# Patient Record
Sex: MALE | Race: BLACK OR AFRICAN AMERICAN | NOT HISPANIC OR LATINO | Employment: OTHER | URBAN - METROPOLITAN AREA
[De-identification: names, ages, dates, MRNs, and addresses within clinical notes are randomized per-mention and may not be internally consistent; named-entity substitution may affect disease eponyms.]

---

## 2017-01-03 ENCOUNTER — OFFICE VISIT (OUTPATIENT)
Dept: PODIATRY | Facility: CLINIC | Age: 67
End: 2017-01-03
Payer: MEDICARE

## 2017-01-03 VITALS
BODY MASS INDEX: 26.9 KG/M2 | HEART RATE: 78 BPM | DIASTOLIC BLOOD PRESSURE: 92 MMHG | HEIGHT: 73 IN | WEIGHT: 203 LBS | SYSTOLIC BLOOD PRESSURE: 150 MMHG

## 2017-01-03 DIAGNOSIS — B35.1 ONYCHOMYCOSIS: ICD-10-CM

## 2017-01-03 DIAGNOSIS — E11.49 TYPE II DIABETES MELLITUS WITH NEUROLOGICAL MANIFESTATIONS: Primary | ICD-10-CM

## 2017-01-03 PROCEDURE — 99499 UNLISTED E&M SERVICE: CPT | Mod: S$PBB,,, | Performed by: PODIATRIST

## 2017-01-03 PROCEDURE — 99499 UNLISTED E&M SERVICE: CPT | Mod: S$GLB,,, | Performed by: PODIATRIST

## 2017-01-03 PROCEDURE — 11721 DEBRIDE NAIL 6 OR MORE: CPT | Mod: Q9,S$GLB,, | Performed by: PODIATRIST

## 2017-01-03 PROCEDURE — 99999 PR PBB SHADOW E&M-EST. PATIENT-LVL III: CPT | Mod: PBBFAC,,, | Performed by: PODIATRIST

## 2017-01-03 NOTE — MR AVS SNAPSHOT
[facility]   [place]  [place] [address]  Phone: [phone]                  Xander Sanchez   1/3/2017 9:30 AM   Office Visit    Description:  Male : 1950   Provider:  Scott Garcia DPM   Department:  [facility]           Reason for Visit     Diabetes Mellitus     Diabetic Foot Exam           Diagnoses this Visit        Comments    Type II diabetes mellitus with neurological manifestations    -  Primary     Onychomycosis                To Do List           Future Appointments        Provider Department Dept Phone    2017 8:00 AM SUDS, UROLOGY Kadeem [facility] - Urology 4th Floor [phone]    2017 8:00 AM HRA, KNR 7 [facility] [phone]    2017 8:40 AM MD Kadeem Bailey III [facility] - Neurology [phone]    2017 8:00 AM MD Kadeem Maradiaga [facility]-Physical Med & Rehab [phone]    2017 8:00 AM HOME MONITOR DEVICE CHECK, [facility] - Arrhythmia [phone]      Goals (5 Years of Data)     None      Follow-Up and Disposition     Return in about 3 months (around 4/3/2017).      [facility]     [facility] On Call Nurse Care Line -  Assistance  Registered nurses in the [facility] On Call Center provide clinical advisement, health education, appointment booking, and other advisory services.  Call for this free service at [phone].             Medications           Message regarding Medications     Verify the changes and/or additions to your medication regime listed below are the same as discussed with your clinician today.  If any of these changes or additions are incorrect, please notify your healthcare provider.             Verify that the below list of medications is an accurate representation of the medications you are currently taking.  If none reported, the list may be blank. If incorrect, please contact your healthcare provider. Carry this list with you in case of emergency.           Current Medications      ACCU-CHEK SOFTCLIX LANCETS Misc     amlodipine (NORVASC) 5 MG tablet Take 1 tablet (5 mg total) by mouth once daily.    ammonium lactate 12 % Crea Apply to feet twice a day. Avoid use between toes.    aspirin (ECOTRIN) 81 MG EC tablet Take 81 mg by mouth once daily.    baclofen (LIORESAL) 10 MG tablet Take 1 tablet (10 mg total) by mouth 3 (three) times daily.    CONTOUR NEXT STRIPS Strp     docusate sodium (COLACE) 50 MG capsule Take 2 capsules (100 mg total) by mouth 2 (two) times daily as needed for Constipation.    econazole nitrate 1 % cream Apply topically 2 (two) times daily. Apply to feet twice daily.    fluticasone-salmeterol 230-21 mcg/dose (ADVAIR HFA) 230-21 mcg/actuation HFAA inhaler Inhale 2 puffs into the lungs 2 (two) times daily.    gabapentin (NEURONTIN) 600 MG tablet Take 1 tablet (600 mg total) by mouth 4 (four) times daily with meals and nightly.    hydrocodone-acetaminophen 10-325mg (NORCO)  mg Tab Take 1 tablet by mouth every 8 (eight) hours as needed (MSK pain).    hydrocodone-acetaminophen 10-325mg (NORCO)  mg Tab Starting on Jan 11, 2017. Take 1 tablet by mouth every 8 (eight) hours as needed (MSK pain).    lisinopril (PRINIVIL,ZESTRIL) 5 MG tablet Take 2 tablets (10 mg total) by mouth every morning.    oxybutynin (DITROPAN) 5 MG Tab Take 1 tablet (5 mg total) by mouth 3 (three) times daily.    oxybutynin (DITROPAN-XL) 5 MG TR24 Take 1 tablet (5 mg total) by mouth 2 (two) times daily.    polyethylene glycol (GLYCOLAX) 17 gram/dose powder Take 17 g by mouth once daily.    pravastatin (PRAVACHOL) 10 MG tablet TAKE ONE TABLET BY MOUTH AT BEDTIME    tamsulosin (FLOMAX) 0.4 mg Cp24 Take 1 capsule (0.4 mg total) by mouth once daily.    hydrochlorothiazide (MICROZIDE) 12.5 mg capsule Take 1 capsule (12.5 mg total) by mouth once daily.    omeprazole (PRILOSEC) 40 MG capsule Take 1 capsule (40 mg total) by mouth before breakfast.           Clinical Reference Information           Vital  "Signs - Last Recorded  Most recent update: 1/3/2017  9:34 AM by Patsy Knapp MA    BP Pulse Ht Wt BMI    (!) 150/92 78 6' 1" (1.854 m) 92.1 kg (203 lb) 26.78 kg/m2      Blood Pressure          Most Recent Value    BP  (!)  150/92      Allergies as of 1/3/2017     No Known Allergies      Immunizations Administered on Date of Encounter - 1/3/2017     None      "

## 2017-01-05 ENCOUNTER — PROCEDURE VISIT (OUTPATIENT)
Dept: UROLOGY | Facility: CLINIC | Age: 67
End: 2017-01-05
Payer: MEDICARE

## 2017-01-05 ENCOUNTER — TELEPHONE (OUTPATIENT)
Dept: UROLOGY | Facility: CLINIC | Age: 67
End: 2017-01-05

## 2017-01-05 VITALS
HEART RATE: 70 BPM | BODY MASS INDEX: 28 KG/M2 | HEIGHT: 71 IN | WEIGHT: 200 LBS | DIASTOLIC BLOOD PRESSURE: 78 MMHG | SYSTOLIC BLOOD PRESSURE: 148 MMHG | TEMPERATURE: 98 F

## 2017-01-05 DIAGNOSIS — N40.1 BENIGN NON-NODULAR PROSTATIC HYPERPLASIA WITH LOWER URINARY TRACT SYMPTOMS: Primary | ICD-10-CM

## 2017-01-05 DIAGNOSIS — R35.0 FREQUENCY OF MICTURITION: ICD-10-CM

## 2017-01-05 DIAGNOSIS — R33.9 INCOMPLETE BLADDER EMPTYING: ICD-10-CM

## 2017-01-05 PROCEDURE — 87086 URINE CULTURE/COLONY COUNT: CPT

## 2017-01-05 PROCEDURE — 51797 INTRAABDOMINAL PRESSURE TEST: CPT | Mod: S$GLB,,, | Performed by: UROLOGY

## 2017-01-05 PROCEDURE — 51728 CYSTOMETROGRAM W/VP: CPT | Mod: S$GLB,,, | Performed by: UROLOGY

## 2017-01-05 PROCEDURE — 52000 CYSTOURETHROSCOPY: CPT | Mod: 59,S$GLB,, | Performed by: UROLOGY

## 2017-01-05 PROCEDURE — 51741 ELECTRO-UROFLOWMETRY FIRST: CPT | Mod: 51,S$GLB,, | Performed by: UROLOGY

## 2017-01-05 PROCEDURE — 51784 ANAL/URINARY MUSCLE STUDY: CPT | Mod: 51,S$GLB,, | Performed by: UROLOGY

## 2017-01-05 RX ORDER — CIPROFLOXACIN 250 MG/1
500 TABLET, FILM COATED ORAL ONCE
Status: COMPLETED | OUTPATIENT
Start: 2017-01-05 | End: 2017-01-05

## 2017-01-05 RX ORDER — LIDOCAINE HYDROCHLORIDE 20 MG/ML
JELLY TOPICAL ONCE
Status: COMPLETED | OUTPATIENT
Start: 2017-01-05 | End: 2017-01-05

## 2017-01-05 RX ADMIN — LIDOCAINE HYDROCHLORIDE: 20 JELLY TOPICAL at 09:01

## 2017-01-05 RX ADMIN — CIPROFLOXACIN 500 MG: 250 TABLET, FILM COATED ORAL at 09:01

## 2017-01-05 NOTE — MR AVS SNAPSHOT
[facility] U[facility] 4th Floor  [address]  [facility] [address]  Phone: [phone]                  Xander Sanchez   2017 8:00 AM   Procedure visit    Description:  Male : 1950   Provider:  SUDS, UROLOGY   Department:  [facility] 4th Floor           Diagnoses this Visit        Comments    Incomplete bladder emptying         Frequency of micturition                To Do List           Future Appointments        Provider Department Dept Phone    2017 8:00 AM ELIZABETH WATERSR 7 [facility] [phone]    2017 8:40 AM MD Kadeem Bailey III [facility] - Neurology [phone]    2017 8:40 AM MD Kadeem Quinn [facility] - Arrhythmia [phone]    2017 8:00 AM MD Kademe Maradiaga [facility]-Physical Med & Rehab [phone]    2017 8:00 AM HOME MONITOR DEVICE CHECK, [facility] Arrhythmia [phone]      Goals (5 Years of Data)     None      [facility]     [facility] Nurse [facility] -  Assistance  Registered nurses in the [facility] On Call Center provide clinical advisement, health education, appointment booking, and other advisory services.  Call for this free service at [phone].             Medications           Message regarding Medications     Verify the changes and/or additions to your medication regime listed below are the same as discussed with your clinician today.  If any of these changes or additions are incorrect, please notify your healthcare provider.             Verify that the below list of medications is an accurate representation of the medications you are currently taking.  If none reported, the list may be blank. If incorrect, please contact your healthcare provider. Carry this list with you in case of emergency.           Current Medications     ACCU-CHEK SOFTCLIX LANCETS Misc     amlodipine (NORVASC) 5 MG tablet Take 1 tablet (5 mg total) by mouth once daily.    ammonium lactate 12 % Crea Apply to feet twice a  "day. Avoid use between toes.    aspirin (ECOTRIN) 81 MG EC tablet Take 81 mg by mouth once daily.    baclofen (LIORESAL) 10 MG tablet Take 1 tablet (10 mg total) by mouth 3 (three) times daily.    CONTOUR NEXT STRIPS Strp     docusate sodium (COLACE) 50 MG capsule Take 2 capsules (100 mg total) by mouth 2 (two) times daily as needed for Constipation.    econazole nitrate 1 % cream Apply topically 2 (two) times daily. Apply to feet twice daily.    fluticasone-salmeterol 230-21 mcg/dose (ADVAIR HFA) 230-21 mcg/actuation HFAA inhaler Inhale 2 puffs into the lungs 2 (two) times daily.    gabapentin (NEURONTIN) 600 MG tablet Take 1 tablet (600 mg total) by mouth 4 (four) times daily with meals and nightly.    hydrocodone-acetaminophen 10-325mg (NORCO)  mg Tab Take 1 tablet by mouth every 8 (eight) hours as needed (MSK pain).    hydrocodone-acetaminophen 10-325mg (NORCO)  mg Tab Starting on Jan 11, 2017. Take 1 tablet by mouth every 8 (eight) hours as needed (MSK pain).    lisinopril (PRINIVIL,ZESTRIL) 5 MG tablet Take 2 tablets (10 mg total) by mouth every morning.    oxybutynin (DITROPAN) 5 MG Tab Take 1 tablet (5 mg total) by mouth 3 (three) times daily.    oxybutynin (DITROPAN-XL) 5 MG TR24 Take 1 tablet (5 mg total) by mouth 2 (two) times daily.    polyethylene glycol (GLYCOLAX) 17 gram/dose powder Take 17 g by mouth once daily.    pravastatin (PRAVACHOL) 10 MG tablet TAKE ONE TABLET BY MOUTH AT BEDTIME    tamsulosin (FLOMAX) 0.4 mg Cp24 Take 1 capsule (0.4 mg total) by mouth once daily.    hydrochlorothiazide (MICROZIDE) 12.5 mg capsule Take 1 capsule (12.5 mg total) by mouth once daily.    omeprazole (PRILOSEC) 40 MG capsule Take 1 capsule (40 mg total) by mouth before breakfast.           Clinical Reference Information           Vital Signs - Last Recorded  Most recent update: 1/5/2017  8:14 AM by Ela Devine LPN    BP Pulse Temp Ht Wt BMI    (!) 134/99 75 98.3 °F (36.8 °C) (Oral) 5' 11" (1.803 m) " 90.7 kg (200 lb) 27.89 kg/m2      Blood Pressure          Most Recent Value    BP  (!)  134/99      Allergies as of 1/5/2017     No Known Allergies      Immunizations Administered on Date of Encounter - 1/5/2017     None      Orders Placed During Today's Visit      Normal Orders This Visit    Simple Urodynamics w/ Cysto       Instructions    SIMPLE URODYNAMIC STUDY (SUDS) & CYSTOSCOPY  UROLOGY CLINIC DISCHARGE INSTRUCTIONS    You have had a procedure that will require time to properly heal. Follow the instructions you have been given on how to care for yourself once you are home. Below is additional information to help in your recovery.    ACTIVITY  · There are no restrictions in activity. Start doing again the things you did before the procedure.  · You may experience a slight burning sensation. You may notice a small amount of blood in your urine. This will clear up within a day. Call the clinic if this continues beyond 48 hours.    DIET  · Continue your normal diet. You may eat the same foods you ate before your procedure.  · Drink plenty of fluids during the first 24-48 hours following your procedure.    MEDICATIONS  · Resume all other previous medications from your prescribing physician.  · Continue any pre=procedure antibiotics until they are all gone.    SIGNS AND SYMPTOMS TO REPORT TO THE DOCTOR  · Chills or fever greater than 101° F within 24 hours of procedure.  · Changes in urination, such as increased bleeding, foul smell, cloudy urine, or painful urination.  · Call your doctor with any questions or concerns.    For any emergency situation, call 911 immediately or go to your nearest emergency room.    [facility]  [phone]      Instructed by_________________________________          Patient Signature___________________________________                                                                                                                                                                                              If any problems after hours or weekends, you may call [phone] and ask for the urology resident on call.

## 2017-01-05 NOTE — PROCEDURES
Simple Urodynamics w/ Cysto  Date/Time: 1/5/2017 10:16 AM  Performed by: PEARL POTTER.  Authorized by: PEARL POTTER.   Comments: Procedure Date:  01/05/2017    Procedure:   Diagnostic Cystourethroscopy   Complex Cystometrogram   Voiding / Pressure Study with Intrarectal Balloon   Complex Uroflow   Electromyogram of Anal Sphincter.     Pre-OP Diagnosis:   bph with obstruction, incomplete bladder emptying, urgency, urge incontinence   Post-OP Diagnosis:   same   Anesthesia:   Anesthesia Administered:   Intraurethral instillation of 10 mL 2% lidocaine (Xylocaine) jelly.   Findings:   --- Bladder ---   CYSTOMETROGRAM ( Filling Phase ):   Cystometric Numeric Data:   - First Desire (Sensation): 321 mL at 7cm of water.   - Normal Desire: 411mL at 27 cm of water.   - Strong Desire: 413 mL at 64 cm of water.   - Urgency (Imminent Void) : 417 mL at 80cm of water.   - Maximum Cystometric Capacity: 418 mL.   Compliance:   - normal.   Leak Point Pressure:   - Valsalva ( Abdominal ) Leak Point Pressure: none.   UROFLOW:   - Voided Volume: 53 mL.   - Residual Urine: 425 mL.   - Maximum Flow Rate: 10 mL/sec.   - Flow Pattern: low intermittent flow  VOIDING PRESSURE STUDY ( Voiding Phase ):   Detrusor Pressure:   - Maximum Detrusor Pressure: 91cm of water.   - Detrusor Pressure at Maximum Flow: 62 cm of water.   - Detrusor Contraction Characteristics: Sustained contraction(s).   ELECTROMYOGRAM:   - normal.     ---Diagnostic Cystourethroscopy ---   Normal urethra.    Prostate 3.5 cm bilateral obstruction  Width of Bladder Neck Opening: Approximately 18 Fr.   Normal bladder with 2 + trabeculation.   Normal ureteral orifices bilaterally.       Description of Procedure:   Informed Consent:   - Risks, benefits and alternatives of procedure discussed with   patient and informed consent obtained.   Patient Position:   - Supine.   --- Bladder ---   Prep and Drape:   - Patient prepped and draped in usual sterile fashion using povidone    iodine (Betadine).   --- Diagnostic Cystourethroscopy ---   Instruments:   - 16 Fr flexible cystoscope with 0 degree lens.   Procedure Details:   - Cystoscope passed under vision into bladder.   - Bladder and urethra examined in their entirety with findings as   above.   --- Urodynamic Studies ---   Procedure Details:   Cystometrogram:   - Catheter(s) passed into the bladder.   - Rectal balloon inserted.   - Catheter(s) connected to infusion medium and to pressure recording   device.   - Infusion Rate: 30 mL / min.   Electromyogram:   - Perineal electromyogram pad placed and connected to electromyogram   recording device.   Equipment:   - Catheters: Double lumen catheter.   - Medium: Liquid.   - Pressure Recording Device: Calibrated electronic equipment.   Complications:   No immediate complications.    CONCLUSIONS:   1. BPH with obstruction  2. Incomplete bladder emptying  Failed medical treatment.  Laser TURP for BPH with obstruction.  Stop ASA.  Continue Flomax  0.8 mg q hs and oxybutynin for now.    Post-OP Plan:   Patient was discharged home in a stable condition.  Medications: cipro  Follow up:  Laser TURP

## 2017-01-05 NOTE — PATIENT INSTRUCTIONS
SIMPLE URODYNAMIC STUDY (SUDS) & CYSTOSCOPY  UROLOGY CLINIC DISCHARGE INSTRUCTIONS    You have had a procedure that will require time to properly heal. Follow the instructions you have been given on how to care for yourself once you are home. Below is additional information to help in your recovery.    ACTIVITY  · There are no restrictions in activity. Start doing again the things you did before the procedure.  · You may experience a slight burning sensation. You may notice a small amount of blood in your urine. This will clear up within a day. Call the clinic if this continues beyond 48 hours.    DIET  · Continue your normal diet. You may eat the same foods you ate before your procedure.  · Drink plenty of fluids during the first 24-48 hours following your procedure.    MEDICATIONS  · Resume all other previous medications from your prescribing physician.  · Continue any pre=procedure antibiotics until they are all gone.    SIGNS AND SYMPTOMS TO REPORT TO THE DOCTOR  · Chills or fever greater than 101° F within 24 hours of procedure.  · Changes in urination, such as increased bleeding, foul smell, cloudy urine, or painful urination.  · Call your doctor with any questions or concerns.    For any emergency situation, call 171 immediately or go to your nearest emergency room.    [facility]  [phone]      Instructed by_________________________________          Patient Signature___________________________________                                                                                                                                                                                             If any problems after hours or weekends, you may call [phone] and ask for the urology resident on call.

## 2017-01-05 NOTE — TELEPHONE ENCOUNTER
Diagnoses and all orders for this visit:    Benign non-nodular prostatic hyperplasia with lower urinary tract symptoms  -     Case Request Operating Room: PROSTATECTOMY-TRANSURETHRAL W QUANTA LASER

## 2017-01-05 NOTE — PROGRESS NOTES
Subjective:      Patient ID: Xander Sanchez is a 66 y.o. male.    Chief Complaint: Diabetes Mellitus and Diabetic Foot Exam    Xander is a 66 y.o. male who presents to the clinic for evaluation and treatment of high risk feet. Xander has a past medical history of Asthma; Cardiomyopathy; Cervical spondylosis with myelopathy (10/17/2012); Chronic bronchitis; Chronic systolic dysfunction of left ventricle (7/27/2015); Constipation (7/27/2015); COPD (chronic obstructive pulmonary disease); Diabetes mellitus, type 2; DM type 2 (diabetes mellitus, type 2) (7/27/2015); Enlarged prostate (7/27/2015); Hypertension; ICD (implantable cardiac defibrillator) in place; Presence of biventricular AICD (7/27/2015); 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thy (2/1/2016); and Urinary tract infection.  This patient has documented high risk feet requiring routine maintenance secondary to diabetes mellitis and those secondary complications of diabetes, as mentioned.  Wearing flip flops. Complains of elongated and thickened toenails. No new complaints.    PCP: Dr. August  LOV: 11/29/16    Past Medical History   Diagnosis Date    Asthma     Cardiomyopathy     Cervical spondylosis with myelopathy 10/17/2012    Chronic bronchitis     Chronic systolic dysfunction of left ventricle 7/27/2015    Constipation 7/27/2015    COPD (chronic obstructive pulmonary disease)     Diabetes mellitus, type 2     DM type 2 (diabetes mellitus, type 2) 7/27/2015    Enlarged prostate 7/27/2015    Hypertension     ICD (implantable cardiac defibrillator) in place     Presence of biventricular AICD 7/27/2015    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thy 2/1/2016    Urinary tract infection      pt does not know       Past Surgical History   Procedure Laterality Date    Cervical spine surgery      Cardiac defibrillator placement      Spine surgery      Eye surgery         Family History   Problem Relation Age of Onset     Hypertension Mother     Hypertension Father     COPD Father     No Known Problems Sister     No Known Problems Brother     No Known Problems Daughter     Prostate cancer Neg Hx     Kidney disease Neg Hx        Social History     Social History    Marital status: Single     Spouse name: N/A    Number of children: N/A    Years of education: N/A     Social History Main Topics    Smoking status: Former Smoker     Packs/day: 1.00     Years: 40.00     Quit date: 7/26/2009    Smokeless tobacco: Never Used    Alcohol use 0.6 oz/week     1 Cans of beer per week      Comment: occasionally    Drug use: No    Sexual activity: Yes     Partners: Female     Other Topics Concern    None     Social History Narrative       Current Outpatient Prescriptions   Medication Sig Dispense Refill    ACCU-CHEK SOFTCLIX LANCETS Misc       amlodipine (NORVASC) 5 MG tablet Take 1 tablet (5 mg total) by mouth once daily. 90 tablet 3    ammonium lactate 12 % Crea Apply to feet twice a day. Avoid use between toes. 140 g 5    aspirin (ECOTRIN) 81 MG EC tablet Take 81 mg by mouth once daily.      baclofen (LIORESAL) 10 MG tablet Take 1 tablet (10 mg total) by mouth 3 (three) times daily. 90 tablet 11    CONTOUR NEXT STRIPS Strp       docusate sodium (COLACE) 50 MG capsule Take 2 capsules (100 mg total) by mouth 2 (two) times daily as needed for Constipation. 30 capsule 0    econazole nitrate 1 % cream Apply topically 2 (two) times daily. Apply to feet twice daily. 85 g 2    fluticasone-salmeterol 230-21 mcg/dose (ADVAIR HFA) 230-21 mcg/actuation HFAA inhaler Inhale 2 puffs into the lungs 2 (two) times daily. 12 each 6    gabapentin (NEURONTIN) 600 MG tablet Take 1 tablet (600 mg total) by mouth 4 (four) times daily with meals and nightly. 360 tablet 3    hydrocodone-acetaminophen 10-325mg (NORCO)  mg Tab Take 1 tablet by mouth every 8 (eight) hours as needed (MSK pain). 90 tablet 0    [START ON 1/11/2017]  hydrocodone-acetaminophen 10-325mg (NORCO)  mg Tab Take 1 tablet by mouth every 8 (eight) hours as needed (MSK pain). 90 tablet 0    lisinopril (PRINIVIL,ZESTRIL) 5 MG tablet Take 2 tablets (10 mg total) by mouth every morning. 270 tablet 3    oxybutynin (DITROPAN) 5 MG Tab Take 1 tablet (5 mg total) by mouth 3 (three) times daily. 270 tablet 3    oxybutynin (DITROPAN-XL) 5 MG TR24 Take 1 tablet (5 mg total) by mouth 2 (two) times daily. 30 tablet 11    polyethylene glycol (GLYCOLAX) 17 gram/dose powder Take 17 g by mouth once daily. 1 Bottle 6    pravastatin (PRAVACHOL) 10 MG tablet TAKE ONE TABLET BY MOUTH AT BEDTIME 90 tablet 0    tamsulosin (FLOMAX) 0.4 mg Cp24 Take 1 capsule (0.4 mg total) by mouth once daily. 90 capsule 3    hydrochlorothiazide (MICROZIDE) 12.5 mg capsule Take 1 capsule (12.5 mg total) by mouth once daily. 10 capsule 0    omeprazole (PRILOSEC) 40 MG capsule Take 1 capsule (40 mg total) by mouth before breakfast. 30 capsule 0     No current facility-administered medications for this visit.        Review of patient's allergies indicates:  No Known Allergies    PCP: Williams Alvarenga MD    Date Last Seen by PCP:     Current shoe gear:  Affected Foot: Casual shoes     Unaffected Foot: Casual shoes    Hemoglobin A1C   Date Value Ref Range Status   08/17/2016 6.4 (H) 4.5 - 6.2 % Final     Comment:     According to ADA guidelines, hemoglobin A1C <7.0% represents  optimal control in non-pregnant diabetic patients.  Different  metrics may apply to specific populations.   Standards of Medical Care in Diabetes - 2016.  For the purpose of screening for the presence of diabetes:  <5.7%     Consistent with the absence of diabetes  5.7-6.4%  Consistent with increasing risk for diabetes   (prediabetes)  >or=6.5%  Consistent with diabetes  Currently no consensus exists for use of hemoglobin A1C  for diagnosis of diabetes for children.     02/01/2016 6.5 (H) 4.5 - 6.2 % Final   07/27/2015 7.2  (H) 4.5 - 6.2 % Final       Review of Systems   Constitution: Negative for chills and fever.   Respiratory: Negative for shortness of breath.    Skin: Positive for color change and nail changes. Negative for itching.   Musculoskeletal: Negative for falls, joint pain and muscle weakness.   Gastrointestinal: Negative for nausea and vomiting.   Neurological: Negative for focal weakness, loss of balance, numbness and paresthesias.           Objective:      Physical Exam   Constitutional: He is oriented to person, place, and time. He appears well-nourished. No distress.   Cardiovascular: Intact distal pulses.    Pulses:       Dorsalis pedis pulses are 2+ on the right side, and 2+ on the left side.        Posterior tibial pulses are 2+ on the right side, and 2+ on the left side.   CRT < 3 seconds to digits 1-5 bilateral, no edema.   Musculoskeletal:        Right foot: There is decreased range of motion. There is no deformity.        Left foot: There is decreased range of motion. There is no deformity.   Equinus noted b/l ankles with < 10 deg DF noted. MMT 5/5 in DF/PF/Inv/Ev resistance with no reproduction of pain in any direction. Passive range of motion of ankle and pedal joints is painless b/l.     Feet:   Right Foot:   Protective Sensation: 10 sites tested. 9 sites sensed.   Skin Integrity: Positive for dry skin. Negative for ulcer, skin breakdown, erythema, warmth or callus.   Left Foot:   Protective Sensation: 10 sites tested. 7 sites sensed.   Skin Integrity: Positive for dry skin. Negative for ulcer, skin breakdown, erythema, warmth or callus.   Neurological: He is alert and oriented to person, place, and time. He has normal strength. A sensory deficit is present.   Decreased vibratory sensation to the bilateral foot.   Skin: Skin is warm, dry and intact. No ecchymosis, no lesion, no petechiae and no rash noted. He is not diaphoretic. No cyanosis or erythema. No pallor. Nails show no clubbing.   Skin is dry and  flaky plantar foot bilateral.    Hyperpigmented patch of skin dorsal left hallux.     Nails 1-5 bilateral are thickened, slightly yellow with debris, loosened and elongated.     No open lesions or macerations bilateral lower extremity.             Assessment:       Encounter Diagnoses   Name Primary?    Type II diabetes mellitus with neurological manifestations Yes    Onychomycosis          Plan:       Xander was seen today for diabetes mellitus and diabetic foot exam.    Diagnoses and all orders for this visit:    Type II diabetes mellitus with neurological manifestations    Onychomycosis      I counseled the patient on his conditions, their implications and medical management.    Shoe inspection. Diabetic Foot Education. Patient reminded of the importance of good nutrition and blood sugar control to help prevent podiatric complications of diabetes. Patient instructed on proper foot hygeine. We discussed wearing proper shoe gear, daily foot inspections, never walking without protective shoe gear, never putting sharp instruments to feet    With patient's verbal consent, nails were aggressively reduced and debrided x 10 to their soft tissue attachment mechanically and with electric , removing all offending nail and debris. Patient relates relief following the procedure. No anesthesia or hemostasis required. No blood loss.

## 2017-01-06 LAB — BACTERIA UR CULT: NO GROWTH

## 2017-01-13 ENCOUNTER — OFFICE VISIT (OUTPATIENT)
Dept: FAMILY MEDICINE | Facility: CLINIC | Age: 67
End: 2017-01-13
Payer: MEDICARE

## 2017-01-13 VITALS
BODY MASS INDEX: 28.24 KG/M2 | DIASTOLIC BLOOD PRESSURE: 77 MMHG | HEART RATE: 74 BPM | HEIGHT: 71 IN | WEIGHT: 201.75 LBS | SYSTOLIC BLOOD PRESSURE: 132 MMHG

## 2017-01-13 DIAGNOSIS — I42.8 CARDIOMYOPATHY, NONISCHEMIC: ICD-10-CM

## 2017-01-13 DIAGNOSIS — E11.9 TYPE 2 DIABETES MELLITUS WITHOUT COMPLICATION, WITHOUT LONG-TERM CURRENT USE OF INSULIN: Chronic | ICD-10-CM

## 2017-01-13 DIAGNOSIS — Z00.00 ENCOUNTER FOR PREVENTIVE HEALTH EXAMINATION: Primary | ICD-10-CM

## 2017-01-13 DIAGNOSIS — I50.32 DIASTOLIC DYSFUNCTION WITH CHRONIC HEART FAILURE: ICD-10-CM

## 2017-01-13 DIAGNOSIS — F11.20 NARCOTIC DEPENDENCY, CONTINUOUS: ICD-10-CM

## 2017-01-13 DIAGNOSIS — J43.9 PULMONARY EMPHYSEMA, UNSPECIFIED EMPHYSEMA TYPE: ICD-10-CM

## 2017-01-13 DIAGNOSIS — I70.0 AORTIC ATHEROSCLEROSIS: ICD-10-CM

## 2017-01-13 DIAGNOSIS — M47.812 FACET ARTHRITIS OF CERVICAL REGION: ICD-10-CM

## 2017-01-13 DIAGNOSIS — I10 ESSENTIAL HYPERTENSION: ICD-10-CM

## 2017-01-13 PROCEDURE — 99499 UNLISTED E&M SERVICE: CPT | Mod: S$PBB,,, | Performed by: NURSE PRACTITIONER

## 2017-01-13 PROCEDURE — 99999 PR PBB SHADOW E&M-EST. PATIENT-LVL IV: CPT | Mod: PBBFAC,,, | Performed by: NURSE PRACTITIONER

## 2017-01-13 PROCEDURE — 3075F SYST BP GE 130 - 139MM HG: CPT | Mod: S$GLB,,, | Performed by: NURSE PRACTITIONER

## 2017-01-13 PROCEDURE — 3078F DIAST BP <80 MM HG: CPT | Mod: S$GLB,,, | Performed by: NURSE PRACTITIONER

## 2017-01-13 PROCEDURE — G0439 PPPS, SUBSEQ VISIT: HCPCS | Mod: S$GLB,,, | Performed by: NURSE PRACTITIONER

## 2017-01-13 NOTE — PATIENT INSTRUCTIONS
Counseling and Referral of Other Preventative  (Italic type indicates deductible and co-insurance are waived)    Patient Name: Xander Sanchez  Today's Date: 1/13/2017      SERVICE LIMITATIONS RECOMMENDATION    Vaccines    · Pneumococcal (once after 65)    · Influenza (annually)    · Hepatitis B (if medium/high risk)    · Prevnar 13      Hepatitis B medium/high risk factors:       - End-stage renal disease       - Hemophiliacs who received Factor VII or         IX concentrates       - Clients of institutions for the mentally             retarded       - Persons who live in the same house as          a HepB carrier       - Homosexual men       - Illicit injectable drug abusers     Pneumococcal: Done, no repeat necessary     Influenza: Done, repeat in one year     Hepatitis B: N/A Not recommended     Prevnar 13: Done, repeat at next scheduled date    Prostate cancer screening (annually to age 75)     Prostate specific antigen (PSA) Shared decision making with Provider. Sometimes a co-pay may be required if the patient decides to have this test. The USPSTF no longer recommends prostate cancer screening routinely in medicine: every 1 year    Colorectal cancer screening (to age 75)    · Fecal occult blood test (annual)  · Flexible sigmoidoscopy (5y)  · Screening colonoscopy (10y)  · Barium enema   Last done 05/08/2012, recommend to repeat every 10  years    Diabetes self-management training (no USPSTF recommendations)  Requires referral by treating physician for patient with diabetes or renal disease. 10 hours of initial DSMT sessions of no less than 30 minutes each in a continuous 12-month period. 2 hours of follow-up DSMT in subsequent years.  N/A Not recommended    Glaucoma screening (no USPSTF recommendation)  Diabetes mellitus, family history   , age 50 or over    American, age 65 or over  Done this year, repeat every year    Medical nutrition therapy for diabetes or renal disease (no  recommended schedule)  Requires referral by treating physician for patient with diabetes or renal disease or kidney transplant within the past 3 years.  Can be provided in same year as diabetes self-management training (DSMT), and CMS recommends medical nutrition therapy take place after DSMT. Up to 3 hours for initial year and 2 hours in subsequent years.  N/A Not recommended    Cardiovascular screening blood tests (every 5 years)  · Fasting lipid panel  Order as a panel if possible  Done this year, repeat every year    Diabetes screening tests (at least every 3 years, Medicare covers annually or at 6-month intervals for prediabetic patients)  · Fasting blood sugar (FBS) or glucose tolerance test (GTT)  Patient must be diagnosed with one of the following:       - Hypertension       - Dyslipidemia       - Obesity (BMI 30kg/m2)       - Previous elevated impaired FBS or GTT       ... or any two of the following:       - Overweight (BMI 25 but <30)       - Family history of diabetes       - Age 65 or older       - History of gestational diabetes or birth of baby weighing more than 9 pounds  Done this year, repeat every year    Abdominal aortic aneurysm screening (once)  · Sonogram   Limited to patients who meet one of the following criteria:       - Men who are 65-75 years old and have smoked more than 100 cigarette in their lifetime       - Anyone with a family history of abdominal aortic aneurysm       - Anyone recommended for screening by the USPSTF  Done this year, repeat every year    HIV screening (annually for increased risk patients)  · HIV-1 and HIV-2 by EIA, or MEGHANN, rapid antibody test or oral mucosa transudate  Patients must be at increased risk for HIV infection per USPSTF guidelines or pregnant. Tests covered annually for patient at increased risk or as requested by the patient. Pregnant patients may receive up to 3 tests during pregnancy.  Risks discussed, screening is not recommended    Smoking  cessation counseling (up to 8 sessions per year)  Patients must be asymptomatic of tobacco-related conditions to receive as a preventative service.  Former smoker    Subsequent annual wellness visit  At least 12 months since last AWV  Return in one year     The following information is provided to all patients.  This information is to help you find resources for any of the problems found today that may be affecting your health:                Living healthy guide: www.[facility].[place].[facility]      Understanding Diabetes: www.diabetes.org      Eating healthy: www.cdc.gov/healthyweight      [facility] home safety checklist: www.cdc.gov/steadi/patient.html      Agency on Aging: www.goea.[place].[facility]      Alcoholics anonymous (AA): www.aa.org      Physical Activity: www.juvencio.nih.gov/xx6fbcs      Tobacco use: www.quitwithusla.org

## 2017-01-13 NOTE — PROGRESS NOTES
"Xander Sanchez presented for a  Medicare AWV and comprehensive Health Risk Assessment today. The following components were reviewed and updated:    · Medical history  · Family History  · Social history  · Allergies and Current Medications  · Health Risk Assessment  · Health Maintenance  · Care Team     ** See Completed Assessments for Annual Wellness Visit within the encounter summary.**       The following assessments were completed:  · Living Situation  · CAGE  · Depression Screening  · Timed Get Up and Go  · Whisper Test  · Cognitive Function Screening  · Nutrition Screening  · ADL Screening  · PAQ Screening    Vitals:    01/13/17 0812   BP: 132/77   Pulse: 74   Weight: 91.5 kg (201 lb 11.5 oz)   Height: 5' 11" (1.803 m)     Body mass index is 28.13 kg/(m^2).     Physical Exam   Constitutional: He is oriented to person, place, and time. He appears well-developed and well-nourished. No distress.   HENT:   Head: Normocephalic and atraumatic.   Eyes: EOM are normal. Pupils are equal, round, and reactive to light.   Neck: Neck supple. No JVD present. No tracheal deviation present.   Cardiovascular: Normal rate, regular rhythm, normal heart sounds and intact distal pulses.    No murmur heard.  Pulmonary/Chest: Effort normal and breath sounds normal. No respiratory distress. He has no wheezes. He has no rales.   Abdominal: Soft. Bowel sounds are normal. He exhibits no distension and no mass. There is no tenderness.   Musculoskeletal: Normal range of motion. He exhibits no edema or tenderness.   Neurological: He is alert and oriented to person, place, and time. Coordination normal.   Skin: Skin is warm and dry. No erythema. No pallor.   Psychiatric: He has a normal mood and affect. His behavior is normal. Judgment and thought content normal. Cognition and memory are normal. He expresses no homicidal and no suicidal ideation.   Nursing note and vitals reviewed.        Diagnoses and health risks identified today and " associated recommendations/orders:    1. Encounter for preventive health examination    2. Type 2 diabetes mellitus without complication, without long-term current use of insulin  Chronic; stable with lifestyle modifications.  Followed by PCP.    3. Essential hypertension  Chronic; stable on medication.  Followed by PCP.    4. Aortic atherosclerosis  Chronic; stable.  As seen on imaging dated 03/27/11.  Followed by Cardiology.    5. Cardiomyopathy, nonischemic  Chronic; stable on medication.  Followed by Cardiology.    6. Diastolic dysfunction with chronic heart failure  Chronic; stable on medication.  Followed by Cardiology.    7. Pulmonary emphysema, unspecified emphysema type  Chronic; stable on medication.  Followed by PCP.    8. Facet arthritis of cervical region  Chronic; stable on medication.  As seen on imaging dated 08/04/15.  Followed by Pain Management.    9. Narcotic dependency, continuous  Chronic; stable.  Followed by PCP.    10. BMI 28.0-28.9,adult      Provided Xander with a 5-10 year written screening schedule and personal prevention plan. Recommendations were developed using the USPSTF age appropriate recommendations. Education, counseling, and referrals were provided as needed. After Visit Summary printed and given to patient which includes a list of additional screenings\tests needed.    Return in 4 months (on 5/29/2017) for follow-up with PCP, HRA visit in 1 year.    Pooja Duong NP

## 2017-01-13 NOTE — MR AVS SNAPSHOT
[facility]   [place]  [place] [address]  Phone: [phone]  Fax: [phone]                  Xander Sanchez   2017 8:00 AM   Office Visit    Description:  Male : 1950   Provider:  Pooja Duong NP   Department:  [facility]           Reason for Visit     Health Risk Assessment           Diagnoses this Visit        Comments    Encounter for preventive health examination    -  Primary            To Do List           Future Appointments        Provider Department Dept Phone    2017 8:40 AM Nahun Lockhart III, MD [facility] - Neurology [phone]    2017 8:40 AM Christian Navarro MD [facility] - Arrhythmia [phone]    2017 3:00 PM PREOP, HP UROLOGY [facility] Urology 4th Floor [phone]    2/3/2017 10:40 AM Ness Diego NP [facility] Urolog 4th Floor [phone]    2017 8:00 AM Sara Ruiz MD [facility]-Physical Med & Rehab [phone]      Your Future Surgeries/Procedures     2017   Surgery with Graham Buenrostro MD   [facility]    [address] [place] [address] [phone]              Goals (5 Years of Data)     None      Follow-Up and Disposition     Return in 4 months (on 2017) for follow-up with PCP, HRA visit in 1 year.      [facility]     [facility] -  Assistance  Registered nurses in the [facility] provide clinical advisement, health education, appointment booking, and other advisory services.  Call for this free service at [phone].             Medications           Message regarding Medications     Verify the changes and/or additions to your medication regime listed below are the same as discussed with your clinician today.  If any of these changes or additions are incorrect, please notify your healthcare provider.        STOP taking these medications     ammonium lactate 12 % Crea Apply to feet  twice a day. Avoid use between toes.    docusate sodium (COLACE) 50 MG capsule Take 2 capsules (100 mg total) by mouth 2 (two) times daily as needed for Constipation.    econazole nitrate 1 % cream Apply topically 2 (two) times daily. Apply to feet twice daily.    omeprazole (PRILOSEC) 40 MG capsule Take 1 capsule (40 mg total) by mouth before breakfast.    oxybutynin (DITROPAN-XL) 5 MG TR24 Take 1 tablet (5 mg total) by mouth 2 (two) times daily.    polyethylene glycol (GLYCOLAX) 17 gram/dose powder Take 17 g by mouth once daily.    hydrochlorothiazide (MICROZIDE) 12.5 mg capsule Take 1 capsule (12.5 mg total) by mouth once daily.           Verify that the below list of medications is an accurate representation of the medications you are currently taking.  If none reported, the list may be blank. If incorrect, please contact your healthcare provider. Carry this list with you in case of emergency.           Current Medications     ACCU-CHEK SOFTCLIX LANCETS Misc     amlodipine (NORVASC) 5 MG tablet Take 1 tablet (5 mg total) by mouth once daily.    aspirin (ECOTRIN) 81 MG EC tablet Take 81 mg by mouth once daily.    baclofen (LIORESAL) 10 MG tablet Take 1 tablet (10 mg total) by mouth 3 (three) times daily.    CONTOUR NEXT STRIPS Strp     fluticasone-salmeterol 230-21 mcg/dose (ADVAIR HFA) 230-21 mcg/actuation HFAA inhaler Inhale 2 puffs into the lungs 2 (two) times daily.    gabapentin (NEURONTIN) 600 MG tablet Take 1 tablet (600 mg total) by mouth 4 (four) times daily with meals and nightly.    hydrocodone-acetaminophen 10-325mg (NORCO)  mg Tab Take 1 tablet by mouth every 8 (eight) hours as needed (MSK pain).    lisinopril (PRINIVIL,ZESTRIL) 5 MG tablet Take 2 tablets (10 mg total) by mouth every morning.    oxybutynin (DITROPAN) 5 MG Tab Take 1 tablet (5 mg total) by mouth 3 (three) times daily.    pravastatin (PRAVACHOL) 10 MG tablet TAKE ONE TABLET BY MOUTH AT BEDTIME    tamsulosin (FLOMAX) 0.4 mg Cp24  "Take 1 capsule (0.4 mg total) by mouth once daily.           Clinical Reference Information           Vital Signs - Last Recorded  Most recent update: 1/13/2017  8:14 AM by Shruthi Gonzales MA    BP Pulse Ht Wt BMI    132/77 74 5' 11" (1.803 m) 91.5 kg (201 lb 11.5 oz) 28.13 kg/m2      Blood Pressure          Most Recent Value    BP  132/77      Allergies as of 1/13/2017     No Known Allergies      Immunizations Administered on Date of Encounter - 1/13/2017     None      Instructions      Counseling and Referral of Other Preventative  (Italic type indicates deductible and co-insurance are waived)    Patient Name: Xander Sanchez  Today's Date: 1/13/2017      SERVICE LIMITATIONS RECOMMENDATION    Vaccines    · Pneumococcal (once after 65)    · Influenza (annually)    · Hepatitis B (if medium/high risk)    · Prevnar 13      Hepatitis B medium/high risk factors:       - End-stage renal disease       - Hemophiliacs who received Factor VII or         IX concentrates       - Clients of institutions for the mentally             retarded       - Persons who live in the same house as          a HepB carrier       - Homosexual men       - Illicit injectable drug abusers     Pneumococcal: Done, no repeat necessary     Influenza: Done, repeat in one year     Hepatitis B: N/A Not recommended     Prevnar 13: Done, repeat at next scheduled date    Prostate cancer screening (annually to age 75)     Prostate specific antigen (PSA) Shared decision making with Provider. Sometimes a co-pay may be required if the patient decides to have this test. The USPSTF no longer recommends prostate cancer screening routinely in medicine: every 1 year    Colorectal cancer screening (to age 75)    · Fecal occult blood test (annual)  · Flexible sigmoidoscopy (5y)  · Screening colonoscopy (10y)  · Barium enema   Last done 05/08/2012, recommend to repeat every 10  years    Diabetes self-management training (no USPSTF recommendations)  Requires " referral by treating physician for patient with diabetes or renal disease. 10 hours of initial DSMT sessions of no less than 30 minutes each in a continuous 12-month period. 2 hours of follow-up DSMT in subsequent years.  N/A Not recommended    Glaucoma screening (no USPSTF recommendation)  Diabetes mellitus, family history   , age 50 or over    American, age 65 or over  Done this year, repeat every year    Medical nutrition therapy for diabetes or renal disease (no recommended schedule)  Requires referral by treating physician for patient with diabetes or renal disease or kidney transplant within the past 3 years.  Can be provided in same year as diabetes self-management training (DSMT), and CMS recommends medical nutrition therapy take place after DSMT. Up to 3 hours for initial year and 2 hours in subsequent years.  N/A Not recommended    Cardiovascular screening blood tests (every 5 years)  · Fasting lipid panel  Order as a panel if possible  Done this year, repeat every year    Diabetes screening tests (at least every 3 years, Medicare covers annually or at 6-month intervals for prediabetic patients)  · Fasting blood sugar (FBS) or glucose tolerance test (GTT)  Patient must be diagnosed with one of the following:       - Hypertension       - Dyslipidemia       - Obesity (BMI 30kg/m2)       - Previous elevated impaired FBS or GTT       ... or any two of the following:       - Overweight (BMI 25 but <30)       - Family history of diabetes       - Age 65 or older       - History of gestational diabetes or birth of baby weighing more than 9 pounds  Done this year, repeat every year    Abdominal aortic aneurysm screening (once)  · Sonogram   Limited to patients who meet one of the following criteria:       - Men who are 65-75 years old and have smoked more than 100 cigarette in their lifetime       - Anyone with a family history of abdominal aortic aneurysm       - Anyone recommended for  screening by the USPSTF  Done this year, repeat every year    HIV screening (annually for increased risk patients)  · HIV-1 and HIV-2 by EIA, or MEGHANN, rapid antibody test or oral mucosa transudate  Patients must be at increased risk for HIV infection per USPSTF guidelines or pregnant. Tests covered annually for patient at increased risk or as requested by the patient. Pregnant patients may receive up to 3 tests during pregnancy.  Risks discussed, screening is not recommended    Smoking cessation counseling (up to 8 sessions per year)  Patients must be asymptomatic of tobacco-related conditions to receive as a preventative service.  Former smoker    Subsequent annual wellness visit  At least 12 months since last AWV  Return in one year     The following information is provided to all patients.  This information is to help you find resources for any of the problems found today that may be affecting your health:                Living healthy guide: www.[facility].[place].gov      Understanding Diabetes: www.diabetes.org      Eating healthy: www.cdc.gov/healthyweight      CDC home safety checklist: www.cdc.gov/steadi/patient.html      Agency on Aging: www.goea.[place].[facility]      Alcoholics anonymous (AA): www.aa.org      Physical Activity: www.juvencio.nih.gov/aw1wtim      Tobacco use: www.quitwithusla.org

## 2017-01-20 ENCOUNTER — TELEPHONE (OUTPATIENT)
Dept: ENDOCRINOLOGY | Facility: CLINIC | Age: 67
End: 2017-01-20

## 2017-01-20 NOTE — TELEPHONE ENCOUNTER
----- Message from Ana Pulido sent at 1/18/2017 10:29 AM CST -----  Contact: patient  [phone]  Arjun  -   Pt calling to speak with the DrJose Cruz In regards to having a procedure done , dealing with shots - call back number [phone]  Thanks,

## 2017-01-23 ENCOUNTER — TELEPHONE (OUTPATIENT)
Dept: ELECTROPHYSIOLOGY | Facility: CLINIC | Age: 67
End: 2017-01-23

## 2017-01-23 DIAGNOSIS — I44.7 LBBB (LEFT BUNDLE BRANCH BLOCK): Primary | ICD-10-CM

## 2017-01-23 NOTE — TELEPHONE ENCOUNTER
Called pt to request device check in AM prior to apt with Dr. Navarro. Pt unavailable, spoke to spouse. She states she will give pt message.

## 2017-01-24 ENCOUNTER — OFFICE VISIT (OUTPATIENT)
Dept: ELECTROPHYSIOLOGY | Facility: CLINIC | Age: 67
End: 2017-01-24
Payer: MEDICARE

## 2017-01-24 ENCOUNTER — ANESTHESIA EVENT (OUTPATIENT)
Dept: SURGERY | Facility: HOSPITAL | Age: 67
End: 2017-01-24
Payer: MEDICARE

## 2017-01-24 ENCOUNTER — CLINICAL SUPPORT (OUTPATIENT)
Dept: ELECTROPHYSIOLOGY | Facility: CLINIC | Age: 67
End: 2017-01-24
Payer: MEDICARE

## 2017-01-24 VITALS
SYSTOLIC BLOOD PRESSURE: 122 MMHG | BODY MASS INDEX: 28.15 KG/M2 | HEIGHT: 71 IN | HEART RATE: 83 BPM | DIASTOLIC BLOOD PRESSURE: 70 MMHG | WEIGHT: 201.06 LBS

## 2017-01-24 DIAGNOSIS — I42.9 CARDIOMYOPATHY, PRIMARY: ICD-10-CM

## 2017-01-24 DIAGNOSIS — I44.7 LBBB (LEFT BUNDLE BRANCH BLOCK): ICD-10-CM

## 2017-01-24 DIAGNOSIS — Z95.810 PRESENCE OF BIVENTRICULAR AICD: ICD-10-CM

## 2017-01-24 DIAGNOSIS — I50.32 DIASTOLIC DYSFUNCTION WITH CHRONIC HEART FAILURE: ICD-10-CM

## 2017-01-24 DIAGNOSIS — Z95.810 AUTOMATIC IMPLANTABLE CARDIOVERTER-DEFIBRILLATOR IN SITU: ICD-10-CM

## 2017-01-24 DIAGNOSIS — I10 ESSENTIAL HYPERTENSION: ICD-10-CM

## 2017-01-24 DIAGNOSIS — I42.8 CARDIOMYOPATHY, NONISCHEMIC: ICD-10-CM

## 2017-01-24 DIAGNOSIS — E11.9 TYPE 2 DIABETES MELLITUS WITHOUT COMPLICATION, WITHOUT LONG-TERM CURRENT USE OF INSULIN: Chronic | ICD-10-CM

## 2017-01-24 DIAGNOSIS — I51.9 CHRONIC SYSTOLIC DYSFUNCTION OF LEFT VENTRICLE: Primary | ICD-10-CM

## 2017-01-24 PROCEDURE — 3074F SYST BP LT 130 MM HG: CPT | Mod: S$GLB,,, | Performed by: INTERNAL MEDICINE

## 2017-01-24 PROCEDURE — 1160F RVW MEDS BY RX/DR IN RCRD: CPT | Mod: S$GLB,,, | Performed by: INTERNAL MEDICINE

## 2017-01-24 PROCEDURE — 1157F ADVNC CARE PLAN IN RCRD: CPT | Mod: S$GLB,,, | Performed by: INTERNAL MEDICINE

## 2017-01-24 PROCEDURE — 99214 OFFICE O/P EST MOD 30 MIN: CPT | Mod: S$GLB,,, | Performed by: INTERNAL MEDICINE

## 2017-01-24 PROCEDURE — 1126F AMNT PAIN NOTED NONE PRSNT: CPT | Mod: S$GLB,,, | Performed by: INTERNAL MEDICINE

## 2017-01-24 PROCEDURE — 93283 PRGRMG EVAL IMPLANTABLE DFB: CPT | Mod: S$GLB,,, | Performed by: INTERNAL MEDICINE

## 2017-01-24 PROCEDURE — 1159F MED LIST DOCD IN RCRD: CPT | Mod: S$GLB,,, | Performed by: INTERNAL MEDICINE

## 2017-01-24 PROCEDURE — 3044F HG A1C LEVEL LT 7.0%: CPT | Mod: S$GLB,,, | Performed by: INTERNAL MEDICINE

## 2017-01-24 PROCEDURE — 99999 PR PBB SHADOW E&M-EST. PATIENT-LVL III: CPT | Mod: PBBFAC,,, | Performed by: INTERNAL MEDICINE

## 2017-01-24 PROCEDURE — 99499 UNLISTED E&M SERVICE: CPT | Mod: S$GLB,,, | Performed by: INTERNAL MEDICINE

## 2017-01-24 PROCEDURE — 3078F DIAST BP <80 MM HG: CPT | Mod: S$GLB,,, | Performed by: INTERNAL MEDICINE

## 2017-01-24 PROCEDURE — 93000 ELECTROCARDIOGRAM COMPLETE: CPT | Mod: S$GLB,,, | Performed by: INTERNAL MEDICINE

## 2017-01-24 PROCEDURE — 4010F ACE/ARB THERAPY RXD/TAKEN: CPT | Mod: S$GLB,,, | Performed by: INTERNAL MEDICINE

## 2017-01-24 NOTE — MR AVS SNAPSHOT
[facility] Arrhythmia  [address]  [place] [address]  Phone: [phone]  Fax: [phone]                  Xander Sanchez   2017 8:40 AM   Office Visit    Description:  Male : 1950   Provider:  Christian Navarro MD   Department:  [facility] - Arrhythmia           Reason for Visit     Pacemaker Check           Diagnoses this Visit        Comments    Chronic systolic dysfunction of left ventricle    -  Primary     LBBB (left bundle branch block)         Cardiomyopathy, nonischemic         Essential hypertension         Diastolic dysfunction with chronic heart failure         Type 2 diabetes mellitus without complication, without long-term current use of insulin         Presence of biventricular AICD                To Do List           Future Appointments        Provider Department Dept Phone    2017 8:40 AM Christian Navarro MD [facility] Arrhythmia [phone]    2017 3:40 PM PACEMAKER, ICD [facility] [phone]    2017 3:00 PM PREOP, HP UROLOGY [facility] Urology 4th Floor [phone]    2/3/2017 10:40 AM Ness Diego NP [facility] Urology 4th Floor [phone]    2017 8:00 AM Sara Ruiz MD [facility]-Physical Med & Rehab [phone]      Your Future Surgeries/Procedures     2017   Surgery with Graham Buenrostro MD   [facility]    [address] [facility] [address] [phone]              Goals (5 Years of Data)     None      Follow-Up and Disposition     Return in about 1 year (around 2018).    Follow-up and Disposition History      [facility]     [facility] -  Assistance  Registered nurses in the [facility] provide clinical advisement, health education, appointment booking, and other advisory services.  Call for this free service at [phone].             Medications           Message regarding Medications     Verify the changes  "and/or additions to your medication regime listed below are the same as discussed with your clinician today.  If any of these changes or additions are incorrect, please notify your healthcare provider.             Verify that the below list of medications is an accurate representation of the medications you are currently taking.  If none reported, the list may be blank. If incorrect, please contact your healthcare provider. Carry this list with you in case of emergency.           Current Medications     amlodipine (NORVASC) 5 MG tablet Take 1 tablet (5 mg total) by mouth once daily.    aspirin (ECOTRIN) 81 MG EC tablet Take 81 mg by mouth once daily.    baclofen (LIORESAL) 10 MG tablet Take 1 tablet (10 mg total) by mouth 3 (three) times daily.    fluticasone-salmeterol 230-21 mcg/dose (ADVAIR HFA) 230-21 mcg/actuation HFAA inhaler Inhale 2 puffs into the lungs 2 (two) times daily.    gabapentin (NEURONTIN) 600 MG tablet Take 1 tablet (600 mg total) by mouth 4 (four) times daily with meals and nightly.    hydrocodone-acetaminophen 10-325mg (NORCO)  mg Tab Take 1 tablet by mouth every 8 (eight) hours as needed (MSK pain).    lisinopril (PRINIVIL,ZESTRIL) 5 MG tablet Take 2 tablets (10 mg total) by mouth every morning.    pravastatin (PRAVACHOL) 10 MG tablet TAKE ONE TABLET BY MOUTH AT BEDTIME    tamsulosin (FLOMAX) 0.4 mg Cp24 Take 1 capsule (0.4 mg total) by mouth once daily.    ACCU-CHEK SOFTCLIX LANCETS Misc     CONTOUR NEXT STRIPS Strp     oxybutynin (DITROPAN) 5 MG Tab Take 1 tablet (5 mg total) by mouth 3 (three) times daily.           Clinical Reference Information           Vital Signs - Last Recorded  Most recent update: 1/24/2017  7:47 AM by Kaushik Rodriguez MA    BP Pulse Ht Wt BMI    122/70 83 5' 11" (1.803 m) 91.2 kg (201 lb 1 oz) 28.04 kg/m2      Blood Pressure          Most Recent Value    BP  122/70      Allergies as of 1/24/2017     No Known Allergies      Immunizations Administered on Date " of Encounter - 1/24/2017     None      Orders Placed During Today's Visit      Normal Orders This Visit    EKG RHYTHM STRIP (to [place])     Future Labs/Procedures Expected by Expires    2D echo with color flow doppler  1/24/2018 1/24/2018

## 2017-01-24 NOTE — PROGRESS NOTES
"Subjective:    Patient ID:  Xander Sanchez is a 66 y.o. male who presents for follow-up of Pacemaker Check      HPI 67 yo male with NICM, LBBB, CRT-D.  He was diagnosed with NICM with a LBBB at [facility] in approx ~2005, C approx 5-6 years ago per patient at  that was "no blockages"  A single chamber ICD implanted for primary prevention, since he has been followed here at [facility] his LBBB resolved and NICM normalized.   He was scheduled for a generator change, noted to be in LBBB again ( with correlative NYHA III symptoms ) and fluoroscopy of the RV ([facility]) revealed lead externalization but a patent left sided venous system. Echo revealed EF 25%.  3/24/15 Extraction of ICD lead and implantation of CRT-D (Dr. Mensah). Paucity of LV vein targets, has high LV threshold.  Did not derive symptomatic benefit from upgrade, and EF remained unchanged at 30%.  We turned off LV lead 11/16 to conserve battery.  Device interrogation reveals stable function of leads, no ventricular pacing, no arrhythmias.  Battery is under advisory.  Continues to feel ok.  Notes stable dyspnea on exertion.  No difference since we turned off LV lead.  Denies syncope, dizziness.    Review of Systems   Constitution: Negative. Negative for weakness and malaise/fatigue.   Cardiovascular: Negative for chest pain, dyspnea on exertion, irregular heartbeat, leg swelling, near-syncope, orthopnea, palpitations, paroxysmal nocturnal dyspnea and syncope.   Respiratory: Positive for shortness of breath. Negative for cough.    Neurological: Negative for dizziness and light-headedness.   All other systems reviewed and are negative.       Objective:    Physical Exam   Constitutional: He is oriented to person, place, and time. He appears well-developed and well-nourished.   Eyes: Conjunctivae are normal. No scleral icterus.   Neck: No JVD present. No tracheal deviation present.   Cardiovascular: Normal rate, regular rhythm and normal heart sounds.  PMI " is not displaced.    Pulmonary/Chest: Effort normal and breath sounds normal. No respiratory distress.   Abdominal: Soft. There is no hepatosplenomegaly. There is no tenderness.   Musculoskeletal: He exhibits no edema (lower extremity) or tenderness.   Neurological: He is alert and oriented to person, place, and time.   Skin: Skin is warm and dry. No rash noted.   Psychiatric: He has a normal mood and affect. His behavior is normal.         Assessment:       1. Chronic systolic dysfunction of left ventricle    2. LBBB (left bundle branch block)    3. Cardiomyopathy, nonischemic    4. Essential hypertension    5. Diastolic dysfunction with chronic heart failure    6. Type 2 diabetes mellitus without complication, without long-term current use of insulin    7. Presence of biventricular AICD         Plan:           Doing well overall.  Continue with LV lead off.  F/u in device clinic and home monitoring as scheduled.  Repeat echo in 1 year, f/u upon completion with Princess Fajardo.

## 2017-01-24 NOTE — PRE ADMISSION SCREENING
Anesthesia Assessment: Preoperative EQUATION    Planned Procedure: Procedure(s) (LRB):  PROSTATECTOMY-TRANSURETHRAL W QUANTA LASER (N/A)  Requested Anesthesia Type:General  Surgeon: Graham Buenrostro MD  Service: Urology  Known or anticipated Date of Surgery:2/1/2017    Surgeon notes: reviewed    Electronic QUestionnaire Assessment completed via nurse interview with patient.        Xander Sanchez [[id number]] - 66 y.o. Male        Providers Outside of [facility]      No data to display       Surgical Risk Level      Surgical Risk Level:  2           caRDScore (Clinical Anesthesia Rapid Decision Score)        High  Total Score: 21      21 Sum of Clinical Scores       caRDScores (Grouped)      caRDScore - Ane:  2                caRDScore - CVD:  5                caRDScore - Pul:  9                caRDScore - Met:  2                caRDScore - Phy:  3           caRDScore Items           Pre-admit from 2/1/2017 in [facility]     Anesthesia      Has painful neck extension  Yes     S/P cervical fusion or other surgery severely affecting neck mobility  Yes     CVD      Activity similar to best ability for maximal activity or exercise  METS 4     Diagnosed with high blood pressure  Yes     Typical BP runs <150/90  Yes     Has internal defibrillator (includes pacemaker)  Yes [St Isreal]     Has/has had congestive heart failure  Yes [EF30% on 11/2016]     Cardiomypathy (specify type)  Yes [nonischemic stable]     Pulmonary      Total smoking adds up to 20 - 40 years  Yes     Has COPD (emphysema, chronic bronchitis, or bronchiectasis)  Yes [advair inhaler 1/day]     COPD requiring bronchodilator therapy  Yes     Some SOB at rest  Yes     Metabolic      Type 2 Diabetes  Yes [diet controlled--130s, A1c 6.4 on 8/2016]     Physiologic      Obesity Status  Not Obese (BMI <30)     Hx of abnormal blood clot  Yes [DVT arm x1 after a neck surgery per pt]     Has problems emptying bladder/ u. retent. due to  nerve/prostate/bladder/pelvic dis.  Yes       Flags      Red Flag Score:  2                Yellow Flag Score:  9           Red Flags           Pre-admit from 2/1/2017 in [facility]     Obesity Status  Not Obese (BMI <30)     Some SOB at rest  Yes     Has internal defibrillator (includes pacemaker)  Yes [St Isreal]       Yellow Flags           Pre-admit from 2/1/2017 in [facility]     Obesity Status  Not Obese (BMI <30)     Is or has been a Smoker  Yes     Hx of abnormal blood clot  Yes [DVT arm x1 after a neck surgery per pt]     Aspirin  Yes     Has painful neck extension  Yes     S/P cervical fusion or other surgery severely affecting neck mobility  Yes     Has COPD (emphysema, chronic bronchitis, or bronchiectasis)  Yes [advair inhaler 1/day]     Has/has had congestive heart failure  Yes [EF30% on 11/2016]     Cardiomypathy (specify type)  Yes [nonischemic stable]     Has pain  Yes       PONV Risk Score (assumes periop narcotic use = +1, Max=4)      PONV Risk Score:  2           PONV Risk Factors  Total Score: 1      1 Non-Smoker at present       Sleep Apnea  Total Score: 0        BLANK STOP-Bang Risk Factors (Max=8)  Total Score: 3      1 Takes medication for high blood pressure     1 Age >50     1 Male       BLANK Risk Level - 1 (Low), 2 (Moderate), 3 (High)      BLANK Risk Level:  2           RCRI (Revised Cardiac Risk Indices of ACC/AHA guidelines, Max=6)  Total Score: 1      1 Has/has had CHF       CAD Risk Factors  Total Score: 4      1 Male. Age >45     1 Total smoking adds up to 20 - 40 years     1 Diagnosed with high blood pressure     1 Has Diabetes       CHADS Score if applicable (history of atrial fib/flutter, Max=6)  Total Score: 3      1 Diagnosed with high blood pressure     1 Has/has had congestive heart failure     1 Has Diabetes       Maximal Exercise Capacity           Pre-admit from 2/1/2017 in [facility]     Maximal Exercise Capacity  METS 4        Summary of Dependence  Total Score: 1      1 Is totally independent of others for activities of daily living       Phone Fraility Score (Max = 17)  Total Score: 2      1 Uses 5 or more meds on reg basis     1 Describes health as Fair       Pain Factors           Pre-admit from 2/1/2017 in [facility]     Has pain  Yes     Location and description of pain  arm pain     Duration of pain  Pain is chronic, has been present greater than 12 mo     Uses one of the following medications:  daily gabapentin and hydrocodone     On opioid medication for greater than 90 days  Yes       Risk Triggers (Evidence-Based Risk Triggers)        Pulmonary Risk Triggers  Total Score: 5      1 Age > or = 60     1 Total smoking adds up to 20 - 40 years     1 Has COPD     1 COPD requiring bronchodilator therapy     1 Some SOB at rest       Renal Risk Triggers  Total Score: 2      1 Diagnosed with high blood pressure     1 Type 2 Diabetes       Delirium Risk Triggers  Total Score: 0        Urologic Risk Triggers  Total Score: 1      1 Has problems emptying bladder/ u. retent. due to nerve/prostate/bladder/pelvic dis.       Logistics        Pre-op Clinic Logistics  Total Score: 4      1 Has had anesthesia, either as adult or as a child     1 Takes medication for high blood pressure     2 Has internal defibrillator (includes pacemaker)       DOSC Logistics  Total Score: 4      3 Has internal defibrillator (includes pacemaker)     1 Has peripheral neuropathy       Discharge Logistics  Total Score: 3      2 Some SOB at rest     1 Has peripheral neuropathy       Discharge Planning           Pre-admit from 2/1/2017 in [facility]     Discharge Planning      Will assist patient 24/7, if needed  fiancee     Who will transport you to therapy, if need  fiancee       Int Med <For office use only>      Total Score: 22        Surgical Risk Level Assessment                 Triage considerations:     The patient  has no apparent active cardiac condition (No unstable coronary Syndrome such as severe unstable angina or recent [<1 month] myocardial infarction, decompensated CHF, severe valvular   disease or significant arrhythmia)    Previous anesthesia records:GETA, MAC and No problems-HX cervical fusion: 8/10/15 Placement Time: 1212; Method of Intubation: Glidescope; Inserted by: CRNA (MATTHEW Krishna using in-line stabilization); Airway Device: Endotracheal Tube; Mask Ventilation: Easy; Intubated: Postinduction; Blade: Maria E #3; Airway Device Size: 8.0; Style: Cuffed; Cuff Inflation: Minimal occlusive pressure; Inflation Amount: 6; Placement Verified By: Auscultation, Capnometry; Complicating Factors: None; Intubation Findings: Positive EtCO2, Bilateral breath sounds, Atraumatic/Condition of teeth unchanged;  Depth of Insertion: 22; Securment: Teeth; Complications: None; Insertion Attempts: 1;   Airway/Jaw/Neck:  Airway Findings: Mouth Opening: Normal Tongue: Large General Airway Assessment: Adult Jaw/Neck Findings: Neck ROM: Extension Decreased, Mild Neck Findings: Short Neck     Last PCP note: within 1 month , within [facility] 1/13/17 health risk assessment  Subspecialty notes: Cardiology: General, Pain Management, Physical medicine    Other important co-morbidities: Nonischemic cardiomyopathy/EF 30%, CHF,  HTN, COPD, DM2, chronic pain     Tests already available:  Available tests,  within 3 months , within [facility] . 11/29/16 BMP. 8/2016 A1c and CMP. 11/2016 echo. 7/2016 EKG.            Instructions given. (See in Nurse's note)    Optimization:  Anesthesia Preop Clinic Assessment  Indicated-not required for this procedure    Medical Opinion Indicated-will ask [facility] for clearance note          Plan:    Testing:  Hematology Profile       Consultation:Patient's PCP for a statement of optimization      Patient  has previously scheduled Medical Appointment: 1/24 Dr Navarro 1 yr followup and defibrillator tune  up    Navigation: Tests Scheduled. Am of surgery             Consults scheduled.             Results will be tracked by Preop Clinic.

## 2017-01-24 NOTE — ANESTHESIA PREPROCEDURE EVALUATION
Pre Admission Screening  Verna Martin RN      []Hide copied text  Anesthesia Assessment: Preoperative EQUATION     Planned Procedure: Procedure(s) (LRB):  PROSTATECTOMY-TRANSURETHRAL W QUANTA LASER (N/A)  Requested Anesthesia Type:General  Surgeon: Graham Buenrostro MD  Service: Urology  Known or anticipated Date of Surgery:2/1/2017     Surgeon notes: reviewed     Electronic QUestionnaire Assessment completed via nurse interview with patient.                    Xander Sanchez [[id number]] - 66 y.o. Male         Providers Outside of [facility]       No data to display        Surgical Risk Level       Surgical Risk Level:   2              caRDScore (Clinical Anesthesia Rapid Decision Score)         High  Total Score: 21       21 Sum of Clinical Scores        caRDScores (Grouped)       caRDScore - Ane:   2                       caRDScore - CVD:   5                       caRDScore - Pul:   9                       caRDScore - Met:   2                       caRDScore - Phy:   3              caRDScore Items             Pre-admit from 2/1/2017 in [facility]      Anesthesia        Has painful neck extension   Yes      S/P cervical fusion or other surgery severely affecting neck mobility   Yes      CVD        Activity similar to best ability for maximal activity or exercise   METS 4      Diagnosed with high blood pressure   Yes      Typical BP runs <150/90   Yes      Has internal defibrillator (includes pacemaker)   Yes [St Isreal]      Has/has had congestive heart failure   Yes [EF30% on 11/2016]      Cardiomypathy (specify type)   Yes [nonischemic stable]      Pulmonary        Total smoking adds up to 20 - 40 years   Yes      Has COPD (emphysema, chronic bronchitis, or bronchiectasis)   Yes [advair inhaler 1/day]      COPD requiring bronchodilator therapy   Yes      Some SOB at rest   Yes      Metabolic        Type 2 Diabetes   Yes [diet controlled--130s, A1c 6.4 on 8/2016]      Physiologic         Obesity Status   Not Obese (BMI <30)      Hx of abnormal blood clot   Yes [DVT arm x1 after a neck surgery per pt]      Has problems emptying bladder/ u. retent. due to nerve/prostate/bladder/pelvic dis.   Yes        Flags       Red Flag Score:   2                       Yellow Flag Score:   9              Red Flags             Pre-admit from 2/1/2017 in [facility]      Obesity Status   Not Obese (BMI <30)      Some SOB at rest   Yes      Has internal defibrillator (includes pacemaker)   Yes [St Isreal]        Yellow Flags             Pre-admit from 2/1/2017 in [facility]      Obesity Status   Not Obese (BMI <30)      Is or has been a Smoker   Yes      Hx of abnormal blood clot   Yes [DVT arm x1 after a neck surgery per pt]      Aspirin   Yes      Has painful neck extension   Yes      S/P cervical fusion or other surgery severely affecting neck mobility   Yes      Has COPD (emphysema, chronic bronchitis, or bronchiectasis)   Yes [advair inhaler 1/day]      Has/has had congestive heart failure   Yes [EF30% on 11/2016]      Cardiomypathy (specify type)   Yes [nonischemic stable]      Has pain   Yes        PONV Risk Score (assumes periop narcotic use = +1, Max=4)       PONV Risk Score:   2              PONV Risk Factors  Total Score: 1       1 Non-Smoker at present        Sleep Apnea  Total Score: 0         BLANK STOP-Bang Risk Factors (Max=8)  Total Score: 3       1 Takes medication for high blood pressure      1 Age >50      1 Male        BLANK Risk Level - 1 (Low), 2 (Moderate), 3 (High)       BLANK Risk Level:   2              RCRI (Revised Cardiac Risk Indices of ACC/AHA guidelines, Max=6)  Total Score: 1       1 Has/has had CHF        CAD Risk Factors  Total Score: 4       1 Male. Age >45      1 Total smoking adds up to 20 - 40 years      1 Diagnosed with high blood pressure      1 Has Diabetes        CHADS Score if applicable (history of atrial fib/flutter, Max=6)  Total Score: 3        1 Diagnosed with high blood pressure      1 Has/has had congestive heart failure      1 Has Diabetes        Maximal Exercise Capacity             Pre-admit from 2/1/2017 in [facility]      Maximal Exercise Capacity   METS 4        Summary of Dependence  Total Score: 1       1 Is totally independent of others for activities of daily living        Phone Fraility Score (Max = 17)  Total Score: 2       1 Uses 5 or more meds on reg basis      1 Describes health as Fair        Pain Factors             Pre-admit from 2/1/2017 in [facility]      Has pain   Yes      Location and description of pain   arm pain      Duration of pain   Pain is chronic, has been present greater than 12 mo      Uses one of the following medications:   daily gabapentin and hydrocodone      On opioid medication for greater than 90 days   Yes        Risk Triggers (Evidence-Based Risk Triggers)         Pulmonary Risk Triggers  Total Score: 5       1 Age > or = 60      1 Total smoking adds up to 20 - 40 years      1 Has COPD      1 COPD requiring bronchodilator therapy      1 Some SOB at rest        Renal Risk Triggers  Total Score: 2       1 Diagnosed with high blood pressure      1 Type 2 Diabetes        Delirium Risk Triggers  Total Score: 0         Urologic Risk Triggers  Total Score: 1       1 Has problems emptying bladder/ u. retent. due to nerve/prostate/bladder/pelvic dis.        Logistics         Pre-op Clinic Logistics  Total Score: 4       1 Has had anesthesia, either as adult or as a child      1 Takes medication for high blood pressure      2 Has internal defibrillator (includes pacemaker)        DOSC Logistics  Total Score: 4       3 Has internal defibrillator (includes pacemaker)      1 Has peripheral neuropathy        Discharge Logistics  Total Score: 3       2 Some SOB at rest      1 Has peripheral neuropathy        Discharge Planning             Pre-admit from 2/1/2017 in [facility]      Discharge Planning        Will assist patient 24/7, if needed   fiancee      Who will transport you to therapy, if need   fiancee        Int Med <For office use only>       Total Score: 22          Surgical Risk Level Assessment                       Triage considerations:      The patient has no apparent active cardiac condition (No unstable coronary Syndrome such as severe unstable angina or recent [<1 month] myocardial infarction, decompensated CHF, severe valvular disease or significant arrhythmia)     Previous anesthesia records:GETA, MAC and No problems-HX cervical fusion: 8/10/15 Placement Time: 1212; Method of Intubation: Glidescope; Inserted by: CRNA (MATTHEW Krishna using in-line stabilization); Airway Device: Endotracheal Tube; Mask Ventilation: Easy; Intubated: Postinduction; Blade: Maria E #3; Airway Device Size: 8.0; Style: Cuffed; Cuff Inflation: Minimal occlusive pressure; Inflation Amount: 6; Placement Verified By: Auscultation, Capnometry; Complicating Factors: None; Intubation Findings: Positive EtCO2, Bilateral breath sounds, Atraumatic/Condition of teeth unchanged;  Depth of Insertion: 22; Securment: Teeth; Complications: None; Insertion Attempts: 1;   Airway/Jaw/Neck:  Airway Findings: Mouth Opening: Normal Tongue: Large General Airway Assessment: Adult Jaw/Neck Findings: Neck ROM: Extension Decreased, Mild Neck Findings: Short Neck   Last PCP note: within 1 month , within [facility] 1/13/17 health risk assessment  Subspecialty notes: Cardiology: General, Pain Management, Physical medicine     Other important co-morbidities: Nonischemic cardiomyopathy/EF 30%, CHF, HX LBBB-St Isreal AICD, HTN, COPD, DM2, and chronic pain      Tests already available:  Available tests, within 3 months , within [facility] . 11/29/16 BMP. 8/2016 A1c and CMP. 11/2016 echo. 7/2016 EKG.      Instructions given. (See in Nurse's note)     Optimization:  Anesthesia Preop Clinic Assessment Indicated-not  required for this procedure  Medical Opinion Indicated-will ask [facility] for clearance note      Plan:   Testing: Hematology Profile     Consultation:Patient's PCP for a statement of optimization -PCP recommended cards to clear  Patient has previously scheduled Medical Appointment: 1/24 Dr Navarro 1 yr followup and defibrillator tune up     Navigation: Tests Scheduled. Am of surgery  Consults scheduled.  Results will be tracked by Preop Clinic.  1/30 cards note:   EKG today with SR no acute ST or T wave changes     Echo from 11/2016 notes moderately depressed left ventricular systolic function (EF 30-35%) which is stable over the past 2 years.  Patient able to tolerate 4 METS with NYHA class II symptoms which is at his baseline.  Revised Cardiac Risk Index of 1 point for history of heart failure which equates to 0.9% risk of major cardiac event.   Patient may proceed with TURP without further preoperative cardiac work up.        Electronically signed by Verna Martin RN at 1/24/2017 11:24 AM        Pre-admit on 2/1/2017              Detailed Report                                                                                                                        01/24/2017  Xander Sanchez is a 66 y.o., male.    [facility] Anesthesia Evaluation    I have reviewed the Patient Summary Reports.    I have reviewed the Nursing Notes.   I have reviewed the Medications.     Review of Systems  Anesthesia Hx:  No problems with previous Anesthesia    Cardiovascular:   Hypertension Dysrhythmias    Pulmonary:   COPD Asthma    Hepatic/GI:   PUD,    Musculoskeletal:   Arthritis     Neurological:   Neuromuscular Disease,    Endocrine:   Diabetes, type 2        Physical Exam  General:  Well nourished    Airway/Jaw/Neck:  Airway Findings: Mouth Opening: Normal Mallampati: II  Improves to II with phonation.  TM Distance: Normal, at least 6 cm  Jaw/Neck Findings:  Neck ROM: Extension Decreased, Mild       Chest/Lungs:  Chest/Lungs  Findings: Normal Respiratory Rate     Heart/Vascular:  Heart Findings: Rate: Normal  Rhythm: Regular Rhythm        Mental Status:  Mental Status Findings:  Cooperative         Anesthesia Plan  Type of Anesthesia, risks & benefits discussed:  Anesthesia Type:  general  Patient's Preference:   Intra-op Monitoring Plan:   Intra-op Monitoring Plan Comments:   Post Op Pain Control Plan:   Post Op Pain Control Plan Comments:   Induction:   IV  Beta Blocker:  Patient is not currently on a Beta-Blocker (No further documentation required).       Informed Consent: Patient understands risks and agrees with Anesthesia plan.  Questions answered. Anesthesia consent signed with patient.  ASA Score: 3     Day of Surgery Review of History & Physical:    H&P update referred to the surgeon.         Ready For Surgery From Anesthesia Perspective.

## 2017-01-26 ENCOUNTER — OFFICE VISIT (OUTPATIENT)
Dept: UROLOGY | Facility: CLINIC | Age: 67
End: 2017-01-26
Payer: MEDICARE

## 2017-01-26 DIAGNOSIS — N40.1 BPH WITH URINARY OBSTRUCTION: ICD-10-CM

## 2017-01-26 DIAGNOSIS — N13.8 BPH WITH URINARY OBSTRUCTION: ICD-10-CM

## 2017-01-26 PROCEDURE — 99499 UNLISTED E&M SERVICE: CPT | Mod: S$GLB,,, | Performed by: STUDENT IN AN ORGANIZED HEALTH CARE EDUCATION/TRAINING PROGRAM

## 2017-01-26 NOTE — PROGRESS NOTES
Urology ([facility]) H&P  Staff: Graham Buenrostro MD    CC: BPH with obstruction    HPI: Xander Sanchez is a 66 y.o. 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    He complains of frequency, urgency, weak stream. He was started on Flomax and Oxybutynin XL 5mg BID by Dr. Rosas and states that this has helped his symptoms. He still notes frequency, urgency, hesitancy, nocturia x1-2, occasional incomplete emptying.  He denies dysuria, hematuria, recurrent UTIs.        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  - has been on flomax.  - does not have a catheter in place.  - has had cystoscopy. This demonstrated 3.5 cm prostate with lateral lobe hypertrophy.  - has had urodynamics. This demonstrated obstructive voiding.  - has not had history of elevated PSA.   - ADDIS documented on 10/18/2016 was Normal.  30g, smooth.   - Last urine culture on 1/5/2017 demonstrated no growth.      No family history of  malignancy.       ROS:  Neg except per HPI    Past Medical History   Diagnosis Date    Asthma     Cardiomyopathy     Cervical spondylosis with myelopathy 10/17/2012    Chronic bronchitis     Chronic systolic dysfunction of left ventricle 7/27/2015    Constipation 7/27/2015    COPD (chronic obstructive pulmonary disease)     Diabetes mellitus, type 2     DM type 2 (diabetes mellitus, type 2) 7/27/2015    Enlarged prostate 7/27/2015    Hypertension     ICD (implantable cardiac defibrillator) in place     Presence of biventricular AICD 7/27/2015    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thy 2/1/2016    Urinary tract infection      pt does not know       Past Surgical History   Procedure Laterality Date    Cervical spine surgery      Cardiac defibrillator placement      Spine surgery         Social History     Social History    Marital status: Single     Spouse name:  N/A    Number of children: N/A    Years of education: N/A     Social History Main Topics    Smoking status: Former Smoker     Packs/day: 1.00     Years: 40.00     Types: Cigarettes     Quit date: 7/26/2009    Smokeless tobacco: Never Used    Alcohol use 1.8 oz/week     3 Cans of beer per week    Drug use: No    Sexual activity: Yes     Partners: Female     Other Topics Concern    Not on file     Social History Narrative       Family History   Problem Relation Age of Onset    Hypertension Mother     Hypertension Father     COPD Father     No Known Problems Sister     No Known Problems Brother     No Known Problems Daughter     Prostate cancer Neg Hx     Kidney disease Neg Hx        Review of patient's allergies indicates:  No Known Allergies    Current Outpatient Prescriptions on File Prior to Visit   Medication Sig Dispense Refill    ACCU-CHEK SOFTCLIX LANCETS Misc       amlodipine (NORVASC) 5 MG tablet Take 1 tablet (5 mg total) by mouth once daily. 90 tablet 3    aspirin (ECOTRIN) 81 MG EC tablet Take 81 mg by mouth once daily.      baclofen (LIORESAL) 10 MG tablet Take 1 tablet (10 mg total) by mouth 3 (three) times daily. 90 tablet 11    CONTOUR NEXT STRIPS Strp       fluticasone-salmeterol 230-21 mcg/dose (ADVAIR HFA) 230-21 mcg/actuation HFAA inhaler Inhale 2 puffs into the lungs 2 (two) times daily. 12 each 6    gabapentin (NEURONTIN) 600 MG tablet Take 1 tablet (600 mg total) by mouth 4 (four) times daily with meals and nightly. 360 tablet 3    hydrocodone-acetaminophen 10-325mg (NORCO)  mg Tab Take 1 tablet by mouth every 8 (eight) hours as needed (MSK pain). 90 tablet 0    lisinopril (PRINIVIL,ZESTRIL) 5 MG tablet Take 2 tablets (10 mg total) by mouth every morning. 270 tablet 3    oxybutynin (DITROPAN) 5 MG Tab Take 1 tablet (5 mg total) by mouth 3 (three) times daily. 270 tablet 3    pravastatin (PRAVACHOL) 10 MG tablet TAKE ONE TABLET BY MOUTH AT BEDTIME 90 tablet 0     tamsulosin (FLOMAX) 0.4 mg Cp24 Take 1 capsule (0.4 mg total) by mouth once daily. 90 capsule 3     No current facility-administered medications on file prior to visit.        Anticoagulation:  Yes aspirin, will hold     Physical Exam:  Weight 201lb/91kg  BMI 28    AAOx4, NAD, WDWN  NC/AT, EOMI, PER, sclerae anicteric, speech normal, tongue midline  Nl effort  RRR  Soft, non-tender, non-distended  Phallus uncircumcised, testes 10cc, normal cord structures    Labs:    Urine dipstick today - negative for all components       Lab Results   Component Value Date    WBC 4.29 01/25/2016    HGB 12.7 (L) 01/25/2016    HCT 40.1 01/25/2016    MCV 91 01/25/2016     01/25/2016       BMP  Lab Results   Component Value Date     11/29/2016    K 4.7 11/29/2016     11/29/2016    CO2 28 11/29/2016    BUN 16 11/29/2016    CREATININE 0.9 11/29/2016    CALCIUM 9.0 11/29/2016    ANIONGAP 6 (L) 11/29/2016    ESTGFRAFRICA >60.0 11/29/2016    EGFRNONAA >60.0 11/29/2016       Lab Results   Component Value Date    PSA 1.2 02/01/2016    PSA 0.66 03/28/2014    PSA 0.67 03/13/2013    PSA 0.64 04/12/2012    PSADIAG 1.1 04/06/2015       Imaging: none    Assessment: Xander Sanchez is a 66 y.o. 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    Plan:     1. To OR on 2/1/2017 for laser TURP.  2. Consents signed for surgery.  3. I have explained the risk, benefits, and alternatives of the procedure in detail. The patient voices understanding and all questions have been answered. The patient agrees to proceed as planned.        Ashok Kunz MD

## 2017-01-27 RX ORDER — PRAVASTATIN SODIUM 10 MG/1
TABLET ORAL
Qty: 90 TABLET | Refills: 1 | Status: SHIPPED | OUTPATIENT
Start: 2017-01-27 | End: 2017-08-01 | Stop reason: SDUPTHER

## 2017-01-30 ENCOUNTER — OFFICE VISIT (OUTPATIENT)
Dept: CARDIOLOGY | Facility: CLINIC | Age: 67
End: 2017-01-30
Payer: MEDICARE

## 2017-01-30 VITALS
HEART RATE: 76 BPM | BODY MASS INDEX: 26.07 KG/M2 | HEIGHT: 69 IN | SYSTOLIC BLOOD PRESSURE: 127 MMHG | DIASTOLIC BLOOD PRESSURE: 78 MMHG | WEIGHT: 176 LBS

## 2017-01-30 DIAGNOSIS — I42.8 CARDIOMYOPATHY, NONISCHEMIC: Primary | ICD-10-CM

## 2017-01-30 DIAGNOSIS — I11.0 HYPERTENSIVE LEFT VENTRICULAR HYPERTROPHY WITH HEART FAILURE: ICD-10-CM

## 2017-01-30 PROCEDURE — 1159F MED LIST DOCD IN RCRD: CPT | Mod: S$GLB,,, | Performed by: NURSE PRACTITIONER

## 2017-01-30 PROCEDURE — 3074F SYST BP LT 130 MM HG: CPT | Mod: S$GLB,,, | Performed by: NURSE PRACTITIONER

## 2017-01-30 PROCEDURE — 99499 UNLISTED E&M SERVICE: CPT | Mod: S$GLB,,, | Performed by: NURSE PRACTITIONER

## 2017-01-30 PROCEDURE — 1160F RVW MEDS BY RX/DR IN RCRD: CPT | Mod: S$GLB,,, | Performed by: NURSE PRACTITIONER

## 2017-01-30 PROCEDURE — 1157F ADVNC CARE PLAN IN RCRD: CPT | Mod: S$GLB,,, | Performed by: NURSE PRACTITIONER

## 2017-01-30 PROCEDURE — 3078F DIAST BP <80 MM HG: CPT | Mod: S$GLB,,, | Performed by: NURSE PRACTITIONER

## 2017-01-30 PROCEDURE — 93000 ELECTROCARDIOGRAM COMPLETE: CPT | Mod: S$GLB,,, | Performed by: INTERNAL MEDICINE

## 2017-01-30 PROCEDURE — 99213 OFFICE O/P EST LOW 20 MIN: CPT | Mod: S$GLB,,, | Performed by: NURSE PRACTITIONER

## 2017-01-30 PROCEDURE — 1126F AMNT PAIN NOTED NONE PRSNT: CPT | Mod: S$GLB,,, | Performed by: NURSE PRACTITIONER

## 2017-01-30 PROCEDURE — 99999 PR PBB SHADOW E&M-EST. PATIENT-LVL III: CPT | Mod: PBBFAC,,, | Performed by: NURSE PRACTITIONER

## 2017-01-30 NOTE — PROGRESS NOTES
"Subjective:       Patient ID: Xander Sanchez is a 66 y.o. male.    Chief Complaint: No chief complaint on file.    HPI Comments: Mr Xander Sanchez is a 66 year old male here for cardiac clearance for scheduled prostatectomy with Dr Buenrostro.  He has DCM with [facility] [employer] AICD in place in the left chest. Last interrogation, device was functioning well. Last EF 30%. LBBB at [facility] in approx ~2005, LHC in 2008 years ago per patient at  that was "no blockages", no C records in EPIC to review. Cardiology clinic notes from [facility] reviewed noting nonischemic cardiomyopathy. Previously seen in clinic by Dr Joy for RUE evaluation with no significant PAD noted. Patient's largest complaint is RUE numbness 2/2 spinal stenosis. Patient follows with Dr Bai for NICM, LBBB, CRT-D. Last device interrogation was on 01/24/2017 per Dr Bai noting stable function of leads, no ventricular pacing, no arrhythmias. Patient reports that he is physically active and able to tolerate riding his bike 15-30 min on most days of the week. Denies any chest pain. Endorses SOB x many years that is stable in nature. Patient denies any dizziness, syncope, weakness, or fatigue. Echocardiogram from 11/2016 notes moderately depressed left ventricular systolic function (EF 30-35%) which is stable over the past 2 years. Patient reports compliance with daily medication regimen and low sodium, diabetic diet. Daily BG checks per patient are in the 110-120 range. Blood pressure is currently well controlled. Patient has not been hospitalized within the past 2 years for ADHF.           Review of Systems   Constitutional: Negative for diaphoresis, fatigue, fever and unexpected weight change.   HENT: Negative.    Respiratory: Positive for shortness of breath. Negative for cough, choking, chest tightness, wheezing and stridor.    Cardiovascular: Negative for chest pain, palpitations and leg swelling.   Gastrointestinal: Negative.    Endocrine: Negative.  "   Genitourinary: Positive for dysuria and urgency.   Musculoskeletal: Positive for arthralgias, myalgias and neck pain. Negative for gait problem and joint swelling.   Skin: Negative for color change, pallor, rash and wound.   Allergic/Immunologic: Negative.    Neurological: Negative for dizziness, syncope, weakness and light-headedness.   Hematological: Negative.    Psychiatric/Behavioral: Negative.        Objective:      Physical Exam   Constitutional: He is oriented to person, place, and time. He appears well-developed and well-nourished. No distress.   HENT:   Head: Normocephalic and atraumatic.   Eyes: Right eye exhibits no discharge. Left eye exhibits no discharge.   Neck: No JVD present.   Cardiovascular: Normal rate, regular rhythm, normal heart sounds and intact distal pulses.  Exam reveals no gallop and no friction rub.    No murmur heard.  Pulmonary/Chest: Effort normal and breath sounds normal. No respiratory distress. He has no wheezes. He has no rales. He exhibits no tenderness.   Abdominal: Soft. Bowel sounds are normal.   Musculoskeletal: He exhibits no edema.   Neurological: He is alert and oriented to person, place, and time.   Skin: Skin is warm and dry. He is not diaphoretic.   Psychiatric: He has a normal mood and affect. His behavior is normal. Judgment and thought content normal.       Assessment:       1. Cardiomyopathy, nonischemic    2. Hypertensive left ventricular hypertrophy with heart failure        Plan:       Diagnoses and all orders for this visit:    Cardiomyopathy, nonischemic  -     EKG 12-lead    Hypertensive left ventricular hypertrophy with heart failure  -     EKG 12-lead  Current Outpatient Prescriptions on File Prior to Visit   Medication Sig Dispense Refill    ACCU-CHEK SOFTCLIX LANCETS Misc       amlodipine (NORVASC) 5 MG tablet Take 1 tablet (5 mg total) by mouth once daily. 90 tablet 3    aspirin (ECOTRIN) 81 MG EC tablet Take 81 mg by mouth once daily.      baclofen  "(LIORESAL) 10 MG tablet Take 1 tablet (10 mg total) by mouth 3 (three) times daily. 90 tablet 11    CONTOUR NEXT STRIPS Strp       fluticasone-salmeterol 230-21 mcg/dose (ADVAIR HFA) 230-21 mcg/actuation HFAA inhaler Inhale 2 puffs into the lungs 2 (two) times daily. 12 each 6    gabapentin (NEURONTIN) 600 MG tablet Take 1 tablet (600 mg total) by mouth 4 (four) times daily with meals and nightly. 360 tablet 3    hydrocodone-acetaminophen 10-325mg (NORCO)  mg Tab Take 1 tablet by mouth every 8 (eight) hours as needed (MSK pain). 90 tablet 0    lisinopril (PRINIVIL,ZESTRIL) 5 MG tablet Take 2 tablets (10 mg total) by mouth every morning. 270 tablet 3    oxybutynin (DITROPAN) 5 MG Tab Take 1 tablet (5 mg total) by mouth 3 (three) times daily. 270 tablet 3    pravastatin (PRAVACHOL) 10 MG tablet TAKE ONE TABLET BY MOUTH AT BEDTIME 90 tablet 1    tamsulosin (FLOMAX) 0.4 mg Cp24 Take 1 capsule (0.4 mg total) by mouth once daily. 90 capsule 3     No current facility-administered medications on file prior to visit.      Cardiovascular risk analysis - 66 y.o. male diabetic  hypertension  former smoker.   ROS: taking medications as instructed, no medication side effects noted, no TIA's, no chest pain on exertion, notes stable dyspnea on exertion, no change, no swelling of ankles and no orthopnea or paroxysmal nocturnal dyspnea.   New concerns: none.   Exam: BP noted to be well controlled today in office, S1, S2 normal, no gallop, no murmur, chest clear, no JVD, no HSM, no edema. Lab review: labs are reviewed, up to date and normal  Assessment:  Hyperlipidemia well controlled.   Plan: Current treatment plan is effective, no change in therapy..  Blood pressure right arm sitting 127/78, pulse 76, height 5' 9" (1.753 m), weight 79.8 kg (176 lb).       Left arm sitting  120/76       EKG today with SR no acute ST or T wave changes    Echo from 11/2016 notes moderately depressed left ventricular systolic function (EF " 30-35%) which is stable over the past 2 years.  Patient able to tolerate 4 METS with NYHA class II symptoms which is at his baseline.  Revised Cardiac Risk Index of 1 point for history of heart failure which equates to 0.9% risk of major cardiac event.   Patient may proceed with TURP without further preoperative cardiac work up.

## 2017-01-31 ENCOUNTER — TELEPHONE (OUTPATIENT)
Dept: UROLOGY | Facility: CLINIC | Age: 67
End: 2017-01-31

## 2017-02-01 ENCOUNTER — NURSE TRIAGE (OUTPATIENT)
Dept: ADMINISTRATIVE | Facility: CLINIC | Age: 67
End: 2017-02-01

## 2017-02-01 ENCOUNTER — ANESTHESIA (OUTPATIENT)
Dept: SURGERY | Facility: HOSPITAL | Age: 67
End: 2017-02-01
Payer: MEDICARE

## 2017-02-01 ENCOUNTER — SURGERY (OUTPATIENT)
Age: 67
End: 2017-02-01

## 2017-02-01 ENCOUNTER — HOSPITAL ENCOUNTER (OUTPATIENT)
Facility: HOSPITAL | Age: 67
Discharge: HOME | End: 2017-02-01
Attending: UROLOGY | Admitting: UROLOGY
Payer: MEDICARE

## 2017-02-01 VITALS
TEMPERATURE: 98 F | BODY MASS INDEX: 28.14 KG/M2 | WEIGHT: 201 LBS | RESPIRATION RATE: 16 BRPM | SYSTOLIC BLOOD PRESSURE: 104 MMHG | DIASTOLIC BLOOD PRESSURE: 88 MMHG | HEIGHT: 71 IN | OXYGEN SATURATION: 100 % | HEART RATE: 81 BPM

## 2017-02-01 DIAGNOSIS — N13.8 BPH WITH URINARY OBSTRUCTION: ICD-10-CM

## 2017-02-01 DIAGNOSIS — N40.1 BPH WITH URINARY OBSTRUCTION: ICD-10-CM

## 2017-02-01 LAB
POCT GLUCOSE: 81 MG/DL (ref 70–110)
POCT GLUCOSE: 91 MG/DL (ref 70–110)

## 2017-02-01 PROCEDURE — D9220A PRA ANESTHESIA: Mod: ANES,,, | Performed by: ANESTHESIOLOGY

## 2017-02-01 PROCEDURE — 36000706: Performed by: UROLOGY

## 2017-02-01 PROCEDURE — 25000003 PHARM REV CODE 250: Performed by: UROLOGY

## 2017-02-01 PROCEDURE — 63600175 PHARM REV CODE 636 W HCPCS: Performed by: NURSE ANESTHETIST, CERTIFIED REGISTERED

## 2017-02-01 PROCEDURE — 27200921 HC BAG, CYSTO DRAINAGE: Performed by: UROLOGY

## 2017-02-01 PROCEDURE — 27000212: Performed by: UROLOGY

## 2017-02-01 PROCEDURE — 27200651 HC AIRWAY, LMA: Performed by: NURSE ANESTHETIST, CERTIFIED REGISTERED

## 2017-02-01 PROCEDURE — 52649 PROSTATE LASER ENUCLEATION: CPT | Mod: ,,, | Performed by: UROLOGY

## 2017-02-01 PROCEDURE — 36000707: Performed by: UROLOGY

## 2017-02-01 PROCEDURE — 27200960 HC CATHETER, FOLEY (ANY): Performed by: UROLOGY

## 2017-02-01 PROCEDURE — 88305 TISSUE EXAM BY PATHOLOGIST: CPT | Mod: 26,,, | Performed by: PATHOLOGY

## 2017-02-01 PROCEDURE — 71000033 HC RECOVERY, INTIAL HOUR: Performed by: UROLOGY

## 2017-02-01 PROCEDURE — 25000003 PHARM REV CODE 250: Performed by: NURSE ANESTHETIST, CERTIFIED REGISTERED

## 2017-02-01 PROCEDURE — 37000008 HC ANESTHESIA 1ST 15 MINUTES: Performed by: UROLOGY

## 2017-02-01 PROCEDURE — 71000015 HC POSTOP RECOV 1ST HR: Performed by: UROLOGY

## 2017-02-01 PROCEDURE — 25000003 PHARM REV CODE 250: Performed by: STUDENT IN AN ORGANIZED HEALTH CARE EDUCATION/TRAINING PROGRAM

## 2017-02-01 PROCEDURE — 27200971 HC CYSTO SUPPLY II (SCOPE PRCDR.): Performed by: UROLOGY

## 2017-02-01 PROCEDURE — 37000009 HC ANESTHESIA EA ADD 15 MINS: Performed by: UROLOGY

## 2017-02-01 PROCEDURE — 71000039 HC RECOVERY, EACH ADD'L HOUR: Performed by: UROLOGY

## 2017-02-01 PROCEDURE — D9220A PRA ANESTHESIA: Mod: CRNA,,, | Performed by: NURSE ANESTHETIST, CERTIFIED REGISTERED

## 2017-02-01 PROCEDURE — 63600175 PHARM REV CODE 636 W HCPCS: Performed by: ANESTHESIOLOGY

## 2017-02-01 PROCEDURE — 88305 TISSUE EXAM BY PATHOLOGIST: CPT | Performed by: PATHOLOGY

## 2017-02-01 RX ORDER — FENTANYL CITRATE 50 UG/ML
50 INJECTION, SOLUTION INTRAMUSCULAR; INTRAVENOUS EVERY 10 MIN PRN
Status: DISCONTINUED | OUTPATIENT
Start: 2017-02-01 | End: 2017-02-01 | Stop reason: HOSPADM

## 2017-02-01 RX ORDER — FENTANYL CITRATE 50 UG/ML
INJECTION, SOLUTION INTRAMUSCULAR; INTRAVENOUS
Status: DISCONTINUED
Start: 2017-02-01 | End: 2017-02-01 | Stop reason: WASHOUT

## 2017-02-01 RX ORDER — ONDANSETRON 2 MG/ML
INJECTION INTRAMUSCULAR; INTRAVENOUS
Status: DISCONTINUED | OUTPATIENT
Start: 2017-02-01 | End: 2017-02-01

## 2017-02-01 RX ORDER — SODIUM CHLORIDE 9 MG/ML
INJECTION, SOLUTION INTRAVENOUS CONTINUOUS
Status: DISCONTINUED | OUTPATIENT
Start: 2017-02-01 | End: 2017-02-01 | Stop reason: HOSPADM

## 2017-02-01 RX ORDER — LABETALOL HYDROCHLORIDE 5 MG/ML
INJECTION, SOLUTION INTRAVENOUS
Status: DISCONTINUED | OUTPATIENT
Start: 2017-02-01 | End: 2017-02-01

## 2017-02-01 RX ORDER — FENTANYL CITRATE 50 UG/ML
INJECTION, SOLUTION INTRAMUSCULAR; INTRAVENOUS
Status: DISCONTINUED | OUTPATIENT
Start: 2017-02-01 | End: 2017-02-01

## 2017-02-01 RX ORDER — HYDROCODONE BITARTRATE AND ACETAMINOPHEN 5; 325 MG/1; MG/1
1 TABLET ORAL EVERY 4 HOURS PRN
Status: DISCONTINUED | OUTPATIENT
Start: 2017-02-01 | End: 2017-02-01 | Stop reason: HOSPADM

## 2017-02-01 RX ORDER — CIPROFLOXACIN 500 MG/1
500 TABLET ORAL EVERY 12 HOURS
Qty: 6 TABLET | Refills: 0 | Status: SHIPPED | OUTPATIENT
Start: 2017-02-01 | End: 2017-02-04

## 2017-02-01 RX ORDER — PROPOFOL 10 MG/ML
VIAL (ML) INTRAVENOUS
Status: DISCONTINUED | OUTPATIENT
Start: 2017-02-01 | End: 2017-02-01

## 2017-02-01 RX ORDER — MIDAZOLAM HYDROCHLORIDE 1 MG/ML
INJECTION, SOLUTION INTRAMUSCULAR; INTRAVENOUS
Status: DISCONTINUED | OUTPATIENT
Start: 2017-02-01 | End: 2017-02-01

## 2017-02-01 RX ORDER — POLYETHYLENE GLYCOL 3350 17 G/17G
17 POWDER, FOR SOLUTION ORAL DAILY
Qty: 30 PACKET | Refills: 0 | Status: SHIPPED | OUTPATIENT
Start: 2017-02-01 | End: 2018-02-26

## 2017-02-01 RX ORDER — ALBUTEROL SULFATE 90 UG/1
AEROSOL, METERED RESPIRATORY (INHALATION)
Status: DISCONTINUED | OUTPATIENT
Start: 2017-02-01 | End: 2017-02-01

## 2017-02-01 RX ORDER — GLYCOPYRROLATE 0.2 MG/ML
INJECTION INTRAMUSCULAR; INTRAVENOUS
Status: DISCONTINUED | OUTPATIENT
Start: 2017-02-01 | End: 2017-02-01

## 2017-02-01 RX ORDER — LIDOCAINE HCL/PF 100 MG/5ML
SYRINGE (ML) INTRAVENOUS
Status: DISCONTINUED | OUTPATIENT
Start: 2017-02-01 | End: 2017-02-01

## 2017-02-01 RX ORDER — OXYCODONE AND ACETAMINOPHEN 5; 325 MG/1; MG/1
1 TABLET ORAL EVERY 4 HOURS PRN
Qty: 31 TABLET | Refills: 0 | Status: SHIPPED | OUTPATIENT
Start: 2017-02-01 | End: 2017-02-16

## 2017-02-01 RX ORDER — ONDANSETRON 8 MG/1
8 TABLET, ORALLY DISINTEGRATING ORAL EVERY 8 HOURS PRN
Status: DISCONTINUED | OUTPATIENT
Start: 2017-02-01 | End: 2017-02-01 | Stop reason: HOSPADM

## 2017-02-01 RX ORDER — LIDOCAINE HYDROCHLORIDE 10 MG/ML
1 INJECTION, SOLUTION EPIDURAL; INFILTRATION; INTRACAUDAL; PERINEURAL ONCE
Status: COMPLETED | OUTPATIENT
Start: 2017-02-01 | End: 2017-02-01

## 2017-02-01 RX ADMIN — ONDANSETRON 4 MG: 2 INJECTION INTRAMUSCULAR; INTRAVENOUS at 12:02

## 2017-02-01 RX ADMIN — PROPOFOL 50 MG: 10 INJECTION, EMULSION INTRAVENOUS at 12:02

## 2017-02-01 RX ADMIN — MIDAZOLAM HYDROCHLORIDE 2 MG: 1 INJECTION, SOLUTION INTRAMUSCULAR; INTRAVENOUS at 11:02

## 2017-02-01 RX ADMIN — FENTANYL CITRATE 50 MCG: 50 INJECTION, SOLUTION INTRAMUSCULAR; INTRAVENOUS at 11:02

## 2017-02-01 RX ADMIN — LABETALOL HYDROCHLORIDE 10 MG: 5 INJECTION, SOLUTION INTRAVENOUS at 12:02

## 2017-02-01 RX ADMIN — GLYCOPYRROLATE 0.2 MG: 0.2 INJECTION, SOLUTION INTRAMUSCULAR; INTRAVENOUS at 12:02

## 2017-02-01 RX ADMIN — LIDOCAINE HYDROCHLORIDE 100 MG: 20 INJECTION, SOLUTION INTRAVENOUS at 11:02

## 2017-02-01 RX ADMIN — Medication 2 G: at 11:02

## 2017-02-01 RX ADMIN — ALBUTEROL SULFATE 2 PUFF: 90 AEROSOL, METERED RESPIRATORY (INHALATION) at 12:02

## 2017-02-01 RX ADMIN — SODIUM CHLORIDE, SODIUM GLUCONATE, SODIUM ACETATE, POTASSIUM CHLORIDE, MAGNESIUM CHLORIDE, SODIUM PHOSPHATE, DIBASIC, AND POTASSIUM PHOSPHATE: .53; .5; .37; .037; .03; .012; .00082 INJECTION, SOLUTION INTRAVENOUS at 12:02

## 2017-02-01 RX ADMIN — HYDROCODONE BITARTRATE AND ACETAMINOPHEN 1 TABLET: 5; 325 TABLET ORAL at 01:02

## 2017-02-01 RX ADMIN — SODIUM CHLORIDE: 0.9 INJECTION, SOLUTION INTRAVENOUS at 11:02

## 2017-02-01 RX ADMIN — FENTANYL CITRATE 50 MCG: 50 INJECTION, SOLUTION INTRAMUSCULAR; INTRAVENOUS at 02:02

## 2017-02-01 RX ADMIN — LIDOCAINE HYDROCHLORIDE 2 MG: 10 INJECTION, SOLUTION EPIDURAL; INFILTRATION; INTRACAUDAL; PERINEURAL at 11:02

## 2017-02-01 RX ADMIN — FENTANYL CITRATE 50 MCG: 50 INJECTION, SOLUTION INTRAMUSCULAR; INTRAVENOUS at 12:02

## 2017-02-01 RX ADMIN — EPHEDRINE SULFATE 15 MG: 50 INJECTION, SOLUTION INTRAMUSCULAR; INTRAVENOUS; SUBCUTANEOUS at 12:02

## 2017-02-01 RX ADMIN — PROPOFOL 200 MG: 10 INJECTION, EMULSION INTRAVENOUS at 11:02

## 2017-02-01 NOTE — PROGRESS NOTES
Per Lady, in pacemaker clinic, AICD was not turned off. No need to do anything further. Per Dr. Peacock, urology, Pt has FC to traction. Will come back in 30 mins to check it, release traction. If not bloody, pt may be dc hm w/ FC in place.   1345- DR. Buenrostro and Dr. Peacock, at bedside, taking pt off traction. Pt tolerated well. Per Dr. Peacock pt may be dc hm w/ FC. Pt aaox3 and in no distress.   1400- Extra mcpherson care supplies given to pt and fiance.

## 2017-02-01 NOTE — ANESTHESIA POSTPROCEDURE EVALUATION
"Anesthesia Post Evaluation    Patient: Xander Sanchez    Procedure(s) Performed: Procedure(s) (LRB):  PROSTATECTOMY-TRANSURETHRAL W QUANTA LASER (N/A)    Final Anesthesia Type: general  Patient location during evaluation: PACU  Patient participation: Yes- Able to Participate  Level of consciousness: awake and alert  Post-procedure vital signs: reviewed and stable  Pain management: adequate  Airway patency: patent  PONV status at discharge: No PONV  Anesthetic complications: no      Cardiovascular status: blood pressure returned to baseline  Respiratory status: unassisted, spontaneous ventilation and room air  Hydration status: euvolemic  Follow-up not needed.        Visit Vitals    /61    Pulse 71    Temp 36.6 °C (97.9 °F) (Temporal)    Resp 16    Ht 5' 11" (1.803 m)    Wt 91.2 kg (201 lb)    SpO2 99%    BMI 28.03 kg/m2       Pain/Odell Score: Pain Assessment Performed: Yes (2/1/2017  2:30 PM)  Presence of Pain: complains of pain/discomfort (2/1/2017  2:30 PM)  Pain Rating Prior to Med Admin: 8 (2/1/2017  2:24 PM)  Odell Score: 10 (2/1/2017  2:30 PM)      "

## 2017-02-01 NOTE — TRANSFER OF CARE
"Anesthesia Transfer of Care Note    Patient: Xander Sanchez    Procedure(s) Performed: Procedure(s) (LRB):  PROSTATECTOMY-TRANSURETHRAL W QUANTA LASER (N/A)    Patient location: PACU    Anesthesia Type: general    Transport from OR: Transported from OR on 6-10 L/min O2 by face mask with adequate spontaneous ventilation    Post pain: adequate analgesia    Post assessment: no apparent anesthetic complications    Post vital signs: stable    Level of consciousness: awake, alert and oriented    Nausea/Vomiting: no nausea/vomiting    Complications: none          Last vitals:   Visit Vitals    BP (!) 152/97 (BP Location: Left arm, Patient Position: Lying, BP Method: Automatic)    Pulse 73    Temp 37.2 °C (98.9 °F) (Oral)    Resp 18    Ht 5' 11" (1.803 m)    Wt 91.2 kg (201 lb)    SpO2 97%    BMI 28.03 kg/m2     "

## 2017-02-01 NOTE — OP NOTE
[facility]  Operative Note    Date: 02/01/2017    Pre-Op Diagnosis: BPH    Post-Op Diagnosis: same    Procedure(s) Performed:   1.  Laser vaporization of the prostate  2.  Enucleation of prostate tissue    Specimen(s): prostate floor    Staff Surgeon: Graham Buenrostro MD    Assistant Surgeon: Anabel Peacock MD    Anesthesia: General endotracheal anesthesia    Indications: Xander Sanchez is a 66 y.o. male with BPH    Findings:   1. Lateral lobe hypertrophy  2. No urethral or bladder abnormalities  3. Bilateral ureteral orifices in normal anatomical positon    Estimated Blood Loss: min    Drains: 20 Fr mcpherson catheter    Procedure in Detail:  After risks, benefits and possible complications of Laser TURP were discussed, the patient elected to undergo the procedure and informed consent was obtained. All questions were answered in the tiffany-operative area. The patient was transferred to the cystoscopy suite and placed on the fluoroscopy table in the supine position. Anesthesia was administered.  When the patient was adequately sedated he was placed in the dorsal lithotomy position and prepped and draped in the usual sterile fashion.  Time out was performed, tiffany-procedural antibiotics were confirmed.      A 22 Armenian revolix laser scope was introduced into the bladder per urethra. Bilobar hyperplasia was seen. Formal cystoscopy was performed which revealed no tumors or lesions suspicious for malignancy, no bladder stones, no bladder diverticuli.  The right and left ureteral orifices were visualized in the normal anatomic position.    Our attention was first turned to enucleating the floor of the prostate. This was accomplished with a 800 micro Quanta laser fiber. An incision in the prostate was made with the laser fiber at the bladder neck at the 5 o'clock position and brought just proximal to the verumontanum to the depth of the prostatic capsule. A right counter incision was made in the prostate at the  7 o'clock position and brought to just proximal of the verumontanum. The tissue was then enucleated by incising at the base between the two previously mentioned counterincisions. The median lobe was advanced into the bladder and and vaporized at the base. Once free, the enucleated tissue was removed with alligator forceps and sent to pathology.    Next lateral incisions were made from the 2 o'clock position and brought down to the proximal verumontanum to the level of the prostatic capsule. The lateral lobe was then vaporized superficially to deep in a stepwise fashion. This was repeated from the 10' o'clock position to just proximal to the veru. After all hyperplasia had been vaporized the bladder was drained. A good channel was seen. All bleeding was coagulated with the quanta laser and adequate hemostasis was obtained.      The patient tolerated the procedure well and was transferred to the recovery room in stable condition.      Follow up care:  The patient will follow up with Dr. Buenrostro in 4 weeks.  He was given prescriptions for cipro, percocet, miralax. His mcpherson will be removed in 2 days.     Anabel Peacock MD

## 2017-02-01 NOTE — IP AVS SNAPSHOT
[facility]  [address]  [facility] [address]  Phone: [phone]           Patient Discharge Instructions     Our goal is to set you up for success. This packet includes information on your condition, medications, and your home care. It will help you to care for yourself so you don't get sicker and need to go back to the hospital.     Please ask your nurse if you have any questions.        There are many details to remember when preparing to leave the hospital. Here is what you will need to do:    1. Take your medicine. If you are prescribed medications, review your Medication List in the following pages. You may have new medications to  at the pharmacy and others that you'll need to stop taking. Review the instructions for how and when to take your medications. Talk with your doctor or nurses if you are unsure of what to do.     2. Go to your follow-up appointments. Specific follow-up information is listed in the following pages. Your may be contacted by a transition nurse or clinical provider about future appointments. Be sure we have all of the phone numbers to reach you, if needed. Please contact your provider's office if you are unable to make an appointment.     3. Watch for warning signs. Your doctor or nurse will give you detailed warning signs to watch for and when to call for assistance. These instructions may also include educational information about your condition. If you experience any of warning signs to your health, call your doctor.               [facility]  Unless otherwise directed by your provider, please contact [facility], our nurse care line that is available for 24/7 assistance.     [phone] (toll-free)    Registered nurses in the [facility] provide clinical advisement, health education, appointment booking, and other advisory services.                    ** Verify the list of medication(s) below is accurate and up  to date. Carry this with you in case of emergency. If your medications have changed, please notify your healthcare provider.             Medication List      START taking these medications        Additional Info                      ciprofloxacin HCl 500 MG tablet   Commonly known as:  CIPRO   Quantity:  6 tablet   Refills:  0   Dose:  500 mg    Instructions:  Take 1 tablet (500 mg total) by mouth every 12 (twelve) hours.     Begin Date    AM    Noon    PM    Bedtime       oxycodone-acetaminophen 5-325 mg per tablet   Commonly known as:  PERCOCET   Quantity:  31 tablet   Refills:  0   Dose:  1 tablet    Instructions:  Take 1 tablet by mouth every 4 (four) hours as needed for Pain.     Begin Date    AM    Noon    PM    Bedtime       polyethylene glycol 17 gram Pwpk   Commonly known as:  GLYCOLAX   Quantity:  30 packet   Refills:  0   Dose:  17 g    Instructions:  Take 17 g by mouth once daily.     Begin Date    AM    Noon    PM    Bedtime         CONTINUE taking these medications        Additional Info                      ACCU-CHEK SOFTCLIX LANCETS Misc   Refills:  0   Generic drug:  lancets      Begin Date    AM    Noon    PM    Bedtime       amlodipine 5 MG tablet   Commonly known as:  NORVASC   Quantity:  90 tablet   Refills:  3   Dose:  5 mg    Instructions:  Take 1 tablet (5 mg total) by mouth once daily.     Begin Date    AM    Noon    PM    Bedtime       aspirin 81 MG EC tablet   Commonly known as:  ECOTRIN   Refills:  0   Dose:  81 mg    Instructions:  Take 81 mg by mouth once daily.     Begin Date    AM    Noon    PM    Bedtime       baclofen 10 MG tablet   Commonly known as:  LIORESAL   Quantity:  90 tablet   Refills:  11   Dose:  10 mg    Instructions:  Take 1 tablet (10 mg total) by mouth 3 (three) times daily.     Begin Date    AM    Noon    PM    Bedtime       CONTOUR NEXT STRIPS Strp   Refills:  0   Generic drug:  blood sugar diagnostic      Begin Date    AM    Noon    PM    Bedtime        fluticasone-salmeterol 230-21 mcg/dose 230-21 mcg/actuation Hfaa inhaler   Commonly known as:  ADVAIR HFA   Quantity:  12 each   Refills:  6   Dose:  2 puff    Instructions:  Inhale 2 puffs into the lungs 2 (two) times daily.     Begin Date    AM    Noon    PM    Bedtime       gabapentin 600 MG tablet   Commonly known as:  NEURONTIN   Quantity:  360 tablet   Refills:  3   Dose:  600 mg    Instructions:  Take 1 tablet (600 mg total) by mouth 4 (four) times daily with meals and nightly.     Begin Date    AM    Noon    PM    Bedtime       hydrocodone-acetaminophen 10-325mg  mg Tab   Commonly known as:  NORCO   Quantity:  90 tablet   Refills:  0   Dose:  1 tablet    Instructions:  Take 1 tablet by mouth every 8 (eight) hours as needed (MSK pain).     Begin Date    AM    Noon    PM    Bedtime       lisinopril 5 MG tablet   Commonly known as:  PRINIVIL,ZESTRIL   Quantity:  270 tablet   Refills:  3   Dose:  10 mg    Instructions:  Take 2 tablets (10 mg total) by mouth every morning.     Begin Date    AM    Noon    PM    Bedtime       oxybutynin 5 MG Tab   Commonly known as:  DITROPAN   Quantity:  270 tablet   Refills:  3   Dose:  5 mg    Instructions:  Take 1 tablet (5 mg total) by mouth 3 (three) times daily.     Begin Date    AM    Noon    PM    Bedtime       pravastatin 10 MG tablet   Commonly known as:  PRAVACHOL   Quantity:  90 tablet   Refills:  1    Instructions:  TAKE ONE TABLET BY MOUTH AT BEDTIME     Begin Date    AM    Noon    PM    Bedtime       tamsulosin 0.4 mg Cp24   Commonly known as:  FLOMAX   Quantity:  90 capsule   Refills:  3   Dose:  1 capsule    Instructions:  Take 1 capsule (0.4 mg total) by mouth once daily.     Begin Date    AM    Noon    PM    Bedtime            Where to Get Your Medications      You can get these medications from any pharmacy     Bring a paper prescription for each of these medications     ciprofloxacin HCl 500 MG tablet    oxycodone-acetaminophen 5-325 mg per tablet     polyethylene glycol 17 gram Pwpk                  Please bring to all follow up appointments:    1. A copy of your discharge instructions.  2. All medicines you are currently taking in their original bottles.  3. Identification and insurance card.    Please arrive 15 minutes ahead of scheduled appointment time.    Please call 24 hours in advance if you must reschedule your appointment and/or time.        Your Scheduled Appointments     Feb 03, 2017 10:40 AM CST   Post OP with Ness Diego NP   [facility] 4th [facility] ([address] )    [address]  [facility] [id number] [phone]            Feb 13, 2017  8:00 AM CST   Established Patient Visit with Sara Ruiz MD   [facility] ([address] )    [address]  [facility] [id number] [phone]            Feb 27, 2017  1:40 PM CST   Established Patient Visit with Williams Alvarenga MD   [facility] ([place]    [address]  [place] [id number] [phone]            Apr 03, 2017  7:30 AM CDT   Established Patient Visit with Scott Garcia DPM   [facility] ([place])    [address]  [place] [id number] [phone]            Apr 24, 2017  8:00 AM CDT   Remote Interrogation with HOME MONITOR DEVICE CHECK, [facility]   [facility] ([address] )    [address]  [place] [id number] [phone]              Follow-up Information     Follow up with Jing Riley NP On 2/3/2017.    Specialty:  Urology    Why:  mcpherson removal, voiding trial    Contact information:    [address] [facility] [id number] [phone]          Follow up with Graham Buenrostro MD In 1 month.    Specialty:  Urology    Why:  post op    Contact information:    [address]  [facility] [id number] [phone]          Discharge Instructions     Future Orders    Activity as tolerated     Call MD for:  persistent nausea and vomiting     Call MD for:   severe uncontrolled pain     Call MD for:  temperature >100.4     Diet general     Questions:    Total calories:      Fat restriction, if any:      Protein restriction, if any:      Na restriction, if any:      Fluid restriction:      Additional restrictions:      No dressing needed         Discharge Instructions         Discharge Instructions: Caring for Your Indwelling Urinary Catheter  You have been discharged with an indwelling urinary catheter (also called a Potter catheter). A catheter is a thin, flexible tube. An indwelling urinary catheter has two parts. The first part is a tube that drains urine from your bladder. The second part is a bag or other device that collects the urine.  The most important thing to remember is that you want to prevent infection. Always wash your hands before handling your catheter bag or tubing.  Draining the bedside bag  · Wash your hands with soap and clean, running water or an alcohol-based hand  that contains at least 60% alcohol.  · Hold the drainage tube over a toilet or measuring container.  · Unclamp the tube and let the bag drain.  · Dont touch the tip of the drainage tube or let it touch the toilet or container.  Cleaning the drainage tube  · When the bag is empty, clean the tip of the drainage tube with an alcohol wipe.  · Clamp the tube.  · Reinsert the tube into the pocket on the drainage bag.  Cleaning your skin and tubing  · Clean the skin near the catheter with soap and water.  · Wash your genital area from front to back.  · Wash the catheter tubing. Always wash the catheter in the direction away from your body.  · You will be told when and how to change your bag and tubing.  · Dont try to remove the catheter by yourself.  · You may shower with the catheter in place.  Emptying a leg bag  · Wash your hands.  · Remove the stopper on the bag.  · Drain the bag into the toilet or a measuring container. Dont let the tip of the drainage tube touch anything,  "including your fingers.  · Clean the tip of the drainage tube with alcohol.  · Replace the stopper.  Follow-up  Make a follow-up appointment as directed by your healthcare provider  When to call your healthcare provider  Call your healthcare provider right away if you have any of the following:  · Chills or fever above 100.4°F (38°C)  · Leakage around the catheter insertion site  · Increased spasms (uncontrollable twitching) in your legs, abdomen, or bladder. Occasional mild spasms are normal.  · Burning in the urinary tract, penis, or genital area  · Nausea and vomiting  · Aching in the lower back  · Cloudy or bloody (pink or red) urine, sediment or mucus in the urine, or foul-smelling urine   © 7948-3035 [employer]. [address] [place] [address]. All rights reserved. This information is not intended as a substitute for professional medical care. Always follow your healthcare professional's instructions.            Primary Diagnosis     Your primary diagnosis was:  Enlarged Prostate With Urinary Obstruction      Admission Information     Date & Time Provider Department CSN    2/1/2017  9:53 AM Graham Buenrostro MD [facility] [id number]      Care Providers     Provider Role Specialty Primary office phone    Graham Buenrostro MD Attending Provider Urology [phone]    Graham Buenrostro MD Surgeon  Urology [phone]      Your Vitals Were     BP Pulse Temp Resp Height Weight    124/41 80 97.9 °F (36.6 °C) (Temporal) 16 5' 11" (1.803 m) 91.2 kg (201 lb)    SpO2 BMI             100% 28.03 kg/m2         Recent Lab Values        10/3/2014 2/2/2015 7/27/2015 2/1/2016 8/17/2016               7:16 AM  9:49 AM 11:50 AM  9:12 AM  9:19 AM       A1C 7.7 (H) 7.0 (H) 7.2 (H) 6.5 (H) 6.4 (H)       Comment for A1C at  9:19 AM on 8/17/2016:  According to ADA guidelines, hemoglobin A1C <7.0% represents  optimal control in non-pregnant diabetic patients.  Different  metrics may apply to specific " populations.   Standards of Medical Care in Diabetes - 2016.  For the purpose of screening for the presence of diabetes:  <5.7%     Consistent with the absence of diabetes  5.7-6.4%  Consistent with increasing risk for diabetes   (prediabetes)  >or=6.5%  Consistent with diabetes  Currently no consensus exists for use of hemoglobin A1C  for diagnosis of diabetes for children.        Pending Labs     Order Current Status    Specimen to Pathology - Surgery Collected (02/01/17 1251)      Allergies as of 2/1/2017     No Known Allergies      Advance Directives     An advance directive is a document which, in the event you are no longer able to make decisions for yourself, tells your healthcare team what kind of treatment you do or do not want to receive, or who you would like to make those decisions for you.  If you do not currently have an advance directive, [employer] encourages you to create one.  For more information call:  [phone]), [phone] ([phone]),  or log on to [email or web address].        Smoking Cessation     If you would like to quit smoking:   You may be eligible for free services if you are a [place] resident and started smoking cigarettes before September 1, 1988.  Call the Smoking Cessation Trust (SCT) toll free at (253) 015-8075 or (704) 478-3254.   Call 4-995-QUIT-NOW if you do not meet the above criteria.            Language Assistance Services     ATTENTION: Language assistance services are available, free of charge. Please call [phone].      ATENCIÓN: Si habla español, tiene a cope disposición servicios gratuitos de asistencia lingüística. Llame al [phone].     CHÚ Ý: N?u b?n nói Ti?ng Vi?t, có các d?ch v? h? tr? ngôn ng? mi?n phí dành cho b?n. G?i s? [phone].        Heart Failure Education       Heart Failure: Being Active  You have a condition called heart failure. Being active doesnt mean that you have to wear yourself out. Even a little  movement each day helps to strengthen your heart. If you cant get out to exercise, you can do simple stretching and strengthening exercises at home. These are good ways to keep you well-conditioned and prevent you and your heart from becoming excessively weak.    Ideas to get you started  · Add a little movement to things you do now. Walk to mail letters. Park your car at the far end of the parking lot and walk to the store. Walk up a flight of stairs instead of taking the elevator.  · Choose activities you enjoy. You might walk, swim, or ride an exercise bike. Things like gardening and washing the car count, too. Other possibilities include: washing dishes, walking the dog, walking around the mall, and doing aerobic activities with friends.  · Join a group exercise program at a [facility], a senior center, or a community center. Or look into a hospital cardiac rehabilitation program. Ask your doctor if you qualify.  Tips to keep you going  · Get up and get dressed each day. Go to a coffee shop and read a newspaper or go somewhere that you'll be in the presence of other active people. Youll feel more like being active.  · Make a plan. Choose one or more activities that you enjoy and that you can easily do. Then plan to do at least one each day. You might write your plan on a calendar.  · Go with a friend or a group if you like company. This can help you feel supported and stay motivated, too.  · Plan social events that you enjoy. This will keep you mentally engaged as well as physically motivated to do things you find pleasure in.  For your safety  · Talk with your healthcare provider before starting an exercise program.  · Exercise indoors when its too hot or too cold outside, or when the air quality is poor. Try walking at a shopping mall.  · Wear socks and sturdy shoes to maintain your balance and prevent falls.  · Start slowly. Do a few minutes several times a day at first. Increase your time and speed  little by little.  · Stop and rest whenever you feel tired or get short of breath.  · Dont push yourself on days when you dont feel well.  © 6361-3708 [employer]. [address] [place] [address], [place] [address]. All rights reserved. This information is not intended as a substitute for professional medical care. Always follow your healthcare professional's instructions.              Heart Failure: Evaluating Your Heart  You have a condition called heart failure. To evaluate your condition, your doctor will examine you, ask questions, and do some tests. Along with looking for signs of heart failure, the doctor looks for any other health problems that may have led to heart failure. The results of your evaluation will help your doctor form a treatment plan.  Health history and physical exam  Your visit will start with a health history. Tell the doctor about any symptoms youve noticed and about all medicines you take. Then youll have a physical exam. This includes listening to your heartbeat and breathing. Youll also be checked for swelling (edema) in your legs and neck. When you have fluid buildup or fluid in the lungs, it may be called congestive heart failure.  Diagnosing heart failure     During an echocardiogram, sound waves bounce off the heart. These are converted into a picture on the screen.   The following may be done to help your doctor form a diagnosis:  · X-rays show the size and shape of your heart. These pictures can also show fluid in your lungs.  · An electrocardiogram (ECG or EKG) shows the pattern of your heartbeat. Small pads (electrodes) are placed on your chest, arms, and legs. Wires connect the pads to the ECG machine, which records your hearts electrical signals. This can give the doctor information about heart function.  · An echocardiogram uses ultrasound waves to show the structure and movement of your heart muscle. This shows how well the heart pumps. It also shows the thickness  of the heart walls, and if the heart is enlarged. It is one of the most useful, non-invasive tests as it provides information about the heart's general function. This helps your doctor make treatment decisions.  · Lab tests evaluate small amounts of blood or urine for signs of problems. A BNP lab test can help diagnose and evaluate heart failure. BNP stands for B-type natriuretic peptide. The ventricles secrete more BNP when heart failure worsens. Lab tests can also provide information about metabolic dysfunction or heart dysfunction.  Your treatment plan  Based on the results of your evaluation and tests, your doctor will develop a treatment plan. This plan is designed to relieve some of your heart failure symptoms and help make you more comfortable. Your treatment plan may include:  · Medicine to help your heart work better and improve your quality of life  · Changes in what you eat and drink to help prevent fluid from backing up in your body  · Daily monitoring of your weight and heart failure symptoms to see how well your treatment plan is working  · Exercise to help you stay healthy  · Help with quitting smoking  · Emotional and psychological support to help adjust to the changes  · Referrals to other specialists to make sure you are being treated comprehensively  © 0358-2461 [employer]. [address] [place] [address]. All rights reserved. This information is not intended as a substitute for professional medical care. Always follow your healthcare professional's instructions.              Heart Failure: Making Changes to Your Diet  You have a condition called heart failure. When you have heart failure, excess fluid is more likely to build up in your body because your heart isn't working well. This makes the heart work harder to pump blood. Fluid buildup causes symptoms such as shortness of breath and swelling (edema). This is often referred to as congestive heart failure or CHF.  Controlling the amount of salt (sodium) you eat may help stop fluid from building up. Your doctor may also tell you to reduce the amount of fluid you drink.  Reading food labels    Your healthcare provider will tell you how much sodium you can eat each day. Read food labels to keep track. Keep in mind that certain foods are high in salt. These include canned, frozen, and processed foods. Check the amount of sodium in each serving. Watch out for high-sodium ingredients. These include MSG (monosodium glutamate), baking soda, and sodium phosphate.   Eating less salt  Give yourself time to get used to eating less salt. It may take a little while. Here are some tips to help:  · Take the saltshaker off the table. Replace it with salt-free herb mixes and spices.  · Eat fresh or plain frozen vegetables. These have much less salt than canned vegetables.  · Choose low-sodium snacks like sodium-free pretzels, crackers, or air-popped popcorn.  · Dont add salt to your food when youre cooking. Instead, season your foods with pepper, lemon, garlic, or onion.  · When you eat out, ask that your food be cooked without added salt.  · Avoid eating fried foods as these often have a great deal of salt.  If youre told to limit fluids  You may need to limit how much fluid you have to help prevent swelling. This includes anything that is liquid at room temperature, such as ice cream and soup. If your doctor tells you to limit fluid, try these tips:  · Measure drinks in a measuring cup before you drink them. This will help you meet daily goals.  · Chill drinks to make them more refreshing.  · Suck on frozen lemon wedges to quench thirst.  · Only drink when youre thirsty.  · Chew sugarless gum or suck on hard candy to keep your mouth moist.  · Weigh yourself daily to know if your body's fluid content is rising.  My sodium goal  Your healthcare provider may give you a sodium goal to meet each day. This includes sodium found in food as well  as salt that you add. My goal is to eat no more than ___________ mg of sodium per day.     When to call your doctor  Call your doctor right away if you have any symptoms of worsening heart failure. These can include:  · Sudden weight gain  · Increased swelling of your legs or ankles  · Trouble breathing when youre resting or at night  · Increase in the number of pillows you have to sleep on  · Chest pain, pressure, discomfort, or pain in the jaw, neck, or back   © 20000310-1461 [employer]. [address] [place] [address]. All rights reserved. This information is not intended as a substitute for professional medical care. Always follow your healthcare professional's instructions.              Heart Failure: Medicines to Help Your Heart    You have a condition called heart failure (also known as congestive heart failure, or CHF). Your doctor will likely prescribe medicines for heart failure and any underlying health problems you have. Most heart failure patients take one or more types of medicinen. Your healthcare provider will work to find the combination of medicines that works best for you.  Heart failure medicines  Here are the most common heart failure medicines:  · ACE inhibitors lower blood pressure and decrease strain on the heart. This makes it easier for the heart to pump. Angiotensin receptor blockers have similar effects. These are prescribed for some patients instead of ACE inhibitors.  · Beta-blockers relieve stress on the heart. They also improve symptoms. They may also improve the heart's pumping action over time.  · Diuretics (also called water pills) help rid your body of excess water. This can help rid your body of swelling (edema). Having less fluid to pump means your heart doesnt have to work as hard. Some diuretics make your body lose a mineral called potassium. Your doctor will tell you if you need to take supplements or eat more foods high in potassium.  · Digoxin helps  your heart pump with more strength. This helps your heart pump more blood with each beat. So, more oxygen-rich blood travels to the rest of the body.  · Aldosterone antagonists help alter hormones and decrease strain on the heart.  · Hydralazine and nitrates are two separate medicines used together to treat heart failure. They may come in one combination pill. They lower blood pressure and decrease how hard the heart has to pump.  Medicines for related conditions  Controlling other heart problems helps keep heart failure under control, too. Depending on other heart problems you have, medicines may be prescribed to:  · Lower blood pressure (antihypertensives).  · Lower cholesterol levels (statins).  · Prevent blood clots (anticoagulants or aspirin).  · Keep the heartbeat steady (antiarrhythmics).  © 7072-0033 [employer]. [address] [place] [address], [place] [address]. All rights reserved. This information is not intended as a substitute for professional medical care. Always follow your healthcare professional's instructions.              Heart Failure: Procedures That May Help    The heart is a muscle that pumps oxygen-rich blood to all parts of the body. When you have heart failure, the heart is not able to pump as well as it should. Blood and fluid may back up into the lungs (congestive heart failure), and some parts of the body dont get enough oxygen-rich blood to work normally. These problems lead to the symptoms of heart failure.     Certain procedures may help the heart pump better in some cases of heart failure. Some procedures are done to treat health problems that may have caused the heart failure such as coronary artery disease or heart rhythm problems. For more serious heart failure, other options are available.  Treating artery and valve problems  If you have coronary artery disease or valve disease, procedures may be done to improve blood flow. This helps the heart pump better, which can  improve heart failure symptoms. First, your doctor may do a cardiac catheterization to help detect clogged blood vessels or valve damage. During this procedure, a  thin tube (catheter) in inserted into a blood vessel and guided to the heart. There a dye is injected and a special type of X-ray (angiogram) is taken of the blood vessels. Procedures to open a blocked artery or fix damaged valves can also be done using catheterization.  · Angioplasty uses a balloon-tipped instrument at the end of the catheter. The balloon is inflated to widen the narrowed artery. In many cases, a stent is expanded to further support the narrowed artery. A stent is a metal mesh tube.  · Valve surgery repairs or replacement of faulty valves can also be done during catheterization so blood can flow properly through the chambers of the heart.  Bypass surgery is another option to help treat blocked arteries. It uses a healthy blood vessel from elsewhere in the body. The healthy blood vessel is attached above and below the blocked area so that blood can flow around the blocked artery.  Treating heart rhythm problems  A device may be placed in the chest to help a weak heart maintain a healthy, heartbeat so the heart can pump more effectively:  · Pacemaker. A pacemaker is an implanted device that regulates your heartbeat electronically. It monitors your heart's rhythm and generates a painless electric impulse that helps the heart beat in a regular rhythm. A pacemaker is programmed to meet your specific heart rhythm needs.  · Biventricular pacing/cardiac resynchronization therapy. A type of pacemaker that paces both pumping chambers of the heart at the same time to coordinate contractions and to improve the heart's function. Some people with heart failure are candidates for this therapy.  · Implantable cardioverter defibrillator. A device similar to a pacemaker that senses when the heart is beating too fast and delivers an electrical shock to  convert the fast rhythm to a normal rhythm. This can be a life saving device.  In severe cases  In more serious cases of heart failure when other treatments no longer work, other options may include:  · Ventricular assist devices (VADs). These are mechanical devices used to take over the pumping function for one or both of the heart's ventricles, or pumping chambers. A VAD may be necessary when heart failure progresses to the point that medicines and other treatments no longer help. In some cases, a VAD may be used as a bridge to transplant.  · Heart transplant. This is replacing the diseased heart with a healthy one from a donor. This is an option for a few people who are very sick. A heart transplant is very serious and not an option for all patients. Your doctor can tell you more.  © 2465-1305 [employer]. [address] [place] [address], [place] [address]. All rights reserved. This information is not intended as a substitute for professional medical care. Always follow your healthcare professional's instructions.              Heart Failure: Tracking Your Weight  You have a condition called heart failure. When you have heart failure, a sudden weight gain or a steady rise in weight is a warning sign that your body is retaining too much water and salt. This could mean your heart failure is getting worse. If left untreated, it can cause problems for your lungs and result in shortness of breath. Weighing yourself each day is the best way to know if youre retaining water. If your weight goes up quickly, call your doctor. You will be given instructions on how to get rid of the excess water. You will likely need medicines and to avoid salt. This will help your heart work better.  Call your doctor if you gain more than 2 pounds in 1 day, more than 5 pounds in 1 week, or whatever weight gain you were told to report by your doctor. This is often a sign of worsening heart failure and needs to be evaluated and treated.  Your doctor will tell you what to do next.   Tips for weighing yourself    · Weigh yourself at the same time each morning, wearing the same clothes. Weigh yourself after urinating and before eating.  · Use the same scale each day. Make sure the numbers are easy to read. Put the scale on a flat, hard surface -- not on a rug or carpet.  · Do not stop weighing yourself. If you forget one day, weigh again the next morning.  How to use your weight chart  · Keep your weight chart near the scale. Write your weight on the chart as soon as you get off the scale.  · Fill in the month and the start date on the chart. Then write down your weight each day. Your chart will look like this:    · If you miss a day, leave the space blank. Weigh yourself the next day and write your weight in the next space.  · Take your weight chart with you when you go to see your doctor.  © 6579-4176 [employer]. [address] [place] [address], [place] [address]. All rights reserved. This information is not intended as a substitute for professional medical care. Always follow your healthcare professional's instructions.              Heart Failure: Warning Signs of a Flare-Up  You have a condition called heart failure. Once you have heart failure, flare-ups can happen. Below are signs that can mean your heart failure is getting worse. If you notice any of these warning signs, call your healthcare provider.  Swelling    · Your feet, ankles, or lower legs get puffier.  · You notice skin changes on your lower legs.  · Your shoes feel too tight.  · Your clothes are tighter in the waist.  · You have trouble getting rings on or off your fingers.  Shortness of breath  · You have to breathe harder even when youre doing your normal activities or when youre resting.  · You are short of breath walking up stairs or even short distances.  · You wake up at night short of breath or coughing.  · You need to use more pillows or sit up to sleep.  · You wake up  tired or restless.  Other warning signs  · You feel weaker, dizzy, or more tired.  · You have chest pain or changes in your heartbeat.  · You have a cough that wont go away.  · You cant remember things or dont feel like eating.  Tracking your weight  Gaining weight is often the first warning sign that heart failure is getting worse. Gaining even a few pounds can be a sign that your body is retaining excess water and salt. Weighing yourself each day in the morning after you urinate and before you eat, is the best way to know if you're retaining water. Get a scale that is easy to read and make sure you wear the same clothes and use the same scale every time you weigh. Your healthcare provider will show you how to track your weight. Call your doctor if you gain more than 2 pounds in 1 day, 5 pounds in 1 week, or whatever weight gain you were told to report by your doctor. This is often a sign of worsening heart failure and needs to be evaluated and treated before it compromises your breathing. Your doctor will tell you what to do next.    © 1869-4030 [employer]. [address] [place] [address], [place] [address]. All rights reserved. This information is not intended as a substitute for professional medical care. Always follow your healthcare professional's instructions.              Diabetes Discharge Instructions                                    [facility] complies with applicable Federal civil rights laws and does not discriminate on the basis of race, color, national origin, age, disability, or sex.

## 2017-02-01 NOTE — DISCHARGE INSTRUCTIONS
Discharge Instructions: Caring for Your Indwelling Urinary Catheter  You have been discharged with an indwelling urinary catheter (also called a Potter catheter). A catheter is a thin, flexible tube. An indwelling urinary catheter has two parts. The first part is a tube that drains urine from your bladder. The second part is a bag or other device that collects the urine.  The most important thing to remember is that you want to prevent infection. Always wash your hands before handling your catheter bag or tubing.  Draining the bedside bag  · Wash your hands with soap and clean, running water or an alcohol-based hand  that contains at least 60% alcohol.  · Hold the drainage tube over a toilet or measuring container.  · Unclamp the tube and let the bag drain.  · Dont touch the tip of the drainage tube or let it touch the toilet or container.  Cleaning the drainage tube  · When the bag is empty, clean the tip of the drainage tube with an alcohol wipe.  · Clamp the tube.  · Reinsert the tube into the pocket on the drainage bag.  Cleaning your skin and tubing  · Clean the skin near the catheter with soap and water.  · Wash your genital area from front to back.  · Wash the catheter tubing. Always wash the catheter in the direction away from your body.  · You will be told when and how to change your bag and tubing.  · Dont try to remove the catheter by yourself.  · You may shower with the catheter in place.  Emptying a leg bag  · Wash your hands.  · Remove the stopper on the bag.  · Drain the bag into the toilet or a measuring container. Dont let the tip of the drainage tube touch anything, including your fingers.  · Clean the tip of the drainage tube with alcohol.  · Replace the stopper.  Follow-up  Make a follow-up appointment as directed by your healthcare provider  When to call your healthcare provider  Call your healthcare provider right away if you have any of the following:  · Chills or fever above  100.4°F (38°C)  · Leakage around the catheter insertion site  · Increased spasms (uncontrollable twitching) in your legs, abdomen, or bladder. Occasional mild spasms are normal.  · Burning in the urinary tract, penis, or genital area  · Nausea and vomiting  · Aching in the lower back  · Cloudy or bloody (pink or red) urine, sediment or mucus in the urine, or foul-smelling urine   © 8102-2057 [employer]. [address] [place] [address], [place] [address]. All rights reserved. This information is not intended as a substitute for professional medical care. Always follow your healthcare professional's instructions.

## 2017-02-01 NOTE — ANESTHESIA RELEASE NOTE
"Anesthesia Release from PACU Note    Patient: Xander Sanchez    Procedure(s) Performed: Procedure(s) (LRB):  PROSTATECTOMY-TRANSURETHRAL W QUANTA LASER (N/A)    Anesthesia type: GEN    Post pain: Adequate analgesia reported    Post assessment: no apparent anesthetic complications, tolerated procedure well and no evidence of recall    Post vital signs:   Visit Vitals    /61    Pulse 71    Temp 36.6 °C (97.9 °F) (Temporal)    Resp 16    Ht 5' 11" (1.803 m)    Wt 91.2 kg (201 lb)    SpO2 99%    BMI 28.03 kg/m2       Level of consciousness: awake, alert and oriented    Nausea/Vomiting: no nausea/no vomiting    Complications: none    Airway Patency: patent    Respiratory: unassisted, spontaneous ventilation, room air    Cardiovascular: stable and blood pressure at baseline    Hydration: euvolemic    "

## 2017-02-01 NOTE — PROGRESS NOTES
Dr Bustillo notified: pt c/o pain to right arm x 2 years now; no new orders. Dr uBstillo at bs now.

## 2017-02-01 NOTE — H&P (VIEW-ONLY)
Urology ([facility]) H&P  Staff: Graham Buenrostro MD    CC: BPH with obstruction    HPI: Xander Sanchez is a 66 y.o. 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    He complains of frequency, urgency, weak stream. He was started on Flomax and Oxybutynin XL 5mg BID by Dr. Rosas and states that this has helped his symptoms. He still notes frequency, urgency, hesitancy, nocturia x1-2, occasional incomplete emptying.  He denies dysuria, hematuria, recurrent UTIs.        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  - has been on flomax.  - does not have a catheter in place.  - has had cystoscopy. This demonstrated 3.5 cm prostate with lateral lobe hypertrophy.  - has had urodynamics. This demonstrated obstructive voiding.  - has not had history of elevated PSA.   - ADDIS documented on 10/18/2016 was Normal.  30g, smooth.   - Last urine culture on 1/5/2017 demonstrated no growth.      No family history of  malignancy.       ROS:  Neg except per HPI    Past Medical History   Diagnosis Date    Asthma     Cardiomyopathy     Cervical spondylosis with myelopathy 10/17/2012    Chronic bronchitis     Chronic systolic dysfunction of left ventricle 7/27/2015    Constipation 7/27/2015    COPD (chronic obstructive pulmonary disease)     Diabetes mellitus, type 2     DM type 2 (diabetes mellitus, type 2) 7/27/2015    Enlarged prostate 7/27/2015    Hypertension     ICD (implantable cardiac defibrillator) in place     Presence of biventricular AICD 7/27/2015    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thy 2/1/2016    Urinary tract infection      pt does not know       Past Surgical History   Procedure Laterality Date    Cervical spine surgery      Cardiac defibrillator placement      Spine surgery         Social History     Social History    Marital status: Single     Spouse name:  N/A    Number of children: N/A    Years of education: N/A     Social History Main Topics    Smoking status: Former Smoker     Packs/day: 1.00     Years: 40.00     Types: Cigarettes     Quit date: 7/26/2009    Smokeless tobacco: Never Used    Alcohol use 1.8 oz/week     3 Cans of beer per week    Drug use: No    Sexual activity: Yes     Partners: Female     Other Topics Concern    Not on file     Social History Narrative       Family History   Problem Relation Age of Onset    Hypertension Mother     Hypertension Father     COPD Father     No Known Problems Sister     No Known Problems Brother     No Known Problems Daughter     Prostate cancer Neg Hx     Kidney disease Neg Hx        Review of patient's allergies indicates:  No Known Allergies    Current Outpatient Prescriptions on File Prior to Visit   Medication Sig Dispense Refill    ACCU-CHEK SOFTCLIX LANCETS Misc       amlodipine (NORVASC) 5 MG tablet Take 1 tablet (5 mg total) by mouth once daily. 90 tablet 3    aspirin (ECOTRIN) 81 MG EC tablet Take 81 mg by mouth once daily.      baclofen (LIORESAL) 10 MG tablet Take 1 tablet (10 mg total) by mouth 3 (three) times daily. 90 tablet 11    CONTOUR NEXT STRIPS Strp       fluticasone-salmeterol 230-21 mcg/dose (ADVAIR HFA) 230-21 mcg/actuation HFAA inhaler Inhale 2 puffs into the lungs 2 (two) times daily. 12 each 6    gabapentin (NEURONTIN) 600 MG tablet Take 1 tablet (600 mg total) by mouth 4 (four) times daily with meals and nightly. 360 tablet 3    hydrocodone-acetaminophen 10-325mg (NORCO)  mg Tab Take 1 tablet by mouth every 8 (eight) hours as needed (MSK pain). 90 tablet 0    lisinopril (PRINIVIL,ZESTRIL) 5 MG tablet Take 2 tablets (10 mg total) by mouth every morning. 270 tablet 3    oxybutynin (DITROPAN) 5 MG Tab Take 1 tablet (5 mg total) by mouth 3 (three) times daily. 270 tablet 3    pravastatin (PRAVACHOL) 10 MG tablet TAKE ONE TABLET BY MOUTH AT BEDTIME 90 tablet 0     tamsulosin (FLOMAX) 0.4 mg Cp24 Take 1 capsule (0.4 mg total) by mouth once daily. 90 capsule 3     No current facility-administered medications on file prior to visit.        Anticoagulation:  Yes aspirin, will hold     Physical Exam:  Weight 201lb/91kg  BMI 28    AAOx4, NAD, WDWN  NC/AT, EOMI, PER, sclerae anicteric, speech normal, tongue midline  Nl effort  RRR  Soft, non-tender, non-distended  Phallus uncircumcised, testes 10cc, normal cord structures    Labs:    Urine dipstick today - negative for all components       Lab Results   Component Value Date    WBC 4.29 01/25/2016    HGB 12.7 (L) 01/25/2016    HCT 40.1 01/25/2016    MCV 91 01/25/2016     01/25/2016       BMP  Lab Results   Component Value Date     11/29/2016    K 4.7 11/29/2016     11/29/2016    CO2 28 11/29/2016    BUN 16 11/29/2016    CREATININE 0.9 11/29/2016    CALCIUM 9.0 11/29/2016    ANIONGAP 6 (L) 11/29/2016    ESTGFRAFRICA >60.0 11/29/2016    EGFRNONAA >60.0 11/29/2016       Lab Results   Component Value Date    PSA 1.2 02/01/2016    PSA 0.66 03/28/2014    PSA 0.67 03/13/2013    PSA 0.64 04/12/2012    PSADIAG 1.1 04/06/2015       Imaging: none    Assessment: Xander Sanchez is a 66 y.o. 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    Plan:     1. To OR on 2/1/2017 for laser TURP.  2. Consents signed for surgery.  3. I have explained the risk, benefits, and alternatives of the procedure in detail. The patient voices understanding and all questions have been answered. The patient agrees to proceed as planned.        Ashok Kunz MD

## 2017-02-01 NOTE — DISCHARGE SUMMARY
[facility]  Discharge Note  Short Stay    Admit Date: 2/1/2017    Discharge Date and Time: 2/1/2017    Attending Physician: Graham Buenrostro MD     Discharge Provider: Anabel Peacock    Diagnoses:  Active Hospital Problems    Diagnosis  POA    *BPH with urinary obstruction [N40.1, N13.8]  Yes    BMI 28.0-28.9,adult [Z68.28]  Not Applicable    DM type 2 (diabetes mellitus, type 2) [E11.9]  Yes     Chronic      Resolved Hospital Problems    Diagnosis Date Resolved POA   No resolved problems to display.       Discharged Condition: good    Hospital Course: Patient was admitted for laser TURP and tolerated the procedure well with no complications.    Final Diagnoses: Same as principal problem.    Disposition: Home or Self Care    Follow up/Patient Instructions:    Medications:  Reconciled Home Medications:   Current Discharge Medication List      START taking these medications    Details   ciprofloxacin HCl (CIPRO) 500 MG tablet Take 1 tablet (500 mg total) by mouth every 12 (twelve) hours.  Qty: 6 tablet, Refills: 0      oxycodone-acetaminophen (PERCOCET) 5-325 mg per tablet Take 1 tablet by mouth every 4 (four) hours as needed for Pain.  Qty: 31 tablet, Refills: 0      polyethylene glycol (GLYCOLAX) 17 gram PwPk Take 17 g by mouth once daily.  Qty: 30 packet, Refills: 0         CONTINUE these medications which have NOT CHANGED    Details   amlodipine (NORVASC) 5 MG tablet Take 1 tablet (5 mg total) by mouth once daily.  Qty: 90 tablet, Refills: 3    Associated Diagnoses: Essential hypertension      aspirin (ECOTRIN) 81 MG EC tablet Take 81 mg by mouth once daily.      baclofen (LIORESAL) 10 MG tablet Take 1 tablet (10 mg total) by mouth 3 (three) times daily.  Qty: 90 tablet, Refills: 11    Associated Diagnoses: Brachial neuritis or radiculitis NOS; Chronic pain syndrome; HNP (herniated nucleus pulposus) with myelopathy, cervical; Degeneration of cervical intervertebral disc; Complex regional pain  syndrome type 1, affecting unspecified site; Right arm weakness; Narcotic dependency, continuous; Muscle right arm weakness; Rotator cuff syndrome of right shoulder; Arm pain, right; Right arm pain; Right hand weakness; Cervical stenosis of spine; Spinal stenosis in cervical region; Cervicalgia; Cervical radicular pain      fluticasone-salmeterol 230-21 mcg/dose (ADVAIR HFA) 230-21 mcg/actuation HFAA inhaler Inhale 2 puffs into the lungs 2 (two) times daily.  Qty: 12 each, Refills: 6      gabapentin (NEURONTIN) 600 MG tablet Take 1 tablet (600 mg total) by mouth 4 (four) times daily with meals and nightly.  Qty: 360 tablet, Refills: 3    Associated Diagnoses: Brachial neuritis or radiculitis NOS; Chronic pain syndrome; HNP (herniated nucleus pulposus) with myelopathy, cervical; Degeneration of cervical intervertebral disc; Complex regional pain syndrome type 1, affecting unspecified site; Right arm weakness; Narcotic dependency, continuous; Muscle right arm weakness; Rotator cuff syndrome of right shoulder      hydrocodone-acetaminophen 10-325mg (NORCO)  mg Tab Take 1 tablet by mouth every 8 (eight) hours as needed (MSK pain).  Qty: 90 tablet, Refills: 0    Associated Diagnoses: Brachial neuritis or radiculitis NOS; Chronic pain syndrome; HNP (herniated nucleus pulposus) with myelopathy, cervical; Degeneration of cervical intervertebral disc; Complex regional pain syndrome type 1, affecting unspecified site; Right arm weakness; Narcotic dependency, continuous; Muscle right arm weakness; Rotator cuff syndrome of right shoulder; Arm pain, right; Right arm pain; Right hand weakness; Cervical stenosis of spine; Spinal stenosis in cervical region; Cervicalgia; Cervical radicular pain      lisinopril (PRINIVIL,ZESTRIL) 5 MG tablet Take 2 tablets (10 mg total) by mouth every morning.  Qty: 270 tablet, Refills: 3    Associated Diagnoses: Automatic implantable cardioverter-defibrillator in situ; Cardiomyopathy,  nonischemic      oxybutynin (DITROPAN) 5 MG Tab Take 1 tablet (5 mg total) by mouth 3 (three) times daily.  Qty: 270 tablet, Refills: 3    Associated Diagnoses: OAB (overactive bladder)      pravastatin (PRAVACHOL) 10 MG tablet TAKE ONE TABLET BY MOUTH AT BEDTIME  Qty: 90 tablet, Refills: 1      tamsulosin (FLOMAX) 0.4 mg Cp24 Take 1 capsule (0.4 mg total) by mouth once daily.  Qty: 90 capsule, Refills: 3    Associated Diagnoses: Benign non-nodular prostatic hyperplasia with lower urinary tract symptoms      ACCU-CHEK SOFTCLIX LANCETS Misc       CONTOUR NEXT STRIPS Strp              Discharge Procedure Orders  Diet general     Activity as tolerated     Call MD for:  temperature >100.4     Call MD for:  persistent nausea and vomiting     Call MD for:  severe uncontrolled pain     No dressing needed       Follow-up Information     Follow up with Jing Riley NP On 2/3/2017.    Specialty:  Urology    Why:  mcpherson removal, voiding trial    Contact information:    [address] [address]  [phone]          Follow up with Graham Buenrostro MD In 1 month.    Specialty:  Urology    Why:  post op    Contact information:    [address]  [place] [address]  [phone]              Anabel Peacock MD  Urology, PGY-2  Pager# [phone]

## 2017-02-01 NOTE — INTERVAL H&P NOTE
The patient has been examined and the H&P has been reviewed:    I concur with the findings and no changes have occurred since H&P was written.    Anesthesia/Surgery risks, benefits and alternative options discussed and understood by patient/family.    UA today negative for nitrite, blood, leuks      Active Hospital Problems    Diagnosis  POA    BPH with urinary obstruction [N40.1, N13.8]  Yes      Resolved Hospital Problems    Diagnosis Date Resolved POA   No resolved problems to display.

## 2017-02-02 ENCOUNTER — NURSE TRIAGE (OUTPATIENT)
Dept: ADMINISTRATIVE | Facility: CLINIC | Age: 67
End: 2017-02-02

## 2017-02-02 NOTE — TELEPHONE ENCOUNTER
"  Reason for Disposition   Urinary catheter care, questions about     EC states patient is concern the although his mcpherson is draining properly and has no symptoms there still remain pinkish (expected) urine in mcpherson tubing. EC advised there's an possibility with mcpherson urine may appear in tubing, if otherwise asymptotic and mcpherson is drainage properly she can empty mcpherson more often to allow "complete" emptying of mcpherson container. EC advised per OOC protocol verbalized understanding, agreed with recommendations to monitor at home, call it urine collecting in tubing persist or worsen or if home measures are ineffective; EC had no further questions at end of call.    Answer Assessment - Initial Assessment Questions  1. SYMPTOMS: "What symptoms are you concerned about?"      Urine in tubing  2. ONSET:  "When did the symptoms start?"      Today   3. FEVER: "Is there a fever?" If so, ask: "What is the temperature, how was it measured, and when did it start?"      no  4. ABDOMINAL PAIN: "Is there any abdominal pain?" (e.g., Scale 1-10; or mild, moderate, severe)      no  5. URINE COLOR: "What color is the urine?"  "Is there blood present in the urine?" (e.g., clear, yellow, cloudy, tea-colored, blood streaks, bright red)      Pinkish (expected)   6. ONSET: "When was the catheter inserted?"      This morning   7. OTHER SYMPTOMS: "Do you have any other symptoms?" (e.g., back pain, bad urine odor)       no  8. PREGNANCY: "Is there any chance you are pregnant?" "When was your last menstrual period?"      n/a    Protocols used: ST URINARY CATHETER SYMPTOMS AND [id number]    "

## 2017-02-02 NOTE — TELEPHONE ENCOUNTER
Reason for Disposition   Caller has NON-URGENT question and triager unable to answer question    Protocols used: ST POST-OP SYMPTOMS AND [id number]  caller states pt had a laser TURP today/ his urine is pink in color/ denies kathe bleeding/ pt doing well    Advised that pink urine is to be expected    Call back for any other questions or concerns    Jazmín Brooks RN

## 2017-02-03 ENCOUNTER — OFFICE VISIT (OUTPATIENT)
Dept: UROLOGY | Facility: CLINIC | Age: 67
End: 2017-02-03
Payer: MEDICARE

## 2017-02-03 VITALS
HEIGHT: 71 IN | DIASTOLIC BLOOD PRESSURE: 88 MMHG | WEIGHT: 201 LBS | HEART RATE: 125 BPM | BODY MASS INDEX: 28.14 KG/M2 | SYSTOLIC BLOOD PRESSURE: 107 MMHG

## 2017-02-03 DIAGNOSIS — N13.8 BPH WITH URINARY OBSTRUCTION: Primary | ICD-10-CM

## 2017-02-03 DIAGNOSIS — Z46.6 ENCOUNTER FOR FOLEY CATHETER REMOVAL: ICD-10-CM

## 2017-02-03 DIAGNOSIS — Z90.79 S/P TURP: ICD-10-CM

## 2017-02-03 DIAGNOSIS — Z98.890 POST-OPERATIVE STATE: ICD-10-CM

## 2017-02-03 DIAGNOSIS — N40.1 BPH WITH URINARY OBSTRUCTION: Primary | ICD-10-CM

## 2017-02-03 PROCEDURE — 99999 PR PBB SHADOW E&M-EST. PATIENT-LVL IV: CPT | Mod: PBBFAC,,, | Performed by: NURSE PRACTITIONER

## 2017-02-03 PROCEDURE — 99024 POSTOP FOLLOW-UP VISIT: CPT | Mod: S$GLB,,, | Performed by: NURSE PRACTITIONER

## 2017-02-03 NOTE — MR AVS SNAPSHOT
[facility] Urolog 4th Floor  [address]  [place] [address]  Phone: [phone]                  Xander Sanchez   2/3/2017 10:40 AM   Office Visit    Description:  Male : 1950   Provider:  Ness Diego NP   Department:  [facility]rolog 4th Floor           Reason for Visit     Post-op Evaluation           Diagnoses this Visit        Comments    BPH with urinary obstruction    -  Primary     S/P TURP         Encounter for Potter catheter removal         Post-operative state                To Do List           Future Appointments        Provider Department Dept Phone    2017 8:00 AM Sara Ruiz MD [facility] [phone]    2017 1:40 PM Williams Alvarenga MD [facility] Family Medicine [phone]    4/3/2017 7:30 AM Scott Garcia DPM [facility] Podiatry [phone]    2017 8:00 AM HOME MONITOR DEVICE CHECK, [facility] Arrhythmia [phone]    2017 8:20 AM Graham Buenrostro MD [facility] 4th Floor [phone]      Goals (5 Years of Data)     None      Follow-Up and Disposition     Return in about 3 months (around 2017) for 3 months with Dr. Buenrostro.      [facility] On Call     [facility] On Call Nurse [facility] -  Assistance  Registered nurses in the [facility] provide clinical advisement, health education, appointment booking, and other advisory services.  Call for this free service at [phone].             Medications           Message regarding Medications     Verify the changes and/or additions to your medication regime listed below are the same as discussed with your clinician today.  If any of these changes or additions are incorrect, please notify your healthcare provider.             Verify that the below list of medications is an accurate representation of the medications you are currently taking.  If none reported, the list may be blank. If incorrect, please contact your healthcare provider.  "Carry this list with you in case of emergency.           Current Medications     ACCU-CHEK SOFTCLIX LANCETS Misc     amlodipine (NORVASC) 5 MG tablet Take 1 tablet (5 mg total) by mouth once daily.    aspirin (ECOTRIN) 81 MG EC tablet Take 81 mg by mouth once daily.    baclofen (LIORESAL) 10 MG tablet Take 1 tablet (10 mg total) by mouth 3 (three) times daily.    ciprofloxacin HCl (CIPRO) 500 MG tablet Take 1 tablet (500 mg total) by mouth every 12 (twelve) hours.    CONTOUR NEXT STRIPS Strp     fluticasone-salmeterol 230-21 mcg/dose (ADVAIR HFA) 230-21 mcg/actuation HFAA inhaler Inhale 2 puffs into the lungs 2 (two) times daily.    gabapentin (NEURONTIN) 600 MG tablet Take 1 tablet (600 mg total) by mouth 4 (four) times daily with meals and nightly.    hydrocodone-acetaminophen 10-325mg (NORCO)  mg Tab Take 1 tablet by mouth every 8 (eight) hours as needed (MSK pain).    lisinopril (PRINIVIL,ZESTRIL) 5 MG tablet Take 2 tablets (10 mg total) by mouth every morning.    oxycodone-acetaminophen (PERCOCET) 5-325 mg per tablet Take 1 tablet by mouth every 4 (four) hours as needed for Pain.    polyethylene glycol (GLYCOLAX) 17 gram PwPk Take 17 g by mouth once daily.    pravastatin (PRAVACHOL) 10 MG tablet TAKE ONE TABLET BY MOUTH AT BEDTIME    tamsulosin (FLOMAX) 0.4 mg Cp24 Take 1 capsule (0.4 mg total) by mouth once daily.    oxybutynin (DITROPAN) 5 MG Tab Take 1 tablet (5 mg total) by mouth 3 (three) times daily.           Clinical Reference Information           Your Vitals Were     BP Pulse Height Weight BMI    107/88 125 5' 11" (1.803 m) 91.2 kg (201 lb) 28.03 kg/m2      Blood Pressure          Most Recent Value    BP  107/88      Allergies as of 2/3/2017     No Known Allergies      Immunizations Administered on Date of Encounter - 2/3/2017     None      Orders Placed During Today's Visit      Normal Orders This Visit    Voiding Trial       Instructions      Laser Prostatectomy  You have an enlarged prostate " gland. This is also called benign prostatic hyperplasia (BPH). BPH is not cancer, but it can cause problems with urination. To relieve your symptoms, your doctor may recommend laser prostatectomy. During this procedure, a laser (concentrated light energy) is used. The laser removes prostate tissue from around the urethra. The urethra is the tube that carries urine from the bladder out of the body. The surgery lets urine to flow more freely. Note that the laser destroys the prostate tissue so it cant be looked at for signs of cancer.     Types of prostate laser surgery  The type of laser surgery your doctor will do may depend on your health, the size of your prostate, and the type of tools available. The different types of prostate laser surgery are:  · Photoselective vaporization of the prostate (PVP). The laser is used to vaporize or melt away the extra prostate tissue.  · Holmium laser ablation of the prostate (HoLAP). This type of surgery is similar to PVP, but uses a different kind of laser.  · Holmium laser enucleation of the prostate (HoLEP). In this procedure the laser cuts and removes extra tissue. An instrument called a morcellator is used to cut the tissue into small pieces. This makes them easier to remove.   Possible risks and side effects of laser prostatectomy  · Bleeding  · Infection  · Pneumonia  · Blood clots  · Scarring or narrowing of the urethra. This can cause trouble urinating.  · Only some relief of symptoms  · Erectile dysfunction (rare)  · Loss of bladder control (very rare)  In some cases, a muscle involved in ejaculation must be cut during surgery. If this happens, semen may travel back into the bladder instead of out of the penis during ejaculation. This side effect is called retrograde ejaculation. It is harmless. The feeling of orgasm wont change. But very little semen will leave the body during ejaculation. Note that retrograde ejaculation can also be a side effect of certain  medicines.  Getting ready for the procedure  Your healthcare team will give you detailed instructions on how to get ready for the procedure. Be sure to follow them.  · Tell your healthcare team about all medicines you take. This includes herbs and other supplements. It also includes any blood thinners, such as warfarin, clopidogrel, or daily aspirin. You may need to stop taking some or all of them before surgery.  · You will probably be told not to eat or drink anything after midnight on the night before your procedure.  · When you arrive for the procedure, you will have an intravenous (IV) line placed. This gives you fluids and medicines during the procedure.  · You will be given medicine to keep you from feeling pain (anesthesia) before the procedure. You may have one or more of the following:  ¨ Local anesthesia. Your bladder and urethra are numbed.  ¨ Regional anesthesia. Your body below the waist is numbed.  ¨ General anesthesia. You are in a state like deep sleep.  During the procedure  Your doctor can help explain the details of your surgery. These details depend on the type of laser surgery that will be done. In general, you can expect the following:  · The procedure itself usually takes less than an hour.  · A thin, tube-like telescope (cystoscope) is put through the opening in your penis and into your urethra. This tool lets your doctor see your urethra and your prostate, either through the cystoscope or on a video screen.  · The laser is put through the cystoscope. The laser is then used to destroy the extra prostate tissue.  · You may get a urinary catheter. This helps your bladder drain for a short time after the procedure. Its removed when its no longer needed.  After the procedure  You may go home the same day after your surgery. Or you may stay a night in the hospital. An adult friend or family member should drive you home. To get the best results from your laser prostatectomy, follow your doctors  instructions. Keep your follow-up appointments.  Your prostate will likely be sore at first. This will get better as you heal. Here are some things you can expect:  · You may be sent home with a catheter to drain urine from your bladder. If so, you may wear a leg bag until it's no longer needed. The catheter will allow the area to heal and help you avoid painful urination.  · Your doctor may also prescribe antibiotics to prevent infection and pain medicine to ease any discomfort.  · In about a week, youll visit the doctor to have your catheter removed. If swelling still makes urination difficult, the catheter may be left in longer. After the catheter is removed, you may need to urinate more often. This is normal and should get better with time.  · For the first few weeks after your procedure, you may notice that your urine is cloudy. Or you may have blood or blood clots in your urine. This is normal while your body rids itself of the treated tissue. These symptoms may begin to get better during the first few weeks. But it may take a few months before they go away. Your doctor can tell you when you can resume sexual activity and how soon you can return to work.  · You also may be told to avoid lifting anything over 10 pounds or bending over to lift things from the ground.  · You should drink plenty of fluids to help flush out your bladder.  Getting back to sex  You may be glad to know that BPH and its treatments rarely cause problems with sex. Even if you have retrograde ejaculation, orgasm shouldnt feel any different than it did before the procedure. Retrograde ejaculation happens when semen goes into the bladder instead of the urethra during ejaculation. This is common after procedures for BPH. This should not cause any health problems or affect your sexual function. If you notice any problems with sex, talk with your doctor. Help may be available.     When to call your healthcare provider  Contact your doctor  right away if:  · You have a fever over 100.4°F (38°C).  · You have excessive bleeding.  · You have pain not relieved by medicines.  · You notice that no urine is draining from the catheter or the catheter falls out.  · You have frequent or very strong urge to urinate.  · Youre not able to urinate, or notice a decrease in urine flow.    Date Last Reviewed: 4/9/2015 © 2000-2016 [employer]. [address] [place] [address], [place] [address]. All rights reserved. This information is not intended as a substitute for professional medical care. Always follow your healthcare professional's instructions.        After Laser Prostatectomy  You may go home the same day after your laser prostatectomy. Or you may stay up to 2 nights in the hospital. An adult friend or family member should drive you home. To get the best results from your laser prostatectomy, follow your doctors instructions and keep your follow-up appointments.  After your procedure  Your prostate will likely be sore at first. This will improve as you heal. Here are some things you can expect:  · You may be sent home with a catheter to drain urine from your bladder. If so, you may wear a leg bag for a week or so. The catheter will allow the surgery area to heal and help you avoid painful urination.  · Your doctor may also prescribe antibiotics to prevent infection and pain medication to ease any discomfort.  · In about a week, youll visit the doctor to have your catheter removed. If swelling still makes urination difficult, the catheter may be left in for another week. After the catheter is removed, you may need to urinate more often. This is normal and should get better with time.  Healing  For the first few weeks after your surgery, you may notice that your urine is cloudy or that you have blood or blood clots in your urine. This is normal while your body rids itself of the treated tissue. These symptoms may begin to improve during the first few  weeks, but it may take up to 3 months before they go away. Your doctor can tell you when you can resume sexual activity and how soon you can return to work.  Special instructions  You may be told to:  · Avoid certain activities, such as sex, driving, and strenuous exercise. Talk to your doctor about when you can resume these activities.  · Avoid lifting anything over 10 pounds and avoid bending over to lift things from the ground.  · Drink plenty of fluids to flush out your bladder.  Getting back to sex  You may be glad to know that BPH and its treatments rarely cause problems with sex. Even if you have retrograde ejaculation, orgasm shouldnt feel any different than it did before the procedure. Retrograde ejaculation happens when semen goes into the bladder instead of the urethra during ejaculation. This is common after surgery for BPH. This should not cause any health problems or affect your sexual function. If you notice any problems with sex, talk to your doctor. Help may be available.      When to call your healthcare provider  Contact your healthcare provider right away if:  · You have a fever of 100.4°F (38°C) or higher, or as directed by your healthcare provider  · You have excessive bleeding  · You have pain not relieved by medicine  · You notice that no urine is draining from the catheter or if the catheter falls out  · You have frequent or excessive urge to urinate  · Youre not able to urinate, or notice a decrease in urine flow   Date Last Reviewed: 10/23/2013  © 5552-4644 [employer]. [address] [place] [address]. All rights reserved. This information is not intended as a substitute for professional medical care. Always follow your healthcare professional's instructions.             Language Assistance Services     ATTENTION: Language assistance services are available, free of charge. Please call [phone].      ATENCIÓN: xiomara Slaughter cope disposición  servicios gratuitos de asistencia lingüística. Herve pink [phone].     JACKIE Ý: N?u b?n nói Ti?ng Vi?t, có các d?ch v? h? tr? ngôn ng? mi?n phí dành cho b?n. G?i s? [phone].         [facility] complies with applicable Federal civil rights laws and does not discriminate on the basis of race, color, national origin, age, disability, or sex.

## 2017-02-03 NOTE — PROGRESS NOTES
Subjective:       Patient ID: Xander Sanchez is a 66 y.o. male.    Chief Complaint: Post-op Evaluation    HPI Comments: Xander Sanchez is a 66 y.o. male with BPH;     Procedure(s) Performed 02/01/2017 with Dr. Buenrostro   1. Laser vaporization of the prostate  2. Enucleation of prostate tissue       Findings:   1. Lateral lobe hypertrophy  2. No urethral or bladder abnormalities  3. Bilateral ureteral orifices in normal anatomical positon      Today he reports some urgency.  Some blood in mcpherson; (+) urgency  No fever; n/v.        Past Medical History:    Asthma                                                        Cardiomyopathy                                                Cervical spondylosis with myelopathy            10/17/2012    Chronic bronchitis                                            Chronic systolic dysfunction of left ventricle  7/27/2015     Constipation                                    7/27/2015     COPD (chronic obstructive pulmonary disease)                  Diabetes mellitus, type 2                                     DM type 2 (diabetes mellitus, type 2)           7/27/2015     Enlarged prostate                               7/27/2015     Hypertension                                                  ICD (implantable cardiac defibrillator) in greta*               Presence of biventricular AICD                  7/27/2015     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* 2/1/2016      Urinary tract infection                                         Comment:pt does not know    Past Surgical History:    CERVICAL SPINE SURGERY                                         CARDIAC DEFIBRILLATOR PLACEMENT                                SPINE SURGERY                                                  Review of patient's family history indicates:    Hypertension                   Mother                    Hypertension                   Father                    COPD                           Father                     No Known Problems              Sister                    No Known Problems              Brother                   No Known Problems              Daughter                  Prostate cancer                Neg Hx                    Kidney disease                 Neg Hx                      Social History    Marital status: Single              Spouse name:                       Years of education:                 Number of children:               Occupational History    None on file    Social History Main Topics    Smoking status: Former Smoker                                                                Packs/day: 1.00      Years: 40.00          Types: Cigarettes       Quit date: 7/26/2009    Smokeless status: Never Used                        Alcohol use: Yes           1.8 oz/week       3 Cans of beer per week    Drug use: No              Sexual activity: Yes               Partners with: Female    Other Topics            Concern    None on file    Social History Narrative    None on file        Allergies:  Review of patient's allergies indicates no known allergies.    Medications:  Current Outpatient Prescriptions:   ACCU-CHEK SOFTCLIX LANCETS Misc, , Disp: , Rfl:   amlodipine (NORVASC) 5 MG tablet, Take 1 tablet (5 mg total) by mouth once daily., Disp: 90 tablet, Rfl: 3  aspirin (ECOTRIN) 81 MG EC tablet, Take 81 mg by mouth once daily., Disp: , Rfl:   baclofen (LIORESAL) 10 MG tablet, Take 1 tablet (10 mg total) by mouth 3 (three) times daily., Disp: 90 tablet, Rfl: 11  ciprofloxacin HCl (CIPRO) 500 MG tablet, Take 1 tablet (500 mg total) by mouth every 12 (twelve) hours., Disp: 6 tablet, Rfl: 0  CONTOUR NEXT STRIPS Strp, , Disp: , Rfl:   fluticasone-salmeterol 230-21 mcg/dose (ADVAIR HFA) 230-21 mcg/actuation HFAA inhaler, Inhale 2 puffs into the lungs 2 (two) times daily., Disp: 12 each, Rfl: 6  gabapentin (NEURONTIN) 600 MG tablet, Take 1 tablet (600 mg total) by mouth 4 (four) times daily with meals and  nightly., Disp: 360 tablet, Rfl: 3  hydrocodone-acetaminophen 10-325mg (NORCO)  mg Tab, Take 1 tablet by mouth every 8 (eight) hours as needed (MSK pain)., Disp: 90 tablet, Rfl: 0  lisinopril (PRINIVIL,ZESTRIL) 5 MG tablet, Take 2 tablets (10 mg total) by mouth every morning., Disp: 270 tablet, Rfl: 3  oxycodone-acetaminophen (PERCOCET) 5-325 mg per tablet, Take 1 tablet by mouth every 4 (four) hours as needed for Pain., Disp: 31 tablet, Rfl: 0  polyethylene glycol (GLYCOLAX) 17 gram PwPk, Take 17 g by mouth once daily., Disp: 30 packet, Rfl: 0  pravastatin (PRAVACHOL) 10 MG tablet, TAKE ONE TABLET BY MOUTH AT BEDTIME, Disp: 90 tablet, Rfl: 1  tamsulosin (FLOMAX) 0.4 mg Cp24, Take 1 capsule (0.4 mg total) by mouth once daily., Disp: 90 capsule, Rfl: 3  oxybutynin (DITROPAN) 5 MG Tab, Take 1 tablet (5 mg total) by mouth 3 (three) times daily., Disp: 270 tablet, Rfl: 3        Review of Systems   Constitutional: Negative.  Negative for activity change, appetite change and fever.   HENT: Negative.  Negative for facial swelling and trouble swallowing.    Eyes: Negative.    Respiratory: Negative.  Negative for shortness of breath.    Cardiovascular: Negative.  Negative for chest pain and palpitations.   Gastrointestinal: Negative for abdominal pain, constipation, diarrhea, nausea and vomiting.   Genitourinary: Positive for difficulty urinating and urgency. Negative for dysuria, enuresis, flank pain, frequency, genital sores, hematuria, nocturia, penile pain, scrotal swelling and testicular pain.        Potter draining.     Musculoskeletal: Negative for back pain, gait problem and neck stiffness.   Skin: Negative.  Negative for wound.   Neurological: Negative for dizziness, tremors, seizures, syncope, speech difficulty, light-headedness and headaches.   Hematological: Does not bruise/bleed easily.   Psychiatric/Behavioral: Negative for confusion and hallucinations. The patient is not nervous/anxious.         Objective:      Physical Exam   Nursing note and vitals reviewed.  Constitutional: He is oriented to person, place, and time. He appears well-developed and well-nourished.  Non-toxic appearance. He does not have a sickly appearance.   HENT:   Head: Normocephalic and atraumatic.   Right Ear: External ear normal.   Left Ear: External ear normal.   Nose: Nose normal.   Mouth/Throat: Mucous membranes are normal.   Eyes: Conjunctivae and lids are normal. No scleral icterus.   Neck: Trachea normal, normal range of motion and full passive range of motion without pain. Neck supple. No JVD present. No tracheal deviation present.   Cardiovascular: Normal rate, regular rhythm, S1 normal and S2 normal.    Pulmonary/Chest: Effort normal and breath sounds normal. No respiratory distress. He exhibits no tenderness.   Abdominal: Soft. Normal appearance and bowel sounds are normal. There is no hepatosplenomegaly. There is no tenderness. There is no rebound, no guarding and no CVA tenderness.   Genitourinary: Penis normal. No penile tenderness.   Musculoskeletal: Normal range of motion.   Neurological: He is alert and oriented to person, place, and time. He has normal strength.   Skin: Skin is warm, dry and intact.     Psychiatric: He has a normal mood and affect. His behavior is normal. Judgment and thought content normal.       Assessment:       1. BPH with urinary obstruction    2. S/P TURP    3. Encounter for Mcpherson catheter removal    4. Post-operative state        Plan:         I spent 20 minutes with the patient of which more than half was spent in direct consultation with the patient in regards to our treatment and plan.    Education and recommendations of today's plan of care including home remedies.  Discussed today's plan of care.  Discussed post mcpherson removal as well as post TURP expectations.  VT attempted but stopped due to bladder spasms.  He was able to urinate < 100cc.  Diet modifications; avoiding bladder  irritants.   F/u with Dr. Buenrostro 3 months.  Call this afternoon with any issues.

## 2017-02-03 NOTE — PATIENT INSTRUCTIONS
Laser Prostatectomy  You have an enlarged prostate gland. This is also called benign prostatic hyperplasia (BPH). BPH is not cancer, but it can cause problems with urination. To relieve your symptoms, your doctor may recommend laser prostatectomy. During this procedure, a laser (concentrated light energy) is used. The laser removes prostate tissue from around the urethra. The urethra is the tube that carries urine from the bladder out of the body. The surgery lets urine to flow more freely. Note that the laser destroys the prostate tissue so it cant be looked at for signs of cancer.     Types of prostate laser surgery  The type of laser surgery your doctor will do may depend on your health, the size of your prostate, and the type of tools available. The different types of prostate laser surgery are:  · Photoselective vaporization of the prostate (PVP). The laser is used to vaporize or melt away the extra prostate tissue.  · Holmium laser ablation of the prostate (HoLAP). This type of surgery is similar to PVP, but uses a different kind of laser.  · Holmium laser enucleation of the prostate (HoLEP). In this procedure the laser cuts and removes extra tissue. An instrument called a morcellator is used to cut the tissue into small pieces. This makes them easier to remove.   Possible risks and side effects of laser prostatectomy  · Bleeding  · Infection  · Pneumonia  · Blood clots  · Scarring or narrowing of the urethra. This can cause trouble urinating.  · Only some relief of symptoms  · Erectile dysfunction (rare)  · Loss of bladder control (very rare)  In some cases, a muscle involved in ejaculation must be cut during surgery. If this happens, semen may travel back into the bladder instead of out of the penis during ejaculation. This side effect is called retrograde ejaculation. It is harmless. The feeling of orgasm wont change. But very little semen will leave the body during ejaculation. Note that retrograde  ejaculation can also be a side effect of certain medicines.  Getting ready for the procedure  Your healthcare team will give you detailed instructions on how to get ready for the procedure. Be sure to follow them.  · Tell your healthcare team about all medicines you take. This includes herbs and other supplements. It also includes any blood thinners, such as warfarin, clopidogrel, or daily aspirin. You may need to stop taking some or all of them before surgery.  · You will probably be told not to eat or drink anything after midnight on the night before your procedure.  · When you arrive for the procedure, you will have an intravenous (IV) line placed. This gives you fluids and medicines during the procedure.  · You will be given medicine to keep you from feeling pain (anesthesia) before the procedure. You may have one or more of the following:  ¨ Local anesthesia. Your bladder and urethra are numbed.  ¨ Regional anesthesia. Your body below the waist is numbed.  ¨ General anesthesia. You are in a state like deep sleep.  During the procedure  Your doctor can help explain the details of your surgery. These details depend on the type of laser surgery that will be done. In general, you can expect the following:  · The procedure itself usually takes less than an hour.  · A thin, tube-like telescope (cystoscope) is put through the opening in your penis and into your urethra. This tool lets your doctor see your urethra and your prostate, either through the cystoscope or on a video screen.  · The laser is put through the cystoscope. The laser is then used to destroy the extra prostate tissue.  · You may get a urinary catheter. This helps your bladder drain for a short time after the procedure. Its removed when its no longer needed.  After the procedure  You may go home the same day after your surgery. Or you may stay a night in the hospital. An adult friend or family member should drive you home. To get the best results  from your laser prostatectomy, follow your doctors instructions. Keep your follow-up appointments.  Your prostate will likely be sore at first. This will get better as you heal. Here are some things you can expect:  · You may be sent home with a catheter to drain urine from your bladder. If so, you may wear a leg bag until it's no longer needed. The catheter will allow the area to heal and help you avoid painful urination.  · Your doctor may also prescribe antibiotics to prevent infection and pain medicine to ease any discomfort.  · In about a week, youll visit the doctor to have your catheter removed. If swelling still makes urination difficult, the catheter may be left in longer. After the catheter is removed, you may need to urinate more often. This is normal and should get better with time.  · For the first few weeks after your procedure, you may notice that your urine is cloudy. Or you may have blood or blood clots in your urine. This is normal while your body rids itself of the treated tissue. These symptoms may begin to get better during the first few weeks. But it may take a few months before they go away. Your doctor can tell you when you can resume sexual activity and how soon you can return to work.  · You also may be told to avoid lifting anything over 10 pounds or bending over to lift things from the ground.  · You should drink plenty of fluids to help flush out your bladder.  Getting back to sex  You may be glad to know that BPH and its treatments rarely cause problems with sex. Even if you have retrograde ejaculation, orgasm shouldnt feel any different than it did before the procedure. Retrograde ejaculation happens when semen goes into the bladder instead of the urethra during ejaculation. This is common after procedures for BPH. This should not cause any health problems or affect your sexual function. If you notice any problems with sex, talk with your doctor. Help may be available.     When to  call your healthcare provider  Contact your doctor right away if:  · You have a fever over 100.4°F (38°C).  · You have excessive bleeding.  · You have pain not relieved by medicines.  · You notice that no urine is draining from the catheter or the catheter falls out.  · You have frequent or very strong urge to urinate.  · Youre not able to urinate, or notice a decrease in urine flow.    Date Last Reviewed: 4/9/2015 © 2000-2016 "[employer]". [address] [place] [address]. All rights reserved. This information is not intended as a substitute for professional medical care. Always follow your healthcare professional's instructions.        After Laser Prostatectomy  You may go home the same day after your laser prostatectomy. Or you may stay up to 2 nights in the hospital. An adult friend or family member should drive you home. To get the best results from your laser prostatectomy, follow your doctors instructions and keep your follow-up appointments.  After your procedure  Your prostate will likely be sore at first. This will improve as you heal. Here are some things you can expect:  · You may be sent home with a catheter to drain urine from your bladder. If so, you may wear a leg bag for a week or so. The catheter will allow the surgery area to heal and help you avoid painful urination.  · Your doctor may also prescribe antibiotics to prevent infection and pain medication to ease any discomfort.  · In about a week, youll visit the doctor to have your catheter removed. If swelling still makes urination difficult, the catheter may be left in for another week. After the catheter is removed, you may need to urinate more often. This is normal and should get better with time.  Healing  For the first few weeks after your surgery, you may notice that your urine is cloudy or that you have blood or blood clots in your urine. This is normal while your body rids itself of the treated tissue. These  symptoms may begin to improve during the first few weeks, but it may take up to 3 months before they go away. Your doctor can tell you when you can resume sexual activity and how soon you can return to work.  Special instructions  You may be told to:  · Avoid certain activities, such as sex, driving, and strenuous exercise. Talk to your doctor about when you can resume these activities.  · Avoid lifting anything over 10 pounds and avoid bending over to lift things from the ground.  · Drink plenty of fluids to flush out your bladder.  Getting back to sex  You may be glad to know that BPH and its treatments rarely cause problems with sex. Even if you have retrograde ejaculation, orgasm shouldnt feel any different than it did before the procedure. Retrograde ejaculation happens when semen goes into the bladder instead of the urethra during ejaculation. This is common after surgery for BPH. This should not cause any health problems or affect your sexual function. If you notice any problems with sex, talk to your doctor. Help may be available.      When to call your healthcare provider  Contact your healthcare provider right away if:  · You have a fever of 100.4°F (38°C) or higher, or as directed by your healthcare provider  · You have excessive bleeding  · You have pain not relieved by medicine  · You notice that no urine is draining from the catheter or if the catheter falls out  · You have frequent or excessive urge to urinate  · Youre not able to urinate, or notice a decrease in urine flow   Date Last Reviewed: 10/23/2013  © 9212-9014 [employer]. [address] [place] [address], [place] [address]. All rights reserved. This information is not intended as a substitute for professional medical care. Always follow your healthcare professional's instructions.

## 2017-02-04 ENCOUNTER — HOSPITAL ENCOUNTER (EMERGENCY)
Facility: HOSPITAL | Age: 67
Discharge: HOME | End: 2017-02-04
Attending: EMERGENCY MEDICINE
Payer: MEDICARE

## 2017-02-04 ENCOUNTER — NURSE TRIAGE (OUTPATIENT)
Dept: ADMINISTRATIVE | Facility: CLINIC | Age: 67
End: 2017-02-04

## 2017-02-04 VITALS
SYSTOLIC BLOOD PRESSURE: 155 MMHG | TEMPERATURE: 98 F | DIASTOLIC BLOOD PRESSURE: 84 MMHG | BODY MASS INDEX: 28.14 KG/M2 | HEIGHT: 71 IN | RESPIRATION RATE: 20 BRPM | WEIGHT: 201 LBS | HEART RATE: 104 BPM | OXYGEN SATURATION: 100 %

## 2017-02-04 DIAGNOSIS — R33.8 ACUTE URINARY RETENTION: Primary | ICD-10-CM

## 2017-02-04 LAB
ALBUMIN SERPL BCP-MCNC: 3.4 G/DL
ALP SERPL-CCNC: 67 U/L
ALT SERPL W/O P-5'-P-CCNC: 19 U/L
ANION GAP SERPL CALC-SCNC: 11 MMOL/L
AST SERPL-CCNC: 23 U/L
BACTERIA #/AREA URNS HPF: ABNORMAL /HPF
BASOPHILS # BLD AUTO: 0.02 K/UL
BASOPHILS NFR BLD: 0.2 %
BILIRUB SERPL-MCNC: 0.5 MG/DL
BILIRUB UR QL STRIP: NEGATIVE
BUN SERPL-MCNC: 17 MG/DL
CALCIUM SERPL-MCNC: 9 MG/DL
CHLORIDE SERPL-SCNC: 101 MMOL/L
CLARITY UR: ABNORMAL
CO2 SERPL-SCNC: 22 MMOL/L
COLOR UR: ABNORMAL
CREAT SERPL-MCNC: 0.9 MG/DL
DIFFERENTIAL METHOD: ABNORMAL
EOSINOPHIL # BLD AUTO: 0.3 K/UL
EOSINOPHIL NFR BLD: 2.1 %
ERYTHROCYTE [DISTWIDTH] IN BLOOD BY AUTOMATED COUNT: 12.4 %
EST. GFR  (AFRICAN AMERICAN): >60 ML/MIN/1.73 M^2
EST. GFR  (NON AFRICAN AMERICAN): >60 ML/MIN/1.73 M^2
GLUCOSE SERPL-MCNC: 124 MG/DL
GLUCOSE UR QL STRIP: NEGATIVE
HCT VFR BLD AUTO: 41.3 %
HGB BLD-MCNC: 13.5 G/DL
HGB UR QL STRIP: ABNORMAL
HYALINE CASTS #/AREA URNS LPF: 0 /LPF
KETONES UR QL STRIP: NEGATIVE
LEUKOCYTE ESTERASE UR QL STRIP: ABNORMAL
LYMPHOCYTES # BLD AUTO: 2 K/UL
LYMPHOCYTES NFR BLD: 16 %
MCH RBC QN AUTO: 29.9 PG
MCHC RBC AUTO-ENTMCNC: 32.7 %
MCV RBC AUTO: 92 FL
MICROSCOPIC COMMENT: ABNORMAL
MONOCYTES # BLD AUTO: 0.8 K/UL
MONOCYTES NFR BLD: 6.3 %
NEUTROPHILS # BLD AUTO: 9.5 K/UL
NEUTROPHILS NFR BLD: 75.2 %
NITRITE UR QL STRIP: NEGATIVE
PH UR STRIP: 6 [PH] (ref 5–8)
PLATELET # BLD AUTO: 255 K/UL
PMV BLD AUTO: 10.2 FL
POTASSIUM SERPL-SCNC: 4.1 MMOL/L
PROT SERPL-MCNC: 7.2 G/DL
PROT UR QL STRIP: ABNORMAL
RBC # BLD AUTO: 4.51 M/UL
RBC #/AREA URNS HPF: >100 /HPF (ref 0–4)
SODIUM SERPL-SCNC: 134 MMOL/L
SP GR UR STRIP: 1.02 (ref 1–1.03)
URN SPEC COLLECT METH UR: ABNORMAL
UROBILINOGEN UR STRIP-ACNC: 1 EU/DL
WBC # BLD AUTO: 12.58 K/UL
WBC #/AREA URNS HPF: 50 /HPF (ref 0–5)

## 2017-02-04 PROCEDURE — 99283 EMERGENCY DEPT VISIT LOW MDM: CPT | Mod: 25

## 2017-02-04 PROCEDURE — 81000 URINALYSIS NONAUTO W/SCOPE: CPT

## 2017-02-04 PROCEDURE — 80053 COMPREHEN METABOLIC PANEL: CPT

## 2017-02-04 PROCEDURE — 51702 INSERT TEMP BLADDER CATH: CPT

## 2017-02-04 PROCEDURE — 85025 COMPLETE CBC W/AUTO DIFF WBC: CPT

## 2017-02-04 PROCEDURE — 87086 URINE CULTURE/COLONY COUNT: CPT

## 2017-02-04 PROCEDURE — 25000003 PHARM REV CODE 250: Performed by: EMERGENCY MEDICINE

## 2017-02-04 RX ORDER — CIPROFLOXACIN 500 MG/1
500 TABLET ORAL
Status: COMPLETED | OUTPATIENT
Start: 2017-02-04 | End: 2017-02-04

## 2017-02-04 RX ORDER — CIPROFLOXACIN 500 MG/1
500 TABLET ORAL EVERY 12 HOURS
Qty: 14 TABLET | Refills: 0 | Status: SHIPPED | OUTPATIENT
Start: 2017-02-04 | End: 2017-02-07

## 2017-02-04 RX ADMIN — CIPROFLOXACIN 500 MG: 500 TABLET, FILM COATED ORAL at 09:02

## 2017-02-04 NOTE — TELEPHONE ENCOUNTER
Reason for Disposition   Urination is difficult to start (i.e., hesitancy) or straining    Protocols used: ST URINARY SYMPTOMS-A-AH

## 2017-02-04 NOTE — ED AVS SNAPSHOT
[facility] [address]  [place] [address]               Xander Sanchez   2017  8:21 PM   ED    Description:  Male : 1950   Department:  [facility]           Your Care was Coordinated By:     Provider Role From To    Georges Sharif Jr., MD Attending Provider 17 --      Reason for Visit     Urinary Retention           Diagnoses this Visit        Comments    Acute urinary retention    -  Primary       ED Disposition     None           To Do List           Follow-up Information     Follow up with Graham Buenrostro MD.    Specialty:  Urology    Why:  Follow-up Monday    Contact information:    Rick JIMENEZ  [facility] [address]  [phone]         These Medications        Disp Refills Start End    ciprofloxacin HCl (CIPRO) 500 MG tablet 14 tablet 0 2017    Take 1 tablet (500 mg total) by mouth every 12 (twelve) hours. - Oral    Pharmacy: [facility] - [address] Ph #: [phone]         [facility]     [facility] -  Assistance  Registered nurses in the [facility] provide clinical advisement, health education, appointment booking, and other advisory services.  Call for this free service at [phone].             Medications           Message regarding Medications     Verify the changes and/or additions to your medication regime listed below are the same as discussed with your clinician today.  If any of these changes or additions are incorrect, please notify your healthcare provider.        These medications were administered today        Dose Freq    ciprofloxacin HCl tablet 500 mg 500 mg ED 1 Time    Sig: Take 1 tablet (500 mg total) by mouth ED 1 Time.    Class: Normal    Route: Oral           Verify that the below list of medications is an accurate representation of the medications you are currently taking.  If none  "reported, the list may be blank. If incorrect, please contact your healthcare provider. Carry this list with you in case of emergency.           Current Medications     ACCU-CHEK SOFTCLIX LANCETS Misc     amlodipine (NORVASC) 5 MG tablet Take 1 tablet (5 mg total) by mouth once daily.    aspirin (ECOTRIN) 81 MG EC tablet Take 81 mg by mouth once daily.    baclofen (LIORESAL) 10 MG tablet Take 1 tablet (10 mg total) by mouth 3 (three) times daily.    ciprofloxacin HCl (CIPRO) 500 MG tablet Take 1 tablet (500 mg total) by mouth every 12 (twelve) hours.    CONTOUR NEXT STRIPS Strp     fluticasone-salmeterol 230-21 mcg/dose (ADVAIR HFA) 230-21 mcg/actuation HFAA inhaler Inhale 2 puffs into the lungs 2 (two) times daily.    gabapentin (NEURONTIN) 600 MG tablet Take 1 tablet (600 mg total) by mouth 4 (four) times daily with meals and nightly.    hydrocodone-acetaminophen 10-325mg (NORCO)  mg Tab Take 1 tablet by mouth every 8 (eight) hours as needed (MSK pain).    lisinopril (PRINIVIL,ZESTRIL) 5 MG tablet Take 2 tablets (10 mg total) by mouth every morning.    oxybutynin (DITROPAN) 5 MG Tab Take 1 tablet (5 mg total) by mouth 3 (three) times daily.    oxycodone-acetaminophen (PERCOCET) 5-325 mg per tablet Take 1 tablet by mouth every 4 (four) hours as needed for Pain.    polyethylene glycol (GLYCOLAX) 17 gram PwPk Take 17 g by mouth once daily.    pravastatin (PRAVACHOL) 10 MG tablet TAKE ONE TABLET BY MOUTH AT BEDTIME    tamsulosin (FLOMAX) 0.4 mg Cp24 Take 1 capsule (0.4 mg total) by mouth once daily.           Clinical Reference Information           Your Vitals Were     BP Pulse Temp Resp Height Weight    155/84 (BP Location: Right arm, Patient Position: Sitting) 104 98.2 °F (36.8 °C) (Oral) 20 5' 11" (1.803 m) 91.2 kg (201 lb)    SpO2 BMI             100% 28.03 kg/m2         Allergies as of 2/4/2017     No Known Allergies      Immunizations Administered on Date of Encounter - 2/4/2017     None      ED Micro, " Lab, POCT     Start Ordered       Status Ordering Provider    02/04/17 2039 02/04/17 2038  Urinalysis Catheterized  STAT      Final result     02/04/17 2039 02/04/17 2038  CBC auto differential  STAT      Final result     02/04/17 2039 02/04/17 2038  Comprehensive metabolic panel  STAT      Final result     02/04/17 2039 02/04/17 2038  Urine culture **CANNOT BE ORDERED STAT**  Once      In process     02/04/17 2039 02/04/17 2039  Urinalysis Microscopic  Once      Final result       ED Imaging Orders     None        Discharge Instructions         Urinary Retention (Male)  Urinary retention is the medical term for difficulty or inability to pass urine, even though your bladder is full.  Causes  The most common cause of urinary retention in men is the bladder outlet being blocked. This can be due to an enlarged prostate gland or a bladder infection. Certain medicines can also cause this problem. This condition is more likely to occur as men get older.    This condition is treated by insertion of a catheter into the bladder to drain the urine. This provides immediate relief. The catheter may need to remain in place for a few days to prevent a recurrence. The catheter has a balloon on the tip which was inflated after insertion. This prevents the catheter from falling out.  Symptoms  Common symptoms of urinary retention include:  · Pain (not experienced by everyone)  · Frequent urination  · Feeling that the bladder is still full after urinating  · Incontinence (not being able to control the release of urine)  · Swollen abdomen  Treatment  This condition is treated by inserting a tube (catheter) into the bladder to drain the urine. This provides immediate relief. The catheter may need to stay in place for a few days. The catheter has a balloon on the tip, which is inflated after insertion. This prevents the catheter from falling out.  Home care  · If you were given antibiotics, take them until they are used up, or your  healthcare provider tells you to stop. It is important to finish the antibiotics even though you feel better. This is to make sure your infection has cleared.  · If a catheter was left in place, it is important to keep bacteria from getting into the collection bag. Do not disconnect the catheter from the collection bag.  · Use a leg band to secure the drainage tube, so it does not pull on the catheter. Drain the collection bag when it becomes full using the drain spout at the bottom of the bag.  · Do not pull on or try to remove your catheter. This will injure your urethra. The catheter must be removed by a healthcare provider.  Follow-up care  Follow up with your healthcare provider, or as advised.  If a catheter was left in place, it can usually be removed within 3 to 7 days. Some conditions require the catheter to stay in longer. Your healthcare provider will tell you when to return to have the catheter removed.  When to seek medical advice  Call your healthcare provider right away if any of these occur:  · Fever of 100.4ºF (38ºC) or higher, or as directed by your healthcare provider  · Bladder or lower-abdominal pain or fullness  · Abdominal swelling, nausea, vomiting, or back pain  · Blood or urine leakage around the catheter  · Bloody urine coming from the catheter (if a new symptom)  · Weakness, dizziness, or fainting  · Confusion or change in usual level of alertness  · If a catheter was left in place, return if:  ¨ Catheter falls out  ¨ Catheter stops draining for 6 hours  Date Last Reviewed: 7/26/2015  © 8636-7645 [employer]. [address] [place] [address], [place] [address]. All rights reserved. This information is not intended as a substitute for professional medical care. Always follow your healthcare professional's instructions.          Your Scheduled Appointments     Feb 13, 2017  8:00 AM CST   Established Patient Visit with MD [facility] ([facility]     [address]  [facility] [address]   [phone]            Feb 27, 2017  1:40 PM CST   Established Patient Visit with Williams Alvarenga MD   [facility]    [address]  [place] [address] [phone]            Apr 03, 2017  7:30 AM CDT   Established Patient Visit with Scott Garcia DPM   [facility] ([facility])    [address]  [place] [id number] [phone]            Apr 24, 2017  8:00 AM CDT   Remote Interrogation with HOME MONITOR DEVICE CHECK, [facility]   [facility] ([facility] )    [address]  [facility] [address] [phone]            May 02, 2017  8:20 AM CDT   Post OP with Graham Buenrostro MD   [facility] 4th [facility] ([facility] )    [address]  [facility] [address] [phone]              Smoking Cessation     If you would like to quit smoking:   You may be eligible for free services if you are a [place] resident and started smoking cigarettes before September 1, 1988.  Call the Smoking Cessation Trust (SCT) toll free at (414) 853-4075 or (489) 312-6887.   Call 1-800-QUIT-NOW if you do not meet the above criteria.             [facility] complies with applicable Federal civil rights laws and does not discriminate on the basis of race, color, national origin, age, disability, or sex.        Language Assistance Services     ATTENTION: Language assistance services are available, free of charge. Please call [phone].      ATENCIÓN: Si habla español, tiene a cope disposición servicios gratuitos de asistencia lingüística. Llame al [phone].     CHÚ Ý: N?u b?n nói Ti?ng Vi?t, có các d?ch v? h? tr? ngôn ng? mi?n phí dành cho b?n. G?i s? [phone].

## 2017-02-05 LAB — POCT GLUCOSE: 105 MG/DL (ref 70–110)

## 2017-02-05 NOTE — ED PROVIDER NOTES
Encounter Date: 2/4/2017       History     Chief Complaint   Patient presents with    Urinary Retention     reports had urinary catherter removed yesterday after prostate surgery, was able to urinate at that time however has not been able to urinate in a few hours, reports extreme pain to groin and pelvic area     Review of patient's allergies indicates:  No Known Allergies  HPI Comments: Xander Sanchez, a 66 y.o. male, complains of inability to void since this afternoon.  He states he had prostate surgery on Wednesday.  He said catheter was removed at his urologist office yesterday.  He said he was able to void yesterday and this morning but began to dribble urine this afternoon and has been unable to void since he complains of suprapubic pain and pressure.  He denies any fever chills or vomiting.  No hematuria.  Pain location: Suprapubic pressure  Pain Severity: Moderate    Pain timing: This afternoon  Pain character: Suprapubic pressure    Associated with or Modified by: (see above)        Past Medical History   Diagnosis Date    Asthma     Cardiomyopathy     Cervical spondylosis with myelopathy 10/17/2012    Chronic bronchitis     Chronic systolic dysfunction of left ventricle 7/27/2015    Constipation 7/27/2015    COPD (chronic obstructive pulmonary disease)     Diabetes mellitus, type 2     DM type 2 (diabetes mellitus, type 2) 7/27/2015    Enlarged prostate 7/27/2015    Hypertension     ICD (implantable cardiac defibrillator) in place     Presence of biventricular AICD 7/27/2015    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thy 2/1/2016    Urinary tract infection      pt does not know     Past Medical History Pertinent Negatives   Diagnosis Date Noted    Acute leukemia 2/1/2016    Acute pancreatitis 2/1/2016    Alcohol dependence 2/1/2016    Alzheimer's disease 2/1/2016    Amblyopia 12/10/2015    Anemia 2/1/2016    Angina pectoris 2/1/2016    Anticoagulant long-term use 1/23/2015     Anxiety 2/1/2016    Aplasia bone marrow 2/1/2016    Atrial fibrillation 2/1/2016    Back pain 2/1/2016    Bipolar disorder 2/1/2016    Bladder cancer 2/1/2016    Bone cancer 2/1/2016    Bone marrow transplant status 2/1/2016    Brain cancer 2/1/2016    Breast cancer 2/1/2016    Cancer 1/23/2015    Cancer of lymphatic and hematopoietic tissue 2/1/2016    Cerebral palsy 2/1/2016    Cervical cancer 2/1/2016    CHF (congestive heart failure) 1/23/2015    Chronic hepatitis 2/1/2016    Cirrhosis 2/1/2016    Colon cancer 2/1/2016    Complications of reattached extremity 2/1/2016    Coronary artery disease 2/1/2016    Cystic fibrosis 2/1/2016    Dependence on renal dialysis 2/1/2016    Depression 2/1/2016    Diabetic retinopathy 12/10/2015    Difficult intubation 1/23/2015    Elevated PSA 4/6/2015    Emphysema of lung 2/1/2016    Encounter for blood transfusion 1/23/2015    Endometrial cancer 2/1/2016    Esophageal cancer 2/1/2016    Fallopian tube cancer, carcinoma 2/1/2016    Fibromyalgia 2/1/2016    Gastrostomy status 2/1/2016    General anesthetics causing adverse effect in therapeutic use 1/23/2015    Glaucoma 12/10/2015    Glomerulonephritis 2/1/2016    Heart transplanted 2/1/2016    Hemolytic anemia 2/1/2016    Hepatitis B 2/1/2016    Hepatitis C 2/1/2016    HIV infection 2/1/2016    Hyperlipidemia 2/1/2016    Hypotension, iatrogenic 1/23/2015    Hypothyroidism 2/1/2016    Immune deficiency disorder 2/1/2016    Immune disorder 2/1/2016    Inflammatory bowel disease 2/1/2016    Interstitial nephritis chronic 2/1/2016    Intracranial hemorrhage 2/1/2016    Kidney stone 4/6/2015    Late complications of amputation stump 2/1/2016    Leukemia 2/1/2016    Liver cancer 2/1/2016    Liver transplanted 2/1/2016    Lung cancer 2/1/2016    Lung transplanted 2/1/2016    Macular degeneration 12/10/2015    Malignant hyperthermia 1/23/2015    Malnutrition 2/1/2016     Melanoma 2/1/2016    Multiple sclerosis 2/1/2016    Myocardial infarction 2/1/2016    Neoplasm of lip 2/1/2016    Obesity 2/1/2016    Osteoporosis 2/1/2016    Ovarian cancer 2/1/2016    Pancreatic cancer 2/1/2016    Pancreatic disease 2/1/2016    Paranoia 2/1/2016    Parkinson disease 2/1/2016    Peripheral vascular disease 2/1/2016    Phantom limb syndrome 2/1/2016    Pneumonia 2/1/2016    Pneumonia due to other staphylococcus 2/1/2016    Polyneuropathy 2/1/2016    PONV (postoperative nausea and vomiting) 1/23/2015    Prostate cancer 2/1/2016    Pulmonary embolism 2/1/2016    Rectal cancer 2/1/2016    Renal cancer 2/1/2016    Renal manifestation of secondary diabetes mellitus 2/1/2016    Respiratory distress 1/23/2015    Respiratory failure 2/1/2016    Retinal detachment 12/10/2015    Rheumatoid arthritis 2/1/2016    Schizoaffective disorder 2/1/2016    Seizures 1/23/2015    Septic shock 2/1/2016    Sickle cell anemia 12/10/2015    Sickle cell trait 12/10/2015    Skin ulcer 2/1/2016    Sleep apnea 2/1/2016    Small intestine cancer 2/1/2016    Spinal cord disease 2/1/2016    Squamous cell carcinoma 2/1/2016    STD (sexually transmitted disease) 4/6/2015    Stomach cancer 2/1/2016    Strabismus 12/10/2015    Stroke 1/23/2015    Testicular cancer 2/1/2016    Thyroid cancer 2/1/2016    Thyroid disease 1/23/2015    Tobacco dependence 2/1/2016    Transfusion reaction 1/23/2015    Trouble in sleeping 2/1/2016    Type 2 diabetes with peripheral circulatory disorder, controlled 2/1/2016    Urinary incontinence 2/1/2016    Uterine cancer 2/1/2016    Uveitis 12/10/2015    Vaginal cancer 2/1/2016    Vulvar cancer 2/1/2016     Past Surgical History   Procedure Laterality Date    Cervical spine surgery      Cardiac defibrillator placement      Spine surgery       Family History   Problem Relation Age of Onset    Hypertension Mother     Hypertension Father     COPD Father      No Known Problems Sister     No Known Problems Brother     No Known Problems Daughter     Prostate cancer Neg Hx     Kidney disease Neg Hx      Social History   Substance Use Topics    Smoking status: Former Smoker     Packs/day: 1.00     Years: 40.00     Types: Cigarettes     Quit date: 7/26/2009    Smokeless tobacco: Never Used    Alcohol use 1.8 oz/week     3 Cans of beer per week     Review of Systems   Constitutional: Negative for chills and fever.   Gastrointestinal: Negative.    Genitourinary: Positive for difficulty urinating.   All other systems reviewed and are negative.      Physical Exam   Initial Vitals   BP Pulse Resp Temp SpO2   02/04/17 2015 02/04/17 2015 02/04/17 2015 02/04/17 2015 02/04/17 2015   155/84 104 20 98.2 °F (36.8 °C) 100 %     Physical Exam    Nursing note and vitals reviewed.  Constitutional: He appears well-developed and well-nourished. He appears distressed.   Cardiovascular: Normal rate, regular rhythm and normal heart sounds.   Pulmonary/Chest: Breath sounds normal.   Abdominal: Soft.   Suprapubic fullness with tenderness the rest of the abdomen is soft and nontender with no guarding or rebound.  No CVA tenderness.   Neurological: He is alert and oriented to person, place, and time. He has normal strength.   Skin: Skin is dry.   Psychiatric: He has a normal mood and affect. His behavior is normal. Thought content normal.         ED Course   Procedures  Labs Reviewed - No data to display          Medical Decision Making:   Initial Assessment:   66 y.o. male, complains of inability to void since this afternoon.  He states he had prostate surgery on Wednesday.  He said catheter was removed at his urologist office yesterday.  He said he was able to void yesterday and this morning but began to dribble urine this afternoon and has been unable to void since he complains of suprapubic pain and pressure.  He denies any fever chills or vomiting.  No hematuria  Pain location:  Suprapubic pressure    PE: Mild suprapubic distention with tenderness, no penile bleeding  Differential Diagnosis:   Urinary tract infection, acute urinary retention, gross hematuria and clots,  ED Management:  Acute urinary retention post-prostatectomy.  No focal catheter was reinserted.  No gross blood clots and hematuria was noted.  He will be placed on Cipro and follow-up with his urologist on Monday.  A culture was submitted of the urine.                   ED Course     Clinical Impression:   The encounter diagnosis was Acute urinary retention.          Georges Sharif Jr., MD  02/11/17 [id number]

## 2017-02-05 NOTE — DISCHARGE INSTRUCTIONS
Urinary Retention (Male)  Urinary retention is the medical term for difficulty or inability to pass urine, even though your bladder is full.  Causes  The most common cause of urinary retention in men is the bladder outlet being blocked. This can be due to an enlarged prostate gland or a bladder infection. Certain medicines can also cause this problem. This condition is more likely to occur as men get older.    This condition is treated by insertion of a catheter into the bladder to drain the urine. This provides immediate relief. The catheter may need to remain in place for a few days to prevent a recurrence. The catheter has a balloon on the tip which was inflated after insertion. This prevents the catheter from falling out.  Symptoms  Common symptoms of urinary retention include:  · Pain (not experienced by everyone)  · Frequent urination  · Feeling that the bladder is still full after urinating  · Incontinence (not being able to control the release of urine)  · Swollen abdomen  Treatment  This condition is treated by inserting a tube (catheter) into the bladder to drain the urine. This provides immediate relief. The catheter may need to stay in place for a few days. The catheter has a balloon on the tip, which is inflated after insertion. This prevents the catheter from falling out.  Home care  · If you were given antibiotics, take them until they are used up, or your healthcare provider tells you to stop. It is important to finish the antibiotics even though you feel better. This is to make sure your infection has cleared.  · If a catheter was left in place, it is important to keep bacteria from getting into the collection bag. Do not disconnect the catheter from the collection bag.  · Use a leg band to secure the drainage tube, so it does not pull on the catheter. Drain the collection bag when it becomes full using the drain spout at the bottom of the bag.  · Do not pull on or try to remove your catheter.  This will injure your urethra. The catheter must be removed by a healthcare provider.  Follow-up care  Follow up with your healthcare provider, or as advised.  If a catheter was left in place, it can usually be removed within 3 to 7 days. Some conditions require the catheter to stay in longer. Your healthcare provider will tell you when to return to have the catheter removed.  When to seek medical advice  Call your healthcare provider right away if any of these occur:  · Fever of 100.4ºF (38ºC) or higher, or as directed by your healthcare provider  · Bladder or lower-abdominal pain or fullness  · Abdominal swelling, nausea, vomiting, or back pain  · Blood or urine leakage around the catheter  · Bloody urine coming from the catheter (if a new symptom)  · Weakness, dizziness, or fainting  · Confusion or change in usual level of alertness  · If a catheter was left in place, return if:  ¨ Catheter falls out  ¨ Catheter stops draining for 6 hours  Date Last Reviewed: 7/26/2015  © 1776-9297 [employer]. [address] [place] [address], [place] [address]. All rights reserved. This information is not intended as a substitute for professional medical care. Always follow your healthcare professional's instructions.

## 2017-02-05 NOTE — ED NOTES
Patient identifiers for Xander Sanchez checked and correct.  LOC: The patient is awake, alert and aware of environment with an appropriate affect, the patient is oriented x 3 and speaking appropriately.  APPEARANCE: Patient uncomfortable.  MUSKULOSKELETAL: Patient moving all extremities well, no obvious swelling or deformities noted.  RESPIRATORY: Airway is open and patent, respirations are spontaneous, patient has a normal effort and rate.  ABDOMEN: Tender to palpation.

## 2017-02-06 LAB — BACTERIA UR CULT: NO GROWTH

## 2017-02-09 ENCOUNTER — OFFICE VISIT (OUTPATIENT)
Dept: UROLOGY | Facility: CLINIC | Age: 67
End: 2017-02-09
Payer: MEDICARE

## 2017-02-09 VITALS
DIASTOLIC BLOOD PRESSURE: 91 MMHG | BODY MASS INDEX: 28.14 KG/M2 | SYSTOLIC BLOOD PRESSURE: 138 MMHG | HEIGHT: 71 IN | HEART RATE: 89 BPM | WEIGHT: 201 LBS

## 2017-02-09 DIAGNOSIS — N40.1 BPH WITH URINARY OBSTRUCTION: ICD-10-CM

## 2017-02-09 DIAGNOSIS — R31.0 HEMATURIA, GROSS: ICD-10-CM

## 2017-02-09 DIAGNOSIS — N13.8 BPH WITH URINARY OBSTRUCTION: ICD-10-CM

## 2017-02-09 DIAGNOSIS — Z98.890 POST-OPERATIVE STATE: Primary | ICD-10-CM

## 2017-02-09 DIAGNOSIS — Z97.8 FOLEY CATHETER IN PLACE: ICD-10-CM

## 2017-02-09 DIAGNOSIS — R33.9 URINARY RETENTION: ICD-10-CM

## 2017-02-09 DIAGNOSIS — Z46.6 ENCOUNTER FOR FOLEY CATHETER REMOVAL: ICD-10-CM

## 2017-02-09 DIAGNOSIS — N32.81 OAB (OVERACTIVE BLADDER): ICD-10-CM

## 2017-02-09 PROCEDURE — 99024 POSTOP FOLLOW-UP VISIT: CPT | Mod: S$GLB,,, | Performed by: NURSE PRACTITIONER

## 2017-02-09 PROCEDURE — 51702 INSERT TEMP BLADDER CATH: CPT | Mod: 58,S$GLB,, | Performed by: NURSE PRACTITIONER

## 2017-02-09 PROCEDURE — 87086 URINE CULTURE/COLONY COUNT: CPT

## 2017-02-09 PROCEDURE — 99999 PR PBB SHADOW E&M-EST. PATIENT-LVL III: CPT | Mod: PBBFAC,,, | Performed by: NURSE PRACTITIONER

## 2017-02-09 RX ORDER — LIDOCAINE HYDROCHLORIDE 20 MG/ML
JELLY TOPICAL ONCE
Status: CANCELLED | OUTPATIENT
Start: 2017-02-09 | End: 2017-02-09

## 2017-02-09 RX ORDER — OXYBUTYNIN CHLORIDE 5 MG/1
5 TABLET ORAL 3 TIMES DAILY
Qty: 30 TABLET | Refills: 0 | Status: SHIPPED | OUTPATIENT
Start: 2017-02-09 | End: 2017-02-14 | Stop reason: SDUPTHER

## 2017-02-09 RX ORDER — CIPROFLOXACIN 500 MG/1
500 TABLET ORAL 2 TIMES DAILY
Qty: 14 TABLET | Refills: 0 | Status: SHIPPED | OUTPATIENT
Start: 2017-02-09 | End: 2017-02-16

## 2017-02-09 RX ORDER — CIPROFLOXACIN 250 MG/1
500 TABLET, FILM COATED ORAL ONCE
Status: CANCELLED | OUTPATIENT
Start: 2017-02-09 | End: 2017-02-09

## 2017-02-09 NOTE — MR AVS SNAPSHOT
[facility] Urolog 4th Floor  [address]  [facility] [address]  Phone: [phone]                  Xander Sanchez   2017 8:00 AM   Office Visit    Description:  Male : 1950   Provider:  Jing Riley NP   Department:  [facility] Urolog 4th Floor           Reason for Visit     Urinary Retention                To Do List           Future Appointments        Provider Department Dept Phone    2017 8:00 AM Sara Ruiz MD [facility] Med [facility] Rehab [phone]    2017 1:40 PM Williams Alvarenga MD [facility] Family Medicine [phone]    4/3/2017 7:30 AM Scott Garcia DPM [facility] Podiatry [phone]    2017 8:00 AM [facility] [phone]    2017 8:20 AM Graham Buenrostro MD [facility] Urolog 4th Floor [phone]      Goals (5 Years of Data)     None      Follow-Up and Disposition     Return in about 3 months (around 2017) for Follow up w/ Dr. Buenrostro.      [facility]     [facility] -  Assistance  Registered nurses in the [facility] provide clinical advisement, health education, appointment booking, and other advisory services.  Call for this free service at [phone].             Medications           Message regarding Medications     Verify the changes and/or additions to your medication regime listed below are the same as discussed with your clinician today.  If any of these changes or additions are incorrect, please notify your healthcare provider.        STOP taking these medications     oxybutynin (DITROPAN) 5 MG Tab Take 1 tablet (5 mg total) by mouth 3 (three) times daily.           Verify that the below list of medications is an accurate representation of the medications you are currently taking.  If none reported, the list may be blank. If incorrect, please contact your healthcare provider. Carry this list with you in case of emergency.     "       Current Medications     ACCU-CHEK SOFTCLIX LANCETS Misc     amlodipine (NORVASC) 5 MG tablet Take 1 tablet (5 mg total) by mouth once daily.    aspirin (ECOTRIN) 81 MG EC tablet Take 81 mg by mouth once daily.    baclofen (LIORESAL) 10 MG tablet Take 1 tablet (10 mg total) by mouth 3 (three) times daily.    CONTOUR NEXT STRIPS Strp     fluticasone-salmeterol 230-21 mcg/dose (ADVAIR HFA) 230-21 mcg/actuation HFAA inhaler Inhale 2 puffs into the lungs 2 (two) times daily.    gabapentin (NEURONTIN) 600 MG tablet Take 1 tablet (600 mg total) by mouth 4 (four) times daily with meals and nightly.    hydrocodone-acetaminophen 10-325mg (NORCO)  mg Tab Take 1 tablet by mouth every 8 (eight) hours as needed (MSK pain).    lisinopril (PRINIVIL,ZESTRIL) 5 MG tablet Take 2 tablets (10 mg total) by mouth every morning.    oxycodone-acetaminophen (PERCOCET) 5-325 mg per tablet Take 1 tablet by mouth every 4 (four) hours as needed for Pain.    polyethylene glycol (GLYCOLAX) 17 gram PwPk Take 17 g by mouth once daily.    pravastatin (PRAVACHOL) 10 MG tablet TAKE ONE TABLET BY MOUTH AT BEDTIME    tamsulosin (FLOMAX) 0.4 mg Cp24 Take 1 capsule (0.4 mg total) by mouth once daily.           Clinical Reference Information           Your Vitals Were     BP Pulse Height Weight BMI    138/91 89 5' 11" (1.803 m) 91.2 kg (201 lb) 28.03 kg/m2      Blood Pressure          Most Recent Value    BP  (!)  138/91      Allergies as of 2/9/2017     No Known Allergies      Immunizations Administered on Date of Encounter - 2/9/2017     None      Instructions    Stop Oxybutynin  Continue Flomax        Urinary Retention (Male)  Urinary retention is the medical term for difficulty or inability to pass urine, even though your bladder is full.  Causes  The most common cause of urinary retention in men is the bladder outlet being blocked. This can be due to an enlarged prostate gland or a bladder infection. Certain medicines can also cause this " problem. This condition is more likely to occur as men get older.    This condition is treated by insertion of a catheter into the bladder to drain the urine. This provides immediate relief. The catheter may need to remain in place for a few days to prevent a recurrence. The catheter has a balloon on the tip which was inflated after insertion. This prevents the catheter from falling out.  Symptoms  Common symptoms of urinary retention include:  · Pain (not experienced by everyone)  · Frequent urination  · Feeling that the bladder is still full after urinating  · Incontinence (not being able to control the release of urine)  · Swollen abdomen  Treatment  This condition is treated by inserting a tube (catheter) into the bladder to drain the urine. This provides immediate relief. The catheter may need to stay in place for a few days. The catheter has a balloon on the tip, which is inflated after insertion. This prevents the catheter from falling out.  Home care  · If you were given antibiotics, take them until they are used up, or your healthcare provider tells you to stop. It is important to finish the antibiotics even though you feel better. This is to make sure your infection has cleared.  · If a catheter was left in place, it is important to keep bacteria from getting into the collection bag. Do not disconnect the catheter from the collection bag.  · Use a leg band to secure the drainage tube, so it does not pull on the catheter. Drain the collection bag when it becomes full using the drain spout at the bottom of the bag.  · Do not pull on or try to remove your catheter. This will injure your urethra. The catheter must be removed by a healthcare provider.  Follow-up care  Follow up with your healthcare provider, or as advised.  If a catheter was left in place, it can usually be removed within 3 to 7 days. Some conditions require the catheter to stay in longer. Your healthcare provider will tell you when to return  to have the catheter removed.  When to seek medical advice  Call your healthcare provider right away if any of these occur:  · Fever of 101.4ºF (38ºC) or higher, or as directed by your healthcare provider  · Bladder or lower-abdominal pain or fullness  · Abdominal swelling, nausea, vomiting, or back pain  · Blood or urine leakage around the catheter  · Bloody urine coming from the catheter (if a new symptom)  · Weakness, dizziness, or fainting  · Confusion or change in usual level of alertness  Date Last Reviewed: 7/26/2015 © 2000-2016 [employer]. [address] [place] [address]. All rights reserved. This information is not intended as a substitute for professional medical care. Always follow your healthcare professional's instructions.             Language Assistance Services     ATTENTION: Language assistance services are available, free of charge. Please call [phone].      ATENCIÓN: Si habla español, tiene a cope disposición servicios gratuitos de asistencia lingüística. Llame al [phone].     JACKIE Ý: N?u b?n nói Ti?ng Vi?t, có các d?ch v? h? tr? ngôn ng? mi?n phí dành cho b?n. G?i s? [phone].         [facility] 4th [facility] complies with applicable Federal civil rights laws and does not discriminate on the basis of race, color, national origin, age, disability, or sex.

## 2017-02-09 NOTE — PATIENT INSTRUCTIONS
Stop Oxybutynin  Continue Flomax        Urinary Retention (Male)  Urinary retention is the medical term for difficulty or inability to pass urine, even though your bladder is full.  Causes  The most common cause of urinary retention in men is the bladder outlet being blocked. This can be due to an enlarged prostate gland or a bladder infection. Certain medicines can also cause this problem. This condition is more likely to occur as men get older.    This condition is treated by insertion of a catheter into the bladder to drain the urine. This provides immediate relief. The catheter may need to remain in place for a few days to prevent a recurrence. The catheter has a balloon on the tip which was inflated after insertion. This prevents the catheter from falling out.  Symptoms  Common symptoms of urinary retention include:  · Pain (not experienced by everyone)  · Frequent urination  · Feeling that the bladder is still full after urinating  · Incontinence (not being able to control the release of urine)  · Swollen abdomen  Treatment  This condition is treated by inserting a tube (catheter) into the bladder to drain the urine. This provides immediate relief. The catheter may need to stay in place for a few days. The catheter has a balloon on the tip, which is inflated after insertion. This prevents the catheter from falling out.  Home care  · If you were given antibiotics, take them until they are used up, or your healthcare provider tells you to stop. It is important to finish the antibiotics even though you feel better. This is to make sure your infection has cleared.  · If a catheter was left in place, it is important to keep bacteria from getting into the collection bag. Do not disconnect the catheter from the collection bag.  · Use a leg band to secure the drainage tube, so it does not pull on the catheter. Drain the collection bag when it becomes full using the drain spout at the bottom of the bag.  · Do not pull  on or try to remove your catheter. This will injure your urethra. The catheter must be removed by a healthcare provider.  Follow-up care  Follow up with your healthcare provider, or as advised.  If a catheter was left in place, it can usually be removed within 3 to 7 days. Some conditions require the catheter to stay in longer. Your healthcare provider will tell you when to return to have the catheter removed.  When to seek medical advice  Call your healthcare provider right away if any of these occur:  · Fever of 101.4ºF (38ºC) or higher, or as directed by your healthcare provider  · Bladder or lower-abdominal pain or fullness  · Abdominal swelling, nausea, vomiting, or back pain  · Blood or urine leakage around the catheter  · Bloody urine coming from the catheter (if a new symptom)  · Weakness, dizziness, or fainting  · Confusion or change in usual level of alertness  Date Last Reviewed: 7/26/2015  © 5575-9784 [employer]. [address] [place] [address], [place] [address]. All rights reserved. This information is not intended as a substitute for professional medical care. Always follow your healthcare professional's instructions.

## 2017-02-09 NOTE — PROGRESS NOTES
Subjective:       Patient ID: Xander Sanchez is a 66 y.o. male.    Chief Complaint: Urinary Retention (mcpherson)    HPI Comments: Xander Sanchez is a 66 y.o. male with BPH;   Last seen with KEYONA Diego on 2/3/17 for a VT. He had bladder spasms w/ the VT. Mcpherson was removed, and pt instructed to call if unable to urinate.     On 2/4/17, he returned to the ER bc he was unable to urinate and experienced suprapubic pressure. A catheter was reinserted, and he returns today for another voiding trial.     Today he reports catheter has been draining well but still aggravating him.  Reports some blood in mcpherson  No fever; n/v; abdominal pain or flank pain.       Procedure(s) Performed 02/01/2017 with Dr. Buenrostro   1. Laser vaporization of the prostate  2. Enucleation of prostate tissue       Findings:   1. Lateral lobe hypertrophy  2. No urethral or bladder abnormalities  3. Bilateral ureteral orifices in normal anatomical positon        Past Medical History:    Asthma                                                        Cardiomyopathy                                                Cervical spondylosis with myelopathy            10/17/2012    Chronic bronchitis                                            Chronic systolic dysfunction of left ventricle  7/27/2015     Constipation                                    7/27/2015     COPD (chronic obstructive pulmonary disease)                  Diabetes mellitus, type 2                                     DM type 2 (diabetes mellitus, type 2)           7/27/2015     Enlarged prostate                               7/27/2015     Hypertension                                                  ICD (implantable cardiac defibrillator) in greta*               Presence of biventricular AICD                  7/27/2015     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* 2/1/2016      Urinary tract infection                                         Comment:pt does not know    Past Surgical History:     CERVICAL SPINE SURGERY                                         CARDIAC DEFIBRILLATOR PLACEMENT                                SPINE SURGERY                                                  Review of patient's family history indicates:    Hypertension                   Mother                    Hypertension                   Father                    COPD                           Father                    No Known Problems              Sister                    No Known Problems              Brother                   No Known Problems              Daughter                  Prostate cancer                Neg Hx                    Kidney disease                 Neg Hx                      Social History    Marital status: Single              Spouse name:                       Years of education:                 Number of children:               Occupational History    None on file    Social History Main Topics    Smoking status: Former Smoker                                                                Packs/day: 1.00      Years: 40.00          Types: Cigarettes       Quit date: 7/26/2009    Smokeless status: Never Used                        Alcohol use: Yes           1.8 oz/week       3 Cans of beer per week    Drug use: No              Sexual activity: Yes               Partners with: Female    Other Topics            Concern    None on file    Social History Narrative    None on file        Allergies:  Review of patient's allergies indicates no known allergies.    Medications:  Current Outpatient Prescriptions:   ACCU-CHEK SOFTCLIX LANCETS Misc, , Disp: , Rfl:   amlodipine (NORVASC) 5 MG tablet, Take 1 tablet (5 mg total) by mouth once daily., Disp: 90 tablet, Rfl: 3  aspirin (ECOTRIN) 81 MG EC tablet, Take 81 mg by mouth once daily., Disp: , Rfl:   baclofen (LIORESAL) 10 MG tablet, Take 1 tablet (10 mg total) by mouth 3 (three) times daily., Disp: 90 tablet, Rfl: 11  ciprofloxacin HCl (CIPRO) 500 MG  tablet, Take 1 tablet (500 mg total) by mouth every 12 (twelve) hours., Disp: 6 tablet, Rfl: 0  CONTOUR NEXT STRIPS Strp, , Disp: , Rfl:   fluticasone-salmeterol 230-21 mcg/dose (ADVAIR HFA) 230-21 mcg/actuation HFAA inhaler, Inhale 2 puffs into the lungs 2 (two) times daily., Disp: 12 each, Rfl: 6  gabapentin (NEURONTIN) 600 MG tablet, Take 1 tablet (600 mg total) by mouth 4 (four) times daily with meals and nightly., Disp: 360 tablet, Rfl: 3  hydrocodone-acetaminophen 10-325mg (NORCO)  mg Tab, Take 1 tablet by mouth every 8 (eight) hours as needed (MSK pain)., Disp: 90 tablet, Rfl: 0  lisinopril (PRINIVIL,ZESTRIL) 5 MG tablet, Take 2 tablets (10 mg total) by mouth every morning., Disp: 270 tablet, Rfl: 3  oxycodone-acetaminophen (PERCOCET) 5-325 mg per tablet, Take 1 tablet by mouth every 4 (four) hours as needed for Pain., Disp: 31 tablet, Rfl: 0  polyethylene glycol (GLYCOLAX) 17 gram PwPk, Take 17 g by mouth once daily., Disp: 30 packet, Rfl: 0  pravastatin (PRAVACHOL) 10 MG tablet, TAKE ONE TABLET BY MOUTH AT BEDTIME, Disp: 90 tablet, Rfl: 1  tamsulosin (FLOMAX) 0.4 mg Cp24, Take 1 capsule (0.4 mg total) by mouth once daily., Disp: 90 capsule, Rfl: 3  oxybutynin (DITROPAN) 5 MG Tab, Take 1 tablet (5 mg total) by mouth 3 (three) times daily., Disp: 270 tablet, Rfl: 3        Review of Systems   Constitutional: Negative.  Negative for activity change, appetite change and fever.   HENT: Negative.  Negative for facial swelling and trouble swallowing.    Eyes: Negative.    Respiratory: Negative.  Negative for shortness of breath.    Cardiovascular: Negative.  Negative for chest pain and palpitations.   Gastrointestinal: Negative for abdominal pain, constipation, diarrhea, nausea and vomiting.   Genitourinary: Positive for difficulty urinating. Negative for dysuria, enuresis, flank pain, frequency, genital sores, hematuria, nocturia, penile pain, scrotal swelling, testicular pain and urgency.         Potter draining.     Musculoskeletal: Negative for back pain, gait problem and neck stiffness.   Skin: Negative.  Negative for wound.   Neurological: Negative for dizziness, tremors, seizures, syncope, speech difficulty, light-headedness and headaches.   Hematological: Does not bruise/bleed easily.   Psychiatric/Behavioral: Negative for agitation, confusion and hallucinations. The patient is not nervous/anxious.        Objective:      Physical Exam   Nursing note and vitals reviewed.  Constitutional: He is oriented to person, place, and time. He appears well-developed and well-nourished.  Non-toxic appearance. He does not have a sickly appearance.   HENT:   Head: Normocephalic and atraumatic.   Right Ear: External ear normal.   Left Ear: External ear normal.   Nose: Nose normal.   Mouth/Throat: Mucous membranes are normal.   Eyes: Conjunctivae and lids are normal. No scleral icterus.   Neck: Trachea normal, normal range of motion and full passive range of motion without pain. Neck supple. No JVD present. No tracheal deviation present.   Cardiovascular: Normal rate, regular rhythm, S1 normal and S2 normal.    Pulmonary/Chest: Effort normal and breath sounds normal. No respiratory distress. He exhibits no tenderness.   Abdominal: Soft. Normal appearance and bowel sounds are normal. There is no hepatosplenomegaly. There is no tenderness. There is no rebound, no guarding and no CVA tenderness.   Genitourinary: Penis normal. No penile tenderness.   Genitourinary Comments:  Catheter draining clear, esteban tinted urine.   Musculoskeletal: Normal range of motion.   Neurological: He is alert and oriented to person, place, and time. He has normal strength.   Skin: Skin is warm, dry and intact.     Psychiatric: He has a normal mood and affect. His behavior is normal. Judgment and thought content normal.       Assessment:       1. Post-operative state    2. BPH with urinary obstruction    3. Potter catheter in place    4. Encounter  for Mcpherson catheter removal        Plan:         I spent 25 minutes with the patient of which more than half was spent in direct consultation with the patient in regards to our treatment and plan.    Education and recommendations of today's plan of care including home remedies.  Discussed today's plan of care.  Discussed post mcpherson removal as well as post TURP expectations.  VT attempted but stopped due to bladder spasms.  Diet modifications; avoiding bladder irritants.   Continue flomax and stop oxybutynin.   Verbalized understanding.   F/u with Dr. Buenrostro 3 months.  Call this afternoon with updates on voiding.    Addendum:  Pt came back c/o inability to urinate.  18 F Mcpherson catheter was replaced without difficulty.  40 cc urine noted.  UC sent to the lab from cath urine  Will keep the catheter over the weekend.  Dr. Buenrostro will see him next Tues.

## 2017-02-10 DIAGNOSIS — N41.9 PROSTATITIS, UNSPECIFIED PROSTATITIS TYPE: Primary | ICD-10-CM

## 2017-02-10 LAB — BACTERIA UR CULT: NO GROWTH

## 2017-02-10 RX ORDER — SULFAMETHOXAZOLE AND TRIMETHOPRIM 800; 160 MG/1; MG/1
1 TABLET ORAL 2 TIMES DAILY
Qty: 20 TABLET | Refills: 0 | Status: SHIPPED | OUTPATIENT
Start: 2017-02-10 | End: 2017-02-20

## 2017-02-13 ENCOUNTER — OFFICE VISIT (OUTPATIENT)
Dept: PHYSICAL MEDICINE AND REHAB | Facility: CLINIC | Age: 67
End: 2017-02-13
Payer: MEDICARE

## 2017-02-13 VITALS
WEIGHT: 191 LBS | SYSTOLIC BLOOD PRESSURE: 103 MMHG | DIASTOLIC BLOOD PRESSURE: 66 MMHG | HEART RATE: 105 BPM | BODY MASS INDEX: 26.74 KG/M2 | HEIGHT: 71 IN

## 2017-02-13 DIAGNOSIS — M48.02 CERVICAL STENOSIS OF SPINE: ICD-10-CM

## 2017-02-13 DIAGNOSIS — M62.81 MUSCLE RIGHT ARM WEAKNESS: ICD-10-CM

## 2017-02-13 DIAGNOSIS — R27.8 LOSS OF COORDINATION: ICD-10-CM

## 2017-02-13 DIAGNOSIS — M79.641 PAIN OF RIGHT HAND: ICD-10-CM

## 2017-02-13 DIAGNOSIS — M79.601 RIGHT ARM PAIN: ICD-10-CM

## 2017-02-13 DIAGNOSIS — F11.20 NARCOTIC DEPENDENCY, CONTINUOUS: ICD-10-CM

## 2017-02-13 DIAGNOSIS — M54.12 CERVICAL RADICULOPATHY: ICD-10-CM

## 2017-02-13 DIAGNOSIS — M54.2 CERVICALGIA: ICD-10-CM

## 2017-02-13 DIAGNOSIS — G90.50 COMPLEX REGIONAL PAIN SYNDROME TYPE 1, AFFECTING UNSPECIFIED SITE: ICD-10-CM

## 2017-02-13 DIAGNOSIS — M54.12 BRACHIAL NEURITIS OR RADICULITIS NOS: ICD-10-CM

## 2017-02-13 DIAGNOSIS — M50.30 DEGENERATION OF CERVICAL INTERVERTEBRAL DISC: ICD-10-CM

## 2017-02-13 DIAGNOSIS — M48.02 SPINAL STENOSIS IN CERVICAL REGION: Primary | ICD-10-CM

## 2017-02-13 DIAGNOSIS — R29.898 RIGHT HAND WEAKNESS: ICD-10-CM

## 2017-02-13 DIAGNOSIS — M50.00 HNP (HERNIATED NUCLEUS PULPOSUS) WITH MYELOPATHY, CERVICAL: ICD-10-CM

## 2017-02-13 DIAGNOSIS — R29.898 RIGHT ARM WEAKNESS: ICD-10-CM

## 2017-02-13 DIAGNOSIS — M54.12 CERVICAL RADICULAR PAIN: ICD-10-CM

## 2017-02-13 DIAGNOSIS — M79.601 ARM PAIN, RIGHT: ICD-10-CM

## 2017-02-13 DIAGNOSIS — G89.4 CHRONIC PAIN SYNDROME: ICD-10-CM

## 2017-02-13 DIAGNOSIS — R26.81 GAIT INSTABILITY: ICD-10-CM

## 2017-02-13 DIAGNOSIS — M75.101 ROTATOR CUFF SYNDROME OF RIGHT SHOULDER: ICD-10-CM

## 2017-02-13 PROCEDURE — 99499 UNLISTED E&M SERVICE: CPT | Mod: S$GLB,,, | Performed by: PHYSICAL MEDICINE & REHABILITATION

## 2017-02-13 PROCEDURE — 1159F MED LIST DOCD IN RCRD: CPT | Mod: S$GLB,,, | Performed by: PHYSICAL MEDICINE & REHABILITATION

## 2017-02-13 PROCEDURE — 99999 PR PBB SHADOW E&M-EST. PATIENT-LVL III: CPT | Mod: PBBFAC,,, | Performed by: PHYSICAL MEDICINE & REHABILITATION

## 2017-02-13 PROCEDURE — 1160F RVW MEDS BY RX/DR IN RCRD: CPT | Mod: S$GLB,,, | Performed by: PHYSICAL MEDICINE & REHABILITATION

## 2017-02-13 PROCEDURE — 99214 OFFICE O/P EST MOD 30 MIN: CPT | Mod: S$GLB,,, | Performed by: PHYSICAL MEDICINE & REHABILITATION

## 2017-02-13 PROCEDURE — 3078F DIAST BP <80 MM HG: CPT | Mod: S$GLB,,, | Performed by: PHYSICAL MEDICINE & REHABILITATION

## 2017-02-13 PROCEDURE — 1157F ADVNC CARE PLAN IN RCRD: CPT | Mod: S$GLB,,, | Performed by: PHYSICAL MEDICINE & REHABILITATION

## 2017-02-13 PROCEDURE — 3074F SYST BP LT 130 MM HG: CPT | Mod: S$GLB,,, | Performed by: PHYSICAL MEDICINE & REHABILITATION

## 2017-02-13 RX ORDER — HYDROCODONE BITARTRATE AND ACETAMINOPHEN 10; 325 MG/1; MG/1
1 TABLET ORAL EVERY 8 HOURS PRN
Qty: 90 TABLET | Refills: 0 | Status: SHIPPED | OUTPATIENT
Start: 2017-03-13 | End: 2017-02-16 | Stop reason: SDUPTHER

## 2017-02-13 RX ORDER — HYDROCODONE BITARTRATE AND ACETAMINOPHEN 10; 325 MG/1; MG/1
1 TABLET ORAL EVERY 8 HOURS PRN
Qty: 90 TABLET | Refills: 0 | Status: SHIPPED | OUTPATIENT
Start: 2017-04-13 | End: 2017-02-16 | Stop reason: SDUPTHER

## 2017-02-13 RX ORDER — HYDROCODONE BITARTRATE AND ACETAMINOPHEN 10; 325 MG/1; MG/1
1 TABLET ORAL EVERY 8 HOURS PRN
Qty: 90 TABLET | Refills: 0 | Status: SHIPPED | OUTPATIENT
Start: 2017-02-13 | End: 2017-03-15

## 2017-02-13 NOTE — PROGRESS NOTES
Subjective:       Patient ID: Xander Sanchez is a 66 y.o. male.    Chief Complaint: Extremity Weakness and Arm Pain    Extremity Weakness    Numbness: Right arm paina nd numbness.   Arm Pain    Pertinent negatives include no chest pain. Numbness: Right arm paina nd numbness.   Neck Pain    Pertinent negatives include no chest pain or headaches. Numbness: Right arm paina nd numbness. Weakness: right arm weak.      Mr Sanchez is Right handed male,who returns to clinic for right arm pain, and weakness that worsened after second neck surgery with Dr. Meyer.   Third surgery with Dr. De Dois did not changed his symptoms.   He states that all symptoms stayed the same as before surgery.   He reports some improvement in pain, but the tightness, cold sensation in right arm is same as before.  Mr. Sanchez returns to clinic for chronic pain management.  Last clinic visit was on 11/11/16. .   Current right arm pain is 3, average pain is 3-4, worst pain is 7.   He has not that much of neck pain, current neck pain is 1, worst pain maybe 2.   He complains mainly about tightness, pressure in right arm, in right arm , more in forearm, than above elbow, tightness  runs down the arm to right thumb.   Right arm pain is constant, day and night, intensity changes, pain is worse during day time, does not awake him at night.   In morning feels tight, stiff, as pressure around the arm, also feels swollen and tight in hand, and feels cold at all time.   It hurts more bellow \the elbow than above elbow.   He reports that he does not have feelings, cannot write, does not know if things are going to drop out of his hand.   Sometimes squeezes too hard plastic cup.   Right hand is clumsy, getting smaller, hardly can dress, cannot button shirt.   He states that he is not able to dress. He states that before surgery he has had similar problems, but they just got worse after second surgery. It is swelling feeling, and tightness that borders him, not  that much pain.   Sometimes right arm feels cold, and cold weather affect him more,  Pain is better controled with Hydrocodone.   He went for CRESCENCIO, once before surgery and few time after surgery, total  > 3   Takes Hydrocodone 10/325 mg PO TID, that brings his pain down to tolerable 2.   He is now taking Neurontin 600 mg po QID, total 2400 mg /day, and tolerates it well.   Hydrocodone helps and brings pain down to 2, and Neurontin does not help much, in his opinion.   Also, takes Baclofen 10 mg po TID , for tightness in right arm.  Now, awaiting procedure, pain stimulator, that is a little complicated since he has AICD.  Here for follow up, re evaluation and conservative treatment, involved in chronic pain management with opiates.    Review of Systems   Constitutional: Negative for appetite change and fatigue.   Eyes: Negative for visual disturbance.   Respiratory: Negative for shortness of breath.    Cardiovascular: Negative for chest pain.   Gastrointestinal: Negative for constipation and diarrhea.   Genitourinary: Negative for dysuria, frequency and urgency.   Musculoskeletal: Positive for extremity weakness and neck pain. Negative for arthralgias, back pain, gait problem, joint swelling, myalgias and neck stiffness.   Neurological: Negative for dizziness, tremors and headaches. Weakness: right arm weak. Numbness: Right arm paina nd numbness.   Psychiatric/Behavioral: Negative for dysphoric mood.   All other systems reviewed and are negative.          Objective:      Physical Exam    GENERAL: The patient is alert, oriented, pleasant.   MUSCULOSKELETAL:   Gait: on wide basis, COG moved forward.   GENERAL: The patient is alert, oriented, pleasant.   HEENT; PERRLA  NECK: supple, minimaly webbed neck.   Cervical spine :  Minimally decreased AROM in cervical spine, flexion to 60, extension to 0,,   side bending and rotation to 20-25 degrees with pain.  + mild-moderate paravertebral cervical musculature tenderness on  Right.  + mild- moderate tenderness in upper trapezius muscles on Right.   Lumbar spine, full range of motion in all planes, flexion to 90 degrees , ext. 0.  Side bending and rotation to 25--30 degrees.   Straight leg raising Negative bilaterally.   Full range of motion in all joints x4 extremities.   Muscle strength 5/5 throughout x4 extremities.   No joint laxity throughout x4 extremities.   NEUROLOGIC: Cranial nerves II through XII intact.   Deep tendon reflexes is normal, +2 in the upper and lower extremities bilaterally.   Muscle tone is normal.   Sensory is intact to light touch and pinprick throughout x4 extremities.   Skin: no hypersensitivity, alodynia, no somatosensory changes, other than cold skin in right arm, no atrophic skin changes.        CT of Cervical spine showed:  Stable remote operative change right C3, C4 and C6 hemilaminectomies with metallic laminal plasty.  Operative change with C5/C6 anterior spinal fusion no evidence for hardware failure. with interval reduction of protruding disk at C5/C6.  continued truncation of the cervical spinal cord most pronounced at the C6 level with mild/moderate small caliber of the cord   concerning for myelomalacia stable.  Superimposed multilevel degenerative change most pronounced at C6/C7 with bulging disk resulting in mild central canal stenosis.   Please note there is additional degenerative change with mild neural foraminal stenosis C3/C4 and C4/C5 levels.    Assessment:       1. Spinal stenosis in cervical region    2. Right arm weakness    3. Cervical radiculopathy    4. Gait instability    5. Loss of coordination    6. Pain of right hand    7. Cervical stenosis of spine        Plan:         Spinal stenosis in cervical region  -     hydrocodone-acetaminophen 10-325mg (NORCO)  mg Tab; Take 1 tablet by mouth every 8 (eight) hours as needed (MSK pain).  Dispense: 90 tablet; Refill: 0  -     hydrocodone-acetaminophen 10-325mg (NORCO)  mg Tab;  Take 1 tablet by mouth every 8 (eight) hours as needed (MSK pain).  Dispense: 90 tablet; Refill: 0  -     hydrocodone-acetaminophen 10-325mg (NORCO)  mg Tab; Take 1 tablet by mouth every 8 (eight) hours as needed (MSK pain).  Dispense: 90 tablet; Refill: 0    Right arm weakness  -     hydrocodone-acetaminophen 10-325mg (NORCO)  mg Tab; Take 1 tablet by mouth every 8 (eight) hours as needed (MSK pain).  Dispense: 90 tablet; Refill: 0  -     hydrocodone-acetaminophen 10-325mg (NORCO)  mg Tab; Take 1 tablet by mouth every 8 (eight) hours as needed (MSK pain).  Dispense: 90 tablet; Refill: 0  -     hydrocodone-acetaminophen 10-325mg (NORCO)  mg Tab; Take 1 tablet by mouth every 8 (eight) hours as needed (MSK pain).  Dispense: 90 tablet; Refill: 0    Cervical radiculopathy  -     hydrocodone-acetaminophen 10-325mg (NORCO)  mg Tab; Take 1 tablet by mouth every 8 (eight) hours as needed (MSK pain).  Dispense: 90 tablet; Refill: 0  -     hydrocodone-acetaminophen 10-325mg (NORCO)  mg Tab; Take 1 tablet by mouth every 8 (eight) hours as needed (MSK pain).  Dispense: 90 tablet; Refill: 0  -     hydrocodone-acetaminophen 10-325mg (NORCO)  mg Tab; Take 1 tablet by mouth every 8 (eight) hours as needed (MSK pain).  Dispense: 90 tablet; Refill: 0    Gait instability  -     hydrocodone-acetaminophen 10-325mg (NORCO)  mg Tab; Take 1 tablet by mouth every 8 (eight) hours as needed (MSK pain).  Dispense: 90 tablet; Refill: 0  -     hydrocodone-acetaminophen 10-325mg (NORCO)  mg Tab; Take 1 tablet by mouth every 8 (eight) hours as needed (MSK pain).  Dispense: 90 tablet; Refill: 0  -     hydrocodone-acetaminophen 10-325mg (NORCO)  mg Tab; Take 1 tablet by mouth every 8 (eight) hours as needed (MSK pain).  Dispense: 90 tablet; Refill: 0    Loss of coordination  -     hydrocodone-acetaminophen 10-325mg (NORCO)  mg Tab; Take 1 tablet by mouth every 8 (eight) hours as  needed (MSK pain).  Dispense: 90 tablet; Refill: 0  -     hydrocodone-acetaminophen 10-325mg (NORCO)  mg Tab; Take 1 tablet by mouth every 8 (eight) hours as needed (MSK pain).  Dispense: 90 tablet; Refill: 0  -     hydrocodone-acetaminophen 10-325mg (NORCO)  mg Tab; Take 1 tablet by mouth every 8 (eight) hours as needed (MSK pain).  Dispense: 90 tablet; Refill: 0    Pain of right hand  -     hydrocodone-acetaminophen 10-325mg (NORCO)  mg Tab; Take 1 tablet by mouth every 8 (eight) hours as needed (MSK pain).  Dispense: 90 tablet; Refill: 0  -     hydrocodone-acetaminophen 10-325mg (NORCO)  mg Tab; Take 1 tablet by mouth every 8 (eight) hours as needed (MSK pain).  Dispense: 90 tablet; Refill: 0  -     hydrocodone-acetaminophen 10-325mg (NORCO)  mg Tab; Take 1 tablet by mouth every 8 (eight) hours as needed (MSK pain).  Dispense: 90 tablet; Refill: 0    Cervical stenosis of spine  -     hydrocodone-acetaminophen 10-325mg (NORCO)  mg Tab; Take 1 tablet by mouth every 8 (eight) hours as needed (MSK pain).  Dispense: 90 tablet; Refill: 0  -     hydrocodone-acetaminophen 10-325mg (NORCO)  mg Tab; Take 1 tablet by mouth every 8 (eight) hours as needed (MSK pain).  Dispense: 90 tablet; Refill: 0  -     hydrocodone-acetaminophen 10-325mg (NORCO)  mg Tab; Take 1 tablet by mouth every 8 (eight) hours as needed (MSK pain).  Dispense: 90 tablet; Refill: 0    Brachial neuritis or radiculitis NOS  -     hydrocodone-acetaminophen 10-325mg (NORCO)  mg Tab; Take 1 tablet by mouth every 8 (eight) hours as needed (MSK pain).  Dispense: 90 tablet; Refill: 0  -     hydrocodone-acetaminophen 10-325mg (NORCO)  mg Tab; Take 1 tablet by mouth every 8 (eight) hours as needed (MSK pain).  Dispense: 90 tablet; Refill: 0  -     hydrocodone-acetaminophen 10-325mg (NORCO)  mg Tab; Take 1 tablet by mouth every 8 (eight) hours as needed (MSK pain).  Dispense: 90 tablet; Refill:  0    Chronic pain syndrome  -     hydrocodone-acetaminophen 10-325mg (NORCO)  mg Tab; Take 1 tablet by mouth every 8 (eight) hours as needed (MSK pain).  Dispense: 90 tablet; Refill: 0  -     hydrocodone-acetaminophen 10-325mg (NORCO)  mg Tab; Take 1 tablet by mouth every 8 (eight) hours as needed (MSK pain).  Dispense: 90 tablet; Refill: 0  -     hydrocodone-acetaminophen 10-325mg (NORCO)  mg Tab; Take 1 tablet by mouth every 8 (eight) hours as needed (MSK pain).  Dispense: 90 tablet; Refill: 0    HNP (herniated nucleus pulposus) with myelopathy, cervical  -     hydrocodone-acetaminophen 10-325mg (NORCO)  mg Tab; Take 1 tablet by mouth every 8 (eight) hours as needed (MSK pain).  Dispense: 90 tablet; Refill: 0  -     hydrocodone-acetaminophen 10-325mg (NORCO)  mg Tab; Take 1 tablet by mouth every 8 (eight) hours as needed (MSK pain).  Dispense: 90 tablet; Refill: 0  -     hydrocodone-acetaminophen 10-325mg (NORCO)  mg Tab; Take 1 tablet by mouth every 8 (eight) hours as needed (MSK pain).  Dispense: 90 tablet; Refill: 0    Degeneration of cervical intervertebral disc  -     hydrocodone-acetaminophen 10-325mg (NORCO)  mg Tab; Take 1 tablet by mouth every 8 (eight) hours as needed (MSK pain).  Dispense: 90 tablet; Refill: 0  -     hydrocodone-acetaminophen 10-325mg (NORCO)  mg Tab; Take 1 tablet by mouth every 8 (eight) hours as needed (MSK pain).  Dispense: 90 tablet; Refill: 0  -     hydrocodone-acetaminophen 10-325mg (NORCO)  mg Tab; Take 1 tablet by mouth every 8 (eight) hours as needed (MSK pain).  Dispense: 90 tablet; Refill: 0    Complex regional pain syndrome type 1, affecting unspecified site  -     hydrocodone-acetaminophen 10-325mg (NORCO)  mg Tab; Take 1 tablet by mouth every 8 (eight) hours as needed (MSK pain).  Dispense: 90 tablet; Refill: 0  -     hydrocodone-acetaminophen 10-325mg (NORCO)  mg Tab; Take 1 tablet by mouth every 8 (eight)  hours as needed (MSK pain).  Dispense: 90 tablet; Refill: 0  -     hydrocodone-acetaminophen 10-325mg (NORCO)  mg Tab; Take 1 tablet by mouth every 8 (eight) hours as needed (MSK pain).  Dispense: 90 tablet; Refill: 0    Narcotic dependency, continuous  -     hydrocodone-acetaminophen 10-325mg (NORCO)  mg Tab; Take 1 tablet by mouth every 8 (eight) hours as needed (MSK pain).  Dispense: 90 tablet; Refill: 0  -     hydrocodone-acetaminophen 10-325mg (NORCO)  mg Tab; Take 1 tablet by mouth every 8 (eight) hours as needed (MSK pain).  Dispense: 90 tablet; Refill: 0  -     hydrocodone-acetaminophen 10-325mg (NORCO)  mg Tab; Take 1 tablet by mouth every 8 (eight) hours as needed (MSK pain).  Dispense: 90 tablet; Refill: 0    Muscle right arm weakness  -     hydrocodone-acetaminophen 10-325mg (NORCO)  mg Tab; Take 1 tablet by mouth every 8 (eight) hours as needed (MSK pain).  Dispense: 90 tablet; Refill: 0  -     hydrocodone-acetaminophen 10-325mg (NORCO)  mg Tab; Take 1 tablet by mouth every 8 (eight) hours as needed (MSK pain).  Dispense: 90 tablet; Refill: 0  -     hydrocodone-acetaminophen 10-325mg (NORCO)  mg Tab; Take 1 tablet by mouth every 8 (eight) hours as needed (MSK pain).  Dispense: 90 tablet; Refill: 0    Rotator cuff syndrome of right shoulder  -     hydrocodone-acetaminophen 10-325mg (NORCO)  mg Tab; Take 1 tablet by mouth every 8 (eight) hours as needed (MSK pain).  Dispense: 90 tablet; Refill: 0  -     hydrocodone-acetaminophen 10-325mg (NORCO)  mg Tab; Take 1 tablet by mouth every 8 (eight) hours as needed (MSK pain).  Dispense: 90 tablet; Refill: 0  -     hydrocodone-acetaminophen 10-325mg (NORCO)  mg Tab; Take 1 tablet by mouth every 8 (eight) hours as needed (MSK pain).  Dispense: 90 tablet; Refill: 0    Arm pain, right  -     hydrocodone-acetaminophen 10-325mg (NORCO)  mg Tab; Take 1 tablet by mouth every 8 (eight) hours as needed  (MSK pain).  Dispense: 90 tablet; Refill: 0  -     hydrocodone-acetaminophen 10-325mg (NORCO)  mg Tab; Take 1 tablet by mouth every 8 (eight) hours as needed (MSK pain).  Dispense: 90 tablet; Refill: 0  -     hydrocodone-acetaminophen 10-325mg (NORCO)  mg Tab; Take 1 tablet by mouth every 8 (eight) hours as needed (MSK pain).  Dispense: 90 tablet; Refill: 0    Right arm pain  -     hydrocodone-acetaminophen 10-325mg (NORCO)  mg Tab; Take 1 tablet by mouth every 8 (eight) hours as needed (MSK pain).  Dispense: 90 tablet; Refill: 0  -     hydrocodone-acetaminophen 10-325mg (NORCO)  mg Tab; Take 1 tablet by mouth every 8 (eight) hours as needed (MSK pain).  Dispense: 90 tablet; Refill: 0  -     hydrocodone-acetaminophen 10-325mg (NORCO)  mg Tab; Take 1 tablet by mouth every 8 (eight) hours as needed (MSK pain).  Dispense: 90 tablet; Refill: 0    Right hand weakness  -     hydrocodone-acetaminophen 10-325mg (NORCO)  mg Tab; Take 1 tablet by mouth every 8 (eight) hours as needed (MSK pain).  Dispense: 90 tablet; Refill: 0  -     hydrocodone-acetaminophen 10-325mg (NORCO)  mg Tab; Take 1 tablet by mouth every 8 (eight) hours as needed (MSK pain).  Dispense: 90 tablet; Refill: 0  -     hydrocodone-acetaminophen 10-325mg (NORCO)  mg Tab; Take 1 tablet by mouth every 8 (eight) hours as needed (MSK pain).  Dispense: 90 tablet; Refill: 0    Cervicalgia  -     hydrocodone-acetaminophen 10-325mg (NORCO)  mg Tab; Take 1 tablet by mouth every 8 (eight) hours as needed (MSK pain).  Dispense: 90 tablet; Refill: 0  -     hydrocodone-acetaminophen 10-325mg (NORCO)  mg Tab; Take 1 tablet by mouth every 8 (eight) hours as needed (MSK pain).  Dispense: 90 tablet; Refill: 0  -     hydrocodone-acetaminophen 10-325mg (NORCO)  mg Tab; Take 1 tablet by mouth every 8 (eight) hours as needed (MSK pain).  Dispense: 90 tablet; Refill: 0    Cervical radicular pain  -      hydrocodone-acetaminophen 10-325mg (NORCO)  mg Tab; Take 1 tablet by mouth every 8 (eight) hours as needed (MSK pain).  Dispense: 90 tablet; Refill: 0  -     hydrocodone-acetaminophen 10-325mg (NORCO)  mg Tab; Take 1 tablet by mouth every 8 (eight) hours as needed (MSK pain).  Dispense: 90 tablet; Refill: 0  -     hydrocodone-acetaminophen 10-325mg (NORCO)  mg Tab; Take 1 tablet by mouth every 8 (eight) hours as needed (MSK pain).  Dispense: 90 tablet; Refill: 0      Patient with cervical myelopathy, with tightness and pain is Right UE, s/p Cervical fusion, here for chronic pain management.   Awaiting SCS.    1. Pain management : Gabapentin , increased to 600 mg po QID, and Hydrocodone 10/325 mg PO TID ( #90 tabs,2 refills).  2. Spasticity, will resume Baclofen 10 mg PO TID.    RTC  In 3 months.    Total time spent face to face with patient was 25 minutes.     More than 50% of that time was spent in counseling on diagnosis , prognosis and treatment options.   I also caunsel patient  on common and most usual side effect of prescribed medications.   Risk and benefits of opiates, possible risk of developing opiate dependence and tolerance, need of strict compliance with prescribed medications.  I reviewed Primary care , and other specialty's notes to better coordinate patient's  care.   All questions were answered, and patient voiced understanding.

## 2017-02-13 NOTE — MR AVS SNAPSHOT
[facility]  [address]  [facility] [address]  Phone: [phone]                  Xander Sanchez   2017 8:00 AM   Office Visit    Description:  Male : 1950   Provider:  Sara Ruiz MD   Department:  [facility]           Reason for Visit     Extremity Weakness     Arm Pain           Diagnoses this Visit        Comments    Spinal stenosis in cervical region    -  Primary     Right arm weakness         Cervical radiculopathy         Gait instability         Loss of coordination         Pain of right hand         Cervical stenosis of spine         Brachial neuritis or radiculitis NOS         Chronic pain syndrome         HNP (herniated nucleus pulposus) with myelopathy, cervical         Degeneration of cervical intervertebral disc         Complex regional pain syndrome type 1, affecting unspecified site         Narcotic dependency, continuous         Muscle right arm weakness         Rotator cuff syndrome of right shoulder         Arm pain, right         Right arm pain         Right hand weakness         Cervicalgia         Cervical radicular pain                To Do List           Future Appointments        Provider Department Dept Phone    2017 8:00 AM MD Kadeem Cruz [facility] Urology 4th Floor [phone]    2017 1:40 PM Williams Alvarenga MD [facility] Family Medicine [phone]    4/3/2017 7:30 AM Scott Garcia DPM [facility] Podiatry [phone]    2017 8:00 AM HOME MONITOR DEVICE CHECK, [facility] - Arrhythmia [phone]    2017 8:20 AM Graham Buenrostro MD [facility] Urology 4th Floor [phone]      Goals (5 Years of Data)     None      Follow-Up and Disposition     Return in about 3 months (around 2017).       These Medications        Disp Refills Start End    hydrocodone-acetaminophen 10-325mg (NORCO)  mg Tab 90 tablet 0 2017 3/15/2017    Take 1 tablet by mouth every 8  (eight) hours as needed (MSK pain). - Oral    Pharmacy: [facility] - [facility] [address] [facility] #: [phone]       hydrocodone-acetaminophen 10-325mg (NORCO)  mg Tab 90 tablet 0 3/13/2017 4/12/2017    Take 1 tablet by mouth every 8 (eight) hours as needed (MSK pain). - Oral    Pharmacy: [facility] - [facility] [address] [facility] [address] [facility] Ph #: [phone]       hydrocodone-acetaminophen 10-325mg (NORCO)  mg Tab 90 tablet 0 4/13/2017 5/13/2017    Take 1 tablet by mouth every 8 (eight) hours as needed (MSK pain). - Oral    Pharmacy: [facility] - [facility] [address] [facility] #: [phone]         [facility] On Call     [facility] On Call Nurse Care Line - 24/7 Assistance  Registered nurses in the [facility] Call Center provide clinical advisement, health education, appointment booking, and other advisory services.  Call for this free service at [phone].             Medications           Message regarding Medications     Verify the changes and/or additions to your medication regime listed below are the same as discussed with your clinician today.  If any of these changes or additions are incorrect, please notify your healthcare provider.        START taking these NEW medications        Refills    hydrocodone-acetaminophen 10-325mg (NORCO)  mg Tab 0    Starting on: 3/13/2017    Sig: Take 1 tablet by mouth every 8 (eight) hours as needed (MSK pain).    Class: Print    Route: Oral    hydrocodone-acetaminophen 10-325mg (NORCO)  mg Tab 0    Starting on: 4/13/2017    Sig: Take 1 tablet by mouth every 8 (eight) hours as needed (MSK pain).    Class: Print    Route: Oral           Verify that the below list of medications is an accurate representation of the medications you are currently taking.  If none reported, the list may be blank. If incorrect, please contact your healthcare provider. Carry this list with you in case  "of emergency.           Current Medications     ACCU-CHEK SOFTCLIX LANCETS Misc     amlodipine (NORVASC) 5 MG tablet Take 1 tablet (5 mg total) by mouth once daily.    aspirin (ECOTRIN) 81 MG EC tablet Take 81 mg by mouth once daily.    baclofen (LIORESAL) 10 MG tablet Take 1 tablet (10 mg total) by mouth 3 (three) times daily.    ciprofloxacin HCl (CIPRO) 500 MG tablet Take 1 tablet (500 mg total) by mouth 2 (two) times daily.    CONTOUR NEXT STRIPS Strp     fluticasone-salmeterol 230-21 mcg/dose (ADVAIR HFA) 230-21 mcg/actuation HFAA inhaler Inhale 2 puffs into the lungs 2 (two) times daily.    gabapentin (NEURONTIN) 600 MG tablet Take 1 tablet (600 mg total) by mouth 4 (four) times daily with meals and nightly.    hydrocodone-acetaminophen 10-325mg (NORCO)  mg Tab Take 1 tablet by mouth every 8 (eight) hours as needed (MSK pain).    hydrocodone-acetaminophen 10-325mg (NORCO)  mg Tab Starting on Mar 13, 2017. Take 1 tablet by mouth every 8 (eight) hours as needed (MSK pain).    hydrocodone-acetaminophen 10-325mg (NORCO)  mg Tab Starting on Apr 13, 2017. Take 1 tablet by mouth every 8 (eight) hours as needed (MSK pain).    lisinopril (PRINIVIL,ZESTRIL) 5 MG tablet Take 2 tablets (10 mg total) by mouth every morning.    oxybutynin (DITROPAN) 5 MG Tab Take 1 tablet (5 mg total) by mouth 3 (three) times daily.    oxycodone-acetaminophen (PERCOCET) 5-325 mg per tablet Take 1 tablet by mouth every 4 (four) hours as needed for Pain.    polyethylene glycol (GLYCOLAX) 17 gram PwPk Take 17 g by mouth once daily.    pravastatin (PRAVACHOL) 10 MG tablet TAKE ONE TABLET BY MOUTH AT BEDTIME    sulfamethoxazole-trimethoprim 800-160mg (BACTRIM DS) 800-160 mg Tab Take 1 tablet by mouth 2 (two) times daily.    tamsulosin (FLOMAX) 0.4 mg Cp24 Take 1 capsule (0.4 mg total) by mouth once daily.           Clinical Reference Information           Your Vitals Were     BP Pulse Height Weight BMI    103/66 105 5' 11" " (1.803 m) 86.6 kg (191 lb) 26.64 kg/m2      Blood Pressure          Most Recent Value    BP  103/66      Allergies as of 2/13/2017     No Known Allergies      Immunizations Administered on Date of Encounter - 2/13/2017     None      Language Assistance Services     ATTENTION: Language assistance services are available, free of charge. Please call [phone].      ATENCIÓN: Si habla español, tiene a cope disposición servicios gratuitos de asistencia lingüística. Llame al [phone].     CHÚ Ý: N?u b?n nói Ti?ng Vi?t, có các d?ch v? h? tr? ngôn ng? mi?n phí dành cho b?n. G?i s? [phone].         [facility] complies with applicable Federal civil rights laws and does not discriminate on the basis of race, color, national origin, age, disability, or sex.

## 2017-02-14 ENCOUNTER — OFFICE VISIT (OUTPATIENT)
Dept: UROLOGY | Facility: CLINIC | Age: 67
End: 2017-02-14
Payer: MEDICARE

## 2017-02-14 VITALS
BODY MASS INDEX: 27 KG/M2 | HEART RATE: 96 BPM | DIASTOLIC BLOOD PRESSURE: 79 MMHG | HEIGHT: 71 IN | SYSTOLIC BLOOD PRESSURE: 131 MMHG | WEIGHT: 192.88 LBS

## 2017-02-14 DIAGNOSIS — R30.0 DYSURIA: Primary | ICD-10-CM

## 2017-02-14 DIAGNOSIS — N32.81 OAB (OVERACTIVE BLADDER): ICD-10-CM

## 2017-02-14 DIAGNOSIS — R31.0 HEMATURIA, GROSS: ICD-10-CM

## 2017-02-14 DIAGNOSIS — Z97.8 FOLEY CATHETER IN PLACE: ICD-10-CM

## 2017-02-14 DIAGNOSIS — Z98.890 POST-OPERATIVE STATE: ICD-10-CM

## 2017-02-14 DIAGNOSIS — N40.1 BENIGN NON-NODULAR PROSTATIC HYPERPLASIA WITH LOWER URINARY TRACT SYMPTOMS: ICD-10-CM

## 2017-02-14 PROCEDURE — 1160F RVW MEDS BY RX/DR IN RCRD: CPT | Mod: S$GLB,,, | Performed by: UROLOGY

## 2017-02-14 PROCEDURE — 87086 URINE CULTURE/COLONY COUNT: CPT

## 2017-02-14 PROCEDURE — 87088 URINE BACTERIA CULTURE: CPT

## 2017-02-14 PROCEDURE — 1157F ADVNC CARE PLAN IN RCRD: CPT | Mod: S$GLB,,, | Performed by: UROLOGY

## 2017-02-14 PROCEDURE — 87077 CULTURE AEROBIC IDENTIFY: CPT

## 2017-02-14 PROCEDURE — 99024 POSTOP FOLLOW-UP VISIT: CPT | Mod: S$GLB,,, | Performed by: UROLOGY

## 2017-02-14 PROCEDURE — 87186 SC STD MICRODIL/AGAR DIL: CPT

## 2017-02-14 PROCEDURE — 3078F DIAST BP <80 MM HG: CPT | Mod: S$GLB,,, | Performed by: UROLOGY

## 2017-02-14 PROCEDURE — 3075F SYST BP GE 130 - 139MM HG: CPT | Mod: S$GLB,,, | Performed by: UROLOGY

## 2017-02-14 PROCEDURE — 1126F AMNT PAIN NOTED NONE PRSNT: CPT | Mod: S$GLB,,, | Performed by: UROLOGY

## 2017-02-14 PROCEDURE — 99999 PR PBB SHADOW E&M-EST. PATIENT-LVL III: CPT | Mod: PBBFAC,,, | Performed by: UROLOGY

## 2017-02-14 PROCEDURE — 1159F MED LIST DOCD IN RCRD: CPT | Mod: S$GLB,,, | Performed by: UROLOGY

## 2017-02-14 RX ORDER — PHENAZOPYRIDINE HYDROCHLORIDE 95 MG/1
95 TABLET ORAL 3 TIMES DAILY PRN
Qty: 30 TABLET | Refills: 3 | Status: SHIPPED | OUTPATIENT
Start: 2017-02-14 | End: 2017-02-24

## 2017-02-14 RX ORDER — OXYBUTYNIN CHLORIDE 5 MG/1
5 TABLET ORAL 3 TIMES DAILY
Qty: 270 TABLET | Refills: 3 | Status: SHIPPED | OUTPATIENT
Start: 2017-02-14 | End: 2017-12-19

## 2017-02-14 RX ORDER — OXYBUTYNIN CHLORIDE 5 MG/1
5 TABLET ORAL 3 TIMES DAILY
Qty: 30 TABLET | Refills: 3 | Status: SHIPPED | OUTPATIENT
Start: 2017-02-14 | End: 2017-02-14 | Stop reason: SDUPTHER

## 2017-02-14 RX ORDER — TAMSULOSIN HYDROCHLORIDE 0.4 MG/1
1 CAPSULE ORAL DAILY
Qty: 30 CAPSULE | Refills: 3 | Status: SHIPPED | OUTPATIENT
Start: 2017-02-14 | End: 2017-11-06 | Stop reason: SDUPTHER

## 2017-02-14 NOTE — PROGRESS NOTES
CC: voiding trial    Xander Sanchez is here for voiding trial following laser TURP on 2/1/17.  Failed to void well after voiding trial last week.  Thus a mcpherson catheter was replaced on 2/9/17.  Minimal PVR was noted. But due to his bladder spasm, the catheter was left in.    He reports that there was no problem with catheter drainage. Clear urine reported.  He has been taking flomax, oxybutynin, and abx.    Review of Systems    General ROS: GENERAL:  No weight gain or loss  SKIN:  No rashes or lacerations  HEAD:  No headaches  EYES:  No exophthalmos, jaundice or blindness  EARS:  No dizziness, tinnitus or hearing loss  NOSE:  No changes in smell  MOUTH & THROAT:  No dyskinetic movements or obvious goiter  CHEST:  No shortness of breath, hyperventilation or cough  CARDIOVASCULAR:  No tachycardia or chest pain  ABDOMEN:  No nausea, vomiting, pain, constipation or diarrhea  URINARY:  No frequency, dysuria or sexual dysfunction  ENDOCRINE:  No polydipsia, polyuria  MUSCULOSKELETAL:  No pain or stiffness of the joints  NEUROLOGIC:  No weakness, sensory changes, seizures, confusion, memory loss, tremor or other abnormal movements    Physical Exam     Vitals:    02/14/17 0807   BP: 131/79   Pulse: 96     General Appearance:  Alert, cooperative, no distress, appears stated age   Head:  Normocephalic, without obvious abnormality, atraumatic   Eyes:  PERRL, conjunctiva/corneas clear, EOM's intact, fundi benign, both eyes   Ears:  Normal TM's and external ear canals, both ears   Nose: Nares normal, septum midline, mucosa normal, no drainage or sinus tenderness   Throat: Lips, mucosa, and tongue normal; teeth and gums normal   Neck: Supple, symmetrical, trachea midline, no adenopathy, thyroid: not enlarged, symmetric, no tenderness/mass/nodules, no carotid bruit or JVD   Back:   Symmetric, no curvature, ROM normal, no CVA tenderness   Lungs:   Clear to auscultation bilaterally, respirations unlabored   Chest Wall:  No  tenderness or deformity   Heart:  Regular rate and rhythm, S1, S2 normal, no murmur, rub or gallop   Abdomen:   Soft, non-tender, bowel sounds active all four quadrants,  no masses, no organomegaly   Genitalia:  Normal male, normal testicles, normal epididymis, normal vas palpated.   Rectal:  Normal tone, no masses or tenderness;   Extremities: Extremities normal, atraumatic, no cyanosis or edema   Pulses: 2+ and symmetric   Skin: Skin color, texture, turgor normal, no rashes or lesions   Lymph nodes: Cervical, supraclavicular, and axillary nodes normal   Neurologic: Normal       LABS:  Lab Results   Component Value Date    PSA 1.2 02/01/2016    PSA 0.66 03/28/2014    PSA 0.67 03/13/2013     Lab Results   Component Value Date    CREATININE 0.9 02/04/2017    CREATININE 0.9 11/29/2016    CREATININE 0.8 08/17/2016     No results found for: TESTOSTERONE  Urine Culture, Routine   Date Value Ref Range Status   02/09/2017 No growth  Final     Procedure  Voiding trial.  Unable to tolerate bladder filling due to bladder spasm.  Potter catheter removed without difficulty.  Pt was asked to drink plenty of water and void to the urinal.    When he returned later to the clinic, he voided over 200 cc slight bloody urine later without difficulty.    Assessment and Plan:  Diagnoses and all orders for this visit:    Dysuria  -     phenazopyridine (AZO) 95 MG tablet; Take 1 tablet (95 mg total) by mouth 3 (three) times daily as needed for Pain.  -     Urine culture    Benign non-nodular prostatic hyperplasia with lower urinary tract symptoms  -     tamsulosin (FLOMAX) 0.4 mg Cp24; Take 1 capsule (0.4 mg total) by mouth once daily.    OAB (overactive bladder)  -     Discontinue: oxybutynin (DITROPAN) 5 MG Tab; Take 1 tablet (5 mg total) by mouth 3 (three) times daily.  -     oxybutynin (DITROPAN) 5 MG Tab; Take 1 tablet (5 mg total) by mouth 3 (three) times daily.    Post-operative state  -     Discontinue: oxybutynin (DITROPAN) 5 MG  Tab; Take 1 tablet (5 mg total) by mouth 3 (three) times daily.  -     oxybutynin (DITROPAN) 5 MG Tab; Take 1 tablet (5 mg total) by mouth 3 (three) times daily.    Potter catheter in place  -     Discontinue: oxybutynin (DITROPAN) 5 MG Tab; Take 1 tablet (5 mg total) by mouth 3 (three) times daily.  -     oxybutynin (DITROPAN) 5 MG Tab; Take 1 tablet (5 mg total) by mouth 3 (three) times daily.    Hematuria, gross  -     Discontinue: oxybutynin (DITROPAN) 5 MG Tab; Take 1 tablet (5 mg total) by mouth 3 (three) times daily.  -     Urine culture  -     oxybutynin (DITROPAN) 5 MG Tab; Take 1 tablet (5 mg total) by mouth 3 (three) times daily.    try Azo to minimize dysuria.  Continue ditropan to minimize bladder spasm.  Continue flomax for now.  Will see him in 3 months for follow up.  I spent 25 minutes with the patient of which more than half was spent in direct consultation with the patient in regards to our treatment and plan.      Follow-up:  Return in about 3 months (around 5/14/2017).

## 2017-02-14 NOTE — MR AVS SNAPSHOT
[facility] 4th Floor  [address]  [facility] [address]  Phone: [phone]                  Xander Sanchez   2017 8:00 AM   Office Visit    Description:  Male : 1950   Provider:  Graham Buenrostro MD   Department:  [facility] 4th Floor           Diagnoses this Visit        Comments    Dysuria    -  Primary     Benign non-nodular prostatic hyperplasia with lower urinary tract symptoms         OAB (overactive bladder)         Post-operative state         Potter catheter in place         Hematuria, gross                To Do List           Future Appointments        Provider Department Dept Phone    2017 1:40 PM Williams Alvarenga MD [facility] [phone]    4/3/2017 7:30 AM Scott Garcia DPM [facility] [phone]    2017 8:00 AM HOME MONITOR DEVICE CHECK, [facility] [phone]    2017 8:20 AM Graham Buenrostro MD [facility] 4th Floor [phone]    2017 8:40 AM Sara Ruiz MD [facility] [phone]      Goals (5 Years of Data)     None      Follow-Up and Disposition     Return in about 3 months (around 2017).       These Medications        Disp Refills Start End    phenazopyridine (AZO) 95 MG tablet 30 tablet 3 2017    Take 1 tablet (95 mg total) by mouth 3 (three) times daily as needed for Pain. - Oral    Pharmacy: [facility] - [facility] [address] Ph #: [phone]       tamsulosin (FLOMAX) 0.4 mg Cp24 30 capsule 3 2017     Take 1 capsule (0.4 mg total) by mouth once daily. - Oral    Pharmacy: [facility] - [facility] [address] Ph #: [phone]       oxybutynin (DITROPAN) 5 MG Tab 30 tablet 3 2017 5/15/2017    Take 1 tablet (5 mg total) by mouth 3 (three) times daily. - Oral    Pharmacy: [facility] - [facility] [address] Ph #:  [phone]         [facility]     [facility] Line - 24/7 Assistance  Registered nurses in the [facility] provide clinical advisement, health education, appointment booking, and other advisory services.  Call for this free service at [phone].             Medications           Message regarding Medications     Verify the changes and/or additions to your medication regime listed below are the same as discussed with your clinician today.  If any of these changes or additions are incorrect, please notify your healthcare provider.        START taking these NEW medications        Refills    phenazopyridine (AZO) 95 MG tablet 3    Sig: Take 1 tablet (95 mg total) by mouth 3 (three) times daily as needed for Pain.    Class: Print    Route: Oral           Verify that the below list of medications is an accurate representation of the medications you are currently taking.  If none reported, the list may be blank. If incorrect, please contact your healthcare provider. Carry this list with you in case of emergency.           Current Medications     ACCU-CHEK SOFTCLIX LANCETS Misc     amlodipine (NORVASC) 5 MG tablet Take 1 tablet (5 mg total) by mouth once daily.    aspirin (ECOTRIN) 81 MG EC tablet Take 81 mg by mouth once daily.    baclofen (LIORESAL) 10 MG tablet Take 1 tablet (10 mg total) by mouth 3 (three) times daily.    ciprofloxacin HCl (CIPRO) 500 MG tablet Take 1 tablet (500 mg total) by mouth 2 (two) times daily.    CONTOUR NEXT STRIPS Strp     hydrocodone-acetaminophen 10-325mg (NORCO)  mg Tab Take 1 tablet by mouth every 8 (eight) hours as needed ([facility] pain).    hydrocodone-acetaminophen 10-325mg (NORCO)  mg Tab Starting on Mar 13, 2017. Take 1 tablet by mouth every 8 (eight) hours as needed ([facility] pain).    hydrocodone-acetaminophen 10-325mg (NORCO)  mg Tab Starting on Apr 13, 2017. Take 1 tablet by mouth every 8 (eight) hours as needed ([facility] pain).     "lisinopril (PRINIVIL,ZESTRIL) 5 MG tablet Take 2 tablets (10 mg total) by mouth every morning.    oxybutynin (DITROPAN) 5 MG Tab Take 1 tablet (5 mg total) by mouth 3 (three) times daily.    oxycodone-acetaminophen (PERCOCET) 5-325 mg per tablet Take 1 tablet by mouth every 4 (four) hours as needed for Pain.    polyethylene glycol (GLYCOLAX) 17 gram PwPk Take 17 g by mouth once daily.    pravastatin (PRAVACHOL) 10 MG tablet TAKE ONE TABLET BY MOUTH AT BEDTIME    sulfamethoxazole-trimethoprim 800-160mg (BACTRIM DS) 800-160 mg Tab Take 1 tablet by mouth 2 (two) times daily.    tamsulosin (FLOMAX) 0.4 mg Cp24 Take 1 capsule (0.4 mg total) by mouth once daily.    gabapentin (NEURONTIN) 600 MG tablet Take 1 tablet (600 mg total) by mouth 4 (four) times daily with meals and nightly.    phenazopyridine (AZO) 95 MG tablet Take 1 tablet (95 mg total) by mouth 3 (three) times daily as needed for Pain.           Clinical Reference Information           Your Vitals Were     BP Pulse Height Weight BMI    131/79 (BP Location: Left arm, Patient Position: Sitting, BP Method: Automatic) 96 5' 11" (1.803 m) 87.5 kg (192 lb 14.4 oz) 26.9 kg/m2      Blood Pressure          Most Recent Value    BP  131/79      Allergies as of 2/14/2017     No Known Allergies      Immunizations Administered on Date of Encounter - 2/14/2017     None      Orders Placed During Today's Visit      Normal Orders This Visit    Urine culture       Language Assistance Services     ATTENTION: Language assistance services are available, free of charge. Please call [phone].      ATENCIÓN: Si habla español, tiene a cope disposición servicios gratuitos de asistencia lingüística. Llame al [phone].     JACKIE Ý: N?u b?n nói Ti?ng Vi?t, có các d?ch v? h? tr? ngôn ng? mi?n phí dành cho b?n. G?i s? [phone].         [facility] complies with applicable Federal civil rights laws and does not discriminate on the basis of race, color, national " origin, age, disability, or sex.

## 2017-02-16 ENCOUNTER — HOSPITAL ENCOUNTER (EMERGENCY)
Facility: HOSPITAL | Age: 67
Discharge: HOME | End: 2017-02-16
Attending: EMERGENCY MEDICINE
Payer: MEDICARE

## 2017-02-16 VITALS
WEIGHT: 191 LBS | TEMPERATURE: 98 F | HEIGHT: 71 IN | OXYGEN SATURATION: 98 % | SYSTOLIC BLOOD PRESSURE: 122 MMHG | HEART RATE: 89 BPM | RESPIRATION RATE: 15 BRPM | DIASTOLIC BLOOD PRESSURE: 82 MMHG | BODY MASS INDEX: 26.74 KG/M2

## 2017-02-16 DIAGNOSIS — T78.3XXA ANGIOEDEMA OF LIPS, INITIAL ENCOUNTER: Primary | ICD-10-CM

## 2017-02-16 PROCEDURE — 96374 THER/PROPH/DIAG INJ IV PUSH: CPT

## 2017-02-16 PROCEDURE — 99284 EMERGENCY DEPT VISIT MOD MDM: CPT | Mod: 25

## 2017-02-16 PROCEDURE — 25000003 PHARM REV CODE 250: Performed by: EMERGENCY MEDICINE

## 2017-02-16 PROCEDURE — 63600175 PHARM REV CODE 636 W HCPCS: Performed by: EMERGENCY MEDICINE

## 2017-02-16 PROCEDURE — 96375 TX/PRO/DX INJ NEW DRUG ADDON: CPT

## 2017-02-16 RX ORDER — DIPHENHYDRAMINE HCL 50 MG
50 CAPSULE ORAL EVERY 6 HOURS PRN
Qty: 20 CAPSULE | Refills: 0 | Status: SHIPPED | OUTPATIENT
Start: 2017-02-16 | End: 2018-02-26

## 2017-02-16 RX ORDER — FAMOTIDINE 10 MG/ML
20 INJECTION INTRAVENOUS ONCE
Status: COMPLETED | OUTPATIENT
Start: 2017-02-16 | End: 2017-02-16

## 2017-02-16 RX ORDER — DIPHENHYDRAMINE HYDROCHLORIDE 50 MG/ML
50 INJECTION INTRAMUSCULAR; INTRAVENOUS
Status: COMPLETED | OUTPATIENT
Start: 2017-02-16 | End: 2017-02-16

## 2017-02-16 RX ORDER — PREDNISONE 20 MG/1
20 TABLET ORAL 2 TIMES DAILY
Qty: 6 TABLET | Refills: 0 | Status: SHIPPED | OUTPATIENT
Start: 2017-02-16 | End: 2017-02-19

## 2017-02-16 RX ORDER — FAMOTIDINE 20 MG/1
20 TABLET, FILM COATED ORAL 2 TIMES DAILY
Qty: 20 TABLET | Refills: 0 | Status: SHIPPED | OUTPATIENT
Start: 2017-02-16 | End: 2018-02-06

## 2017-02-16 RX ORDER — METHYLPREDNISOLONE SOD SUCC 125 MG
125 VIAL (EA) INJECTION
Status: COMPLETED | OUTPATIENT
Start: 2017-02-16 | End: 2017-02-16

## 2017-02-16 RX ADMIN — METHYLPREDNISOLONE SODIUM SUCCINATE 125 MG: 125 INJECTION, POWDER, FOR SOLUTION INTRAMUSCULAR; INTRAVENOUS at 03:02

## 2017-02-16 RX ADMIN — DIPHENHYDRAMINE HYDROCHLORIDE 50 MG: 50 INJECTION, SOLUTION INTRAMUSCULAR; INTRAVENOUS at 03:02

## 2017-02-16 RX ADMIN — FAMOTIDINE 20 MG: 10 INJECTION, SOLUTION INTRAVENOUS at 03:02

## 2017-02-16 NOTE — DISCHARGE INSTRUCTIONS
Angioedema  Angioedema (OT-owo-oo-eh-JOHNNIE-muh) is a sudden appearance of swollen patches (edema) on the skin or mucous membranes. It most often involves the face, lips, mouth, tongue, back of throat, or vocal cords. It may also occur in other places, such as the arms or legs. A rash may also appear during the first 4 days of this illness.  Symptoms  There are different types of angioedema. Your symptoms will depend on what type of angioedema you have. Swelling and redness may be the main symptoms. Like allergic reactions, angioedema may include:  · Rash, hives, redness, welts, blisters  · Itching, burning, stinging, pain  · Dry, flaky, cracking, or scaly skin  · Swelling of the face, lips, or other parts of the body  More severe symptoms may include:  · Trouble swallowing, or feeling like your throat is closing  · Trouble breathing or wheezing  · Hoarse voice or trouble speaking  · Nausea, vomiting, diarrhea, or stomach cramps  · Feeling faint or lightheaded, rapid heart rate, or low blood pressure  Causes  Angioedema can be triggered by exposure to certain substances. Medical conditions involving the immune systems and certain infections may cause it. In rare cases, angioedema can be hereditary. Sometimes the cause may be very clear. However, it is often hard to find a cause. The most common causes include:  · Foods, such as shrimp, shellfish, peanuts, milk products, gluten, and eggs; also colorings, flavorings, and additives  · Insect bites or stings, from bees, mosquitos, fleas, or ticks  · Medicines, such as ACE inhibitors, penicillin, sulfa drugs, amoxicillin, aspirin, and ibuprofen  · Latex, which may be in gloves, clothes, toys, balloons, and some kinds of tape. People who are allergic to latex may have problems with foods such as bananas, avocados, kiwi, papaya, or chestnuts.  · Stress  · Heat, cold, or sunlight  The most common cause of angioedema is a reaction to a class of medicines called ACE  inhibitors. These are used to treat high blood pressure. ACE inhibitors include captopril, enalapril, and lisinopril. Tell your doctor if you have angioedema symptoms and are taking any of these medicines. Stop taking your lisinopril. Call your doctor today.  Angioedema may recur. It is important to watch for the earliest signs of this condition (see the list below). Contact your healthcare provider right away if swelling involves the face, mouth, or throat.  Home care  Rest quietly today. Avoid vigorous physical activity.  Medicines: The healthcare provider may prescribe medicines for itching, swelling, or pain. Follow the healthcare providers instructions when taking these medicines.  · Oral diphenhydramine is an antihistamine available without a prescription. Unless a prescription antihistamine was given, diphenhydramine may be used to reduce widespread itching. It may make you sleepy, so be careful using it when going to school, working, or driving. (Note: Do not use diphenhydramine if you have glaucoma or if you are a man who has trouble urinating due to an enlarged prostate.) Loratadine is an antihistamine that may cause less drowsiness.  · Do not use diphenhydramine cream on your skin. Some people can have an allergic reaction to this.  · Calamine lotion or oatmeal baths sometimes help with itching.  · You may use acetaminophen or ibuprofen for pain, unless another pain medicine was prescribed.  · If you were told that your angioedema was caused by a medicine you are taking, you must stop taking it. Ask your healthcare provider for a different one. In the future, advise medical staff that you are allergic to this medicine.  · If medicine was prescribed to treat angioedema (such as steroids or antihistamines), be sure you understand what the medicine is and how to take it. Then, take it as directed.  Follow-up care  Follow up with your healthcare provider, or as advised.  Call 911  Contact emergency services  right away if any of these occur:  · Trouble breathing or swallowing, or wheezing  · Hoarse voice or trouble speaking  · Chest pain  · Confusion  · Extreme drowsiness or trouble awakening  · Fainting or loss of consciousness  · Rapid heart rate  · Vomiting blood, or large amounts of blood in stool  · Seizure  When to seek medical attention  Call your healthcare provider right away if any of the following occur:  · Increase in swelling of the lips, mouth, or tongue  · Severe abdominal pain  Date Last Reviewed: 7/20/2015 © 2000-2016 [employer]. [address] [place] [address]. All rights reserved. This information is not intended as a substitute for professional medical care. Always follow your healthcare professional's instructions.

## 2017-02-16 NOTE — ED PROVIDER NOTES
Encounter Date: 2/16/2017       History     Chief Complaint   Patient presents with    Angioedema     Reports swelling to upper and lower lip since yesterday afternoon. Presents awake, alert, oriented. Speech clear.      Review of patient's allergies indicates:  No Known Allergies  The history is provided by the patient.    He presents to the emergency department with swelling to his lips since this afternoon.  The patient has been taking lisinopril for a number of years and recently started Bactrim 5 days ago.  The patient had an episode of angioedema one year ago and his physician reduced his lisinopril from 3 times a day to 2 times a day.  Past Medical History   Diagnosis Date    Asthma     Cardiomyopathy     Cervical spondylosis with myelopathy 10/17/2012    Chronic bronchitis     Chronic systolic dysfunction of left ventricle 7/27/2015    Constipation 7/27/2015    COPD (chronic obstructive pulmonary disease)     Diabetes mellitus, type 2     DM type 2 (diabetes mellitus, type 2) 7/27/2015    Enlarged prostate 7/27/2015    Hypertension     ICD (implantable cardiac defibrillator) in place     Presence of biventricular AICD 7/27/2015    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thy 2/1/2016    Urinary tract infection      pt does not know     Past Medical History Pertinent Negatives   Diagnosis Date Noted    Acute leukemia 2/1/2016    Acute pancreatitis 2/1/2016    Alcohol dependence 2/1/2016    Alzheimer's disease 2/1/2016    Amblyopia 12/10/2015    Anemia 2/1/2016    Angina pectoris 2/1/2016    Anticoagulant long-term use 1/23/2015    Anxiety 2/1/2016    Aplasia bone marrow 2/1/2016    Atrial fibrillation 2/1/2016    Back pain 2/1/2016    Bipolar disorder 2/1/2016    Bladder cancer 2/1/2016    Bone cancer 2/1/2016    Bone marrow transplant status 2/1/2016    Brain cancer 2/1/2016    Breast cancer 2/1/2016    Cancer 1/23/2015    Cancer of lymphatic and hematopoietic tissue  2/1/2016    Cerebral palsy 2/1/2016    Cervical cancer 2/1/2016    CHF (congestive heart failure) 1/23/2015    Chronic hepatitis 2/1/2016    Cirrhosis 2/1/2016    Colon cancer 2/1/2016    Complications of reattached extremity 2/1/2016    Coronary artery disease 2/1/2016    Cystic fibrosis 2/1/2016    Dependence on renal dialysis 2/1/2016    Depression 2/1/2016    Diabetic retinopathy 12/10/2015    Difficult intubation 1/23/2015    Elevated PSA 4/6/2015    Emphysema of lung 2/1/2016    Encounter for blood transfusion 1/23/2015    Endometrial cancer 2/1/2016    Esophageal cancer 2/1/2016    Fallopian tube cancer, carcinoma 2/1/2016    Fibromyalgia 2/1/2016    Gastrostomy status 2/1/2016    General anesthetics causing adverse effect in therapeutic use 1/23/2015    Glaucoma 12/10/2015    Glomerulonephritis 2/1/2016    Heart transplanted 2/1/2016    Hemolytic anemia 2/1/2016    Hepatitis B 2/1/2016    Hepatitis C 2/1/2016    HIV infection 2/1/2016    Hyperlipidemia 2/1/2016    Hypotension, iatrogenic 1/23/2015    Hypothyroidism 2/1/2016    Immune deficiency disorder 2/1/2016    Immune disorder 2/1/2016    Inflammatory bowel disease 2/1/2016    Interstitial nephritis chronic 2/1/2016    Intracranial hemorrhage 2/1/2016    Kidney stone 4/6/2015    Late complications of amputation stump 2/1/2016    Leukemia 2/1/2016    Liver cancer 2/1/2016    Liver transplanted 2/1/2016    Lung cancer 2/1/2016    Lung transplanted 2/1/2016    Macular degeneration 12/10/2015    Malignant hyperthermia 1/23/2015    Malnutrition 2/1/2016    Melanoma 2/1/2016    Multiple sclerosis 2/1/2016    Myocardial infarction 2/1/2016    Neoplasm of lip 2/1/2016    Obesity 2/1/2016    Osteoporosis 2/1/2016    Ovarian cancer 2/1/2016    Pancreatic cancer 2/1/2016    Pancreatic disease 2/1/2016    Paranoia 2/1/2016    Parkinson disease 2/1/2016    Peripheral vascular disease 2/1/2016    Phantom limb  syndrome 2/1/2016    Pneumonia 2/1/2016    Pneumonia due to other staphylococcus 2/1/2016    Polyneuropathy 2/1/2016    PONV (postoperative nausea and vomiting) 1/23/2015    Prostate cancer 2/1/2016    Pulmonary embolism 2/1/2016    Rectal cancer 2/1/2016    Renal cancer 2/1/2016    Renal manifestation of secondary diabetes mellitus 2/1/2016    Respiratory distress 1/23/2015    Respiratory failure 2/1/2016    Retinal detachment 12/10/2015    Rheumatoid arthritis 2/1/2016    Schizoaffective disorder 2/1/2016    Seizures 1/23/2015    Septic shock 2/1/2016    Sickle cell anemia 12/10/2015    Sickle cell trait 12/10/2015    Skin ulcer 2/1/2016    Sleep apnea 2/1/2016    Small intestine cancer 2/1/2016    Spinal cord disease 2/1/2016    Squamous cell carcinoma 2/1/2016    STD (sexually transmitted disease) 4/6/2015    Stomach cancer 2/1/2016    Strabismus 12/10/2015    Stroke 1/23/2015    Testicular cancer 2/1/2016    Thyroid cancer 2/1/2016    Thyroid disease 1/23/2015    Tobacco dependence 2/1/2016    Transfusion reaction 1/23/2015    Trouble in sleeping 2/1/2016    Type 2 diabetes with peripheral circulatory disorder, controlled 2/1/2016    Urinary incontinence 2/1/2016    Uterine cancer 2/1/2016    Uveitis 12/10/2015    Vaginal cancer 2/1/2016    Vulvar cancer 2/1/2016     Past Surgical History   Procedure Laterality Date    Cervical spine surgery      Cardiac defibrillator placement      Spine surgery       Family History   Problem Relation Age of Onset    Hypertension Mother     Hypertension Father     COPD Father     No Known Problems Sister     No Known Problems Brother     No Known Problems Daughter     Prostate cancer Neg Hx     Kidney disease Neg Hx      Social History   Substance Use Topics    Smoking status: Former Smoker     Packs/day: 1.00     Years: 40.00     Types: Cigarettes     Quit date: 7/26/2009    Smokeless tobacco: Never Used    Alcohol use 1.8  oz/week     3 Cans of beer per week     Review of Systems   Constitutional: Negative for fever.   HENT: Negative for sore throat.    Respiratory: Negative for shortness of breath.    Cardiovascular: Negative for chest pain.   Gastrointestinal: Negative for nausea.   Genitourinary: Negative for dysuria.   Musculoskeletal: Negative for back pain.   Skin: Negative for rash.   Neurological: Negative for weakness.   Hematological: Does not bruise/bleed easily.       Physical Exam   Initial Vitals   BP Pulse Resp Temp SpO2   02/16/17 0235 02/16/17 0235 02/16/17 0235 02/16/17 0235 02/16/17 0235   132/67 95 16 97.6 °F (36.4 °C) 95 %     Physical Exam    Nursing note and vitals reviewed.  Constitutional: He appears well-developed and well-nourished.   HENT:   Head: Normocephalic and atraumatic.   Eyes: EOM are normal. Pupils are equal, round, and reactive to light.   Neck: Normal range of motion. Neck supple.   Cardiovascular: Normal rate, regular rhythm, normal heart sounds and intact distal pulses.   Pulmonary/Chest: Breath sounds normal.   Abdominal: Soft. Bowel sounds are normal. He exhibits no distension. There is no tenderness. There is no rebound and no guarding.   Musculoskeletal: Normal range of motion. He exhibits no edema.   Neurological: He is alert and oriented to person, place, and time.   Skin: Skin is warm and dry.   Psychiatric: He has a normal mood and affect. His behavior is normal. Judgment and thought content normal.         ED Course   Procedures  Labs Reviewed - No data to display          Medical Decision Making:   ED Management:  The patient was given Benadryl Solu-Medrol and Pepcid                   ED Course     Clinical Impression:   The encounter diagnosis was Angioedema of lips, initial encounter.          Jazmín Rubio MD  02/16/17 0552

## 2017-02-16 NOTE — ED AVS SNAPSHOT
[facility]  [address]  [place] [address]               Xander Sanchez   2017  2:36 AM   ED    Description:  Male : 1950   Department:  [facility]           Your Care was Coordinated By:     Provider Role From To    Jazmín Rubio MD Attending Provider 17 0236 --      Reason for Visit     Angioedema           Diagnoses this Visit        Comments    Angioedema of lips, initial encounter    -  Primary       ED Disposition     ED Disposition Condition Comment    Discharge             To Do List           Follow-up Information     Follow up with Williams Alvarenga MD. Schedule an appointment as soon as possible for a visit today.    Specialty:  Family Medicine    Contact information:     [facility]  [place] [address]  [phone]         These Medications        Disp Refills Start End    predniSONE (DELTASONE) 20 MG tablet 6 tablet 0 2017    Take 1 tablet (20 mg total) by mouth 2 (two) times daily. - Oral    Pharmacy: [facility] - [address] Ph #: [phone]       diphenhydrAMINE (BENADRYL) 50 MG capsule 20 capsule 0 2017     Take 1 capsule (50 mg total) by mouth every 6 (six) hours as needed for Itching or Allergies (swelling). - Oral    Pharmacy: [facility] - [address] Ph #: [phone]       famotidine (PEPCID) 20 MG tablet 20 tablet 0 2017    Take 1 tablet (20 mg total) by mouth 2 (two) times daily. - Oral    Pharmacy: [facility] - [address] Ph #: [phone]         [facility]     [facility] -  Assistance  Registered nurses in the [facility] provide clinical advisement, health education, appointment booking, and other advisory services.  Call for this free service at [phone].             Medications            Message regarding Medications     Verify the changes and/or additions to your medication regime listed below are the same as discussed with your clinician today.  If any of these changes or additions are incorrect, please notify your healthcare provider.        START taking these NEW medications        Refills    predniSONE (DELTASONE) 20 MG tablet 0    Sig: Take 1 tablet (20 mg total) by mouth 2 (two) times daily.    Class: Print    Route: Oral    diphenhydrAMINE (BENADRYL) 50 MG capsule 0    Sig: Take 1 capsule (50 mg total) by mouth every 6 (six) hours as needed for Itching or Allergies (swelling).    Class: Print    Route: Oral    famotidine (PEPCID) 20 MG tablet 0    Sig: Take 1 tablet (20 mg total) by mouth 2 (two) times daily.    Class: Print    Route: Oral      These medications were administered today        Dose Freq    diphenhydrAMINE injection 50 mg 50 mg ED 1 Time    Sig: Inject 1 mL (50 mg total) into the vein ED 1 Time.    Class: Normal    Route: Intravenous    methylPREDNISolone sodium succinate injection 125 mg 125 mg ED 1 Time    Sig: Inject 125 mg into the vein ED 1 Time.    Class: Normal    Route: Intravenous    famotidine (PF) 20 mg/2 mL injection 20 mg 20 mg Once    Sig: Inject 2 mLs (20 mg total) into the vein once.    Class: Normal    Route: Intravenous      STOP taking these medications     ciprofloxacin HCl (CIPRO) 500 MG tablet Take 1 tablet (500 mg total) by mouth 2 (two) times daily.    gabapentin (NEURONTIN) 600 MG tablet Take 1 tablet (600 mg total) by mouth 4 (four) times daily with meals and nightly.    oxycodone-acetaminophen (PERCOCET) 5-325 mg per tablet Take 1 tablet by mouth every 4 (four) hours as needed for Pain.           Verify that the below list of medications is an accurate representation of the medications you are currently taking.  If none reported, the list may be blank. If incorrect, please contact your healthcare provider. Carry this list with you in case  "of emergency.           Current Medications     ACCU-CHEK SOFTCLIX LANCETS Misc     amlodipine (NORVASC) 5 MG tablet Take 1 tablet (5 mg total) by mouth once daily.    aspirin (ECOTRIN) 81 MG EC tablet Take 81 mg by mouth once daily.    baclofen (LIORESAL) 10 MG tablet Take 1 tablet (10 mg total) by mouth 3 (three) times daily.    CONTOUR NEXT STRIPS Strp     diphenhydrAMINE (BENADRYL) 50 MG capsule Take 1 capsule (50 mg total) by mouth every 6 (six) hours as needed for Itching or Allergies (swelling).    famotidine (PEPCID) 20 MG tablet Take 1 tablet (20 mg total) by mouth 2 (two) times daily.    hydrocodone-acetaminophen 10-325mg (NORCO)  mg Tab Take 1 tablet by mouth every 8 (eight) hours as needed (MSK pain).    lisinopril (PRINIVIL,ZESTRIL) 5 MG tablet Take 2 tablets (10 mg total) by mouth every morning.    oxybutynin (DITROPAN) 5 MG Tab Take 1 tablet (5 mg total) by mouth 3 (three) times daily.    phenazopyridine (AZO) 95 MG tablet Take 1 tablet (95 mg total) by mouth 3 (three) times daily as needed for Pain.    polyethylene glycol (GLYCOLAX) 17 gram PwPk Take 17 g by mouth once daily.    pravastatin (PRAVACHOL) 10 MG tablet TAKE ONE TABLET BY MOUTH AT BEDTIME    predniSONE (DELTASONE) 20 MG tablet Take 1 tablet (20 mg total) by mouth 2 (two) times daily.    sulfamethoxazole-trimethoprim 800-160mg (BACTRIM DS) 800-160 mg Tab Take 1 tablet by mouth 2 (two) times daily.    tamsulosin (FLOMAX) 0.4 mg Cp24 Take 1 capsule (0.4 mg total) by mouth once daily.           Clinical Reference Information           Your Vitals Were     BP Pulse Temp Resp Height Weight    122/82 89 98.2 °F (36.8 °C) (Oral) 15 5' 11" (1.803 m) 86.6 kg (191 lb)    SpO2 BMI             98% 26.64 kg/m2         Allergies as of 2/16/2017     No Known Allergies      Immunizations Administered on Date of Encounter - 2/16/2017     None      ED Micro, Lab, POCT     None      ED Imaging Orders     None        Discharge Instructions     "     Angioedema  Angioedema (DN-ben-yx-eh-JOHNNIE-muh) is a sudden appearance of swollen patches (edema) on the skin or mucous membranes. It most often involves the face, lips, mouth, tongue, back of throat, or vocal cords. It may also occur in other places, such as the arms or legs. A rash may also appear during the first 4 days of this illness.  Symptoms  There are different types of angioedema. Your symptoms will depend on what type of angioedema you have. Swelling and redness may be the main symptoms. Like allergic reactions, angioedema may include:  · Rash, hives, redness, welts, blisters  · Itching, burning, stinging, pain  · Dry, flaky, cracking, or scaly skin  · Swelling of the face, lips, or other parts of the body  More severe symptoms may include:  · Trouble swallowing, or feeling like your throat is closing  · Trouble breathing or wheezing  · Hoarse voice or trouble speaking  · Nausea, vomiting, diarrhea, or stomach cramps  · Feeling faint or lightheaded, rapid heart rate, or low blood pressure  Causes  Angioedema can be triggered by exposure to certain substances. Medical conditions involving the immune systems and certain infections may cause it. In rare cases, angioedema can be hereditary. Sometimes the cause may be very clear. However, it is often hard to find a cause. The most common causes include:  · Foods, such as shrimp, shellfish, peanuts, milk products, gluten, and eggs; also colorings, flavorings, and additives  · Insect bites or stings, from bees, mosquitos, fleas, or ticks  · Medicines, such as ACE inhibitors, penicillin, sulfa drugs, amoxicillin, aspirin, and ibuprofen  · Latex, which may be in gloves, clothes, toys, balloons, and some kinds of tape. People who are allergic to latex may have problems with foods such as bananas, avocados, kiwi, papaya, or chestnuts.  · Stress  · Heat, cold, or sunlight  The most common cause of angioedema is a reaction to a class of medicines called ACE  inhibitors. These are used to treat high blood pressure. ACE inhibitors include captopril, enalapril, and lisinopril. Tell your doctor if you have angioedema symptoms and are taking any of these medicines. Stop taking your lisinopril. Call your doctor today.  Angioedema may recur. It is important to watch for the earliest signs of this condition (see the list below). Contact your healthcare provider right away if swelling involves the face, mouth, or throat.  Home care  Rest quietly today. Avoid vigorous physical activity.  Medicines: The healthcare provider may prescribe medicines for itching, swelling, or pain. Follow the healthcare providers instructions when taking these medicines.  · Oral diphenhydramine is an antihistamine available without a prescription. Unless a prescription antihistamine was given, diphenhydramine may be used to reduce widespread itching. It may make you sleepy, so be careful using it when going to school, working, or driving. (Note: Do not use diphenhydramine if you have glaucoma or if you are a man who has trouble urinating due to an enlarged prostate.) Loratadine is an antihistamine that may cause less drowsiness.  · Do not use diphenhydramine cream on your skin. Some people can have an allergic reaction to this.  · Calamine lotion or oatmeal baths sometimes help with itching.  · You may use acetaminophen or ibuprofen for pain, unless another pain medicine was prescribed.  · If you were told that your angioedema was caused by a medicine you are taking, you must stop taking it. Ask your healthcare provider for a different one. In the future, advise medical staff that you are allergic to this medicine.  · If medicine was prescribed to treat angioedema (such as steroids or antihistamines), be sure you understand what the medicine is and how to take it. Then, take it as directed.  Follow-up care  Follow up with your healthcare provider, or as advised.  Call 911  Contact emergency services  right away if any of these occur:  · Trouble breathing or swallowing, or wheezing  · Hoarse voice or trouble speaking  · Chest pain  · Confusion  · Extreme drowsiness or trouble awakening  · Fainting or loss of consciousness  · Rapid heart rate  · Vomiting blood, or large amounts of blood in stool  · Seizure  When to seek medical attention  Call your healthcare provider right away if any of the following occur:  · Increase in swelling of the lips, mouth, or tongue  · Severe abdominal pain  Date Last Reviewed: 7/20/2015 © 2000-2016 [employer]. [address] [place] [address]. All rights reserved. This information is not intended as a substitute for professional medical care. Always follow your healthcare professional's instructions.          Your Scheduled Appointments     Feb 27, 2017  1:40 PM CST   Established Patient Visit with Williams Alvarenga MD   [place] [facility]    [address]  [place] [address]   [phone]            Apr 03, 2017  7:30 AM CDT   Established Patient Visit with Scott Garcia DPM   [place] [facility] Podiat[facility]    [address]  [place] [address]   [phone]            Apr 24, 2017  8:00 AM CDT   Remote Interrogation with HOME MONITOR DEVICE CHECK, [facility]   [facility] ([facility] )    [address]  [place] [address] [phone]            May 02, 2017  8:20 AM CDT   Post OP with MD Kadeem Cruz - Urology 4th Floor ([facility] )    [address]  [place] [address] [phone]            May 12, 2017  8:40 AM CDT   Established Patient Visit with MD [facility]    [address]  [place] [address] [phone]              Smoking Cessation     If you would like to quit smoking:   You may be eligible for free services if you are a [place] resident and started smoking cigarettes before  September 1, 1988.  Call the Smoking Cessation Trust (SCT) toll free at (875) 074-8530 or (124) 662-1355.   Call 1-800-QUIT-NOW if you do not meet the above criteria.             [facility] complies with applicable Federal civil rights laws and does not discriminate on the basis of race, color, national origin, age, disability, or sex.        Language Assistance Services     ATTENTION: Language assistance services are available, free of charge. Please call [phone].      ATENCIÓN: Si habla español, tiene a cope disposición servicios gratuitos de asistencia lingüística. Llame al [phone].     CHÚ Ý: N?u b?n nói Ti?ng Vi?t, có các d?ch v? h? tr? ngôn ng? mi?n phí dành cho b?n. G?i s? [phone].

## 2017-02-16 NOTE — ED TRIAGE NOTES
Pt. C/o having swelling to lips that began around 4 pm yesterday after taking his Lisinopril. Resp. Even and non labored. Pt. Able to speak in clear sentences. Pt. Placed on cardiac monitor, BP and pulse oximetry. Monitor shows NSR and oxygen saturations are 96% on room air. Breath sounds clear bilaterally. Skin PWD.

## 2017-02-17 ENCOUNTER — TELEPHONE (OUTPATIENT)
Dept: UROLOGY | Facility: CLINIC | Age: 67
End: 2017-02-17

## 2017-02-17 DIAGNOSIS — N39.0 ACUTE UTI: Primary | ICD-10-CM

## 2017-02-17 LAB — BACTERIA UR CULT: NORMAL

## 2017-02-17 RX ORDER — DOXYCYCLINE 100 MG/1
100 CAPSULE ORAL 2 TIMES DAILY
Qty: 14 CAPSULE | Refills: 0 | Status: SHIPPED | OUTPATIENT
Start: 2017-02-17 | End: 2017-02-24

## 2017-02-17 NOTE — TELEPHONE ENCOUNTER
Please ask pt to switch his abx to doxycycline for his recent UTI.    Diagnoses and all orders for this visit:    Acute UTI  -     doxycycline (VIBRAMYCIN) 100 MG Cap; Take 1 capsule (100 mg total) by mouth 2 (two) times daily.    eRxed to the pharmacy.  Please call and advise the patient to take the medication as given.

## 2017-02-20 ENCOUNTER — TELEPHONE (OUTPATIENT)
Dept: UROLOGY | Facility: CLINIC | Age: 67
End: 2017-02-20

## 2017-02-27 ENCOUNTER — LAB VISIT (OUTPATIENT)
Dept: LAB | Facility: HOSPITAL | Age: 67
End: 2017-02-27
Attending: FAMILY MEDICINE
Payer: MEDICARE

## 2017-02-27 ENCOUNTER — OFFICE VISIT (OUTPATIENT)
Dept: FAMILY MEDICINE | Facility: CLINIC | Age: 67
End: 2017-02-27
Payer: MEDICARE

## 2017-02-27 VITALS
SYSTOLIC BLOOD PRESSURE: 122 MMHG | WEIGHT: 198.63 LBS | HEIGHT: 71 IN | HEART RATE: 84 BPM | DIASTOLIC BLOOD PRESSURE: 78 MMHG | BODY MASS INDEX: 27.81 KG/M2 | OXYGEN SATURATION: 95 %

## 2017-02-27 DIAGNOSIS — I10 ESSENTIAL HYPERTENSION: Primary | ICD-10-CM

## 2017-02-27 DIAGNOSIS — K29.50 CHRONIC GASTRITIS, PRESENCE OF BLEEDING UNSPECIFIED, UNSPECIFIED GASTRITIS TYPE: ICD-10-CM

## 2017-02-27 DIAGNOSIS — R10.11 RUQ ABDOMINAL PAIN: ICD-10-CM

## 2017-02-27 DIAGNOSIS — I10 ESSENTIAL HYPERTENSION: ICD-10-CM

## 2017-02-27 LAB
AMYLASE SERPL-CCNC: 122 U/L
ANION GAP SERPL CALC-SCNC: 8 MMOL/L
BACTERIA #/AREA URNS AUTO: ABNORMAL /HPF
BASOPHILS # BLD AUTO: 0.01 K/UL
BASOPHILS NFR BLD: 0.1 %
BILIRUB UR QL STRIP: NEGATIVE
BUN SERPL-MCNC: 10 MG/DL
CALCIUM SERPL-MCNC: 9 MG/DL
CHLORIDE SERPL-SCNC: 105 MMOL/L
CLARITY UR: ABNORMAL
CO2 SERPL-SCNC: 26 MMOL/L
COLOR UR: YELLOW
CREAT SERPL-MCNC: 0.9 MG/DL
DIFFERENTIAL METHOD: ABNORMAL
EOSINOPHIL # BLD AUTO: 0.3 K/UL
EOSINOPHIL NFR BLD: 3.5 %
ERYTHROCYTE [DISTWIDTH] IN BLOOD BY AUTOMATED COUNT: 12.9 %
EST. GFR  (AFRICAN AMERICAN): >60 ML/MIN/1.73 M^2
EST. GFR  (NON AFRICAN AMERICAN): >60 ML/MIN/1.73 M^2
GLUCOSE SERPL-MCNC: 134 MG/DL
GLUCOSE UR QL STRIP: NEGATIVE
HCT VFR BLD AUTO: 39.5 %
HGB BLD-MCNC: 12.5 G/DL
HGB UR QL STRIP: ABNORMAL
HYALINE CASTS UR QL AUTO: 1 /LPF
KETONES UR QL STRIP: NEGATIVE
LEUKOCYTE ESTERASE UR QL STRIP: ABNORMAL
LIPASE SERPL-CCNC: 26 U/L
LYMPHOCYTES # BLD AUTO: 3 K/UL
LYMPHOCYTES NFR BLD: 35.4 %
MCH RBC QN AUTO: 30 PG
MCHC RBC AUTO-ENTMCNC: 31.6 %
MCV RBC AUTO: 95 FL
MICROSCOPIC COMMENT: ABNORMAL
MONOCYTES # BLD AUTO: 0.6 K/UL
MONOCYTES NFR BLD: 6.5 %
NEUTROPHILS # BLD AUTO: 4.6 K/UL
NEUTROPHILS NFR BLD: 54 %
NITRITE UR QL STRIP: NEGATIVE
PH UR STRIP: 5 [PH] (ref 5–8)
PLATELET # BLD AUTO: 308 K/UL
PMV BLD AUTO: 10.4 FL
POTASSIUM SERPL-SCNC: 4.1 MMOL/L
PROT UR QL STRIP: ABNORMAL
RBC # BLD AUTO: 4.17 M/UL
RBC #/AREA URNS AUTO: 10 /HPF (ref 0–4)
SODIUM SERPL-SCNC: 139 MMOL/L
SP GR UR STRIP: 1.02 (ref 1–1.03)
SQUAMOUS #/AREA URNS AUTO: 1 /HPF
URN SPEC COLLECT METH UR: ABNORMAL
UROBILINOGEN UR STRIP-ACNC: NEGATIVE EU/DL
WBC # BLD AUTO: 8.58 K/UL
WBC #/AREA URNS AUTO: >100 /HPF (ref 0–5)
WBC CLUMPS UR QL AUTO: ABNORMAL
YEAST UR QL AUTO: ABNORMAL

## 2017-02-27 PROCEDURE — 3078F DIAST BP <80 MM HG: CPT | Mod: S$GLB,,, | Performed by: FAMILY MEDICINE

## 2017-02-27 PROCEDURE — 99214 OFFICE O/P EST MOD 30 MIN: CPT | Mod: S$GLB,,, | Performed by: FAMILY MEDICINE

## 2017-02-27 PROCEDURE — 1159F MED LIST DOCD IN RCRD: CPT | Mod: S$GLB,,, | Performed by: FAMILY MEDICINE

## 2017-02-27 PROCEDURE — 99499 UNLISTED E&M SERVICE: CPT | Mod: S$GLB,,, | Performed by: FAMILY MEDICINE

## 2017-02-27 PROCEDURE — 1160F RVW MEDS BY RX/DR IN RCRD: CPT | Mod: S$GLB,,, | Performed by: FAMILY MEDICINE

## 2017-02-27 PROCEDURE — 1157F ADVNC CARE PLAN IN RCRD: CPT | Mod: S$GLB,,, | Performed by: FAMILY MEDICINE

## 2017-02-27 PROCEDURE — 85025 COMPLETE CBC W/AUTO DIFF WBC: CPT

## 2017-02-27 PROCEDURE — 99999 PR PBB SHADOW E&M-EST. PATIENT-LVL IV: CPT | Mod: PBBFAC,,, | Performed by: FAMILY MEDICINE

## 2017-02-27 PROCEDURE — 36415 COLL VENOUS BLD VENIPUNCTURE: CPT | Mod: PO

## 2017-02-27 PROCEDURE — 3074F SYST BP LT 130 MM HG: CPT | Mod: S$GLB,,, | Performed by: FAMILY MEDICINE

## 2017-02-27 PROCEDURE — 82150 ASSAY OF AMYLASE: CPT

## 2017-02-27 PROCEDURE — 83690 ASSAY OF LIPASE: CPT

## 2017-02-27 PROCEDURE — 80048 BASIC METABOLIC PNL TOTAL CA: CPT

## 2017-02-27 PROCEDURE — 1125F AMNT PAIN NOTED PAIN PRSNT: CPT | Mod: S$GLB,,, | Performed by: FAMILY MEDICINE

## 2017-02-27 RX ORDER — PANTOPRAZOLE SODIUM 40 MG/1
40 TABLET, DELAYED RELEASE ORAL
Qty: 30 TABLET | Refills: 3 | Status: SHIPPED | OUTPATIENT
Start: 2017-02-27 | End: 2017-03-27 | Stop reason: SDUPTHER

## 2017-02-27 RX ORDER — GABAPENTIN 600 MG/1
TABLET ORAL
COMMUNITY
Start: 2017-02-20 | End: 2017-08-02 | Stop reason: SDUPTHER

## 2017-02-27 NOTE — PROGRESS NOTES
Subjective:       Patient ID: Xander Sanchez is a 66 y.o. male.    Chief Complaint: Abdominal Pain    HPI Comments: 66 years old male who came to the clinic with right upper quadrant abdominal pain for the last week.  Patient was previously diagnosed with gastritis and ulcers.  Patient using antiacid with no significant improvement.  The pain is 3 out of 10 of intensity on and off aggravated with palpation and better with rest.  Blood pressure today is stable.  No chest pain palpitations orthopnea or PND.    Abdominal Pain       Review of Systems   Constitutional: Negative.    HENT: Negative.    Eyes: Negative.    Respiratory: Negative.    Cardiovascular: Negative.  Negative for chest pain, palpitations and leg swelling.   Gastrointestinal: Positive for abdominal pain.   Endocrine: Negative for cold intolerance, heat intolerance, polydipsia, polyphagia and polyuria.   Genitourinary: Negative.    Musculoskeletal: Negative.    Skin: Negative.    Neurological: Negative.    Psychiatric/Behavioral: Negative.        Objective:      Physical Exam   Constitutional: He is oriented to person, place, and time. He appears well-developed and well-nourished. No distress.   HENT:   Head: Normocephalic and atraumatic.   Right Ear: External ear normal.   Left Ear: External ear normal.   Nose: Nose normal.   Mouth/Throat: Oropharynx is clear and moist. No oropharyngeal exudate.   Eyes: Conjunctivae and EOM are normal. Pupils are equal, round, and reactive to light. Right eye exhibits no discharge. Left eye exhibits no discharge. No scleral icterus.   Neck: Normal range of motion. Neck supple. No JVD present. No tracheal deviation present. No thyromegaly present.   Cardiovascular: Normal rate, regular rhythm, normal heart sounds and intact distal pulses.  Exam reveals no gallop and no friction rub.    No murmur heard.  Pulmonary/Chest: Effort normal and breath sounds normal. No stridor. No respiratory distress. He has no wheezes. He  has no rales. He exhibits no tenderness.   Abdominal: Soft. Bowel sounds are normal. He exhibits no distension and no mass. There is tenderness in the right upper quadrant. There is no rebound and no guarding.   Musculoskeletal: Normal range of motion. He exhibits no edema or tenderness.   Lymphadenopathy:     He has no cervical adenopathy.   Neurological: He is alert and oriented to person, place, and time. He has normal reflexes. He displays normal reflexes. No cranial nerve deficit. He exhibits normal muscle tone. Coordination normal.   Skin: Skin is warm and dry. No rash noted. He is not diaphoretic. No erythema. No pallor.   Psychiatric: He has a normal mood and affect. His behavior is normal. Judgment and thought content normal.   Nursing note and vitals reviewed.      Assessment:       1. Essential hypertension    2. RUQ abdominal pain    3. Chronic gastritis, presence of bleeding unspecified, unspecified gastritis type        Plan:         Xander was seen today for abdominal pain.    Diagnoses and all orders for this visit:    Essential hypertension  -     Basic metabolic panel; Future  -     CBC auto differential; Future  -     Urinalysis  -     Urinalysis; Future    RUQ abdominal pain  -     Basic metabolic panel; Future  -     CBC auto differential; Future  -     Urinalysis  -     Amylase; Future  -     Lipase; Future  -     Urinalysis; Future  -     US Abdomen Complete; Future    Chronic gastritis, presence of bleeding unspecified, unspecified gastritis type  -     pantoprazole (PROTONIX) 40 MG tablet; Take 1 tablet (40 mg total) by mouth before breakfast.    Continue monitoring blood pressure at home, low sodium diet.  Antireflux measures.

## 2017-02-27 NOTE — PATIENT INSTRUCTIONS
Abdominal Pain    Abdominal pain is pain in the stomach or belly area. Everyone has this pain from time to time. In many cases it goes away on its own. But abdominal pain can sometimes be due to a serious problem, such as appendicitis. So its important to know when to seek help.  Causes of abdominal pain  There are many possible causes of abdominal pain. Common causes in adults include:  · Constipation, diarrhea, or gas  · Stomach acid flowing back up into the esophagus (acid reflux or heartburn)  · Severe acid reflux, called GERD (gastroesophageal reflux disease)  · A sore in the lining of the stomach or small intestine (peptic ulcer)  · Inflammation of the gallbladder, liver, or pancreas  · Gallstones or kidney stones  · Appendicitis   · Intestinal blockage   · An internal organ pushing through a muscle or other tissue (hernia)  · Urinary tract infections  · In women, menstrual cramps, fibroids, or endometriosis  · Inflammation or infection of the intestines  Diagnosing the cause of abdominal pain  Your healthcare provider will do a physical exam help find the cause of your pain. If needed, tests will be ordered. Belly pain has many possible causes. So it can be hard to find the reason for your pain. Giving details about your pain can help. Tell your provider where and when you feel the pain, and what makes it better or worse. Also let your provider know if you have other symptoms such as:  · Fever  · Tiredness  · Upset stomach (nausea)  · Vomiting  · Changes in bathroom habits  Treating abdominal pain  Some causes of pain need emergency medical treatment right away. These include appendicitis or a bowel blockage. Other problems can be treated with rest, fluids, or medicines. Your healthcare provider can give you specific instructions for treatment or self-care based on what is causing your pain.  If you have vomiting or diarrhea, sip water or other clear fluids. When you are ready to eat solid foods again,  start with small amounts of easy-to-digest, low-fat foods. These include apple sauce, toast, or crackers.   When to seek medical care  Call 911 or go to the hospital right away if you:  · Cant pass stool and are vomiting  · Are vomiting blood or have bloody diarrhea or black, tarry diarrhea  · Have chest, neck, or shoulder pain  · Feel like you might pass out  · Have pain in your shoulder blades with nausea  · Have sudden, severe belly pain  · Have new, severe pain unlike any you have felt before  · Have a belly that is rigid, hard, and tender to touch  Call your healthcare provider if you have:  · Pain for more than 5 days  · Bloating for more than 2 days  · Diarrhea for more than 5 days  · A fever of 100.4°F (38.0°C) or higher, or as directed by your provider  · Pain that gets worse  · Weight loss for no reason  · Continued lack of appetite  · Blood in your stool  How to prevent abdominal pain  Here are some tips to help prevent abdominal pain:  · Eat smaller amounts of food at one time.  · Avoid greasy, fried, or other high-fat foods.  · Avoid foods that give you gas.  · Exercise regularly.  · Drink plenty of fluids.  To help prevent GERD symptoms:  · Quit smoking.  · Reduce alcohol and certain foods that increase stomach acid.  · Avoid aspirin and over-the-counter pain and fever medicines (NSAIDS or nonsteroidal anti-inflammatory drugs), if possible  · Lose extra weight.  · Finish eating at least 2 hours before you go to bed or lie down.  · Raise the head of your bed.  Date Last Reviewed: 7/1/2016  © 5457-5820 [employer]. [address] [place] [address], [place] [address]. All rights reserved. This information is not intended as a substitute for professional medical care. Always follow your healthcare professional's instructions.

## 2017-02-27 NOTE — MR AVS SNAPSHOT
[facility]   [place]  [place] [address]  Phone: [phone]  Fax: [phone]                  Xander Sanchez   2017 1:40 PM   Office Visit    Description:  Male : 1950   Provider:  Williams Alvarenga MD   Department:  [facility]           Reason for Visit     Abdominal Pain           Diagnoses this Visit        Comments    Essential hypertension    -  Primary     RUQ abdominal pain         Chronic gastritis, presence of bleeding unspecified, unspecified gastritis type                To Do List           Future Appointments        Provider Department Dept Phone    3/10/2017 8:45 AM [facility] 11 ALL [facility] [phone]    4/3/2017 7:30 AM Scott Garcia, SAYRA [facility] Podiatry [phone]    2017 8:00 AM HOME MONITOR DEVICE CHECK, [facility] - Arrhythmia [phone]    2017 8:20 AM Graham Buenrostro MD [facility] - Urology 4th Floor [phone]    2017 8:40 AM Sara Ruiz MD [facility]-Physical Med & Rehab [phone]      Goals (5 Years of Data)     None      Follow-Up and Disposition     Return in about 6 months (around 2017), or if symptoms worsen or fail to improve.       These Medications        Disp Refills Start End    pantoprazole (PROTONIX) 40 MG tablet 30 tablet 3 2017 3/29/2017    Take 1 tablet (40 mg total) by mouth before breakfast. - Oral    Pharmacy: [facility] [address] [place] [address] Ph #: [phone]         [facility] On Call     [facility] On [facility] Nurse Care Line -  Assistance  Registered nurses in the [facility] On [facility] Center provide clinical advisement, health education, appointment booking, and other advisory services.  Call for this free service at [phone].             Medications           Message regarding Medications     Verify the changes and/or additions to your medication regime listed below are the same as discussed with your  clinician today.  If any of these changes or additions are incorrect, please notify your healthcare provider.        START taking these NEW medications        Refills    pantoprazole (PROTONIX) 40 MG tablet 3    Sig: Take 1 tablet (40 mg total) by mouth before breakfast.    Class: Normal    Route: Oral           Verify that the below list of medications is an accurate representation of the medications you are currently taking.  If none reported, the list may be blank. If incorrect, please contact your healthcare provider. Carry this list with you in case of emergency.           Current Medications     ACCU-CHEK SOFTCLIX LANCETS Misc     amlodipine (NORVASC) 5 MG tablet Take 1 tablet (5 mg total) by mouth once daily.    aspirin (ECOTRIN) 81 MG EC tablet Take 81 mg by mouth once daily.    baclofen (LIORESAL) 10 MG tablet Take 1 tablet (10 mg total) by mouth 3 (three) times daily.    CONTOUR NEXT STRIPS Strp     diphenhydrAMINE (BENADRYL) 50 MG capsule Take 1 capsule (50 mg total) by mouth every 6 (six) hours as needed for Itching or Allergies (swelling).    famotidine (PEPCID) 20 MG tablet Take 1 tablet (20 mg total) by mouth 2 (two) times daily.    gabapentin (NEURONTIN) 600 MG tablet     hydrocodone-acetaminophen 10-325mg (NORCO)  mg Tab Take 1 tablet by mouth every 8 (eight) hours as needed (MSK pain).    oxybutynin (DITROPAN) 5 MG Tab Take 1 tablet (5 mg total) by mouth 3 (three) times daily.    polyethylene glycol (GLYCOLAX) 17 gram PwPk Take 17 g by mouth once daily.    pravastatin (PRAVACHOL) 10 MG tablet TAKE ONE TABLET BY MOUTH AT BEDTIME    tamsulosin (FLOMAX) 0.4 mg Cp24 Take 1 capsule (0.4 mg total) by mouth once daily.    lisinopril (PRINIVIL,ZESTRIL) 5 MG tablet Take 2 tablets (10 mg total) by mouth every morning.    pantoprazole (PROTONIX) 40 MG tablet Take 1 tablet (40 mg total) by mouth before breakfast.           Clinical Reference Information           Your Vitals Were     BP                    122/78 (BP Location: Left arm, Patient Position: Sitting, BP Method: Manual)           Blood Pressure          Most Recent Value    BP  122/78      Allergies as of 2/27/2017     No Known Allergies      Immunizations Administered on Date of Encounter - 2/27/2017     None      Orders Placed During Today's Visit      Normal Orders This Visit    Urinalysis     Future Labs/Procedures Expected by Expires    Amylase  2/27/2017 5/28/2017    Basic metabolic panel  2/27/2017 5/28/2017    CBC auto differential  2/27/2017 2/27/2018    Lipase  2/27/2017 5/28/2017    Urinalysis  2/27/2017 5/28/2017    US Abdomen Complete  2/27/2017 2/27/2018      Instructions      Abdominal Pain    Abdominal pain is pain in the stomach or belly area. Everyone has this pain from time to time. In many cases it goes away on its own. But abdominal pain can sometimes be due to a serious problem, such as appendicitis. So its important to know when to seek help.  Causes of abdominal pain  There are many possible causes of abdominal pain. Common causes in adults include:  · Constipation, diarrhea, or gas  · Stomach acid flowing back up into the esophagus (acid reflux or heartburn)  · Severe acid reflux, called GERD (gastroesophageal reflux disease)  · A sore in the lining of the stomach or small intestine (peptic ulcer)  · Inflammation of the gallbladder, liver, or pancreas  · Gallstones or kidney stones  · Appendicitis   · Intestinal blockage   · An internal organ pushing through a muscle or other tissue (hernia)  · Urinary tract infections  · In women, menstrual cramps, fibroids, or endometriosis  · Inflammation or infection of the intestines  Diagnosing the cause of abdominal pain  Your healthcare provider will do a physical exam help find the cause of your pain. If needed, tests will be ordered. Belly pain has many possible causes. So it can be hard to find the reason for your pain. Giving details about your pain can help. Tell your provider where  and when you feel the pain, and what makes it better or worse. Also let your provider know if you have other symptoms such as:  · Fever  · Tiredness  · Upset stomach (nausea)  · Vomiting  · Changes in bathroom habits  Treating abdominal pain  Some causes of pain need emergency medical treatment right away. These include appendicitis or a bowel blockage. Other problems can be treated with rest, fluids, or medicines. Your healthcare provider can give you specific instructions for treatment or self-care based on what is causing your pain.  If you have vomiting or diarrhea, sip water or other clear fluids. When you are ready to eat solid foods again, start with small amounts of easy-to-digest, low-fat foods. These include apple sauce, toast, or crackers.   When to seek medical care  Call 911 or go to the hospital right away if you:  · Cant pass stool and are vomiting  · Are vomiting blood or have bloody diarrhea or black, tarry diarrhea  · Have chest, neck, or shoulder pain  · Feel like you might pass out  · Have pain in your shoulder blades with nausea  · Have sudden, severe belly pain  · Have new, severe pain unlike any you have felt before  · Have a belly that is rigid, hard, and tender to touch  Call your healthcare provider if you have:  · Pain for more than 5 days  · Bloating for more than 2 days  · Diarrhea for more than 5 days  · A fever of 100.4°F (38.0°C) or higher, or as directed by your provider  · Pain that gets worse  · Weight loss for no reason  · Continued lack of appetite  · Blood in your stool  How to prevent abdominal pain  Here are some tips to help prevent abdominal pain:  · Eat smaller amounts of food at one time.  · Avoid greasy, fried, or other high-fat foods.  · Avoid foods that give you gas.  · Exercise regularly.  · Drink plenty of fluids.  To help prevent GERD symptoms:  · Quit smoking.  · Reduce alcohol and certain foods that increase stomach acid.  · Avoid aspirin and over-the-counter pain  and fever medicines (NSAIDS or nonsteroidal anti-inflammatory drugs), if possible  · Lose extra weight.  · Finish eating at least 2 hours before you go to bed or lie down.  · Raise the head of your bed.  Date Last Reviewed: 7/1/2016  © 3540-1602 [employer] [address] [place] [address]. All rights reserved. This information is not intended as a substitute for professional medical care. Always follow your healthcare professional's instructions.             Language Assistance Services     ATTENTION: Language assistance services are available, free of charge. Please call [phone].      ATENCIÓN: Si habla jazmine, tiene a cope disposición servicios gratuitos de asistencia lingüística. Llame al [phone].     CHÚ Ý: N?u b?n nói Ti?ng Vi?t, có các d?ch v? h? tr? ngôn ng? mi?n phí dành cho b?n. G?i s? [phone].         [facility] complies with applicable Federal civil rights laws and does not discriminate on the basis of race, color, national origin, age, disability, or sex.

## 2017-03-02 DIAGNOSIS — E11.9 TYPE 2 DIABETES MELLITUS WITHOUT COMPLICATION: ICD-10-CM

## 2017-03-10 ENCOUNTER — TELEPHONE (OUTPATIENT)
Dept: FAMILY MEDICINE | Facility: CLINIC | Age: 67
End: 2017-03-10

## 2017-03-10 ENCOUNTER — HOSPITAL ENCOUNTER (OUTPATIENT)
Dept: RADIOLOGY | Facility: HOSPITAL | Age: 67
Discharge: HOME | End: 2017-03-10
Attending: FAMILY MEDICINE
Payer: MEDICARE

## 2017-03-10 DIAGNOSIS — R10.11 RUQ ABDOMINAL PAIN: ICD-10-CM

## 2017-03-10 DIAGNOSIS — K80.20 GALLSTONES: Primary | ICD-10-CM

## 2017-03-10 PROCEDURE — 76700 US EXAM ABDOM COMPLETE: CPT | Mod: TC

## 2017-03-10 PROCEDURE — 76700 US EXAM ABDOM COMPLETE: CPT | Mod: 26,,, | Performed by: INTERNAL MEDICINE

## 2017-03-10 NOTE — TELEPHONE ENCOUNTER
Fatty liver noted.  Diet and physical activity to promote weight loss.  Avoid Tylenol in higher doses and alcohol abuse.    Gallbladder stones noted.  Surgery evaluation was ordered.

## 2017-03-16 ENCOUNTER — TELEPHONE (OUTPATIENT)
Dept: UROLOGY | Facility: CLINIC | Age: 67
End: 2017-03-16

## 2017-03-16 NOTE — TELEPHONE ENCOUNTER
----- Message from Maritza Diego sent at 3/16/2017  9:54 AM CDT -----  Contact: pt [phone]  Pt states he is almost out of AZO and would like to know what he should do next. Pt states he is still having a little stinging feeling. Please call pt.

## 2017-03-22 ENCOUNTER — OFFICE VISIT (OUTPATIENT)
Dept: FAMILY MEDICINE | Facility: CLINIC | Age: 67
End: 2017-03-22
Payer: MEDICARE

## 2017-03-22 VITALS
DIASTOLIC BLOOD PRESSURE: 74 MMHG | HEART RATE: 84 BPM | OXYGEN SATURATION: 95 % | WEIGHT: 205.25 LBS | BODY MASS INDEX: 28.73 KG/M2 | HEIGHT: 71 IN | SYSTOLIC BLOOD PRESSURE: 126 MMHG

## 2017-03-22 DIAGNOSIS — M79.89 LEG SWELLING: ICD-10-CM

## 2017-03-22 DIAGNOSIS — G47.00 INSOMNIA, UNSPECIFIED TYPE: ICD-10-CM

## 2017-03-22 DIAGNOSIS — I10 ESSENTIAL HYPERTENSION: Primary | ICD-10-CM

## 2017-03-22 DIAGNOSIS — B35.4 TINEA CORPORIS: ICD-10-CM

## 2017-03-22 DIAGNOSIS — K59.00 CONSTIPATION, UNSPECIFIED CONSTIPATION TYPE: ICD-10-CM

## 2017-03-22 DIAGNOSIS — K76.0 FATTY LIVER: ICD-10-CM

## 2017-03-22 DIAGNOSIS — K80.20 GALLSTONES: ICD-10-CM

## 2017-03-22 PROCEDURE — 1125F AMNT PAIN NOTED PAIN PRSNT: CPT | Mod: S$GLB,,, | Performed by: FAMILY MEDICINE

## 2017-03-22 PROCEDURE — 1159F MED LIST DOCD IN RCRD: CPT | Mod: S$GLB,,, | Performed by: FAMILY MEDICINE

## 2017-03-22 PROCEDURE — 3078F DIAST BP <80 MM HG: CPT | Mod: S$GLB,,, | Performed by: FAMILY MEDICINE

## 2017-03-22 PROCEDURE — 1157F ADVNC CARE PLAN IN RCRD: CPT | Mod: S$GLB,,, | Performed by: FAMILY MEDICINE

## 2017-03-22 PROCEDURE — 99499 UNLISTED E&M SERVICE: CPT | Mod: S$GLB,,, | Performed by: FAMILY MEDICINE

## 2017-03-22 PROCEDURE — 99214 OFFICE O/P EST MOD 30 MIN: CPT | Mod: S$GLB,,, | Performed by: FAMILY MEDICINE

## 2017-03-22 PROCEDURE — 1160F RVW MEDS BY RX/DR IN RCRD: CPT | Mod: S$GLB,,, | Performed by: FAMILY MEDICINE

## 2017-03-22 PROCEDURE — 99999 PR PBB SHADOW E&M-EST. PATIENT-LVL III: CPT | Mod: PBBFAC,,, | Performed by: FAMILY MEDICINE

## 2017-03-22 PROCEDURE — 3074F SYST BP LT 130 MM HG: CPT | Mod: S$GLB,,, | Performed by: FAMILY MEDICINE

## 2017-03-22 RX ORDER — HYDROCODONE BITARTRATE AND ACETAMINOPHEN 10; 325 MG/1; MG/1
TABLET ORAL
COMMUNITY
Start: 2017-03-17 | End: 2017-05-02 | Stop reason: SDUPTHER

## 2017-03-22 RX ORDER — METOPROLOL SUCCINATE 25 MG/1
25 TABLET, EXTENDED RELEASE ORAL DAILY
Qty: 90 TABLET | Refills: 3 | Status: SHIPPED | OUTPATIENT
Start: 2017-03-22 | End: 2018-01-31 | Stop reason: SDUPTHER

## 2017-03-22 RX ORDER — ECONAZOLE NITRATE 10 MG/G
CREAM TOPICAL DAILY
Qty: 85 G | Refills: 0 | Status: SHIPPED | OUTPATIENT
Start: 2017-03-22 | End: 2018-02-26

## 2017-03-22 RX ORDER — TRAZODONE HYDROCHLORIDE 100 MG/1
100 TABLET ORAL NIGHTLY PRN
Qty: 30 TABLET | Refills: 1 | Status: SHIPPED | OUTPATIENT
Start: 2017-03-22 | End: 2018-02-26

## 2017-03-22 RX ORDER — LACTULOSE 10 G/15ML
10 SOLUTION ORAL 2 TIMES DAILY
Qty: 90 ML | Refills: 0 | Status: SHIPPED | OUTPATIENT
Start: 2017-03-22 | End: 2017-03-25

## 2017-03-22 NOTE — MR AVS SNAPSHOT
[facility]   [place] [address]  Phone: [phone]  Fax: [phone]                  Xander Sanchez   3/22/2017 3:40 PM   Office Visit    Description:  Male : 1950   Provider:  Williams Alvarenga MD   Department:  [facility]           Reason for Visit     Follow-up     Insomnia     Arm Pain     Foot Swelling           Diagnoses this Visit        Comments    Essential hypertension    -  Primary     Leg swelling         Gallstones         Fatty liver         Insomnia, unspecified type         Tinea corporis         Constipation, unspecified constipation type                To Do List           Future Appointments        Provider Department Dept Phone    4/3/2017 7:30 AM Scott Garcia, SAYRA [facility] Podiatry [phone]    2017 8:00 AM HOME MONITOR DEVICE CHECK, [facility] [address] - Arrhythmia [phone]    2017 8:20 AM Graham Buenrostro MD [facility] [address] - Urology 4th Floor [phone]    2017 8:40 AM Sara Ruiz MD [facility] [address]-Physical Med & Rehab [phone]    2017 8:00 AM Williams Alvarenga MD [facility] [phone]      Goals (5 Years of Data)     None      Follow-Up and Disposition     Return in about 6 months (around 2017), or if symptoms worsen or fail to improve.       These Medications        Disp Refills Start End    econazole nitrate 1 % cream 85 g 0 3/22/2017     Apply topically once daily. - Topical (Top)    Pharmacy: [facility] - [address] Ph #: [phone]       lactulose (CHRONULAC) 20 gram/30 mL Soln 90 mL 0 3/22/2017 3/25/2017    Take 15 mLs (10 g total) by mouth 2 (two) times daily. - Oral    Pharmacy: [facility] - [address] Ph #: [phone]       trazodone (DESYREL) 100 MG tablet 30 tablet 1 3/22/2017     Take 1 tablet (100 mg total) by mouth nightly as needed for Insomnia. - Oral     Pharmacy: [facility] - [place], [place] - [address] [facility] #: [phone]       metoprolol succinate (TOPROL-XL) 25 MG 24 hr tablet 90 tablet 3 3/22/2017 3/22/2018    Take 1 tablet (25 mg total) by mouth once daily. - Oral    Pharmacy: [facility] - [place], [place] - [address] [facility] Ph #: [phone]         [facility]     [facility] Care Line - 24/7 Assistance  Registered nurses in the [facility] On Call Center provide clinical advisement, health education, appointment booking, and other advisory services.  Call for this free service at [phone].             Medications           Message regarding Medications     Verify the changes and/or additions to your medication regime listed below are the same as discussed with your clinician today.  If any of these changes or additions are incorrect, please notify your healthcare provider.        START taking these NEW medications        Refills    econazole nitrate 1 % cream 0    Sig: Apply topically once daily.    Class: Print    Route: Topical (Top)    lactulose (CHRONULAC) 20 gram/30 mL Soln 0    Sig: Take 15 mLs (10 g total) by mouth 2 (two) times daily.    Class: Print    Route: Oral    trazodone (DESYREL) 100 MG tablet 1    Sig: Take 1 tablet (100 mg total) by mouth nightly as needed for Insomnia.    Class: Print    Route: Oral    metoprolol succinate (TOPROL-XL) 25 MG 24 hr tablet 3    Sig: Take 1 tablet (25 mg total) by mouth once daily.    Class: Normal    Route: Oral      STOP taking these medications     amlodipine (NORVASC) 5 MG tablet Take 1 tablet (5 mg total) by mouth once daily.           Verify that the below list of medications is an accurate representation of the medications you are currently taking.  If none reported, the list may be blank. If incorrect, please contact your healthcare provider. Carry this list with you in case of emergency.           Current Medications     ACCU-CHEK SOFTCLIX  "LANCETS Misc     aspirin (ECOTRIN) 81 MG EC tablet Take 81 mg by mouth once daily.    baclofen (LIORESAL) 10 MG tablet Take 1 tablet (10 mg total) by mouth 3 (three) times daily.    CONTOUR NEXT STRIPS Strp     diphenhydrAMINE (BENADRYL) 50 MG capsule Take 1 capsule (50 mg total) by mouth every 6 (six) hours as needed for Itching or Allergies (swelling).    famotidine (PEPCID) 20 MG tablet Take 1 tablet (20 mg total) by mouth 2 (two) times daily.    gabapentin (NEURONTIN) 600 MG tablet     hydrocodone-acetaminophen 10-325mg (NORCO)  mg Tab     lisinopril (PRINIVIL,ZESTRIL) 5 MG tablet Take 2 tablets (10 mg total) by mouth every morning.    oxybutynin (DITROPAN) 5 MG Tab Take 1 tablet (5 mg total) by mouth 3 (three) times daily.    pantoprazole (PROTONIX) 40 MG tablet Take 1 tablet (40 mg total) by mouth before breakfast.    polyethylene glycol (GLYCOLAX) 17 gram PwPk Take 17 g by mouth once daily.    pravastatin (PRAVACHOL) 10 MG tablet TAKE ONE TABLET BY MOUTH AT BEDTIME    tamsulosin (FLOMAX) 0.4 mg Cp24 Take 1 capsule (0.4 mg total) by mouth once daily.    econazole nitrate 1 % cream Apply topically once daily.    lactulose (CHRONULAC) 20 gram/30 mL Soln Take 15 mLs (10 g total) by mouth 2 (two) times daily.    metoprolol succinate (TOPROL-XL) 25 MG 24 hr tablet Take 1 tablet (25 mg total) by mouth once daily.    trazodone (DESYREL) 100 MG tablet Take 1 tablet (100 mg total) by mouth nightly as needed for Insomnia.           Clinical Reference Information           Your Vitals Were     BP Pulse Height Weight SpO2 BMI    126/74 (BP Location: Left arm, Patient Position: Sitting, BP Method: Manual) 84 5' 11" (1.803 m) 93.1 kg (205 lb 4 oz) 95% 28.63 kg/m2      Blood Pressure          Most Recent Value    BP  126/74      Allergies as of 3/22/2017     No Known Allergies      Immunizations Administered on Date of Encounter - 3/22/2017     None      Instructions      Constipation (Adult)  Constipation means that " you have bowel movements that are less frequent than usual. Stools often become very hard and difficult to pass.  Constipation is very common. At some point in life it affects almost everyone. Since everyone's bowel habits are different, what is constipation to one person may not be to another. Your healthcare provider may do tests to diagnose constipation. It depends on what he or she finds when evaluating you.    Symptoms of constipation include:  · Abdominal pain  · Bloating  · Vomiting  · Painful bowel movements  · Itching, swelling, bleeding, or pain around the anus  Causes  Constipation can have many causes. These include:  · Diet low in fiber  · Too much dairy  · Not drinking enough liquids  · Lack of exercise or physical activity. This is especially true for older adults.  · Changes in lifestyle or daily routine, including pregnancy, aging, work, and travel  · Frequent use or misuse of laxatives  · Ignoring the urge to have a bowel movement or delaying it until later  · Medicines, such as certain prescription pain medicines, iron supplements, antacids, certain antidepressants, and calcium supplements  · Diseases like irritable bowel syndrome, bowel obstructions, stroke, diabetes, thyroid disease, Parkinson disease, hemorrhoids, and colon cancer  Complications  Potential complications of constipation can include:  · Hemorrhoids  · Rectal bleeding from hemorrhoids or anal fissures (skin tears)  · Hernias  · Dependency on laxatives  · Chronic constipation  · Fecal impaction  · Bowel obstruction or perforation  Home care  All treatment should be done after talking with your healthcare provider. This is especially true if you have another medical problems, are taking prescription medicines, or are an older adult. Treatment most often involves lifestyle changes. You may also need medicines. Your healthcare provider will tell you which will work best for you. Follow the advice below to help avoid this problem in  the future.  Lifestyle changes  These lifestyle changes can help prevent constipation:  · Diet. Eat a high-fiber diet, with fresh fruit and vegetables, and reduce dairy intake, meats, and processed foods  · Fluids. It's important to get enough fluids each day. Drink plenty of water when you eat more fiber. If you are on diet that limits the amount of fluid you can have, talk about this with your healthcare provider.  · Regular exercise. Check with your healthcare provider first.  Medications  Take any medicines as directed. Some laxatives are safe to use only every now and then. Others can be taken on a regular basis. Talk with your doctor or pharmacist if you have questions.  Prescription pain medicines can cause constipation. If you are taking this kind of medicine, ask your healthcare provider if you should also take a stool softener.  Medicines you may take to treat constipation include:  · Fiber supplements  · Stool softeners  · Laxatives  · Enemas  · Rectal suppositories  Follow-up care  Follow up with your healthcare provider if symptoms don't get better in the next few days. You may need to have more tests or see a specialist.  Call 911  Call 911 if any of these occur:  · Trouble breathing  · Stiff, rigid abdomen that is severely painful to touch  · Confusion  · Fainting or loss of consciousness  · Rapid heart rate  · Chest pain  When to seek medical advice  Call your healthcare provider right away if any of these occur:  · Fever over 100.4°F (38°C)  · Failure to resume normal bowel movements  · Pain in your abdomen or back gets worse  · Nausea or vomiting  · Swelling in your abdomen  · Blood in the stool  · Black, tarry stool  · Involuntary weight loss  · Weakness  Date Last Reviewed: 12/30/2015  © 7190-4140 [employer]. [address] [place] [address]. All rights reserved. This information is not intended as a substitute for professional medical care. Always follow your healthcare  professional's instructions.             Language Assistance Services     ATTENTION: Language assistance services are available, free of charge. Please call [phone].      ATENCIÓN: Si habla jazmine, tiene a cope disposición servicios gratuitos de asistencia lingüística. Llame al [phone].     CHÚ Ý: N?u b?n nói Ti?ng Vi?t, có các d?ch v? h? tr? ngôn ng? mi?n phí dành cho b?n. G?i s? [phone].         [facility] complies with applicable Federal civil rights laws and does not discriminate on the basis of race, color, national origin, age, disability, or sex.

## 2017-03-22 NOTE — PATIENT INSTRUCTIONS
Constipation (Adult)  Constipation means that you have bowel movements that are less frequent than usual. Stools often become very hard and difficult to pass.  Constipation is very common. At some point in life it affects almost everyone. Since everyone's bowel habits are different, what is constipation to one person may not be to another. Your healthcare provider may do tests to diagnose constipation. It depends on what he or she finds when evaluating you.    Symptoms of constipation include:  · Abdominal pain  · Bloating  · Vomiting  · Painful bowel movements  · Itching, swelling, bleeding, or pain around the anus  Causes  Constipation can have many causes. These include:  · Diet low in fiber  · Too much dairy  · Not drinking enough liquids  · Lack of exercise or physical activity. This is especially true for older adults.  · Changes in lifestyle or daily routine, including pregnancy, aging, work, and travel  · Frequent use or misuse of laxatives  · Ignoring the urge to have a bowel movement or delaying it until later  · Medicines, such as certain prescription pain medicines, iron supplements, antacids, certain antidepressants, and calcium supplements  · Diseases like irritable bowel syndrome, bowel obstructions, stroke, diabetes, thyroid disease, Parkinson disease, hemorrhoids, and colon cancer  Complications  Potential complications of constipation can include:  · Hemorrhoids  · Rectal bleeding from hemorrhoids or anal fissures (skin tears)  · Hernias  · Dependency on laxatives  · Chronic constipation  · Fecal impaction  · Bowel obstruction or perforation  Home care  All treatment should be done after talking with your healthcare provider. This is especially true if you have another medical problems, are taking prescription medicines, or are an older adult. Treatment most often involves lifestyle changes. You may also need medicines. Your healthcare provider will tell you which will work best for you. Follow  the advice below to help avoid this problem in the future.  Lifestyle changes  These lifestyle changes can help prevent constipation:  · Diet. Eat a high-fiber diet, with fresh fruit and vegetables, and reduce dairy intake, meats, and processed foods  · Fluids. It's important to get enough fluids each day. Drink plenty of water when you eat more fiber. If you are on diet that limits the amount of fluid you can have, talk about this with your healthcare provider.  · Regular exercise. Check with your healthcare provider first.  Medications  Take any medicines as directed. Some laxatives are safe to use only every now and then. Others can be taken on a regular basis. Talk with your doctor or pharmacist if you have questions.  Prescription pain medicines can cause constipation. If you are taking this kind of medicine, ask your healthcare provider if you should also take a stool softener.  Medicines you may take to treat constipation include:  · Fiber supplements  · Stool softeners  · Laxatives  · Enemas  · Rectal suppositories  Follow-up care  Follow up with your healthcare provider if symptoms don't get better in the next few days. You may need to have more tests or see a specialist.  Call 911  Call 911 if any of these occur:  · Trouble breathing  · Stiff, rigid abdomen that is severely painful to touch  · Confusion  · Fainting or loss of consciousness  · Rapid heart rate  · Chest pain  When to seek medical advice  Call your healthcare provider right away if any of these occur:  · Fever over 100.4°F (38°C)  · Failure to resume normal bowel movements  · Pain in your abdomen or back gets worse  · Nausea or vomiting  · Swelling in your abdomen  · Blood in the stool  · Black, tarry stool  · Involuntary weight loss  · Weakness  Date Last Reviewed: 12/30/2015  © 4587-6766 [employer]. [address] [place] [address], [place] [address]. All rights reserved. This information is not intended as a substitute for  professional medical care. Always follow your healthcare professional's instructions.

## 2017-03-22 NOTE — PROGRESS NOTES
Subjective:       Patient ID: Xander Sanchez is a 66 y.o. male.    Chief Complaint: Follow-up; Insomnia; Arm Pain (right arm); and Foot Swelling (right foot)    HPI Comments: 66 years old male who came to the clinic for blood pressure check.  Blood pressure today is stable.  No chest pain palpitations orthopnea or PND.  Patient reports left swelling and he is using amlodipine.  Patient also with problems with sleeping was to try a medicine to help him rest.  Patient recently diagnosed with gallstones and preferred only observation for now.  Patient reports episodic constipation but using narcotics.    Arm Pain    Pertinent negatives include no chest pain.     Review of Systems   Constitutional: Negative.    HENT: Negative.    Eyes: Negative.    Respiratory: Negative.    Cardiovascular: Negative.  Negative for chest pain, palpitations and leg swelling.   Gastrointestinal: Positive for constipation.   Genitourinary: Negative.    Musculoskeletal: Negative.    Skin: Negative.    Neurological: Negative.    Psychiatric/Behavioral: Negative.        Objective:      Physical Exam   Constitutional: He is oriented to person, place, and time. He appears well-developed and well-nourished. No distress.   HENT:   Head: Normocephalic and atraumatic.   Right Ear: External ear normal.   Left Ear: External ear normal.   Nose: Nose normal.   Mouth/Throat: Oropharynx is clear and moist. No oropharyngeal exudate.   Eyes: Conjunctivae and EOM are normal. Pupils are equal, round, and reactive to light. Right eye exhibits no discharge. Left eye exhibits no discharge. No scleral icterus.   Neck: Normal range of motion. Neck supple. No JVD present. No tracheal deviation present. No thyromegaly present.   Cardiovascular: Normal rate, regular rhythm, normal heart sounds and intact distal pulses.  Exam reveals no gallop and no friction rub.    No murmur heard.  Pulmonary/Chest: Effort normal and breath sounds normal. No stridor. No respiratory  distress. He has no wheezes. He has no rales. He exhibits no tenderness.   Abdominal: Soft. Bowel sounds are normal. He exhibits no distension and no mass. There is no tenderness. There is no rebound and no guarding.   Musculoskeletal: Normal range of motion. He exhibits no edema or tenderness.   Lymphadenopathy:     He has no cervical adenopathy.   Neurological: He is alert and oriented to person, place, and time. He has normal reflexes. He displays normal reflexes. No cranial nerve deficit. He exhibits normal muscle tone. Coordination normal.   Skin: Skin is warm and dry. No rash noted. He is not diaphoretic. No erythema. No pallor.   Psychiatric: He has a normal mood and affect. His behavior is normal. Judgment and thought content normal.   Nursing note and vitals reviewed.      Assessment:       1. Essential hypertension    2. Leg swelling    3. Gallstones    4. Fatty liver    5. Insomnia, unspecified type    6. Tinea corporis    7. Constipation, unspecified constipation type        Plan:         Xander was seen today for follow-up, insomnia, arm pain and foot swelling.    Diagnoses and all orders for this visit:    Essential hypertension    Leg swelling    Gallstones    Fatty liver    Insomnia, unspecified type  -     trazodone (DESYREL) 100 MG tablet; Take 1 tablet (100 mg total) by mouth nightly as needed for Insomnia.    Tinea corporis  -     econazole nitrate 1 % cream; Apply topically once daily.    Constipation, unspecified constipation type  -     lactulose (CHRONULAC) 20 gram/30 mL Soln; Take 15 mLs (10 g total) by mouth 2 (two) times daily.    Other orders  -     metoprolol succinate (TOPROL-XL) 25 MG 24 hr tablet; Take 1 tablet (25 mg total) by mouth once daily.    Continue monitoring blood pressure at home, low sodium diet.  Sleep hygiene.   Diet and physical activity to promote weight loss.

## 2017-03-27 DIAGNOSIS — K29.50 CHRONIC GASTRITIS, PRESENCE OF BLEEDING UNSPECIFIED, UNSPECIFIED GASTRITIS TYPE: ICD-10-CM

## 2017-03-27 RX ORDER — PANTOPRAZOLE SODIUM 40 MG/1
40 TABLET, DELAYED RELEASE ORAL
Qty: 30 TABLET | Refills: 5 | Status: SHIPPED | OUTPATIENT
Start: 2017-03-27 | End: 2017-04-26

## 2017-04-03 ENCOUNTER — OFFICE VISIT (OUTPATIENT)
Dept: PODIATRY | Facility: CLINIC | Age: 67
End: 2017-04-03
Payer: MEDICARE

## 2017-04-03 VITALS
HEIGHT: 71 IN | HEART RATE: 79 BPM | WEIGHT: 205 LBS | SYSTOLIC BLOOD PRESSURE: 159 MMHG | DIASTOLIC BLOOD PRESSURE: 97 MMHG | BODY MASS INDEX: 28.7 KG/M2

## 2017-04-03 DIAGNOSIS — B35.1 ONYCHOMYCOSIS: ICD-10-CM

## 2017-04-03 DIAGNOSIS — E11.49 TYPE II DIABETES MELLITUS WITH NEUROLOGICAL MANIFESTATIONS: Primary | ICD-10-CM

## 2017-04-03 DIAGNOSIS — L85.3 XEROSIS OF SKIN: ICD-10-CM

## 2017-04-03 PROCEDURE — 99499 UNLISTED E&M SERVICE: CPT | Mod: S$GLB,,, | Performed by: PODIATRIST

## 2017-04-03 PROCEDURE — 99999 PR PBB SHADOW E&M-EST. PATIENT-LVL II: CPT | Mod: PBBFAC,,, | Performed by: PODIATRIST

## 2017-04-03 PROCEDURE — 11721 DEBRIDE NAIL 6 OR MORE: CPT | Mod: Q9,S$GLB,, | Performed by: PODIATRIST

## 2017-04-03 NOTE — PROGRESS NOTES
Subjective:      Patient ID: Xander Sanchez is a 66 y.o. male.    Chief Complaint: Diabetic Foot Exam    Xander is a 66 y.o. male who presents to the clinic for evaluation and treatment of high risk feet. Xander has a past medical history of Asthma; Cardiomyopathy; Cervical spondylosis with myelopathy (10/17/2012); Chronic bronchitis; Chronic systolic dysfunction of left ventricle (7/27/2015); Constipation (7/27/2015); COPD (chronic obstructive pulmonary disease); Diabetes mellitus, type 2; DM type 2 (diabetes mellitus, type 2) (7/27/2015); Enlarged prostate (7/27/2015); Hypertension; ICD (implantable cardiac defibrillator) in place; Presence of biventricular AICD (7/27/2015); 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thy (2/1/2016); and Urinary tract infection.  This patient has documented high risk feet requiring routine maintenance secondary to diabetes mellitis and those secondary complications of diabetes, as mentioned.  Complains of new onset swelling B/L legs. PCP changed his BP meds to address new onset swelling. Has been taking new BP meds for 3 days.     PCP: Dr. August  LOV: 3/22/17    Past Medical History:   Diagnosis Date    Asthma     Cardiomyopathy     Cervical spondylosis with myelopathy 10/17/2012    Chronic bronchitis     Chronic systolic dysfunction of left ventricle 7/27/2015    Constipation 7/27/2015    COPD (chronic obstructive pulmonary disease)     Diabetes mellitus, type 2     DM type 2 (diabetes mellitus, type 2) 7/27/2015    Enlarged prostate 7/27/2015    Hypertension     ICD (implantable cardiac defibrillator) in place     Presence of biventricular AICD 7/27/2015    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thy 2/1/2016    Urinary tract infection     pt does not know       Past Surgical History:   Procedure Laterality Date    CARDIAC DEFIBRILLATOR PLACEMENT      CERVICAL SPINE SURGERY      SPINE SURGERY         Family History   Problem Relation Age of Onset     Hypertension Mother     Hypertension Father     COPD Father     No Known Problems Sister     No Known Problems Brother     No Known Problems Daughter     Prostate cancer Neg Hx     Kidney disease Neg Hx        Social History     Social History    Marital status: Single     Spouse name: N/A    Number of children: N/A    Years of education: N/A     Social History Main Topics    Smoking status: Former Smoker     Packs/day: 1.00     Years: 40.00     Types: Cigarettes     Quit date: 7/26/2009    Smokeless tobacco: Never Used    Alcohol use 1.8 oz/week     3 Cans of beer per week    Drug use: No    Sexual activity: Yes     Partners: Female     Other Topics Concern    Not on file     Social History Narrative       Current Outpatient Prescriptions   Medication Sig Dispense Refill    ACCU-CHEK SOFTCLIX LANCETS Misc       aspirin (ECOTRIN) 81 MG EC tablet Take 81 mg by mouth once daily.      baclofen (LIORESAL) 10 MG tablet Take 1 tablet (10 mg total) by mouth 3 (three) times daily. 90 tablet 11    CONTOUR NEXT STRIPS Strp       diphenhydrAMINE (BENADRYL) 50 MG capsule Take 1 capsule (50 mg total) by mouth every 6 (six) hours as needed for Itching or Allergies (swelling). 20 capsule 0    econazole nitrate 1 % cream Apply topically once daily. 85 g 0    famotidine (PEPCID) 20 MG tablet Take 1 tablet (20 mg total) by mouth 2 (two) times daily. 20 tablet 0    gabapentin (NEURONTIN) 600 MG tablet       hydrocodone-acetaminophen 10-325mg (NORCO)  mg Tab       lisinopril (PRINIVIL,ZESTRIL) 5 MG tablet Take 2 tablets (10 mg total) by mouth every morning. 270 tablet 3    metoprolol succinate (TOPROL-XL) 25 MG 24 hr tablet Take 1 tablet (25 mg total) by mouth once daily. 90 tablet 3    oxybutynin (DITROPAN) 5 MG Tab Take 1 tablet (5 mg total) by mouth 3 (three) times daily. 270 tablet 3    pantoprazole (PROTONIX) 40 MG tablet Take 1 tablet (40 mg total) by mouth before breakfast. 30 tablet 5     polyethylene glycol (GLYCOLAX) 17 gram PwPk Take 17 g by mouth once daily. 30 packet 0    pravastatin (PRAVACHOL) 10 MG tablet TAKE ONE TABLET BY MOUTH AT BEDTIME 90 tablet 1    tamsulosin (FLOMAX) 0.4 mg Cp24 Take 1 capsule (0.4 mg total) by mouth once daily. 30 capsule 3    trazodone (DESYREL) 100 MG tablet Take 1 tablet (100 mg total) by mouth nightly as needed for Insomnia. 30 tablet 1     No current facility-administered medications for this visit.        Review of patient's allergies indicates:  No Known Allergies    PCP: Williams Alvarenga MD    Date Last Seen by PCP:     Current shoe gear:  Affected Foot: Casual shoes     Unaffected Foot: Casual shoes    Hemoglobin A1C   Date Value Ref Range Status   08/17/2016 6.4 (H) 4.5 - 6.2 % Final     Comment:     According to ADA guidelines, hemoglobin A1C <7.0% represents  optimal control in non-pregnant diabetic patients.  Different  metrics may apply to specific populations.   Standards of Medical Care in Diabetes - 2016.  For the purpose of screening for the presence of diabetes:  <5.7%     Consistent with the absence of diabetes  5.7-6.4%  Consistent with increasing risk for diabetes   (prediabetes)  >or=6.5%  Consistent with diabetes  Currently no consensus exists for use of hemoglobin A1C  for diagnosis of diabetes for children.     02/01/2016 6.5 (H) 4.5 - 6.2 % Final   07/27/2015 7.2 (H) 4.5 - 6.2 % Final       Review of Systems   Constitution: Negative for chills and fever.   Respiratory: Negative for shortness of breath.    Skin: Positive for color change and nail changes. Negative for itching.   Musculoskeletal: Negative for falls, joint pain and muscle weakness.   Gastrointestinal: Negative for nausea and vomiting.   Neurological: Negative for focal weakness, loss of balance, numbness and paresthesias.           Objective:      Physical Exam   Constitutional: He is oriented to person, place, and time. He appears well-nourished. No distress.    Cardiovascular: Intact distal pulses.    Pulses:       Dorsalis pedis pulses are 2+ on the right side, and 2+ on the left side.        Posterior tibial pulses are 2+ on the right side, and 2+ on the left side.   CRT < 3 seconds to digits 1-5 bilateral, no edema.   Musculoskeletal:        Right foot: There is decreased range of motion. There is no deformity.        Left foot: There is decreased range of motion. There is no deformity.   Equinus noted b/l ankles with < 10 deg DF noted. MMT 5/5 in DF/PF/Inv/Ev resistance with no reproduction of pain in any direction. Passive range of motion of ankle and pedal joints is painless b/l.     Feet:   Right Foot:   Protective Sensation: 10 sites tested. 9 sites sensed.   Skin Integrity: Positive for dry skin. Negative for ulcer, skin breakdown, erythema, warmth or callus.   Left Foot:   Protective Sensation: 10 sites tested. 7 sites sensed.   Skin Integrity: Positive for dry skin. Negative for ulcer, skin breakdown, erythema, warmth or callus.   Neurological: He is alert and oriented to person, place, and time. He has normal strength. A sensory deficit is present.   Decreased vibratory sensation to the bilateral foot.   Skin: Skin is warm, dry and intact. No ecchymosis, no lesion, no petechiae and no rash noted. He is not diaphoretic. No cyanosis or erythema. No pallor. Nails show no clubbing.   Skin is dry and flaky plantar foot bilateral.    Hyperpigmented patch of skin dorsal left hallux.     Nails 1-5 bilateral are thickened, slightly yellow with debris, loosened and elongated.     Edema B/L LE 2+             Assessment:       Encounter Diagnoses   Name Primary?    Type II diabetes mellitus with neurological manifestations Yes    Onychomycosis     Xerosis of skin          Plan:       Xander was seen today for diabetic foot exam.    Diagnoses and all orders for this visit:    Type II diabetes mellitus with neurological manifestations    Onychomycosis    Xerosis of  skin    I counseled the patient on his conditions, their implications and medical management.    Shoe inspection. Diabetic Foot Education. Patient reminded of the importance of good nutrition and blood sugar control to help prevent podiatric complications of diabetes. Patient instructed on proper foot hygeine. We discussed wearing proper shoe gear, daily foot inspections, never walking without protective shoe gear, never putting sharp instruments to feet    With patient's verbal consent, nails were aggressively reduced and debrided x 10 to their soft tissue attachment mechanically and with electric , removing all offending nail and debris. Patient relates relief following the procedure. No anesthesia or hemostasis required. No blood loss.     Instructed to monitor swelling B/L LE  on new BP meds.     Krystin Bacon DPM PGY-2    I have personally taken the history and examined this patient and agree with the resident's note as stated as above.   Scott Garcia DPM, FACFAS

## 2017-04-03 NOTE — MR AVS SNAPSHOT
[facility]   [place]  [place] [address]  Phone: [phone]                  Xander Sanchez   4/3/2017 7:30 AM   Office Visit    Description:  Male : 1950   Provider:  Scott Garcia DPM   Department:  [facility]           Reason for Visit     Diabetic Foot Exam                To Do List           Future Appointments        Provider Department Dept Phone    2017 8:00 AM HOME MONITOR DEVICE CHECK, [facility] - Arrhythmia [phone]    2017 8:20 AM MD Kadeem Cruz [facility] - Urology 4th Floor [phone]    2017 10:00 AM MD Kadeem Maradiaga mt-Physical Med & Rehab [phone]    2017 8:00 AM Williams Alvarenga MD [facility] [phone]      Goals (5 Years of Data)     None      [facility]     [facility] Care Line -  Assistance  Unless otherwise directed by your provider, please contact [facility], our nurse care line that is available for  assistance.     Registered nurses in the [facility] provide: appointment scheduling, clinical advisement, health education, and other advisory services.  Call: [phone] (toll free)               Medications           Message regarding Medications     Verify the changes and/or additions to your medication regime listed below are the same as discussed with your clinician today.  If any of these changes or additions are incorrect, please notify your healthcare provider.             Verify that the below list of medications is an accurate representation of the medications you are currently taking.  If none reported, the list may be blank. If incorrect, please contact your healthcare provider. Carry this list with you in case of emergency.           Current Medications     ACCU-CHEK SOFTCLIX LANCETS Misc     aspirin (ECOTRIN) 81 MG EC tablet Take 81 mg by mouth once daily.    baclofen (LIORESAL) 10 MG tablet Take 1 tablet (10 mg total) by  "mouth 3 (three) times daily.    CONTOUR NEXT STRIPS Strp     diphenhydrAMINE (BENADRYL) 50 MG capsule Take 1 capsule (50 mg total) by mouth every 6 (six) hours as needed for Itching or Allergies (swelling).    econazole nitrate 1 % cream Apply topically once daily.    famotidine (PEPCID) 20 MG tablet Take 1 tablet (20 mg total) by mouth 2 (two) times daily.    gabapentin (NEURONTIN) 600 MG tablet     hydrocodone-acetaminophen 10-325mg (NORCO)  mg Tab     lisinopril (PRINIVIL,ZESTRIL) 5 MG tablet Take 2 tablets (10 mg total) by mouth every morning.    metoprolol succinate (TOPROL-XL) 25 MG 24 hr tablet Take 1 tablet (25 mg total) by mouth once daily.    oxybutynin (DITROPAN) 5 MG Tab Take 1 tablet (5 mg total) by mouth 3 (three) times daily.    pantoprazole (PROTONIX) 40 MG tablet Take 1 tablet (40 mg total) by mouth before breakfast.    polyethylene glycol (GLYCOLAX) 17 gram PwPk Take 17 g by mouth once daily.    pravastatin (PRAVACHOL) 10 MG tablet TAKE ONE TABLET BY MOUTH AT BEDTIME    tamsulosin (FLOMAX) 0.4 mg Cp24 Take 1 capsule (0.4 mg total) by mouth once daily.    trazodone (DESYREL) 100 MG tablet Take 1 tablet (100 mg total) by mouth nightly as needed for Insomnia.           Clinical Reference Information           Your Vitals Were     BP Pulse Height Weight BMI    159/97 79 5' 11" (1.803 m) 93 kg (205 lb) 28.59 kg/m2      Blood Pressure          Most Recent Value    BP  (!)  159/97      Allergies as of 4/3/2017     No Known Allergies      Immunizations Administered on Date of Encounter - 4/3/2017     None      Language Assistance Services     ATTENTION: Language assistance services are available, free of charge. Please call [phone].      ATENCIÓN: Si yrnla jazmine, tiene a cope disposición servicios gratuitos de asistencia lingüística. Llame al [phone].     CHÚ Ý: N?u b?n nói Ti?ng Vi?t, có các d?ch v? h? tr? ngôn ng? mi?n phí dành cho b?n. G?i s? [phone].         [facility] " [facility] complies with applicable Federal civil rights laws and does not discriminate on the basis of race, color, national origin, age, disability, or sex.

## 2017-04-07 ENCOUNTER — TELEPHONE (OUTPATIENT)
Dept: FAMILY MEDICINE | Facility: CLINIC | Age: 67
End: 2017-04-07

## 2017-04-07 DIAGNOSIS — R60.0 PEDAL EDEMA: Primary | ICD-10-CM

## 2017-04-07 NOTE — TELEPHONE ENCOUNTER
----- Message from Eleni Barnes sent at 4/7/2017  1:11 PM CDT -----  Contact: [phone] / self   Patient called in requesting to speak with you. Patient prefers to speak with a nurse. Please advise.

## 2017-04-07 NOTE — TELEPHONE ENCOUNTER
Spoke to patient and he states that he saw Dr. Garcia in Podiatry and he suggested he use compression stockings. Patient would like order to be sent to DME. Please advise.

## 2017-04-07 NOTE — TELEPHONE ENCOUNTER
----- Message from Victoria Vega sent at 4/7/2017  1:39 PM CDT -----  Contact: Self [phone]  Patient Returning Your Phone Call

## 2017-04-24 ENCOUNTER — CLINICAL SUPPORT (OUTPATIENT)
Dept: ELECTROPHYSIOLOGY | Facility: CLINIC | Age: 67
End: 2017-04-24
Payer: MEDICARE

## 2017-04-24 DIAGNOSIS — Z95.810 AUTOMATIC IMPLANTABLE CARDIOVERTER-DEFIBRILLATOR IN SITU: ICD-10-CM

## 2017-04-24 DIAGNOSIS — I42.9 CARDIOMYOPATHY, PRIMARY: ICD-10-CM

## 2017-05-02 ENCOUNTER — OFFICE VISIT (OUTPATIENT)
Dept: UROLOGY | Facility: CLINIC | Age: 67
End: 2017-05-02
Payer: MEDICARE

## 2017-05-02 ENCOUNTER — OFFICE VISIT (OUTPATIENT)
Dept: PHYSICAL MEDICINE AND REHAB | Facility: CLINIC | Age: 67
End: 2017-05-02
Payer: MEDICARE

## 2017-05-02 VITALS
HEIGHT: 71 IN | BODY MASS INDEX: 28.11 KG/M2 | WEIGHT: 200.81 LBS | HEART RATE: 84 BPM | DIASTOLIC BLOOD PRESSURE: 107 MMHG | SYSTOLIC BLOOD PRESSURE: 156 MMHG

## 2017-05-02 VITALS
DIASTOLIC BLOOD PRESSURE: 84 MMHG | SYSTOLIC BLOOD PRESSURE: 152 MMHG | WEIGHT: 198.44 LBS | HEART RATE: 56 BPM | BODY MASS INDEX: 27.78 KG/M2 | HEIGHT: 71 IN

## 2017-05-02 DIAGNOSIS — M96.1 CERVICAL POST-LAMINECTOMY SYNDROME: Primary | ICD-10-CM

## 2017-05-02 DIAGNOSIS — M54.12 BRACHIAL NEURITIS OR RADICULITIS NOS: ICD-10-CM

## 2017-05-02 DIAGNOSIS — M47.12 CERVICAL SPONDYLOSIS WITH MYELOPATHY: ICD-10-CM

## 2017-05-02 DIAGNOSIS — Z90.79 S/P TURP: ICD-10-CM

## 2017-05-02 DIAGNOSIS — N40.1 BPH WITH URINARY OBSTRUCTION: Primary | ICD-10-CM

## 2017-05-02 DIAGNOSIS — R29.898 RIGHT ARM WEAKNESS: ICD-10-CM

## 2017-05-02 DIAGNOSIS — R33.9 INCOMPLETE BLADDER EMPTYING: ICD-10-CM

## 2017-05-02 DIAGNOSIS — R29.898 RIGHT HAND WEAKNESS: ICD-10-CM

## 2017-05-02 DIAGNOSIS — N13.8 BPH WITH URINARY OBSTRUCTION: Primary | ICD-10-CM

## 2017-05-02 DIAGNOSIS — M79.601 PAIN OF RIGHT UPPER EXTREMITY: ICD-10-CM

## 2017-05-02 DIAGNOSIS — G89.4 CHRONIC PAIN SYNDROME: ICD-10-CM

## 2017-05-02 PROCEDURE — 99999 PR PBB SHADOW E&M-EST. PATIENT-LVL III: CPT | Mod: PBBFAC,,, | Performed by: PHYSICAL MEDICINE & REHABILITATION

## 2017-05-02 PROCEDURE — 1160F RVW MEDS BY RX/DR IN RCRD: CPT | Mod: S$GLB,,, | Performed by: UROLOGY

## 2017-05-02 PROCEDURE — 1160F RVW MEDS BY RX/DR IN RCRD: CPT | Mod: S$GLB,,, | Performed by: PHYSICAL MEDICINE & REHABILITATION

## 2017-05-02 PROCEDURE — 3077F SYST BP >= 140 MM HG: CPT | Mod: S$GLB,,, | Performed by: UROLOGY

## 2017-05-02 PROCEDURE — 99214 OFFICE O/P EST MOD 30 MIN: CPT | Mod: S$GLB,,, | Performed by: PHYSICAL MEDICINE & REHABILITATION

## 2017-05-02 PROCEDURE — 99999 PR PBB SHADOW E&M-EST. PATIENT-LVL III: CPT | Mod: PBBFAC,,, | Performed by: UROLOGY

## 2017-05-02 PROCEDURE — 3077F SYST BP >= 140 MM HG: CPT | Mod: S$GLB,,, | Performed by: PHYSICAL MEDICINE & REHABILITATION

## 2017-05-02 PROCEDURE — 1159F MED LIST DOCD IN RCRD: CPT | Mod: S$GLB,,, | Performed by: PHYSICAL MEDICINE & REHABILITATION

## 2017-05-02 PROCEDURE — 1125F AMNT PAIN NOTED PAIN PRSNT: CPT | Mod: S$GLB,,, | Performed by: PHYSICAL MEDICINE & REHABILITATION

## 2017-05-02 PROCEDURE — 99024 POSTOP FOLLOW-UP VISIT: CPT | Mod: S$GLB,,, | Performed by: UROLOGY

## 2017-05-02 PROCEDURE — 1159F MED LIST DOCD IN RCRD: CPT | Mod: S$GLB,,, | Performed by: UROLOGY

## 2017-05-02 PROCEDURE — 3079F DIAST BP 80-89 MM HG: CPT | Mod: S$GLB,,, | Performed by: PHYSICAL MEDICINE & REHABILITATION

## 2017-05-02 PROCEDURE — 3080F DIAST BP >= 90 MM HG: CPT | Mod: S$GLB,,, | Performed by: UROLOGY

## 2017-05-02 RX ORDER — PANTOPRAZOLE SODIUM 40 MG/1
40 TABLET, DELAYED RELEASE ORAL DAILY
COMMUNITY
End: 2018-02-06

## 2017-05-02 RX ORDER — HYDROCODONE BITARTRATE AND ACETAMINOPHEN 10; 325 MG/1; MG/1
1 TABLET ORAL EVERY 6 HOURS PRN
Qty: 120 TABLET | Refills: 0 | Status: SHIPPED | OUTPATIENT
Start: 2017-05-16 | End: 2017-06-15

## 2017-05-02 RX ORDER — HYDROCODONE BITARTRATE AND ACETAMINOPHEN 10; 325 MG/1; MG/1
1 TABLET ORAL EVERY 6 HOURS PRN
Qty: 120 TABLET | Refills: 0 | Status: SHIPPED | OUTPATIENT
Start: 2017-07-16 | End: 2017-08-02 | Stop reason: SDUPTHER

## 2017-05-02 RX ORDER — HYDROCODONE BITARTRATE AND ACETAMINOPHEN 10; 325 MG/1; MG/1
1 TABLET ORAL EVERY 6 HOURS PRN
Qty: 120 TABLET | Refills: 0 | Status: SHIPPED | OUTPATIENT
Start: 2017-06-16 | End: 2017-07-16

## 2017-05-02 NOTE — MR AVS SNAPSHOT
[facility]  [address]  [facility] [address]  Phone: [phone]                  Xander Sanchez   2017 10:00 AM   Office Visit    Description:  Male : 1950   Provider:  Sara Ruiz MD   Department:  [facility]           Reason for Visit     Arm Pain           Diagnoses this Visit        Comments    Cervical post-laminectomy syndrome    -  Primary     Cervical spondylosis with myelopathy         Chronic pain syndrome         Right arm weakness         Right hand weakness         Pain of right upper extremity         Brachial neuritis or radiculitis NOS                To Do List           Future Appointments        Provider Department Dept Phone    2017 11:00 AM Williams Alvarenga MD [facility] [phone]    2017 8:00 AM Williams Alvarenga MD [facility] [phone]    2017 8:40 AM Nadir Joy MD [facility] [phone]      Goals (5 Years of Data)     None      Follow-Up and Disposition     Return in about 3 months (around 2017).       These Medications        Disp Refills Start End    hydrocodone-acetaminophen 10-325mg (NORCO)  mg Tab 120 tablet 0 2017 6/15/2017    Take 1 tablet by mouth every 6 (six) hours as needed for Pain. - Oral    Pharmacy: [facility] - [facility] [address] [facility] Ph #: [phone]       hydrocodone-acetaminophen 10-325mg (NORCO)  mg Tab 120 tablet 0 2017    Take 1 tablet by mouth every 6 (six) hours as needed for Pain. - Oral    Pharmacy: [facility] - [facility] [address] [facility] Ph #: [phone]       hydrocodone-acetaminophen 10-325mg (NORCO)  mg Tab 120 tablet 0 2017 8/15/2017    Take 1 tablet by mouth every 6 (six) hours as needed for Pain. - Oral    Pharmacy: [facility] - [facility] [address] [facility] [facility] Ph #:  [phone]         [facility]     [facility] Care [facility] - 24/7 Assistance  Unless otherwise directed by your provider, please contact [facility], our nurse care line that is available for 24/7 assistance.     Registered nurses in the [facility] provide: appointment scheduling, clinical advisement, health education, and other advisory services.  Call: [phone] (toll free)               Medications           Message regarding Medications     Verify the changes and/or additions to your medication regime listed below are the same as discussed with your clinician today.  If any of these changes or additions are incorrect, please notify your healthcare provider.        START taking these NEW medications        Refills    hydrocodone-acetaminophen 10-325mg (NORCO)  mg Tab 0    Starting on: 6/16/2017    Sig: Take 1 tablet by mouth every 6 (six) hours as needed for Pain.    Class: Print    Route: Oral    hydrocodone-acetaminophen 10-325mg (NORCO)  mg Tab 0    Starting on: 7/16/2017    Sig: Take 1 tablet by mouth every 6 (six) hours as needed for Pain.    Class: Print    Route: Oral      CHANGE how you are taking these medications     Start Taking Instead of    hydrocodone-acetaminophen 10-325mg (NORCO)  mg Tab hydrocodone-acetaminophen 10-325mg (NORCO)  mg Tab    Dosage:  Take 1 tablet by mouth every 6 (six) hours as needed for Pain.     Reason for Change:  Reorder     Starting on: 5/16/2017            Verify that the below list of medications is an accurate representation of the medications you are currently taking.  If none reported, the list may be blank. If incorrect, please contact your healthcare provider. Carry this list with you in case of emergency.           Current Medications     aspirin (ECOTRIN) 81 MG EC tablet Take 81 mg by mouth once daily.    baclofen (LIORESAL) 10 MG tablet Take 1 tablet (10 mg total) by mouth 3 (three) times daily.    CONTOUR  "NEXT STRIPS Strp     diphenhydrAMINE (BENADRYL) 50 MG capsule Take 1 capsule (50 mg total) by mouth every 6 (six) hours as needed for Itching or Allergies (swelling).    econazole nitrate 1 % cream Apply topically once daily.    famotidine (PEPCID) 20 MG tablet Take 1 tablet (20 mg total) by mouth 2 (two) times daily.    gabapentin (NEURONTIN) 600 MG tablet     metoprolol succinate (TOPROL-XL) 25 MG 24 hr tablet Take 1 tablet (25 mg total) by mouth once daily.    oxybutynin (DITROPAN) 5 MG Tab Take 1 tablet (5 mg total) by mouth 3 (three) times daily.    pantoprazole (PROTONIX) 40 MG tablet Take 40 mg by mouth once daily.    polyethylene glycol (GLYCOLAX) 17 gram PwPk Take 17 g by mouth once daily.    pravastatin (PRAVACHOL) 10 MG tablet TAKE ONE TABLET BY MOUTH AT BEDTIME    tamsulosin (FLOMAX) 0.4 mg Cp24 Take 1 capsule (0.4 mg total) by mouth once daily.    ACCU-CHEK SOFTCLIX LANCETS Misc     hydrocodone-acetaminophen 10-325mg (NORCO)  mg Tab Starting on May 16, 2017. Take 1 tablet by mouth every 6 (six) hours as needed for Pain.    hydrocodone-acetaminophen 10-325mg (NORCO)  mg Tab Starting on Jun 16, 2017. Take 1 tablet by mouth every 6 (six) hours as needed for Pain.    hydrocodone-acetaminophen 10-325mg (NORCO)  mg Tab Starting on Jul 16, 2017. Take 1 tablet by mouth every 6 (six) hours as needed for Pain.    trazodone (DESYREL) 100 MG tablet Take 1 tablet (100 mg total) by mouth nightly as needed for Insomnia.           Clinical Reference Information           Your Vitals Were     BP Pulse Height Weight BMI    152/84 56 5' 11" (1.803 m) 90 kg (198 lb 6.6 oz) 27.67 kg/m2      Blood Pressure          Most Recent Value    BP  (!)  152/84      Allergies as of 5/2/2017     No Known Allergies      Immunizations Administered on Date of Encounter - 5/2/2017     None      Language Assistance Services     ATTENTION: Language assistance services are available, free of charge. Please call " [phone].      ATENCIÓN: Si habla español, tiene a cope disposición servicios gratuitos de asistencia lingüística. Llame al [phone].     CHÚ Ý: N?u b?n nói Ti?ng Vi?t, có các d?ch v? h? tr? ngôn ng? mi?n phí dành cho b?n. G?i s? [phone].         [facility] complies with applicable Federal civil rights laws and does not discriminate on the basis of race, color, national origin, age, disability, or sex.

## 2017-05-02 NOTE — PROGRESS NOTES
CC: BPH    Xander Sanchez is here for follow up of laser TURP on 2/1/17.  Has been doing very well since surgery.   Denies frequency, urgency, straining, incomplete bladder emptying. Good FOS.  No hematuria, dysuria  Nocturia x 1.   He is very pleased.   Still taking oxybutynin and flomax.   PVR today 160.    Review of Systems    General ROS: GENERAL:  No weight gain or loss  SKIN:  No rashes or lacerations  HEAD:  No headaches  EYES:  No exophthalmos, jaundice or blindness  EARS:  No dizziness, tinnitus or hearing loss  NOSE:  No changes in smell  MOUTH & THROAT:  No dyskinetic movements or obvious goiter  CHEST:  No shortness of breath, hyperventilation or cough  CARDIOVASCULAR:  No tachycardia or chest pain  ABDOMEN:  No nausea, vomiting, pain, constipation or diarrhea  URINARY:  No frequency, dysuria or sexual dysfunction  ENDOCRINE:  No polydipsia, polyuria  MUSCULOSKELETAL:  No pain or stiffness of the joints  NEUROLOGIC:  No weakness, sensory changes, seizures, confusion, memory loss, tremor or other abnormal movements    Physical Exam     Vitals:    05/02/17 0743   BP: (!) 156/107   Pulse: 84     General Appearance:  Alert, cooperative, no distress, appears stated age   Head:  Normocephalic, without obvious abnormality, atraumatic   Eyes:  PERRL, conjunctiva/corneas clear, EOM's intact, fundi benign, both eyes   Ears:  Normal TM's and external ear canals, both ears   Nose: Nares normal, septum midline, mucosa normal, no drainage or sinus tenderness   Throat: Lips, mucosa, and tongue normal; teeth and gums normal   Neck: Supple, symmetrical, trachea midline, no adenopathy, thyroid: not enlarged, symmetric, no tenderness/mass/nodules, no carotid bruit or JVD   Back:   Symmetric, no curvature, ROM normal, no CVA tenderness   Lungs:   Clear to auscultation bilaterally, respirations unlabored   Chest Wall:  No tenderness or deformity   Heart:  Regular rate and rhythm, S1, S2 normal, no murmur, rub or gallop    Abdomen:   Soft, non-tender, bowel sounds active all four quadrants,  no masses, no organomegaly   Genitalia:  Normal male, normal testicles, normal epididymis, normal vas palpated.   Rectal:  Normal tone, no masses or tenderness;   Extremities: Extremities normal, atraumatic, no cyanosis or edema   Pulses: 2+ and symmetric   Skin: Skin color, texture, turgor normal, no rashes or lesions   Lymph nodes: Cervical, supraclavicular, and axillary nodes normal   Neurologic: Normal       LABS:  Lab Results   Component Value Date    PSA 1.2 02/01/2016    PSA 0.66 03/28/2014    PSA 0.67 03/13/2013     Lab Results   Component Value Date    CREATININE 0.9 02/27/2017    CREATININE 0.9 02/04/2017    CREATININE 0.9 11/29/2016     No results found for: TESTOSTERONE  Urine Culture, Routine   Date Value Ref Range Status   02/14/2017   Final    STAPHYLOCOCCUS EPIDERMIDIS  10,000 - 49,999 cfu/ml           Assessment and Plan:  Xander was seen today for post-op evaluation.    Diagnoses and all orders for this visit:    BPH with urinary obstruction    S/P TURP    Incomplete bladder emptying       Doing very well following procedure.   Will stop oxybutynin at this time, continue flomax.   Will see how his symptoms do with this and may stop flomax in the future  Return to clinic PRN    Addendum:  Agree with the above.  Stop oxybutynin now.  In 2 wks, wean flomax from 2 capsules to 1 capsule at night.  In the next 2 wks, stop flomax all together.    Follow-up:  Return if symptoms worsen or fail to improve.

## 2017-05-02 NOTE — PROGRESS NOTES
Subjective:       Patient ID: Xander Sanchez is a 66 y.o. male.    Chief Complaint: Arm Pain (right )    Arm Pain    Pertinent negatives include no chest pain. Numbness: Right arm paina nd numbness.   Extremity Weakness    Numbness: Right arm paina nd numbness.   Neck Pain    Pertinent negatives include no chest pain or headaches. Numbness: Right arm paina nd numbness. Weakness: right arm weak.      Mr Sanchez is Right handed male,who returns to clinic for right arm pain, and weakness that worsened after second neck surgery with Dr. Meyer.   Third surgery with Dr. De Dios did not changed his symptoms.   He states that all symptoms stayed the same as before surgery.   He reports some improvement in pain, but the tightness, cold sensation in right arm is same as before.  Mr. Sanchez returns to clinic for chronic pain management.  Last clinic visit was on 2/13/17.   Current right arm pain is 4, average pain is 4-5, worst pain is 7/10.   He has not that much of neck pain, current neck pain is 1, worst pain maybe 2.   He complains mainly about tightness, pressure in right arm, in right arm , more in forearm, than above elbow, tightness  runs down the arm to right thumb.   Right arm pain is constant, day and night, intensity changes, pain is worse during day time, does not awake him at night.   In morning feels tight, stiff, as pressure around the arm, also feels swollen and tight in hand, and feels cold at all time.   It hurts more bellow \the elbow than above elbow.   He reports that he does not have feelings, cannot write, does not know if things are going to drop out of his hand.   Sometimes squeezes too hard plastic cup.   Right hand is clumsy, getting smaller, hardly can dress, cannot button shirt.   He states that he is not able to dress. He states that before surgery he has had similar problems, but they just got worse after second surgery.   It is swelling feeling, and tightness that borders him, not that much pain.    Sometimes right arm feels cold, and cold weather affect him more,  Pain is better controled with Hydrocodone.   He went for CRESCENCIO, once before surgery and few time after surgery, total  > 3   Takes Hydrocodone 10/325 mg PO TID, that brings his pain down to tolerable 2.   He is now taking Neurontin 600 mg po QID, total 2400 mg /day, and tolerates it well.   But, Neurontin does not help much, in his opinion.   Hydrocodone helps and brings pain down to 2, and gives him ~ 5 hrs pain relief.  Also, takes Baclofen 10 mg po TID , for tightness in right arm, that helps also.  Now, awaiting procedure, pain stimulator, that is a little complicated since he has AICD.  Here for follow up, re evaluation and conservative treatment, involved in chronic pain management with opiates.    Past Medical History:   Diagnosis Date    Asthma     Cardiomyopathy     Cervical spondylosis with myelopathy 10/17/2012    Chronic bronchitis     Chronic systolic dysfunction of left ventricle 7/27/2015    Constipation 7/27/2015    COPD (chronic obstructive pulmonary disease)     Diabetes mellitus, type 2     DM type 2 (diabetes mellitus, type 2) 7/27/2015    Enlarged prostate 7/27/2015    Hypertension     ICD (implantable cardiac defibrillator) in place     Presence of biventricular AICD 7/27/2015    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thy 2/1/2016    Urinary tract infection     pt does not know       Past Surgical History:   Procedure Laterality Date    CARDIAC DEFIBRILLATOR PLACEMENT      CERVICAL SPINE SURGERY      SPINE SURGERY         Family History   Problem Relation Age of Onset    Hypertension Mother     Hypertension Father     COPD Father     No Known Problems Sister     No Known Problems Brother     No Known Problems Daughter     Prostate cancer Neg Hx     Kidney disease Neg Hx        Social History     Social History    Marital status: Single     Spouse name: N/A    Number of children: N/A    Years  of education: N/A     Social History Main Topics    Smoking status: Former Smoker     Packs/day: 1.00     Years: 40.00     Types: Cigarettes     Quit date: 7/26/2009    Smokeless tobacco: Never Used    Alcohol use 1.8 oz/week     3 Cans of beer per week    Drug use: No    Sexual activity: Yes     Partners: Female     Other Topics Concern    None     Social History Narrative       Current Outpatient Prescriptions   Medication Sig Dispense Refill    ACCU-CHEK SOFTCLIX LANCETS Misc       aspirin (ECOTRIN) 81 MG EC tablet Take 81 mg by mouth once daily.      baclofen (LIORESAL) 10 MG tablet Take 1 tablet (10 mg total) by mouth 3 (three) times daily. 90 tablet 11    CONTOUR NEXT STRIPS Strp       diphenhydrAMINE (BENADRYL) 50 MG capsule Take 1 capsule (50 mg total) by mouth every 6 (six) hours as needed for Itching or Allergies (swelling). 20 capsule 0    econazole nitrate 1 % cream Apply topically once daily. 85 g 0    famotidine (PEPCID) 20 MG tablet Take 1 tablet (20 mg total) by mouth 2 (two) times daily. 20 tablet 0    gabapentin (NEURONTIN) 600 MG tablet       [START ON 5/16/2017] hydrocodone-acetaminophen 10-325mg (NORCO)  mg Tab Take 1 tablet by mouth every 6 (six) hours as needed for Pain. 120 tablet 0    [START ON 6/16/2017] hydrocodone-acetaminophen 10-325mg (NORCO)  mg Tab Take 1 tablet by mouth every 6 (six) hours as needed for Pain. 120 tablet 0    [START ON 7/16/2017] hydrocodone-acetaminophen 10-325mg (NORCO)  mg Tab Take 1 tablet by mouth every 6 (six) hours as needed for Pain. 120 tablet 0    metoprolol succinate (TOPROL-XL) 25 MG 24 hr tablet Take 1 tablet (25 mg total) by mouth once daily. 90 tablet 3    oxybutynin (DITROPAN) 5 MG Tab Take 1 tablet (5 mg total) by mouth 3 (three) times daily. 270 tablet 3    pantoprazole (PROTONIX) 40 MG tablet Take 40 mg by mouth once daily.      polyethylene glycol (GLYCOLAX) 17 gram PwPk Take 17 g by mouth once daily. 30  packet 0    pravastatin (PRAVACHOL) 10 MG tablet TAKE ONE TABLET BY MOUTH AT BEDTIME 90 tablet 1    tamsulosin (FLOMAX) 0.4 mg Cp24 Take 1 capsule (0.4 mg total) by mouth once daily. 30 capsule 3    trazodone (DESYREL) 100 MG tablet Take 1 tablet (100 mg total) by mouth nightly as needed for Insomnia. 30 tablet 1     No current facility-administered medications for this visit.        Review of patient's allergies indicates:  No Known Allergies  Review of Systems   Constitutional: Negative for appetite change and fatigue.   Eyes: Negative for visual disturbance.   Respiratory: Negative for shortness of breath.    Cardiovascular: Negative for chest pain.   Gastrointestinal: Negative for constipation and diarrhea.   Genitourinary: Negative for dysuria, frequency and urgency.   Musculoskeletal: Positive for extremity weakness and neck pain. Negative for arthralgias, back pain, gait problem, joint swelling, myalgias and neck stiffness.   Neurological: Negative for dizziness, tremors and headaches. Weakness: right arm weak. Numbness: Right arm paina nd numbness.   Psychiatric/Behavioral: Negative for dysphoric mood.   All other systems reviewed and are negative.          Objective:      Physical Exam    GENERAL: The patient is alert, oriented, pleasant.   MUSCULOSKELETAL:   Gait: on wide basis, COG moved forward.   GENERAL: The patient is alert, oriented, pleasant.   HEENT; PERRLA  NECK: supple, minimaly webbed neck.   Cervical spine :  Minimally decreased AROM in cervical spine, flexion to 60, extension to 0,,   side bending and rotation to 20-25 degrees with pain.  + mild-moderate paravertebral cervical musculature tenderness on Right.  + mild- moderate tenderness in upper trapezius muscles on Right.   Lumbar spine, full range of motion in all planes, flexion to 90 degrees , ext. 0.  Side bending and rotation to 25--30 degrees.   Straight leg raising Negative bilaterally.   Full range of motion in all joints x4  extremities.   Muscle strength 5/5 throughout x4 extremities.   No joint laxity throughout x4 extremities.   NEUROLOGIC: Cranial nerves II through XII intact.   Deep tendon reflexes is normal, +2 in the upper and lower extremities bilaterally.   Muscle tone is normal.   Sensory is intact to light touch and pinprick throughout x4 extremities.   Skin: no hypersensitivity, alodynia, no somatosensory changes, other than cold skin in right arm, no atrophic skin changes.        CT of Cervical spine showed:  Stable remote operative change right C3, C4 and C6 hemilaminectomies with metallic laminal plasty.  Operative change with C5/C6 anterior spinal fusion no evidence for hardware failure. with interval reduction of protruding disk at C5/C6.  continued truncation of the cervical spinal cord most pronounced at the C6 level with mild/moderate small caliber of the cord   concerning for myelomalacia stable.  Superimposed multilevel degenerative change most pronounced at C6/C7 with bulging disk resulting in mild central canal stenosis.   Please note there is additional degenerative change with mild neural foraminal stenosis C3/C4 and C4/C5 levels.    Assessment:       1. Cervical post-laminectomy syndrome    2. Cervical spondylosis with myelopathy    3. Chronic pain syndrome    4. Right arm weakness    5. Right hand weakness    6. Pain of right upper extremity    7. Brachial neuritis or radiculitis NOS        Plan:         Cervical post-laminectomy syndrome  -     hydrocodone-acetaminophen 10-325mg (NORCO)  mg Tab; Take 1 tablet by mouth every 6 (six) hours as needed for Pain.  Dispense: 120 tablet; Refill: 0  -     hydrocodone-acetaminophen 10-325mg (NORCO)  mg Tab; Take 1 tablet by mouth every 6 (six) hours as needed for Pain.  Dispense: 120 tablet; Refill: 0  -     hydrocodone-acetaminophen 10-325mg (NORCO)  mg Tab; Take 1 tablet by mouth every 6 (six) hours as needed for Pain.  Dispense: 120 tablet;  Refill: 0    Cervical spondylosis with myelopathy  -     hydrocodone-acetaminophen 10-325mg (NORCO)  mg Tab; Take 1 tablet by mouth every 6 (six) hours as needed for Pain.  Dispense: 120 tablet; Refill: 0  -     hydrocodone-acetaminophen 10-325mg (NORCO)  mg Tab; Take 1 tablet by mouth every 6 (six) hours as needed for Pain.  Dispense: 120 tablet; Refill: 0  -     hydrocodone-acetaminophen 10-325mg (NORCO)  mg Tab; Take 1 tablet by mouth every 6 (six) hours as needed for Pain.  Dispense: 120 tablet; Refill: 0    Chronic pain syndrome  -     hydrocodone-acetaminophen 10-325mg (NORCO)  mg Tab; Take 1 tablet by mouth every 6 (six) hours as needed for Pain.  Dispense: 120 tablet; Refill: 0  -     hydrocodone-acetaminophen 10-325mg (NORCO)  mg Tab; Take 1 tablet by mouth every 6 (six) hours as needed for Pain.  Dispense: 120 tablet; Refill: 0  -     hydrocodone-acetaminophen 10-325mg (NORCO)  mg Tab; Take 1 tablet by mouth every 6 (six) hours as needed for Pain.  Dispense: 120 tablet; Refill: 0    Right arm weakness  -     hydrocodone-acetaminophen 10-325mg (NORCO)  mg Tab; Take 1 tablet by mouth every 6 (six) hours as needed for Pain.  Dispense: 120 tablet; Refill: 0  -     hydrocodone-acetaminophen 10-325mg (NORCO)  mg Tab; Take 1 tablet by mouth every 6 (six) hours as needed for Pain.  Dispense: 120 tablet; Refill: 0  -     hydrocodone-acetaminophen 10-325mg (NORCO)  mg Tab; Take 1 tablet by mouth every 6 (six) hours as needed for Pain.  Dispense: 120 tablet; Refill: 0    Right hand weakness  -     hydrocodone-acetaminophen 10-325mg (NORCO)  mg Tab; Take 1 tablet by mouth every 6 (six) hours as needed for Pain.  Dispense: 120 tablet; Refill: 0  -     hydrocodone-acetaminophen 10-325mg (NORCO)  mg Tab; Take 1 tablet by mouth every 6 (six) hours as needed for Pain.  Dispense: 120 tablet; Refill: 0  -     hydrocodone-acetaminophen 10-325mg (NORCO)   mg Tab; Take 1 tablet by mouth every 6 (six) hours as needed for Pain.  Dispense: 120 tablet; Refill: 0    Pain of right upper extremity  -     hydrocodone-acetaminophen 10-325mg (NORCO)  mg Tab; Take 1 tablet by mouth every 6 (six) hours as needed for Pain.  Dispense: 120 tablet; Refill: 0  -     hydrocodone-acetaminophen 10-325mg (NORCO)  mg Tab; Take 1 tablet by mouth every 6 (six) hours as needed for Pain.  Dispense: 120 tablet; Refill: 0  -     hydrocodone-acetaminophen 10-325mg (NORCO)  mg Tab; Take 1 tablet by mouth every 6 (six) hours as needed for Pain.  Dispense: 120 tablet; Refill: 0    Brachial neuritis or radiculitis NOS  -     hydrocodone-acetaminophen 10-325mg (NORCO)  mg Tab; Take 1 tablet by mouth every 6 (six) hours as needed for Pain.  Dispense: 120 tablet; Refill: 0  -     hydrocodone-acetaminophen 10-325mg (NORCO)  mg Tab; Take 1 tablet by mouth every 6 (six) hours as needed for Pain.  Dispense: 120 tablet; Refill: 0  -     hydrocodone-acetaminophen 10-325mg (NORCO)  mg Tab; Take 1 tablet by mouth every 6 (six) hours as needed for Pain.  Dispense: 120 tablet; Refill: 0      Patient with cervical myelopathy, with tightness and pain is Right UE, s/p Cervical fusion, here for chronic pain management.   Awaiting SCS.    1. Pain management : Gabapentin , increased to 600 mg po QID, and Hydrocodone 10/325 mg PO TID ( #90 tabs,2 refills).  2. Spasticity, will resume Baclofen 10 mg PO TID.    RTC  In 3 months.    Total time spent face to face with patient was 25 minutes.     More than 50% of that time was spent in counseling on diagnosis , prognosis and treatment options.   I also caunsel patient  on common and most usual side effect of prescribed medications.   Risk and benefits of opiates, possible risk of developing opiate dependence and tolerance, need of strict compliance with prescribed medications.  I reviewed Primary care , and other specialty's notes to  better coordinate patient's  care.   All questions were answered, and patient voiced understanding.

## 2017-05-02 NOTE — MR AVS SNAPSHOT
[facility] 4th Floor  [address]  [facility] [address]  Phone: [phone]                  Xander Sanchez   2017 8:20 AM   Office Visit    Description:  Male : 1950   Provider:  Graham Buenrostro MD   Department:  [facility] 4th Floor           Reason for Visit     Post-op Evaluation           Diagnoses this Visit        Comments    BPH with urinary obstruction    -  Primary     S/P TURP         Incomplete bladder emptying                To Do List           Future Appointments        Provider Department Dept Phone    2017 8:20 AM Graham Buenrostro MD [facility] 4th Floor [phone]    2017 10:00 AM Sara Ruiz MD [facility] [phone]    2017 11:00 AM Williams Alvarenga MD [facility] [phone]    2017 8:00 AM Williams Alvarenga MD [facility] [phone]    2017 8:40 AM Nadir Joy MD [facility] [phone]      Goals (5 Years of Data)     None      Follow-Up and Disposition     Return if symptoms worsen or fail to improve.      [facility]     [facility] Care [facility] -  Assistance  Unless otherwise directed by your provider, please contact [facility], our nurse care line that is available for  assistance.     Registered nurses in the [facility] provide: appointment scheduling, clinical advisement, health education, and other advisory services.  Call: [phone] (toll free)               Medications           Message regarding Medications     Verify the changes and/or additions to your medication regime listed below are the same as discussed with your clinician today.  If any of these changes or additions are incorrect, please notify your healthcare provider.        STOP taking these medications     lisinopril (PRINIVIL,ZESTRIL) 5 MG tablet Take 2 tablets (10 mg total) by mouth every morning.           Verify that the  "below list of medications is an accurate representation of the medications you are currently taking.  If none reported, the list may be blank. If incorrect, please contact your healthcare provider. Carry this list with you in case of emergency.           Current Medications     aspirin (ECOTRIN) 81 MG EC tablet Take 81 mg by mouth once daily.    baclofen (LIORESAL) 10 MG tablet Take 1 tablet (10 mg total) by mouth 3 (three) times daily.    CONTOUR NEXT STRIPS Strp     diphenhydrAMINE (BENADRYL) 50 MG capsule Take 1 capsule (50 mg total) by mouth every 6 (six) hours as needed for Itching or Allergies (swelling).    econazole nitrate 1 % cream Apply topically once daily.    famotidine (PEPCID) 20 MG tablet Take 1 tablet (20 mg total) by mouth 2 (two) times daily.    gabapentin (NEURONTIN) 600 MG tablet     hydrocodone-acetaminophen 10-325mg (NORCO)  mg Tab     metoprolol succinate (TOPROL-XL) 25 MG 24 hr tablet Take 1 tablet (25 mg total) by mouth once daily.    oxybutynin (DITROPAN) 5 MG Tab Take 1 tablet (5 mg total) by mouth 3 (three) times daily.    pantoprazole (PROTONIX) 40 MG tablet Take 40 mg by mouth once daily.    polyethylene glycol (GLYCOLAX) 17 gram PwPk Take 17 g by mouth once daily.    pravastatin (PRAVACHOL) 10 MG tablet TAKE ONE TABLET BY MOUTH AT BEDTIME    tamsulosin (FLOMAX) 0.4 mg Cp24 Take 1 capsule (0.4 mg total) by mouth once daily.    ACCU-CHEK SOFTCLIX LANCETS Misc     trazodone (DESYREL) 100 MG tablet Take 1 tablet (100 mg total) by mouth nightly as needed for Insomnia.           Clinical Reference Information           Your Vitals Were     BP Pulse Height Weight BMI    156/107 84 5' 11" (1.803 m) 91.1 kg (200 lb 13.4 oz) 28.01 kg/m2      Blood Pressure          Most Recent Value    BP  (!)  156/107      Allergies as of 5/2/2017     No Known Allergies      Immunizations Administered on Date of Encounter - 5/2/2017     None      Instructions    Stop oxybutynin now.  In 2 wks, wean " flomax from 2 capsules to 1 capsule at night.  In the next 2 wks, stop flomax all together.    Lab Results   Component Value Date    PSA 1.2 02/01/2016    PSA 0.66 03/28/2014    PSA 0.67 03/13/2013    PSADIAG 1.1 04/06/2015          Language Assistance Services     ATTENTION: Language assistance services are available, free of charge. Please call [phone].      ATENCIÓN: Si habla español, tiene a cope disposición servicios gratuitos de asistencia lingüística. Llame al [phone].     CHÚ Ý: N?u b?n nói Ti?ng Vi?t, có các d?ch v? h? tr? ngôn ng? mi?n phí dành cho b?n. G?i s? [phone].         [facility] 4[facility] complies with applicable Federal civil rights laws and does not discriminate on the basis of race, color, national origin, age, disability, or sex.

## 2017-05-04 ENCOUNTER — LAB VISIT (OUTPATIENT)
Dept: LAB | Facility: HOSPITAL | Age: 67
End: 2017-05-04
Attending: FAMILY MEDICINE
Payer: MEDICARE

## 2017-05-04 ENCOUNTER — OFFICE VISIT (OUTPATIENT)
Dept: FAMILY MEDICINE | Facility: CLINIC | Age: 67
End: 2017-05-04
Payer: MEDICARE

## 2017-05-04 VITALS
HEART RATE: 64 BPM | DIASTOLIC BLOOD PRESSURE: 98 MMHG | HEIGHT: 71 IN | BODY MASS INDEX: 28.49 KG/M2 | WEIGHT: 203.5 LBS | SYSTOLIC BLOOD PRESSURE: 160 MMHG

## 2017-05-04 DIAGNOSIS — K80.20 GALLSTONES: ICD-10-CM

## 2017-05-04 DIAGNOSIS — R10.31 RIGHT LOWER QUADRANT ABDOMINAL PAIN: ICD-10-CM

## 2017-05-04 DIAGNOSIS — E11.40 TYPE 2 DIABETES MELLITUS WITH DIABETIC NEUROPATHY, WITHOUT LONG-TERM CURRENT USE OF INSULIN: ICD-10-CM

## 2017-05-04 DIAGNOSIS — R10.31 RIGHT LOWER QUADRANT ABDOMINAL PAIN: Primary | ICD-10-CM

## 2017-05-04 DIAGNOSIS — Z12.11 SCREEN FOR COLON CANCER: ICD-10-CM

## 2017-05-04 LAB
AMYLASE SERPL-CCNC: 109 U/L
ANION GAP SERPL CALC-SCNC: 9 MMOL/L
BACTERIA #/AREA URNS AUTO: ABNORMAL /HPF
BASOPHILS # BLD AUTO: 0.02 K/UL
BASOPHILS NFR BLD: 0.3 %
BILIRUB UR QL STRIP: NEGATIVE
BUN SERPL-MCNC: 10 MG/DL
CALCIUM SERPL-MCNC: 9.5 MG/DL
CHLORIDE SERPL-SCNC: 104 MMOL/L
CLARITY UR REFRACT.AUTO: CLEAR
CO2 SERPL-SCNC: 26 MMOL/L
COLOR UR AUTO: YELLOW
CREAT SERPL-MCNC: 0.9 MG/DL
CREAT UR-MCNC: 139 MG/DL
DIFFERENTIAL METHOD: ABNORMAL
EOSINOPHIL # BLD AUTO: 0.2 K/UL
EOSINOPHIL NFR BLD: 2.9 %
ERYTHROCYTE [DISTWIDTH] IN BLOOD BY AUTOMATED COUNT: 12.3 %
EST. GFR  (AFRICAN AMERICAN): >60 ML/MIN/1.73 M^2
EST. GFR  (NON AFRICAN AMERICAN): >60 ML/MIN/1.73 M^2
GLUCOSE SERPL-MCNC: 120 MG/DL
GLUCOSE UR QL STRIP: NEGATIVE
HCT VFR BLD AUTO: 42.2 %
HGB BLD-MCNC: 13.2 G/DL
HGB UR QL STRIP: ABNORMAL
KETONES UR QL STRIP: NEGATIVE
LEUKOCYTE ESTERASE UR QL STRIP: NEGATIVE
LIPASE SERPL-CCNC: 25 U/L
LYMPHOCYTES # BLD AUTO: 2.4 K/UL
LYMPHOCYTES NFR BLD: 34.8 %
MCH RBC QN AUTO: 29.1 PG
MCHC RBC AUTO-ENTMCNC: 31.3 %
MCV RBC AUTO: 93 FL
MICROALBUMIN UR DL<=1MG/L-MCNC: 80 UG/ML
MICROALBUMIN/CREATININE RATIO: 57.6 UG/MG
MICROSCOPIC COMMENT: ABNORMAL
MONOCYTES # BLD AUTO: 0.6 K/UL
MONOCYTES NFR BLD: 9 %
NEUTROPHILS # BLD AUTO: 3.7 K/UL
NEUTROPHILS NFR BLD: 52.9 %
NITRITE UR QL STRIP: NEGATIVE
PH UR STRIP: 6 [PH] (ref 5–8)
PLATELET # BLD AUTO: 264 K/UL
PMV BLD AUTO: 11.8 FL
POTASSIUM SERPL-SCNC: 4.1 MMOL/L
PROT UR QL STRIP: NEGATIVE
RBC # BLD AUTO: 4.53 M/UL
RBC #/AREA URNS AUTO: 6 /HPF (ref 0–4)
SODIUM SERPL-SCNC: 139 MMOL/L
SP GR UR STRIP: 1.02 (ref 1–1.03)
SQUAMOUS #/AREA URNS AUTO: 0 /HPF
URN SPEC COLLECT METH UR: ABNORMAL
UROBILINOGEN UR STRIP-ACNC: 2 EU/DL
WBC # BLD AUTO: 6.9 K/UL
WBC #/AREA URNS AUTO: 1 /HPF (ref 0–5)

## 2017-05-04 PROCEDURE — 85025 COMPLETE CBC W/AUTO DIFF WBC: CPT

## 2017-05-04 PROCEDURE — 1160F RVW MEDS BY RX/DR IN RCRD: CPT | Mod: S$GLB,,, | Performed by: FAMILY MEDICINE

## 2017-05-04 PROCEDURE — 3077F SYST BP >= 140 MM HG: CPT | Mod: S$GLB,,, | Performed by: FAMILY MEDICINE

## 2017-05-04 PROCEDURE — 99999 PR PBB SHADOW E&M-EST. PATIENT-LVL III: CPT | Mod: PBBFAC,,, | Performed by: FAMILY MEDICINE

## 2017-05-04 PROCEDURE — 99499 UNLISTED E&M SERVICE: CPT | Mod: S$GLB,,, | Performed by: FAMILY MEDICINE

## 2017-05-04 PROCEDURE — 83690 ASSAY OF LIPASE: CPT

## 2017-05-04 PROCEDURE — 1157F ADVNC CARE PLAN IN RCRD: CPT | Mod: S$GLB,,, | Performed by: FAMILY MEDICINE

## 2017-05-04 PROCEDURE — 83036 HEMOGLOBIN GLYCOSYLATED A1C: CPT

## 2017-05-04 PROCEDURE — 3044F HG A1C LEVEL LT 7.0%: CPT | Mod: S$GLB,,, | Performed by: FAMILY MEDICINE

## 2017-05-04 PROCEDURE — 99214 OFFICE O/P EST MOD 30 MIN: CPT | Mod: S$GLB,,, | Performed by: FAMILY MEDICINE

## 2017-05-04 PROCEDURE — 1126F AMNT PAIN NOTED NONE PRSNT: CPT | Mod: S$GLB,,, | Performed by: FAMILY MEDICINE

## 2017-05-04 PROCEDURE — 80048 BASIC METABOLIC PNL TOTAL CA: CPT

## 2017-05-04 PROCEDURE — 1159F MED LIST DOCD IN RCRD: CPT | Mod: S$GLB,,, | Performed by: FAMILY MEDICINE

## 2017-05-04 PROCEDURE — 3060F POS MICROALBUMINURIA REV: CPT | Mod: S$GLB,,, | Performed by: FAMILY MEDICINE

## 2017-05-04 PROCEDURE — 36415 COLL VENOUS BLD VENIPUNCTURE: CPT | Mod: PO

## 2017-05-04 PROCEDURE — 82150 ASSAY OF AMYLASE: CPT

## 2017-05-04 PROCEDURE — 3080F DIAST BP >= 90 MM HG: CPT | Mod: S$GLB,,, | Performed by: FAMILY MEDICINE

## 2017-05-04 NOTE — MR AVS SNAPSHOT
[facility]   [place]  [address] [address]  Phone: [phone]  Fax: [phone]                  Xander Sanchez   2017 11:00 AM   Office Visit    Description:  Male : 1950   Provider:  Williams Alvarenga MD   Department:  [facility]           Reason for Visit     Abdominal Pain           Diagnoses this Visit        Comments    Right lower quadrant abdominal pain    -  Primary     Gallstones         Screen for colon cancer         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thy, without long-term current use of insulin                To Do List           Future Appointments        Provider Department Dept Phone    2017 11:45 AM [facility] [phone]    2017 8:40 AM Nadir Joy MD [place] - Card[facility] [phone]    2017 10:20 AM Sara Ruiz MD [facility] [phone]    2017 8:40 AM Williams Alvarenga MD [facility] [phone]      Goals (5 Years of Data)     None      [facility]     [facility] Care [facility] -  Assistance  Unless otherwise directed by your provider, please contact [facility], our nurse care line that is available for  assistance.     Registered nurses in the [facility] provide: appointment scheduling, clinical advisement, health education, and other advisory services.  Call: [phone] (toll free)               Medications           Message regarding Medications     Verify the changes and/or additions to your medication regime listed below are the same as discussed with your clinician today.  If any of these changes or additions are incorrect, please notify your healthcare provider.             Verify that the below list of medications is an accurate representation of the medications you are currently taking.  If none reported, the list may be blank. If incorrect, please contact your healthcare  "provider. Carry this list with you in case of emergency.           Current Medications     ACCU-CHEK SOFTCLIX LANCETS Misc     aspirin (ECOTRIN) 81 MG EC tablet Take 81 mg by mouth once daily.    baclofen (LIORESAL) 10 MG tablet Take 1 tablet (10 mg total) by mouth 3 (three) times daily.    CONTOUR NEXT STRIPS Strp     diphenhydrAMINE (BENADRYL) 50 MG capsule Take 1 capsule (50 mg total) by mouth every 6 (six) hours as needed for Itching or Allergies (swelling).    econazole nitrate 1 % cream Apply topically once daily.    famotidine (PEPCID) 20 MG tablet Take 1 tablet (20 mg total) by mouth 2 (two) times daily.    gabapentin (NEURONTIN) 600 MG tablet     hydrocodone-acetaminophen 10-325mg (NORCO)  mg Tab Starting on May 16, 2017. Take 1 tablet by mouth every 6 (six) hours as needed for Pain.    hydrocodone-acetaminophen 10-325mg (NORCO)  mg Tab Starting on Jun 16, 2017. Take 1 tablet by mouth every 6 (six) hours as needed for Pain.    hydrocodone-acetaminophen 10-325mg (NORCO)  mg Tab Starting on Jul 16, 2017. Take 1 tablet by mouth every 6 (six) hours as needed for Pain.    metoprolol succinate (TOPROL-XL) 25 MG 24 hr tablet Take 1 tablet (25 mg total) by mouth once daily.    oxybutynin (DITROPAN) 5 MG Tab Take 1 tablet (5 mg total) by mouth 3 (three) times daily.    pantoprazole (PROTONIX) 40 MG tablet Take 40 mg by mouth once daily.    polyethylene glycol (GLYCOLAX) 17 gram PwPk Take 17 g by mouth once daily.    pravastatin (PRAVACHOL) 10 MG tablet TAKE ONE TABLET BY MOUTH AT BEDTIME    tamsulosin (FLOMAX) 0.4 mg Cp24 Take 1 capsule (0.4 mg total) by mouth once daily.    trazodone (DESYREL) 100 MG tablet Take 1 tablet (100 mg total) by mouth nightly as needed for Insomnia.           Clinical Reference Information           Your Vitals Were     BP Pulse Height Weight BMI    160/98 (BP Location: Right arm, Patient Position: Sitting, BP Method: Manual) 64 5' 11" (1.803 m) 92.3 kg (203 lb 7.8 oz) " 28.38 kg/m2      Blood Pressure          Most Recent Value    BP  (!)  160/98      Allergies as of 5/4/2017     No Known Allergies      Immunizations Administered on Date of Encounter - 5/4/2017     None      Orders Placed During Today's Visit      Normal Orders This Visit    Ambulatory referral to General Surgery     Case request GI: COLONOSCOPY     Microalbumin/creatinine urine ratio     Urinalysis     Future Labs/Procedures Expected by Expires    Amylase  5/4/2017 8/2/2017    Basic metabolic panel  5/4/2017 8/2/2017    CBC auto differential  5/4/2017 5/4/2018    Hemoglobin A1c  5/4/2017 8/2/2017    Lipase  5/4/2017 8/2/2017      Language Assistance Services     ATTENTION: Language assistance services are available, free of charge. Please call [phone].      ATENCIÓN: Si yrnla jazmine, tiene a cope disposición servicios gratuitos de asistencia lingüística. Llame al [phone].     CHÚ Ý: N?u b?n nói Ti?ng Vi?t, có các d?ch v? h? tr? ngôn ng? mi?n phí dành cho b?n. G?i s? [phone].         [facility] complies with applicable Federal civil rights laws and does not discriminate on the basis of race, color, national origin, age, disability, or sex.

## 2017-05-04 NOTE — PROGRESS NOTES
Subjective:       Patient ID: Xander Sanchez is a 66 y.o. male.    Chief Complaint: Abdominal Pain    HPI Comments: 66 years old male who came to the clinic with right upper quadrant abdominal pain for the last month.  The pain is 6 out of 10 of intensity on and off aggravated with palpation and better with rest.  Patient currently asymptomatic.  Last ultrasound with evidence of multiple gallstones.  Patient with no recent A1c.  No polyuria polydipsia or polyphagia.    Abdominal Pain       Review of Systems   Constitutional: Negative.    HENT: Negative.    Eyes: Negative.    Respiratory: Negative.    Cardiovascular: Negative.    Gastrointestinal: Positive for abdominal pain.   Endocrine: Negative for cold intolerance, heat intolerance, polydipsia, polyphagia and polyuria.   Genitourinary: Negative.    Musculoskeletal: Negative.    Skin: Negative.    Neurological: Negative.    Psychiatric/Behavioral: Negative.        Objective:      Physical Exam   Constitutional: He is oriented to person, place, and time. He appears well-developed and well-nourished. No distress.   HENT:   Head: Normocephalic and atraumatic.   Right Ear: External ear normal.   Left Ear: External ear normal.   Nose: Nose normal.   Mouth/Throat: Oropharynx is clear and moist. No oropharyngeal exudate.   Eyes: Conjunctivae and EOM are normal. Pupils are equal, round, and reactive to light. Right eye exhibits no discharge. Left eye exhibits no discharge. No scleral icterus.   Neck: Normal range of motion. Neck supple. No JVD present. No tracheal deviation present. No thyromegaly present.   Cardiovascular: Normal rate, regular rhythm, normal heart sounds and intact distal pulses.  Exam reveals no gallop and no friction rub.    No murmur heard.  Pulmonary/Chest: Effort normal and breath sounds normal. No stridor. No respiratory distress. He has no wheezes. He has no rales. He exhibits no tenderness.   Abdominal: Soft. Bowel sounds are normal. He exhibits  no distension and no mass. There is no tenderness. There is no rebound and no guarding.   Musculoskeletal: Normal range of motion. He exhibits no edema or tenderness.   Lymphadenopathy:     He has no cervical adenopathy.   Neurological: He is alert and oriented to person, place, and time. He has normal reflexes. He displays normal reflexes. No cranial nerve deficit. He exhibits normal muscle tone. Coordination normal.   Skin: Skin is warm and dry. No rash noted. He is not diaphoretic. No erythema. No pallor.   Psychiatric: He has a normal mood and affect. His behavior is normal. Judgment and thought content normal.   Nursing note and vitals reviewed.      Assessment:       1. Right lower quadrant abdominal pain    2. Gallstones    3. Screen for colon cancer    4. 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thy, without long-term current use of insulin        Plan:         Xander was seen today for abdominal pain.    Diagnoses and all orders for this visit:    Right lower quadrant abdominal pain  -     Ambulatory referral to General Surgery  -     CBC auto differential; Future  -     Urinalysis  -     Basic metabolic panel; Future  -     Amylase; Future  -     Lipase; Future    Gallstones  -     Ambulatory referral to General Surgery  -     CBC auto differential; Future  -     Urinalysis  -     Basic metabolic panel; Future  -     Amylase; Future  -     Lipase; Future    Screen for colon cancer  -     Case request GI: COLONOSCOPY    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thy, without long-term current use of insulin  -     Hemoglobin A1c; Future  -     Microalbumin/creatinine urine ratio    Continue monitoring blood sugar at home,ADA diet.

## 2017-05-05 LAB
ESTIMATED AVG GLUCOSE: 137 MG/DL
HBA1C MFR BLD HPLC: 6.4 %

## 2017-05-09 ENCOUNTER — TELEPHONE (OUTPATIENT)
Dept: FAMILY MEDICINE | Facility: CLINIC | Age: 67
End: 2017-05-09

## 2017-05-09 DIAGNOSIS — R80.9 MICROALBUMINURIA: Primary | ICD-10-CM

## 2017-05-09 RX ORDER — LISINOPRIL 2.5 MG/1
2.5 TABLET ORAL DAILY
Qty: 90 TABLET | Refills: 3 | Status: SHIPPED | OUTPATIENT
Start: 2017-05-09 | End: 2018-03-13 | Stop reason: SDUPTHER

## 2017-05-09 NOTE — TELEPHONE ENCOUNTER
Pt informed he has protein in the urine secondary to the diabetes, low dose of lisinopril was sent to the pharmacy, verb understanding

## 2017-05-09 NOTE — TELEPHONE ENCOUNTER
Protein in the urine probably secondary to diabetes.  Low dose of lisinopril was prescribed to protect the kidneys.  Prescription was sent to the pharmacy.  Please notify the patient.

## 2017-05-10 DIAGNOSIS — Z86.010 PERSONAL HISTORY OF COLONIC POLYPS: Primary | ICD-10-CM

## 2017-05-10 RX ORDER — INSULIN PUMP SYRINGE, 3 ML
EACH MISCELLANEOUS
Qty: 1 EACH | Refills: 0 | Status: SHIPPED | OUTPATIENT
Start: 2017-05-10 | End: 2017-07-12 | Stop reason: SDUPTHER

## 2017-05-10 RX ORDER — LANCETS
1 EACH MISCELLANEOUS DAILY
Qty: 100 EACH | Refills: 3 | Status: SHIPPED | OUTPATIENT
Start: 2017-05-10 | End: 2019-02-04

## 2017-05-10 RX ORDER — POLYETHYLENE GLYCOL 3350, SODIUM SULFATE ANHYDROUS, SODIUM BICARBONATE, SODIUM CHLORIDE, POTASSIUM CHLORIDE 236; 22.74; 6.74; 5.86; 2.97 G/4L; G/4L; G/4L; G/4L; G/4L
4000 POWDER, FOR SOLUTION ORAL SEE ADMIN INSTRUCTIONS
Qty: 1 BOTTLE | Refills: 0 | Status: ON HOLD | OUTPATIENT
Start: 2017-05-10 | End: 2017-07-19 | Stop reason: HOSPADM

## 2017-06-05 ENCOUNTER — OFFICE VISIT (OUTPATIENT)
Dept: CARDIOLOGY | Facility: CLINIC | Age: 67
End: 2017-06-05
Payer: MEDICARE

## 2017-06-05 VITALS
SYSTOLIC BLOOD PRESSURE: 130 MMHG | OXYGEN SATURATION: 96 % | HEART RATE: 52 BPM | DIASTOLIC BLOOD PRESSURE: 80 MMHG | WEIGHT: 202.63 LBS | BODY MASS INDEX: 28.26 KG/M2

## 2017-06-05 DIAGNOSIS — I44.7 LBBB (LEFT BUNDLE BRANCH BLOCK): ICD-10-CM

## 2017-06-05 DIAGNOSIS — I42.8 CARDIOMYOPATHY, NONISCHEMIC: ICD-10-CM

## 2017-06-05 DIAGNOSIS — I11.0 HYPERTENSIVE LEFT VENTRICULAR HYPERTROPHY WITH HEART FAILURE: ICD-10-CM

## 2017-06-05 DIAGNOSIS — E11.40 TYPE 2 DIABETES MELLITUS WITH DIABETIC NEUROPATHY, WITHOUT LONG-TERM CURRENT USE OF INSULIN: ICD-10-CM

## 2017-06-05 DIAGNOSIS — M47.12 CERVICAL SPONDYLOSIS WITH MYELOPATHY: ICD-10-CM

## 2017-06-05 DIAGNOSIS — I10 ESSENTIAL HYPERTENSION: Primary | ICD-10-CM

## 2017-06-05 DIAGNOSIS — M54.12 BRACHIAL NEURITIS OR RADICULITIS NOS: ICD-10-CM

## 2017-06-05 PROCEDURE — 1125F AMNT PAIN NOTED PAIN PRSNT: CPT | Mod: S$GLB,,, | Performed by: INTERNAL MEDICINE

## 2017-06-05 PROCEDURE — 99999 PR PBB SHADOW E&M-EST. PATIENT-LVL II: CPT | Mod: PBBFAC,,, | Performed by: INTERNAL MEDICINE

## 2017-06-05 PROCEDURE — 4010F ACE/ARB THERAPY RXD/TAKEN: CPT | Mod: S$GLB,,, | Performed by: INTERNAL MEDICINE

## 2017-06-05 PROCEDURE — 3044F HG A1C LEVEL LT 7.0%: CPT | Mod: S$GLB,,, | Performed by: INTERNAL MEDICINE

## 2017-06-05 PROCEDURE — 99214 OFFICE O/P EST MOD 30 MIN: CPT | Mod: S$GLB,,, | Performed by: INTERNAL MEDICINE

## 2017-06-05 PROCEDURE — 1159F MED LIST DOCD IN RCRD: CPT | Mod: S$GLB,,, | Performed by: INTERNAL MEDICINE

## 2017-06-05 PROCEDURE — 99499 UNLISTED E&M SERVICE: CPT | Mod: S$GLB,,, | Performed by: INTERNAL MEDICINE

## 2017-06-05 NOTE — PATIENT INSTRUCTIONS
Heart Disease Education    The heart beats 60 to 100 times per minute, 24 hours a day. This equals almost 1000,000 times a day. It pumps blood with oxygen and nutrients to the tissues and organs of the body. But the heart is a muscle and needs its own supply of blood. Blood flow to the heart is supplied by the coronary arteries. Coronary artery disease (atherosclerosis) is a result of cholesterol, saturated fat, and calcium deposits (plaques) that build up inside the walls. This causes inflammation within the coronary arteries. These plaques narrow the artery and reduce blood flow to the heart muscle. The reduction in blood flow to the heart muscle decreases oxygen supply to the heart. If the narrowing is significant enough, the oxygen supply to one or more regions of the heart can be temporarily or permanently shut down. This can cause chest pain, and possibly death of heart tissue (heart attack).  Types of chest pain  Angina is the name for pain in the heart muscle. Angina is a warning sign of serious heart disease. When untreated it can lead to a heart attack, also known as acute myocardial infarction, or AMI. Angina occurs when there is not enough blood and oxygen flowing to the heart for the amount of work it is doing. This most often happens during physical exertion, when the heart is working hardest. It is usually relieved by rest or nitroglycerin. Angina may also occur after a large meal when extra blood is sent to the digestive organs and less goes to the heart. In the case of advanced or unstable heart disease, angina can occur at rest or awaken you from sleep. Angina usually lasts from a few minutes up to 20 minutes or more. When treated early, the effects of angina can be reversed without permanent damage to the heart. Angina is a serious condition and needs to be evaluated by a medical professional immediately.  There are two types of angina -- stable and unstable:  · Stable angina usually occurs  with a predictable level of activity. Being stable, its character, severity, and occurrence do not change much over time. It usually starts with activity, and resolves with rest or taking your medicine as instructed by your doctor. The symptoms usually do not last long.  · Unstable angina changes or gets worse over time. It is different from whatever you are used to. It may feel different or worse, begin without cause, occur with exercise or exertion, wake you up from sleep, and last longer. It may not respond in the same way as it does when you take your usual medicines for an attack. This type of angina can be a warning sign of an impending heart attack.     A heart attack is usually the result of a blood clot that suddenly forms in a coronary artery that has been narrowed with plaque. When this occurs, blood flow may be cut off to a part of the heart muscle, causing the cells to die. This weakens the pumping action of the heart, which affects the delivery of blood to all the other organs in the body including the brain. This damage is not reversible. However, early treatment can limit the amount of damage.  The pain you feel with angina and a heart attack may have a similar quality. However, it is usually different in intensity and duration. Here are some typical descriptions of a heart attack:  · It is most often experienced as a squeezing, crushing, pressure-like sensation in the center of the chest.  · It is sometimes described as something heavy sitting on my chest.  · It may feel more like a bad case of indigestion.  · The pain may spread from the chest to the arm, shoulder, throat or jaw.  · Sometimes the pain is not felt in the chest at all, but only in the arm, shoulder, throat or jaw.  · There may also be nausea, vomiting, dizziness or light-headedness, sweating and trouble breathing.  · Palpitations, or your heart beating rapidly  · A new, irregular heart beat  · Unexplained weakness  You may not be  "able to tell the difference between "bad" angina and a heart attack at home. Seek help if your symptoms are different than usual. Do not be in denial or just try to "tough it out."  Call 911  This is the fastest and safest way to get to the emergency department. The paramedics can also start treatment on the way to the hospital, saving valuable time for your heart.  · If the angina gets worse, if it continues, or if it stops and returns, call 911 immediately. Do not delay. You may be having a heart attack.  · After you call 911, take a second tablet or spray unless instructed otherwise. When repeating doses, sit down if possible, because it can make you feel lightheaded or dizzy. Wait another 5 minutes. If the angina still does not go away, take a third tablet or spray. Do not take more than 3 tablets or sprays within 15 minutes. Stay on the phone with 911 for further instruction.  · Your healthcare provider may give you slightly different instructions than those above. If so, follow them carefully.  Do not wait until symptoms become severe to call 911.  Other reasons to call 911 include:  · Trouble breathing  · Feeling lightheaded, faint, or dizzy  · Rapid heart beat  · Slower than usual heart rate compared to your normal  · Angina with weakness, dizziness, fainting, heavy sweating, nausea, or vomiting  · Extreme drowsiness, confusion  · Weakness of an arm or leg or one side of the face  · Difficulty with speech or vision  When to seek medical care  Remember, the signs and symptoms of a heart attack are not always like they are on TV. Sometimes they are not so obvious. You may only feel weak, or just not right. If it is not clear or if you have any doubt, call for advice.  · Seek help if there is a change in the type of pain, if it feels different, or if your symptoms are mild.  · Do not drive yourself. Have someone else drive you. If no one can drive, call 911.  · Do not delay. Fast diagnosis and treatment can " "prevent or limit the amount of heart damage during a heart attack.  · Do not go to your doctor's office or a clinic as they may not be able to provide all the testing and treatment required for this condition.  · If your doctor has given you medicine to take when symptoms occur, take them but don't delay getting help trying to locate medicines.  What happens in the emergency department  The emergency department is connected to your local emergency medical system (EMS) through 911. That's why during a cardiac emergency, calling 911 is the fastest way to get help. The goal of the emergency department is to rapidly screen, evaluate, and treat people.  Once you are there, an electrocardiogram (ECG or heart tracing) will be done. Blood samples may be taken to look for the presence of heart enzymes that leak from damaged heart cells and show if a heart attack is occurring. You will often be evaluated by a heart specialist (cardiologist) who decides the best course of action. In the case of severe angina or early heart attack, and depending on the circumstances, powerful "clot busting" medicines can be used to dissolve blood clots in the coronary artery. In other cases, you may be taken to a cardiac catheterization lab. Here, a tiny balloon-tipped catheter is advanced through blood vessels to the heart. There the balloon is inflated pushing open the blood vessel restoring blood flow.  Risk factors for heart disease  Risk factors for heart disease are a combination of genetic and lifestyle. Many risk factors work by either directly or indirectly damaging the blood vessels of the heart, or by increasing the risk of forming blood or cholesterol clots, which then clog up and block the arteries.     Examples of physical lifestyle risk factors:  · Cigarette smoking  · High blood pressure  · High blood cholesterol  · Use of stimulant drugs such as cocaine, crack, and amphetamines  · Eating a high-fat, high-cholesterol " meal  · Diabetes   · Obesity which increases risk for diabetes and high blood pressure  · Lack of regular physical activity     Examples of emotional lifestyle factors:  · Chronic high stress levels release stress hormones. These raise blood pressure and cholesterol level and makes blood clot more easily.  · Held-in anger, hostile or cynical attitude  · Social and emotional isolation, lack of intimacy  · Loss of relationship  · Depression  Other factors that increase the risk of heart attack that you cannot control :  · Age. The older you get beyond 40, the greater is your risk of significant coronary artery disease.  · Gender. More men than women get heart disease; but once past menopause, women who are not taking estrogen replacement have the same risk as men for a heart attack.  · Family history. If your mother, father, brother or sister has coronary artery disease, your risk of having it is higher than a person your age without this family history.  What can you do to decrease your risk  To reduce your risk of heart disease:  · Get regular checkups with your doctor.  · Take your medicines for blood pressure, cholesterol or diabetes as directed.  · Watch your diet. Eat a heart healthy diet choosing fresh foods, less salt, cholesterol, and fat  · Stop smoking. Get help if needed.  · Get regular exercise.  · Manage stress.  · Carry a list of medicines and doses in your wallet.  Date Last Reviewed: 12/30/2015  © 3156-5852 [employer]. [address] [place] [address], [place] [address]. All rights reserved. This information is not intended as a substitute for professional medical care. Always follow your healthcare professional's instructions.

## 2017-06-05 NOTE — PROGRESS NOTES
Subjective:   Patient ID:  Xander Sanchez is a 67 y.o. male who presents for evaluation and treatment of Hypertension; Congestive Heart Failure; and Hyperlipidemia      HPI:         Xander Sanchez 67 y.o. male is here follow up and feeling well without any new cardiaccomplaints. He was initially referred to my clinic by Dr. Munoz to rule out peripheral arterial as the cause of R arm numbness and fatigue for nearly 3 years. His vascular work up and presentation were unremarkable.       He has cervical DJD s/p 3 surgical interventions and CRESCENCIO. Last cervical CT revealed extensive hardware and severe R and moderate L neural foraminal narrowing between C3-C4; there is also moderate bilateral neural foraminal narrowing between C5-C6. He has no ulcers, discoloration or poikilothermia of his wrist to suggest ischemia. He has no pain with exertion.       He has DCM with [facility] AICD in place in the left chest.  Last EF 30% 11/2016. Arterial ultrasound revealed patent bilateral upper extremity arteries except for slightly lower velocities on the R arm. Not enough to cause rest pain, numbness, and/or paresthesia.        Wrist brachial index 12/2016:   R 1.0   L 1.0         Last ICD interrogation 1/2017    Well functioning device   No therapies   REEMA 2.45 V                Patient Active Problem List    Diagnosis Date Noted    Incomplete bladder emptying 05/02/2017    BPH with urinary obstruction 02/01/2017    BMI 28.0-28.9,adult 01/13/2017    Rotator cuff syndrome of right shoulder 09/15/2016    Muscle right arm weakness 08/22/2016    Right arm weakness 08/08/2016    Pain of upper extremity 03/14/2016    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thy 02/01/2016    Diastolic dysfunction with chronic heart failure 02/01/2016    Hypertensive left ventricular hypertrophy with heart failure 02/01/2016    Narcotic dependency, continuous 02/01/2016    Benign non-nodular prostatic hyperplasia with lower urinary tract  symptoms 2015    Facet arthritis of cervical region 2015     Seen on CT of the cervical spine dated 08/04/15      Slow transit constipation 2015    DM type 2 (diabetes mellitus, type 2) 2015    Chronic systolic dysfunction of left ventricle 2015    S/P TURP 2015    LBBB (left bundle branch block) 2015    Chronic pain syndrome 2014    Cervical post-laminectomy syndrome 2014    CRPS (complex regional pain syndrome type I) 2014    CRPS (complex regional pain syndrome type II) 2014    HNP (herniated nucleus pulposus) with myelopathy, cervical 2014    Cardiomyopathy, nonischemic 2013    PUD (peptic ulcer disease) 2013    COPD (chronic obstructive pulmonary disease) with emphysema 2013    Degeneration of cervical intervertebral disc 2013    Cervical spondylosis with myelopathy 10/17/2012    Brachial neuritis or radiculitis NOS 10/17/2012    HTN (hypertension) 2012    Aortic atherosclerosis 2011     Seen on chest x-ray dated 11             Left Arm BP - Sittin/80  Reason for not completing BP on both arms: injury        LABS    LAST HbA1c  Lab Results   Component Value Date    HGBA1C 6.4 (H) 2017       Lipid panel  Lab Results   Component Value Date    CHOL 142 2016    CHOL 150 01/10/2015    CHOL 157 2014     Lab Results   Component Value Date    HDL 46 2016    HDL 38 (L) 01/10/2015    HDL 45 2014     Lab Results   Component Value Date    LDLCALC 84.8 2016    LDLCALC 89.4 01/10/2015    LDLCALC 79.4 2014     Lab Results   Component Value Date    TRIG 56 2016    TRIG 113 01/10/2015    TRIG 163 (H) 2014     Lab Results   Component Value Date    CHOLHDL 32.4 2016    CHOLHDL 25.3 01/10/2015    CHOLHDL 28.7 2014            Review of Systems   Constitution: Negative for diaphoresis, weakness, night sweats, weight gain and weight  loss.   HENT: Negative for congestion.    Eyes: Negative for blurred vision, discharge and double vision.   Cardiovascular: Negative for chest pain, claudication, cyanosis, dyspnea on exertion, irregular heartbeat, leg swelling, near-syncope, orthopnea, palpitations, paroxysmal nocturnal dyspnea and syncope.        R arm pain   Respiratory: Negative for cough, shortness of breath and wheezing.    Endocrine: Negative for cold intolerance, heat intolerance and polyphagia.   Hematologic/Lymphatic: Negative for adenopathy and bleeding problem. Does not bruise/bleed easily.   Skin: Negative for dry skin and nail changes.   Musculoskeletal: Negative for arthritis, back pain, falls, joint pain, myalgias and neck pain.        R arm pain and numbness     Gastrointestinal: Negative for bloating, abdominal pain, change in bowel habit and constipation.   Genitourinary: Negative for bladder incontinence, dysuria, flank pain, genital sores and missed menses.   Neurological: Negative for aphonia, brief paralysis, difficulty with concentration and dizziness.   Psychiatric/Behavioral: Negative for altered mental status and memory loss. The patient does not have insomnia.    Allergic/Immunologic: Negative for environmental allergies.       Objective:   Physical Exam   Constitutional: He is oriented to person, place, and time. He appears well-developed and well-nourished. He is not intubated.   HENT:   Head: Normocephalic and atraumatic.   Right Ear: External ear normal.   Left Ear: External ear normal.   Mouth/Throat: Oropharynx is clear and moist.   Eyes: Conjunctivae and EOM are normal. Pupils are equal, round, and reactive to light. Right eye exhibits no discharge. Left eye exhibits no discharge. No scleral icterus.   Neck: Normal range of motion. Neck supple. Normal carotid pulses, no hepatojugular reflux and no JVD present. Carotid bruit is not present. No tracheal deviation present. No thyromegaly present.   Cardiovascular:  Normal rate, regular rhythm, S1 normal and S2 normal.   No extrasystoles are present. PMI is not displaced.  Exam reveals no gallop, no S3, no distant heart sounds, no friction rub and no midsystolic click.    No murmur heard.  Pulses:       Carotid pulses are 2+ on the right side, and 2+ on the left side.       Radial pulses are 1+ on the right side, and 1+ on the left side.        Femoral pulses are 2+ on the right side, and 2+ on the left side.       Popliteal pulses are 2+ on the right side, and 2+ on the left side.        Dorsalis pedis pulses are 2+ on the right side, and 2+ on the left side.        Posterior tibial pulses are 2+ on the right side, and 2+ on the left side.   Pulmonary/Chest: Effort normal and breath sounds normal. No accessory muscle usage or stridor. No apnea, no tachypnea and no bradypnea. He is not intubated. No respiratory distress. He has no decreased breath sounds. He has no wheezes. He has no rales. He exhibits no tenderness and no bony tenderness.   Abdominal: He exhibits no distension, no pulsatile liver, no abdominal bruit, no ascites, no pulsatile midline mass and no mass. There is no tenderness. There is no rebound and no guarding.   Musculoskeletal: Normal range of motion. He exhibits no edema or tenderness.   R arm, elbow, and shoulder pain with palpation   Lymphadenopathy:     He has no cervical adenopathy.   Neurological: He is alert and oriented to person, place, and time. He has normal reflexes. No cranial nerve deficit. Coordination normal.   Numbness on R forearm     Skin: Skin is warm. No rash noted. No erythema. No pallor.   Psychiatric: He has a normal mood and affect. His behavior is normal. Judgment and thought content normal.       Assessment:     1. Essential hypertension    2. Cardiomyopathy, nonischemic    3. LBBB (left bundle branch block)    4. Hypertensive left ventricular hypertrophy with heart failure    5. Cervical spondylosis with myelopathy    6. Brachial  neuritis or radiculitis NOS    7. 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thy, without long-term current use of insulin          Clinically his presentation is not consistent with a vascular problem   His R arm is warm   He has equal radial and capillary reflexes   Cervical CT with multiple abnormalities that can cause neuropathic pain       Arterial ultrasound revealed patent UE velocities with mild difference, L >> R   No obstructive arterial disease to cause rest pain   He has no symptoms with wrist exertion   Wrist brachial index in 12/2016 was normal even with severe rest pain      Even with a hot cup on his wrist he has no sensation        Plan:               Continue with current medical plan and lifestyle changes.  Return sooner for concerns or questions. If symptoms persist go to the ED  I have reviewed all pertinent data on this patient         Follow up as scheduled.   Return sooner for concerns or questions  Continue cardiovascular care with primary cardiologist  Further recommendations to follow            He expressed verbal understanding and agreed with the plan          Follow up as scheduled. Return sooner for concerns or questions  Follow up with PMR and pain management              Patient's Medications   New Prescriptions    No medications on file   Previous Medications    ACCU-CHEK SOFTCLIX LANCETS MISC        ASPIRIN (ECOTRIN) 81 MG EC TABLET    Take 81 mg by mouth once daily.    BACLOFEN (LIORESAL) 10 MG TABLET    Take 1 tablet (10 mg total) by mouth 3 (three) times daily.    BLOOD SUGAR DIAGNOSTIC (BLOOD GLUCOSE TEST) STRP    1 strip by Misc.(Non-Drug; Combo Route) route once daily at 6am.    BLOOD-GLUCOSE METER (TRUE METRIX AIR GLUCOSE METER) KIT    Use as instructed    DIPHENHYDRAMINE (BENADRYL) 50 MG CAPSULE    Take 1 capsule (50 mg total) by mouth every 6 (six) hours as needed for Itching or Allergies (swelling).    ECONAZOLE NITRATE 1 % CREAM    Apply topically once daily.     FAMOTIDINE (PEPCID) 20 MG TABLET    Take 1 tablet (20 mg total) by mouth 2 (two) times daily.    GABAPENTIN (NEURONTIN) 600 MG TABLET        HYDROCODONE-ACETAMINOPHEN 10-325MG (NORCO)  MG TAB    Take 1 tablet by mouth every 6 (six) hours as needed for Pain.    HYDROCODONE-ACETAMINOPHEN 10-325MG (NORCO)  MG TAB    Take 1 tablet by mouth every 6 (six) hours as needed for Pain.    HYDROCODONE-ACETAMINOPHEN 10-325MG (NORCO)  MG TAB    Take 1 tablet by mouth every 6 (six) hours as needed for Pain.    LANCETS MISC    1 lancet by Misc.(Non-Drug; Combo Route) route once daily at 6am.    LISINOPRIL (PRINIVIL,ZESTRIL) 2.5 MG TABLET    Take 1 tablet (2.5 mg total) by mouth once daily.    METOPROLOL SUCCINATE (TOPROL-XL) 25 MG 24 HR TABLET    Take 1 tablet (25 mg total) by mouth once daily.    OXYBUTYNIN (DITROPAN) 5 MG TAB    Take 1 tablet (5 mg total) by mouth 3 (three) times daily.    PANTOPRAZOLE (PROTONIX) 40 MG TABLET    Take 40 mg by mouth once daily.    POLYETHYLENE GLYCOL (GLYCOLAX) 17 GRAM PWPK    Take 17 g by mouth once daily.    POLYETHYLENE GLYCOL (GOLYTELY,NULYTELY) 236-22.74-6.74 -5.86 GRAM SUSPENSION    Take 4,000 mLs by mouth As instructed.    PRAVASTATIN (PRAVACHOL) 10 MG TABLET    TAKE ONE TABLET BY MOUTH AT BEDTIME    TAMSULOSIN (FLOMAX) 0.4 MG CP24    Take 1 capsule (0.4 mg total) by mouth once daily.    TRAZODONE (DESYREL) 100 MG TABLET    Take 1 tablet (100 mg total) by mouth nightly as needed for Insomnia.   Modified Medications    No medications on file   Discontinued Medications    No medications on file

## 2017-06-19 ENCOUNTER — TELEPHONE (OUTPATIENT)
Dept: GASTROENTEROLOGY | Facility: CLINIC | Age: 67
End: 2017-06-19

## 2017-06-19 NOTE — TELEPHONE ENCOUNTER
Spoke with pt and was able to reschedule his procedure to 7/19/17 with Dr. Uribe. Pt stated that he still has the prep and instruction. Verbal Understanding.           ----- Message from Cori Love sent at 6/19/2017  7:38 AM CDT -----  No. [phone]   Patient needs to cancel and reschedule the procedure on 6/21/17.   Please call.

## 2017-07-10 ENCOUNTER — TELEPHONE (OUTPATIENT)
Dept: UROLOGY | Facility: CLINIC | Age: 67
End: 2017-07-10

## 2017-07-10 NOTE — TELEPHONE ENCOUNTER
----- Message from Mariann Gonzalez sent at 7/10/2017  9:23 AM CDT -----  Contact: Self- [phone]  [facility]- pt called to speak with staff to discuss the issues he is having- no further details given- please call pt back at [phone]

## 2017-07-11 ENCOUNTER — OFFICE VISIT (OUTPATIENT)
Dept: UROLOGY | Facility: CLINIC | Age: 67
End: 2017-07-11
Payer: MEDICARE

## 2017-07-11 VITALS — WEIGHT: 198.44 LBS | BODY MASS INDEX: 27.67 KG/M2

## 2017-07-11 DIAGNOSIS — R35.0 FREQUENCY OF URINATION: ICD-10-CM

## 2017-07-11 DIAGNOSIS — R39.15 URGENCY OF URINATION: Primary | ICD-10-CM

## 2017-07-11 DIAGNOSIS — R30.0 DYSURIA: ICD-10-CM

## 2017-07-11 DIAGNOSIS — R35.1 NOCTURIA: ICD-10-CM

## 2017-07-11 PROCEDURE — 1126F AMNT PAIN NOTED NONE PRSNT: CPT | Mod: S$GLB,,, | Performed by: PHYSICIAN ASSISTANT

## 2017-07-11 PROCEDURE — 99999 PR PBB SHADOW E&M-EST. PATIENT-LVL III: CPT | Mod: PBBFAC,,, | Performed by: PHYSICIAN ASSISTANT

## 2017-07-11 PROCEDURE — 1159F MED LIST DOCD IN RCRD: CPT | Mod: S$GLB,,, | Performed by: PHYSICIAN ASSISTANT

## 2017-07-11 PROCEDURE — 87086 URINE CULTURE/COLONY COUNT: CPT

## 2017-07-11 PROCEDURE — 99213 OFFICE O/P EST LOW 20 MIN: CPT | Mod: S$GLB,,, | Performed by: PHYSICIAN ASSISTANT

## 2017-07-11 NOTE — PROGRESS NOTES
"CHIEF COMPLAINT:    Mr. Sanchez is a 67 y.o. male presenting for dysuria.    PRESENTING ILLNESS:    Xander Sanchez is a 67 y.o. male who presents for dysuria that started two days ago.  He also reports associated urinary frequency and nocturia.  He reports urgency sometimes.  He denies urinary incontinence.  Today his symptoms are better.      He had a laser TURP on 2/1/17.    FINAL PATHOLOGIC DIAGNOSIS  Prostate chips:  Smooth muscle and striated skeletal muscle.    He recently stopped flomax but started back on in it because he "wasn't feeling right".  When he restarted taking 2 and he felt normal.     He reports a good urinary stream and complete bladder emptying sometimes.    REVIEW OF SYSTEMS:    Constitutional: Negative for fever and chills.   HENT: Negative for hearing loss.   Eyes: Negative for visual disturbance.   Respiratory: Positive for shortness of breath. (uses inhaler)  Cardiovascular: Negative for chest pain.   Gastrointestinal: Negative for nausea, vomiting, and constipation.   Genitourinary: Positive for urgency, frequency, nocturia, dysuria Negative for  incontinence, hematuria, intermittency, hesitancy, difficulty urinating, incomplete bladder emptying, and decreased urine volume.   Neurological: Negative for dizziness.   Hematological: Does not bruise/bleed easily.   Psychiatric/Behavioral: Negative for confusion.       PATIENT HISTORY:    Past Medical History:   Diagnosis Date    Asthma     Cardiomyopathy     Cervical spondylosis with myelopathy 10/17/2012    Chronic bronchitis     Chronic systolic dysfunction of left ventricle 7/27/2015    Constipation 7/27/2015    COPD (chronic obstructive pulmonary disease)     Diabetes mellitus, type 2     DM type 2 (diabetes mellitus, type 2) 7/27/2015    Enlarged prostate 7/27/2015    Hypertension     ICD (implantable cardiac defibrillator) in place     Presence of biventricular AICD 7/27/2015    Type 2 diabetes mellitus with diabetic " neuropathy 2/1/2016    Urinary tract infection     pt does not know       Past Surgical History:   Procedure Laterality Date    CARDIAC DEFIBRILLATOR PLACEMENT      CERVICAL SPINE SURGERY      SPINE SURGERY         Family History   Problem Relation Age of Onset    Hypertension Mother     Hypertension Father     COPD Father     No Known Problems Sister     No Known Problems Brother     No Known Problems Daughter     Prostate cancer Neg Hx     Kidney disease Neg Hx        Social History     Social History    Marital status:      Spouse name: N/A    Number of children: N/A    Years of education: N/A     Occupational History    Not on file.     Social History Main Topics    Smoking status: Former Smoker     Packs/day: 1.00     Years: 40.00     Types: Cigarettes     Quit date: 7/26/2009    Smokeless tobacco: Never Used    Alcohol use 1.8 oz/week     3 Cans of beer per week    Drug use: No    Sexual activity: Yes     Partners: Female     Other Topics Concern    Not on file     Social History Narrative    No narrative on file       Allergies:  Review of patient's allergies indicates no known allergies.    Medications:    Current Outpatient Prescriptions:     ACCU-CHEK SOFTCLIX LANCETS Misc, , Disp: , Rfl:     aspirin (ECOTRIN) 81 MG EC tablet, Take 81 mg by mouth once daily., Disp: , Rfl:     baclofen (LIORESAL) 10 MG tablet, Take 1 tablet (10 mg total) by mouth 3 (three) times daily., Disp: 90 tablet, Rfl: 11    blood sugar diagnostic (BLOOD GLUCOSE TEST) Strp, 1 strip by Misc.(Non-Drug; Combo Route) route once daily at 6am., Disp: 100 strip, Rfl: 3    blood-glucose meter (TRUE METRIX AIR GLUCOSE METER) kit, Use as instructed, Disp: 1 each, Rfl: 0    diphenhydrAMINE (BENADRYL) 50 MG capsule, Take 1 capsule (50 mg total) by mouth every 6 (six) hours as needed for Itching or Allergies (swelling)., Disp: 20 capsule, Rfl: 0    econazole nitrate 1 % cream, Apply topically once daily., Disp:  85 g, Rfl: 0    famotidine (PEPCID) 20 MG tablet, Take 1 tablet (20 mg total) by mouth 2 (two) times daily., Disp: 20 tablet, Rfl: 0    gabapentin (NEURONTIN) 600 MG tablet, , Disp: , Rfl:     hydrocodone-acetaminophen 10-325mg (NORCO)  mg Tab, Take 1 tablet by mouth every 6 (six) hours as needed for Pain., Disp: 120 tablet, Rfl: 0    [START ON 7/16/2017] hydrocodone-acetaminophen 10-325mg (NORCO)  mg Tab, Take 1 tablet by mouth every 6 (six) hours as needed for Pain., Disp: 120 tablet, Rfl: 0    lancets Misc, 1 lancet by Misc.(Non-Drug; Combo Route) route once daily at 6am., Disp: 100 each, Rfl: 3    lisinopril (PRINIVIL,ZESTRIL) 2.5 MG tablet, Take 1 tablet (2.5 mg total) by mouth once daily., Disp: 90 tablet, Rfl: 3    metoprolol succinate (TOPROL-XL) 25 MG 24 hr tablet, Take 1 tablet (25 mg total) by mouth once daily., Disp: 90 tablet, Rfl: 3    oxybutynin (DITROPAN) 5 MG Tab, Take 1 tablet (5 mg total) by mouth 3 (three) times daily., Disp: 270 tablet, Rfl: 3    pantoprazole (PROTONIX) 40 MG tablet, Take 40 mg by mouth once daily., Disp: , Rfl:     polyethylene glycol (GLYCOLAX) 17 gram PwPk, Take 17 g by mouth once daily., Disp: 30 packet, Rfl: 0    polyethylene glycol (GOLYTELY,NULYTELY) 236-22.74-6.74 -5.86 gram suspension, Take 4,000 mLs by mouth As instructed., Disp: 1 Bottle, Rfl: 0    pravastatin (PRAVACHOL) 10 MG tablet, TAKE ONE TABLET BY MOUTH AT BEDTIME, Disp: 90 tablet, Rfl: 1    tamsulosin (FLOMAX) 0.4 mg Cp24, Take 1 capsule (0.4 mg total) by mouth once daily., Disp: 30 capsule, Rfl: 3    trazodone (DESYREL) 100 MG tablet, Take 1 tablet (100 mg total) by mouth nightly as needed for Insomnia., Disp: 30 tablet, Rfl: 1    PHYSICAL EXAMINATION:    Constitutional: He appears well-developed and well-nourished.  He is in no apparent distress.    Eyes: No scleral icterus noted bilaterally.  No discharge noted bilaterally.    Cardiovascular: Normal rate.  No pitting edema noted in  lower extremities bilaterally    Pulmonary/Chest: Effort normal. No respiratory distress.     Abdominal:  He exhibits no distension.  There is no CVA tenderness.     Lymphadenopathy:        Right: No supraclavicular adenopathy present.        Left: No supraclavicular adenopathy present.     Neurological: He is alert and oriented to person, place, and time.     Skin: Skin is warm and dry.     Psych: Cooperative with normal affect.    Physical Exam      LABS:    U/a: 1.010, pH 5, tr protein, negative.   Lab Results   Component Value Date    PSA 1.2 02/01/2016    PSA 0.66 03/28/2014    PSA 0.67 03/13/2013    PSADIAG 1.1 04/06/2015       IMPRESSION:    Encounter Diagnoses   Name Primary?    Urgency of urination Yes    Frequency of urination     Nocturia     Dysuria          PLAN:    Urine culture    Follow up based on culture.      Jen Santiago PA-C

## 2017-07-12 RX ORDER — BLOOD-GLUCOSE METER
EACH MISCELLANEOUS
Qty: 1 EACH | Refills: 0 | Status: SHIPPED | OUTPATIENT
Start: 2017-07-12 | End: 2019-01-02 | Stop reason: SDUPTHER

## 2017-07-13 LAB — BACTERIA UR CULT: NO GROWTH

## 2017-07-14 ENCOUNTER — TELEPHONE (OUTPATIENT)
Dept: GASTROENTEROLOGY | Facility: CLINIC | Age: 67
End: 2017-07-14

## 2017-07-19 ENCOUNTER — HOSPITAL ENCOUNTER (OUTPATIENT)
Facility: HOSPITAL | Age: 67
Discharge: HOME | End: 2017-07-19
Attending: INTERNAL MEDICINE | Admitting: INTERNAL MEDICINE
Payer: MEDICARE

## 2017-07-19 ENCOUNTER — ANESTHESIA (OUTPATIENT)
Dept: ENDOSCOPY | Facility: HOSPITAL | Age: 67
End: 2017-07-19
Payer: MEDICARE

## 2017-07-19 ENCOUNTER — ANESTHESIA EVENT (OUTPATIENT)
Dept: ENDOSCOPY | Facility: HOSPITAL | Age: 67
End: 2017-07-19
Payer: MEDICARE

## 2017-07-19 VITALS
OXYGEN SATURATION: 95 % | SYSTOLIC BLOOD PRESSURE: 131 MMHG | BODY MASS INDEX: 26.88 KG/M2 | RESPIRATION RATE: 16 BRPM | DIASTOLIC BLOOD PRESSURE: 92 MMHG | WEIGHT: 192 LBS | HEIGHT: 71 IN | TEMPERATURE: 98 F | HEART RATE: 80 BPM

## 2017-07-19 DIAGNOSIS — Z86.010 HISTORY OF COLON POLYPS: ICD-10-CM

## 2017-07-19 PROBLEM — Z86.0100 HISTORY OF COLON POLYPS: Status: ACTIVE | Noted: 2017-07-19

## 2017-07-19 LAB — POCT GLUCOSE: 86 MG/DL (ref 70–110)

## 2017-07-19 PROCEDURE — 25000003 PHARM REV CODE 250: Performed by: INTERNAL MEDICINE

## 2017-07-19 PROCEDURE — 37000009 HC ANESTHESIA EA ADD 15 MINS: Performed by: INTERNAL MEDICINE

## 2017-07-19 PROCEDURE — 37000008 HC ANESTHESIA 1ST 15 MINUTES: Performed by: INTERNAL MEDICINE

## 2017-07-19 PROCEDURE — 88305 TISSUE EXAM BY PATHOLOGIST: CPT | Mod: 26,,, | Performed by: PATHOLOGY

## 2017-07-19 PROCEDURE — 45380 COLONOSCOPY AND BIOPSY: CPT | Performed by: INTERNAL MEDICINE

## 2017-07-19 PROCEDURE — 27201012 HC FORCEPS, HOT/COLD, DISP: Performed by: INTERNAL MEDICINE

## 2017-07-19 PROCEDURE — 88305 TISSUE EXAM BY PATHOLOGIST: CPT | Performed by: PATHOLOGY

## 2017-07-19 PROCEDURE — 63600175 PHARM REV CODE 636 W HCPCS: Performed by: NURSE ANESTHETIST, CERTIFIED REGISTERED

## 2017-07-19 PROCEDURE — 25000003 PHARM REV CODE 250: Performed by: NURSE ANESTHETIST, CERTIFIED REGISTERED

## 2017-07-19 PROCEDURE — 82962 GLUCOSE BLOOD TEST: CPT | Performed by: INTERNAL MEDICINE

## 2017-07-19 PROCEDURE — 45380 COLONOSCOPY AND BIOPSY: CPT | Mod: PT,,, | Performed by: INTERNAL MEDICINE

## 2017-07-19 RX ORDER — SODIUM CHLORIDE 9 MG/ML
INJECTION, SOLUTION INTRAVENOUS CONTINUOUS
Status: DISCONTINUED | OUTPATIENT
Start: 2017-07-19 | End: 2017-07-19 | Stop reason: HOSPADM

## 2017-07-19 RX ORDER — LIDOCAINE HCL/PF 100 MG/5ML
SYRINGE (ML) INTRAVENOUS
Status: DISCONTINUED | OUTPATIENT
Start: 2017-07-19 | End: 2017-07-19

## 2017-07-19 RX ORDER — PROPOFOL 10 MG/ML
VIAL (ML) INTRAVENOUS
Status: DISCONTINUED | OUTPATIENT
Start: 2017-07-19 | End: 2017-07-19

## 2017-07-19 RX ORDER — PROPOFOL 10 MG/ML
VIAL (ML) INTRAVENOUS CONTINUOUS PRN
Status: DISCONTINUED | OUTPATIENT
Start: 2017-07-19 | End: 2017-07-19

## 2017-07-19 RX ADMIN — LIDOCAINE HYDROCHLORIDE 80 MG: 20 INJECTION, SOLUTION INTRAVENOUS at 11:07

## 2017-07-19 RX ADMIN — PROPOFOL 150 MCG/KG/MIN: 10 INJECTION, EMULSION INTRAVENOUS at 11:07

## 2017-07-19 RX ADMIN — PROPOFOL 30 MG: 10 INJECTION, EMULSION INTRAVENOUS at 11:07

## 2017-07-19 RX ADMIN — PROPOFOL 50 MG: 10 INJECTION, EMULSION INTRAVENOUS at 11:07

## 2017-07-19 RX ADMIN — SODIUM CHLORIDE: 0.9 INJECTION, SOLUTION INTRAVENOUS at 10:07

## 2017-07-19 NOTE — ANESTHESIA PREPROCEDURE EVALUATION
07/19/2017  Xander Sanchez is a 67 y.o., male colonoscopy    Review of patient's allergies indicates:  No Known Allergies  Past Medical History:   Diagnosis Date    Asthma     Cardiomyopathy     Cervical spondylosis with myelopathy 10/17/2012    Chronic bronchitis     Chronic systolic dysfunction of left ventricle 7/27/2015    Constipation 7/27/2015    COPD (chronic obstructive pulmonary disease)     Diabetes mellitus, type 2     DM type 2 (diabetes mellitus, type 2) 7/27/2015    Enlarged prostate 7/27/2015    Hypertension     ICD (implantable cardiac defibrillator) in place     Presence of biventricular AICD 7/27/2015    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thy 2/1/2016    Urinary tract infection     pt does not know     Past Surgical History:   Procedure Laterality Date    CARDIAC DEFIBRILLATOR PLACEMENT      CERVICAL SPINE SURGERY      SPINE SURGERY       Patient Active Problem List   Diagnosis    HTN (hypertension)    Cervical spondylosis with myelopathy    Brachial neuritis or radiculitis NOS    Degeneration of cervical intervertebral disc    PUD (peptic ulcer disease)    COPD (chronic obstructive pulmonary disease) with emphysema    Cardiomyopathy, nonischemic    HNP (herniated nucleus pulposus) with myelopathy, cervical    CRPS (complex regional pain syndrome type II)    Chronic pain syndrome    Cervical post-laminectomy syndrome    CRPS (complex regional pain syndrome type I)    LBBB (left bundle branch block)    Slow transit constipation    DM type 2 (diabetes mellitus, type 2)    Chronic systolic dysfunction of left ventricle    S/P TURP    Benign non-nodular prostatic hyperplasia with lower urinary tract symptoms    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thy    Diastolic dysfunction with chronic heart failure    Hypertensive left ventricular  hypertrophy with heart failure    Narcotic dependency, continuous    Pain of upper extremity    Right arm weakness    Muscle right arm weakness    Rotator cuff syndrome of right shoulder    Facet arthritis of cervical region    Aortic atherosclerosis    BMI 28.0-28.9,adult    BPH with urinary obstruction    Incomplete bladder emptying         Anesthesia Evaluation         Review of Systems    Wt Readings from Last 3 Encounters:   07/11/17 90 kg (198 lb 6.6 oz)   06/05/17 91.9 kg (202 lb 9.6 oz)   05/04/17 92.3 kg (203 lb 7.8 oz)     Temp Readings from Last 3 Encounters:   02/16/17 36.8 °C (98.2 °F) (Oral)   02/04/17 36.8 °C (98.2 °F) (Oral)   02/01/17 36.6 °C (97.9 °F) (Temporal)     BP Readings from Last 3 Encounters:   06/05/17 130/80   05/04/17 (!) 160/98   05/02/17 (!) 152/84     Pulse Readings from Last 3 Encounters:   06/05/17 (!) 52   05/04/17 64   05/02/17 (!) 56     Lab Results   Component Value Date    WBC 6.90 05/04/2017    HGB 13.2 (L) 05/04/2017    HCT 42.2 05/04/2017    MCV 93 05/04/2017     05/04/2017       Chemistry        Component Value Date/Time     05/04/2017 1150    K 4.1 05/04/2017 1150     05/04/2017 1150    CO2 26 05/04/2017 1150    BUN 10 05/04/2017 1150    CREATININE 0.9 05/04/2017 1150     (H) 05/04/2017 1150        Component Value Date/Time    CALCIUM 9.5 05/04/2017 1150    ALKPHOS 67 02/04/2017 2111    AST 23 02/04/2017 2111    ALT 19 02/04/2017 2111    BILITOT 0.5 02/04/2017 2111    ESTGFRAFRICA >60.0 05/04/2017 1150    EGFRNONAA >60.0 05/04/2017 1150            Physical Exam  General:  Well nourished    Airway/Jaw/Neck:  Airway Findings: Mouth Opening: Normal Tongue: Normal  General Airway Assessment: Adult  Mallampati: II  Improves to II with phonation.  TM Distance: Normal, at least 6 cm       Chest/Lungs:  Chest/Lungs Findings: Clear to auscultation, Normal Respiratory Rate     Heart/Vascular:  Heart Findings: Rate: Normal  Rhythm: Regular Rhythm   Sounds: Normal        Mental Status:  Mental Status Findings:  Cooperative, Alert and Oriented         Anesthesia Plan  Type of Anesthesia, risks & benefits discussed:  Anesthesia Type:  MAC  Patient's Preference:   Intra-op Monitoring Plan:   Intra-op Monitoring Plan Comments:   Post Op Pain Control Plan:   Post Op Pain Control Plan Comments:   Induction:   IV  Beta Blocker:         Informed Consent: Patient understands risks and agrees with Anesthesia plan.  Questions answered. Anesthesia consent signed with patient.  ASA Score: 3     Day of Surgery Review of History & Physical: I have interviewed and examined the patient. I have reviewed the patient's H&P dated:  There are no significant changes.  H&P update referred to the provider.  H&P completed by Anesthesiologist.       Ready For Surgery From Anesthesia Perspective.

## 2017-07-19 NOTE — H&P
History and Physical      Chief complaints: Requesting screening colonscopy    History of Presenting Illness    Patient requesting colonoscopy.  Patient denies any abdominal pain, weight loss or blood in the stool.  There is no family history of colon cancer. Patient has a personal history of colon polyp. His last colonoscopy was in 2012    Review of Systems   Constitutional: Negative for fever and appetite change.   HENT: Negative for sore throat, trouble swallowing and neck pain.   Eyes: Negative for photophobia and visual disturbance.   Respiratory: Negative for wheezing.   Cardiovascular: Negative for chest pain and palpitations.   Gastrointestinal: See HPI for details   Musculoskeletal: Negative for joint swelling and arthralgias.   Skin: Negative for rash and wound.   Neurological: Negative for dizziness, tremors and weakness.   Hematological: Negative.   Psychiatric/Behavioral: Negative for suicidal ideas and behavioral problems.     Past Medical History:   Diagnosis Date    Asthma     Cardiomyopathy     Cervical spondylosis with myelopathy 10/17/2012    Chronic bronchitis     Chronic systolic dysfunction of left ventricle 7/27/2015    Constipation 7/27/2015    COPD (chronic obstructive pulmonary disease)     Diabetes mellitus, type 2     DM type 2 (diabetes mellitus, type 2) 7/27/2015    Enlarged prostate 7/27/2015    Hypertension     ICD (implantable cardiac defibrillator) in place     Presence of biventricular AICD 7/27/2015    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thy 2/1/2016    Urinary tract infection     pt does not know       Past Surgical History:   Procedure Laterality Date    CARDIAC DEFIBRILLATOR PLACEMENT      CERVICAL SPINE SURGERY      SPINE SURGERY         Family History   Problem Relation Age of Onset    Hypertension Mother     Hypertension Father     COPD Father     No Known Problems Sister     No Known Problems Brother     No Known Problems Daughter      Prostate cancer Neg Hx     Kidney disease Neg Hx        Social History     Social History    Marital status:      Spouse name: N/A    Number of children: N/A    Years of education: N/A     Social History Main Topics    Smoking status: Former Smoker     Packs/day: 1.00     Years: 40.00     Types: Cigarettes     Quit date: 7/26/2009    Smokeless tobacco: Never Used    Alcohol use 4.2 oz/week     6 Cans of beer, 1 Shots of liquor per week    Drug use: No    Sexual activity: Yes     Partners: Female     Other Topics Concern    None     Social History Narrative    None       Current Facility-Administered Medications   Medication Dose Route Frequency Provider Last Rate Last Dose    0.9%  NaCl infusion   Intravenous Continuous Vannessa Uribe MD 20 mL/hr at 07/19/17 1027         Review of patient's allergies indicates:  No Known Allergies    Objective:      Vitals:    07/19/17 1032   BP: (!) 179/84   Pulse: 60   Resp: 16   Temp: 98.1 °F (36.7 °C)     Physical Exam   Constitutional: Patient is oriented to person, place, and time. Appears well-nourished.   HENT:   Mouth/Throat: Oropharynx is clear and moist.   Eyes: Pupils are equal, round, and reactive to light.   Neck: Neck supple.   Cardiovascular: Normal heart sounds.   Pulmonary/Chest: Effort normal and breath sounds normal.   Abdominal: Soft. Exhibits no mass. There is no tenderness. There is no guarding.   Musculoskeletal: Normal range of motion.   Lymphadenopathy: Has no cervical adenopathy.   Neurological:Alert and oriented to person, place, and time.   Skin: Skin is warm. No rash noted.   Psychiatric: Has a normal mood and affect.     Assessment:  History of colon polyp    Plan:  Colonoscopy       I have reviewed the patient's medical history in detail and updated the computerized patient record

## 2017-07-19 NOTE — DISCHARGE INSTRUCTIONS
Discharge Summary/Instructions for after Colonoscopy with Biopsy/Polypectomy    Xander Sanchez  7/19/2017  Vannessa Uribe MD    Restrictions on Activity:    - Do not drive car or operate machinery until the day after the procedure.  - The following day: return to full activity including work.  - For 3 days: No heavy lifting, straining or running.  - Diet: Eat and drink normally unless instructed otherwise.    Treatment for Common Side Effects:  - Mild abdominal pain and bloating or excessive gas: rest, eat lightly and use a heating pad.     Symptoms to watch for and report to your physician:  1. Severe abdominal pain.  2. Fever within 24 hours after a procedure.  3. A large amount of rectal bleeding. (A small amount of blood from the rectum is not serious, especially if hemorrhoids are present.)  4. Because air was put into your colon during the procedure, expelling large amount of air from your rectum is normal.  5. You may not have a bowel movement for 1-3 days because of the colonoscopy prep. This is normal.  6. Go directly to the emergency room if you notice any of the following:     Chills and/or fever over 101   Persistent vomiting   Severe abdominal pain, other than gas cramps   Severe chest pain   Black, tarry stools   Any bleeding - exceeding one tablespoon    If you have any questions or problems, please call your Physician:    Vannessa Uribe MD      Lab Results: Contact Physician's Office      If a complication or emergency situation arises and you are unable to reach your Physician - GO TO THE EMERGENCY ROOM.

## 2017-07-19 NOTE — TRANSFER OF CARE
"Anesthesia Transfer of Care Note    Patient: Xander Sanchez    Procedure(s) Performed: Procedure(s) (LRB):  COLONOSCOPY  golytely (N/A)    Patient location: GI    Anesthesia Type: MAC    Transport from OR: Transported from OR on room air with adequate spontaneous ventilation    Post pain: adequate analgesia    Post assessment: no apparent anesthetic complications and tolerated procedure well    Post vital signs: stable    Level of consciousness: awake, alert and oriented    Nausea/Vomiting: no nausea/vomiting    Complications: none    Transfer of care protocol was followed      Last vitals:   Visit Vitals  /72 (BP Location: Left arm, Patient Position: Lying, BP Method: Automatic)   Pulse 71   Temp 36.5 °C (97.7 °F) (Oral)   Resp 12   Ht 5' 11" (1.803 m)   Wt 87.1 kg (192 lb)   SpO2 97%   BMI 26.78 kg/m²     "

## 2017-07-19 NOTE — ANESTHESIA POSTPROCEDURE EVALUATION
"Anesthesia Post Evaluation    Patient: Xander Sanchez    Procedure(s) Performed: Procedure(s) (LRB):  COLONOSCOPY  golytely (N/A)    Final Anesthesia Type: MAC  Patient location during evaluation: GI PACU  Patient participation: Yes- Able to Participate  Level of consciousness: awake and alert and oriented  Post-procedure vital signs: reviewed and stable  Pain management: adequate  Airway patency: patent  PONV status at discharge: No PONV  Anesthetic complications: no      Cardiovascular status: blood pressure returned to baseline and hemodynamically stable  Respiratory status: unassisted, spontaneous ventilation and room air  Hydration status: euvolemic  Follow-up not needed.        Visit Vitals  /72 (BP Location: Left arm, Patient Position: Lying, BP Method: Automatic)   Pulse 71   Temp 36.5 °C (97.7 °F) (Oral)   Resp 12   Ht 5' 11" (1.803 m)   Wt 87.1 kg (192 lb)   SpO2 97%   BMI 26.78 kg/m²       Pain/Odell Score: Pain Assessment Performed: Yes (7/19/2017 10:35 AM)  Presence of Pain: denies (7/19/2017 10:35 AM)      "

## 2017-07-25 ENCOUNTER — TELEPHONE (OUTPATIENT)
Dept: GASTROENTEROLOGY | Facility: CLINIC | Age: 67
End: 2017-07-25

## 2017-07-25 NOTE — TELEPHONE ENCOUNTER
----- Message from Vannessa Uribe MD sent at 7/24/2017 10:04 PM CDT -----  Pathology report is benign

## 2017-08-01 RX ORDER — PRAVASTATIN SODIUM 10 MG/1
TABLET ORAL
Qty: 90 TABLET | Refills: 0 | Status: SHIPPED | OUTPATIENT
Start: 2017-08-01 | End: 2017-10-31 | Stop reason: SDUPTHER

## 2017-08-02 ENCOUNTER — OFFICE VISIT (OUTPATIENT)
Dept: PHYSICAL MEDICINE AND REHAB | Facility: CLINIC | Age: 67
End: 2017-08-02
Payer: MEDICARE

## 2017-08-02 VITALS
DIASTOLIC BLOOD PRESSURE: 92 MMHG | HEIGHT: 71 IN | WEIGHT: 196.19 LBS | BODY MASS INDEX: 27.47 KG/M2 | HEART RATE: 71 BPM | SYSTOLIC BLOOD PRESSURE: 173 MMHG

## 2017-08-02 DIAGNOSIS — R29.898 RIGHT HAND WEAKNESS: ICD-10-CM

## 2017-08-02 DIAGNOSIS — R29.898 RIGHT ARM WEAKNESS: ICD-10-CM

## 2017-08-02 DIAGNOSIS — M79.601 PAIN OF RIGHT UPPER EXTREMITY: ICD-10-CM

## 2017-08-02 DIAGNOSIS — G89.4 CHRONIC PAIN SYNDROME: ICD-10-CM

## 2017-08-02 DIAGNOSIS — F11.20 NARCOTIC DEPENDENCY, CONTINUOUS: ICD-10-CM

## 2017-08-02 DIAGNOSIS — R26.81 GAIT INSTABILITY: ICD-10-CM

## 2017-08-02 DIAGNOSIS — M96.1 CERVICAL POST-LAMINECTOMY SYNDROME: ICD-10-CM

## 2017-08-02 DIAGNOSIS — M54.12 BRACHIAL NEURITIS OR RADICULITIS NOS: ICD-10-CM

## 2017-08-02 DIAGNOSIS — M47.12 CERVICAL SPONDYLOSIS WITH MYELOPATHY: Primary | ICD-10-CM

## 2017-08-02 PROCEDURE — 99214 OFFICE O/P EST MOD 30 MIN: CPT | Mod: S$GLB,,, | Performed by: PHYSICAL MEDICINE & REHABILITATION

## 2017-08-02 PROCEDURE — 99499 UNLISTED E&M SERVICE: CPT | Mod: S$GLB,,, | Performed by: PHYSICAL MEDICINE & REHABILITATION

## 2017-08-02 PROCEDURE — 99999 PR PBB SHADOW E&M-EST. PATIENT-LVL III: CPT | Mod: PBBFAC,,, | Performed by: PHYSICAL MEDICINE & REHABILITATION

## 2017-08-02 RX ORDER — GABAPENTIN 600 MG/1
600 TABLET ORAL
Qty: 360 TABLET | Refills: 3 | Status: SHIPPED | OUTPATIENT
Start: 2017-08-02 | End: 2018-02-26 | Stop reason: SDUPTHER

## 2017-08-02 RX ORDER — HYDROCODONE BITARTRATE AND ACETAMINOPHEN 10; 325 MG/1; MG/1
1 TABLET ORAL EVERY 6 HOURS PRN
Qty: 120 TABLET | Refills: 0 | Status: SHIPPED | OUTPATIENT
Start: 2017-09-16 | End: 2017-08-29

## 2017-08-02 RX ORDER — HYDROCODONE BITARTRATE AND ACETAMINOPHEN 10; 325 MG/1; MG/1
1 TABLET ORAL EVERY 6 HOURS PRN
Qty: 120 TABLET | Refills: 0 | Status: SHIPPED | OUTPATIENT
Start: 2017-10-16 | End: 2017-08-29

## 2017-08-02 RX ORDER — HYDROCODONE BITARTRATE AND ACETAMINOPHEN 10; 325 MG/1; MG/1
1 TABLET ORAL EVERY 6 HOURS PRN
Qty: 120 TABLET | Refills: 0 | Status: SHIPPED | OUTPATIENT
Start: 2017-08-17 | End: 2017-08-29

## 2017-08-02 NOTE — PROGRESS NOTES
Subjective:       Patient ID: Xander Sanchez is a 67 y.o. male.    Chief Complaint: Extremity Weakness; Arm Pain; and Neck Pain    Arm Pain    Pertinent negatives include no chest pain. Numbness: Right arm paina nd numbness.   Extremity Weakness    Numbness: Right arm paina nd numbness.   Neck Pain    Pertinent negatives include no chest pain or headaches. Numbness: Right arm paina nd numbness. Weakness: right arm weak.      Mr Sanchez is Right handed male,who returns to clinic for right arm pain, and weakness that worsened after second neck surgery with Dr. Meyer.   Third surgery with Dr. De Dios did not changed his symptoms.   He states that all symptoms stayed the same as before surgery.   He reports some improvement in pain, but the tightness, cold sensation in right arm is same as before.  Mr. Sanchez returns to clinic for chronic pain management.  Last clinic visit was on  05/02/17.    Current right arm pain is 5, average pain is 4-5, worst pain is 7/10.   He has not that much of neck pain, current neck pain is 1, worst pain maybe 2.   He complains mainly about tightness, pressure in right arm, in right arm , more in forearm, than above elbow, tightness  runs down the arm to right thumb.   Right arm pain is constant, day and night, intensity changes, pain is worse during day time, does not awake him at night.   In morning feels tight, stiff, as pressure around the arm, also feels swollen and tight in hand, and feels cold at all time.   It hurts more bellow \the elbow than above elbow.   He reports that he does not have feelings, cannot write, does not know if things are going to drop out of his hand.   Sometimes squeezes too hard plastic cup.   Right hand is clumsy, getting smaller, hardly can dress, cannot button shirt.   He states that he is not able to dress. He states that before surgery he has had similar problems, but they just got worse after second surgery.   It is swelling feeling, and tightness that  borders him, not that much pain.   Sometimes right arm feels cold, and cold weather affect him more,  Pain is better controled with Hydrocodone.   He went for CRESCENCIO, once before surgery and few time after surgery, total  > 3   Takes Hydrocodone 10/325 mg PO TID, that brings his pain down to tolerable 2.   He is now taking Neurontin 600 mg po QID, total 2400 mg /day, and tolerates it well.   But, Neurontin does not help much, in his opinion.   Hydrocodone helps and brings pain down to 2, and gives him ~ 5 hrs pain relief.  Also, takes Baclofen 10 mg po TID , for tightness in right arm, that helps also.  Now, awaiting procedure, pain stimulator, that is a little complicated since he has AICD.  Here for follow up, re evaluation and conservative treatment, involved in chronic pain management with opiates.    Past Medical History:   Diagnosis Date    Asthma     Cardiomyopathy     Cervical spondylosis with myelopathy 10/17/2012    Chronic bronchitis     Chronic systolic dysfunction of left ventricle 7/27/2015    Constipation 7/27/2015    COPD (chronic obstructive pulmonary disease)     Diabetes mellitus, type 2     DM type 2 (diabetes mellitus, type 2) 7/27/2015    Enlarged prostate 7/27/2015    Hypertension     ICD (implantable cardiac defibrillator) in place     Presence of biventricular AICD 7/27/2015    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thy 2/1/2016    Urinary tract infection     pt does not know       Past Surgical History:   Procedure Laterality Date    CARDIAC DEFIBRILLATOR PLACEMENT      CERVICAL SPINE SURGERY      COLONOSCOPY N/A 7/19/2017    Procedure: COLONOSCOPY  golytely;  Surgeon: Vannessa Uribe MD;  Location: [facility];  Service: Endoscopy;  Laterality: N/A;    SPINE SURGERY         Family History   Problem Relation Age of Onset    Hypertension Mother     Hypertension Father     COPD Father     No Known Problems Sister     No Known Problems Brother     No Known  Problems Daughter     Prostate cancer Neg Hx     Kidney disease Neg Hx        Social History     Social History    Marital status:      Spouse name: N/A    Number of children: N/A    Years of education: N/A     Social History Main Topics    Smoking status: Former Smoker     Packs/day: 1.00     Years: 40.00     Types: Cigarettes     Quit date: 7/26/2009    Smokeless tobacco: Never Used    Alcohol use 4.2 oz/week     6 Cans of beer, 1 Shots of liquor per week    Drug use: No    Sexual activity: Yes     Partners: Female     Other Topics Concern    None     Social History Narrative    None       Current Outpatient Prescriptions   Medication Sig Dispense Refill    ACCU-CHEK SOFTCLIX LANCETS Misc       aspirin (ECOTRIN) 81 MG EC tablet Take 81 mg by mouth once daily.      baclofen (LIORESAL) 10 MG tablet Take 1 tablet (10 mg total) by mouth 3 (three) times daily. 90 tablet 11    blood sugar diagnostic (BLOOD GLUCOSE TEST) Strp 1 strip by Misc.(Non-Drug; Combo Route) route once daily at 6am. 100 strip 3    diphenhydrAMINE (BENADRYL) 50 MG capsule Take 1 capsule (50 mg total) by mouth every 6 (six) hours as needed for Itching or Allergies (swelling). 20 capsule 0    econazole nitrate 1 % cream Apply topically once daily. 85 g 0    famotidine (PEPCID) 20 MG tablet Take 1 tablet (20 mg total) by mouth 2 (two) times daily. 20 tablet 0    gabapentin (NEURONTIN) 600 MG tablet Take 1 tablet (600 mg total) by mouth 4 (four) times daily with meals and nightly. 360 tablet 3    [START ON 8/17/2017] hydrocodone-acetaminophen 10-325mg (NORCO)  mg Tab Take 1 tablet by mouth every 6 (six) hours as needed for Pain. 120 tablet 0    [START ON 9/16/2017] hydrocodone-acetaminophen 10-325mg (NORCO)  mg Tab Take 1 tablet by mouth every 6 (six) hours as needed for Pain. 120 tablet 0    [START ON 10/16/2017] hydrocodone-acetaminophen 10-325mg (NORCO)  mg Tab Take 1 tablet by mouth every 6 (six)  hours as needed for Pain. 120 tablet 0    lancets Misc 1 lancet by Misc.(Non-Drug; Combo Route) route once daily at 6am. 100 each 3    lisinopril (PRINIVIL,ZESTRIL) 2.5 MG tablet Take 1 tablet (2.5 mg total) by mouth once daily. 90 tablet 3    metoprolol succinate (TOPROL-XL) 25 MG 24 hr tablet Take 1 tablet (25 mg total) by mouth once daily. 90 tablet 3    oxybutynin (DITROPAN) 5 MG Tab Take 1 tablet (5 mg total) by mouth 3 (three) times daily. 270 tablet 3    pantoprazole (PROTONIX) 40 MG tablet Take 40 mg by mouth once daily.      polyethylene glycol (GLYCOLAX) 17 gram PwPk Take 17 g by mouth once daily. 30 packet 0    pravastatin (PRAVACHOL) 10 MG tablet TAKE ONE TABLET BY MOUTH AT BEDTIME 90 tablet 0    tamsulosin (FLOMAX) 0.4 mg Cp24 Take 1 capsule (0.4 mg total) by mouth once daily. 30 capsule 3    trazodone (DESYREL) 100 MG tablet Take 1 tablet (100 mg total) by mouth nightly as needed for Insomnia. 30 tablet 1    TRUE METRIX AIR GLUCOSE METER kit USE AS INSTRUCTED 1 each 0     No current facility-administered medications for this visit.        Review of patient's allergies indicates:  No Known Allergies  Review of Systems   Constitutional: Negative for appetite change and fatigue.   Eyes: Negative for visual disturbance.   Respiratory: Negative for shortness of breath.    Cardiovascular: Negative for chest pain.   Gastrointestinal: Negative for constipation and diarrhea.   Genitourinary: Negative for dysuria, frequency and urgency.   Musculoskeletal: Positive for extremity weakness and neck pain. Negative for arthralgias, back pain, gait problem, joint swelling, myalgias and neck stiffness.   Neurological: Negative for dizziness, tremors and headaches. Weakness: right arm weak. Numbness: Right arm paina nd numbness.   Psychiatric/Behavioral: Negative for dysphoric mood.   All other systems reviewed and are negative.          Objective:      Physical Exam    GENERAL: The patient is alert, oriented,  pleasant.   MUSCULOSKELETAL:   Gait: on wide basis, COG moved forward.   GENERAL: The patient is alert, oriented, pleasant.   HEENT; PERRLA  NECK: supple, minimaly webbed neck.   Cervical spine :  Minimally decreased AROM in cervical spine, flexion to 60, extension to 0,,   side bending and rotation to 20-25 degrees with pain.  + mild-moderate paravertebral cervical musculature tenderness on Right.  + mild- moderate tenderness in upper trapezius muscles on Right.   Lumbar spine, full range of motion in all planes, flexion to 90 degrees , ext. 0.  Side bending and rotation to 25--30 degrees.   Straight leg raising Negative bilaterally.   Full range of motion in all joints x4 extremities.   Muscle strength 5/5 throughout x4 extremities.   No joint laxity throughout x4 extremities.   NEUROLOGIC: Cranial nerves II through XII intact.   Deep tendon reflexes is normal, +2 in the upper and lower extremities bilaterally.   Muscle tone is normal.   Sensory is intact to light touch and pinprick throughout x4 extremities.   Skin: no hypersensitivity, alodynia, no somatosensory changes, other than cold skin in right arm, no atrophic skin changes.        CT of Cervical spine showed:  Stable remote operative change right C3, C4 and C6 hemilaminectomies with metallic laminal plasty.  Operative change with C5/C6 anterior spinal fusion no evidence for hardware failure. with interval reduction of protruding disk at C5/C6.  continued truncation of the cervical spinal cord most pronounced at the C6 level with mild/moderate small caliber of the cord   concerning for myelomalacia stable.  Superimposed multilevel degenerative change most pronounced at C6/C7 with bulging disk resulting in mild central canal stenosis.   Please note there is additional degenerative change with mild neural foraminal stenosis C3/C4 and C4/C5 levels.    Assessment:          1. Cervical spondylosis with myelopathy    2. Cervical post-laminectomy syndrome    3.  Brachial neuritis or radiculitis NOS    4. Chronic pain syndrome    5. Gait instability    6. Pain of right upper extremity    7. Narcotic dependency, continuous    8. Right arm weakness    9. Right hand weakness      Plan:       Cervical spondylosis with myelopathy  -     hydrocodone-acetaminophen 10-325mg (NORCO)  mg Tab; Take 1 tablet by mouth every 6 (six) hours as needed for Pain.  Dispense: 120 tablet; Refill: 0  -     hydrocodone-acetaminophen 10-325mg (NORCO)  mg Tab; Take 1 tablet by mouth every 6 (six) hours as needed for Pain.  Dispense: 120 tablet; Refill: 0  -     hydrocodone-acetaminophen 10-325mg (NORCO)  mg Tab; Take 1 tablet by mouth every 6 (six) hours as needed for Pain.  Dispense: 120 tablet; Refill: 0  -     Ambulatory Referral to Physical/Occupational Therapy    Cervical post-laminectomy syndrome  -     hydrocodone-acetaminophen 10-325mg (NORCO)  mg Tab; Take 1 tablet by mouth every 6 (six) hours as needed for Pain.  Dispense: 120 tablet; Refill: 0  -     hydrocodone-acetaminophen 10-325mg (NORCO)  mg Tab; Take 1 tablet by mouth every 6 (six) hours as needed for Pain.  Dispense: 120 tablet; Refill: 0  -     hydrocodone-acetaminophen 10-325mg (NORCO)  mg Tab; Take 1 tablet by mouth every 6 (six) hours as needed for Pain.  Dispense: 120 tablet; Refill: 0  -     Ambulatory Referral to Physical/Occupational Therapy    Brachial neuritis or radiculitis NOS  -     hydrocodone-acetaminophen 10-325mg (NORCO)  mg Tab; Take 1 tablet by mouth every 6 (six) hours as needed for Pain.  Dispense: 120 tablet; Refill: 0  -     hydrocodone-acetaminophen 10-325mg (NORCO)  mg Tab; Take 1 tablet by mouth every 6 (six) hours as needed for Pain.  Dispense: 120 tablet; Refill: 0  -     hydrocodone-acetaminophen 10-325mg (NORCO)  mg Tab; Take 1 tablet by mouth every 6 (six) hours as needed for Pain.  Dispense: 120 tablet; Refill: 0  -     Ambulatory Referral to  Physical/Occupational Therapy    Chronic pain syndrome  -     hydrocodone-acetaminophen 10-325mg (NORCO)  mg Tab; Take 1 tablet by mouth every 6 (six) hours as needed for Pain.  Dispense: 120 tablet; Refill: 0  -     hydrocodone-acetaminophen 10-325mg (NORCO)  mg Tab; Take 1 tablet by mouth every 6 (six) hours as needed for Pain.  Dispense: 120 tablet; Refill: 0  -     hydrocodone-acetaminophen 10-325mg (NORCO)  mg Tab; Take 1 tablet by mouth every 6 (six) hours as needed for Pain.  Dispense: 120 tablet; Refill: 0  -     Ambulatory Referral to Physical/Occupational Therapy    Gait instability  -     hydrocodone-acetaminophen 10-325mg (NORCO)  mg Tab; Take 1 tablet by mouth every 6 (six) hours as needed for Pain.  Dispense: 120 tablet; Refill: 0  -     hydrocodone-acetaminophen 10-325mg (NORCO)  mg Tab; Take 1 tablet by mouth every 6 (six) hours as needed for Pain.  Dispense: 120 tablet; Refill: 0  -     hydrocodone-acetaminophen 10-325mg (NORCO)  mg Tab; Take 1 tablet by mouth every 6 (six) hours as needed for Pain.  Dispense: 120 tablet; Refill: 0  -     Ambulatory Referral to Physical/Occupational Therapy    Pain of right upper extremity  -     hydrocodone-acetaminophen 10-325mg (NORCO)  mg Tab; Take 1 tablet by mouth every 6 (six) hours as needed for Pain.  Dispense: 120 tablet; Refill: 0  -     hydrocodone-acetaminophen 10-325mg (NORCO)  mg Tab; Take 1 tablet by mouth every 6 (six) hours as needed for Pain.  Dispense: 120 tablet; Refill: 0  -     hydrocodone-acetaminophen 10-325mg (NORCO)  mg Tab; Take 1 tablet by mouth every 6 (six) hours as needed for Pain.  Dispense: 120 tablet; Refill: 0  -     Ambulatory Referral to Physical/Occupational Therapy    Narcotic dependency, continuous  -     hydrocodone-acetaminophen 10-325mg (NORCO)  mg Tab; Take 1 tablet by mouth every 6 (six) hours as needed for Pain.  Dispense: 120 tablet; Refill: 0  -      hydrocodone-acetaminophen 10-325mg (NORCO)  mg Tab; Take 1 tablet by mouth every 6 (six) hours as needed for Pain.  Dispense: 120 tablet; Refill: 0  -     hydrocodone-acetaminophen 10-325mg (NORCO)  mg Tab; Take 1 tablet by mouth every 6 (six) hours as needed for Pain.  Dispense: 120 tablet; Refill: 0  -     Ambulatory Referral to Physical/Occupational Therapy    Right arm weakness  -     hydrocodone-acetaminophen 10-325mg (NORCO)  mg Tab; Take 1 tablet by mouth every 6 (six) hours as needed for Pain.  Dispense: 120 tablet; Refill: 0  -     hydrocodone-acetaminophen 10-325mg (NORCO)  mg Tab; Take 1 tablet by mouth every 6 (six) hours as needed for Pain.  Dispense: 120 tablet; Refill: 0  -     hydrocodone-acetaminophen 10-325mg (NORCO)  mg Tab; Take 1 tablet by mouth every 6 (six) hours as needed for Pain.  Dispense: 120 tablet; Refill: 0  -     Ambulatory Referral to Physical/Occupational Therapy    Right hand weakness  -     hydrocodone-acetaminophen 10-325mg (NORCO)  mg Tab; Take 1 tablet by mouth every 6 (six) hours as needed for Pain.  Dispense: 120 tablet; Refill: 0  -     hydrocodone-acetaminophen 10-325mg (NORCO)  mg Tab; Take 1 tablet by mouth every 6 (six) hours as needed for Pain.  Dispense: 120 tablet; Refill: 0  -     hydrocodone-acetaminophen 10-325mg (NORCO)  mg Tab; Take 1 tablet by mouth every 6 (six) hours as needed for Pain.  Dispense: 120 tablet; Refill: 0  -     Ambulatory Referral to Physical/Occupational Therapy    Other orders  -     gabapentin (NEURONTIN) 600 MG tablet; Take 1 tablet (600 mg total) by mouth 4 (four) times daily with meals and nightly.  Dispense: 360 tablet; Refill: 3        Patient with cervical myelopathy, with tightness and pain is Right UE, s/p Cervical fusion, here for chronic pain management.   Awaiting SCS.    1. Pain management : Gabapentin , increased to 600 mg po QID, and Hydrocodone 10/325 mg PO TID ( #90 tabs,2  refills).  2. Spasticity, will resume Baclofen 10 mg PO TID.    RTC in 3 months.    Total time spent face to face with patient was 25 minutes.     More than 50% of that time was spent in counseling on diagnosis , prognosis and treatment options.   I also caunsel patient  on common and most usual side effect of prescribed medications.   Risk and benefits of opiates, possible risk of developing opiate dependence and tolerance, need of strict compliance with prescribed medications.  I reviewed Primary care , and other specialty's notes to better coordinate patient's  care.   All questions were answered, and patient voiced understanding.

## 2017-08-03 ENCOUNTER — TELEPHONE (OUTPATIENT)
Dept: GASTROENTEROLOGY | Facility: CLINIC | Age: 67
End: 2017-08-03

## 2017-08-03 NOTE — TELEPHONE ENCOUNTER
Spoke to patient informed him pathology report from colonoscopy from 7/19/2017 came back benign, patient verbalized understanding

## 2017-08-09 ENCOUNTER — CLINICAL SUPPORT (OUTPATIENT)
Dept: ELECTROPHYSIOLOGY | Facility: CLINIC | Age: 67
End: 2017-08-09
Payer: MEDICARE

## 2017-08-09 DIAGNOSIS — Z95.810 AUTOMATIC IMPLANTABLE CARDIAC DEFIBRILLATOR IN SITU: ICD-10-CM

## 2017-08-09 DIAGNOSIS — Z95.810 AUTOMATIC IMPLANTABLE CARDIOVERTER-DEFIBRILLATOR IN SITU: ICD-10-CM

## 2017-08-09 DIAGNOSIS — I42.9 CARDIOMYOPATHY, PRIMARY: ICD-10-CM

## 2017-08-09 DIAGNOSIS — I42.8 NICM (NONISCHEMIC CARDIOMYOPATHY): ICD-10-CM

## 2017-08-11 ENCOUNTER — OFFICE VISIT (OUTPATIENT)
Dept: PODIATRY | Facility: CLINIC | Age: 67
End: 2017-08-11
Payer: MEDICARE

## 2017-08-11 VITALS
DIASTOLIC BLOOD PRESSURE: 93 MMHG | BODY MASS INDEX: 27.27 KG/M2 | HEIGHT: 71 IN | HEART RATE: 56 BPM | SYSTOLIC BLOOD PRESSURE: 160 MMHG | WEIGHT: 194.75 LBS

## 2017-08-11 DIAGNOSIS — E11.49 TYPE II DIABETES MELLITUS WITH NEUROLOGICAL MANIFESTATIONS: Primary | ICD-10-CM

## 2017-08-11 DIAGNOSIS — B35.1 ONYCHOMYCOSIS: ICD-10-CM

## 2017-08-11 PROCEDURE — 11721 DEBRIDE NAIL 6 OR MORE: CPT | Mod: Q9,S$GLB,, | Performed by: PODIATRIST

## 2017-08-11 PROCEDURE — 99999 PR PBB SHADOW E&M-EST. PATIENT-LVL III: CPT | Mod: PBBFAC,,, | Performed by: PODIATRIST

## 2017-08-11 PROCEDURE — 99499 UNLISTED E&M SERVICE: CPT | Mod: S$GLB,,, | Performed by: PODIATRIST

## 2017-08-11 NOTE — PROGRESS NOTES
Subjective:      Patient ID: Xander Sanchez is a 67 y.o. male.    Chief Complaint: Diabetes Mellitus and Diabetic Foot Exam    Xander is a 67 y.o. male who presents to the clinic for evaluation and treatment of high risk feet. Xander has a past medical history of Asthma; Cardiomyopathy; Cervical spondylosis with myelopathy (10/17/2012); Chronic bronchitis; Chronic systolic dysfunction of left ventricle (7/27/2015); Constipation (7/27/2015); COPD (chronic obstructive pulmonary disease); Diabetes mellitus, type 2; DM type 2 (diabetes mellitus, type 2) (7/27/2015); Enlarged prostate (7/27/2015); Hypertension; ICD (implantable cardiac defibrillator) in place; Presence of biventricular AICD (7/27/2015); 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thy (2/1/2016); and Urinary tract infection.  This patient has documented high risk feet requiring routine maintenance secondary to diabetes mellitis and those secondary complications of diabetes, as mentioned.  Treated per Physical medicine for chronic back pain. No new complaints.     PCP: Dr. August  LOV: 5/4/17    Past Medical History:   Diagnosis Date    Asthma     Cardiomyopathy     Cervical spondylosis with myelopathy 10/17/2012    Chronic bronchitis     Chronic systolic dysfunction of left ventricle 7/27/2015    Constipation 7/27/2015    COPD (chronic obstructive pulmonary disease)     Diabetes mellitus, type 2     DM type 2 (diabetes mellitus, type 2) 7/27/2015    Enlarged prostate 7/27/2015    Hypertension     ICD (implantable cardiac defibrillator) in place     Presence of biventricular AICD 7/27/2015    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thy 2/1/2016    Urinary tract infection     pt does not know       Past Surgical History:   Procedure Laterality Date    CARDIAC DEFIBRILLATOR PLACEMENT      CERVICAL SPINE SURGERY      COLONOSCOPY N/A 7/19/2017    Procedure: COLONOSCOPY  golytely;  Surgeon: Vannessa Uribe MD;  Location: [facility]  ENDO;  Service: Endoscopy;  Laterality: N/A;    SPINE SURGERY         Family History   Problem Relation Age of Onset    Hypertension Mother     Hypertension Father     COPD Father     No Known Problems Sister     No Known Problems Brother     No Known Problems Daughter     Prostate cancer Neg Hx     Kidney disease Neg Hx        Social History     Social History    Marital status:      Spouse name: N/A    Number of children: N/A    Years of education: N/A     Social History Main Topics    Smoking status: Former Smoker     Packs/day: 1.00     Years: 40.00     Types: Cigarettes     Quit date: 7/26/2009    Smokeless tobacco: Never Used    Alcohol use 4.2 oz/week     6 Cans of beer, 1 Shots of liquor per week    Drug use: No    Sexual activity: Yes     Partners: Female     Other Topics Concern    None     Social History Narrative    None       Current Outpatient Prescriptions   Medication Sig Dispense Refill    ACCU-CHEK SOFTCLIX LANCETS Misc       aspirin (ECOTRIN) 81 MG EC tablet Take 81 mg by mouth once daily.      baclofen (LIORESAL) 10 MG tablet Take 1 tablet (10 mg total) by mouth 3 (three) times daily. 90 tablet 11    blood sugar diagnostic (BLOOD GLUCOSE TEST) Strp 1 strip by Misc.(Non-Drug; Combo Route) route once daily at 6am. 100 strip 3    diphenhydrAMINE (BENADRYL) 50 MG capsule Take 1 capsule (50 mg total) by mouth every 6 (six) hours as needed for Itching or Allergies (swelling). 20 capsule 0    econazole nitrate 1 % cream Apply topically once daily. 85 g 0    famotidine (PEPCID) 20 MG tablet Take 1 tablet (20 mg total) by mouth 2 (two) times daily. 20 tablet 0    gabapentin (NEURONTIN) 600 MG tablet Take 1 tablet (600 mg total) by mouth 4 (four) times daily with meals and nightly. 360 tablet 3    [START ON 8/17/2017] hydrocodone-acetaminophen 10-325mg (NORCO)  mg Tab Take 1 tablet by mouth every 6 (six) hours as needed for Pain. 120 tablet 0    [START ON  9/16/2017] hydrocodone-acetaminophen 10-325mg (NORCO)  mg Tab Take 1 tablet by mouth every 6 (six) hours as needed for Pain. 120 tablet 0    [START ON 10/16/2017] hydrocodone-acetaminophen 10-325mg (NORCO)  mg Tab Take 1 tablet by mouth every 6 (six) hours as needed for Pain. 120 tablet 0    lancets Misc 1 lancet by Misc.(Non-Drug; Combo Route) route once daily at 6am. 100 each 3    lisinopril (PRINIVIL,ZESTRIL) 2.5 MG tablet Take 1 tablet (2.5 mg total) by mouth once daily. 90 tablet 3    metoprolol succinate (TOPROL-XL) 25 MG 24 hr tablet Take 1 tablet (25 mg total) by mouth once daily. 90 tablet 3    pantoprazole (PROTONIX) 40 MG tablet Take 40 mg by mouth once daily.      polyethylene glycol (GLYCOLAX) 17 gram PwPk Take 17 g by mouth once daily. 30 packet 0    pravastatin (PRAVACHOL) 10 MG tablet TAKE ONE TABLET BY MOUTH AT BEDTIME 90 tablet 0    tamsulosin (FLOMAX) 0.4 mg Cp24 Take 1 capsule (0.4 mg total) by mouth once daily. 30 capsule 3    trazodone (DESYREL) 100 MG tablet Take 1 tablet (100 mg total) by mouth nightly as needed for Insomnia. 30 tablet 1    TRUE METRIX AIR GLUCOSE METER kit USE AS INSTRUCTED 1 each 0    oxybutynin (DITROPAN) 5 MG Tab Take 1 tablet (5 mg total) by mouth 3 (three) times daily. 270 tablet 3     No current facility-administered medications for this visit.        Review of patient's allergies indicates:  No Known Allergies    PCP: Williams Alvarenga MD    Date Last Seen by PCP:     Current shoe gear:  Affected Foot: Casual shoes     Unaffected Foot: Casual shoes    Hemoglobin A1C   Date Value Ref Range Status   05/04/2017 6.4 (H) 4.5 - 6.2 % Final     Comment:     According to ADA guidelines, hemoglobin A1C <7.0% represents  optimal control in non-pregnant diabetic patients.  Different  metrics may apply to specific populations.   Standards of Medical Care in Diabetes - 2016.  For the purpose of screening for the presence of diabetes:  <5.7%      Consistent with the absence of diabetes  5.7-6.4%  Consistent with increasing risk for diabetes   (prediabetes)  >or=6.5%  Consistent with diabetes  Currently no consensus exists for use of hemoglobin A1C  for diagnosis of diabetes for children.     08/17/2016 6.4 (H) 4.5 - 6.2 % Final     Comment:     According to ADA guidelines, hemoglobin A1C <7.0% represents  optimal control in non-pregnant diabetic patients.  Different  metrics may apply to specific populations.   Standards of Medical Care in Diabetes - 2016.  For the purpose of screening for the presence of diabetes:  <5.7%     Consistent with the absence of diabetes  5.7-6.4%  Consistent with increasing risk for diabetes   (prediabetes)  >or=6.5%  Consistent with diabetes  Currently no consensus exists for use of hemoglobin A1C  for diagnosis of diabetes for children.     02/01/2016 6.5 (H) 4.5 - 6.2 % Final       Review of Systems   Constitution: Negative for chills and fever.   Respiratory: Negative for shortness of breath.    Skin: Positive for color change and nail changes. Negative for itching.   Musculoskeletal: Negative for falls, joint pain and muscle weakness.   Gastrointestinal: Negative for nausea and vomiting.   Neurological: Negative for focal weakness, loss of balance, numbness and paresthesias.           Objective:      Physical Exam   Constitutional: He is oriented to person, place, and time. He appears well-nourished. No distress.   Cardiovascular: Intact distal pulses.    Pulses:       Dorsalis pedis pulses are 2+ on the right side, and 2+ on the left side.        Posterior tibial pulses are 2+ on the right side, and 2+ on the left side.   CRT < 3 seconds to digits 1-5 bilateral, no edema.   Musculoskeletal:        Right foot: There is decreased range of motion. There is no deformity.        Left foot: There is decreased range of motion. There is no deformity.   Equinus noted b/l ankles with < 10 deg DF noted. MMT 5/5 in DF/PF/Inv/Ev  resistance with no reproduction of pain in any direction. Passive range of motion of ankle and pedal joints is painless b/l.     Feet:   Right Foot:   Protective Sensation: 10 sites tested. 9 sites sensed.   Skin Integrity: Positive for dry skin. Negative for ulcer, skin breakdown, erythema, warmth or callus.   Left Foot:   Protective Sensation: 10 sites tested. 7 sites sensed.   Skin Integrity: Positive for dry skin. Negative for ulcer, skin breakdown, erythema, warmth or callus.   Neurological: He is alert and oriented to person, place, and time. He has normal strength. A sensory deficit is present.   Decreased vibratory sensation to the bilateral foot.   Skin: Skin is warm, dry and intact. Capillary refill takes less than 2 seconds. No ecchymosis, no lesion, no petechiae and no rash noted. He is not diaphoretic. No cyanosis or erythema. No pallor. Nails show no clubbing.   Skin is dry and flaky plantar foot bilateral.    Hyperpigmented patch of skin dorsal left hallux.     Nails 1-5 bilateral are thickened 2-3 mm, slightly yellow with debris, loosened and elongated 3-4 mm.     Edema B/L LE 2+    No open lesions or macerations bilateral lower extremity.               Assessment:       Encounter Diagnoses   Name Primary?    Type II diabetes mellitus with neurological manifestations Yes    Onychomycosis          Plan:       Xander was seen today for diabetes mellitus and diabetic foot exam.    Diagnoses and all orders for this visit:    Type II diabetes mellitus with neurological manifestations    Onychomycosis      I counseled the patient on his conditions, their implications and medical management.    Shoe inspection. Diabetic Foot Education. Patient reminded of the importance of good nutrition and blood sugar control to help prevent podiatric complications of diabetes. Patient instructed on proper foot hygeine. We discussed wearing proper shoe gear, daily foot inspections, never walking without protective shoe  gear, never putting sharp instruments to feet    With patient's verbal consent, nails were aggressively reduced and debrided x 10 to their soft tissue attachment mechanically and with electric , removing all offending nail and debris. Patient relates relief following the procedure. No anesthesia or hemostasis required. No blood loss.

## 2017-08-29 ENCOUNTER — LAB VISIT (OUTPATIENT)
Dept: LAB | Facility: HOSPITAL | Age: 67
End: 2017-08-29
Attending: FAMILY MEDICINE
Payer: MEDICARE

## 2017-08-29 ENCOUNTER — OFFICE VISIT (OUTPATIENT)
Dept: FAMILY MEDICINE | Facility: CLINIC | Age: 67
End: 2017-08-29
Payer: MEDICARE

## 2017-08-29 VITALS
HEIGHT: 71 IN | BODY MASS INDEX: 27.32 KG/M2 | DIASTOLIC BLOOD PRESSURE: 88 MMHG | SYSTOLIC BLOOD PRESSURE: 140 MMHG | OXYGEN SATURATION: 95 % | WEIGHT: 195.13 LBS | HEART RATE: 67 BPM

## 2017-08-29 DIAGNOSIS — E11.65 TYPE 2 DIABETES MELLITUS WITH HYPERGLYCEMIA, WITHOUT LONG-TERM CURRENT USE OF INSULIN: ICD-10-CM

## 2017-08-29 DIAGNOSIS — I10 ESSENTIAL HYPERTENSION: ICD-10-CM

## 2017-08-29 DIAGNOSIS — M79.89 LEG SWELLING: ICD-10-CM

## 2017-08-29 DIAGNOSIS — Z00.00 ANNUAL PHYSICAL EXAM: Primary | ICD-10-CM

## 2017-08-29 LAB
ANION GAP SERPL CALC-SCNC: 9 MMOL/L
BNP SERPL-MCNC: <10 PG/ML
BUN SERPL-MCNC: 10 MG/DL
CALCIUM SERPL-MCNC: 9.1 MG/DL
CHLORIDE SERPL-SCNC: 104 MMOL/L
CHOLEST/HDLC SERPL: 3.5 {RATIO}
CO2 SERPL-SCNC: 27 MMOL/L
CREAT SERPL-MCNC: 1 MG/DL
EST. GFR  (AFRICAN AMERICAN): >60 ML/MIN/1.73 M^2
EST. GFR  (NON AFRICAN AMERICAN): >60 ML/MIN/1.73 M^2
GLUCOSE SERPL-MCNC: 105 MG/DL
HDL/CHOLESTEROL RATIO: 28.9 %
HDLC SERPL-MCNC: 114 MG/DL
HDLC SERPL-MCNC: 33 MG/DL
LDLC SERPL CALC-MCNC: 50 MG/DL
NONHDLC SERPL-MCNC: 81 MG/DL
POTASSIUM SERPL-SCNC: 4.5 MMOL/L
SODIUM SERPL-SCNC: 140 MMOL/L
TRIGL SERPL-MCNC: 155 MG/DL
TSH SERPL DL<=0.005 MIU/L-ACNC: 0.99 UIU/ML

## 2017-08-29 PROCEDURE — 3044F HG A1C LEVEL LT 7.0%: CPT | Mod: S$GLB,,, | Performed by: FAMILY MEDICINE

## 2017-08-29 PROCEDURE — 4010F ACE/ARB THERAPY RXD/TAKEN: CPT | Mod: S$GLB,,, | Performed by: FAMILY MEDICINE

## 2017-08-29 PROCEDURE — 3008F BODY MASS INDEX DOCD: CPT | Mod: S$GLB,,, | Performed by: FAMILY MEDICINE

## 2017-08-29 PROCEDURE — 99499 UNLISTED E&M SERVICE: CPT | Mod: S$GLB,,, | Performed by: FAMILY MEDICINE

## 2017-08-29 PROCEDURE — 84443 ASSAY THYROID STIM HORMONE: CPT

## 2017-08-29 PROCEDURE — 1125F AMNT PAIN NOTED PAIN PRSNT: CPT | Mod: S$GLB,,, | Performed by: FAMILY MEDICINE

## 2017-08-29 PROCEDURE — 3077F SYST BP >= 140 MM HG: CPT | Mod: S$GLB,,, | Performed by: FAMILY MEDICINE

## 2017-08-29 PROCEDURE — 80061 LIPID PANEL: CPT

## 2017-08-29 PROCEDURE — 80048 BASIC METABOLIC PNL TOTAL CA: CPT

## 2017-08-29 PROCEDURE — 83036 HEMOGLOBIN GLYCOSYLATED A1C: CPT

## 2017-08-29 PROCEDURE — 83880 ASSAY OF NATRIURETIC PEPTIDE: CPT

## 2017-08-29 PROCEDURE — 36415 COLL VENOUS BLD VENIPUNCTURE: CPT | Mod: PO

## 2017-08-29 PROCEDURE — 99999 PR PBB SHADOW E&M-EST. PATIENT-LVL III: CPT | Mod: PBBFAC,,, | Performed by: FAMILY MEDICINE

## 2017-08-29 PROCEDURE — 99397 PER PM REEVAL EST PAT 65+ YR: CPT | Mod: S$GLB,,, | Performed by: FAMILY MEDICINE

## 2017-08-29 PROCEDURE — 1159F MED LIST DOCD IN RCRD: CPT | Mod: S$GLB,,, | Performed by: FAMILY MEDICINE

## 2017-08-29 PROCEDURE — 3079F DIAST BP 80-89 MM HG: CPT | Mod: S$GLB,,, | Performed by: FAMILY MEDICINE

## 2017-08-29 RX ORDER — HYDROCHLOROTHIAZIDE 12.5 MG/1
12.5 CAPSULE ORAL DAILY
Qty: 30 CAPSULE | Refills: 11 | Status: SHIPPED | OUTPATIENT
Start: 2017-08-29 | End: 2017-09-13 | Stop reason: SDUPTHER

## 2017-08-29 NOTE — PROGRESS NOTES
Subjective:       Patient ID: Xander Sanchez is a 67 y.o. male.    Chief Complaint: Annual Exam    67 years old male came to the clinic for his physical examination.  Blood pressure today stable.  No chest pain palpitations orthopnea or PND.  Patient last A1c was normal.  No polyuria polydipsia polyphagia.  Patient reports lower extremity swelling sometimes he is currently taking gabapentin.      Review of Systems   Constitutional: Negative.    HENT: Negative.    Eyes: Negative.    Respiratory: Negative.    Cardiovascular: Negative.  Negative for chest pain, palpitations and leg swelling.   Gastrointestinal: Negative.    Endocrine: Negative for cold intolerance, heat intolerance, polydipsia, polyphagia and polyuria.   Genitourinary: Negative.    Musculoskeletal: Negative.    Skin: Negative.    Neurological: Negative.    Psychiatric/Behavioral: Negative.        Objective:      Physical Exam   Constitutional: He is oriented to person, place, and time. He appears well-developed and well-nourished. No distress.   HENT:   Head: Normocephalic and atraumatic.   Right Ear: External ear normal.   Left Ear: External ear normal.   Nose: Nose normal.   Mouth/Throat: Oropharynx is clear and moist. No oropharyngeal exudate.   Eyes: Conjunctivae and EOM are normal. Pupils are equal, round, and reactive to light. Right eye exhibits no discharge. Left eye exhibits no discharge. No scleral icterus.   Neck: Normal range of motion. Neck supple. No JVD present. No tracheal deviation present. No thyromegaly present.   Cardiovascular: Normal rate, regular rhythm, normal heart sounds and intact distal pulses.  Exam reveals no gallop and no friction rub.    No murmur heard.  Pulmonary/Chest: Effort normal and breath sounds normal. No stridor. No respiratory distress. He has no wheezes. He has no rales. He exhibits no tenderness.   Abdominal: Soft. Bowel sounds are normal. He exhibits no distension and no mass. There is no tenderness. There  is no rebound and no guarding.   Musculoskeletal: Normal range of motion. He exhibits no edema or tenderness.   Lymphadenopathy:     He has no cervical adenopathy.   Neurological: He is alert and oriented to person, place, and time. He has normal reflexes. He displays normal reflexes. No cranial nerve deficit. He exhibits normal muscle tone. Coordination normal.   Skin: Skin is warm and dry. No rash noted. He is not diaphoretic. No erythema. No pallor.   Psychiatric: He has a normal mood and affect. His behavior is normal. Judgment and thought content normal.   Nursing note and vitals reviewed.      Assessment:       1. Annual physical exam    2. Essential hypertension    3. Leg swelling    4. Type 2 diabetes mellitus with hyperglycemia, without long-term current use of insulin        Plan:         Xander was seen today for annual exam.    Diagnoses and all orders for this visit:    Annual physical exam    Essential hypertension  -     Basic metabolic panel; Future  -     TSH; Future  -     hydrochlorothiazide (MICROZIDE) 12.5 mg capsule; Take 1 capsule (12.5 mg total) by mouth once daily.  -     Lipid panel; Future    Leg swelling  -     Basic metabolic panel; Future  -     Brain natriuretic peptide; Future  -     TSH; Future  -     hydrochlorothiazide (MICROZIDE) 12.5 mg capsule; Take 1 capsule (12.5 mg total) by mouth once daily.    Type 2 diabetes mellitus with hyperglycemia, without long-term current use of insulin  -     Basic metabolic panel; Future  -     Hemoglobin A1c; Future  -     Lipid panel; Future    Continue monitoring blood pressure at home, low sodium diet.  Continue monitoring blood sugar at home,ADA diet.

## 2017-08-30 LAB
ESTIMATED AVG GLUCOSE: 131 MG/DL
HBA1C MFR BLD HPLC: 6.2 %

## 2017-08-31 ENCOUNTER — CLINICAL SUPPORT (OUTPATIENT)
Dept: REHABILITATION | Facility: HOSPITAL | Age: 67
End: 2017-08-31
Attending: FAMILY MEDICINE
Payer: MEDICARE

## 2017-08-31 DIAGNOSIS — M62.81 MUSCLE RIGHT ARM WEAKNESS: Primary | ICD-10-CM

## 2017-08-31 DIAGNOSIS — M79.601 PAIN IN RIGHT ARM: ICD-10-CM

## 2017-08-31 DIAGNOSIS — R29.898 RIGHT ARM WEAKNESS: ICD-10-CM

## 2017-08-31 PROCEDURE — 97110 THERAPEUTIC EXERCISES: CPT | Mod: PN

## 2017-08-31 PROCEDURE — 97165 OT EVAL LOW COMPLEX 30 MIN: CPT | Mod: PN

## 2017-08-31 PROCEDURE — G8987 SELF CARE CURRENT STATUS: HCPCS | Mod: CK,PN

## 2017-08-31 PROCEDURE — G8988 SELF CARE GOAL STATUS: HCPCS | Mod: CI,PN

## 2017-08-31 PROCEDURE — 97530 THERAPEUTIC ACTIVITIES: CPT | Mod: PN

## 2017-08-31 NOTE — PLAN OF CARE
TIME RECORD    Date: 08/31/2017    Start Time:  9:12am  Stop Time:  9:50am    PROCEDURES:    TIMED  Procedure Min.   TA 15   TE 10         UNTIMED  Procedure Min.   1 low eval 13         Total Timed Minutes:  25  Total Timed Units:  2  Total Untimed Units:  1  Charges Billed/# of units:  3 (1 low eval, 1 TA, 1 TE)    Visit #: 1  FOTO last administered: 8/31/17 (initial evaluation)    Medicare charges:  Today's amount: $146.45  Total amount: $146.45      OCCUPATIONAL THERAPY INITIAL EVALUATION & PLAN OF TREATMENT    Patient Name: Xander Daniel  Physician Name:  Dr. Sara MD  Primary Diagnosis:  Cervical spondylosis, RUE weakness & pain  Treatment Diagnosis:  Pain in RUE, decreased fine and gross motor coordination of RUE, weakness of RUE  Onset Date:  Cervical spondylosis and RUE weakness/imparments x 10+ yearss  Eval Date:  8/31/2017   Certification Period:  8/31/2017  to 10/31/17  Past Medical History:   Past Medical History:   Diagnosis Date    Asthma     Cardiomyopathy     Cervical spondylosis with myelopathy 10/17/2012    Chronic bronchitis     Chronic systolic dysfunction of left ventricle 7/27/2015    Constipation 7/27/2015    COPD (chronic obstructive pulmonary disease)     Diabetes mellitus, type 2     DM type 2 (diabetes mellitus, type 2) 7/27/2015    Enlarged prostate 7/27/2015    Hypertension     ICD (implantable cardiac defibrillator) in place     Presence of biventricular AICD 7/27/2015    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thy 2/1/2016    Urinary tract infection     pt does not know     Precautions:  Pt with hx/o cervical spondylosis. Universal precautions. DEFIBRILLATOR   Prior Therapy:  OT in 2016, pt reports hx/o therapies.  Signs of Abuse: no  Medications: Xander Sanchez has a current medication list which includes the following prescription(s): accu-chek softclix lancets, aspirin, baclofen, blood sugar diagnostic, diphenhydramine, econazole nitrate, famotidine,  "gabapentin, hydrochlorothiazide, lancets, lisinopril, metoprolol succinate, oxybutynin, pantoprazole, polyethylene glycol, pravastatin, tamsulosin, trazodone, and true metrix air glucose meter.  Nutrition:  WNWD Male  Prior Level of Function: Independent  Social History:  Retired.  Functional Deficits Leading to Referral/Nature of Injury:  Pt has been having cervical myelopathy since before 2005. Pt has had hand pain since the same time. Pt has previously gone to therapy for his RUE 1 1/2 years ago. Pt reports his physician had encouraged him to return to therapy.   Patient Therapy Goals:  "To have my arm be alright and have some relief."  Hand dominance: Right  X-Rays/Tests: See images prn      Subjective:     Pain: 5 /10 stiff, tight in entire arm. "This arm feels like it weighs 200 lbs."  "This arm feels so tight. I just wish it could loosen up some."      Objective:     Pt presented: with gait imbalance, R arm in slightly protective position against body. Pt pleasant throughout evaluation    Physical Exam:    Postural examination/scapula alignment: Rounded shoulder and Affected scapula abducted  Joint integrity: no noted impairments  Skin integrity: no noted impairments  Edema: no noted impairments    Palpation: Pt with tight upper traps, R pec and biceps    Sensation: Xander reports decrease sensation to heat and touch in lower arm (forearm to fingers)  Assess in future session    Range of Motion:   AROM and MMT WNL  Comments:   Pt with slower movements, reporting his RUE feels very heavy.       and Pinch Strengths (in pounds):  Setting 2   Right Left   Elbow bent     1 65 84   2 70 71   3 73 79   Average 69.3 78        Lateral 11 14   Tripod 8 17   Tip 10 12     Comments:       Fine motor coordination:  9 hole peg - individual pegs   Right Left   Seconds 1 min 12 sec 38   Dropped during removal 1 0   Dropped during replacement 8 2     Comments:      Gross motor coordination:  Box and Blocks   Right Left "   Blocks in 60 seconds 39 48     Comments:      Functional Mobility:  Independent, extra time    ADL's:  Independent, extra time    IADL's:  Independent, extra time    FOTO subjective score: Patient scored 49% (NeuroQOL) demonstrating Pt's perceived impairment of functional ability with upper extremity.     Treatment included: OT evaluation, the following exercises (HEP) were instructed and Xander was able to demonstrate them prior to the end of the session. HEP are as follows: MHP x 10 min to R shoulder.      Exercises Date: 8/31/2017    Supine pec stretch/butterfly [id number]         TA: education regarding cervical spondylosis with myelopathy, potential with therapy, and overall rehabilitation education. Pt verbalized understanding.    Assessment:     This 67 y.o. male referred to Outpatient Occupational Therapy with diagnosis of   Encounter Diagnoses   Name Primary?    Muscle right arm weakness Yes    Pain in right arm     Right arm weakness     presents with limitations as described in problem list. Patient can benefit from Occupational Therapy services for moist heat, PROM, AAROM, AROM, Theraputic exercises, joint mobs, home exercise program provided with written instructions, strengthening, Theraband Ex, UBE,pulley ex in order to maximize painfree functional use of right UE, neuromuscular activities, therapeutic activities. The following goals were discussed with the patient and he is in agreement with them as to be addressed in the treatment plan.     Problem List:   Decreased function of Right UE, Decreased ROM, Increased pain, Decreased strength, Muscular atrophy, Inability to perform work/tasks, Difficulty sleeping, Inability to perform leisure activiites and Inability to perform self care tasks    History Examination Decision Making Complexity Score   Occupational Profile:   Pt retired. Pt with hx/o RUE impairment x 10+ years.  Medical and Therapy History:   Pt with hx/o OT August and September 2016 for  RUE. Pt discharged due to plateau in return of funciton.     Brief review of medical/therapy history performed.         Low     Performance Deficits     Physical  Pt with:   decreased AROM of RUE,   weakness of RUE,    fine and gross motor coordination deficits,   pain and stiffness       Cognitive  Pt with no noted impairments      Psychosocial:    Pt able to participate in all daily activities, however they take more time.    5+, high Pt required minimal/moderate modifications to activities during initial evaluation, has several options of therapeutic intervention, including neuromuscular reeducation, TA, TE, MT, MHP, ADL training, kinesiostaping as needed.     Discussed goals and Pt in agreement with goals.      Moderate Low         Patient Education/Response:     HEP: Butterfly/pec stretch, MHP to shoulder for tissue extensibility    Plans and Goals:     Rehab Potential: fair    Goals to be met in 4 weeks: (9/30/17)  1) Initiate Hep   2) Pt to increase Box and Blocks to 43 blocks with R hand, demonstrating improvement in gross motor coordination.  3) Pt to decrease nine peg in hand manipulation activity to 55 seconds, demonstrating improvement in FM coordination.  4) Patient will be able to achieve less than or equal to 35% on the FOTO, demonstrating overall improved functional ability with upper extremity. (self-care category)    Goals to be met by discharge:  1) Independent with HEP  2) Pt to increase Box and Blocks to 49 blocks with R hand, demonstrating improvement in gross motor coordination.  3) Pt to decrease nine peg in hand manipulation activity to 40  seconds, demonstrating improvement in FM coordination.  4) Pt to decrease pain to 2-3/10 at rest.  5) Patient will be able to achieve less than or equal to 19% on the FOTO, demonstrating overall improved functional ability with upper extremity. (self-care category)    Recommended Treatment Plan (2-3 times per week for 8 weeks): Therapeutic Exercise,  Functional Activities, Patient Education, Home Exercise Program, ADL Training, Moist Heat/Ice, Sensory/Neuromuscular Reeducation, Cognitive Perceptual Retraining and Manual Therapy  Other Recommendations:  Kinesiotaping as needed. Pt would benefit from PT eval and treat for gait imbalance and neck stiffness/ROM.    Therapist's Name: CACHORRO Parker/AZEB   Date: 08/31/2017    I CERTIFY THE NEED FOR THESE SERVICES FURNISHED UNDER THIS PLAN OF TREATMENT AND WHILE UNDER MY CARE    Physician's comments: ________________________________________________________________________________________________________________________________________________      Physician's Name: ___________________________________

## 2017-09-06 ENCOUNTER — CLINICAL SUPPORT (OUTPATIENT)
Dept: REHABILITATION | Facility: HOSPITAL | Age: 67
End: 2017-09-06
Attending: PHYSICAL MEDICINE & REHABILITATION
Payer: MEDICARE

## 2017-09-06 DIAGNOSIS — M79.601 RIGHT ARM PAIN: Primary | ICD-10-CM

## 2017-09-06 DIAGNOSIS — R29.898 RIGHT HAND WEAKNESS: ICD-10-CM

## 2017-09-06 DIAGNOSIS — R29.898 FINE MOTOR IMPAIRMENT: ICD-10-CM

## 2017-09-06 DIAGNOSIS — R29.898 RIGHT ARM WEAKNESS: ICD-10-CM

## 2017-09-06 DIAGNOSIS — R29.818 FINE MOTOR IMPAIRMENT: ICD-10-CM

## 2017-09-06 PROCEDURE — 97530 THERAPEUTIC ACTIVITIES: CPT | Mod: PN

## 2017-09-06 PROCEDURE — 97110 THERAPEUTIC EXERCISES: CPT | Mod: PN

## 2017-09-06 NOTE — PROGRESS NOTES
"TIME RECORD    Date:  09/06/2017    Start Time:  1:15pm  Stop Time:  2:00pm    PROCEDURES:    TIMED  Procedure Min.   MT 10   TE 25   TA 10             UNTIMED  Procedure Min.             Total Timed Minutes:  45  Total Timed Units:  3  Total Untimed Units:  0  Charges Billed/# of units:  3 ( 2 TE, 1 TA)    Visit #: 2  FOTO last administered: 8/31/17 (initial evaluation)    Medicare charges:  Today's amount: $98.74  Total amount: $245.19    OCCUPATIONAL THERAPY PROGRESS NOTE       Patient Name: Xander Sanchez  Physician Name:  Dr. Sara MD  Primary Diagnosis:  Cervical spondylosis, RUE weakness & pain  Treatment Diagnosis:  Pain in RUE, decreased fine and gross motor coordination of RUE, weakness of RUE  Onset Date:  Cervical spondylosis and RUE weakness/imparments x 10+ yearss  Eval Date:  8/31/2017   Certification Period:  8/31/2017  to 10/31/17    Subjective:     Pain: 5 /10 tingling pain.  "It hurts all the way from my shoulder down to my fingers."    Objective:     Patient seen by Occupational Therapy today w/ treatment as follows:    MT: supine, moderate STM to entire RUE, specifically biceps, distal deltoid, and upper trap. Gentle joint mobilization of GH joint, grades I-II.     Exercises Date: 09/06/2017   PROM stretching of RUE 1x10 all planes       Chest press with dowel 3#, 2x10   Supine dowel flexion 3#, 3x15   Sidelying external rotation 1#, 2x10   Sidelying abduction 0#, 2x10   Sidelying gator 0#, 2x10   Prone rows 0#, 2x10 next session   Prone extension 0#, 2x10 next session       9 peg 3x R hand   Hand gripper - 3 red bands 2x10       Theraputty - yellow/green       Taffy pulling 3 min       Assessment:     Pt participated well in therapy today. Pt with difficulty throughout shoulder exercises. Complete all, but reported his R arm felt "very heavy." Pt tolerated putty and pegs activities well. Difficulty with fine motor coordination. Pt would benefit from continued skilled OT to address " limitations.    Patient Education/Response:     HEP: continue participating in exercises from today.    Plans and Goals:     Continue POC and progress as tolerated.    Goals to be met in 4 weeks: (9/30/17)  1) Initiate Hep   2) Pt to increase Box and Blocks to 43 blocks with R hand, demonstrating improvement in gross motor coordination.  3) Pt to decrease nine peg in hand manipulation activity to 55 seconds, demonstrating improvement in FM coordination.  4) Patient will be able to achieve less than or equal to 35% on the FOTO, demonstrating overall improved functional ability with upper extremity. (self-care category)     Goals to be met by discharge:  1) Independent with HEP  2) Pt to increase Box and Blocks to 49 blocks with R hand, demonstrating improvement in gross motor coordination.  3) Pt to decrease nine peg in hand manipulation activity to 40  seconds, demonstrating improvement in FM coordination.  4) Pt to decrease pain to 2-3/10 at rest.  5) Patient will be able to achieve less than or equal to 19% on the FOTO, demonstrating overall improved functional ability with upper extremity. (self-care category)

## 2017-09-07 NOTE — PROGRESS NOTES
"TIME RECORD    Date:  09/11/2017    Start Time:  1:16pm  Stop Time:  1:57pm    PROCEDURES:    TIMED  Procedure Min.   MT 10   TE 15   TA 16             UNTIMED  Procedure Min.             Total Timed Minutes:  41  Total Timed Units:  3  Total Untimed Units:  0  Charges Billed/# of units:  3( 1 MT, 1 TE, 1 TA)    Visit #: 3  FOTO last administered: 8/31/17 (initial evaluation)    Medicare charges:  Today's amount: $96.62  Total amount: $341.81    OCCUPATIONAL THERAPY PROGRESS NOTE       Patient Name: Xander Sanchez  Physician Name:  Dr. Sara MD  Primary Diagnosis:  Cervical spondylosis, RUE weakness & pain  Treatment Diagnosis:  Pain in RUE, decreased fine and gross motor coordination of RUE, weakness of RUE  Onset Date:  Cervical spondylosis and RUE weakness/imparments x 10+ yearss  Eval Date:  8/31/2017   Certification Period:  8/31/2017  to 10/31/17    Subjective:     Pain: 4 /10 tingling pain.  "My arm kind of feels like a tooth ache!"    Objective:     Patient seen by Occupational Therapy today w/ treatment as follows:    MT: supine, moderate STM to entire RUE, specifically biceps, distal deltoid, and upper trap. Gentle joint mobilization of GH joint, grades I-II.     Exercises Date: 09/11/2017   PROM stretching of RUE 1x10 all planes       Chest press with dowel 3#, 2x10   Supine dowel flexion 3#, 3x15   Sidelying external rotation 1#, 2x10   Sidelying abduction 0#, 2x10   Sidelying gator 0#, 2x10   Prone rows 0#, 2x10 next session   Prone extension 0#, 2x10 next session       9 peg 3x R hand   Hand gripper - 3 red bands 2x10   [place] manipulation  1 at a time (40 total) into a coin slot   Clothespins Remove all and replace       Theraputty - yellow/green       Taffy pulling 3 min not today       Assessment:     Pt with good participation in therapy today. Continues with tightness and soreness in RUE. Pt tolerated exercises well, required rest breaks throughout. Pt continues to report his arm feels "very " "heavy." Pt would benefit from continued skilled OT to address limitations.    Patient Education/Response:     HEP: continue    Plans and Goals:     Continue POC and progress as tolerated.    Goals to be met in 4 weeks: (9/30/17)  1) Initiate Hep   2) Pt to increase Box and Blocks to 43 blocks with R hand, demonstrating improvement in gross motor coordination.  3) Pt to decrease nine peg in hand manipulation activity to 55 seconds, demonstrating improvement in FM coordination.  4) Patient will be able to achieve less than or equal to 35% on the FOTO, demonstrating overall improved functional ability with upper extremity. (self-care category)     Goals to be met by discharge:  1) Independent with HEP  2) Pt to increase Box and Blocks to 49 blocks with R hand, demonstrating improvement in gross motor coordination.  3) Pt to decrease nine peg in hand manipulation activity to 40  seconds, demonstrating improvement in FM coordination.  4) Pt to decrease pain to 2-3/10 at rest.  5) Patient will be able to achieve less than or equal to 19% on the FOTO, demonstrating overall improved functional ability with upper extremity. (self-care category)         "

## 2017-09-11 ENCOUNTER — CLINICAL SUPPORT (OUTPATIENT)
Dept: REHABILITATION | Facility: HOSPITAL | Age: 67
End: 2017-09-11
Attending: PHYSICAL MEDICINE & REHABILITATION
Payer: MEDICARE

## 2017-09-11 DIAGNOSIS — R29.898 FINE MOTOR IMPAIRMENT: ICD-10-CM

## 2017-09-11 DIAGNOSIS — M79.601 PAIN IN RIGHT ARM: ICD-10-CM

## 2017-09-11 DIAGNOSIS — R29.898 RIGHT ARM WEAKNESS: ICD-10-CM

## 2017-09-11 DIAGNOSIS — R29.818 FINE MOTOR IMPAIRMENT: ICD-10-CM

## 2017-09-11 PROCEDURE — 97140 MANUAL THERAPY 1/> REGIONS: CPT | Mod: PN

## 2017-09-11 PROCEDURE — 97530 THERAPEUTIC ACTIVITIES: CPT | Mod: PN

## 2017-09-11 PROCEDURE — 97110 THERAPEUTIC EXERCISES: CPT | Mod: PN

## 2017-09-11 NOTE — PROGRESS NOTES
"TIME RECORD    Date:  09/13/2017    Start Time:  8:07am  Stop Time:  8:46am    PROCEDURES:    TIMED  Procedure Min.   MT 10   TE 19   TA 10             UNTIMED  Procedure Min.             Total Timed Minutes:  39  Total Timed Units:  3  Total Untimed Units:  0  Charges Billed/# of units:  3(1 MT, 1 TE, 1 TA)    Visit #: 4  FOTO last administered: 8/31/17 (initial evaluation)    Medicare charges:  Today's amount: $96.62  Total amount: $438.43    OCCUPATIONAL THERAPY PROGRESS NOTE    Patient Name: Xander Sanchez  Physician Name:  Dr. Sara MD  Primary Diagnosis:  Cervical spondylosis, RUE weakness & pain  Treatment Diagnosis:  Pain in RUE, decreased fine and gross motor coordination of RUE, weakness of RUE  Onset Date:  Cervical spondylosis and RUE weakness/imparments x 10+ yearss  Eval Date:  8/31/2017   Certification Period:  8/31/2017  to 10/31/17    Subjective:     Pain: 4 /10 with MT to trigger point on RUE.  "It still feels like I have an achy, tingling pain down to my fingertips in my arm."    Objective:     Patient seen by Occupational Therapy today w/ treatment as follows:    MT: supine, mild to moderate STM to entire RUE, specifically biceps, distal deltoid, and upper trap. Gentle joint mobilization of GH joint, grades I-II.     Exercises Date: 09/13/2017   PROM stretching of RUE 1x10 all planes       Chest press with dowel 4#, 3x10   Supine dowel flexion 4#, 3x15   Sidelying external rotation 1#, 2x10   Sidelying abduction 0#, 2x10   Sidelying gator 0#, 2x10  not today   Prone rows 0#, 2x10  not today   Prone extension 0#, 2x10 not today       9 peg 3x R hand not today   Hand gripper - 3 red bands 2x10 not today   [place] manipulation  1 at a time (40 total) into a coin slot not today   Clothespins Remove all and replace not today   PVC pipe activity #1-2       Theraputty - yellow/green       Taffy pulling 3 min not today       Assessment:     Pt with good participation in therapy today. Tolerated exercises " well. Continues with trigger point on R biceps that is very tender to palpation. Pt tolerating increase in weight during exercises well. Pt with good participation in PVC pipe activity. Required cueing to initiate with R hand, due to initiating with L hand at first. Pt tolerated TE supine/sidelying exercises well. Continues to report that his RUE feels very heavy during exercises. Pt would benefit from continued skilled OT to address limitations.    Patient Education/Response:     HEP: continue    Plans and Goals:     Continue POC and progress as tolerated.    Goals to be met in 4 weeks: (9/30/17)  1) Initiate Hep   2) Pt to increase Box and Blocks to 43 blocks with R hand, demonstrating improvement in gross motor coordination.  3) Pt to decrease nine peg in hand manipulation activity to 55 seconds, demonstrating improvement in FM coordination.  4) Patient will be able to achieve less than or equal to 35% on the FOTO, demonstrating overall improved functional ability with upper extremity. (self-care category)     Goals to be met by discharge:  1) Independent with HEP  2) Pt to increase Box and Blocks to 49 blocks with R hand, demonstrating improvement in gross motor coordination.  3) Pt to decrease nine peg in hand manipulation activity to 40  seconds, demonstrating improvement in FM coordination.  4) Pt to decrease pain to 2-3/10 at rest.  5) Patient will be able to achieve less than or equal to 19% on the FOTO, demonstrating overall improved functional ability with upper extremity. (self-care category)

## 2017-09-12 ENCOUNTER — TELEPHONE (OUTPATIENT)
Dept: UROLOGY | Facility: CLINIC | Age: 67
End: 2017-09-12

## 2017-09-12 DIAGNOSIS — N41.1 CHRONIC PROSTATITIS: Primary | ICD-10-CM

## 2017-09-12 RX ORDER — CIPROFLOXACIN 500 MG/1
500 TABLET ORAL 2 TIMES DAILY
Qty: 30 TABLET | Refills: 1 | Status: SHIPPED | OUTPATIENT
Start: 2017-09-12 | End: 2017-09-27

## 2017-09-12 NOTE — TELEPHONE ENCOUNTER
He can take cipro for 2 wks with another refill if he needs.    cipro sent to [facility] pharmacy    Chronic prostatitis  -     ciprofloxacin HCl (CIPRO) 500 MG tablet; Take 1 tablet (500 mg total) by mouth 2 (two) times daily.  Dispense: 30 tablet; Refill: 1    please have him follow up with me in 1 month.

## 2017-09-12 NOTE — TELEPHONE ENCOUNTER
----- Message from Brooke Dickson MA sent at 9/12/2017  8:36 AM CDT -----  Contact: self  [phone]  States when he needs to urinate he is with burning and going every 20 minutes x 5 days.  States he had surgery on 2-1-17.

## 2017-09-12 NOTE — TELEPHONE ENCOUNTER
C/o dysuria and frequency for about 5 days now. He is on flomax and oxybutynin 5mg TID. Had laser turp in Feb

## 2017-09-13 ENCOUNTER — CLINICAL SUPPORT (OUTPATIENT)
Dept: REHABILITATION | Facility: HOSPITAL | Age: 67
End: 2017-09-13
Attending: PHYSICAL MEDICINE & REHABILITATION
Payer: MEDICARE

## 2017-09-13 DIAGNOSIS — R29.898 RIGHT ARM WEAKNESS: ICD-10-CM

## 2017-09-13 DIAGNOSIS — R26.9 GAIT DIFFICULTY: ICD-10-CM

## 2017-09-13 DIAGNOSIS — R29.818 FINE MOTOR IMPAIRMENT: ICD-10-CM

## 2017-09-13 DIAGNOSIS — M79.89 LEG SWELLING: ICD-10-CM

## 2017-09-13 DIAGNOSIS — R29.898 FINE MOTOR IMPAIRMENT: ICD-10-CM

## 2017-09-13 DIAGNOSIS — M79.601 PAIN IN RIGHT ARM: ICD-10-CM

## 2017-09-13 DIAGNOSIS — M54.2 NECK PAIN: Primary | ICD-10-CM

## 2017-09-13 DIAGNOSIS — I10 ESSENTIAL HYPERTENSION: ICD-10-CM

## 2017-09-13 PROCEDURE — 97110 THERAPEUTIC EXERCISES: CPT | Mod: PN

## 2017-09-13 PROCEDURE — 97140 MANUAL THERAPY 1/> REGIONS: CPT | Mod: PN

## 2017-09-13 PROCEDURE — 97530 THERAPEUTIC ACTIVITIES: CPT | Mod: PN

## 2017-09-13 RX ORDER — HYDROCHLOROTHIAZIDE 12.5 MG/1
12.5 CAPSULE ORAL DAILY
Qty: 30 CAPSULE | Refills: 0 | Status: SHIPPED | OUTPATIENT
Start: 2017-09-13 | End: 2017-10-13 | Stop reason: SDUPTHER

## 2017-09-14 ENCOUNTER — TELEPHONE (OUTPATIENT)
Dept: UROLOGY | Facility: CLINIC | Age: 67
End: 2017-09-14

## 2017-09-14 DIAGNOSIS — I42.8 NICM (NONISCHEMIC CARDIOMYOPATHY): ICD-10-CM

## 2017-09-14 DIAGNOSIS — Z95.810 IMPLANTABLE CARDIOVERTER-DEFIBRILLATOR (ICD) IN SITU: ICD-10-CM

## 2017-09-14 DIAGNOSIS — Z95.810 AUTOMATIC IMPLANTABLE CARDIAC DEFIBRILLATOR IN SITU: Primary | ICD-10-CM

## 2017-09-14 NOTE — TELEPHONE ENCOUNTER
----- Message from Graham Buenrostro MD sent at 9/14/2017  9:10 AM CDT -----  Contact: pt [phone]  He can be seen by [facility].  ----- Message -----  From: Carmen Angela LPN  Sent: 9/14/2017   8:31 AM  To: Graham Buenrostro MD    Patient called 2 days ago c/o frequency and dysuria. cipro given. Now he c/o vomiting and yellow urine. Do you want to see him? See [facility] today?   ----- Message -----  From: Maritza Diego  Sent: 9/14/2017   8:19 AM  To: [facility] Staff    Pt states he believes ciprofloxacin HCl (CIPRO) 500 MG tablet is causing him to vomit. Pt would like to speak with the nurse. Pt states he started feeling sick on yesterday and has been vomiting since this morning right after taking medication. Pt states it is yellow. Please call pt.

## 2017-09-14 NOTE — TELEPHONE ENCOUNTER
Patient offered arron't today, but states he can not come in. arron't made to come in tomorrow , he was advised to stop cipro

## 2017-09-15 ENCOUNTER — OFFICE VISIT (OUTPATIENT)
Dept: UROLOGY | Facility: CLINIC | Age: 67
End: 2017-09-15
Payer: MEDICARE

## 2017-09-15 VITALS
DIASTOLIC BLOOD PRESSURE: 98 MMHG | HEIGHT: 71 IN | SYSTOLIC BLOOD PRESSURE: 149 MMHG | BODY MASS INDEX: 27.1 KG/M2 | WEIGHT: 193.56 LBS | HEART RATE: 95 BPM

## 2017-09-15 DIAGNOSIS — R30.0 DYSURIA: ICD-10-CM

## 2017-09-15 DIAGNOSIS — N13.8 BPH WITH URINARY OBSTRUCTION: Primary | ICD-10-CM

## 2017-09-15 DIAGNOSIS — R82.71 BACTERIA IN URINE: ICD-10-CM

## 2017-09-15 DIAGNOSIS — N40.1 BPH WITH URINARY OBSTRUCTION: Primary | ICD-10-CM

## 2017-09-15 LAB
BILIRUB SERPL-MCNC: NORMAL MG/DL
BLOOD URINE, POC: NORMAL
COLOR, POC UA: NORMAL
GLUCOSE UR QL STRIP: NORMAL
KETONES UR QL STRIP: NORMAL
LEUKOCYTE ESTERASE URINE, POC: NORMAL
NITRITE, POC UA: NORMAL
PH: 8
PROTEIN, POC: NORMAL
SPECIFIC GRAVITY, POC UA: 1
UROBILINOGEN, POC UA: 1

## 2017-09-15 PROCEDURE — 99214 OFFICE O/P EST MOD 30 MIN: CPT | Mod: 25,S$GLB,, | Performed by: NURSE PRACTITIONER

## 2017-09-15 PROCEDURE — 87086 URINE CULTURE/COLONY COUNT: CPT

## 2017-09-15 PROCEDURE — 81002 URINALYSIS NONAUTO W/O SCOPE: CPT | Mod: S$GLB,,, | Performed by: NURSE PRACTITIONER

## 2017-09-15 PROCEDURE — 3080F DIAST BP >= 90 MM HG: CPT | Mod: S$GLB,,, | Performed by: NURSE PRACTITIONER

## 2017-09-15 PROCEDURE — 3077F SYST BP >= 140 MM HG: CPT | Mod: S$GLB,,, | Performed by: NURSE PRACTITIONER

## 2017-09-15 PROCEDURE — 99999 PR PBB SHADOW E&M-EST. PATIENT-LVL V: CPT | Mod: PBBFAC,,, | Performed by: NURSE PRACTITIONER

## 2017-09-15 PROCEDURE — 3008F BODY MASS INDEX DOCD: CPT | Mod: S$GLB,,, | Performed by: NURSE PRACTITIONER

## 2017-09-15 PROCEDURE — 1159F MED LIST DOCD IN RCRD: CPT | Mod: S$GLB,,, | Performed by: NURSE PRACTITIONER

## 2017-09-15 RX ORDER — DOXYCYCLINE 100 MG/1
100 CAPSULE ORAL EVERY 12 HOURS
Qty: 10 CAPSULE | Refills: 0 | Status: SHIPPED | OUTPATIENT
Start: 2017-09-15 | End: 2017-09-20

## 2017-09-15 NOTE — PROGRESS NOTES
Subjective:       Patient ID: Xander Sanchez is a 67 y.o. male.    Chief Complaint: Dysuria and Benign Prostatic Hypertrophy    Xander Sanchez is a 67 y.o. male with history of BPH  He had a laser TURP on 2/1/17.   He was last seen in clinic 07/11/2017.    He is here today in clinic for a f/u visit.  He had called 9/11/2017 with c/o of dysuria and urinary frequency.  Dr. Buenrostro called in Cipro 500mg BID and recommended AZO  He states he started throwing up the cipro so he stopped it.  He started the AZO which stopped the dysuria.  FOS not so bad; nocturia 1-2x.  He taking Flomax and Ditropan.                               PSA                      1.2                 02/01/2016       PSA                      1.1                 04/06/2015            PSA                      0.66                03/28/2014                 PSA                      0.67                03/13/2013                 PSA                      0.64                04/12/2012                                    Past Medical History:  No date: Asthma  No date: Cardiomyopathy  10/17/2012: Cervical spondylosis with myelopathy  No date: Chronic bronchitis  7/27/2015: Chronic systolic dysfunction of left ventricle  7/27/2015: Constipation  No date: COPD (chronic obstructive pulmonary disease)  No date: Diabetes mellitus, type 2  7/27/2015: DM type 2 (diabetes mellitus, type 2)  7/27/2015: Enlarged prostate  No date: Hypertension  No date: ICD (implantable cardiac defibrillator) in greta*  7/27/2015: Presence of biventricular AICD  2/1/2016: 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*  No date: Urinary tract infection      Comment: pt does not know    Past Surgical History:  No date: CARDIAC DEFIBRILLATOR PLACEMENT  No date: CERVICAL SPINE SURGERY  7/19/2017: COLONOSCOPY N/A      Comment: Procedure: COLONOSCOPY  satish;  Surgeon:                Vannessa Uribe MD;  Location: [facility];  Service: Endoscopy;  Laterality: N/A;  No  date: SPINE SURGERY    Review of patient's family history indicates:    Hypertension                   Mother                    Hypertension                   Father                    COPD                           Father                    No Known Problems              Sister                    No Known Problems              Brother                   No Known Problems              Daughter                  Prostate cancer                Neg Hx                    Kidney disease                 Neg Hx                      Social History    Marital status:              Spouse name:                       Years of education:                 Number of children:               Occupational History    None on file    Social History Main Topics    Smoking status: Former Smoker                                                                Packs/day: 1.00      Years: 40.00          Types: Cigarettes       Quit date: 7/26/2009    Smokeless tobacco: Never Used                        Alcohol use: Yes           4.2 oz/week       Cans of beer: 6, Shots of liquor: 1 per week    Drug use: No              Sexual activity: Yes               Partners with: Female    Other Topics            Concern    None on file    Social History Narrative    None on file        Allergies:  Review of patient's allergies indicates no known allergies.    Medications:  Current Outpatient Prescriptions:   ACCU-CHEK SOFTCLIX LANCETS Misc, , Disp: , Rfl:   aspirin (ECOTRIN) 81 MG EC tablet, Take 81 mg by mouth once daily., Disp: , Rfl:   baclofen (LIORESAL) 10 MG tablet, Take 1 tablet (10 mg total) by mouth 3 (three) times daily., Disp: 90 tablet, Rfl: 11  blood sugar diagnostic (BLOOD GLUCOSE TEST) Strp, 1 strip by Misc.(Non-Drug; Combo Route) route once daily at 6am., Disp: 100 strip, Rfl: 3  ciprofloxacin HCl (CIPRO) 500 MG tablet, Take 1 tablet (500 mg total) by mouth 2 (two) times daily., Disp: 30 tablet, Rfl: 1  diphenhydrAMINE  (BENADRYL) 50 MG capsule, Take 1 capsule (50 mg total) by mouth every 6 (six) hours as needed for Itching or Allergies (swelling)., Disp: 20 capsule, Rfl: 0  econazole nitrate 1 % cream, Apply topically once daily., Disp: 85 g, Rfl: 0  famotidine (PEPCID) 20 MG tablet, Take 1 tablet (20 mg total) by mouth 2 (two) times daily., Disp: 20 tablet, Rfl: 0  gabapentin (NEURONTIN) 600 MG tablet, Take 1 tablet (600 mg total) by mouth 4 (four) times daily with meals and nightly., Disp: 360 tablet, Rfl: 3  hydrochlorothiazide (MICROZIDE) 12.5 mg capsule, Take 1 capsule (12.5 mg total) by mouth once daily., Disp: 30 capsule, Rfl: 0  lancets Misc, 1 lancet by Misc.(Non-Drug; Combo Route) route once daily at 6am., Disp: 100 each, Rfl: 3  lisinopril (PRINIVIL,ZESTRIL) 2.5 MG tablet, Take 1 tablet (2.5 mg total) by mouth once daily., Disp: 90 tablet, Rfl: 3  metoprolol succinate (TOPROL-XL) 25 MG 24 hr tablet, Take 1 tablet (25 mg total) by mouth once daily., Disp: 90 tablet, Rfl: 3  pantoprazole (PROTONIX) 40 MG tablet, Take 40 mg by mouth once daily., Disp: , Rfl:   polyethylene glycol (GLYCOLAX) 17 gram PwPk, Take 17 g by mouth once daily., Disp: 30 packet, Rfl: 0  pravastatin (PRAVACHOL) 10 MG tablet, TAKE ONE TABLET BY MOUTH AT BEDTIME, Disp: 90 tablet, Rfl: 0  tamsulosin (FLOMAX) 0.4 mg Cp24, Take 1 capsule (0.4 mg total) by mouth once daily., Disp: 30 capsule, Rfl: 3  trazodone (DESYREL) 100 MG tablet, Take 1 tablet (100 mg total) by mouth nightly as needed for Insomnia., Disp: 30 tablet, Rfl: 1  TRUE METRIX AIR GLUCOSE METER kit, USE AS INSTRUCTED, Disp: 1 each, Rfl: 0  oxybutynin (DITROPAN) 5 MG Tab, Take 1 tablet (5 mg total) by mouth 3 (three) times daily., Disp: 270 tablet, Rfl: 3          Review of Systems   Constitutional: Negative.  Negative for activity change, appetite change and fever.   HENT: Negative.  Negative for facial swelling and trouble swallowing.    Eyes: Negative.    Respiratory:  Negative.  Negative for shortness of breath.    Cardiovascular: Negative.  Negative for chest pain and palpitations.   Gastrointestinal: Negative for abdominal pain, constipation, diarrhea, nausea and vomiting.   Genitourinary: Positive for dysuria, frequency and nocturia. Negative for difficulty urinating, enuresis, flank pain, genital sores, hematuria, penile pain, scrotal swelling, testicular pain and urgency.        His LUTS have improved   Musculoskeletal: Negative for back pain, gait problem and neck stiffness.   Skin: Negative.  Negative for wound.   Neurological: Negative for dizziness, tremors, seizures, syncope, speech difficulty, light-headedness and headaches.   Hematological: Does not bruise/bleed easily.   Psychiatric/Behavioral: Negative for confusion and hallucinations. The patient is not nervous/anxious.        Objective:      Physical Exam   Nursing note and vitals reviewed.  Constitutional: He is oriented to person, place, and time. Vital signs are normal. He appears well-developed and well-nourished. He is active and cooperative.  Non-toxic appearance. He does not have a sickly appearance.   Urine dipped (+) leuks & nitrates.    HENT:   Head: Normocephalic and atraumatic.   Right Ear: External ear normal.   Left Ear: External ear normal.   Nose: Nose normal.   Mouth/Throat: Mucous membranes are normal.   Eyes: Conjunctivae and lids are normal. No scleral icterus.   Neck: Trachea normal, normal range of motion and full passive range of motion without pain. Neck supple. No JVD present. No tracheal deviation present.   Cardiovascular: Normal rate, regular rhythm, S1 normal and S2 normal.    Pulmonary/Chest: Effort normal and breath sounds normal. No respiratory distress. He exhibits no tenderness.   Abdominal: Soft. Normal appearance and bowel sounds are normal. There is no hepatosplenomegaly. There is no tenderness. There is no rebound, no guarding and no CVA tenderness.   Genitourinary: Rectum  normal, testes normal and penis normal. Rectal exam shows no external hemorrhoid, no mass and no tenderness. Prostate is enlarged. Prostate is not tender. Right testis shows no mass, no swelling and no tenderness. Left testis shows no mass, no swelling and no tenderness. No hypospadias, penile erythema or penile tenderness. No discharge found.   Musculoskeletal: Normal range of motion.   Lymphadenopathy: No inguinal adenopathy noted on the right or left side.   Neurological: He is alert and oriented to person, place, and time. He has normal strength.   Skin: Skin is warm, dry and intact.     Psychiatric: He has a normal mood and affect. His behavior is normal. Judgment and thought content normal.       Assessment:       1. BPH with urinary obstruction    2. Dysuria    3. Bacteria in urine        Plan:         I spent 25 minutes with the patient of which more than half was spent in direct consultation with the patient in regards to our treatment and plan.    Education and recommendations of today's plan of care including home remedies.  We discussed the inoffice findings;  Discussed his contributing factors for his LUTS and improvement  He states he was able to take a few doses of cipro and when he threw up he did not see the pill.  His in office u/a (+) but now dysuria gone; we discussed it could be from the AZO or enough cipro to weaken it  We send urine to lab for cx  Start Doxy 100mg BID with food due to (+) ua results and weekend coverage.  Check on him next week.  F/u depending on results

## 2017-09-16 LAB — BACTERIA UR CULT: NO GROWTH

## 2017-09-18 ENCOUNTER — TELEPHONE (OUTPATIENT)
Dept: UROLOGY | Facility: CLINIC | Age: 67
End: 2017-09-18

## 2017-09-18 ENCOUNTER — CLINICAL SUPPORT (OUTPATIENT)
Dept: REHABILITATION | Facility: HOSPITAL | Age: 67
End: 2017-09-18
Attending: PHYSICAL MEDICINE & REHABILITATION
Payer: MEDICARE

## 2017-09-18 DIAGNOSIS — R29.898 RIGHT ARM WEAKNESS: ICD-10-CM

## 2017-09-18 DIAGNOSIS — R29.818 FINE MOTOR IMPAIRMENT: ICD-10-CM

## 2017-09-18 DIAGNOSIS — R29.898 FINE MOTOR IMPAIRMENT: ICD-10-CM

## 2017-09-18 DIAGNOSIS — M79.601 PAIN IN RIGHT ARM: ICD-10-CM

## 2017-09-18 PROCEDURE — 97530 THERAPEUTIC ACTIVITIES: CPT | Mod: PN

## 2017-09-18 PROCEDURE — G8988 SELF CARE GOAL STATUS: HCPCS | Mod: CJ,PN

## 2017-09-18 PROCEDURE — 97110 THERAPEUTIC EXERCISES: CPT | Mod: PN

## 2017-09-18 PROCEDURE — G8987 SELF CARE CURRENT STATUS: HCPCS | Mod: CL,PN

## 2017-09-18 NOTE — PROGRESS NOTES
"TIME RECORD    Date:  09/20/2017    Start Time: 11:47am  Stop Time:  12:25pm    PROCEDURES:    TIMED  Procedure Min.   MT 10   TE 28   TA -             UNTIMED  Procedure Min.             Total Timed Minutes:  38  Total Timed Units: 3  Total Untimed Units:  0  Charges Billed/# of units:  3(1 MT, 2 TE)    Visit #: 6  FOTO last administered: 9/18/17 (5th visit)    Medicare charges:  Today's amount: $93.82  Total amount: $633.79    OCCUPATIONAL THERAPY PROGRESS NOTE    Patient Name: Xander Sanchez  Physician Name:  Dr. Sara MD  Primary Diagnosis:  Cervical spondylosis, RUE weakness & pain  Treatment Diagnosis:  Pain in RUE, decreased fine and gross motor coordination of RUE, weakness of RUE  Onset Date:  Cervical spondylosis and RUE weakness/imparments x 10+ yearss  Eval Date:  8/31/2017   Certification Period:  8/31/2017  to 10/31/17    Subjective:     Pain: 4 /10 with MT to trigger point on RUE.  "I don't see anything getting better."    Objective:     Patient seen by Occupational Therapy today w/ treatment as follows:    MT: supine, mild to moderate STM to entire RUE, specifically biceps, distal deltoid, and upper trap. Gentle joint mobilization of GH joint, grades I-II.     Exercises Date: 09/20/2017   PROM stretching of RUE 1x10 all planes       Chest press with dowel 4#, 3x10   Supine dowel flexion 4#, 3x15   Sidelying external rotation 1#, 2x10   Sidelying abduction 0#, 2x10   Sidelying gator 0#, 2x10  not today   Prone rows 0#, 2x10     Prone extension 0#, 2x10       9 peg 3x R hand  not today   Hand gripper - 3 red bands 2x10 not today   [place] manipulation  1 at a time (40 total) into a coin slot not today   Clothespins Remove all and replace not today   PVC pipe activity #1-2  not today       Theraputty - yellow/green       Taffy pulling 3 min not today       Assessment:     Pt tolerated therapy well today. Continues with pain in RUE that is staying the same throughout sessions. Pt tolerated prone exercises " well today. Pt would benefit from continued skilled OT to address limitations.    Patient Education/Response:     HEP: continue    Plans and Goals:     Continue POC and progress as tolerated.    Goals to be met in 4 weeks: (9/30/17)  1) Initiate Hep   2) Pt to increase Box and Blocks to 43 blocks with R hand, demonstrating improvement in gross motor coordination.  3) Pt to decrease nine peg in hand manipulation activity to 55 seconds, demonstrating improvement in FM coordination.  4) Patient will be able to achieve less than or equal to 35% on the FOTO, demonstrating overall improved functional ability with upper extremity. (self-care category)     Goals to be met by discharge:  1) Independent with HEP  2) Pt to increase Box and Blocks to 49 blocks with R hand, demonstrating improvement in gross motor coordination.  3) Pt to decrease nine peg in hand manipulation activity to 40  seconds, demonstrating improvement in FM coordination.  4) Pt to decrease pain to 2-3/10 at rest.  5) Patient will be able to achieve less than or equal to 19% on the FOTO, demonstrating overall improved functional ability with upper extremity. (self-care category)

## 2017-09-18 NOTE — PROGRESS NOTES
"TIME RECORD    Date:  09/18/2017    Start Time: 2:55pm  Stop Time:  3:35pm    PROCEDURES:    TIMED  Procedure Min.   MT -   TE 25   TA 15             UNTIMED  Procedure Min.             Total Timed Minutes:  40  Total Timed Units: 3  Total Untimed Units:  0  Charges Billed/# of units:  3(2 TE, 1 TA)    Visit #: 5  FOTO last administered: 9/18/17 (5th visit)    Medicare charges:  Today's amount: $101.54  Total amount: $539.97    OCCUPATIONAL THERAPY PROGRESS NOTE    Patient Name: Xander Sanchez  Physician Name:  Dr. Sara MD  Primary Diagnosis:  Cervical spondylosis, RUE weakness & pain  Treatment Diagnosis:  Pain in RUE, decreased fine and gross motor coordination of RUE, weakness of RUE  Onset Date:  Cervical spondylosis and RUE weakness/imparments x 10+ yearss  Eval Date:  8/31/2017   Certification Period:  8/31/2017  to 10/31/17    Subjective:     Pain: 4 /10 with MT to trigger point on RUE.  "It feels the same as always."    Objective:     Patient seen by Occupational Therapy today w/ treatment as follows:    MT: supine, mild to moderate STM to entire RUE, specifically biceps, distal deltoid, and upper trap. Gentle joint mobilization of GH joint, grades I-II. Not today    Exercises Date: 09/18/2017   PROM stretching of RUE 1x10 all planes       Chest press with dowel 4#, 3x10   Supine dowel flexion 4#, 3x15   Sidelying external rotation 1#, 2x10   Sidelying abduction 0#, 2x10   Sidelying gator 0#, 2x10  not today   Prone rows 0#, 2x10  not today   Prone extension 0#, 2x10 not today       9 peg 3x R hand    Hand gripper - 3 red bands 2x10 not today   [place] manipulation  1 at a time (40 total) into a coin slot    Clothespins Remove all and replace not today   PVC pipe activity #1-2  not today       Theraputty - yellow/green       Taffy pulling 3 min not today     FOTO subjective score of 63% this date.    Assessment:     Pt participated well in therapy today. Pt continues with difficulty with controlling RUE " throughout FM coordination activities. Pt tolerated supine and sidelying shoulder activities well today. Continues with report of aching pain along lateral RUE. Pt would benefit from continued skilled OT to address limitations.    Patient Education/Response:     HEP: continue    Plans and Goals:     Continue POC and progress as tolerated.    Goals to be met in 4 weeks: (9/30/17)  1) Initiate Hep   2) Pt to increase Box and Blocks to 43 blocks with R hand, demonstrating improvement in gross motor coordination.  3) Pt to decrease nine peg in hand manipulation activity to 55 seconds, demonstrating improvement in FM coordination.  4) Patient will be able to achieve less than or equal to 35% on the FOTO, demonstrating overall improved functional ability with upper extremity. (self-care category)     Goals to be met by discharge:  1) Independent with HEP  2) Pt to increase Box and Blocks to 49 blocks with R hand, demonstrating improvement in gross motor coordination.  3) Pt to decrease nine peg in hand manipulation activity to 40  seconds, demonstrating improvement in FM coordination.  4) Pt to decrease pain to 2-3/10 at rest.  5) Patient will be able to achieve less than or equal to 19% on the FOTO, demonstrating overall improved functional ability with upper extremity. (self-care category)

## 2017-09-18 NOTE — TELEPHONE ENCOUNTER
Called to check on him  Left message.  Can call me back if no problems.  He has f/u with Dr. Buenrostro scheduled for 10/17/2017

## 2017-09-20 ENCOUNTER — CLINICAL SUPPORT (OUTPATIENT)
Dept: REHABILITATION | Facility: HOSPITAL | Age: 67
End: 2017-09-20
Attending: PHYSICAL MEDICINE & REHABILITATION
Payer: MEDICARE

## 2017-09-20 DIAGNOSIS — R29.818 FINE MOTOR IMPAIRMENT: ICD-10-CM

## 2017-09-20 DIAGNOSIS — R29.898 RIGHT ARM WEAKNESS: ICD-10-CM

## 2017-09-20 DIAGNOSIS — R29.898 FINE MOTOR IMPAIRMENT: ICD-10-CM

## 2017-09-20 DIAGNOSIS — M79.601 PAIN IN RIGHT ARM: ICD-10-CM

## 2017-09-20 PROCEDURE — 97110 THERAPEUTIC EXERCISES: CPT | Mod: PN

## 2017-09-20 PROCEDURE — 97140 MANUAL THERAPY 1/> REGIONS: CPT | Mod: PN

## 2017-09-21 ENCOUNTER — CLINICAL SUPPORT (OUTPATIENT)
Dept: REHABILITATION | Facility: HOSPITAL | Age: 67
End: 2017-09-21
Attending: PHYSICAL MEDICINE & REHABILITATION
Payer: MEDICARE

## 2017-09-21 DIAGNOSIS — R29.898 WEAKNESS OF RIGHT HIP: ICD-10-CM

## 2017-09-21 DIAGNOSIS — R26.81 GAIT INSTABILITY: ICD-10-CM

## 2017-09-21 DIAGNOSIS — R26.9 GAIT ABNORMALITY: ICD-10-CM

## 2017-09-21 PROCEDURE — G8978 MOBILITY CURRENT STATUS: HCPCS | Mod: CL,PN

## 2017-09-21 PROCEDURE — G8979 MOBILITY GOAL STATUS: HCPCS | Mod: CJ,PN

## 2017-09-21 PROCEDURE — 97162 PT EVAL MOD COMPLEX 30 MIN: CPT | Mod: PN

## 2017-09-21 NOTE — PLAN OF CARE
TIME RECORD    Date: 9/21/17    Start Time:  1400  Stop Time:  1445    PROCEDURES:    TIMED  Procedure Min.                         UNTIMED  Procedure Min.   1 mod eval 45         Total Timed Minutes:  0  Total Timed Units:  0  Total Untimed Units:  1  Charges Billed/# of units:  1 mod eval      OUTPATIENT NEUROLOGICAL REHABILITATION  PHYSICAL THERAPY EVALUATION    Onset Date: ~ 5 years ago begin treatment and surgical intervention for cervical impairments   Primary Diagnosis:   1. Gait abnormality     2. Weakness of right hip     3. Gait instability       Treatment Diagnosis: R sided weakness and tone with discoordination  Past Medical History:   Diagnosis Date    Asthma     Cardiomyopathy     Cervical spondylosis with myelopathy 10/17/2012    Chronic bronchitis     Chronic systolic dysfunction of left ventricle 7/27/2015    Constipation 7/27/2015    COPD (chronic obstructive pulmonary disease)     Diabetes mellitus, type 2     DM type 2 (diabetes mellitus, type 2) 7/27/2015    Enlarged prostate 7/27/2015    Hypertension     ICD (implantable cardiac defibrillator) in place     Presence of biventricular AICD 7/27/2015    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thy 2/1/2016    Urinary tract infection     pt does not know     Precautions: defibulator  Prior Therapy: OT now and OT at [facility]  Medications: Xander Sanchez has a current medication list which includes the following prescription(s): accu-chek softclix lancets, aspirin, baclofen, blood sugar diagnostic, ciprofloxacin hcl, diphenhydramine, econazole nitrate, famotidine, gabapentin, hydrochlorothiazide, lancets, lisinopril, metoprolol succinate, oxybutynin, pantoprazole, polyethylene glycol, pravastatin, tamsulosin, trazodone, and true metrix air glucose meter.  Nutrition:  Normal  History of Present Illness: decreased R LE function  Prior Level of Function: Independent  Social History: lives with wife  Place of Residence  (Steps/Adaptations): [facility]  Functional Deficits Leading to Referral/Nature of Injury: cervical stenosis, neck pain  Current functional status:  Indep with gait impairments  DME owned: --  Work/Job description:  retired  Fall Incidence: 0   Patient Therapy Goals: to be able to walk more normally and my arm to be a little better.        Subjective:      Pt stated: I feel like my R arm is at 10% and Im right handed.  I had 3 surgeries and I feel like im just as bad a when I started.    Family present/states: -  Pain: 5/10 in R arm ( tingling and numbness )    Objective:      - Command following: --   - Speech: no deficits     Dominant hand:  right     Posture Alignment :slouched posture, forward head    Sensation:  Light Touch: Impaired and absent in C6 on R dermotome level           Proprioception:   Impaired in R hand and wrist    Tone: 0 - No increase in muscle tone  Limbs/muscles affected: R UE and R LE    Coordination: see OT note for UE    - LE coordination:  intact           RANGE OF MOTION--LOWER EXTREMITIES  (R) LE Hip: WFL slow due to tone and weakness   Knee: WFLslow due to tone and weakness   Ankle:WFL slow due to tone and weakness    (L) LE: Hip:WFL   Knee:WFL   Ankle: WFL    Upper Extremity Strength  See OT eval  Lower Extremity Strength   RLE LLE   Hip Flexion: 4+/5 5/5   Hip Extension:  3/5 4/5   Hip Abduction: 4+/5 4+/5   Hip Adduction: 4-/5 5/5   Knee Extension: 5/5 5/5   Knee Flexion: 4-/5 5/5   Ankle Dorsiflexion: 5/5 5/5   Ankle Plantarflexion: 5/5 5/5       Gait Assessment:   - AD used: none  - Assistance: none  - Distance: >200  - Stairs: none    Gait Analysis:  Deviations noted: decreased stance on the R, decrease hip extension in the R in terminal stance, quick step on the L    Impairments contributing to deviations:  R LE weakness in gluts and HS     Endurance Deficit: fatigue in R LE with walking     Balance Assessment:    Sitting balance (static,dynamic):WFL  Standing balance (static,  dynamic):WFL    Dynamic Gait Index - assessed without AD  1. Gait level surface -  2  2. Change in gait speed - 2  3. Gait with horizontal head turns - 2  4. Gait with vertical head turns - 2  5. Gait and pivot turn - 2  6. Step over obstacle - 1  7. Step around obstacle - 2  8. Steps - 2  Total 15/24  38% impaired    Functional Mobility (Bed mobility, transfers)  Bed mobility: I  Supine to sit: I  Sit to supine: I  Rolling: I  Transfers to bed: I  Sit to stand:  I  Stand pivot:  I  Stairs: I  Wheelchair mobility: n/a    FOTO: 68% limitied in balance per ABC scale Gcode 8978 CL    Patient Education/Response:     Written Home Exercises Provided:   Pt demo good understanding of the education provided. Xander demonstrated good return demonstration of activities.     Education provided re:role of PT, goals for PT, scheduling - pt verbalized good understanding.     Assessment:   Initial Assessment (Pertinent finding, problem list and factors affecting outcome):This is a 67 y.o. male referred to outpatient physical therapy and presents with a medical diagnosis of cervical problems and neck pain with gait difficulties and demonstrates limitations as described in the problem list. Due to pt's deficits, he has gait abnormalities, a low fall risk and decrease LE strength on the R which affects his gait and ability to walk long community distances.     History  Co-morbidities and personal factors that may impact the plan of care Examination  Body Structures and Functions, activity limitations and participation restrictions that may impact the plan of care Clinical Presentation   Decision Making/ Complexity Score   Co-morbidities:   Asthma   Cardiomyopathy   Cervical spondylosis with myelopathy   Chronic bronchitis   Chronic systolic dysfunction    Constipation   COPD    DM!!   Enlarged prostate   Hypertension   ICDor)    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thy     Personal Factors:     high Body Regions: neck, UE,  trunk, back, LE    Body Systems: musculoskeletal - posture, ROM, strength; neuromuscular - sensation, balance, gait      Activity limitations and Participation Restrictions: pt unable to walk without gait abnormality, pt unable to walk long community distances, pt unable unable to exercise in gym for wellness    high     FOTO ABC Survey: 68% limitation    moderate   moderate     Rehab Potiential: good  Pt will benefit from continuing skilled outpatient physical therapy to address the deficits listed below in the problem list, provide pt/family education and to maximize pt's level of independence in the home and community environment.     Medical necessity is demonstrated by the following IMPAIRMENTS/PROMBLEM LIST:   1. Fall Risk - impaired balance   2. Weakness R LE  3. Impaired muscle tone   4. Gait deviations   5. Decreased community ambulation   6. Decreased activity tolerance   7. Difficulty to participate in daily activities   8. Requires skilled supervision to complete and progress HEP     Anticipated barriers to physical therapy: none      Plans and Goals:   .  Long term goals = Short term goals (8 weeks)  1. Pt will perform nu-step at level 2.5 x 10 minutes without rests breaks going at least 50 step/min or greater.   2. Pt will score greater than or equal to 17/24 on the DGI test/assessment without use of ADdemonstrating overall improved functional mobility and balance.  4. Pt will walk < than or equal to 650 ft on the 6 minute walk test outdoors on multiple terrain without AD with 0 LOB for pt to walk in neighborhood ~1/2 to 1 mile at safely.   5. Pt will have 0 falls from start of PT sessions.   6. Pt will have MMT score of 4+/5 in all major ms groups in R LE.   7. Pt will ambulate on TM x 8 minutes with use of UE support with 0 LOB at 0.8 mph.   8. Pt will report 37% on the FOTO Functional Assessment indicating increased functional balance and mobility. (Gcode goal mobility CJ)        Certification  Period: 9/21/17 to 11/21/17  Recommended Treatment Plan: 1-2 times per week for 8 weeks:  To recieve Education, HEP, therapeutic exercises, neuromuscular re-education, therapeutic activities, manual therapy, joint mobilizations, and modalities modalities prn, ASTYM prn, kinesiotape prn, Functional Dry Needling prn modalities, ASTYM prn, kinesiotape prn, Functional Dry Needling prn to achieve established goals. Pt may be seen by PTA as part of the rehabilitation team.     Other Recommendations: none, pt is in OT for hand and arm deficits      Therapist: Nelia Farah, PT    I CERTIFY THE NEED FOR THESE SERVICES FURNISHED UNDER THIS PLAN OF TREATMENT AND WHILE UNDER MY CARE    Physician's comments: ____________________________________________________________________________________________________________________________________________    Physician's Name: ___________________________________

## 2017-09-26 NOTE — PROGRESS NOTES
"TIME RECORD    Date:  09/27/2017    Start Time: 11:10am  Stop Time:  11:40am    PROCEDURES:    TIMED  Procedure Min.   MT -   TE -   TA 30             UNTIMED  Procedure Min.             Total Timed Minutes:  30  Total Timed Units: 2  Total Untimed Units:  0  Charges Billed/# of units:  3(2 TA)    Visit #: 7  FOTO last administered: 9/18/17 (5th visit)    Medicare charges:  Today's amount: $69.56  Total amount: $703.35    OCCUPATIONAL THERAPY PROGRESS NOTE    Patient Name: Xander Sanchez  Physician Name:  Dr. Sara MD  Primary Diagnosis:  Cervical spondylosis, RUE weakness & pain  Treatment Diagnosis:  Pain in RUE, decreased fine and gross motor coordination of RUE, weakness of RUE  Onset Date:  Cervical spondylosis and RUE weakness/imparments x 10+ yearss  Eval Date:  8/31/2017   Certification Period:  8/31/2017  to 10/31/17    Subjective:     Pain: 4 /10 with MT to trigger point on RUE.  "I feel like I haven't gotten any better since starting therapy."    Objective:     Patient seen by Occupational Therapy today w/ treatment as follows:    PVC pipe activity #8-10.    Fine motor coordination:  9 hole peg - individual pegs    Right Left   Seconds 59 sec(-13) 38   Dropped during removal 0(-1) 0   Dropped during replacement 1(-7) 2         Gross motor coordination:  Box and Blocks    Right Left   Blocks in 60 seconds 33(-6) 48       Assessment:     See discharge summary below.    Patient Education/Response:     HEP: continue    Plans and Goals:      REHAB SERVICES OUTPATIENT DISCHARGE SUMMARY  Occupational Therapy      Name:  Xander Sanchez  Date:  9/27/2017   Date of Evaluation:  8/31/17  Physician:  Dr. Sara MD  Total # Of Visits:  7  Diagnosis:    1. Fine motor impairment     2. Right arm weakness     3. Pain in right arm         Physical/Functional Status:  At time of discharge, patient was able to : see measurements above    The patient is to be discharged from our Therapy service for the following " reason(s):  Patient has reached the maximum rehab potential for the present time, Patient requested discharge and Other:  Pt continues with burning/tingling/numb sensation in RUE, and requested to discharge to [facility] at this time.    Degree of Goal Achievement:  Patient has not met goals secondary to:  No change in status since initial evaluation    Goals to be met in 4 weeks: (9/30/17)  1) Initiate Hep Met  2) Pt to increase Box and Blocks to 43 blocks with R hand, demonstrating improvement in gross motor coordination.  Not met  3) Pt to decrease nine peg in hand manipulation activity to 55 seconds, demonstrating improvement in FM coordination. Not met  4) Patient will be able to achieve less than or equal to 35% on the FOTO, demonstrating overall improved functional ability with upper extremity. (self-care category) Not met     Goals to be met by discharge:  1) Independent with HEP Not met  2) Pt to increase Box and Blocks to 49 blocks with R hand, demonstrating improvement in gross motor coordination. Not met  3) Pt to decrease nine peg in hand manipulation activity to 40  seconds, demonstrating improvement in FM coordination. Not met  4) Pt to decrease pain to 2-3/10 at rest. Not met  5) Patient will be able to achieve less than or equal to 19% on the FOTO, demonstrating overall improved functional ability with upper extremity. (self-care category) Not met     Patient Education:  Continue with HEP and f/u with physician as needed    Discharge Plan:  Home Program:  Continue with PT for lower body.

## 2017-09-27 ENCOUNTER — CLINICAL SUPPORT (OUTPATIENT)
Dept: REHABILITATION | Facility: HOSPITAL | Age: 67
End: 2017-09-27
Attending: PHYSICAL MEDICINE & REHABILITATION
Payer: MEDICARE

## 2017-09-27 DIAGNOSIS — M47.12 CERVICAL SPONDYLOSIS WITH MYELOPATHY: Primary | ICD-10-CM

## 2017-09-27 DIAGNOSIS — M79.601 PAIN IN RIGHT ARM: ICD-10-CM

## 2017-09-27 DIAGNOSIS — R29.898 FINE MOTOR IMPAIRMENT: ICD-10-CM

## 2017-09-27 DIAGNOSIS — R29.818 FINE MOTOR IMPAIRMENT: ICD-10-CM

## 2017-09-27 DIAGNOSIS — R29.898 RIGHT ARM WEAKNESS: ICD-10-CM

## 2017-09-27 PROCEDURE — 97530 THERAPEUTIC ACTIVITIES: CPT | Mod: PN

## 2017-09-27 PROCEDURE — G8987 SELF CARE CURRENT STATUS: HCPCS | Mod: CL,PN

## 2017-09-27 PROCEDURE — G8988 SELF CARE GOAL STATUS: HCPCS | Mod: CI,PN

## 2017-09-27 PROCEDURE — G8989 SELF CARE D/C STATUS: HCPCS | Mod: CL,PN

## 2017-09-28 ENCOUNTER — CLINICAL SUPPORT (OUTPATIENT)
Dept: REHABILITATION | Facility: HOSPITAL | Age: 67
End: 2017-09-28
Attending: PHYSICAL MEDICINE & REHABILITATION
Payer: MEDICARE

## 2017-09-28 DIAGNOSIS — R29.898 WEAKNESS OF RIGHT HIP: ICD-10-CM

## 2017-09-28 DIAGNOSIS — R26.9 GAIT ABNORMALITY: ICD-10-CM

## 2017-09-28 DIAGNOSIS — R26.81 GAIT INSTABILITY: ICD-10-CM

## 2017-09-28 PROCEDURE — 97110 THERAPEUTIC EXERCISES: CPT | Mod: PN

## 2017-09-28 PROCEDURE — 97112 NEUROMUSCULAR REEDUCATION: CPT | Mod: PN

## 2017-09-28 NOTE — PROGRESS NOTES
TIME RECORD    Date:  09/28/2017    Start Time:  1152  Stop Time:  1232    PROCEDURES:    TIMED  Procedure Min.   NMR 8   TE 32                 UNTIMED  Procedure Min.             Total Timed Minutes:  38  Total Timed Units:  3  Total Untimed Units:  0  Charges Billed/# of units:  3 (2TE, 1NMR)      Progress/Current Status    Subjective:     Patient ID: Xander Sanchez is a 67 y.o. male.  Diagnosis:   1. Gait abnormality     2. Weakness of right hip     3. Gait instability       Pain: 5 /10 in the R arm  Pt stated that he has constant pain in his R arm and hand.    Objective:     Pt arrived to PT session 7' late. Session initiated with Neuro re-ed and endurance training with B LE extremities for reciprocal motion of lower limbs on sci-fit x 8 min at level 2 at > or equal to 50 spm w/o rest. Pt performed TE per log x 32' with PT 1:1.    DATE 9/28/17   Visit 2/50   G CODE  POC exp 11/21/7 2/10     FOTO 2/5   Visit Amount  Total 97.50  174.00   Standing feet apart --   Standing feet together --   Standing 1/2 tandem --   Standing tandem --   SLS R --   SLS L --   Side stepping --   Cross overs --   [place] --   Wii fit balance --   Bosu --   Beep board --   Physioball sitting balance --   SLR --   clamshells --   Hip abduction --   bridges Next SL w/ ball   Adduction isometric --   LAQ --   HS curls Prone 2 x 10 1#   HS stretch  On stairs 3 x 30''   Gastroc stretch On fitter 3 x 30''   Leg press next   Hip extension next   Hip flexion next   Hip Abduction next   Hip adduction --   Knee flexion --   Toe raises 2 x 10 B   Heel raises 2 x 10 B   Nu-step L2 8' LE only   Step ups 2 x 10 B blue   Lateral step ups 1 x 10 B blue   gait --   INITIALS FS       Assessment:     Pt would cont to benefit from LE strength and flexibility training and PT will progress balance as needed.     Patient Education/Response:     CONT HEP    Plans and Goals:     Long term goals = Short term goals (8 weeks)  1. Pt will perform nu-step at level  2.5 x 10 minutes without rests breaks going at least 50 step/min or greater.   2. Pt will score greater than or equal to 17/24 on the DGI test/assessment without use of ADdemonstrating overall improved functional mobility and balance.  4. Pt will walk < than or equal to 650 ft on the 6 minute walk test outdoors on multiple terrain without AD with 0 LOB for pt to walk in neighborhood ~1/2 to 1 mile at safely.   5. Pt will have 0 falls from start of PT sessions.   6. Pt will have MMT score of 4+/5 in all major ms groups in R LE.   7. Pt will ambulate on TM x 8 minutes with use of UE support with 0 LOB at 0.8 mph.   8. Pt will report 37% on the FOTO Functional Assessment indicating increased functional balance and mobility. (Gcode goal mobility CJ)

## 2017-09-29 ENCOUNTER — CLINICAL SUPPORT (OUTPATIENT)
Dept: REHABILITATION | Facility: HOSPITAL | Age: 67
End: 2017-09-29
Attending: PHYSICAL MEDICINE & REHABILITATION
Payer: MEDICARE

## 2017-09-29 DIAGNOSIS — R26.81 GAIT INSTABILITY: ICD-10-CM

## 2017-09-29 DIAGNOSIS — R26.9 GAIT ABNORMALITY: ICD-10-CM

## 2017-09-29 DIAGNOSIS — R29.898 WEAKNESS OF RIGHT HIP: ICD-10-CM

## 2017-09-29 PROCEDURE — 97110 THERAPEUTIC EXERCISES: CPT | Mod: PN

## 2017-09-29 NOTE — PROGRESS NOTES
TIME RECORD    Date:  09/29/2017    Start Time:  1:15  Stop Time:  2:00    PROCEDURES:    TIMED  Procedure Min.   TE 45                     UNTIMED  Procedure Min.             Total Timed Minutes:  45  Total Timed Units:  3  Total Untimed Units:  0  Charges Billed/# of units:  3TE      Progress/Current Status    Subjective:     Patient ID: Xander Sanchez is a 67 y.o. male.  Diagnosis:   1. Gait abnormality     2. Weakness of right hip     3. Gait instability       Pain: 5 /10 in the R arm  Pt stated that he has constant pain in his R arm and hand.    Objective:     Session initiated with Neuro re-ed and endurance training with B LE extremities for reciprocal motion of lower limbs on sci-fit x 8 min at level 2 at > or equal to 50 spm w/o rest. Pt performed TE per log x 40' with PTA 1:1.    DATE 9/29/17 9/28/17   Visit 3/50 2/50   G CODE  POC exp 11/21/7 3/10 2/10     FOTO 3/5 2/5   Visit Amount  Total 95.94  269.94   97.50  174.00   Standing feet apart - --   Standing feet together - --   Standing 1/2 tandem - --   Standing tandem - --   SLS R - --   SLS L - --   Side stepping - --   Cross overs - --   Karoke - --   Wii fit balance - --   Bosu - --   Beep board - --   Physioball sitting balance - --   SLR - --   clamshells - --   Hip abduction  --   bridges  Next SL w/ ball   Adduction isometric  --   LAQ  --   HS curls  Prone 2 x 10 1#   HS stretch  On stairs 3 x 30'' On stairs 3 x 30''   Gastroc stretch On fitter 3 x 30'' On fitter 3 x 30''   Leg press NEXT next   Hip extension RTC 2x10 B next   Hip flexion RTC 2x10 B next   Hip Abduction RTC 2x10 B next   Hip adduction  --   Knee flexion  --   Toe raises 2x10 B 2 x 10 B   Heel raises 2x10 B 2 x 10 B   Nu-step L2 8' LE only L2 8' LE only   Step ups 2x10 B blue 2 x 10 B blue   Lateral step ups 2x10 B blue 1 x 10 B blue   gait  --   INITIALS JA 1/6 FS       Assessment:     Pt completed session with no adverse reactions. He reports minimal B Le's soreness but no  increased pain. Verbal instructions provided on postural awareness in standing //bars therex.     Patient Education/Response:     CONT HEP    Plans and Goals:     Long term goals = Short term goals (8 weeks)  1. Pt will perform nu-step at level 2.5 x 10 minutes without rests breaks going at least 50 step/min or greater.   2. Pt will score greater than or equal to 17/24 on the DGI test/assessment without use of ADdemonstrating overall improved functional mobility and balance.  4. Pt will walk < than or equal to 650 ft on the 6 minute walk test outdoors on multiple terrain without AD with 0 LOB for pt to walk in neighborhood ~1/2 to 1 mile at safely.   5. Pt will have 0 falls from start of PT sessions.   6. Pt will have MMT score of 4+/5 in all major ms groups in R LE.   7. Pt will ambulate on TM x 8 minutes with use of UE support with 0 LOB at 0.8 mph.   8. Pt will report 37% on the FOTO Functional Assessment indicating increased functional balance and mobility. (Gcode goal mobility CJ)

## 2017-09-30 PROCEDURE — 93296 REM INTERROG EVL PM/IDS: CPT | Mod: S$GLB,,, | Performed by: INTERNAL MEDICINE

## 2017-09-30 PROCEDURE — 93295 DEV INTERROG REMOTE 1/2/MLT: CPT | Mod: S$GLB,,, | Performed by: INTERNAL MEDICINE

## 2017-10-02 ENCOUNTER — CLINICAL SUPPORT (OUTPATIENT)
Dept: REHABILITATION | Facility: HOSPITAL | Age: 67
End: 2017-10-02
Attending: PHYSICAL MEDICINE & REHABILITATION
Payer: MEDICARE

## 2017-10-02 DIAGNOSIS — R26.9 GAIT ABNORMALITY: ICD-10-CM

## 2017-10-02 DIAGNOSIS — R26.81 GAIT INSTABILITY: ICD-10-CM

## 2017-10-02 DIAGNOSIS — R29.898 WEAKNESS OF RIGHT HIP: ICD-10-CM

## 2017-10-02 PROCEDURE — 97110 THERAPEUTIC EXERCISES: CPT | Mod: PN

## 2017-10-02 NOTE — PROGRESS NOTES
TIME RECORD    Date:  10/02/2017    Start Time:  0935  Stop Time:  1023    PROCEDURES:    TIMED  Procedure Min.                         UNTIMED  Procedure Min.             Total Timed Minutes:  48  Total Timed Units:  3  Total Untimed Units:  0  Charges Billed/# of units:  3 TE      Progress/Current Status    Subjective:     Patient ID: Xander Sanchez is a 67 y.o. male.  Diagnosis:   1. Gait abnormality     2. Weakness of right hip     3. Gait instability       Pain: 4 /10 in the R arm  Pt stated that he was a litte sore today but it was better now.    Objective:     Session initiated with TE per log x 40 including B LE reciprocal training on stepper x 8' L1.5.  FOTO NEXT!!!!!!!!!!!!!    DATE 10/2/17 9/29/17 9/28/17   Visit 4/50 3/50 2/50   G CODE  POC exp 11/21/7 4/10 3/10 2/10     FOTO 4/5 3/5 2/5   Visit Amount  Total 95.94  365.88 95.94  269.94   97.50  174.00   SLS R -- - --   SLS L -- - --   Side stepping -- - --   Cross overs -- - --   Karoke -- - --   Wii fit balance -- - --   Bosu -- - --   Beep board -- - --   Developmental sequening -- - --   SLR -- - --   clamshells -- - --   Hip abduction --  --   bridges 2 x 15 B w/ ball  Next SL w/ ball   Adduction isometric W/ bridges  --   LAQ --  --   HS curls Prone 2 x 10 1#  Prone 2 x 10 1#   HS stretch  On stairs 3 x 30'' On stairs 3 x 30'' On stairs 3 x 30''   Gastroc stretch On stairs 3 x 30'' On fitter 3 x 30'' On fitter 3 x 30''   Leg press Next NEXT next   Hip extension RTC 2x10 B RTC 2x10 B next   Hip flexion RTC 2x10 B RTC 2x10 B next   Hip Abduction RTC 2x10 B RTC 2x10 B next   Hip adduction --  --   Knee flexion --  --   Toe raises 2x10 B 2x10 B 2 x 10 B   Heel raises 2x10 B 2x10 B 2 x 10 B   Nu-step L1.5 8' LE only L2 8' LE only L2 8' LE only   Step ups 2x10 B  2x10 B blue 2 x 10 B blue   Lateral step ups 2x10 B  2x10 B blue 1 x 10 B blue   gait --  --   INITIALS FS JA 1/6 FS       Assessment:     Pt would cont to benefit from B LE strengthening.      Patient Education/Response:     CONT HEP    Plans and Goals:     Long term goals = Short term goals (8 weeks)  1. Pt will perform nu-step at level 2.5 x 10 minutes without rests breaks going at least 50 step/min or greater.   2. Pt will score greater than or equal to 17/24 on the DGI test/assessment without use of ADdemonstrating overall improved functional mobility and balance.  4. Pt will walk < than or equal to 650 ft on the 6 minute walk test outdoors on multiple terrain without AD with 0 LOB for pt to walk in neighborhood ~1/2 to 1 mile at safely.   5. Pt will have 0 falls from start of PT sessions.   6. Pt will have MMT score of 4+/5 in all major ms groups in R LE.   7. Pt will ambulate on TM x 8 minutes with use of UE support with 0 LOB at 0.8 mph.   8. Pt will report 37% on the FOTO Functional Assessment indicating increased functional balance and mobility. (Gcode goal mobility CJ)

## 2017-10-09 ENCOUNTER — CLINICAL SUPPORT (OUTPATIENT)
Dept: REHABILITATION | Facility: HOSPITAL | Age: 67
End: 2017-10-09
Attending: PHYSICAL MEDICINE & REHABILITATION
Payer: MEDICARE

## 2017-10-09 DIAGNOSIS — R29.898 WEAKNESS OF RIGHT HIP: ICD-10-CM

## 2017-10-09 DIAGNOSIS — R26.9 GAIT ABNORMALITY: ICD-10-CM

## 2017-10-09 DIAGNOSIS — R26.81 GAIT INSTABILITY: ICD-10-CM

## 2017-10-09 PROCEDURE — 97110 THERAPEUTIC EXERCISES: CPT | Mod: PN

## 2017-10-09 PROCEDURE — 97116 GAIT TRAINING THERAPY: CPT | Mod: PN

## 2017-10-09 NOTE — PROGRESS NOTES
TIME RECORD    Date:  10/09/2017    Start Time:  1148  Stop Time:  1230    PROCEDURES:    TIMED  Procedure Min.   gait 12   TE 30                 UNTIMED  Procedure Min.             Total Timed Minutes:  42  Total Timed Units:  3  Total Untimed Units:  0  Charges Billed/# of units:  3 (1gait,, 2TE)      Progress/Current Status    Subjective:     Patient ID: Xander Sanchez is a 67 y.o. male.  Diagnosis:   1. Gait abnormality     2. Weakness of right hip     3. Gait instability       Pain: 4 /10 in the R arm  Pt presented to session a few    Objective:     Pt completed FOTO assessment 10' total. Pt performed gait training on treadmill x 10 total consisting of forward gait x 5' at 0.7 mph with B UE support f/b sidestepping B x 2' each way at 0.4 mph with B UE support and 0 LOB with cues as needed and PT 1:1. Pt performed standing SLS and TE per log with PT 1:1.        DATE 10/9/17 10/2/17 9/29/17 9/28/17   Visit 5/50 4/50 3/50 2/50   G CODE  POC exp 11/21/7 5/10 4/10 3/10 2/10     FOTO 5/5 done 4/5 3/5 2/5   Visit Amount  Total 92.39  458.27 95.94  365.88 95.94  269.94   97.50  174.00   SLS R With contra limb on blue step with overhead med ball lifts x 20 (red) -- - --   SLS L With contra limb on blue step with overhead med ball lifts x 20 (red) -- - --   Side stepping -- -- - --   Cross overs -- -- - --   Karoke -- -- - --   Wii fit balance -- -- - --   Bosu -- -- - --   Beep board -- -- - --   Developmental sequening -- -- - --   SLR -- -- - --   clamshells -- -- - --   Hip abduction -- --  --   bridges 2 x 15 B w/ ball 2 x 15 B w/ ball  Next SL w/ ball   Adduction isometric W/ bridges W/ bridges  --   LAQ -- --  --   HS curls Prone 2 x 10 1# Prone 2 x 10 1#  Prone 2 x 10 1#   HS stretch  On stairs 3 x 30'' On stairs 3 x 30'' On stairs 3 x 30'' On stairs 3 x 30''   Gastroc stretch On stairs 3 x 30'' On stairs 3 x 30'' On fitter 3 x 30'' On fitter 3 x 30''   Leg press 4.5 2 x 10 B Next NEXT next   Hip extension OOT  RTC 2x10 B RTC 2x10 B next   Hip flexion OOT RTC 2x10 B RTC 2x10 B next   Hip Abduction OOT RTC 2x10 B RTC 2x10 B next   Hip adduction -- --  --   Knee flexion -- --  --   Toe raises OOT 2x10 B 2x10 B 2 x 10 B   Heel raises OOT 2x10 B 2x10 B 2 x 10 B   Nu-step NT L1.5 8' LE only L2 8' LE only L2 8' LE only   Step ups 2x10 B  2x10 B  2x10 B blue 2 x 10 B blue   Lateral step ups 2x10 B  2x10 B  2x10 B blue 1 x 10 B blue   gait On TM see note --  --   INITIALS FS FS JA 1/6 FS       Assessment:     Pt is showing increased LE    Patient Education/Response:     CONT HEP    Plans and Goals:     Long term goals = Short term goals (8 weeks)  1. Pt will perform nu-step at level 2.5 x 10 minutes without rests breaks going at least 50 step/min or greater.   2. Pt will score greater than or equal to 17/24 on the DGI test/assessment without use of ADdemonstrating overall improved functional mobility and balance.  4. Pt will walk < than or equal to 650 ft on the 6 minute walk test outdoors on multiple terrain without AD with 0 LOB for pt to walk in neighborhood ~1/2 to 1 mile at safely.   5. Pt will have 0 falls from start of PT sessions.   6. Pt will have MMT score of 4+/5 in all major ms groups in R LE.   7. Pt will ambulate on TM x 8 minutes with use of UE support with 0 LOB at 0.8 mph.   8. Pt will report 37% on the FOTO Functional Assessment indicating increased functional balance and mobility. (ode goal mobility CJ)

## 2017-10-13 DIAGNOSIS — M79.89 LEG SWELLING: ICD-10-CM

## 2017-10-13 DIAGNOSIS — I10 ESSENTIAL HYPERTENSION: ICD-10-CM

## 2017-10-13 RX ORDER — HYDROCHLOROTHIAZIDE 12.5 MG/1
CAPSULE ORAL
Qty: 30 CAPSULE | Refills: 0 | Status: SHIPPED | OUTPATIENT
Start: 2017-10-13 | End: 2018-02-26 | Stop reason: SDUPTHER

## 2017-10-17 ENCOUNTER — OFFICE VISIT (OUTPATIENT)
Dept: UROLOGY | Facility: CLINIC | Age: 67
End: 2017-10-17
Payer: MEDICARE

## 2017-10-17 VITALS
HEIGHT: 71 IN | SYSTOLIC BLOOD PRESSURE: 144 MMHG | WEIGHT: 193 LBS | HEART RATE: 78 BPM | DIASTOLIC BLOOD PRESSURE: 88 MMHG | BODY MASS INDEX: 27.02 KG/M2

## 2017-10-17 DIAGNOSIS — R35.0 URINARY FREQUENCY: ICD-10-CM

## 2017-10-17 DIAGNOSIS — N41.0 PROSTATITIS, ACUTE: ICD-10-CM

## 2017-10-17 DIAGNOSIS — Z90.79 S/P TURP: Primary | ICD-10-CM

## 2017-10-17 DIAGNOSIS — R33.9 INCOMPLETE BLADDER EMPTYING: ICD-10-CM

## 2017-10-17 PROCEDURE — 99999 PR PBB SHADOW E&M-EST. PATIENT-LVL III: CPT | Mod: PBBFAC,,, | Performed by: UROLOGY

## 2017-10-17 PROCEDURE — 99214 OFFICE O/P EST MOD 30 MIN: CPT | Mod: S$GLB,,, | Performed by: UROLOGY

## 2017-10-17 RX ORDER — DOXYCYCLINE 100 MG/1
100 CAPSULE ORAL EVERY 12 HOURS
Qty: 60 CAPSULE | Refills: 1 | Status: SHIPPED | OUTPATIENT
Start: 2017-10-17 | End: 2017-12-19

## 2017-10-20 ENCOUNTER — CLINICAL SUPPORT (OUTPATIENT)
Dept: REHABILITATION | Facility: HOSPITAL | Age: 67
End: 2017-10-20
Attending: PHYSICAL MEDICINE & REHABILITATION
Payer: MEDICARE

## 2017-10-20 DIAGNOSIS — R29.898 WEAKNESS OF RIGHT HIP: ICD-10-CM

## 2017-10-20 DIAGNOSIS — R26.81 GAIT INSTABILITY: ICD-10-CM

## 2017-10-20 DIAGNOSIS — R26.9 GAIT ABNORMALITY: ICD-10-CM

## 2017-10-20 PROCEDURE — 97110 THERAPEUTIC EXERCISES: CPT | Mod: PN

## 2017-10-20 NOTE — PROGRESS NOTES
TIME RECORD    Date:  10/20/2017    Start Time:  0845  Stop Time:  0935    PROCEDURES:    TIMED  Procedure Min.   Supervised TE 5   TE 45                 UNTIMED  Procedure Min.             Total Timed Minutes:  45  Total Timed Units:  3  Total Untimed Units:  0  Charges Billed/# of units:  3TE      Progress/Current Status    Subjective:     Patient ID: Xander Sanchez is a 67 y.o. male.  Diagnosis:   1. Gait abnormality     2. Weakness of right hip     3. Gait instability       Pain: 7 /10 R arm pain  Pt presented to session stating that last time he mixed up his appt times and he could not stay for his appt time.    Objective:     Session initiated with Neuro re-ed and endurance training with B UE/LE extremities for reciprocal motion of all limbs on sci-fit x 10 min at level 2 at > or equal to 50 spm w/o rest. Pt performed TE x 35' with PT 1:1 f/b supervised TE x 5'.         DATE 10/20/17 10/9/17 10/2/17 9/29/17 9/28/17   Visit 6/50 5/50 4/50 3/50 2/50   G CODE  POC exp 11/21/7 6/10 5/10 4/10 3/10 2/10     FOTO 6/10 5/5 done 4/5 3/5 2/5   Visit Amount  Total 95.94  554.21 92.39  458.27 95.94  365.88 95.94  269.94   97.50  174.00   SLS R With contra limb on blue step with overhead shoulder flexion with no ball x 20 With contra limb on blue step with overhead med ball lifts x 20 (red) -- - --   SLS L With contra limb on blue step with overhead shoulder flexion with no ball x  With contra limb on blue step with overhead med ball lifts x 20 (red) -- - --   Side stepping -- -- -- - --   Cross overs -- -- -- - --   Karoke -- -- -- - --   Wii fit balance -- -- -- - --   Bosu -- -- -- - --   Beep board -- -- -- - --   Developmental sequening -- -- -- - --   SLR -- -- -- - --   clamshells -- -- -- - --   Hip abduction -- -- --  --   bridges 2 x 15 B w/ ball 2 x 15 B w/ ball 2 x 15 B w/ ball  Next SL w/ ball   Adduction isometric W/ bridges W/ bridges W/ bridges  --   LAQ -- -- --  --   HS curls Prone 2 x 10 1# Prone 2 x 10  1# Prone 2 x 10 1#  Prone 2 x 10 1#   HS stretch  On stairs 3 x 30'' On stairs 3 x 30'' On stairs 3 x 30'' On stairs 3 x 30'' On stairs 3 x 30''   Gastroc stretch On stairs 3 x 30'' On stairs 3 x 30'' On stairs 3 x 30'' On fitter 3 x 30'' On fitter 3 x 30''   Leg press 4.5 2 x 10 B 4.5 2 x 10 B Next NEXT next   Hip extension RTC 2x10 B OOT RTC 2x10 B RTC 2x10 B next   Hip flexion RTC 2x10 B OOT RTC 2x10 B RTC 2x10 B next   Hip Abduction RTC 2x10 B OOT RTC 2x10 B RTC 2x10 B next   Hip adduction -- -- --  --   Knee flexion -- -- --  --   Toe raises 3x10 B OOT 2x10 B 2x10 B 2 x 10 B   Heel raises 3x10 B OOT 2x10 B 2x10 B 2 x 10 B   Nu-step L2 8' LE only NT L1.5 8' LE only L2 8' LE only L2 8' LE only   Step ups 2x10 B  2x10 B  2x10 B  2x10 B blue 2 x 10 B blue   Lateral step ups 2x10 B  2x10 B  2x10 B  2x10 B blue 1 x 10 B blue   gait Next on TM On TM see note --  --   INITIALS FS FS FS JA 1/6 FS       Assessment:     Pt has R UE pain so no med bal was used on UE flexion today, pt tolerated session well overall with increase knee flexion in R during prone HS curls.  Pt R LE strength is improving.    Patient Education/Response:     CONT HEP    Plans and Goals:       Long term goals = Short term goals (8 weeks)  1. Pt will perform nu-step at level 2.5 x 10 minutes without rests breaks going at least 50 step/min or greater.   2. Pt will score greater than or equal to 17/24 on the DGI test/assessment without use of ADdemonstrating overall improved functional mobility and balance.  4. Pt will walk < than or equal to 650 ft on the 6 minute walk test outdoors on multiple terrain without AD with 0 LOB for pt to walk in neighborhood ~1/2 to 1 mile at safely.   5. Pt will have 0 falls from start of PT sessions.   6. Pt will have MMT score of 4+/5 in all major ms groups in R LE.   7. Pt will ambulate on TM x 8 minutes with use of UE support with 0 LOB at 0.8 mph.   8. Pt will report 37% on the FOTO Functional Assessment indicating  increased functional balance and mobility. (Gcode goal mobility CJ)

## 2017-10-23 ENCOUNTER — CLINICAL SUPPORT (OUTPATIENT)
Dept: REHABILITATION | Facility: HOSPITAL | Age: 67
End: 2017-10-23
Attending: PHYSICAL MEDICINE & REHABILITATION
Payer: MEDICARE

## 2017-10-23 DIAGNOSIS — R26.81 GAIT INSTABILITY: ICD-10-CM

## 2017-10-23 DIAGNOSIS — R26.9 GAIT ABNORMALITY: ICD-10-CM

## 2017-10-23 DIAGNOSIS — R29.898 WEAKNESS OF RIGHT HIP: ICD-10-CM

## 2017-10-23 PROCEDURE — 97112 NEUROMUSCULAR REEDUCATION: CPT | Mod: PN

## 2017-10-23 PROCEDURE — 97110 THERAPEUTIC EXERCISES: CPT | Mod: PN

## 2017-10-23 NOTE — PROGRESS NOTES
"TIME RECORD    Date:  10/23/2017    Start Time:  0934  Stop Time:  1013    PROCEDURES:    TIMED  Procedure Min.   Supervised TE    TE 29   NMR 10             UNTIMED  Procedure Min.             Total Timed Minutes:  39  Total Timed Units:  3  Total Untimed Units:  0  Charges Billed/# of units:  3 (NMR-1, TE-2)      Progress/Current Status    Subjective:     Patient ID: Xander Sanchez is a 67 y.o. male.  Diagnosis:   1. Gait abnormality     2. Weakness of right hip     3. Gait instability       Pain: 6/10 R arm pain  Pt states "I don't like cold weather."    Objective:     Session initiated with Neuro re-ed and endurance training with B UE/LE extremities for reciprocal motion of all limbs on sci-fit x 10' at level 2 at > or equal to 50 spm w/o rest. Pt performed TE 1:1 with PT per log x 29'         DATE 10/23/17 10/20/17 10/9/17 10/2/17 9/29/17 9/28/17   Visit 7/50 6/50 5/50 4/50 3/50 2/50   G CODE  POC exp 11/21/7 7/10 6/10 5/10 4/10 3/10 2/10     FOTO 7/10 6/10 5/5 done 4/5 3/5 2/5   Visit Amount  Total 97.35  651.56 95.94  554.21 92.39  458.27 95.94  365.88 95.94  269.94   97.50  174.00   SLS R With contra limb on blue step with overhead shoulder flexion with ball x 25 With contra limb on blue step with overhead shoulder flexion with no ball x 20 With contra limb on blue step with overhead med ball lifts x 20 (red) -- - --   SLS L With contra limb on blue step with overhead shoulder flexion with ball x 25 With contra limb on blue step with overhead shoulder flexion with no ball x  With contra limb on blue step with overhead med ball lifts x 20 (red) -- - --   Side stepping  -- -- -- - --   Cross overs  -- -- -- - --   Karoke  -- -- -- - --   Wii fit balance  -- -- -- - --   Bosu  -- -- -- - --   Beep board  -- -- -- - --   Developmental sequening  -- -- -- - --   SLR  -- -- -- - --   clamshells  -- -- -- - --   Hip abduction  -- -- --  --   bridges  2 x 15 B w/ ball 2 x 15 B w/ ball 2 x 15 B w/ ball  Next SL w/ " ball   Adduction isometric  W/ bridges W/ bridges W/ bridges  --   LAQ  -- -- --  --   HS curls  Prone 2 x 10 1# Prone 2 x 10 1# Prone 2 x 10 1#  Prone 2 x 10 1#   HS stretch  On stairs 3 x 30'' On stairs 3 x 30'' On stairs 3 x 30'' On stairs 3 x 30'' On stairs 3 x 30'' On stairs 3 x 30''   Gastroc stretch On stairs 3 x 30'' On stairs 3 x 30'' On stairs 3 x 30'' On stairs 3 x 30'' On fitter 3 x 30'' On fitter 3 x 30''   Leg press 5.0 2x10 B 4.5 2 x 10 B 4.5 2 x 10 B Next NEXT next   Hip extension  RTC 2x10 B OOT RTC 2x10 B RTC 2x10 B next   Hip flexion  RTC 2x10 B OOT RTC 2x10 B RTC 2x10 B next   Hip Abduction  RTC 2x10 B OOT RTC 2x10 B RTC 2x10 B next   Hip adduction  -- -- --  --   Knee flexion  -- -- --  --   Toe raises  3x10 B OOT 2x10 B 2x10 B 2 x 10 B   Heel raises  3x10 B OOT 2x10 B 2x10 B 2 x 10 B   Nu-step L2 8' LE only L2 8' LE only NT L1.5 8' LE only L2 8' LE only L2 8' LE only   Step ups 2x12 B  2x10 B  2x10 B  2x10 B  2x10 B blue 2 x 10 B blue   Lateral step ups 2x12 B  2x10 B  2x10 B  2x10 B  2x10 B blue 1 x 10 B blue   gait Next on TM Next on TM On TM see note --  --   INITIALS KV FS FS FS JA 1/6 FS       Assessment:     Pt reported R UE pain with medicine ball therefore rest of treatment no medicine ball was used. Pt tolerated progression of exercises well with no c/o of increased pain or discomfort.    Patient Education/Response:     CONT HEP    Plans and Goals:       Long term goals = Short term goals (8 weeks)  1. Pt will perform nu-step at level 2.5 x 10 minutes without rests breaks going at least 50 step/min or greater.   2. Pt will score greater than or equal to 17/24 on the DGI test/assessment without use of ADdemonstrating overall improved functional mobility and balance.  4. Pt will walk < than or equal to 650 ft on the 6 minute walk test outdoors on multiple terrain without AD with 0 LOB for pt to walk in neighborhood ~1/2 to 1 mile at safely.   5. Pt will have 0 falls from start of PT  sessions.   6. Pt will have MMT score of 4+/5 in all major ms groups in R LE.   7. Pt will ambulate on TM x 8 minutes with use of UE support with 0 LOB at 0.8 mph.   8. Pt will report 37% on the FOTO Functional Assessment indicating increased functional balance and mobility. (ode goal mobility CJ)

## 2017-10-24 ENCOUNTER — DOCUMENTATION ONLY (OUTPATIENT)
Dept: REHABILITATION | Facility: HOSPITAL | Age: 67
End: 2017-10-24

## 2017-10-24 NOTE — PROGRESS NOTES
Face to Face PTA Conference performed with Khoa Cifuentes PTA  regarding patient's current status, overall progress, and plan of care.    Face to face meeting completed with Nelia Farah PT regarding current status and progress of   Xander Sanchez .   Khoa Cifuentes, PTA

## 2017-10-27 ENCOUNTER — CLINICAL SUPPORT (OUTPATIENT)
Dept: REHABILITATION | Facility: HOSPITAL | Age: 67
End: 2017-10-27
Attending: PHYSICAL MEDICINE & REHABILITATION
Payer: MEDICARE

## 2017-10-27 DIAGNOSIS — R26.81 GAIT INSTABILITY: ICD-10-CM

## 2017-10-27 DIAGNOSIS — R29.898 WEAKNESS OF RIGHT HIP: ICD-10-CM

## 2017-10-27 DIAGNOSIS — R26.9 GAIT ABNORMALITY: ICD-10-CM

## 2017-10-27 PROCEDURE — 97110 THERAPEUTIC EXERCISES: CPT | Mod: PN

## 2017-10-27 PROCEDURE — 97112 NEUROMUSCULAR REEDUCATION: CPT | Mod: PN

## 2017-10-27 NOTE — PROGRESS NOTES
TIME RECORD    Date:  10/27/2017    Start Time:  8:02  Stop Time:  8:52    PROCEDURES:    TIMED  Procedure Min.   TE 30   NMR 20                 UNTIMED  Procedure Min.             Total Timed Minutes: 50  Total Timed Units:  3  Total Untimed Units:  0  Charges Billed/# of units:  3 (NMR-1, TE-2)      Progress/Current Status    Subjective:     Patient ID: Xander Sanchez is a 67 y.o. male.  Diagnosis:   1. Gait abnormality     2. Weakness of right hip     3. Gait instability       Pain: Pt reports he felt like he had a workout last session. He is agreeable to PT treatment.     Objective:     Session initiated with Neuro re-ed and endurance training with B UE/LE extremities for reciprocal motion of all limbs on sci-fit x 10' at level 2 at > or equal to 50 spm w/o rest. Pt performed all therex 1:1 with PTA per log x 30. Added tandem gait in //bars x 4 trials with use of 1 UE only, lateral side stepping x 4 trials, 6 hurdles step over fwd/ lat x 2 trials each SBA.         DATE 10/27/17 10/23/17 10/20/17 10/9/17 10/2/17 9/29/17 9/28/17   Visit 8/50 7/50 6/50 5/50 4/50 3/50 2/50   G CODE  POC exp 11/21/7 8/10 7/10 6/10 5/10 4/10 3/10 2/10     FOTO 8/10 7/10 6/10 5/5 done 4/5 3/5 2/5   Visit Amount  Total 97.35  748.91 97.35  651.56 95.94  554.21 92.39  458.27 95.94  365.88 95.94  269.94   97.50  174.00   SLS R With contra limb on blue step with overhead shoulder flexion with ball x 25 With contra limb on blue step with overhead shoulder flexion with ball x 25 With contra limb on blue step with overhead shoulder flexion with no ball x 20 With contra limb on blue step with overhead med ball lifts x 20 (red) -- - --   SLS L With contra limb on blue step with overhead shoulder flexion with ball x 25 With contra limb on blue step with overhead shoulder flexion with ball x 25 With contra limb on blue step with overhead shoulder flexion with no ball x  With contra limb on blue step with overhead med ball lifts x 20 (red) -- - --    Side stepping   -- -- -- - --   Cross overs   -- -- -- - --   Karoke   -- -- -- - --   Wii fit balance   -- -- -- - --   Bosu   -- -- -- - --   Beep board   -- -- -- - --   Developmental sequening   -- -- -- - --   SLR   -- -- -- - --   clamshells   -- -- -- - --   Hip abduction   -- -- --  --   bridges   2 x 15 B w/ ball 2 x 15 B w/ ball 2 x 15 B w/ ball  Next SL w/ ball   Adduction isometric   W/ bridges W/ bridges W/ bridges  --   LAQ   -- -- --  --   HS curls   Prone 2 x 10 1# Prone 2 x 10 1# Prone 2 x 10 1#  Prone 2 x 10 1#   HS stretch  On stairs 3 x 30'' On stairs 3 x 30'' On stairs 3 x 30'' On stairs 3 x 30'' On stairs 3 x 30'' On stairs 3 x 30'' On stairs 3 x 30''   Gastroc stretch On stairs 3 x 30'' On stairs 3 x 30'' On stairs 3 x 30'' On stairs 3 x 30'' On stairs 3 x 30'' On fitter 3 x 30'' On fitter 3 x 30''   Leg press 5.0 2x10 B 5.0 2x10 B 4.5 2 x 10 B 4.5 2 x 10 B Next NEXT next   Hip extension   RTC 2x10 B OOT RTC 2x10 B RTC 2x10 B next   Hip flexion   RTC 2x10 B OOT RTC 2x10 B RTC 2x10 B next   Hip Abduction   RTC 2x10 B OOT RTC 2x10 B RTC 2x10 B next   Hip adduction   -- -- --  --   Knee flexion   -- -- --  --   Toe raises   3x10 B OOT 2x10 B 2x10 B 2 x 10 B   Heel raises   3x10 B OOT 2x10 B 2x10 B 2 x 10 B   Nu-step L4 8' LE only L2 8' LE only L2 8' LE only NT L1.5 8' LE only L2 8' LE only L2 8' LE only   Step ups 2x12 B  2x12 B  2x10 B  2x10 B  2x10 B  2x10 B blue 2 x 10 B blue   Lateral step ups 2x12 B  2x12 B  2x10 B  2x10 B  2x10 B  2x10 B blue 1 x 10 B blue   gait  Next on TM Next on TM On TM see note --  --   INITIALS JA 1/6 KV FS FS FS JA 1/6 FS       Assessment:     Pt able to complete session with no reports of pain, no adverse reactions to standing balance activities. Responded well with miki use today, verbal instructions provided for postural awareness and safety.     Patient Education/Response:     CONT HEP    Plans and Goals:     Cont to advance PT as per POC, progress as able.    Long term goals = Short term goals (8 weeks)  1. Pt will perform nu-step at level 2.5 x 10 minutes without rests breaks going at least 50 step/min or greater.   2. Pt will score greater than or equal to 17/24 on the DGI test/assessment without use of ADdemonstrating overall improved functional mobility and balance.  4. Pt will walk < than or equal to 650 ft on the 6 minute walk test outdoors on multiple terrain without AD with 0 LOB for pt to walk in neighborhood ~1/2 to 1 mile at safely.   5. Pt will have 0 falls from start of PT sessions.   6. Pt will have MMT score of 4+/5 in all major ms groups in R LE.   7. Pt will ambulate on TM x 8 minutes with use of UE support with 0 LOB at 0.8 mph.   8. Pt will report 37% on the FOTO Functional Assessment indicating increased functional balance and mobility. (Gcode goal mobility CJ)

## 2017-10-31 RX ORDER — PRAVASTATIN SODIUM 10 MG/1
TABLET ORAL
Qty: 90 TABLET | Refills: 0 | Status: SHIPPED | OUTPATIENT
Start: 2017-10-31 | End: 2018-02-06 | Stop reason: SDUPTHER

## 2017-11-03 ENCOUNTER — CLINICAL SUPPORT (OUTPATIENT)
Dept: REHABILITATION | Facility: HOSPITAL | Age: 67
End: 2017-11-03
Attending: PHYSICAL MEDICINE & REHABILITATION
Payer: MEDICARE

## 2017-11-03 DIAGNOSIS — R26.81 GAIT INSTABILITY: ICD-10-CM

## 2017-11-03 DIAGNOSIS — R26.9 GAIT ABNORMALITY: ICD-10-CM

## 2017-11-03 DIAGNOSIS — R29.898 WEAKNESS OF RIGHT HIP: ICD-10-CM

## 2017-11-03 PROCEDURE — 97112 NEUROMUSCULAR REEDUCATION: CPT | Mod: PN

## 2017-11-03 PROCEDURE — 97110 THERAPEUTIC EXERCISES: CPT | Mod: PN

## 2017-11-03 NOTE — PROGRESS NOTES
"TIME RECORD    Date:  11/03/2017    Start Time:  1015  Stop Time:   1105    PROCEDURES:    TIMED  Procedure Min.   TE 25   NMR 25                 UNTIMED  Procedure Min.             Total Timed Minutes: 50  Total Timed Units:  3  Total Untimed Units:  0  Charges Billed/# of units:  3 (NMR-2, TE-1)      Progress/Current Status    Subjective:     Patient ID: Xander Sanchez is a 67 y.o. male.  Diagnosis:   1. Gait abnormality     2. Weakness of right hip     3. Gait instability       Pain: Pt reports no complaints of pain upon arrival. He is agreeable to PT session.     Objective:     Session initiated with Neuro re-ed and endurance training with B UE/LE extremities for reciprocal motion of all limbs on sci-fit x 10' at level 2 at > or equal to 50 spm w/o rest. Pt performed all therex 1:1 with PTA per log x 30. Added step fanning with multi level steps 6" and  8" B LE's 2 min 2 trials each with use of 1 UE for balance at ballet bar.     DATE 11/3/17 10/27/17 10/23/17 10/20/17 10/9/17 10/2/17 9/29/17 9/28/17   Visit 9/50 8/50 7/50 6/50 5/50 4/50 3/50 2/50   G CODE  POC exp 11/21/7 9/10 8/10 7/10 6/10 5/10 4/10 3/10 2/10     FOTO 9/10 8/10 7/10 6/10 5/5 done 4/5 3/5 2/5   Visit Amount  Total 98.76  847.67 97.35  748.91 97.35  651.56 95.94  554.21 92.39  458.27 95.94  365.88 95.94  269.94   97.50  174.00   SLS R NT With contra limb on blue step with overhead shoulder flexion with ball x 25 With contra limb on blue step with overhead shoulder flexion with ball x 25 With contra limb on blue step with overhead shoulder flexion with no ball x 20 With contra limb on blue step with overhead med ball lifts x 20 (red) -- - --   SLS L NT With contra limb on blue step with overhead shoulder flexion with ball x 25 With contra limb on blue step with overhead shoulder flexion with ball x 25 With contra limb on blue step with overhead shoulder flexion with no ball x  With contra limb on blue step with overhead med ball lifts x 20 (red) " -- - --   Side stepping    -- -- -- - --   Cross overs    -- -- -- - --   Karoke    -- -- -- - --   Wii fit balance    -- -- -- - --   Bosu    -- -- -- - --   Beep board    -- -- -- - --   Developmental sequening See note   -- -- -- - --   SLR    -- -- -- - --   clamshells    -- -- -- - --   Hip abduction    -- -- --  --   bridges    2 x 15 B w/ ball 2 x 15 B w/ ball 2 x 15 B w/ ball  Next SL w/ ball   Adduction isometric    W/ bridges W/ bridges W/ bridges  --   LAQ    -- -- --  --   HS curls    Prone 2 x 10 1# Prone 2 x 10 1# Prone 2 x 10 1#  Prone 2 x 10 1#   HS stretch  On stairs 3 x 30'' On stairs 3 x 30'' On stairs 3 x 30'' On stairs 3 x 30'' On stairs 3 x 30'' On stairs 3 x 30'' On stairs 3 x 30'' On stairs 3 x 30''   Gastroc stretch On stairs 3 x 30'' On stairs 3 x 30'' On stairs 3 x 30'' On stairs 3 x 30'' On stairs 3 x 30'' On stairs 3 x 30'' On fitter 3 x 30'' On fitter 3 x 30''   Leg press 5.0 2x10 B 5.0 2x10 B 5.0 2x10 B 4.5 2 x 10 B 4.5 2 x 10 B Next NEXT next   Hip extension    RTC 2x10 B OOT RTC 2x10 B RTC 2x10 B next   Hip flexion    RTC 2x10 B OOT RTC 2x10 B RTC 2x10 B next   Hip Abduction    RTC 2x10 B OOT RTC 2x10 B RTC 2x10 B next   Hip adduction    -- -- --  --   Knee flexion    -- -- --  --   Toe raises    3x10 B OOT 2x10 B 2x10 B 2 x 10 B   Heel raises    3x10 B OOT 2x10 B 2x10 B 2 x 10 B   Nu-step L4 8' LE only L4 8' LE only L2 8' LE only L2 8' LE only NT L1.5 8' LE only L2 8' LE only L2 8' LE only   Step ups 2x12 B  2x12 B  2x12 B  2x10 B  2x10 B  2x10 B  2x10 B blue 2 x 10 B blue   Lateral step ups 2x12 B  2x12 B  2x12 B  2x10 B  2x10 B  2x10 B  2x10 B blue 1 x 10 B blue   gait   Next on TM Next on TM On TM see note --  --   INITIALS JA 2/6 JA 1/6 KV FS FS FS JA 1/6 FS       Assessment:     Pt able to complete session with no reports of pain, no adverse reactions to standing balance activities. Pt able to complete step fanning activities with verbal instruction on safety and postural  awareness.     Patient Education/Response:     CONT HEP    Plans and Goals:     Cont to advance PT as per POC, progress as able.   Long term goals = Short term goals (8 weeks)  1. Pt will perform nu-step at level 2.5 x 10 minutes without rests breaks going at least 50 step/min or greater.   2. Pt will score greater than or equal to 17/24 on the DGI test/assessment without use of ADdemonstrating overall improved functional mobility and balance.  4. Pt will walk < than or equal to 650 ft on the 6 minute walk test outdoors on multiple terrain without AD with 0 LOB for pt to walk in neighborhood ~1/2 to 1 mile at safely.   5. Pt will have 0 falls from start of PT sessions.   6. Pt will have MMT score of 4+/5 in all major ms groups in R LE.   7. Pt will ambulate on TM x 8 minutes with use of UE support with 0 LOB at 0.8 mph.   8. Pt will report 37% on the FOTO Functional Assessment indicating increased functional balance and mobility. (Gcode goal mobility CJ)

## 2017-11-06 DIAGNOSIS — N40.1 BENIGN NON-NODULAR PROSTATIC HYPERPLASIA WITH LOWER URINARY TRACT SYMPTOMS: ICD-10-CM

## 2017-11-06 RX ORDER — TAMSULOSIN HYDROCHLORIDE 0.4 MG/1
CAPSULE ORAL
Qty: 180 CAPSULE | Refills: 4 | Status: SHIPPED | OUTPATIENT
Start: 2017-11-06 | End: 2018-07-06 | Stop reason: SDUPTHER

## 2017-11-09 ENCOUNTER — CLINICAL SUPPORT (OUTPATIENT)
Dept: REHABILITATION | Facility: HOSPITAL | Age: 67
End: 2017-11-09
Attending: PHYSICAL MEDICINE & REHABILITATION
Payer: MEDICARE

## 2017-11-09 DIAGNOSIS — R26.9 GAIT ABNORMALITY: ICD-10-CM

## 2017-11-09 DIAGNOSIS — R26.81 GAIT INSTABILITY: ICD-10-CM

## 2017-11-09 DIAGNOSIS — R29.898 WEAKNESS OF RIGHT HIP: ICD-10-CM

## 2017-11-09 PROCEDURE — 97112 NEUROMUSCULAR REEDUCATION: CPT | Mod: PN

## 2017-11-09 PROCEDURE — 97110 THERAPEUTIC EXERCISES: CPT | Mod: PN

## 2017-11-09 NOTE — PROGRESS NOTES
"TIME RECORD    Date:  11/09/2017    Start Time: 845  Stop Time:   9:35    PROCEDURES:    TIMED  Procedure Min.   TE 25   NMR 25                 UNTIMED  Procedure Min.             Total Timed Minutes: 50  Total Timed Units:  3  Total Untimed Units:  0  Charges Billed/# of units:  3 (NMR-2, TE-1)      Progress/Current Status    Subjective:     Patient ID: Xander Sanchez is a 67 y.o. male.  Diagnosis:   1. Gait abnormality     2. Weakness of right hip     3. Gait instability       Pain: Pt reports no complaints of pain upon arrival. He is agreeable to PT session.     Objective:     Session initiated with Neuro re-ed and endurance training with B UE/LE extremities for reciprocal motion of all limbs on sci-fit x 10' at level 2 at > or equal to 50 spm w/o rest. Pt performed all therex 1:1 with PTA per log x 25, including step fanning with multi level steps 6" and  8" B LE's 2 min 2 trials each with use of 1 UE for balance at ballet bar.     DATE 11/9/17 11/3/17 10/27/17 10/23/17 10/20/17 10/9/17 10/2/17 9/29/17 9/28/17   Visit 10/10 9/50 8/50 7/50 6/50 5/50 4/50 3/50 2/50   G CODE  POC exp 11/21/7 10/10 NEXT!! 9/10 8/10 7/10 6/10 5/10 4/10 3/10 2/10     FOTO 10/10 9/10 8/10 7/10 6/10 5/5 done 4/5 3/5 2/5   Visit Amount  Total 98.76  946.43 98.76  847.67 97.35  748.91 97.35  651.56 95.94  554.21 92.39  458.27 95.94  365.88 95.94  269.94   97.50  174.00   SLS R With contra limb on blue step with overhead shoulder flexion with ball x 25 NT With contra limb on blue step with overhead shoulder flexion with ball x 25 With contra limb on blue step with overhead shoulder flexion with ball x 25 With contra limb on blue step with overhead shoulder flexion with no ball x 20 With contra limb on blue step with overhead med ball lifts x 20 (red) -- - --   SLS L With contra limb on blue step with overhead shoulder flexion with ball x 25 NT With contra limb on blue step with overhead shoulder flexion with ball x 25 With contra limb on " blue step with overhead shoulder flexion with ball x 25 With contra limb on blue step with overhead shoulder flexion with no ball x  With contra limb on blue step with overhead med ball lifts x 20 (red) -- - --   Side stepping     -- -- -- - --   Cross overs     -- -- -- - --   Karoke     -- -- -- - --   Wii fit balance     -- -- -- - --   Bosu     -- -- -- - --   Beep board     -- -- -- - --   Developmental sequening  See note   -- -- -- - --   SLR     -- -- -- - --   clamshells     -- -- -- - --   Hip abduction     -- -- --  --   bridges     2 x 15 B w/ ball 2 x 15 B w/ ball 2 x 15 B w/ ball  Next SL w/ ball   Adduction isometric     W/ bridges W/ bridges W/ bridges  --   LAQ     -- -- --  --   HS curls     Prone 2 x 10 1# Prone 2 x 10 1# Prone 2 x 10 1#  Prone 2 x 10 1#   HS stretch  On stairs 3 x 30'' On stairs 3 x 30'' On stairs 3 x 30'' On stairs 3 x 30'' On stairs 3 x 30'' On stairs 3 x 30'' On stairs 3 x 30'' On stairs 3 x 30'' On stairs 3 x 30''   Gastroc stretch On stairs 3 x 30'' On stairs 3 x 30'' On stairs 3 x 30'' On stairs 3 x 30'' On stairs 3 x 30'' On stairs 3 x 30'' On stairs 3 x 30'' On fitter 3 x 30'' On fitter 3 x 30''   Leg press 5.0 2x10 B 5.0 2x10 B 5.0 2x10 B 5.0 2x10 B 4.5 2 x 10 B 4.5 2 x 10 B Next NEXT next   Hip extension     RTC 2x10 B OOT RTC 2x10 B RTC 2x10 B next   Hip flexion     RTC 2x10 B OOT RTC 2x10 B RTC 2x10 B next   Hip Abduction     RTC 2x10 B OOT RTC 2x10 B RTC 2x10 B next   Hip adduction     -- -- --  --   Knee flexion     -- -- --  --   Toe raises     3x10 B OOT 2x10 B 2x10 B 2 x 10 B   Heel raises     3x10 B OOT 2x10 B 2x10 B 2 x 10 B   Nu-step L4 8' LE only L4 8' LE only L4 8' LE only L2 8' LE only L2 8' LE only NT L1.5 8' LE only L2 8' LE only L2 8' LE only   Step ups 2x10 B 2x12 B  2x12 B  2x12 B  2x10 B  2x10 B  2x10 B  2x10 B blue 2 x 10 B blue   Lateral step ups 2x12 B 2x12 B  2x12 B  2x12 B  2x10 B  2x10 B  2x10 B  2x10 B blue 1 x 10 B blue   gait    Next on TM  Next on TM On TM see note --  --   INITIALS JA 3/6 JA 2/6 JA 1/6 KV FS FS FS JA 1/6 FS       Assessment:     Pt able to complete session with no reports of pain, no adverse reactions to standing balance activities.   Patient Education/Response:     CONT HEP    Plans and Goals:     Cont to advance PT as per POC, progress as able.   Long term goals = Short term goals (8 weeks)  1. Pt will perform nu-step at level 2.5 x 10 minutes without rests breaks going at least 50 step/min or greater.   2. Pt will score greater than or equal to 17/24 on the DGI test/assessment without use of ADdemonstrating overall improved functional mobility and balance.  4. Pt will walk < than or equal to 650 ft on the 6 minute walk test outdoors on multiple terrain without AD with 0 LOB for pt to walk in neighborhood ~1/2 to 1 mile at safely.   5. Pt will have 0 falls from start of PT sessions.   6. Pt will have MMT score of 4+/5 in all major ms groups in R LE.   7. Pt will ambulate on TM x 8 minutes with use of UE support with 0 LOB at 0.8 mph.   8. Pt will report 37% on the FOTO Functional Assessment indicating increased functional balance and mobility. (Gcode goal mobility CJ)

## 2017-11-10 ENCOUNTER — CLINICAL SUPPORT (OUTPATIENT)
Dept: ELECTROPHYSIOLOGY | Facility: CLINIC | Age: 67
End: 2017-11-10
Payer: MEDICARE

## 2017-11-10 DIAGNOSIS — I42.8 CARDIOMYOPATHY, NONISCHEMIC: ICD-10-CM

## 2017-11-10 PROCEDURE — 93296 REM INTERROG EVL PM/IDS: CPT | Mod: S$GLB,,, | Performed by: INTERNAL MEDICINE

## 2017-11-10 PROCEDURE — 93295 DEV INTERROG REMOTE 1/2/MLT: CPT | Mod: S$GLB,,, | Performed by: INTERNAL MEDICINE

## 2017-11-12 ENCOUNTER — HOSPITAL ENCOUNTER (EMERGENCY)
Facility: HOSPITAL | Age: 67
Discharge: HOME | End: 2017-11-12
Attending: EMERGENCY MEDICINE
Payer: MEDICARE

## 2017-11-12 VITALS
SYSTOLIC BLOOD PRESSURE: 133 MMHG | DIASTOLIC BLOOD PRESSURE: 80 MMHG | HEIGHT: 71 IN | TEMPERATURE: 97 F | WEIGHT: 198 LBS | RESPIRATION RATE: 20 BRPM | HEART RATE: 63 BPM | OXYGEN SATURATION: 98 % | BODY MASS INDEX: 27.72 KG/M2

## 2017-11-12 DIAGNOSIS — N30.91 HEMORRHAGIC CYSTITIS: Primary | ICD-10-CM

## 2017-11-12 LAB
ANION GAP SERPL CALC-SCNC: 6 MMOL/L
BACTERIA #/AREA URNS HPF: ABNORMAL /HPF
BASOPHILS # BLD AUTO: 0.01 K/UL
BASOPHILS NFR BLD: 0.1 %
BILIRUB UR QL STRIP: NEGATIVE
BUN SERPL-MCNC: 12 MG/DL
CALCIUM SERPL-MCNC: 9.3 MG/DL
CHLORIDE SERPL-SCNC: 102 MMOL/L
CLARITY UR: ABNORMAL
CO2 SERPL-SCNC: 29 MMOL/L
COLOR UR: ABNORMAL
CREAT SERPL-MCNC: 0.8 MG/DL
DIFFERENTIAL METHOD: ABNORMAL
EOSINOPHIL # BLD AUTO: 0.4 K/UL
EOSINOPHIL NFR BLD: 5.7 %
ERYTHROCYTE [DISTWIDTH] IN BLOOD BY AUTOMATED COUNT: 12.2 %
EST. GFR  (AFRICAN AMERICAN): >60 ML/MIN/1.73 M^2
EST. GFR  (NON AFRICAN AMERICAN): >60 ML/MIN/1.73 M^2
GLUCOSE SERPL-MCNC: 113 MG/DL
GLUCOSE UR QL STRIP: NEGATIVE
HCT VFR BLD AUTO: 42.8 %
HGB BLD-MCNC: 13.8 G/DL
HGB UR QL STRIP: ABNORMAL
HYALINE CASTS #/AREA URNS LPF: 0 /LPF
KETONES UR QL STRIP: NEGATIVE
LEUKOCYTE ESTERASE UR QL STRIP: ABNORMAL
LYMPHOCYTES # BLD AUTO: 2 K/UL
LYMPHOCYTES NFR BLD: 29.6 %
MCH RBC QN AUTO: 29.2 PG
MCHC RBC AUTO-ENTMCNC: 32.2 G/DL
MCV RBC AUTO: 91 FL
MICROSCOPIC COMMENT: ABNORMAL
MONOCYTES # BLD AUTO: 0.5 K/UL
MONOCYTES NFR BLD: 6.6 %
NEUTROPHILS # BLD AUTO: 4 K/UL
NEUTROPHILS NFR BLD: 57.7 %
NITRITE UR QL STRIP: ABNORMAL
PH UR STRIP: 6 [PH] (ref 5–8)
PLATELET # BLD AUTO: 237 K/UL
PMV BLD AUTO: 10.4 FL
POCT GLUCOSE: 113 MG/DL (ref 70–110)
POTASSIUM SERPL-SCNC: 4.2 MMOL/L
PROT UR QL STRIP: ABNORMAL
RBC # BLD AUTO: 4.72 M/UL
RBC #/AREA URNS HPF: >100 /HPF (ref 0–4)
SODIUM SERPL-SCNC: 137 MMOL/L
SP GR UR STRIP: 1.02 (ref 1–1.03)
URN SPEC COLLECT METH UR: ABNORMAL
UROBILINOGEN UR STRIP-ACNC: 1 EU/DL
WBC # BLD AUTO: 6.86 K/UL
WBC #/AREA URNS HPF: 0 /HPF (ref 0–5)

## 2017-11-12 PROCEDURE — 87086 URINE CULTURE/COLONY COUNT: CPT

## 2017-11-12 PROCEDURE — 85025 COMPLETE CBC W/AUTO DIFF WBC: CPT

## 2017-11-12 PROCEDURE — 82962 GLUCOSE BLOOD TEST: CPT

## 2017-11-12 PROCEDURE — 81000 URINALYSIS NONAUTO W/SCOPE: CPT

## 2017-11-12 PROCEDURE — 99284 EMERGENCY DEPT VISIT MOD MDM: CPT | Mod: 25

## 2017-11-12 PROCEDURE — 80048 BASIC METABOLIC PNL TOTAL CA: CPT

## 2017-11-12 RX ORDER — AMOXICILLIN AND CLAVULANATE POTASSIUM 875; 125 MG/1; MG/1
1 TABLET, FILM COATED ORAL 2 TIMES DAILY
Qty: 20 TABLET | Refills: 0 | Status: SHIPPED | OUTPATIENT
Start: 2017-11-12 | End: 2018-02-15

## 2017-11-12 NOTE — ED PROVIDER NOTES
Encounter Date: 11/12/2017       History     Chief Complaint   Patient presents with    Dysuria     pt presents to ed with c/o burning post urination x2 days with urinary frequency. pt reports is currently being treated for uti with doxycyline and AZO.  pt began treatment on Oct 17. denies hematuria.      Patient is a 67-year-old male who complains of a 2 day history of burning after he urinates.  He is currently on doxycycline for urinary tract infection.  He denies abdominal pain, nausea or vomiting.  No lower back pain.  No fever or chills.      The history is provided by the patient.     Review of patient's allergies indicates:   Allergen Reactions    Ciprofloxacin Nausea And Vomiting     Past Medical History:   Diagnosis Date    Asthma     Cardiomyopathy     Cervical spondylosis with myelopathy 10/17/2012    Chronic bronchitis     Chronic systolic dysfunction of left ventricle 7/27/2015    Constipation 7/27/2015    COPD (chronic obstructive pulmonary disease)     Diabetes mellitus, type 2     DM type 2 (diabetes mellitus, type 2) 7/27/2015    Enlarged prostate 7/27/2015    Hypertension     ICD (implantable cardiac defibrillator) in place     Presence of biventricular AICD 7/27/2015    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thy 2/1/2016    Urinary tract infection     pt does not know     Past Surgical History:   Procedure Laterality Date    CARDIAC DEFIBRILLATOR PLACEMENT      CERVICAL SPINE SURGERY      COLONOSCOPY N/A 7/19/2017    Procedure: COLONOSCOPY  golytely;  Surgeon: Vannessa Uribe MD;  Location: [facility];  Service: Endoscopy;  Laterality: N/A;    SPINE SURGERY       Family History   Problem Relation Age of Onset    Hypertension Mother     Hypertension Father     COPD Father     No Known Problems Sister     No Known Problems Brother     No Known Problems Daughter     Prostate cancer Neg Hx     Kidney disease Neg Hx      Social History   Substance Use Topics     Smoking status: Former Smoker     Packs/day: 1.00     Years: 40.00     Types: Cigarettes     Quit date: 7/26/2009    Smokeless tobacco: Never Used    Alcohol use 4.2 oz/week     6 Cans of beer, 1 Shots of liquor per week     Review of Systems   Constitutional: Negative for chills and fever.   Gastrointestinal: Negative for abdominal pain, nausea and vomiting.   Genitourinary: Positive for dysuria. Negative for flank pain and hematuria.   Musculoskeletal: Negative for back pain.   Skin: Negative for rash.   All other systems reviewed and are negative.      Physical Exam     Initial Vitals [11/12/17 0704]   BP Pulse Resp Temp SpO2   (!) 153/82 89 20 98 °F (36.7 °C) 98 %      MAP       105.67         Physical Exam    Nursing note and vitals reviewed.  Constitutional: No distress.   HENT:   Head: Normocephalic and atraumatic.   Eyes: EOM are normal.   Neck: Neck supple.   Cardiovascular: Normal rate, regular rhythm and normal heart sounds.   Pulmonary/Chest: Breath sounds normal.   Abdominal: Soft. He exhibits no distension. There is no tenderness.   Musculoskeletal: Normal range of motion. He exhibits no edema.   Neurological: He is alert and oriented to person, place, and time.   Skin: Skin is warm and dry.   Psychiatric: His behavior is normal. Thought content normal.         ED Course   Procedures  Labs Reviewed   URINALYSIS - Abnormal; Notable for the following:        Result Value    Color, UA Orange (*)     Appearance, UA Cloudy (*)     Protein, UA 3+ (*)     Occult Blood UA 3+ (*)     Leukocytes, UA Trace (*)     All other components within normal limits   URINALYSIS MICROSCOPIC - Abnormal; Notable for the following:     RBC, UA >100 (*)     All other components within normal limits   CBC W/ AUTO DIFFERENTIAL - Abnormal; Notable for the following:     Hemoglobin 13.8 (*)     All other components within normal limits   BASIC METABOLIC PANEL - Abnormal; Notable for the following:     Glucose 113 (*)      Anion Gap 6 (*)     All other components within normal limits   POCT GLUCOSE - Abnormal; Notable for the following:     POCT Glucose 113 (*)     All other components within normal limits   CULTURE, URINE   CULTURE, URINE        Imaging Results          CT Renal Stone Study ABD Pelvis WO (Final result)  Result time 11/12/17 09:53:47    Final result by Juliana Abdi MD (11/12/17 09:53:47)                 Impression:      1.  Diffuse urinary bladder wall thickening with adjacent perivesicular fat stranding, concerning for possible cystitis.  Correlate clinically.    2.  No evidence of urolithiasis.    3.  Partially exophytic 3.8 cm cyst present at the interpolar region of the right kidney.    4.  Mild hepatomegaly          Electronically signed by: JULIANA ABDI MD  Date:     11/12/17  Time:    09:53              Narrative:      Comparison: None    Technique: CT of the abdomen and pelvis was obtained without the use of intravenous contrast.    Findings:   Visualized Chest: Lung bases are clear.  Cardiac pacing leads noted.    Abdomen:  Note that sensitivity and specificity for detection and characterization of most vascular and visceral pathology is decreased without the use of intravenous contrast.  Interpretation is offered within these confines.    Liver: Liver appears mildly enlarged.  No focal abnormality appreciated on this noncontrast exam.    Gallbladder and biliary: Within normal limits.  Spleen: Within normal limits.  Pancreas: Within normal limits.  Adrenals: Within normal limits.    Kidneys: There is a 3.8 cm partially exophytic cyst present at the interpolar to the right kidney posteriorly.  No hydronephrosis or hydroureter.  No urolithiasis demonstrated.    Bowel: Within normal limits.  No evidence of obstruction.  The appendix appears within normal limits.    Peritoneum: No ascites or pneumoperitoneum.  Adenopathy: None.  Vasculature: Within normal limits.    Pelvis:  Urinary bladder: There is  diffuse urinary bladder wall thickening.  Bladder is poorly distended.  There some stranding adjacent perivesicular fat suggesting acute inflammation.    Prostate and seminal vesicles: Within normal limits.  Adenopathy: None.    Bones: No acute or suspicious osseous findings.  Scattered degenerative changes noted, most notably at the right hip.    Miscellaneous: None.                                 Medical Decision Making:   ED Management:  67-year-old male with what appears to be hemorrhagic cystitis.  Abdominal CT shows no evidence of renal stones, but only thickened bladder consistent with cystitis.  He is been taking doxycycline and I will switch him to Augmentin.  I have also ordered a urine culture for follow-up.  I have suggested he follow-up with his primary physician as soon as able for recheck and further treatment as warranted.                   ED Course      Clinical Impression:   The encounter diagnosis was Hemorrhagic cystitis.                           Bernabe Hernández MD  11/12/17 7134

## 2017-11-13 LAB — BACTERIA UR CULT: NO GROWTH

## 2017-11-15 ENCOUNTER — OFFICE VISIT (OUTPATIENT)
Dept: PHYSICAL MEDICINE AND REHAB | Facility: CLINIC | Age: 67
End: 2017-11-15
Payer: MEDICARE

## 2017-11-15 VITALS
SYSTOLIC BLOOD PRESSURE: 133 MMHG | BODY MASS INDEX: 26.76 KG/M2 | HEART RATE: 78 BPM | HEIGHT: 71 IN | DIASTOLIC BLOOD PRESSURE: 89 MMHG | WEIGHT: 191.13 LBS

## 2017-11-15 DIAGNOSIS — M79.601 RIGHT ARM PAIN: ICD-10-CM

## 2017-11-15 DIAGNOSIS — M54.12 BRACHIAL NEURITIS OR RADICULITIS: ICD-10-CM

## 2017-11-15 DIAGNOSIS — M62.81 MUSCLE RIGHT ARM WEAKNESS: ICD-10-CM

## 2017-11-15 DIAGNOSIS — M48.02 CERVICAL STENOSIS OF SPINE: ICD-10-CM

## 2017-11-15 DIAGNOSIS — M54.12 CERVICAL RADICULAR PAIN: ICD-10-CM

## 2017-11-15 DIAGNOSIS — G89.4 CHRONIC PAIN SYNDROME: ICD-10-CM

## 2017-11-15 DIAGNOSIS — M48.02 SPINAL STENOSIS IN CERVICAL REGION: ICD-10-CM

## 2017-11-15 DIAGNOSIS — M50.30 DEGENERATION OF CERVICAL INTERVERTEBRAL DISC: ICD-10-CM

## 2017-11-15 DIAGNOSIS — M47.812 FACET ARTHRITIS OF CERVICAL REGION: ICD-10-CM

## 2017-11-15 DIAGNOSIS — M50.00 HNP (HERNIATED NUCLEUS PULPOSUS) WITH MYELOPATHY, CERVICAL: ICD-10-CM

## 2017-11-15 DIAGNOSIS — M96.1 CERVICAL POST-LAMINECTOMY SYNDROME: ICD-10-CM

## 2017-11-15 DIAGNOSIS — R29.898 RIGHT ARM WEAKNESS: ICD-10-CM

## 2017-11-15 DIAGNOSIS — M47.12 CERVICAL SPONDYLOSIS WITH MYELOPATHY: ICD-10-CM

## 2017-11-15 DIAGNOSIS — M75.101 ROTATOR CUFF SYNDROME OF RIGHT SHOULDER: ICD-10-CM

## 2017-11-15 DIAGNOSIS — F11.20 NARCOTIC DEPENDENCY, CONTINUOUS: ICD-10-CM

## 2017-11-15 DIAGNOSIS — R29.898 RIGHT HAND WEAKNESS: ICD-10-CM

## 2017-11-15 DIAGNOSIS — G90.50 COMPLEX REGIONAL PAIN SYNDROME TYPE 1, AFFECTING UNSPECIFIED SITE: Primary | ICD-10-CM

## 2017-11-15 PROCEDURE — 99999 PR PBB SHADOW E&M-EST. PATIENT-LVL IV: CPT | Mod: PBBFAC,,, | Performed by: PHYSICAL MEDICINE & REHABILITATION

## 2017-11-15 PROCEDURE — 99499 UNLISTED E&M SERVICE: CPT | Mod: S$GLB,,, | Performed by: PHYSICAL MEDICINE & REHABILITATION

## 2017-11-15 PROCEDURE — 99214 OFFICE O/P EST MOD 30 MIN: CPT | Mod: S$GLB,,, | Performed by: PHYSICAL MEDICINE & REHABILITATION

## 2017-11-15 RX ORDER — HYDROCODONE BITARTRATE AND ACETAMINOPHEN 10; 325 MG/1; MG/1
1 TABLET ORAL EVERY 6 HOURS PRN
Qty: 120 TABLET | Refills: 0 | Status: SHIPPED | OUTPATIENT
Start: 2017-11-15 | End: 2017-12-15

## 2017-11-15 RX ORDER — BACLOFEN 10 MG/1
10 TABLET ORAL 4 TIMES DAILY
Qty: 120 TABLET | Refills: 11 | Status: SHIPPED | OUTPATIENT
Start: 2017-11-15 | End: 2017-11-15 | Stop reason: SDUPTHER

## 2017-11-15 RX ORDER — HYDROCODONE BITARTRATE AND ACETAMINOPHEN 10; 325 MG/1; MG/1
1 TABLET ORAL EVERY 6 HOURS PRN
Qty: 120 TABLET | Refills: 0 | Status: SHIPPED | OUTPATIENT
Start: 2017-12-15 | End: 2018-01-14

## 2017-11-15 RX ORDER — HYDROCODONE BITARTRATE AND ACETAMINOPHEN 10; 325 MG/1; MG/1
1 TABLET ORAL EVERY 6 HOURS PRN
Qty: 120 TABLET | Refills: 0 | Status: SHIPPED | OUTPATIENT
Start: 2018-01-15 | End: 2018-02-15 | Stop reason: SDUPTHER

## 2017-11-15 RX ORDER — BACLOFEN 10 MG/1
10 TABLET ORAL 4 TIMES DAILY
Qty: 360 TABLET | Refills: 3 | Status: SHIPPED | OUTPATIENT
Start: 2017-11-15 | End: 2018-10-09 | Stop reason: SDUPTHER

## 2017-11-15 NOTE — PROGRESS NOTES
Subjective:       Patient ID: Xander Sanchez is a 67 y.o. male.    Chief Complaint: Arm Pain (R arm)    Extremity Weakness    Numbness: Right arm paina nd numbness.   Arm Pain    Pertinent negatives include no chest pain. Numbness: Right arm paina nd numbness.   Neck Pain    Pertinent negatives include no chest pain or headaches. Numbness: Right arm paina nd numbness. Weakness: right arm weak.      Mr Sanchez is Right handed male,who returns to clinic for right arm pain, and weakness that worsened after second neck surgery with Dr. Meyer.   Third surgery with Dr. De Dios did not changed his symptoms.   He states that all symptoms stayed the same as before surgery.   He reports some improvement in pain, but the tightness, cold sensation in right arm is same as before.  Mr. Sanchez returns to clinic for chronic pain management.    Last clinic visit was on  08/02/17.    Current right arm pain is 6, average pain is 4-5, worst pain is 7/10.   He has not that much of neck pain, current neck pain is 1, worst pain maybe 2.   He complains mainly about tightness, pressure in right arm, in right arm , more in forearm, than above elbow, tightness  runs down the arm to right thumb.   Right arm pain is constant, day and night, intensity changes, pain is worse during day time, does not awake him at night.   In morning feels tight, stiff, as pressure around the arm, also feels swollen and tight in hand, and feels cold at all time.   It hurts more bellow \the elbow than above elbow.   He reports that he does not have feelings, cannot write, does not know if things are going to drop out of his hand.   Sometimes squeezes too hard plastic cup.   Right hand is clumsy, getting smaller, hardly can dress, cannot button shirt.   He states that he is not able to dress. He states that before surgery he has had similar problems, but they just got worse after second surgery.   It is swelling feeling, and tightness that borders him, not that much  pain.   Sometimes right arm feels cold, and cold weather affect him more,  Pain is better controled with Hydrocodone.   He went for CRESCENCIO, once before surgery and few time after surgery, total  > 3   Takes Hydrocodone 10/325 mg PO TID, that brings his pain down to tolerable 2.   He is now taking Neurontin 600 mg po QID, total 2400 mg /day, and tolerates it well.   But, Neurontin does not help much, in his opinion.   Hydrocodone helps and brings pain down to 2, and gives him ~ 5 hrs pain relief.  Also, takes Baclofen 10 mg po TID , for tightness in right arm, that helps also.  Now, awaiting procedure, pain stimulator, that is a little complicated since he has AICD.  Here for follow up, re evaluation and chronic pain management with opiates.    Past Medical History:   Diagnosis Date    Asthma     Cardiomyopathy     Cervical spondylosis with myelopathy 10/17/2012    Chronic bronchitis     Chronic systolic dysfunction of left ventricle 7/27/2015    Constipation 7/27/2015    COPD (chronic obstructive pulmonary disease)     Diabetes mellitus, type 2     DM type 2 (diabetes mellitus, type 2) 7/27/2015    Enlarged prostate 7/27/2015    Hypertension     ICD (implantable cardiac defibrillator) in place     Presence of biventricular AICD 7/27/2015    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thy 2/1/2016    Urinary tract infection     pt does not know       Past Surgical History:   Procedure Laterality Date    CARDIAC DEFIBRILLATOR PLACEMENT      CERVICAL SPINE SURGERY      COLONOSCOPY N/A 7/19/2017    Procedure: COLONOSCOPY  golytely;  Surgeon: Vannessa Uribe MD;  Location: [facility];  Service: Endoscopy;  Laterality: N/A;    SPINE SURGERY         Family History   Problem Relation Age of Onset    Hypertension Mother     Hypertension Father     COPD Father     No Known Problems Sister     No Known Problems Brother     No Known Problems Daughter     Prostate cancer Neg Hx     Kidney disease  Neg Hx        Social History     Social History    Marital status:      Spouse name: N/A    Number of children: N/A    Years of education: N/A     Social History Main Topics    Smoking status: Former Smoker     Packs/day: 1.00     Years: 40.00     Types: Cigarettes     Quit date: 7/26/2009    Smokeless tobacco: Never Used    Alcohol use 4.2 oz/week     6 Cans of beer, 1 Shots of liquor per week    Drug use: No    Sexual activity: Yes     Partners: Female     Other Topics Concern    None     Social History Narrative    None       Current Outpatient Prescriptions   Medication Sig Dispense Refill    ACCU-CHEK SOFTCLIX LANCETS [facility] [place]       amoxicillin-clavulanate 875-125mg (AUGMENTIN) 875-125 mg per tablet Take 1 tablet by mouth 2 (two) times daily. 20 tablet 0    aspirin (ECOTRIN) 81 MG EC tablet Take 81 mg by mouth once daily.      baclofen (LIORESAL) 10 MG tablet Take 1 tablet (10 mg total) by mouth 4 (four) times daily. 360 tablet 3    blood sugar diagnostic (BLOOD GLUCOSE TEST) Strp 1 strip by Misc.(Non-Drug; Combo Route) route once daily at 6am. 100 strip 3    diphenhydrAMINE (BENADRYL) 50 MG capsule Take 1 capsule (50 mg total) by mouth every 6 (six) hours as needed for Itching or Allergies (swelling). 20 capsule 0    doxycycline (VIBRAMYCIN) 100 MG Cap Take 1 capsule (100 mg total) by mouth every 12 (twelve) hours. 60 capsule 1    econazole nitrate 1 % cream Apply topically once daily. 85 g 0    famotidine (PEPCID) 20 MG tablet Take 1 tablet (20 mg total) by mouth 2 (two) times daily. 20 tablet 0    gabapentin (NEURONTIN) 600 MG tablet Take 1 tablet (600 mg total) by mouth 4 (four) times daily with meals and nightly. 360 tablet 3    hydrochlorothiazide (MICROZIDE) 12.5 mg capsule TAKE ONE CAPSULE BY MOUTH ONCE DAILY 30 capsule 0    hydrocodone-acetaminophen 10-325mg (NORCO)  mg Tab Take 1 tablet by mouth every 6 (six) hours as needed. 120 tablet 0    [START ON 12/15/2017]  hydrocodone-acetaminophen 10-325mg (NORCO)  mg Tab Take 1 tablet by mouth every 6 (six) hours as needed. 120 tablet 0    [START ON 1/15/2018] hydrocodone-acetaminophen 10-325mg (NORCO)  mg Tab Take 1 tablet by mouth every 6 (six) hours as needed. 120 tablet 0    lancets Misc 1 lancet by Misc.(Non-Drug; Combo Route) route once daily at 6am. 100 each 3    lisinopril (PRINIVIL,ZESTRIL) 2.5 MG tablet Take 1 tablet (2.5 mg total) by mouth once daily. 90 tablet 3    metoprolol succinate (TOPROL-XL) 25 MG 24 hr tablet Take 1 tablet (25 mg total) by mouth once daily. 90 tablet 3    oxybutynin (DITROPAN) 5 MG Tab Take 1 tablet (5 mg total) by mouth 3 (three) times daily. 270 tablet 3    pantoprazole (PROTONIX) 40 MG tablet Take 40 mg by mouth once daily.      polyethylene glycol (GLYCOLAX) 17 gram PwPk Take 17 g by mouth once daily. 30 packet 0    pravastatin (PRAVACHOL) 10 MG tablet TAKE ONE TABLET BY MOUTH AT BEDTIME 90 tablet 0    tamsulosin (FLOMAX) 0.4 mg Cp24 TAKE 2 CAPSULES (0.8 MG TOTAL) BY MOUTH EVERY EVENING. 180 capsule 4    trazodone (DESYREL) 100 MG tablet Take 1 tablet (100 mg total) by mouth nightly as needed for Insomnia. 30 tablet 1    TRUE METRIX AIR GLUCOSE METER kit USE AS INSTRUCTED 1 each 0     No current facility-administered medications for this visit.        Review of patient's allergies indicates:  No Known Allergies  Review of Systems   Constitutional: Negative for appetite change and fatigue.   Eyes: Negative for visual disturbance.   Respiratory: Negative for shortness of breath.    Cardiovascular: Negative for chest pain.   Gastrointestinal: Negative for constipation and diarrhea.   Genitourinary: Negative for dysuria, frequency and urgency.   Musculoskeletal: Positive for extremity weakness and neck pain. Negative for arthralgias, back pain, gait problem, joint swelling, myalgias and neck stiffness.   Neurological: Negative for dizziness, tremors and headaches. Weakness:  right arm weak. Numbness: Right arm paina nd numbness.   Psychiatric/Behavioral: Negative for dysphoric mood.   All other systems reviewed and are negative.          Objective:      Physical Exam    GENERAL: The patient is alert, oriented, pleasant.   MUSCULOSKELETAL:   Gait: on wide basis, COG moved forward.   GENERAL: The patient is alert, oriented, pleasant.   HEENT; PERRLA  NECK: supple, minimaly webbed neck.   Cervical spine :  Minimally decreased AROM in cervical spine, flexion to 60, extension to 0,,   side bending and rotation to 20-25 degrees with pain.  + mild-moderate paravertebral cervical musculature tenderness on Right.  + mild- moderate tenderness in upper trapezius muscles on Right.   Lumbar spine, full range of motion in all planes, flexion to 90 degrees , ext. 0.  Side bending and rotation to 25--30 degrees.   Straight leg raising Negative bilaterally.   Full range of motion in all joints x4 extremities.   Muscle strength 5/5 throughout x4 extremities.   No joint laxity throughout x4 extremities.   NEUROLOGIC: Cranial nerves II through XII intact.   Deep tendon reflexes is normal, +2 in the upper and lower extremities bilaterally.   Muscle tone is normal.   Sensory is intact to light touch and pinprick throughout x4 extremities.   Skin: no hypersensitivity, alodynia, no somatosensory changes, other than cold skin in right arm, no atrophic skin changes.        CT of Cervical spine showed:  Stable remote operative change right C3, C4 and C6 hemilaminectomies with metallic laminal plasty.  Operative change with C5/C6 anterior spinal fusion no evidence for hardware failure. with interval reduction of protruding disk at C5/C6.  continued truncation of the cervical spinal cord most pronounced at the C6 level with mild/moderate small caliber of the cord   concerning for myelomalacia stable.  Superimposed multilevel degenerative change most pronounced at C6/C7 with bulging disk resulting in mild central  canal stenosis.   Please note there is additional degenerative change with mild neural foraminal stenosis C3/C4 and C4/C5 levels.    Assessment:          1. Complex regional pain syndrome type 1, affecting unspecified site    2. Cervical spondylosis with myelopathy    3. Muscle right arm weakness    4. Degeneration of cervical intervertebral disc    5. Chronic pain syndrome    6. Cervical post-laminectomy syndrome    7. Facet arthritis of cervical region    8. Rotator cuff syndrome of right shoulder    9. Right hand weakness    10. Cervical stenosis of spine    11. Brachial neuritis or radiculitis    12. HNP (herniated nucleus pulposus) with myelopathy, cervical    13. Right arm weakness    14. Narcotic dependency, continuous    15. Right arm pain    16. Spinal stenosis in cervical region    17. Cervical radicular pain      Plan:       Complex regional pain syndrome type 1, affecting unspecified site  -     Discontinue: baclofen (LIORESAL) 10 MG tablet; Take 1 tablet (10 mg total) by mouth 4 (four) times daily.  Dispense: 120 tablet; Refill: 11  -     hydrocodone-acetaminophen 10-325mg (NORCO)  mg Tab; Take 1 tablet by mouth every 6 (six) hours as needed.  Dispense: 120 tablet; Refill: 0  -     hydrocodone-acetaminophen 10-325mg (NORCO)  mg Tab; Take 1 tablet by mouth every 6 (six) hours as needed.  Dispense: 120 tablet; Refill: 0  -     hydrocodone-acetaminophen 10-325mg (NORCO)  mg Tab; Take 1 tablet by mouth every 6 (six) hours as needed.  Dispense: 120 tablet; Refill: 0  -     baclofen (LIORESAL) 10 MG tablet; Take 1 tablet (10 mg total) by mouth 4 (four) times daily.  Dispense: 360 tablet; Refill: 3    Cervical spondylosis with myelopathy  -     Discontinue: baclofen (LIORESAL) 10 MG tablet; Take 1 tablet (10 mg total) by mouth 4 (four) times daily.  Dispense: 120 tablet; Refill: 11  -     hydrocodone-acetaminophen 10-325mg (NORCO)  mg Tab; Take 1 tablet by mouth every 6 (six) hours as  needed.  Dispense: 120 tablet; Refill: 0  -     hydrocodone-acetaminophen 10-325mg (NORCO)  mg Tab; Take 1 tablet by mouth every 6 (six) hours as needed.  Dispense: 120 tablet; Refill: 0  -     hydrocodone-acetaminophen 10-325mg (NORCO)  mg Tab; Take 1 tablet by mouth every 6 (six) hours as needed.  Dispense: 120 tablet; Refill: 0  -     baclofen (LIORESAL) 10 MG tablet; Take 1 tablet (10 mg total) by mouth 4 (four) times daily.  Dispense: 360 tablet; Refill: 3    Muscle right arm weakness  -     Discontinue: baclofen (LIORESAL) 10 MG tablet; Take 1 tablet (10 mg total) by mouth 4 (four) times daily.  Dispense: 120 tablet; Refill: 11  -     hydrocodone-acetaminophen 10-325mg (NORCO)  mg Tab; Take 1 tablet by mouth every 6 (six) hours as needed.  Dispense: 120 tablet; Refill: 0  -     hydrocodone-acetaminophen 10-325mg (NORCO)  mg Tab; Take 1 tablet by mouth every 6 (six) hours as needed.  Dispense: 120 tablet; Refill: 0  -     hydrocodone-acetaminophen 10-325mg (NORCO)  mg Tab; Take 1 tablet by mouth every 6 (six) hours as needed.  Dispense: 120 tablet; Refill: 0  -     baclofen (LIORESAL) 10 MG tablet; Take 1 tablet (10 mg total) by mouth 4 (four) times daily.  Dispense: 360 tablet; Refill: 3    Degeneration of cervical intervertebral disc  -     Discontinue: baclofen (LIORESAL) 10 MG tablet; Take 1 tablet (10 mg total) by mouth 4 (four) times daily.  Dispense: 120 tablet; Refill: 11  -     hydrocodone-acetaminophen 10-325mg (NORCO)  mg Tab; Take 1 tablet by mouth every 6 (six) hours as needed.  Dispense: 120 tablet; Refill: 0  -     hydrocodone-acetaminophen 10-325mg (NORCO)  mg Tab; Take 1 tablet by mouth every 6 (six) hours as needed.  Dispense: 120 tablet; Refill: 0  -     hydrocodone-acetaminophen 10-325mg (NORCO)  mg Tab; Take 1 tablet by mouth every 6 (six) hours as needed.  Dispense: 120 tablet; Refill: 0  -     baclofen (LIORESAL) 10 MG tablet; Take 1 tablet  (10 mg total) by mouth 4 (four) times daily.  Dispense: 360 tablet; Refill: 3    Chronic pain syndrome  -     Discontinue: baclofen (LIORESAL) 10 MG tablet; Take 1 tablet (10 mg total) by mouth 4 (four) times daily.  Dispense: 120 tablet; Refill: 11  -     hydrocodone-acetaminophen 10-325mg (NORCO)  mg Tab; Take 1 tablet by mouth every 6 (six) hours as needed.  Dispense: 120 tablet; Refill: 0  -     hydrocodone-acetaminophen 10-325mg (NORCO)  mg Tab; Take 1 tablet by mouth every 6 (six) hours as needed.  Dispense: 120 tablet; Refill: 0  -     hydrocodone-acetaminophen 10-325mg (NORCO)  mg Tab; Take 1 tablet by mouth every 6 (six) hours as needed.  Dispense: 120 tablet; Refill: 0  -     baclofen (LIORESAL) 10 MG tablet; Take 1 tablet (10 mg total) by mouth 4 (four) times daily.  Dispense: 360 tablet; Refill: 3    Cervical post-laminectomy syndrome  -     Discontinue: baclofen (LIORESAL) 10 MG tablet; Take 1 tablet (10 mg total) by mouth 4 (four) times daily.  Dispense: 120 tablet; Refill: 11  -     hydrocodone-acetaminophen 10-325mg (NORCO)  mg Tab; Take 1 tablet by mouth every 6 (six) hours as needed.  Dispense: 120 tablet; Refill: 0  -     hydrocodone-acetaminophen 10-325mg (NORCO)  mg Tab; Take 1 tablet by mouth every 6 (six) hours as needed.  Dispense: 120 tablet; Refill: 0  -     hydrocodone-acetaminophen 10-325mg (NORCO)  mg Tab; Take 1 tablet by mouth every 6 (six) hours as needed.  Dispense: 120 tablet; Refill: 0  -     baclofen (LIORESAL) 10 MG tablet; Take 1 tablet (10 mg total) by mouth 4 (four) times daily.  Dispense: 360 tablet; Refill: 3    Facet arthritis of cervical region  -     Discontinue: baclofen (LIORESAL) 10 MG tablet; Take 1 tablet (10 mg total) by mouth 4 (four) times daily.  Dispense: 120 tablet; Refill: 11  -     hydrocodone-acetaminophen 10-325mg (NORCO)  mg Tab; Take 1 tablet by mouth every 6 (six) hours as needed.  Dispense: 120 tablet; Refill:  0  -     hydrocodone-acetaminophen 10-325mg (NORCO)  mg Tab; Take 1 tablet by mouth every 6 (six) hours as needed.  Dispense: 120 tablet; Refill: 0  -     hydrocodone-acetaminophen 10-325mg (NORCO)  mg Tab; Take 1 tablet by mouth every 6 (six) hours as needed.  Dispense: 120 tablet; Refill: 0  -     baclofen (LIORESAL) 10 MG tablet; Take 1 tablet (10 mg total) by mouth 4 (four) times daily.  Dispense: 360 tablet; Refill: 3    Rotator cuff syndrome of right shoulder  -     Discontinue: baclofen (LIORESAL) 10 MG tablet; Take 1 tablet (10 mg total) by mouth 4 (four) times daily.  Dispense: 120 tablet; Refill: 11  -     hydrocodone-acetaminophen 10-325mg (NORCO)  mg Tab; Take 1 tablet by mouth every 6 (six) hours as needed.  Dispense: 120 tablet; Refill: 0  -     hydrocodone-acetaminophen 10-325mg (NORCO)  mg Tab; Take 1 tablet by mouth every 6 (six) hours as needed.  Dispense: 120 tablet; Refill: 0  -     hydrocodone-acetaminophen 10-325mg (NORCO)  mg Tab; Take 1 tablet by mouth every 6 (six) hours as needed.  Dispense: 120 tablet; Refill: 0  -     baclofen (LIORESAL) 10 MG tablet; Take 1 tablet (10 mg total) by mouth 4 (four) times daily.  Dispense: 360 tablet; Refill: 3    Right hand weakness  -     Discontinue: baclofen (LIORESAL) 10 MG tablet; Take 1 tablet (10 mg total) by mouth 4 (four) times daily.  Dispense: 120 tablet; Refill: 11  -     hydrocodone-acetaminophen 10-325mg (NORCO)  mg Tab; Take 1 tablet by mouth every 6 (six) hours as needed.  Dispense: 120 tablet; Refill: 0  -     hydrocodone-acetaminophen 10-325mg (NORCO)  mg Tab; Take 1 tablet by mouth every 6 (six) hours as needed.  Dispense: 120 tablet; Refill: 0  -     hydrocodone-acetaminophen 10-325mg (NORCO)  mg Tab; Take 1 tablet by mouth every 6 (six) hours as needed.  Dispense: 120 tablet; Refill: 0  -     baclofen (LIORESAL) 10 MG tablet; Take 1 tablet (10 mg total) by mouth 4 (four) times daily.   Dispense: 360 tablet; Refill: 3    Cervical stenosis of spine  -     Discontinue: baclofen (LIORESAL) 10 MG tablet; Take 1 tablet (10 mg total) by mouth 4 (four) times daily.  Dispense: 120 tablet; Refill: 11  -     hydrocodone-acetaminophen 10-325mg (NORCO)  mg Tab; Take 1 tablet by mouth every 6 (six) hours as needed.  Dispense: 120 tablet; Refill: 0  -     hydrocodone-acetaminophen 10-325mg (NORCO)  mg Tab; Take 1 tablet by mouth every 6 (six) hours as needed.  Dispense: 120 tablet; Refill: 0  -     hydrocodone-acetaminophen 10-325mg (NORCO)  mg Tab; Take 1 tablet by mouth every 6 (six) hours as needed.  Dispense: 120 tablet; Refill: 0  -     baclofen (LIORESAL) 10 MG tablet; Take 1 tablet (10 mg total) by mouth 4 (four) times daily.  Dispense: 360 tablet; Refill: 3    Brachial neuritis or radiculitis  -     Discontinue: baclofen (LIORESAL) 10 MG tablet; Take 1 tablet (10 mg total) by mouth 4 (four) times daily.  Dispense: 120 tablet; Refill: 11  -     hydrocodone-acetaminophen 10-325mg (NORCO)  mg Tab; Take 1 tablet by mouth every 6 (six) hours as needed.  Dispense: 120 tablet; Refill: 0  -     hydrocodone-acetaminophen 10-325mg (NORCO)  mg Tab; Take 1 tablet by mouth every 6 (six) hours as needed.  Dispense: 120 tablet; Refill: 0  -     hydrocodone-acetaminophen 10-325mg (NORCO)  mg Tab; Take 1 tablet by mouth every 6 (six) hours as needed.  Dispense: 120 tablet; Refill: 0  -     baclofen (LIORESAL) 10 MG tablet; Take 1 tablet (10 mg total) by mouth 4 (four) times daily.  Dispense: 360 tablet; Refill: 3    HNP (herniated nucleus pulposus) with myelopathy, cervical  -     Discontinue: baclofen (LIORESAL) 10 MG tablet; Take 1 tablet (10 mg total) by mouth 4 (four) times daily.  Dispense: 120 tablet; Refill: 11  -     hydrocodone-acetaminophen 10-325mg (NORCO)  mg Tab; Take 1 tablet by mouth every 6 (six) hours as needed.  Dispense: 120 tablet; Refill: 0  -      hydrocodone-acetaminophen 10-325mg (NORCO)  mg Tab; Take 1 tablet by mouth every 6 (six) hours as needed.  Dispense: 120 tablet; Refill: 0  -     hydrocodone-acetaminophen 10-325mg (NORCO)  mg Tab; Take 1 tablet by mouth every 6 (six) hours as needed.  Dispense: 120 tablet; Refill: 0  -     baclofen (LIORESAL) 10 MG tablet; Take 1 tablet (10 mg total) by mouth 4 (four) times daily.  Dispense: 360 tablet; Refill: 3    Right arm weakness  -     Discontinue: baclofen (LIORESAL) 10 MG tablet; Take 1 tablet (10 mg total) by mouth 4 (four) times daily.  Dispense: 120 tablet; Refill: 11  -     hydrocodone-acetaminophen 10-325mg (NORCO)  mg Tab; Take 1 tablet by mouth every 6 (six) hours as needed.  Dispense: 120 tablet; Refill: 0  -     hydrocodone-acetaminophen 10-325mg (NORCO)  mg Tab; Take 1 tablet by mouth every 6 (six) hours as needed.  Dispense: 120 tablet; Refill: 0  -     hydrocodone-acetaminophen 10-325mg (NORCO)  mg Tab; Take 1 tablet by mouth every 6 (six) hours as needed.  Dispense: 120 tablet; Refill: 0  -     baclofen (LIORESAL) 10 MG tablet; Take 1 tablet (10 mg total) by mouth 4 (four) times daily.  Dispense: 360 tablet; Refill: 3    Narcotic dependency, continuous  -     Discontinue: baclofen (LIORESAL) 10 MG tablet; Take 1 tablet (10 mg total) by mouth 4 (four) times daily.  Dispense: 120 tablet; Refill: 11  -     hydrocodone-acetaminophen 10-325mg (NORCO)  mg Tab; Take 1 tablet by mouth every 6 (six) hours as needed.  Dispense: 120 tablet; Refill: 0  -     hydrocodone-acetaminophen 10-325mg (NORCO)  mg Tab; Take 1 tablet by mouth every 6 (six) hours as needed.  Dispense: 120 tablet; Refill: 0  -     hydrocodone-acetaminophen 10-325mg (NORCO)  mg Tab; Take 1 tablet by mouth every 6 (six) hours as needed.  Dispense: 120 tablet; Refill: 0  -     baclofen (LIORESAL) 10 MG tablet; Take 1 tablet (10 mg total) by mouth 4 (four) times daily.  Dispense: 360 tablet;  Refill: 3    Right arm pain  -     Discontinue: baclofen (LIORESAL) 10 MG tablet; Take 1 tablet (10 mg total) by mouth 4 (four) times daily.  Dispense: 120 tablet; Refill: 11  -     hydrocodone-acetaminophen 10-325mg (NORCO)  mg Tab; Take 1 tablet by mouth every 6 (six) hours as needed.  Dispense: 120 tablet; Refill: 0  -     hydrocodone-acetaminophen 10-325mg (NORCO)  mg Tab; Take 1 tablet by mouth every 6 (six) hours as needed.  Dispense: 120 tablet; Refill: 0  -     hydrocodone-acetaminophen 10-325mg (NORCO)  mg Tab; Take 1 tablet by mouth every 6 (six) hours as needed.  Dispense: 120 tablet; Refill: 0  -     baclofen (LIORESAL) 10 MG tablet; Take 1 tablet (10 mg total) by mouth 4 (four) times daily.  Dispense: 360 tablet; Refill: 3    Spinal stenosis in cervical region  -     Discontinue: baclofen (LIORESAL) 10 MG tablet; Take 1 tablet (10 mg total) by mouth 4 (four) times daily.  Dispense: 120 tablet; Refill: 11  -     hydrocodone-acetaminophen 10-325mg (NORCO)  mg Tab; Take 1 tablet by mouth every 6 (six) hours as needed.  Dispense: 120 tablet; Refill: 0  -     hydrocodone-acetaminophen 10-325mg (NORCO)  mg Tab; Take 1 tablet by mouth every 6 (six) hours as needed.  Dispense: 120 tablet; Refill: 0  -     hydrocodone-acetaminophen 10-325mg (NORCO)  mg Tab; Take 1 tablet by mouth every 6 (six) hours as needed.  Dispense: 120 tablet; Refill: 0  -     baclofen (LIORESAL) 10 MG tablet; Take 1 tablet (10 mg total) by mouth 4 (four) times daily.  Dispense: 360 tablet; Refill: 3    Cervical radicular pain  -     Discontinue: baclofen (LIORESAL) 10 MG tablet; Take 1 tablet (10 mg total) by mouth 4 (four) times daily.  Dispense: 120 tablet; Refill: 11  -     hydrocodone-acetaminophen 10-325mg (NORCO)  mg Tab; Take 1 tablet by mouth every 6 (six) hours as needed.  Dispense: 120 tablet; Refill: 0  -     hydrocodone-acetaminophen 10-325mg (NORCO)  mg Tab; Take 1 tablet by  mouth every 6 (six) hours as needed.  Dispense: 120 tablet; Refill: 0  -     hydrocodone-acetaminophen 10-325mg (NORCO)  mg Tab; Take 1 tablet by mouth every 6 (six) hours as needed.  Dispense: 120 tablet; Refill: 0  -     baclofen (LIORESAL) 10 MG tablet; Take 1 tablet (10 mg total) by mouth 4 (four) times daily.  Dispense: 360 tablet; Refill: 3    Other orders  -     Cancel: Ambulatory Referral to Physical/Occupational Therapy        Patient with cervical myelopathy, with tightness and pain is Right UE, s/p Cervical fusion, here for chronic pain management.    SCS.    1. Pain management : Gabapentin , increased to 600 mg po QID, and Hydrocodone 10/325 mg PO TID ( #90 tabs,2 refills).    2. Spasticity, will resume Baclofen 10 mg PO TID.    RTC in 3 months.    Total time spent face to face with patient was 25 minutes.     More than 50% of that time was spent in counseling on diagnosis , prognosis and treatment options.   I also caunsel patient  on common and most usual side effect of prescribed medications.   Risk and benefits of opiates, possible risk of developing opiate dependence and tolerance, need of strict compliance with prescribed medications.  I reviewed Primary care , and other specialty's notes to better coordinate patient's  care.   All questions were answered, and patient voiced understanding.

## 2017-11-21 ENCOUNTER — CLINICAL SUPPORT (OUTPATIENT)
Dept: REHABILITATION | Facility: HOSPITAL | Age: 67
End: 2017-11-21
Attending: PHYSICAL MEDICINE & REHABILITATION
Payer: MEDICARE

## 2017-11-21 DIAGNOSIS — R26.81 GAIT INSTABILITY: ICD-10-CM

## 2017-11-21 DIAGNOSIS — R26.9 GAIT ABNORMALITY: ICD-10-CM

## 2017-11-21 DIAGNOSIS — I42.9 CARDIOMYOPATHY, UNSPECIFIED TYPE: ICD-10-CM

## 2017-11-21 DIAGNOSIS — Z95.810 AICD (AUTOMATIC CARDIOVERTER/DEFIBRILLATOR) PRESENT: Primary | ICD-10-CM

## 2017-11-21 DIAGNOSIS — R29.898 WEAKNESS OF RIGHT HIP: ICD-10-CM

## 2017-11-21 PROCEDURE — 97110 THERAPEUTIC EXERCISES: CPT | Mod: PN

## 2017-11-21 PROCEDURE — G8980 MOBILITY D/C STATUS: HCPCS | Mod: CJ,PN

## 2017-11-21 PROCEDURE — G8979 MOBILITY GOAL STATUS: HCPCS | Mod: CJ,PN

## 2017-11-21 NOTE — PROGRESS NOTES
TIME RECORD    Date:  11/21/2017    Start Time:  1530  Stop Time:  1615    PROCEDURES:    TIMED  Procedure Min.   TE 45                     UNTIMED  Procedure Min.             Total Timed Minutes: 45  Total Timed Units:  3  Total Untimed Units:  0  Charges Billed/# of units:  3TE       Progress/Current Status    Subjective:     Patient ID: Xander Sanchez is a 67 y.o. male.  Diagnosis:   1. Gait abnormality     2. Weakness of right hip     3. Gait instability       Pain: 5  /10 R UE pain as usual  Pt stated that he was in the hospital last week with an infection.     Objective:     Session initiated with FOTO assessment f/b 6 minutes walk test f/b DGI and MMT and TM tests x 30', session ended with Neuro re-ed and endurance training with B UE/LE extremities for reciprocal motion of all limbs on sci-fit x 8' min at level 4 at > or equal to 50 spm  w/o rest.        DATE 11/21/17 11/9/17 11/3/17 10/27/17 10/23/17 10/20/17 10/9/17 10/2/17 9/29/17 9/28/17   Visit 11/50 10/10 9/50 8/50 7/50 6/50 5/50 4/50 3/50 2/50   G CODE  POC exp 11/21/7 1/10 10/10 NEXT!! 9/10 8/10 7/10 6/10 5/10 4/10 3/10 2/10     FOTO done 10/10 9/10 8/10 7/10 6/10 5/5 done 4/5 3/5 2/5   Visit Amount  Total -- 98.76  946.43 98.76  847.67 97.35  748.91 97.35  651.56 95.94  554.21 92.39  458.27 95.94  365.88 95.94  269.94   97.50  174.00   SLS R -- With contra limb on blue step with overhead shoulder flexion with ball x 25 NT With contra limb on blue step with overhead shoulder flexion with ball x 25 With contra limb on blue step with overhead shoulder flexion with ball x 25 With contra limb on blue step with overhead shoulder flexion with no ball x 20 With contra limb on blue step with overhead med ball lifts x 20 (red) -- - --   SLS L -- With contra limb on blue step with overhead shoulder flexion with ball x 25 NT With contra limb on blue step with overhead shoulder flexion with ball x 25 With contra limb on blue step with overhead shoulder flexion  with ball x 25 With contra limb on blue step with overhead shoulder flexion with no ball x  With contra limb on blue step with overhead med ball lifts x 20 (red) -- - --   Side stepping --     -- -- -- - --   Cross overs --     -- -- -- - --   Karoke --     -- -- -- - --   Wii fit balance --     -- -- -- - --   Bosu --     -- -- -- - --   Beep board --     -- -- -- - --   Developmental sequening --  See note   -- -- -- - --   SLR -     -- -- -- - --   clamshells --     -- -- -- - --   Hip abduction --     -- -- --  --   bridges -     2 x 15 B w/ ball 2 x 15 B w/ ball 2 x 15 B w/ ball  Next SL w/ ball   Adduction isometric --     W/ bridges W/ bridges W/ bridges  --   LAQ -     -- -- --  --   HS curls -     Prone 2 x 10 1# Prone 2 x 10 1# Prone 2 x 10 1#  Prone 2 x 10 1#   HS stretch  -- On stairs 3 x 30'' On stairs 3 x 30'' On stairs 3 x 30'' On stairs 3 x 30'' On stairs 3 x 30'' On stairs 3 x 30'' On stairs 3 x 30'' On stairs 3 x 30'' On stairs 3 x 30''   Gastroc stretch -- On stairs 3 x 30'' On stairs 3 x 30'' On stairs 3 x 30'' On stairs 3 x 30'' On stairs 3 x 30'' On stairs 3 x 30'' On stairs 3 x 30'' On fitter 3 x 30'' On fitter 3 x 30''   Leg press - 5.0 2x10 B 5.0 2x10 B 5.0 2x10 B 5.0 2x10 B 4.5 2 x 10 B 4.5 2 x 10 B Next NEXT next   Hip extension --     RTC 2x10 B OOT RTC 2x10 B RTC 2x10 B next   Hip flexion --     RTC 2x10 B OOT RTC 2x10 B RTC 2x10 B next   Hip Abduction -     RTC 2x10 B OOT RTC 2x10 B RTC 2x10 B next   Hip adduction --     -- -- --  --   Knee flexion -     -- -- --  --   Toe raises --     3x10 B OOT 2x10 B 2x10 B 2 x 10 B   Heel raises -     3x10 B OOT 2x10 B 2x10 B 2 x 10 B   Nu-step - L4 8' LE only L4 8' LE only L4 8' LE only L2 8' LE only L2 8' LE only NT L1.5 8' LE only L2 8' LE only L2 8' LE only   Step ups -- 2x10 B 2x12 B  2x12 B  2x12 B  2x10 B  2x10 B  2x10 B  2x10 B blue 2 x 10 B blue   Lateral step ups -- 2x12 B 2x12 B  2x12 B  2x12 B  2x10 B  2x10 B  2x10 B  2x10 B blue 1 x 10  B blue   gait 6 minute walk    Next on TM Next on TM On TM see note --  --   INITIALS FS JA 3/6 JA 2/6 JA 1/6 KV FS FS FS JA 1/6 FS     Dynamic Gait Index - assessed without AD  1. Gait level surface -  3  2. Change in gait speed - 2  3. Gait with horizontal head turns - 2  4. Gait with vertical head turns - 3  5. Gait and pivot turn - 3  6. Step over obstacle - 3  7. Step around obstacle - 3  8. Steps - 2  Total 21/24    6 minutes walk test: 840 ft without AD.     Lower Extremity Strength    RLE LLE   Hip Flexion: 5/5 5/5   Hip Extension:  4/5 4+/5   Hip Abduction: 4+/5 5/5   Hip Adduction: 4/5 5/5   Knee Extension: 5/5 5/5   Knee Flexion: 4/5 5/5   Ankle Dorsiflexion: 5/5 5/5   Ankle Plantarflexion: 5/5      TM gait: 5' 0.8 mph with B UE support  Assessment:     See d/c summary    Patient Education/Response:     CONT HEP    Plans and Goals:     Cont to advance PT as per POC, progress as able.   Long term goals = Short term goals (8 weeks)  1. Pt will perform nu-step at level 2.5 x 10 minutes without rests breaks going at least 50 step/min or greater. MET   2. Pt will score greater than or equal to 17/24 on the DGI test/assessment without use of ADdemonstrating overall improved functional mobility and balance. MET  4. Pt will walk < than or equal to 650 ft on the 6 minute walk test outdoors on multiple terrain without AD with 0 LOB for pt to walk in neighborhood ~1/2 to 1 mile at safely.  MET  5. Pt will have 0 falls from start of PT sessions.  MET  6. Pt will have MMT score of 4+/5 in all major ms groups in R LE.  Partially met  7. Pt will ambulate on TM x 8 minutes with use of UE support with 0 LOB at 0.8 mph. MET   8. Pt will report 37% on the FOTO Functional Assessment indicating increased functional balance and mobility. (Gcode goal mobility CJ) MET 27%    REHAB SERVICES OUTPATIENT DISCHARGE SUMMARY  Physical Therapy      Name:  Xander Daniel  Date:  11/21/17  Date of Evaluation:  9/21/17  Physician:   Sara  Total # Of Visits:  11  Cancelled:  2  No Shows:  2  Diagnosis:    1. Gait abnormality     2. Weakness of right hip     3. Gait instability         Physical/Functional Status:  At time of discharge, patient was able to improve gait, LE function and LE strength as well as balance and stability.    Dynamic Gait Index - assessed without AD  1. Gait level surface -  3  2. Change in gait speed - 2  3. Gait with horizontal head turns - 2  4. Gait with vertical head turns - 3  5. Gait and pivot turn - 3  6. Step over obstacle - 3  7. Step around obstacle - 3  8. Steps - 2  Total 21/24    Lower Extremity Strength    RLE LLE   Hip Flexion: 5/5 5/5   Hip Extension:  4/5 4+/5   Hip Abduction: 4+/5 5/5   Hip Adduction: 4/5 5/5   Knee Extension: 5/5 5/5   Knee Flexion: 4/5 5/5   Ankle Dorsiflexion: 5/5 5/5   Ankle Plantarflexion: 5/5        The patient is to be discharged from our Therapy service for the following reason(s):  Patient has met all of his/her goals    Degree of Goal Achievement:  Patient has partially met goals    Patient Education:  CONT HEP    Discharge Plan:  Home Program

## 2017-11-28 ENCOUNTER — OFFICE VISIT (OUTPATIENT)
Dept: PODIATRY | Facility: CLINIC | Age: 67
End: 2017-11-28
Payer: MEDICARE

## 2017-11-28 VITALS
SYSTOLIC BLOOD PRESSURE: 165 MMHG | WEIGHT: 191 LBS | HEIGHT: 71 IN | HEART RATE: 79 BPM | BODY MASS INDEX: 26.74 KG/M2 | DIASTOLIC BLOOD PRESSURE: 92 MMHG

## 2017-11-28 DIAGNOSIS — B35.1 ONYCHOMYCOSIS: ICD-10-CM

## 2017-11-28 DIAGNOSIS — E11.49 TYPE II DIABETES MELLITUS WITH NEUROLOGICAL MANIFESTATIONS: Primary | ICD-10-CM

## 2017-11-28 PROCEDURE — 99999 PR PBB SHADOW E&M-EST. PATIENT-LVL III: CPT | Mod: PBBFAC,,, | Performed by: PODIATRIST

## 2017-11-28 PROCEDURE — 99499 UNLISTED E&M SERVICE: CPT | Mod: S$GLB,,, | Performed by: PODIATRIST

## 2017-11-28 PROCEDURE — 11721 DEBRIDE NAIL 6 OR MORE: CPT | Mod: Q9,S$GLB,, | Performed by: PODIATRIST

## 2017-11-28 RX ORDER — OXYBUTYNIN CHLORIDE 5 MG/1
5 TABLET ORAL 2 TIMES DAILY
COMMUNITY
Start: 2017-11-07 | End: 2018-04-16 | Stop reason: SDUPTHER

## 2017-11-28 NOTE — PROCEDURES
"Routine Foot Care  Date/Time: 11/28/2017 8:14 AM  Performed by: ROHAN ZALDIVAR  Authorized by: ROHAN ZALDIVAR     Time out: Immediately prior to procedure a "time out" was called to verify the correct patient, procedure, equipment, support staff and site/side marked as required.    Consent Done?:  Yes (Verbal)  Hyperkeratotic Skin Lesions?: No      Nail Care Type:  Debride  Location(s): All  (Left 1st Toe, Left 3rd Toe, Left 2nd Toe, Left 4th Toe, Left 5th Toe, Right 1st Toe, Right 2nd Toe, Right 3rd Toe, Right 4th Toe and Right 5th Toe)  Patient tolerance:  Patient tolerated the procedure well with no immediate complications      "

## 2017-11-28 NOTE — PROGRESS NOTES
Subjective:      Patient ID: Xnader Sanchez is a 67 y.o. male.    Chief Complaint: Diabetic Foot Exam    Xander is a 67 y.o. male who presents to the clinic for evaluation and treatment of high risk feet. Xander has a past medical history of Asthma; Cardiomyopathy; Cervical spondylosis with myelopathy (10/17/2012); Chronic bronchitis; Chronic systolic dysfunction of left ventricle (7/27/2015); Constipation (7/27/2015); COPD (chronic obstructive pulmonary disease); Diabetes mellitus, type 2; DM type 2 (diabetes mellitus, type 2) (7/27/2015); Enlarged prostate (7/27/2015); Hypertension; ICD (implantable cardiac defibrillator) in place; Presence of biventricular AICD (7/27/2015); 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thy (2/1/2016); and Urinary tract infection.  This patient has documented high risk feet requiring routine maintenance secondary to diabetes mellitis and those secondary complications of diabetes, as mentioned.  Treated per Physical medicine for chronic back pain. No new complaints.     PCP: Dr. August  LOV: 8/29/17    Past Medical History:   Diagnosis Date    Asthma     Cardiomyopathy     Cervical spondylosis with myelopathy 10/17/2012    Chronic bronchitis     Chronic systolic dysfunction of left ventricle 7/27/2015    Constipation 7/27/2015    COPD (chronic obstructive pulmonary disease)     Diabetes mellitus, type 2     DM type 2 (diabetes mellitus, type 2) 7/27/2015    Enlarged prostate 7/27/2015    Hypertension     ICD (implantable cardiac defibrillator) in place     Presence of biventricular AICD 7/27/2015    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thy 2/1/2016    Urinary tract infection     pt does not know       Past Surgical History:   Procedure Laterality Date    CARDIAC DEFIBRILLATOR PLACEMENT      CERVICAL SPINE SURGERY      COLONOSCOPY N/A 7/19/2017    Procedure: COLONOSCOPY  golytely;  Surgeon: Vannessa Uribe MD;  [facility];  Service:  Endoscopy;  Laterality: N/A;    SPINE SURGERY         Family History   Problem Relation Age of Onset    Hypertension Mother     Hypertension Father     COPD Father     No Known Problems Sister     No Known Problems Brother     No Known Problems Daughter     Prostate cancer Neg Hx     Kidney disease Neg Hx        Social History     Social History    Marital status:      Spouse name: N/A    Number of children: N/A    Years of education: N/A     Social History Main Topics    Smoking status: Former Smoker     Packs/day: 1.00     Years: 40.00     Types: Cigarettes     Quit date: 7/26/2009    Smokeless tobacco: Never Used    Alcohol use 4.2 oz/week     6 Cans of beer, 1 Shots of liquor per week    Drug use: No    Sexual activity: Yes     Partners: Female     Other Topics Concern    None     Social History Narrative    None       Current Outpatient Prescriptions   Medication Sig Dispense Refill    ACCU-CHEK SOFTCLIX LANCETS [facility] [place]       amoxicillin-clavulanate 875-125mg (AUGMENTIN) 875-125 mg per tablet Take 1 tablet by mouth 2 (two) times daily. 20 tablet 0    aspirin (ECOTRIN) 81 MG EC tablet Take 81 mg by mouth once daily.      baclofen (LIORESAL) 10 MG tablet Take 1 tablet (10 mg total) by mouth 4 (four) times daily. 360 tablet 3    blood sugar diagnostic (BLOOD GLUCOSE TEST) Strp 1 strip by Misc.(Non-Drug; Combo Route) route once daily at 6am. 100 strip 3    diphenhydrAMINE (BENADRYL) 50 MG capsule Take 1 capsule (50 mg total) by mouth every 6 (six) hours as needed for Itching or Allergies (swelling). 20 capsule 0    doxycycline (VIBRAMYCIN) 100 MG Cap Take 1 capsule (100 mg total) by mouth every 12 (twelve) hours. 60 capsule 1    econazole nitrate 1 % cream Apply topically once daily. 85 g 0    famotidine (PEPCID) 20 MG tablet Take 1 tablet (20 mg total) by mouth 2 (two) times daily. 20 tablet 0    hydrochlorothiazide (MICROZIDE) 12.5 mg capsule TAKE ONE CAPSULE BY MOUTH ONCE DAILY  30 capsule 0    hydrocodone-acetaminophen 10-325mg (NORCO)  mg Tab Take 1 tablet by mouth every 6 (six) hours as needed. 120 tablet 0    [START ON 12/15/2017] hydrocodone-acetaminophen 10-325mg (NORCO)  mg Tab Take 1 tablet by mouth every 6 (six) hours as needed. 120 tablet 0    [START ON 1/15/2018] hydrocodone-acetaminophen 10-325mg (NORCO)  mg Tab Take 1 tablet by mouth every 6 (six) hours as needed. 120 tablet 0    lancets Misc 1 lancet by Misc.(Non-Drug; Combo Route) route once daily at 6am. 100 each 3    lisinopril (PRINIVIL,ZESTRIL) 2.5 MG tablet Take 1 tablet (2.5 mg total) by mouth once daily. 90 tablet 3    metoprolol succinate (TOPROL-XL) 25 MG 24 hr tablet Take 1 tablet (25 mg total) by mouth once daily. 90 tablet 3    oxybutynin (DITROPAN) 5 MG Tab       pantoprazole (PROTONIX) 40 MG tablet Take 40 mg by mouth once daily.      polyethylene glycol (GLYCOLAX) 17 gram PwPk Take 17 g by mouth once daily. 30 packet 0    pravastatin (PRAVACHOL) 10 MG tablet TAKE ONE TABLET BY MOUTH AT BEDTIME 90 tablet 0    tamsulosin (FLOMAX) 0.4 mg Cp24 TAKE 2 CAPSULES (0.8 MG TOTAL) BY MOUTH EVERY EVENING. 180 capsule 4    trazodone (DESYREL) 100 MG tablet Take 1 tablet (100 mg total) by mouth nightly as needed for Insomnia. 30 tablet 1    TRUE METRIX AIR GLUCOSE METER kit USE AS INSTRUCTED 1 each 0    gabapentin (NEURONTIN) 600 MG tablet Take 1 tablet (600 mg total) by mouth 4 (four) times daily with meals and nightly. 360 tablet 3     No current facility-administered medications for this visit.        Review of patient's allergies indicates:  No Known Allergies    PCP: Williams Alvarenga MD    Date Last Seen by PCP:     Current shoe gear:  Affected Foot: Casual shoes     Unaffected Foot: Casual shoes    Hemoglobin A1C   Date Value Ref Range Status   08/29/2017 6.2 (H) 4.0 - 5.6 % Final     Comment:     According to ADA guidelines, hemoglobin A1c <7.0% represents  optimal control in  non-pregnant diabetic patients. Different  metrics may apply to specific patient populations.   Standards of Medical Care in Diabetes-2016.  For the purpose of screening for the presence of diabetes:  <5.7%     Consistent with the absence of diabetes  5.7-6.4%  Consistent with increasing risk for diabetes   (prediabetes)  >or=6.5%  Consistent with diabetes  Currently, no consensus exists for use of hemoglobin A1c  for diagnosis of diabetes for children.  This Hemoglobin A1c assay has significant interference with fetal   hemoglobin   (HbF). The results are invalid for patients with abnormal amounts of   HbF,   including those with known Hereditary Persistence   of Fetal Hemoglobin. Heterozygous hemoglobin variants (HbAS, HbAC,   HbAD, HbAE, HbA2) do not significantly interfere with this assay;   however, presence of multiple variants in a sample may impact the %   interference.     05/04/2017 6.4 (H) 4.5 - 6.2 % Final     Comment:     According to ADA guidelines, hemoglobin A1C <7.0% represents  optimal control in non-pregnant diabetic patients.  Different  metrics may apply to specific populations.   Standards of Medical Care in Diabetes - 2016.  For the purpose of screening for the presence of diabetes:  <5.7%     Consistent with the absence of diabetes  5.7-6.4%  Consistent with increasing risk for diabetes   (prediabetes)  >or=6.5%  Consistent with diabetes  Currently no consensus exists for use of hemoglobin A1C  for diagnosis of diabetes for children.     08/17/2016 6.4 (H) 4.5 - 6.2 % Final     Comment:     According to ADA guidelines, hemoglobin A1C <7.0% represents  optimal control in non-pregnant diabetic patients.  Different  metrics may apply to specific populations.   Standards of Medical Care in Diabetes - 2016.  For the purpose of screening for the presence of diabetes:  <5.7%     Consistent with the absence of diabetes  5.7-6.4%  Consistent with increasing risk for diabetes   (prediabetes)  >or=6.5%   Consistent with diabetes  Currently no consensus exists for use of hemoglobin A1C  for diagnosis of diabetes for children.         Review of Systems   Constitution: Negative for chills and fever.   Respiratory: Negative for shortness of breath.    Skin: Positive for color change and nail changes. Negative for itching.   Musculoskeletal: Negative for falls, joint pain and muscle weakness.   Gastrointestinal: Negative for nausea and vomiting.   Neurological: Negative for focal weakness, loss of balance, numbness and paresthesias.           Objective:      Physical Exam   Constitutional: He is oriented to person, place, and time. He appears well-nourished. No distress.   Cardiovascular: Intact distal pulses.    Pulses:       Dorsalis pedis pulses are 2+ on the right side, and 2+ on the left side.        Posterior tibial pulses are 2+ on the right side, and 2+ on the left side.   CRT < 3 seconds to digits 1-5 bilateral, no edema.   Musculoskeletal:        Right foot: There is decreased range of motion. There is no deformity.        Left foot: There is decreased range of motion. There is no deformity.   Equinus noted b/l ankles with < 10 deg DF noted. MMT 5/5 in DF/PF/Inv/Ev resistance with no reproduction of pain in any direction. Passive range of motion of ankle and pedal joints is painless b/l.     Feet:   Right Foot:   Protective Sensation: 10 sites tested. 9 sites sensed.   Skin Integrity: Positive for dry skin. Negative for ulcer, skin breakdown, erythema, warmth or callus.   Left Foot:   Protective Sensation: 10 sites tested. 7 sites sensed.   Skin Integrity: Positive for dry skin. Negative for ulcer, skin breakdown, erythema, warmth or callus.   Neurological: He is alert and oriented to person, place, and time. He has normal strength. A sensory deficit is present.   Decreased vibratory sensation to the bilateral foot.   Skin: Skin is warm, dry and intact. Capillary refill takes less than 2 seconds. No ecchymosis,  no lesion, no petechiae and no rash noted. He is not diaphoretic. No cyanosis or erythema. No pallor. Nails show no clubbing.   Skin is dry and flaky plantar foot bilateral.    Hyperpigmented patch of skin dorsal left hallux.     Nails 1-5 bilateral are thickened 2-3 mm, slightly yellow with debris, loosened and elongated 3-4 mm.     Edema B/L LE 2+    No open lesions or macerations bilateral lower extremity.               Assessment:       Encounter Diagnoses   Name Primary?    Type II diabetes mellitus with neurological manifestations Yes    Onychomycosis          Plan:       Xander was seen today for diabetic foot exam.    Diagnoses and all orders for this visit:    Type II diabetes mellitus with neurological manifestations  -     Foot Care    Onychomycosis  -     Foot Care      I counseled the patient on his conditions, their implications and medical management.    Shoe inspection. Diabetic Foot Education. Patient reminded of the importance of good nutrition and blood sugar control to help prevent podiatric complications of diabetes. Patient instructed on proper foot hygeine. We discussed wearing proper shoe gear, daily foot inspections, never walking without protective shoe gear, never putting sharp instruments to feet    Routine foot care per attached note.

## 2017-12-11 ENCOUNTER — TELEPHONE (OUTPATIENT)
Dept: PHYSICAL MEDICINE AND REHAB | Facility: CLINIC | Age: 67
End: 2017-12-11

## 2017-12-11 NOTE — TELEPHONE ENCOUNTER
----- Message from Emre Recinos sent at 12/8/2017  3:55 PM CST -----  Contact: Patient @ [phone]  Patient is requesting a return call about physical therapy, [facility] call

## 2017-12-19 ENCOUNTER — OFFICE VISIT (OUTPATIENT)
Dept: UROLOGY | Facility: CLINIC | Age: 67
End: 2017-12-19
Payer: MEDICARE

## 2017-12-19 VITALS
HEART RATE: 78 BPM | SYSTOLIC BLOOD PRESSURE: 174 MMHG | BODY MASS INDEX: 27.5 KG/M2 | WEIGHT: 196.44 LBS | DIASTOLIC BLOOD PRESSURE: 97 MMHG | HEIGHT: 71 IN

## 2017-12-19 DIAGNOSIS — N13.8 BPH WITH URINARY OBSTRUCTION: ICD-10-CM

## 2017-12-19 DIAGNOSIS — R33.9 INCOMPLETE BLADDER EMPTYING: ICD-10-CM

## 2017-12-19 DIAGNOSIS — Z90.79 S/P TURP: Primary | ICD-10-CM

## 2017-12-19 DIAGNOSIS — N40.1 BPH WITH URINARY OBSTRUCTION: ICD-10-CM

## 2017-12-19 DIAGNOSIS — E11.9 TYPE 2 DIABETES MELLITUS WITHOUT COMPLICATION, WITHOUT LONG-TERM CURRENT USE OF INSULIN: Chronic | ICD-10-CM

## 2017-12-19 PROCEDURE — 99999 PR PBB SHADOW E&M-EST. PATIENT-LVL IV: CPT | Mod: PBBFAC,,, | Performed by: UROLOGY

## 2017-12-19 PROCEDURE — 81002 URINALYSIS NONAUTO W/O SCOPE: CPT | Mod: S$GLB,,, | Performed by: UROLOGY

## 2017-12-19 PROCEDURE — 99214 OFFICE O/P EST MOD 30 MIN: CPT | Mod: 25,S$GLB,, | Performed by: UROLOGY

## 2017-12-19 PROCEDURE — 99499 UNLISTED E&M SERVICE: CPT | Mod: S$GLB,,, | Performed by: UROLOGY

## 2017-12-19 NOTE — PROGRESS NOTES
CC: follow up BPH, prostatitis    Xander Sanchez is here for follow up of laser TURP on 2/1/17.  He was treated with doxycycline, oxybutynin and flomax for his dysuria, frequency and urgency.  S/p laser TURP on 2/1/17.    No hematuria, dysuria  Nocturia x 1.     Review of Systems    General ROS: GENERAL:  No weight gain or loss  SKIN:  No rashes or lacerations  HEAD:  No headaches  EYES:  No exophthalmos, jaundice or blindness  EARS:  No dizziness, tinnitus or hearing loss  NOSE:  No changes in smell  MOUTH & THROAT:  No dyskinetic movements or obvious goiter  CHEST:  No shortness of breath, hyperventilation or cough  CARDIOVASCULAR:  No tachycardia or chest pain  ABDOMEN:  No nausea, vomiting, pain, constipation or diarrhea  URINARY:  No frequency, dysuria or sexual dysfunction  ENDOCRINE:  No polydipsia, polyuria  MUSCULOSKELETAL:  No pain or stiffness of the joints  NEUROLOGIC:  No weakness, sensory changes, seizures, confusion, memory loss, tremor or other abnormal movements    Physical Exam     Vitals:    12/19/17 0903   BP: (!) 174/97   Pulse: 78     General Appearance:  Alert, cooperative, no distress, appears stated age   Head:  Normocephalic, without obvious abnormality, atraumatic   Eyes:  PERRL, conjunctiva/corneas clear, EOM's intact, fundi benign, both eyes   Ears:  Normal TM's and external ear canals, both ears   Nose: Nares normal, septum midline, mucosa normal, no drainage or sinus tenderness   Throat: Lips, mucosa, and tongue normal; teeth and gums normal   Neck: Supple, symmetrical, trachea midline, no adenopathy, thyroid: not enlarged, symmetric, no tenderness/mass/nodules, no carotid bruit or JVD   Back:   Symmetric, no curvature, ROM normal, no CVA tenderness   Lungs:   Clear to auscultation bilaterally, respirations unlabored   Chest Wall:  No tenderness or deformity   Heart:  Regular rate and rhythm, S1, S2 normal, no murmur, rub or gallop   Abdomen:   Soft, non-tender, bowel sounds active all  four quadrants,  no masses, no organomegaly   Genitalia:  Normal male, normal testicles, normal epididymis, normal vas palpated.   Rectal:  Normal tone, no masses or tenderness;   Extremities: Extremities normal, atraumatic, no cyanosis or edema   Pulses: 2+ and symmetric   Skin: Skin color, texture, turgor normal, no rashes or lesions   Lymph nodes: Cervical, supraclavicular, and axillary nodes normal   Neurologic: Normal       LABS:  Lab Results   Component Value Date    PSA 1.2 02/01/2016    PSA 0.66 03/28/2014    PSA 0.67 03/13/2013     Lab Results   Component Value Date    CREATININE 0.8 11/12/2017    CREATININE 1.0 08/29/2017    CREATININE 0.9 05/04/2017     No results found for: TESTOSTERONE  Urine Culture, Routine   Date Value Ref Range Status   11/12/2017 No growth  Final      ml per bladder scan    Assessment and Plan:  Xander was seen today for other.    Diagnoses and all orders for this visit:    S/P TURP    Incomplete bladder emptying    Type 2 diabetes mellitus without complication, without long-term current use of insulin  -     POCT urine dipstick without microscope    BPH with urinary obstruction  -     Prostate Specific Antigen, Diagnostic; Future    OK to stop doxycycline and oxybutynin.  OK to stop flomax 1 month before his follow up in 6 months.  Will check his PVR again.  If still high, may consider repeating suds cysto or try urecholine to improve bladder contraction in this pt who already had TURP.    I spent 25 minutes with the patient of which more than half was spent in direct consultation with the patient in regards to our treatment and plan.    Follow-up:  Return in about 6 months (around 6/19/2018) for check PVR.  PSA

## 2017-12-20 ENCOUNTER — OFFICE VISIT (OUTPATIENT)
Dept: OPTOMETRY | Facility: CLINIC | Age: 67
End: 2017-12-20
Payer: MEDICARE

## 2017-12-20 DIAGNOSIS — H25.13 NUCLEAR SCLEROSIS, BILATERAL: ICD-10-CM

## 2017-12-20 DIAGNOSIS — H52.4 HYPEROPIA WITH ASTIGMATISM AND PRESBYOPIA, BILATERAL: ICD-10-CM

## 2017-12-20 DIAGNOSIS — Z13.5 GLAUCOMA SCREENING: ICD-10-CM

## 2017-12-20 DIAGNOSIS — Z01.00 EYE EXAM, ROUTINE: Primary | ICD-10-CM

## 2017-12-20 DIAGNOSIS — H52.03 HYPEROPIA WITH ASTIGMATISM AND PRESBYOPIA, BILATERAL: ICD-10-CM

## 2017-12-20 DIAGNOSIS — H52.203 HYPEROPIA WITH ASTIGMATISM AND PRESBYOPIA, BILATERAL: ICD-10-CM

## 2017-12-20 PROCEDURE — 92014 COMPRE OPH EXAM EST PT 1/>: CPT | Mod: S$GLB,,, | Performed by: OPTOMETRIST

## 2017-12-20 PROCEDURE — 92015 DETERMINE REFRACTIVE STATE: CPT | Mod: S$GLB,,, | Performed by: OPTOMETRIST

## 2017-12-20 PROCEDURE — 99999 PR PBB SHADOW E&M-EST. PATIENT-LVL II: CPT | Mod: PBBFAC,,, | Performed by: OPTOMETRIST

## 2017-12-20 NOTE — PROGRESS NOTES
HPI      is here for annual eye exam. Blurry vision OU  Blood Sugar-94am  Hemoglobin A1C       Date                     Value               Ref Range             Status                08/29/2017               6.2 (H)             4.0 - 5.6 %           Final                 05/04/2017               6.4 (H)             4.5 - 6.2 %           Final                 08/17/2016               6.4 (H)             4.5 - 6.2 %           Final            (-)Flashes (-)Floaters  (-)Itch, (-)tear, (-)burn, (-)Dryness. (--) OTC Drops   (-)Photophobia  (-)Glare (-)diplopia (-) headaches          Last edited by ARACELIS Roach on 12/20/2017  8:11 AM. (History)            Assessment /Plan     For exam results, see Encounter Report.    Eye exam, routine  -No Diabetic retinopathy noted today.  Continued control with primary care physician and annual comprehensive eye exam.  -Eyemed annual exam    Glaucoma screening  -Monitor with annual eye exam and IOP check    Nuclear sclerosis, bilateral  -Educated patient on presence of cataracts at today's exam, monitor at annual dilated fundus exam. 2-6 years surgical estimate.    Hyperopia with astigmatism and presbyopia, bilateral  Eyeglass Final Rx     Eyeglass Final Rx       Sphere Cylinder Axis Dist VA Add    Right +1.50 Sphere  20/25 +2.50    Left +1.25 +0.50 050 20/25 +2.50    Type:  PAL    Expiration Date:  12/21/2018                  RTC 1 yr

## 2018-01-31 ENCOUNTER — PES CALL (OUTPATIENT)
Dept: ADMINISTRATIVE | Facility: CLINIC | Age: 68
End: 2018-01-31

## 2018-01-31 RX ORDER — METOPROLOL SUCCINATE 25 MG/1
25 TABLET, EXTENDED RELEASE ORAL DAILY
Qty: 90 TABLET | Refills: 3 | Status: SHIPPED | OUTPATIENT
Start: 2018-01-31 | End: 2019-03-06 | Stop reason: SDUPTHER

## 2018-02-06 DIAGNOSIS — K29.50 CHRONIC GASTRITIS, PRESENCE OF BLEEDING UNSPECIFIED, UNSPECIFIED GASTRITIS TYPE: ICD-10-CM

## 2018-02-06 RX ORDER — PANTOPRAZOLE SODIUM 40 MG/1
TABLET, DELAYED RELEASE ORAL
Qty: 30 TABLET | Refills: 3 | Status: SHIPPED | OUTPATIENT
Start: 2018-02-06 | End: 2018-06-14 | Stop reason: SDUPTHER

## 2018-02-06 RX ORDER — PRAVASTATIN SODIUM 10 MG/1
TABLET ORAL
Qty: 90 TABLET | Refills: 0 | Status: SHIPPED | OUTPATIENT
Start: 2018-02-06 | End: 2018-05-05 | Stop reason: SDUPTHER

## 2018-02-07 ENCOUNTER — PES CALL (OUTPATIENT)
Dept: ADMINISTRATIVE | Facility: CLINIC | Age: 68
End: 2018-02-07

## 2018-02-14 ENCOUNTER — TELEPHONE (OUTPATIENT)
Dept: ELECTROPHYSIOLOGY | Facility: CLINIC | Age: 68
End: 2018-02-14

## 2018-02-14 DIAGNOSIS — I42.8 CARDIOMYOPATHY, NONISCHEMIC: Primary | ICD-10-CM

## 2018-02-14 DIAGNOSIS — I44.7 LBBB (LEFT BUNDLE BRANCH BLOCK): ICD-10-CM

## 2018-02-15 ENCOUNTER — HOSPITAL ENCOUNTER (OUTPATIENT)
Dept: CARDIOLOGY | Facility: CLINIC | Age: 68
Discharge: HOME | End: 2018-02-15
Attending: INTERNAL MEDICINE
Payer: MEDICARE

## 2018-02-15 ENCOUNTER — OFFICE VISIT (OUTPATIENT)
Dept: ELECTROPHYSIOLOGY | Facility: CLINIC | Age: 68
End: 2018-02-15
Payer: MEDICARE

## 2018-02-15 ENCOUNTER — HOSPITAL ENCOUNTER (OUTPATIENT)
Dept: CARDIOLOGY | Facility: CLINIC | Age: 68
Discharge: HOME | End: 2018-02-15
Payer: MEDICARE

## 2018-02-15 ENCOUNTER — CLINICAL SUPPORT (OUTPATIENT)
Dept: ELECTROPHYSIOLOGY | Facility: CLINIC | Age: 68
End: 2018-02-15
Attending: INTERNAL MEDICINE
Payer: MEDICARE

## 2018-02-15 VITALS
SYSTOLIC BLOOD PRESSURE: 122 MMHG | WEIGHT: 196 LBS | HEART RATE: 51 BPM | HEIGHT: 71 IN | BODY MASS INDEX: 27.44 KG/M2 | DIASTOLIC BLOOD PRESSURE: 98 MMHG

## 2018-02-15 DIAGNOSIS — I44.7 LBBB (LEFT BUNDLE BRANCH BLOCK): ICD-10-CM

## 2018-02-15 DIAGNOSIS — I51.9 CHRONIC SYSTOLIC DYSFUNCTION OF LEFT VENTRICLE: ICD-10-CM

## 2018-02-15 DIAGNOSIS — I10 ESSENTIAL HYPERTENSION: ICD-10-CM

## 2018-02-15 DIAGNOSIS — I42.8 CARDIOMYOPATHY, NONISCHEMIC: ICD-10-CM

## 2018-02-15 DIAGNOSIS — E11.9 TYPE 2 DIABETES MELLITUS WITHOUT COMPLICATION, WITHOUT LONG-TERM CURRENT USE OF INSULIN: Chronic | ICD-10-CM

## 2018-02-15 DIAGNOSIS — I42.8 CARDIOMYOPATHY, NONISCHEMIC: Primary | ICD-10-CM

## 2018-02-15 DIAGNOSIS — I42.9 CARDIOMYOPATHY, UNSPECIFIED TYPE: ICD-10-CM

## 2018-02-15 DIAGNOSIS — Z95.810 AICD (AUTOMATIC CARDIOVERTER/DEFIBRILLATOR) PRESENT: ICD-10-CM

## 2018-02-15 DIAGNOSIS — J43.9 PULMONARY EMPHYSEMA, UNSPECIFIED EMPHYSEMA TYPE: ICD-10-CM

## 2018-02-15 DIAGNOSIS — G90.50 COMPLEX REGIONAL PAIN SYNDROME TYPE 1, AFFECTING UNSPECIFIED SITE: ICD-10-CM

## 2018-02-15 LAB
DIASTOLIC DYSFUNCTION: YES
ESTIMATED PA SYSTOLIC PRESSURE: 35.76
MITRAL VALVE REGURGITATION: ABNORMAL
RETIRED EF AND QEF - SEE NOTES: 40 (ref 55–65)
TRICUSPID VALVE REGURGITATION: ABNORMAL

## 2018-02-15 PROCEDURE — 99499 UNLISTED E&M SERVICE: CPT | Mod: S$GLB,,, | Performed by: NURSE PRACTITIONER

## 2018-02-15 PROCEDURE — 1125F AMNT PAIN NOTED PAIN PRSNT: CPT | Mod: S$GLB,,, | Performed by: NURSE PRACTITIONER

## 2018-02-15 PROCEDURE — 93306 TTE W/DOPPLER COMPLETE: CPT | Mod: S$GLB,,, | Performed by: INTERNAL MEDICINE

## 2018-02-15 PROCEDURE — 1159F MED LIST DOCD IN RCRD: CPT | Mod: S$GLB,,, | Performed by: NURSE PRACTITIONER

## 2018-02-15 PROCEDURE — 93283 PRGRMG EVAL IMPLANTABLE DFB: CPT | Mod: S$GLB,,, | Performed by: INTERNAL MEDICINE

## 2018-02-15 PROCEDURE — 99999 PR PBB SHADOW E&M-EST. PATIENT-LVL III: CPT | Mod: PBBFAC,,, | Performed by: NURSE PRACTITIONER

## 2018-02-15 PROCEDURE — 93000 ELECTROCARDIOGRAM COMPLETE: CPT | Mod: S$GLB,,, | Performed by: INTERNAL MEDICINE

## 2018-02-15 PROCEDURE — 3008F BODY MASS INDEX DOCD: CPT | Mod: S$GLB,,, | Performed by: NURSE PRACTITIONER

## 2018-02-15 PROCEDURE — 99214 OFFICE O/P EST MOD 30 MIN: CPT | Mod: S$GLB,,, | Performed by: NURSE PRACTITIONER

## 2018-02-15 NOTE — PROGRESS NOTES
"Subjective:    Patient ID:  Xander Sanchez is a 67 y.o. male who presents for an ICD Check.     Xander Sanchez is a patient of Dr. Navarro.    HPI     67 yo male with NICM, LBBB, CRT-D.  He was diagnosed with NICM with a LBBB at [facility] in approx ~2005, C approx 5-6 years ago per patient at  that was "no blockages"  A single chamber ICD implanted for primary prevention, since he has been followed here at [facility] his LBBB resolved and NICM normalized.   He was scheduled for a generator change, noted to be in LBBB again ( with correlative NYHA III symptoms ) and fluoroscopy of the RV (Riata) revealed lead externalization but a patent left sided venous system. Echo revealed EF 25%.  3/24/15 Extraction of ICD lead and implantation of CRT-D (Dr. Mensah). Paucity of LV vein targets, has high LV threshold.  Did not derive symptomatic benefit from upgrade, and EF remained unchanged at 30%.  We turned off LV lead 11/16 to conserve battery.  Device interrogation reveals stable function of leads, no ventricular pacing, no arrhythmias.  Battery is under advisory.  Continues to feel ok.  Notes stable dyspnea on exertion.  No difference since we turned off LV lead.  Denies syncope, dizziness.    Since his last office visit, Mr. Sanchez reports feeling well, with continued occasional SOB/MURDOCK (in the AM especially>>resolved w/ inhaler). He denies chest pain, dizziness, palpitations, or syncope. He states that his energy level remains stable>>he is regularly active without difficulty.     Recent cardiac studies:  Echo (02/15/18) CONCLUSIONS:     1 - Mildly to moderately depressed left ventricular systolic function (EF 40%).     2 - No wall motion abnormalities.     3 - Impaired LV relaxation, normal LAP (grade 1 diastolic dysfunction).     4 - Normal right ventricular systolic function .     5 - The estimated PA systolic pressure is greater than 36 mmHg.     6 - Mild mitral regurgitation.     7 - Mild tricuspid " regurgitation.     Device Interrogation (02/15/18) reveals an intrinsic SR with stable lead and device function. No arrhythmias or treated episodes noted. He paces 8% in the RA and 0% in the RV; LV lead off. Estimated battery longevity 4 years.     I reviewed today's ECG which demonstrated SB at 51 bpm; , , and QTc 387.    Review of Systems   Constitution: Negative for diaphoresis and malaise/fatigue.   HENT: Negative for nosebleeds.    Eyes: Negative for double vision.   Cardiovascular: Positive for dyspnea on exertion. Negative for chest pain, irregular heartbeat, near-syncope, palpitations and syncope.   Respiratory: Negative for shortness of breath.    Skin: Negative.    Musculoskeletal: Positive for stiffness.   Gastrointestinal: Negative for hematemesis and hematochezia.   Genitourinary: Negative for hematuria.   Neurological: Negative for dizziness and light-headedness.   Psychiatric/Behavioral: Negative for altered mental status.        Objective:    Physical Exam   Constitutional: He is oriented to person, place, and time. He appears well-developed and well-nourished.   HENT:   Head: Normocephalic and atraumatic.   Eyes: Pupils are equal, round, and reactive to light.   Cardiovascular: Regular rhythm.  Bradycardia present.    Pulmonary/Chest: Effort normal.   Neurological: He is alert and oriented to person, place, and time.   Vitals reviewed.        Assessment:       1. Cardiomyopathy, nonischemic    2. Chronic systolic dysfunction of left ventricle    3. LBBB (left bundle branch block)    4. Essential hypertension    5. Pulmonary emphysema, unspecified emphysema type    6. Complex regional pain syndrome type 1, affecting unspecified site    7. Type 2 diabetes mellitus without complication, without long-term current use of insulin         Plan:       Mr. Sanchez is doing well from a device perspective with stable lead and device function without arrhythmia noted. EF improved from 30% previously  to 40% on today's Echo.     Continue current medication regimen and device settings.   Follow up in device clinic as scheduled.   Follow up in EP clinic in 1 year, sooner as needed.     MARY Amin, APRN, FNP-C        (A copy of today's note was sent to Dr. Navarro.)

## 2018-02-26 ENCOUNTER — OFFICE VISIT (OUTPATIENT)
Dept: FAMILY MEDICINE | Facility: CLINIC | Age: 68
End: 2018-02-26
Payer: MEDICARE

## 2018-02-26 VITALS
HEART RATE: 60 BPM | SYSTOLIC BLOOD PRESSURE: 140 MMHG | DIASTOLIC BLOOD PRESSURE: 84 MMHG | BODY MASS INDEX: 27.47 KG/M2 | WEIGHT: 196.19 LBS | HEIGHT: 71 IN

## 2018-02-26 VITALS
BODY MASS INDEX: 27.69 KG/M2 | SYSTOLIC BLOOD PRESSURE: 126 MMHG | TEMPERATURE: 98 F | HEART RATE: 61 BPM | HEIGHT: 71 IN | OXYGEN SATURATION: 95 % | WEIGHT: 197.75 LBS | DIASTOLIC BLOOD PRESSURE: 82 MMHG

## 2018-02-26 DIAGNOSIS — E11.40 TYPE 2 DIABETES MELLITUS WITH DIABETIC NEUROPATHY, WITHOUT LONG-TERM CURRENT USE OF INSULIN: ICD-10-CM

## 2018-02-26 DIAGNOSIS — N52.9 ERECTILE DYSFUNCTION, UNSPECIFIED ERECTILE DYSFUNCTION TYPE: ICD-10-CM

## 2018-02-26 DIAGNOSIS — M79.89 LEG SWELLING: ICD-10-CM

## 2018-02-26 DIAGNOSIS — Z23 NEED FOR INFLUENZA VACCINATION: ICD-10-CM

## 2018-02-26 DIAGNOSIS — N13.8 BPH WITH URINARY OBSTRUCTION: ICD-10-CM

## 2018-02-26 DIAGNOSIS — E11.65 TYPE 2 DIABETES MELLITUS WITH HYPERGLYCEMIA, WITHOUT LONG-TERM CURRENT USE OF INSULIN: ICD-10-CM

## 2018-02-26 DIAGNOSIS — Z00.00 ENCOUNTER FOR PREVENTIVE HEALTH EXAMINATION: Primary | ICD-10-CM

## 2018-02-26 DIAGNOSIS — I10 ESSENTIAL HYPERTENSION: ICD-10-CM

## 2018-02-26 DIAGNOSIS — N40.1 BPH WITH URINARY OBSTRUCTION: ICD-10-CM

## 2018-02-26 DIAGNOSIS — I70.0 AORTIC ATHEROSCLEROSIS: ICD-10-CM

## 2018-02-26 DIAGNOSIS — E78.2 MIXED HYPERLIPIDEMIA: ICD-10-CM

## 2018-02-26 DIAGNOSIS — F11.20 NARCOTIC DEPENDENCY, CONTINUOUS: ICD-10-CM

## 2018-02-26 DIAGNOSIS — E66.3 OVERWEIGHT (BMI 25.0-29.9): ICD-10-CM

## 2018-02-26 DIAGNOSIS — M79.89 FOOT SWELLING: Primary | ICD-10-CM

## 2018-02-26 DIAGNOSIS — M75.101 ROTATOR CUFF SYNDROME OF RIGHT SHOULDER: ICD-10-CM

## 2018-02-26 DIAGNOSIS — K21.9 GASTROESOPHAGEAL REFLUX DISEASE, ESOPHAGITIS PRESENCE NOT SPECIFIED: ICD-10-CM

## 2018-02-26 DIAGNOSIS — M47.812 FACET ARTHRITIS OF CERVICAL REGION: ICD-10-CM

## 2018-02-26 PROCEDURE — 1159F MED LIST DOCD IN RCRD: CPT | Mod: S$GLB,,, | Performed by: FAMILY MEDICINE

## 2018-02-26 PROCEDURE — 99499 UNLISTED E&M SERVICE: CPT | Mod: S$GLB,,, | Performed by: NURSE PRACTITIONER

## 2018-02-26 PROCEDURE — 99214 OFFICE O/P EST MOD 30 MIN: CPT | Mod: S$GLB,,, | Performed by: FAMILY MEDICINE

## 2018-02-26 PROCEDURE — 1125F AMNT PAIN NOTED PAIN PRSNT: CPT | Mod: S$GLB,,, | Performed by: FAMILY MEDICINE

## 2018-02-26 PROCEDURE — 3008F BODY MASS INDEX DOCD: CPT | Mod: S$GLB,,, | Performed by: FAMILY MEDICINE

## 2018-02-26 PROCEDURE — 99499 UNLISTED E&M SERVICE: CPT | Mod: S$GLB,,, | Performed by: FAMILY MEDICINE

## 2018-02-26 PROCEDURE — 99999 PR PBB SHADOW E&M-EST. PATIENT-LVL IV: CPT | Mod: PBBFAC,,, | Performed by: FAMILY MEDICINE

## 2018-02-26 PROCEDURE — 90662 IIV NO PRSV INCREASED AG IM: CPT | Mod: S$GLB,,, | Performed by: FAMILY MEDICINE

## 2018-02-26 PROCEDURE — G0008 ADMIN INFLUENZA VIRUS VAC: HCPCS | Mod: S$GLB,,, | Performed by: FAMILY MEDICINE

## 2018-02-26 PROCEDURE — G0439 PPPS, SUBSEQ VISIT: HCPCS | Mod: S$GLB,,, | Performed by: NURSE PRACTITIONER

## 2018-02-26 PROCEDURE — 99999 PR PBB SHADOW E&M-EST. PATIENT-LVL V: CPT | Mod: PBBFAC,,, | Performed by: NURSE PRACTITIONER

## 2018-02-26 RX ORDER — HYDROCHLOROTHIAZIDE 12.5 MG/1
12.5 CAPSULE ORAL DAILY
Qty: 90 CAPSULE | Refills: 3 | Status: SHIPPED | OUTPATIENT
Start: 2018-02-26 | End: 2018-09-13 | Stop reason: SDUPTHER

## 2018-02-26 RX ORDER — TADALAFIL 10 MG/1
10 TABLET ORAL DAILY PRN
Qty: 2 TABLET | Refills: 0 | Status: SHIPPED | OUTPATIENT
Start: 2018-02-26 | End: 2019-02-04

## 2018-02-26 RX ORDER — GABAPENTIN 600 MG/1
600 TABLET ORAL
Qty: 360 TABLET | Refills: 3 | Status: SHIPPED | OUTPATIENT
Start: 2018-02-26 | End: 2018-05-27

## 2018-02-26 RX ORDER — GABAPENTIN 600 MG/1
600 TABLET ORAL
Qty: 360 TABLET | Refills: 3 | Status: CANCELLED | OUTPATIENT
Start: 2018-02-26 | End: 2018-05-27

## 2018-02-26 NOTE — PROGRESS NOTES
Subjective:       Patient ID: Xander Sanchez is a 67 y.o. male.    Chief Complaint: Foot Swelling    67 years old male came to the clinic with bilateral foot swelling for the last 6 months.  Patient currently using Neurontin for neuropathic pain and shoulder rotator cuff injury.  The pain is 3 out of 10 of intensity on and off aggravated with activity and better with rest.  Patient with no recent A1c.  No polyuria polydipsia polyphagia.  Blood pressure today stable.  No chest pain palpitations orthopnea or PND.      Review of Systems   Constitutional: Negative.    HENT: Negative.    Eyes: Negative.    Respiratory: Negative.    Cardiovascular: Positive for leg swelling. Negative for chest pain and palpitations.   Gastrointestinal: Negative.    Genitourinary: Negative.    Musculoskeletal: Positive for arthralgias.   Skin: Negative.    Neurological: Negative.    Psychiatric/Behavioral: Negative.        Objective:      Physical Exam   Constitutional: He is oriented to person, place, and time. He appears well-developed and well-nourished. No distress.   HENT:   Head: Normocephalic and atraumatic.   Right Ear: External ear normal.   Left Ear: External ear normal.   Nose: Nose normal.   Mouth/Throat: Oropharynx is clear and moist. No oropharyngeal exudate.   Eyes: Conjunctivae and EOM are normal. Pupils are equal, round, and reactive to light. Right eye exhibits no discharge. Left eye exhibits no discharge. No scleral icterus.   Neck: Normal range of motion. Neck supple. No JVD present. No tracheal deviation present. No thyromegaly present.   Cardiovascular: Normal rate, regular rhythm, normal heart sounds and intact distal pulses.  Exam reveals no gallop and no friction rub.    No murmur heard.  Pulmonary/Chest: Effort normal and breath sounds normal. No stridor. No respiratory distress. He has no wheezes. He has no rales. He exhibits no tenderness.   Abdominal: Soft. Bowel sounds are normal. He exhibits no distension and  no mass. There is no tenderness. There is no rebound and no guarding.   Musculoskeletal: He exhibits no edema.        Right shoulder: He exhibits decreased range of motion, tenderness and pain.   Lymphadenopathy:     He has no cervical adenopathy.   Neurological: He is alert and oriented to person, place, and time. He has normal reflexes. He displays normal reflexes. No cranial nerve deficit. He exhibits normal muscle tone. Coordination normal.   Skin: Skin is warm and dry. No rash noted. He is not diaphoretic. No erythema. No pallor.   Psychiatric: He has a normal mood and affect. His behavior is normal. Judgment and thought content normal.   Nursing note and vitals reviewed.      Assessment:       1. Foot swelling    2. Need for influenza vaccination    3. Type 2 diabetes mellitus with hyperglycemia, without long-term current use of insulin    4. Essential hypertension    5. Leg swelling    6. Rotator cuff syndrome of right shoulder        Plan:         Xander was seen today for foot swelling.    Diagnoses and all orders for this visit:    Foot swelling  -     TSH; Future  -     Sedimentation rate, manual; Future  -     C-reactive protein; Future  -     ANIBAL; Future  -     Rheumatoid factor; Future  -     Uric acid; Future  -     Ambulatory referral to Podiatry  -     X-Ray Foot Complete Bilateral; Future  -     hydroCHLOROthiazide (MICROZIDE) 12.5 mg capsule; Take 1 capsule (12.5 mg total) by mouth once daily.    Need for influenza vaccination    Type 2 diabetes mellitus with hyperglycemia, without long-term current use of insulin  -     Comprehensive metabolic panel; Future  -     Lipid panel; Future  -     Hemoglobin A1c; Future  -     Microalbumin/creatinine urine ratio; Future    Essential hypertension  -     Comprehensive metabolic panel; Future  -     Lipid panel; Future  -     TSH; Future  -     hydroCHLOROthiazide (MICROZIDE) 12.5 mg capsule; Take 1 capsule (12.5 mg total) by mouth once daily.    Leg  swelling    Rotator cuff syndrome of right shoulder  -     gabapentin (NEURONTIN) 600 MG tablet; Take 1 tablet (600 mg total) by mouth 4 (four) times daily with meals and nightly.    Continue monitoring blood pressure at home, low sodium diet.  Continue monitoring blood sugar at home,ADA diet.

## 2018-02-26 NOTE — PATIENT INSTRUCTIONS
Counseling and Referral of Other Preventative  (Italic type indicates deductible and co-insurance are waived)    Patient Name: Xander Sanchez  Today's Date: 2/28/2018    Health Maintenance       Date Due Completion Date    Sign Pain Contract 05/[birth year]/1966 ---    Hemoglobin A1c 02/28/2018 8/29/2017    Lipid Panel 08/29/2018 8/29/2017    Foot Exam 11/28/2018 11/28/2017    Eye Exam 12/20/2018 12/20/2017    Override on 12/20/2017: Done    High Dose Statin 02/26/2019 2/26/2018    TETANUS VACCINE 08/17/2026 8/17/2016    Colonoscopy 07/19/2027 7/19/2017        No orders of the defined types were placed in this encounter.    The following information is provided to all patients.  This information is to help you find resources for any of the problems found today that may be affecting your health:                Living healthy guide: www.[facility].[place].gov      Understanding Diabetes: www.diabetes.org      Eating healthy: www.cdc.gov/healthyweight      CDC home safety checklist: www.cdc.gov/steadi/patient.html      Agency on Aging: www.goea.[place].[facility]      Alcoholics anonymous (AA): www.aa.org      Physical Activity: www.juvencio.nih.gov/hz8mjni      Tobacco use: www.quitwithusla.org

## 2018-02-27 ENCOUNTER — OFFICE VISIT (OUTPATIENT)
Dept: PODIATRY | Facility: CLINIC | Age: 68
End: 2018-02-27
Payer: MEDICARE

## 2018-02-27 ENCOUNTER — HOSPITAL ENCOUNTER (OUTPATIENT)
Dept: RADIOLOGY | Facility: HOSPITAL | Age: 68
Discharge: HOME | End: 2018-02-27
Attending: FAMILY MEDICINE
Payer: MEDICARE

## 2018-02-27 VITALS
HEART RATE: 71 BPM | HEIGHT: 71 IN | BODY MASS INDEX: 27.58 KG/M2 | DIASTOLIC BLOOD PRESSURE: 84 MMHG | SYSTOLIC BLOOD PRESSURE: 156 MMHG | WEIGHT: 197 LBS

## 2018-02-27 DIAGNOSIS — M79.672 FOOT PAIN, BILATERAL: ICD-10-CM

## 2018-02-27 DIAGNOSIS — M79.89 FOOT SWELLING: ICD-10-CM

## 2018-02-27 DIAGNOSIS — E11.49 TYPE II DIABETES MELLITUS WITH NEUROLOGICAL MANIFESTATIONS: Primary | ICD-10-CM

## 2018-02-27 DIAGNOSIS — R60.0 EDEMA OF FOOT: ICD-10-CM

## 2018-02-27 DIAGNOSIS — B35.1 ONYCHOMYCOSIS: ICD-10-CM

## 2018-02-27 DIAGNOSIS — M79.671 FOOT PAIN, BILATERAL: ICD-10-CM

## 2018-02-27 PROCEDURE — 99999 PR PBB SHADOW E&M-EST. PATIENT-LVL III: CPT | Mod: PBBFAC,,, | Performed by: PODIATRIST

## 2018-02-27 PROCEDURE — 11721 DEBRIDE NAIL 6 OR MORE: CPT | Mod: Q9,S$GLB,, | Performed by: PODIATRIST

## 2018-02-27 PROCEDURE — 73630 X-RAY EXAM OF FOOT: CPT | Mod: 26,50,, | Performed by: RADIOLOGY

## 2018-02-27 PROCEDURE — 99213 OFFICE O/P EST LOW 20 MIN: CPT | Mod: 25,S$GLB,, | Performed by: PODIATRIST

## 2018-02-27 PROCEDURE — 99499 UNLISTED E&M SERVICE: CPT | Mod: S$GLB,,, | Performed by: PODIATRIST

## 2018-02-27 PROCEDURE — 3077F SYST BP >= 140 MM HG: CPT | Mod: S$GLB,,, | Performed by: PODIATRIST

## 2018-02-27 PROCEDURE — 3079F DIAST BP 80-89 MM HG: CPT | Mod: S$GLB,,, | Performed by: PODIATRIST

## 2018-02-27 PROCEDURE — 73630 X-RAY EXAM OF FOOT: CPT | Mod: 50,TC,FY,PO

## 2018-02-27 NOTE — PROCEDURES
"Routine Foot Care  Date/Time: 2/27/2018 8:26 AM  Performed by: ROHAN ZALDIVAR  Authorized by: ROHAN ZALDIVAR     Time out: Immediately prior to procedure a "time out" was called to verify the correct patient, procedure, equipment, support staff and site/side marked as required.    Consent Done?:  Yes (Verbal)  Hyperkeratotic Skin Lesions?: No      Nail Care Type:  Debride  Location(s): All  (Left 1st Toe, Left 3rd Toe, Left 2nd Toe, Left 4th Toe, Left 5th Toe, Right 1st Toe, Right 2nd Toe, Right 3rd Toe, Right 4th Toe and Right 5th Toe)  Patient tolerance:  Patient tolerated the procedure well with no immediate complications      "

## 2018-02-27 NOTE — LETTER
February 28, 2018      Williams Alvarenga MD  [address]  [place] [address]           [facility]  [address] [place] [address]  Phone: [phone]          Patient: Xander Sanchez   MR Number: [id number]   YOB: 1950   Date of Visit: 2/27/2018       Dear Dr. Williams Alvarenga:    Thank you for referring Xander Sanchez to me for evaluation. Attached you will find relevant portions of my assessment and plan of care.    If you have questions, please do not hesitate to call me. I look forward to following Xander Sanchez along with you.    Sincerely,    Scott Garcia, SAYRA    Enclosure  CC:  No Recipients    If you would like to receive this communication electronically, please contact [email or web address] or [phone] to request more information on [employer] access.    For providers and/or their staff who would like to refer a patient to [facility], please contact us through our one-stop-shop provider referral line, Dionte Wood, at [phone].    If you feel you have received this communication in error or would no longer like to receive these types of communications, please e-mail [email or web address]

## 2018-02-28 PROBLEM — K21.9 GERD (GASTROESOPHAGEAL REFLUX DISEASE): Status: ACTIVE | Noted: 2018-02-28

## 2018-02-28 PROBLEM — E78.2 MIXED HYPERLIPIDEMIA: Status: ACTIVE | Noted: 2018-02-28

## 2018-02-28 PROBLEM — E66.3 OVERWEIGHT (BMI 25.0-29.9): Status: ACTIVE | Noted: 2017-01-13

## 2018-02-28 NOTE — PROGRESS NOTES
"Xander Sanchez presented for a  Medicare AWV and comprehensive Health Risk Assessment today. The following components were reviewed and updated:    · Medical history  · Family History  · Social history  · Allergies and Current Medications  · Health Risk Assessment  · Health Maintenance  · Care Team     ** See Completed Assessments for Annual Wellness Visit within the encounter summary.**       The following assessments were completed:  · Living Situation  · CAGE  · Depression Screening  · Timed Get Up and Go  · Whisper Test  · Cognitive Function Screening  · Nutrition Screening  · ADL Screening  · PAQ Screening    Vitals:    02/26/18 1615   BP: 126/82   BP Location: Right arm   Patient Position: Sitting   BP Method: Medium (Manual)   Pulse: 61   Temp: 98.4 °F (36.9 °C)   TempSrc: Oral   SpO2: 95%   Weight: 89.7 kg (197 lb 12 oz)   Height: 5' 11" (1.803 m)     Body mass index is 27.58 kg/m².     Physical Exam   Constitutional: He is oriented to person, place, and time. He appears well-developed and well-nourished. No distress.   HENT:   Head: Normocephalic and atraumatic.   Eyes: EOM are normal. Pupils are equal, round, and reactive to light.   Neck: Neck supple. No JVD present. No tracheal deviation present.   Cardiovascular: Normal rate, regular rhythm, normal heart sounds and intact distal pulses.    No murmur heard.  Pulmonary/Chest: Effort normal and breath sounds normal. No respiratory distress. He has no wheezes. He has no rales.   Abdominal: Soft. Bowel sounds are normal. He exhibits no distension and no mass. There is no tenderness.   Musculoskeletal: Normal range of motion. He exhibits no edema or tenderness.   Neurological: He is alert and oriented to person, place, and time. Coordination normal.   Skin: Skin is warm and dry. No erythema. No pallor.   Psychiatric: He has a normal mood and affect. His behavior is normal. Judgment and thought content normal. Cognition and memory are normal. He expresses no " homicidal and no suicidal ideation.   Nursing note and vitals reviewed.        Diagnoses and health risks identified today and associated recommendations/orders:    1. Encounter for preventive health examination    2. Essential hypertension  Chronic; stable on medication.  Followed by PCP.    3. Mixed hyperlipidemia  Chronic; stable on medication.  Followed by PCP.    4. Aortic atherosclerosis  Chronic; stable.  Followed by Cardiology.    5. 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thy, without long-term current use of insulin  Chronic; stable on medication.  Followed by PCP and Podiatry.    6. Gastroesophageal reflux disease, esophagitis presence not specified  Chronic; stable on medication.  Followed by PCP.    7. BPH with urinary obstruction  Chronic; stable on medication.  Followed by Urology.    8. Facet arthritis of cervical region  Chronic; stable on medication.  Followed by PCP.    9. Narcotic dependency, continuous  Chronic; stable.  Followed by PCP.    10. Overweight (BMI 25.0-29.9)      Provided Xander with a 5-10 year written screening schedule and personal prevention plan. Recommendations were developed using the USPSTF age appropriate recommendations. Education, counseling, and referrals were provided as needed. After Visit Summary printed and given to patient which includes a list of additional screenings\tests needed.    Follow-up in about 4 months (around 6/26/2018) for follow-up with PCP, HRA visit in 1 year.    Pooja Duong NP

## 2018-03-01 NOTE — PROGRESS NOTES
Subjective:      Patient ID: Xander Sanchez is a 67 y.o. male.    Chief Complaint: Diabetes Mellitus (Dr. August 02/26/18) and Diabetic Foot Exam    Xander is a 67 y.o. male who presents to the clinic for evaluation and treatment of high risk feet. Xander has a past medical history of Asthma; Cardiomyopathy; Cervical radicular pain; Cervical spondylosis with myelopathy (10/17/2012); Chronic bronchitis; Chronic systolic dysfunction of left ventricle (7/27/2015); Constipation (7/27/2015); COPD (chronic obstructive pulmonary disease); Diabetes mellitus, type 2; DM type 2 (diabetes mellitus, type 2) (7/27/2015); Enlarged prostate (7/27/2015); Hypertension; ICD (implantable cardiac defibrillator) in place; Mixed hyperlipidemia (2/28/2018); Presence of biventricular AICD (7/27/2015); 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thy (2/1/2016); and Urinary tract infection.  This patient has documented high risk feet requiring routine maintenance secondary to diabetes mellitis and those secondary complications of diabetes, as mentioned.  Treated per Physical medicine for chronic back pain. Complains that his pain in his feet are mostly at night when he lays down. Taking gabapentin 600 mg po qid for chronic neuropathic pain.     PCP: Dr. August  LOV: 8/29/17    Past Medical History:   Diagnosis Date    Asthma     Cardiomyopathy     Cervical radicular pain     Cervical spondylosis with myelopathy 10/17/2012    Chronic bronchitis     Chronic systolic dysfunction of left ventricle 7/27/2015    Constipation 7/27/2015    COPD (chronic obstructive pulmonary disease)     Diabetes mellitus, type 2     DM type 2 (diabetes mellitus, type 2) 7/27/2015    Enlarged prostate 7/27/2015    Hypertension     ICD (implantable cardiac defibrillator) in place     Mixed hyperlipidemia 2/28/2018    Presence of biventricular AICD 7/27/2015    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thy 2/1/2016    Urinary tract infection      pt does not know       Past Surgical History:   Procedure Laterality Date    CARDIAC DEFIBRILLATOR PLACEMENT      CERVICAL SPINE SURGERY      COLONOSCOPY N/A 7/19/2017    Procedure: COLONOSCOPY  golytely;  Surgeon: Vannessa Uribe MD;  Location: [facility];  Service: Endoscopy;  Laterality: N/A;    SPINE SURGERY         Family History   Problem Relation Age of Onset    Hypertension Mother     Hypertension Father     COPD Father     No Known Problems Sister     No Known Problems Brother     No Known Problems Daughter     Prostate cancer Neg Hx     Kidney disease Neg Hx        Social History     Social History    Marital status:      Spouse name: N/A    Number of children: N/A    Years of education: N/A     Social History Main Topics    Smoking status: Former Smoker     Packs/day: 1.00     Years: 40.00     Types: Cigarettes     Quit date: 7/26/2009    Smokeless tobacco: Never Used    Alcohol use 4.2 oz/week     6 Cans of beer, 1 Shots of liquor per week    Drug use: No    Sexual activity: Yes     Partners: Female     Other Topics Concern    None     Social History Narrative    None       Current Outpatient Prescriptions   Medication Sig Dispense Refill    ACCU-CHEK SOFTCLIX LANCETS Misc       aspirin (ECOTRIN) 81 MG EC tablet Take 81 mg by mouth once daily.      blood sugar diagnostic (BLOOD GLUCOSE TEST) Strp 1 strip by Misc.(Non-Drug; Combo Route) route once daily at 6am. 100 strip 3    gabapentin (NEURONTIN) 600 MG tablet Take 1 tablet (600 mg total) by mouth 4 (four) times daily with meals and nightly. 360 tablet 3    hydroCHLOROthiazide (MICROZIDE) 12.5 mg capsule Take 1 capsule (12.5 mg total) by mouth once daily. 90 capsule 3    hydrocodone-acetaminophen 10-325mg (NORCO)  mg Tab Take 1 tablet by mouth every 6 (six) hours as needed. 120 tablet 0    lancets Misc 1 lancet by Misc.(Non-Drug; Combo Route) route once daily at 6am. 100 each 3    lisinopril  (PRINIVIL,ZESTRIL) 2.5 MG tablet Take 1 tablet (2.5 mg total) by mouth once daily. 90 tablet 3    metoprolol succinate (TOPROL-XL) 25 MG 24 hr tablet TAKE 1 TABLET (25 MG TOTAL) BY MOUTH ONCE DAILY. 90 tablet 3    oxybutynin (DITROPAN) 5 MG Tab Take 5 mg by mouth 2 (two) times daily.       pantoprazole (PROTONIX) 40 MG tablet TAKE ONE TABLET BY MOUTH ONCE DAILY BEFORE  BREAKFAST 30 tablet 3    pravastatin (PRAVACHOL) 10 MG tablet TAKE ONE TABLET BY MOUTH AT BEDTIME 90 tablet 0    tadalafil (CIALIS) 10 MG tablet Take 1 tablet (10 mg total) by mouth daily as needed for Erectile Dysfunction. 2 tablet 0    tamsulosin (FLOMAX) 0.4 mg Cp24 TAKE 2 CAPSULES (0.8 MG TOTAL) BY MOUTH EVERY EVENING. 180 capsule 4    TRUE METRIX AIR GLUCOSE METER kit USE AS INSTRUCTED 1 each 0    baclofen (LIORESAL) 10 MG tablet Take 1 tablet (10 mg total) by mouth 4 (four) times daily. 360 tablet 3     No current facility-administered medications for this visit.        Review of patient's allergies indicates:  No Known Allergies    PCP: Williams Alvarenga MD    Date Last Seen by PCP:     Current shoe gear:  Affected Foot: Casual shoes     Unaffected Foot: Casual shoes    Hemoglobin A1C   Date Value Ref Range Status   02/27/2018 6.1 (H) 4.0 - 5.6 % Final     Comment:     According to ADA guidelines, hemoglobin A1c <7.0% represents  optimal control in non-pregnant diabetic patients. Different  metrics may apply to specific patient populations.   Standards of Medical Care in Diabetes-2016.  For the purpose of screening for the presence of diabetes:  <5.7%     Consistent with the absence of diabetes  5.7-6.4%  Consistent with increasing risk for diabetes   (prediabetes)  >or=6.5%  Consistent with diabetes  Currently, no consensus exists for use of hemoglobin A1c  for diagnosis of diabetes for children.  This Hemoglobin A1c assay has significant interference with fetal   hemoglobin   (HbF). The results are invalid for patients with  abnormal amounts of   HbF,   including those with known Hereditary Persistence   of Fetal Hemoglobin. Heterozygous hemoglobin variants (HbAS, HbAC,   HbAD, HbAE, HbA2) do not significantly interfere with this assay;   however, presence of multiple variants in a sample may impact the %   interference.     08/29/2017 6.2 (H) 4.0 - 5.6 % Final     Comment:     According to ADA guidelines, hemoglobin A1c <7.0% represents  optimal control in non-pregnant diabetic patients. Different  metrics may apply to specific patient populations.   Standards of Medical Care in Diabetes-2016.  For the purpose of screening for the presence of diabetes:  <5.7%     Consistent with the absence of diabetes  5.7-6.4%  Consistent with increasing risk for diabetes   (prediabetes)  >or=6.5%  Consistent with diabetes  Currently, no consensus exists for use of hemoglobin A1c  for diagnosis of diabetes for children.  This Hemoglobin A1c assay has significant interference with fetal   hemoglobin   (HbF). The results are invalid for patients with abnormal amounts of   HbF,   including those with known Hereditary Persistence   of Fetal Hemoglobin. Heterozygous hemoglobin variants (HbAS, HbAC,   HbAD, HbAE, HbA2) do not significantly interfere with this assay;   however, presence of multiple variants in a sample may impact the %   interference.     05/04/2017 6.4 (H) 4.5 - 6.2 % Final     Comment:     According to ADA guidelines, hemoglobin A1C <7.0% represents  optimal control in non-pregnant diabetic patients.  Different  metrics may apply to specific populations.   Standards of Medical Care in Diabetes - 2016.  For the purpose of screening for the presence of diabetes:  <5.7%     Consistent with the absence of diabetes  5.7-6.4%  Consistent with increasing risk for diabetes   (prediabetes)  >or=6.5%  Consistent with diabetes  Currently no consensus exists for use of hemoglobin A1C  for diagnosis of diabetes for children.         Review of Systems    Constitution: Negative for chills and fever.   Respiratory: Negative for shortness of breath.    Skin: Positive for color change and nail changes. Negative for itching.   Musculoskeletal: Negative for falls, joint pain and muscle weakness.   Gastrointestinal: Negative for nausea and vomiting.   Neurological: Negative for focal weakness, loss of balance, numbness and paresthesias.           Objective:      Physical Exam   Constitutional: He is oriented to person, place, and time. He appears well-nourished. No distress.   Cardiovascular: Intact distal pulses.    Pulses:       Dorsalis pedis pulses are 2+ on the right side, and 2+ on the left side.        Posterior tibial pulses are 2+ on the right side, and 2+ on the left side.   CRT < 3 seconds to digits 1-5 bilateral, mild to moderate edema of bilateral lower extremity.   Musculoskeletal:        Right foot: There is decreased range of motion. There is no deformity.        Left foot: There is decreased range of motion. There is no deformity.   Equinus noted b/l ankles with < 10 deg DF noted. MMT 5/5 in DF/PF/Inv/Ev resistance with no reproduction of pain in any direction. Passive range of motion of ankle and pedal joints is painless b/l.    No pain on palpation bilateral foot.     Feet:   Right Foot:   Protective Sensation: 10 sites tested. 9 sites sensed.   Skin Integrity: Positive for dry skin. Negative for ulcer, skin breakdown, erythema, warmth or callus.   Left Foot:   Protective Sensation: 10 sites tested. 7 sites sensed.   Skin Integrity: Positive for dry skin. Negative for ulcer, skin breakdown, erythema, warmth or callus.   Neurological: He is alert and oriented to person, place, and time. He has normal strength. A sensory deficit is present.   Decreased vibratory sensation to the bilateral foot.   Skin: Skin is warm, dry and intact. Capillary refill takes less than 2 seconds. No ecchymosis, no lesion, no petechiae and no rash noted. He is not diaphoretic.  No cyanosis or erythema. No pallor. Nails show no clubbing.   Skin is dry and flaky plantar foot bilateral.    Hyperpigmented patch of skin dorsal left hallux.     Nails 1-5 bilateral are thickened 2-3 mm, slightly yellow with debris, loosened and elongated 3-4 mm.     Edema B/L LE 2+    No open lesions or macerations bilateral lower extremity.               Assessment:       Encounter Diagnoses   Name Primary?    Type II diabetes mellitus with neurological manifestations Yes    Onychomycosis     Edema of foot     Foot pain, bilateral          Plan:       Xander was seen today for diabetes mellitus and diabetic foot exam.    Diagnoses and all orders for this visit:    Type II diabetes mellitus with neurological manifestations  -     Foot Care    Onychomycosis  -     Foot Care    Edema of foot    Foot pain, bilateral      I counseled the patient on his conditions, their implications and medical management.    Shoe inspection. Diabetic Foot Education. Patient reminded of the importance of good nutrition and blood sugar control to help prevent podiatric complications of diabetes. Patient instructed on proper foot hygeine. We discussed wearing proper shoe gear, daily foot inspections, never walking without protective shoe gear, never putting sharp instruments to feet    Routine foot care per attached note.    Discussed elevating his feet to heart level. Recommend follow up with PCP if he notices increased edema to his extremities. Possible consequence of his current medications vs cardiac or renal complication.    Prescribed compound analgesic cream from [facility] to be applied as needed.    RTC 2-3 months or prn.

## 2018-03-09 ENCOUNTER — OFFICE VISIT (OUTPATIENT)
Dept: FAMILY MEDICINE | Facility: CLINIC | Age: 68
End: 2018-03-09
Payer: MEDICARE

## 2018-03-09 VITALS
SYSTOLIC BLOOD PRESSURE: 120 MMHG | HEIGHT: 71 IN | BODY MASS INDEX: 27.16 KG/M2 | HEART RATE: 72 BPM | DIASTOLIC BLOOD PRESSURE: 78 MMHG | WEIGHT: 194 LBS

## 2018-03-09 DIAGNOSIS — B35.3 TINEA PEDIS OF LEFT FOOT: Primary | ICD-10-CM

## 2018-03-09 DIAGNOSIS — M79.89 BILATERAL SWELLING OF FEET: ICD-10-CM

## 2018-03-09 DIAGNOSIS — E78.2 MIXED HYPERLIPIDEMIA: ICD-10-CM

## 2018-03-09 DIAGNOSIS — I10 ESSENTIAL HYPERTENSION: ICD-10-CM

## 2018-03-09 DIAGNOSIS — E11.40 TYPE 2 DIABETES MELLITUS WITH DIABETIC NEUROPATHY, WITHOUT LONG-TERM CURRENT USE OF INSULIN: ICD-10-CM

## 2018-03-09 DIAGNOSIS — I73.9 PAD (PERIPHERAL ARTERY DISEASE): ICD-10-CM

## 2018-03-09 PROCEDURE — 99499 UNLISTED E&M SERVICE: CPT | Mod: S$GLB,,, | Performed by: FAMILY MEDICINE

## 2018-03-09 PROCEDURE — 99214 OFFICE O/P EST MOD 30 MIN: CPT | Mod: S$GLB,,, | Performed by: FAMILY MEDICINE

## 2018-03-09 PROCEDURE — 3078F DIAST BP <80 MM HG: CPT | Mod: S$GLB,,, | Performed by: FAMILY MEDICINE

## 2018-03-09 PROCEDURE — 99999 PR PBB SHADOW E&M-EST. PATIENT-LVL III: CPT | Mod: PBBFAC,,, | Performed by: FAMILY MEDICINE

## 2018-03-09 PROCEDURE — 3074F SYST BP LT 130 MM HG: CPT | Mod: S$GLB,,, | Performed by: FAMILY MEDICINE

## 2018-03-09 RX ORDER — KETOCONAZOLE 20 MG/G
CREAM TOPICAL 2 TIMES DAILY
Qty: 30 G | Refills: 0 | Status: SHIPPED | OUTPATIENT
Start: 2018-03-09 | End: 2019-02-04

## 2018-03-09 NOTE — PATIENT INSTRUCTIONS
Diabetes: Activity Tips    Being more active can help you manage your diabetes. The tips on this sheet can help you get the most from your exercise. They can also help you stay safe.  Staying Active  Its important for adults to spend less time sitting and being inactive. This is especially true if you have type 2 diabetes. When you are sitting for long periods of time, get up for short sessions of light activity every 30 minutes.  You should aim for at least 150 minutes a week of exercise or physical activity. Dont let more than 2 days go by without being active.  Benefit from briskness  Brisk activity gets your heart beating faster. This can help you increase your fitness, lose extra weight, and manage your blood sugar level. Try brisk walking. Or, if you have foot or leg problems, you can try swimming or bike riding. You can break up your exercise into chunks throughout the day. Work up to at least 30 minutes of steady, brisk exercise on most days.  Warm up and cool down  Warming up and cooling down reduce your risk of injury. They also help limit muscle soreness. Do a mild version of your activity for 5 minutes before and after your routine. You can also learn stretches that will help keep your muscles loose. Your healthcare provider may show you good ways to warm up and stretch.  Do the talk-sing test  The talk-sing test is a simple way to tell how hard youre exercising. If you can talk while exercising, youre in a safe range. If youre out of breath, slow down. If you can carry a tune, its time to  the pace. Walk up a hill. Increase the resistance on your stationary bike. Or swim faster.  What about eating?  You may be told to plan your exercise for 1 to 2 hours after a meal. In most cases, you dont need to eat while being active. If you take insulin or medicine that can cause low blood sugar, test your blood sugar before exercising. And carry a fast-acting sugar that will raise your blood sugar  level quickly. This includes glucose tablets or hard candy. Use it if you feel low blood sugar symptoms.  Safety tips  These tips can help you stay safe as you become fit:  · Exercise with a friend or carry a cell phone if you have one.  · Carry or wear identification, such as a necklace or bracelet, that says you have diabetes.  · Use the proper footwear and safety equipment for your activity.  · Drink water before, during, and after exercise.  · Dress properly for the weather.  · Dont exercise in very hot or very cold weather.  · Dont exercise if you are sick.  · If you are instructed to do so, test your blood sugar before and after you exercise. Have a small carbohydrate snack if your blood sugar is low before you start exercising.   When to stop exercising and call your healthcare provider  Stop exercising and call your healthcare provider right away if you notice any of the following:  · Pain, pressure, tightness, or heaviness in the chest  · Pain or heaviness in the neck, shoulders, back, arms, legs, or feet  · Unusual shortness of breath  · Dizziness or lightheadedness  · Unusually rapid or slow pulse  · Increased joint or muscle pain  · Nausea or vomiting  Date Last Reviewed: 5/1/2016  © 0908-3781 [employer]. [address] [place] [address], [place] [address]. All rights reserved. This information is not intended as a substitute for professional medical care. Always follow your healthcare professional's instructions.

## 2018-03-09 NOTE — PROGRESS NOTES
Subjective:       Patient ID: Xander Sanchez is a 67 y.o. male.    Chief Complaint: Numbness    67 years old male came to the clinic with bilateral feet swelling over the plantar area.  Patient with significant swelling sensation.  Patient also with rash over the left foot toes associated with itching For the last couple of months.  Patient with dyslipidemia and diabetes concerned about problems with circulation.  Patient with no recent ultrasound.      Review of Systems   Constitutional: Negative.    HENT: Negative.    Eyes: Negative.    Respiratory: Negative.    Cardiovascular: Positive for leg swelling.   Gastrointestinal: Negative.    Genitourinary: Negative.    Musculoskeletal: Negative.    Skin: Negative.    Neurological: Negative.    Psychiatric/Behavioral: Negative.        Objective:      Physical Exam   Constitutional: He is oriented to person, place, and time. He appears well-developed and well-nourished. No distress.   HENT:   Head: Normocephalic and atraumatic.   Right Ear: External ear normal.   Left Ear: External ear normal.   Nose: Nose normal.   Mouth/Throat: Oropharynx is clear and moist. No oropharyngeal exudate.   Eyes: Conjunctivae and EOM are normal. Pupils are equal, round, and reactive to light. Right eye exhibits no discharge. Left eye exhibits no discharge. No scleral icterus.   Neck: Normal range of motion. Neck supple. No JVD present. No tracheal deviation present. No thyromegaly present.   Cardiovascular: Normal rate, regular rhythm, normal heart sounds and intact distal pulses.  Exam reveals no gallop and no friction rub.    No murmur heard.  Pulmonary/Chest: Effort normal and breath sounds normal. No stridor. No respiratory distress. He has no wheezes. He has no rales. He exhibits no tenderness.   Abdominal: Soft. Bowel sounds are normal. He exhibits no distension and no mass. There is no tenderness. There is no rebound and no guarding.   Musculoskeletal: Normal range of motion. He  exhibits no edema or tenderness.        Feet:    Feet:   Right Foot:   Skin Integrity: Positive for dry skin.   Left Foot:   Skin Integrity: Positive for dry skin.   Lymphadenopathy:     He has no cervical adenopathy.   Neurological: He is alert and oriented to person, place, and time. He has normal reflexes. He displays normal reflexes. No cranial nerve deficit. He exhibits normal muscle tone. Coordination normal.   Skin: Skin is warm and dry. No rash noted. He is not diaphoretic. No erythema. No pallor.   Psychiatric: He has a normal mood and affect. His behavior is normal. Judgment and thought content normal.   Nursing note and vitals reviewed.      Assessment:       1. Tinea pedis of left foot    2. Essential hypertension    3. Bilateral swelling of feet    4. PAD (peripheral artery disease)    5. Mixed hyperlipidemia    6. 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thy, without long-term current use of insulin        Plan:         Xander was seen today for numbness.    Diagnoses and all orders for this visit:    Tinea pedis of left foot  -     ketoconazole (NIZORAL) 2 % cream; Apply topically 2 (two) times daily.    Essential hypertension    Bilateral swelling of feet  -     US Lower Extremity Arteries Bilateral; Future    PAD (peripheral artery disease)  -     US Lower Extremity Arteries Bilateral; Future    Mixed hyperlipidemia  -     US Lower Extremity Arteries Bilateral; Future    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thy, without long-term current use of insulin  -     CMPD Flurbiprofen 10%, Amitriptyline 2%, Gabapentin 6%, Lidocaine 2%, Prilocaine 2% in Lipoderm Active-Max; Apply 1 Pump (1.6 g total) topically once daily.

## 2018-03-12 ENCOUNTER — TELEPHONE (OUTPATIENT)
Dept: FAMILY MEDICINE | Facility: CLINIC | Age: 68
End: 2018-03-12

## 2018-03-12 DIAGNOSIS — M79.89 FOOT SWELLING: Primary | ICD-10-CM

## 2018-03-12 RX ORDER — DICLOFENAC SODIUM 10 MG/G
2 GEL TOPICAL DAILY
Qty: 100 G | Refills: 0 | Status: SHIPPED | OUTPATIENT
Start: 2018-03-12 | End: 2019-02-04

## 2018-03-12 NOTE — TELEPHONE ENCOUNTER
----- Message from Marilin Rosario sent at 3/12/2018  2:02 PM CDT -----  Contact: [phone] self  Patient called in returning your call. Please advise.

## 2018-03-12 NOTE — TELEPHONE ENCOUNTER
----- Message from Victoria Vega sent at 3/12/2018  9:18 AM CDT -----  Contact: Self [phone]  Patient is calling to talk to nurse concerning questions on his medication. Please advice

## 2018-03-13 DIAGNOSIS — R80.9 MICROALBUMINURIA: ICD-10-CM

## 2018-03-13 RX ORDER — LISINOPRIL 2.5 MG/1
TABLET ORAL
Qty: 90 TABLET | Refills: 3 | Status: SHIPPED | OUTPATIENT
Start: 2018-03-13 | End: 2019-03-06 | Stop reason: SDUPTHER

## 2018-03-14 ENCOUNTER — HOSPITAL ENCOUNTER (OUTPATIENT)
Dept: RADIOLOGY | Facility: HOSPITAL | Age: 68
Discharge: HOME | End: 2018-03-14
Attending: FAMILY MEDICINE
Payer: MEDICARE

## 2018-03-14 DIAGNOSIS — I73.9 PAD (PERIPHERAL ARTERY DISEASE): ICD-10-CM

## 2018-03-14 DIAGNOSIS — M79.89 BILATERAL SWELLING OF FEET: ICD-10-CM

## 2018-03-14 DIAGNOSIS — E78.2 MIXED HYPERLIPIDEMIA: ICD-10-CM

## 2018-03-14 PROCEDURE — 93925 LOWER EXTREMITY STUDY: CPT | Mod: 26,,, | Performed by: RADIOLOGY

## 2018-03-14 PROCEDURE — 93925 LOWER EXTREMITY STUDY: CPT | Mod: TC

## 2018-03-15 ENCOUNTER — OFFICE VISIT (OUTPATIENT)
Dept: PHYSICAL MEDICINE AND REHAB | Facility: CLINIC | Age: 68
End: 2018-03-15
Payer: MEDICARE

## 2018-03-15 VITALS
DIASTOLIC BLOOD PRESSURE: 95 MMHG | HEART RATE: 101 BPM | BODY MASS INDEX: 27.4 KG/M2 | SYSTOLIC BLOOD PRESSURE: 138 MMHG | HEIGHT: 71 IN | WEIGHT: 195.69 LBS

## 2018-03-15 DIAGNOSIS — M75.101 ROTATOR CUFF SYNDROME OF RIGHT SHOULDER: ICD-10-CM

## 2018-03-15 DIAGNOSIS — M54.12 CERVICAL RADICULOPATHY: ICD-10-CM

## 2018-03-15 DIAGNOSIS — M79.601 RIGHT ARM PAIN: ICD-10-CM

## 2018-03-15 DIAGNOSIS — M62.81 MUSCLE RIGHT ARM WEAKNESS: ICD-10-CM

## 2018-03-15 DIAGNOSIS — M96.1 CERVICAL POST-LAMINECTOMY SYNDROME: ICD-10-CM

## 2018-03-15 DIAGNOSIS — G90.50 COMPLEX REGIONAL PAIN SYNDROME TYPE 1, AFFECTING UNSPECIFIED SITE: Primary | ICD-10-CM

## 2018-03-15 DIAGNOSIS — M47.12 CERVICAL SPONDYLOSIS WITH MYELOPATHY: ICD-10-CM

## 2018-03-15 DIAGNOSIS — M48.02 SPINAL STENOSIS IN CERVICAL REGION: ICD-10-CM

## 2018-03-15 DIAGNOSIS — M47.812 FACET ARTHRITIS OF CERVICAL REGION: ICD-10-CM

## 2018-03-15 DIAGNOSIS — M54.12 BRACHIAL NEURITIS OR RADICULITIS: ICD-10-CM

## 2018-03-15 DIAGNOSIS — M54.12 CERVICAL RADICULAR PAIN: ICD-10-CM

## 2018-03-15 DIAGNOSIS — M50.00 HNP (HERNIATED NUCLEUS PULPOSUS) WITH MYELOPATHY, CERVICAL: ICD-10-CM

## 2018-03-15 DIAGNOSIS — G89.4 CHRONIC PAIN SYNDROME: ICD-10-CM

## 2018-03-15 DIAGNOSIS — M48.02 CERVICAL STENOSIS OF SPINE: ICD-10-CM

## 2018-03-15 DIAGNOSIS — R29.898 RIGHT HAND WEAKNESS: ICD-10-CM

## 2018-03-15 DIAGNOSIS — R29.898 RIGHT ARM WEAKNESS: ICD-10-CM

## 2018-03-15 DIAGNOSIS — M50.30 DEGENERATION OF CERVICAL INTERVERTEBRAL DISC: ICD-10-CM

## 2018-03-15 DIAGNOSIS — F11.20 NARCOTIC DEPENDENCY, CONTINUOUS: ICD-10-CM

## 2018-03-15 PROCEDURE — 99499 UNLISTED E&M SERVICE: CPT | Mod: S$GLB,,, | Performed by: PHYSICAL MEDICINE & REHABILITATION

## 2018-03-15 PROCEDURE — 3075F SYST BP GE 130 - 139MM HG: CPT | Mod: CPTII,S$GLB,, | Performed by: PHYSICAL MEDICINE & REHABILITATION

## 2018-03-15 PROCEDURE — 3080F DIAST BP >= 90 MM HG: CPT | Mod: CPTII,S$GLB,, | Performed by: PHYSICAL MEDICINE & REHABILITATION

## 2018-03-15 PROCEDURE — 99999 PR PBB SHADOW E&M-EST. PATIENT-LVL III: CPT | Mod: PBBFAC,,, | Performed by: PHYSICAL MEDICINE & REHABILITATION

## 2018-03-15 PROCEDURE — 99214 OFFICE O/P EST MOD 30 MIN: CPT | Mod: S$GLB,,, | Performed by: PHYSICAL MEDICINE & REHABILITATION

## 2018-03-15 RX ORDER — HYDROCODONE BITARTRATE AND ACETAMINOPHEN 10; 325 MG/1; MG/1
1 TABLET ORAL EVERY 6 HOURS PRN
Qty: 120 TABLET | Refills: 0 | Status: SHIPPED | OUTPATIENT
Start: 2018-05-15 | End: 2018-06-18 | Stop reason: SDUPTHER

## 2018-03-15 RX ORDER — HYDROCODONE BITARTRATE AND ACETAMINOPHEN 10; 325 MG/1; MG/1
1 TABLET ORAL EVERY 6 HOURS PRN
Qty: 120 TABLET | Refills: 0 | Status: SHIPPED | OUTPATIENT
Start: 2018-03-15 | End: 2018-04-14

## 2018-03-15 RX ORDER — HYDROCODONE BITARTRATE AND ACETAMINOPHEN 10; 325 MG/1; MG/1
1 TABLET ORAL EVERY 6 HOURS PRN
Qty: 120 TABLET | Refills: 0 | Status: SHIPPED | OUTPATIENT
Start: 2018-04-15 | End: 2018-05-15

## 2018-03-15 RX ORDER — HYDROCODONE BITARTRATE AND ACETAMINOPHEN 10; 325 MG/1; MG/1
1 TABLET ORAL EVERY 6 HOURS PRN
Qty: 120 TABLET | Refills: 0 | Status: SHIPPED | OUTPATIENT
Start: 2018-03-15 | End: 2018-03-15 | Stop reason: SDUPTHER

## 2018-03-15 NOTE — PROGRESS NOTES
Subjective:       Patient ID: Xander Sanchez is a 67 y.o. male.    Chief Complaint: Extremity Weakness    Arm Pain    Pertinent negatives include no chest pain. Numbness: Right arm paina nd numbness.   Extremity Weakness    Numbness: Right arm paina nd numbness.   Neck Pain    Pertinent negatives include no chest pain or headaches. Numbness: Right arm paina nd numbness. Weakness: right arm weak.      Mr Sanchez is Right handed male,who returns to clinic for right arm pain, and weakness that worsened after second neck surgery with Dr. Meyer.   Third surgery with Dr. De Dios did not changed his symptoms.   He states that all symptoms stayed the same as before surgery.   He reports some improvement in pain, but the tightness, cold sensation in right arm is same as before.  Mr. Sanhcez returns to clinic for chronic pain management.    Last clinic visit was on  11/15/17.  Current right arm pain is 6, average pain is 3/10, the worst pain is 7/10.   He has not that much of neck pain, current neck pain is 1, worst pain maybe 2.   He complains mainly about tightness, pressure in right arm, in right arm , more in forearm, than above elbow, tightness  runs down the arm to right thumb.   Right arm pain is constant, day and night, intensity changes, pain is worse during day time, does not awake him at night.   In morning feels tight, stiff, as pressure around the arm, also feels swollen and tight in hand, and feels cold at all time.   It hurts more bellow \the elbow than above elbow.   He reports that he does not have feelings, cannot write, does not know if things are going to drop out of his hand.   Sometimes squeezes too hard plastic cup.   Right hand is clumsy, getting smaller, hardly can dress, cannot button shirt.   He states that he is not able to dress. He states that before surgery he has had similar problems, but they just got worse after second surgery.   It is swelling feeling, and tightness that borders him, not that  much pain.   Sometimes right arm feels cold, and cold weather affect him more,  Pain is better controled with Hydrocodone.   He went for CRESCENCIO, once before surgery and few time after surgery, total  > 3   Takes Hydrocodone 10/325 mg PO TID, that brings his pain down to tolerable 2.   He is now taking Neurontin 600 mg po QID, total 2400 mg /day, and tolerates it well.   But, Neurontin does not help much, in his opinion.   Hydrocodone helps and brings pain down to 2, and gives him ~ 5 hrs pain relief.  Also, takes Baclofen 10 mg po TID , for tightness in right arm, that helps also.  Now, awaiting procedure, pain stimulator, that is a little complicated since he has AICD.  Here for follow up, re evaluation and chronic pain management with opiates.    Past Medical History:   Diagnosis Date    Asthma     Cardiomyopathy     Cervical radicular pain     Cervical spondylosis with myelopathy 10/17/2012    Chronic bronchitis     Chronic systolic dysfunction of left ventricle 7/27/2015    Constipation 7/27/2015    COPD (chronic obstructive pulmonary disease)     Diabetes mellitus, type 2     DM type 2 (diabetes mellitus, type 2) 7/27/2015    Enlarged prostate 7/27/2015    Hypertension     ICD (implantable cardiac defibrillator) in place     Mixed hyperlipidemia 2/28/2018    Presence of biventricular AICD 7/27/2015    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thy 2/1/2016    Urinary tract infection     pt does not know       Past Surgical History:   Procedure Laterality Date    CARDIAC DEFIBRILLATOR PLACEMENT      CERVICAL SPINE SURGERY      COLONOSCOPY N/A 7/19/2017    Procedure: COLONOSCOPY  golytely;  Surgeon: Vannessa Uribe MD;  Location: [facility];  Service: Endoscopy;  Laterality: N/A;    SPINE SURGERY         Family History   Problem Relation Age of Onset    Hypertension Mother     Hypertension Father     COPD Father     No Known Problems Sister     No Known Problems Brother     No  Known Problems Daughter     Prostate cancer Neg Hx     Kidney disease Neg Hx        Social History     Social History    Marital status:      Spouse name: N/A    Number of children: N/A    Years of education: N/A     Social History Main Topics    Smoking status: Former Smoker     Packs/day: 1.00     Years: 40.00     Types: Cigarettes     Quit date: 7/26/2009    Smokeless tobacco: Never Used    Alcohol use 4.2 oz/week     6 Cans of beer, 1 Shots of liquor per week    Drug use: No    Sexual activity: Yes     Partners: Female     Other Topics Concern    None     Social History Narrative    None       Current Outpatient Prescriptions   Medication Sig Dispense Refill    ACCU-CHEK SOFTCLIX LANCETS Misc       aspirin (ECOTRIN) 81 MG EC tablet Take 81 mg by mouth once daily.      baclofen (LIORESAL) 10 MG tablet Take 1 tablet (10 mg total) by mouth 4 (four) times daily. 360 tablet 3    blood sugar diagnostic (BLOOD GLUCOSE TEST) Strp 1 strip by Misc.(Non-Drug; Combo Route) route once daily at 6am. 100 strip 3    diclofenac sodium 1 % Gel Apply 2 g topically once daily. 100 g 0    gabapentin (NEURONTIN) 600 MG tablet Take 1 tablet (600 mg total) by mouth 4 (four) times daily with meals and nightly. 360 tablet 3    hydroCHLOROthiazide (MICROZIDE) 12.5 mg capsule Take 1 capsule (12.5 mg total) by mouth once daily. 90 capsule 3    hydrocodone-acetaminophen 10-325mg (NORCO)  mg Tab Take 1 tablet by mouth every 6 (six) hours as needed. 120 tablet 0    [START ON 4/15/2018] hydrocodone-acetaminophen 10-325mg (NORCO)  mg Tab Take 1 tablet by mouth every 6 (six) hours as needed. 120 tablet 0    [START ON 5/15/2018] hydrocodone-acetaminophen 10-325mg (NORCO)  mg Tab Take 1 tablet by mouth every 6 (six) hours as needed. 120 tablet 0    ketoconazole (NIZORAL) 2 % cream Apply topically 2 (two) times daily. 30 g 0    lancets Misc 1 lancet by Misc.(Non-Drug; Combo Route) route once daily at  6am. 100 each 3    lisinopril (PRINIVIL,ZESTRIL) 2.5 MG tablet TAKE 1 TABLET EVERY DAY 90 tablet 3    metoprolol succinate (TOPROL-XL) 25 MG 24 hr tablet TAKE 1 TABLET (25 MG TOTAL) BY MOUTH ONCE DAILY. 90 tablet 3    oxybutynin (DITROPAN) 5 MG Tab Take 5 mg by mouth 2 (two) times daily.       pantoprazole (PROTONIX) 40 MG tablet TAKE ONE TABLET BY MOUTH ONCE DAILY BEFORE  BREAKFAST 30 tablet 3    pravastatin (PRAVACHOL) 10 MG tablet TAKE ONE TABLET BY MOUTH AT BEDTIME 90 tablet 0    tadalafil (CIALIS) 10 MG tablet Take 1 tablet (10 mg total) by mouth daily as needed for Erectile Dysfunction. 2 tablet 0    tamsulosin (FLOMAX) 0.4 mg Cp24 TAKE 2 CAPSULES (0.8 MG TOTAL) BY MOUTH EVERY EVENING. 180 capsule 4    TRUE METRIX AIR GLUCOSE METER kit USE AS INSTRUCTED 1 each 0     No current facility-administered medications for this visit.        Review of patient's allergies indicates:  No Known Allergies  Review of Systems   Constitutional: Negative for appetite change and fatigue.   Eyes: Negative for visual disturbance.   Respiratory: Negative for shortness of breath.    Cardiovascular: Negative for chest pain.   Gastrointestinal: Negative for constipation and diarrhea.   Genitourinary: Negative for dysuria, frequency and urgency.   Musculoskeletal: Positive for extremity weakness and neck pain. Negative for arthralgias, back pain, gait problem, joint swelling, myalgias and neck stiffness.   Neurological: Negative for dizziness, tremors and headaches. Weakness: right arm weak. Numbness: Right arm paina nd numbness.   Psychiatric/Behavioral: Negative for dysphoric mood.   All other systems reviewed and are negative.          Objective:      Physical Exam    GENERAL: The patient is alert, oriented, pleasant.   MUSCULOSKELETAL:   Gait: on wide basis, COG moved forward.   GENERAL: The patient is alert, oriented, pleasant.   HEENT; PERRLA  NECK: supple, minimaly webbed neck.   Cervical spine :  Minimally decreased  AROM in cervical spine, flexion to 60, extension to 0,,   side bending and rotation to 20-25 degrees with pain.  + mild-moderate paravertebral cervical musculature tenderness on Right.  + mild- moderate tenderness in upper trapezius muscles on Right.   Lumbar spine, full range of motion in all planes, flexion to 90 degrees , ext. 0.  Side bending and rotation to 25--30 degrees.   Straight leg raising Negative bilaterally.   Full range of motion in all joints x4 extremities.   Muscle strength 5/5 throughout x4 extremities.   No joint laxity throughout x4 extremities.   NEUROLOGIC: Cranial nerves II through XII intact.   Deep tendon reflexes is normal, +2 in the upper and lower extremities bilaterally.   Muscle tone is normal.   Sensory is intact to light touch and pinprick throughout x4 extremities.   Skin: no hypersensitivity, alodynia, no somatosensory changes, other than cold skin in right arm, no atrophic skin changes.        CT of Cervical spine showed:  Stable remote operative change right C3, C4 and C6 hemilaminectomies with metallic laminal plasty.  Operative change with C5/C6 anterior spinal fusion no evidence for hardware failure. with interval reduction of protruding disk at C5/C6.  continued truncation of the cervical spinal cord most pronounced at the C6 level with mild/moderate small caliber of the cord   concerning for myelomalacia stable.  Superimposed multilevel degenerative change most pronounced at C6/C7 with bulging disk resulting in mild central canal stenosis.   Please note there is additional degenerative change with mild neural foraminal stenosis C3/C4 and C4/C5 levels.    Assessment:          1. Complex regional pain syndrome type 1, affecting unspecified site    2. Cervical stenosis of spine    3. Cervical spondylosis with myelopathy    4. Chronic pain syndrome    5. Cervical radiculopathy    6. Muscle right arm weakness    7. Degeneration of cervical intervertebral disc    8. Cervical  post-laminectomy syndrome    9. Facet arthritis of cervical region    10. Rotator cuff syndrome of right shoulder    11. Right hand weakness    12. Brachial neuritis or radiculitis    13. HNP (herniated nucleus pulposus) with myelopathy, cervical    14. Right arm weakness    15. Narcotic dependency, continuous    16. Right arm pain    17. Spinal stenosis in cervical region    18. Cervical radicular pain      Plan:       Complex regional pain syndrome type 1, affecting unspecified site  -     hydrocodone-acetaminophen 10-325mg (NORCO)  mg Tab; Take 1 tablet by mouth every 6 (six) hours as needed.  Dispense: 120 tablet; Refill: 0  -     Discontinue: hydrocodone-acetaminophen 10-325mg (NORCO)  mg Tab; Take 1 tablet by mouth every 6 (six) hours as needed.  Dispense: 120 tablet; Refill: 0  -     hydrocodone-acetaminophen 10-325mg (NORCO)  mg Tab; Take 1 tablet by mouth every 6 (six) hours as needed.  Dispense: 120 tablet; Refill: 0  -     hydrocodone-acetaminophen 10-325mg (NORCO)  mg Tab; Take 1 tablet by mouth every 6 (six) hours as needed.  Dispense: 120 tablet; Refill: 0    Cervical stenosis of spine  -     hydrocodone-acetaminophen 10-325mg (NORCO)  mg Tab; Take 1 tablet by mouth every 6 (six) hours as needed.  Dispense: 120 tablet; Refill: 0  -     Discontinue: hydrocodone-acetaminophen 10-325mg (NORCO)  mg Tab; Take 1 tablet by mouth every 6 (six) hours as needed.  Dispense: 120 tablet; Refill: 0  -     hydrocodone-acetaminophen 10-325mg (NORCO)  mg Tab; Take 1 tablet by mouth every 6 (six) hours as needed.  Dispense: 120 tablet; Refill: 0  -     hydrocodone-acetaminophen 10-325mg (NORCO)  mg Tab; Take 1 tablet by mouth every 6 (six) hours as needed.  Dispense: 120 tablet; Refill: 0    Cervical spondylosis with myelopathy  -     hydrocodone-acetaminophen 10-325mg (NORCO)  mg Tab; Take 1 tablet by mouth every 6 (six) hours as needed.  Dispense: 120 tablet;  Refill: 0  -     Discontinue: hydrocodone-acetaminophen 10-325mg (NORCO)  mg Tab; Take 1 tablet by mouth every 6 (six) hours as needed.  Dispense: 120 tablet; Refill: 0  -     hydrocodone-acetaminophen 10-325mg (NORCO)  mg Tab; Take 1 tablet by mouth every 6 (six) hours as needed.  Dispense: 120 tablet; Refill: 0  -     hydrocodone-acetaminophen 10-325mg (NORCO)  mg Tab; Take 1 tablet by mouth every 6 (six) hours as needed.  Dispense: 120 tablet; Refill: 0    Chronic pain syndrome  -     hydrocodone-acetaminophen 10-325mg (NORCO)  mg Tab; Take 1 tablet by mouth every 6 (six) hours as needed.  Dispense: 120 tablet; Refill: 0  -     Discontinue: hydrocodone-acetaminophen 10-325mg (NORCO)  mg Tab; Take 1 tablet by mouth every 6 (six) hours as needed.  Dispense: 120 tablet; Refill: 0  -     hydrocodone-acetaminophen 10-325mg (NORCO)  mg Tab; Take 1 tablet by mouth every 6 (six) hours as needed.  Dispense: 120 tablet; Refill: 0  -     hydrocodone-acetaminophen 10-325mg (NORCO)  mg Tab; Take 1 tablet by mouth every 6 (six) hours as needed.  Dispense: 120 tablet; Refill: 0    Cervical radiculopathy  -     hydrocodone-acetaminophen 10-325mg (NORCO)  mg Tab; Take 1 tablet by mouth every 6 (six) hours as needed.  Dispense: 120 tablet; Refill: 0  -     Discontinue: hydrocodone-acetaminophen 10-325mg (NORCO)  mg Tab; Take 1 tablet by mouth every 6 (six) hours as needed.  Dispense: 120 tablet; Refill: 0  -     hydrocodone-acetaminophen 10-325mg (NORCO)  mg Tab; Take 1 tablet by mouth every 6 (six) hours as needed.  Dispense: 120 tablet; Refill: 0  -     hydrocodone-acetaminophen 10-325mg (NORCO)  mg Tab; Take 1 tablet by mouth every 6 (six) hours as needed.  Dispense: 120 tablet; Refill: 0    Muscle right arm weakness  -     hydrocodone-acetaminophen 10-325mg (NORCO)  mg Tab; Take 1 tablet by mouth every 6 (six) hours as needed.  Dispense: 120 tablet;  Refill: 0  -     Discontinue: hydrocodone-acetaminophen 10-325mg (NORCO)  mg Tab; Take 1 tablet by mouth every 6 (six) hours as needed.  Dispense: 120 tablet; Refill: 0  -     hydrocodone-acetaminophen 10-325mg (NORCO)  mg Tab; Take 1 tablet by mouth every 6 (six) hours as needed.  Dispense: 120 tablet; Refill: 0  -     hydrocodone-acetaminophen 10-325mg (NORCO)  mg Tab; Take 1 tablet by mouth every 6 (six) hours as needed.  Dispense: 120 tablet; Refill: 0    Degeneration of cervical intervertebral disc  -     hydrocodone-acetaminophen 10-325mg (NORCO)  mg Tab; Take 1 tablet by mouth every 6 (six) hours as needed.  Dispense: 120 tablet; Refill: 0  -     Discontinue: hydrocodone-acetaminophen 10-325mg (NORCO)  mg Tab; Take 1 tablet by mouth every 6 (six) hours as needed.  Dispense: 120 tablet; Refill: 0  -     hydrocodone-acetaminophen 10-325mg (NORCO)  mg Tab; Take 1 tablet by mouth every 6 (six) hours as needed.  Dispense: 120 tablet; Refill: 0  -     hydrocodone-acetaminophen 10-325mg (NORCO)  mg Tab; Take 1 tablet by mouth every 6 (six) hours as needed.  Dispense: 120 tablet; Refill: 0    Cervical post-laminectomy syndrome  -     hydrocodone-acetaminophen 10-325mg (NORCO)  mg Tab; Take 1 tablet by mouth every 6 (six) hours as needed.  Dispense: 120 tablet; Refill: 0  -     Discontinue: hydrocodone-acetaminophen 10-325mg (NORCO)  mg Tab; Take 1 tablet by mouth every 6 (six) hours as needed.  Dispense: 120 tablet; Refill: 0  -     hydrocodone-acetaminophen 10-325mg (NORCO)  mg Tab; Take 1 tablet by mouth every 6 (six) hours as needed.  Dispense: 120 tablet; Refill: 0  -     hydrocodone-acetaminophen 10-325mg (NORCO)  mg Tab; Take 1 tablet by mouth every 6 (six) hours as needed.  Dispense: 120 tablet; Refill: 0    Facet arthritis of cervical region  -     hydrocodone-acetaminophen 10-325mg (NORCO)  mg Tab; Take 1 tablet by mouth every 6 (six)  hours as needed.  Dispense: 120 tablet; Refill: 0  -     Discontinue: hydrocodone-acetaminophen 10-325mg (NORCO)  mg Tab; Take 1 tablet by mouth every 6 (six) hours as needed.  Dispense: 120 tablet; Refill: 0  -     hydrocodone-acetaminophen 10-325mg (NORCO)  mg Tab; Take 1 tablet by mouth every 6 (six) hours as needed.  Dispense: 120 tablet; Refill: 0  -     hydrocodone-acetaminophen 10-325mg (NORCO)  mg Tab; Take 1 tablet by mouth every 6 (six) hours as needed.  Dispense: 120 tablet; Refill: 0    Rotator cuff syndrome of right shoulder  -     hydrocodone-acetaminophen 10-325mg (NORCO)  mg Tab; Take 1 tablet by mouth every 6 (six) hours as needed.  Dispense: 120 tablet; Refill: 0  -     Discontinue: hydrocodone-acetaminophen 10-325mg (NORCO)  mg Tab; Take 1 tablet by mouth every 6 (six) hours as needed.  Dispense: 120 tablet; Refill: 0  -     hydrocodone-acetaminophen 10-325mg (NORCO)  mg Tab; Take 1 tablet by mouth every 6 (six) hours as needed.  Dispense: 120 tablet; Refill: 0  -     hydrocodone-acetaminophen 10-325mg (NORCO)  mg Tab; Take 1 tablet by mouth every 6 (six) hours as needed.  Dispense: 120 tablet; Refill: 0    Right hand weakness  -     hydrocodone-acetaminophen 10-325mg (NORCO)  mg Tab; Take 1 tablet by mouth every 6 (six) hours as needed.  Dispense: 120 tablet; Refill: 0  -     Discontinue: hydrocodone-acetaminophen 10-325mg (NORCO)  mg Tab; Take 1 tablet by mouth every 6 (six) hours as needed.  Dispense: 120 tablet; Refill: 0  -     hydrocodone-acetaminophen 10-325mg (NORCO)  mg Tab; Take 1 tablet by mouth every 6 (six) hours as needed.  Dispense: 120 tablet; Refill: 0  -     hydrocodone-acetaminophen 10-325mg (NORCO)  mg Tab; Take 1 tablet by mouth every 6 (six) hours as needed.  Dispense: 120 tablet; Refill: 0    Brachial neuritis or radiculitis  -     hydrocodone-acetaminophen 10-325mg (NORCO)  mg Tab; Take 1 tablet by  mouth every 6 (six) hours as needed.  Dispense: 120 tablet; Refill: 0  -     Discontinue: hydrocodone-acetaminophen 10-325mg (NORCO)  mg Tab; Take 1 tablet by mouth every 6 (six) hours as needed.  Dispense: 120 tablet; Refill: 0  -     hydrocodone-acetaminophen 10-325mg (NORCO)  mg Tab; Take 1 tablet by mouth every 6 (six) hours as needed.  Dispense: 120 tablet; Refill: 0  -     hydrocodone-acetaminophen 10-325mg (NORCO)  mg Tab; Take 1 tablet by mouth every 6 (six) hours as needed.  Dispense: 120 tablet; Refill: 0    HNP (herniated nucleus pulposus) with myelopathy, cervical  -     hydrocodone-acetaminophen 10-325mg (NORCO)  mg Tab; Take 1 tablet by mouth every 6 (six) hours as needed.  Dispense: 120 tablet; Refill: 0  -     Discontinue: hydrocodone-acetaminophen 10-325mg (NORCO)  mg Tab; Take 1 tablet by mouth every 6 (six) hours as needed.  Dispense: 120 tablet; Refill: 0  -     hydrocodone-acetaminophen 10-325mg (NORCO)  mg Tab; Take 1 tablet by mouth every 6 (six) hours as needed.  Dispense: 120 tablet; Refill: 0  -     hydrocodone-acetaminophen 10-325mg (NORCO)  mg Tab; Take 1 tablet by mouth every 6 (six) hours as needed.  Dispense: 120 tablet; Refill: 0    Right arm weakness  -     hydrocodone-acetaminophen 10-325mg (NORCO)  mg Tab; Take 1 tablet by mouth every 6 (six) hours as needed.  Dispense: 120 tablet; Refill: 0  -     Discontinue: hydrocodone-acetaminophen 10-325mg (NORCO)  mg Tab; Take 1 tablet by mouth every 6 (six) hours as needed.  Dispense: 120 tablet; Refill: 0  -     hydrocodone-acetaminophen 10-325mg (NORCO)  mg Tab; Take 1 tablet by mouth every 6 (six) hours as needed.  Dispense: 120 tablet; Refill: 0  -     hydrocodone-acetaminophen 10-325mg (NORCO)  mg Tab; Take 1 tablet by mouth every 6 (six) hours as needed.  Dispense: 120 tablet; Refill: 0    Narcotic dependency, continuous  -     hydrocodone-acetaminophen 10-325mg (NORCO)   mg Tab; Take 1 tablet by mouth every 6 (six) hours as needed.  Dispense: 120 tablet; Refill: 0  -     Discontinue: hydrocodone-acetaminophen 10-325mg (NORCO)  mg Tab; Take 1 tablet by mouth every 6 (six) hours as needed.  Dispense: 120 tablet; Refill: 0  -     hydrocodone-acetaminophen 10-325mg (NORCO)  mg Tab; Take 1 tablet by mouth every 6 (six) hours as needed.  Dispense: 120 tablet; Refill: 0  -     hydrocodone-acetaminophen 10-325mg (NORCO)  mg Tab; Take 1 tablet by mouth every 6 (six) hours as needed.  Dispense: 120 tablet; Refill: 0    Right arm pain  -     hydrocodone-acetaminophen 10-325mg (NORCO)  mg Tab; Take 1 tablet by mouth every 6 (six) hours as needed.  Dispense: 120 tablet; Refill: 0  -     Discontinue: hydrocodone-acetaminophen 10-325mg (NORCO)  mg Tab; Take 1 tablet by mouth every 6 (six) hours as needed.  Dispense: 120 tablet; Refill: 0  -     hydrocodone-acetaminophen 10-325mg (NORCO)  mg Tab; Take 1 tablet by mouth every 6 (six) hours as needed.  Dispense: 120 tablet; Refill: 0  -     hydrocodone-acetaminophen 10-325mg (NORCO)  mg Tab; Take 1 tablet by mouth every 6 (six) hours as needed.  Dispense: 120 tablet; Refill: 0    Spinal stenosis in cervical region  -     hydrocodone-acetaminophen 10-325mg (NORCO)  mg Tab; Take 1 tablet by mouth every 6 (six) hours as needed.  Dispense: 120 tablet; Refill: 0  -     Discontinue: hydrocodone-acetaminophen 10-325mg (NORCO)  mg Tab; Take 1 tablet by mouth every 6 (six) hours as needed.  Dispense: 120 tablet; Refill: 0  -     hydrocodone-acetaminophen 10-325mg (NORCO)  mg Tab; Take 1 tablet by mouth every 6 (six) hours as needed.  Dispense: 120 tablet; Refill: 0  -     hydrocodone-acetaminophen 10-325mg (NORCO)  mg Tab; Take 1 tablet by mouth every 6 (six) hours as needed.  Dispense: 120 tablet; Refill: 0    Cervical radicular pain  -     hydrocodone-acetaminophen 10-325mg (NORCO)   mg Tab; Take 1 tablet by mouth every 6 (six) hours as needed.  Dispense: 120 tablet; Refill: 0  -     Discontinue: hydrocodone-acetaminophen 10-325mg (NORCO)  mg Tab; Take 1 tablet by mouth every 6 (six) hours as needed.  Dispense: 120 tablet; Refill: 0  -     hydrocodone-acetaminophen 10-325mg (NORCO)  mg Tab; Take 1 tablet by mouth every 6 (six) hours as needed.  Dispense: 120 tablet; Refill: 0  -     hydrocodone-acetaminophen 10-325mg (NORCO)  mg Tab; Take 1 tablet by mouth every 6 (six) hours as needed.  Dispense: 120 tablet; Refill: 0        Patient with cervical myelopathy, with tightness and pain is Right UE, s/p Cervical fusion, here for chronic pain management.    SCS.    1. Pain management : Gabapentin , increased to 600 mg po QID, and Hydrocodone 10/325 mg PO QID ( #120 tabs,2 refills).   reviewed and appropriate.  Hydrocodone refill on 2/17/18, 1/17/18, and 12/17/17.    2. Spasticity, will resume Baclofen 10 mg PO TID.    RTC in 3 months.    Total time spent face to face with patient was 25 minutes.   More than 50% of that time was spent in counseling on diagnosis , prognosis and treatment options.   I also  patient  on common and most usual side effect of prescribed medications. Risk and benefits of opiates, possible risk of developing opiate dependence and tolerance, need of strict compliance with prescribed medications.  Pain contract, rules and obligations were discussed with patient in details.  He is aware that I would be the only doctor prescribing him pain medications and ED in a case of emergency.  I reviewed Primary care , and other specialty's notes to better coordinate patient's  care.   All questions were answered, and patient voiced understanding.

## 2018-04-09 RX ORDER — CALCIUM CITRATE/VITAMIN D3 200MG-6.25
TABLET ORAL
Qty: 100 STRIP | Refills: 3 | Status: SHIPPED | OUTPATIENT
Start: 2018-04-09 | End: 2021-04-07 | Stop reason: SDUPTHER

## 2018-04-09 RX ORDER — LANCETS 33 GAUGE
EACH MISCELLANEOUS
Qty: 100 EACH | Refills: 3 | Status: SHIPPED | OUTPATIENT
Start: 2018-04-09 | End: 2019-03-06 | Stop reason: SDUPTHER

## 2018-04-16 RX ORDER — OXYBUTYNIN CHLORIDE 5 MG/1
TABLET ORAL
Qty: 270 TABLET | Refills: 3 | Status: SHIPPED | OUTPATIENT
Start: 2018-04-16 | End: 2018-07-06 | Stop reason: SDUPTHER

## 2018-05-07 RX ORDER — PRAVASTATIN SODIUM 10 MG/1
TABLET ORAL
Qty: 90 TABLET | Refills: 0 | Status: SHIPPED | OUTPATIENT
Start: 2018-05-07 | End: 2018-08-11 | Stop reason: SDUPTHER

## 2018-05-21 ENCOUNTER — CLINICAL SUPPORT (OUTPATIENT)
Dept: ELECTROPHYSIOLOGY | Facility: CLINIC | Age: 68
End: 2018-05-21
Attending: INTERNAL MEDICINE
Payer: MEDICARE

## 2018-05-21 DIAGNOSIS — I42.8 NICM (NONISCHEMIC CARDIOMYOPATHY): ICD-10-CM

## 2018-05-21 DIAGNOSIS — Z95.810 IMPLANTABLE CARDIOVERTER-DEFIBRILLATOR (ICD) IN SITU: ICD-10-CM

## 2018-05-21 PROCEDURE — 93296 REM INTERROG EVL PM/IDS: CPT | Mod: S$GLB,,, | Performed by: INTERNAL MEDICINE

## 2018-05-21 PROCEDURE — 93295 DEV INTERROG REMOTE 1/2/MLT: CPT | Mod: S$GLB,,, | Performed by: INTERNAL MEDICINE

## 2018-05-29 ENCOUNTER — OFFICE VISIT (OUTPATIENT)
Dept: PODIATRY | Facility: CLINIC | Age: 68
End: 2018-05-29
Payer: MEDICARE

## 2018-05-29 VITALS
HEIGHT: 71 IN | DIASTOLIC BLOOD PRESSURE: 83 MMHG | HEART RATE: 82 BPM | BODY MASS INDEX: 27.3 KG/M2 | WEIGHT: 195 LBS | SYSTOLIC BLOOD PRESSURE: 148 MMHG

## 2018-05-29 DIAGNOSIS — B35.1 ONYCHOMYCOSIS: ICD-10-CM

## 2018-05-29 DIAGNOSIS — E11.49 TYPE II DIABETES MELLITUS WITH NEUROLOGICAL MANIFESTATIONS: Primary | ICD-10-CM

## 2018-05-29 PROCEDURE — 99999 PR PBB SHADOW E&M-EST. PATIENT-LVL IV: CPT | Mod: PBBFAC,,, | Performed by: PODIATRIST

## 2018-05-29 PROCEDURE — 11721 DEBRIDE NAIL 6 OR MORE: CPT | Mod: Q9,S$GLB,, | Performed by: PODIATRIST

## 2018-05-29 PROCEDURE — 99499 UNLISTED E&M SERVICE: CPT | Mod: S$GLB,,, | Performed by: PODIATRIST

## 2018-05-29 PROCEDURE — 99499 UNLISTED E&M SERVICE: CPT | Mod: S$PBB,,, | Performed by: PODIATRIST

## 2018-05-29 NOTE — PROCEDURES
"Routine Foot Care  Date/Time: 5/29/2018 8:12 AM  Performed by: ROHAN ZALDIVAR  Authorized by: ROHAN ZALDIVAR     Time out: Immediately prior to procedure a "time out" was called to verify the correct patient, procedure, equipment, support staff and site/side marked as required.    Consent Done?:  Yes (Verbal)  Hyperkeratotic Skin Lesions?: No      Nail Care Type:  Debride  Location(s): All  (Left 1st Toe, Left 3rd Toe, Left 2nd Toe, Left 4th Toe, Left 5th Toe, Right 1st Toe, Right 2nd Toe, Right 3rd Toe, Right 4th Toe and Right 5th Toe)  Patient tolerance:  Patient tolerated the procedure well with no immediate complications      "

## 2018-05-29 NOTE — PROGRESS NOTES
Subjective:      Patient ID: Xander Sanchez is a 68 y.o. male.    Chief Complaint: Diabetes Mellitus (Dr. Williams lopez 3/9/18); Diabetic Foot Exam; and Routine Foot Care    Xander is a 68 y.o. male who presents to the clinic for evaluation and treatment of high risk feet. Xander has a past medical history of Asthma; Cardiomyopathy; Cervical radicular pain; Cervical spondylosis with myelopathy (10/17/2012); Chronic bronchitis; Chronic systolic dysfunction of left ventricle (7/27/2015); Constipation (7/27/2015); COPD (chronic obstructive pulmonary disease); Diabetes mellitus, type 2; DM type 2 (diabetes mellitus, type 2) (7/27/2015); Enlarged prostate (7/27/2015); Hypertension; ICD (implantable cardiac defibrillator) in place; Mixed hyperlipidemia (2/28/2018); Presence of biventricular AICD (7/27/2015); 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thy (2/1/2016); and Urinary tract infection.  This patient has documented high risk feet requiring routine maintenance secondary to diabetes mellitis and those secondary complications of diabetes, as mentioned.  Treated per Physical medicine for chronic back pain. Complains that his pain in his feet are mostly at night when he lays down. Taking gabapentin 600 mg po qid for chronic neuropathic pain. No new complaints.    PCP: Dr. August  LOV: 3/9/18    Past Medical History:   Diagnosis Date    Asthma     Cardiomyopathy     Cervical radicular pain     Cervical spondylosis with myelopathy 10/17/2012    Chronic bronchitis     Chronic systolic dysfunction of left ventricle 7/27/2015    Constipation 7/27/2015    COPD (chronic obstructive pulmonary disease)     Diabetes mellitus, type 2     DM type 2 (diabetes mellitus, type 2) 7/27/2015    Enlarged prostate 7/27/2015    Hypertension     ICD (implantable cardiac defibrillator) in place     Mixed hyperlipidemia 2/28/2018    Presence of biventricular AICD 7/27/2015    Type 2 diabetes mellitus with  diabetic neuropathy 2/1/2016    Urinary tract infection     pt does not know       Past Surgical History:   Procedure Laterality Date    CARDIAC DEFIBRILLATOR PLACEMENT      CERVICAL SPINE SURGERY      COLONOSCOPY N/A 7/19/2017    Procedure: COLONOSCOPY  golytely;  Surgeon: Vannessa Uribe MD;  Location: [facility];  Service: Endoscopy;  Laterality: N/A;    SPINE SURGERY         Family History   Problem Relation Age of Onset    Hypertension Mother     Hypertension Father     COPD Father     No Known Problems Sister     No Known Problems Brother     No Known Problems Daughter     Prostate cancer Neg Hx     Kidney disease Neg Hx        Social History     Social History    Marital status:      Spouse name: N/A    Number of children: N/A    Years of education: N/A     Social History Main Topics    Smoking status: Former Smoker     Packs/day: 1.00     Years: 40.00     Types: Cigarettes     Quit date: 7/26/2009    Smokeless tobacco: Never Used    Alcohol use 4.2 oz/week     6 Cans of beer, 1 Shots of liquor per week    Drug use: No    Sexual activity: Yes     Partners: Female     Other Topics Concern    Not on file     Social History Narrative    No narrative on file       Current Outpatient Prescriptions   Medication Sig Dispense Refill    ACCU-CHEK SOFTCLIX LANCETS Misc       aspirin (ECOTRIN) 81 MG EC tablet Take 81 mg by mouth once daily.      diclofenac sodium 1 % Gel Apply 2 g topically once daily. 100 g 0    hydroCHLOROthiazide (MICROZIDE) 12.5 mg capsule Take 1 capsule (12.5 mg total) by mouth once daily. 90 capsule 3    hydrocodone-acetaminophen 10-325mg (NORCO)  mg Tab Take 1 tablet by mouth every 6 (six) hours as needed. 120 tablet 0    ketoconazole (NIZORAL) 2 % cream Apply topically 2 (two) times daily. 30 g 0    lancets Misc 1 lancet by Misc.(Non-Drug; Combo Route) route once daily at 6am. 100 each 3    lisinopril (PRINIVIL,ZESTRIL) 2.5 MG tablet TAKE 1  TABLET EVERY DAY 90 tablet 3    metoprolol succinate (TOPROL-XL) 25 MG 24 hr tablet TAKE 1 TABLET (25 MG TOTAL) BY MOUTH ONCE DAILY. 90 tablet 3    oxybutynin (DITROPAN) 5 MG Tab TAKE 1 TABLET THREE TIMES DAILY. 270 tablet 3    pantoprazole (PROTONIX) 40 MG tablet TAKE ONE TABLET BY MOUTH ONCE DAILY BEFORE  BREAKFAST 30 tablet 3    pravastatin (PRAVACHOL) 10 MG tablet TAKE ONE TABLET BY MOUTH AT BEDTIME 90 tablet 0    tadalafil (CIALIS) 10 MG tablet Take 1 tablet (10 mg total) by mouth daily as needed for Erectile Dysfunction. 2 tablet 0    tamsulosin (FLOMAX) 0.4 mg Cp24 TAKE 2 CAPSULES (0.8 MG TOTAL) BY MOUTH EVERY EVENING. 180 capsule 4    TRUE METRIX AIR GLUCOSE METER kit USE AS INSTRUCTED 1 each 0    TRUE METRIX GLUCOSE TEST STRIP Strp TEST  ONE  TIME  DAILY  AT  6AM 100 strip 3    TRUEPLUS LANCETS 33 gauge Misc TEST ONE TIME DAILY  AT  6AM 100 each 3    baclofen (LIORESAL) 10 MG tablet Take 1 tablet (10 mg total) by mouth 4 (four) times daily. 360 tablet 3    gabapentin (NEURONTIN) 600 MG tablet Take 1 tablet (600 mg total) by mouth 4 (four) times daily with meals and nightly. 360 tablet 3     No current facility-administered medications for this visit.        Review of patient's allergies indicates:  No Known Allergies    PCP: Williams Alvarenga MD    Date Last Seen by PCP:     Current shoe gear:  Affected Foot: Casual shoes     Unaffected Foot: Casual shoes    Hemoglobin A1C   Date Value Ref Range Status   02/27/2018 6.1 (H) 4.0 - 5.6 % Final     Comment:     According to ADA guidelines, hemoglobin A1c <7.0% represents  optimal control in non-pregnant diabetic patients. Different  metrics may apply to specific patient populations.   Standards of Medical Care in Diabetes-2016.  For the purpose of screening for the presence of diabetes:  <5.7%     Consistent with the absence of diabetes  5.7-6.4%  Consistent with increasing risk for diabetes   (prediabetes)  >or=6.5%  Consistent with  diabetes  Currently, no consensus exists for use of hemoglobin A1c  for diagnosis of diabetes for children.  This Hemoglobin A1c assay has significant interference with fetal   hemoglobin   (HbF). The results are invalid for patients with abnormal amounts of   HbF,   including those with known Hereditary Persistence   of Fetal Hemoglobin. Heterozygous hemoglobin variants (HbAS, HbAC,   HbAD, HbAE, HbA2) do not significantly interfere with this assay;   however, presence of multiple variants in a sample may impact the %   interference.     08/29/2017 6.2 (H) 4.0 - 5.6 % Final     Comment:     According to ADA guidelines, hemoglobin A1c <7.0% represents  optimal control in non-pregnant diabetic patients. Different  metrics may apply to specific patient populations.   Standards of Medical Care in Diabetes-2016.  For the purpose of screening for the presence of diabetes:  <5.7%     Consistent with the absence of diabetes  5.7-6.4%  Consistent with increasing risk for diabetes   (prediabetes)  >or=6.5%  Consistent with diabetes  Currently, no consensus exists for use of hemoglobin A1c  for diagnosis of diabetes for children.  This Hemoglobin A1c assay has significant interference with fetal   hemoglobin   (HbF). The results are invalid for patients with abnormal amounts of   HbF,   including those with known Hereditary Persistence   of Fetal Hemoglobin. Heterozygous hemoglobin variants (HbAS, HbAC,   HbAD, HbAE, HbA2) do not significantly interfere with this assay;   however, presence of multiple variants in a sample may impact the %   interference.     05/04/2017 6.4 (H) 4.5 - 6.2 % Final     Comment:     According to ADA guidelines, hemoglobin A1C <7.0% represents  optimal control in non-pregnant diabetic patients.  Different  metrics may apply to specific populations.   Standards of Medical Care in Diabetes - 2016.  For the purpose of screening for the presence of diabetes:  <5.7%     Consistent with the absence of  diabetes  5.7-6.4%  Consistent with increasing risk for diabetes   (prediabetes)  >or=6.5%  Consistent with diabetes  Currently no consensus exists for use of hemoglobin A1C  for diagnosis of diabetes for children.         Review of Systems   Constitution: Negative for chills and fever.   Respiratory: Negative for shortness of breath.    Skin: Positive for color change and nail changes. Negative for itching.   Musculoskeletal: Negative for falls, joint pain and muscle weakness.   Gastrointestinal: Negative for nausea and vomiting.   Neurological: Negative for focal weakness, loss of balance, numbness and paresthesias.           Objective:      Physical Exam   Constitutional: He is oriented to person, place, and time. He appears well-nourished. No distress.   Cardiovascular: Intact distal pulses.    Pulses:       Dorsalis pedis pulses are 2+ on the right side, and 2+ on the left side.        Posterior tibial pulses are 2+ on the right side, and 2+ on the left side.   CRT < 3 seconds to digits 1-5 bilateral, mild to moderate edema of bilateral lower extremity.   Musculoskeletal:        Right foot: There is decreased range of motion. There is no deformity.        Left foot: There is decreased range of motion. There is no deformity.   Equinus noted b/l ankles with < 10 deg DF noted. MMT 5/5 in DF/PF/Inv/Ev resistance with no reproduction of pain in any direction. Passive range of motion of ankle and pedal joints is painless b/l.    No pain on palpation bilateral foot.     Feet:   Right Foot:   Protective Sensation: 10 sites tested. 9 sites sensed.   Skin Integrity: Positive for dry skin. Negative for ulcer, skin breakdown, erythema, warmth or callus.   Left Foot:   Protective Sensation: 10 sites tested. 7 sites sensed.   Skin Integrity: Positive for dry skin. Negative for ulcer, skin breakdown, erythema, warmth or callus.   Neurological: He is alert and oriented to person, place, and time. He has normal strength. A  sensory deficit is present.   Decreased vibratory sensation to the bilateral foot.   Skin: Skin is warm, dry and intact. Capillary refill takes less than 2 seconds. No ecchymosis, no lesion, no petechiae and no rash noted. He is not diaphoretic. No cyanosis or erythema. No pallor. Nails show no clubbing.   Skin is dry and flaky plantar foot bilateral.    Hyperpigmented patch of skin dorsal left hallux.     Nails 1-5 bilateral are thickened 2-3 mm, slightly yellow with debris, loosened and elongated 3-4 mm.     Edema B/L LE 2+    No open lesions or macerations bilateral lower extremity.               Assessment:       Encounter Diagnoses   Name Primary?    Type II diabetes mellitus with neurological manifestations Yes    Onychomycosis          Plan:       Xander was seen today for diabetes mellitus, diabetic foot exam and routine foot care.    Diagnoses and all orders for this visit:    Type II diabetes mellitus with neurological manifestations  -     Foot Care    Onychomycosis  -     Foot Care      I counseled the patient on his conditions, their implications and medical management.    Shoe inspection. Diabetic Foot Education. Patient reminded of the importance of good nutrition and blood sugar control to help prevent podiatric complications of diabetes. Patient instructed on proper foot hygeine. We discussed wearing proper shoe gear, daily foot inspections, never walking without protective shoe gear, never putting sharp instruments to feet    Routine foot care per attached note.

## 2018-06-11 ENCOUNTER — OFFICE VISIT (OUTPATIENT)
Dept: CARDIOLOGY | Facility: CLINIC | Age: 68
End: 2018-06-11
Payer: MEDICARE

## 2018-06-11 VITALS
OXYGEN SATURATION: 96 % | DIASTOLIC BLOOD PRESSURE: 75 MMHG | BODY MASS INDEX: 26.46 KG/M2 | WEIGHT: 189 LBS | HEART RATE: 91 BPM | HEIGHT: 71 IN | SYSTOLIC BLOOD PRESSURE: 117 MMHG

## 2018-06-11 DIAGNOSIS — I70.0 AORTIC ATHEROSCLEROSIS: ICD-10-CM

## 2018-06-11 DIAGNOSIS — E78.2 MIXED HYPERLIPIDEMIA: Primary | ICD-10-CM

## 2018-06-11 DIAGNOSIS — I11.0 HYPERTENSIVE LEFT VENTRICULAR HYPERTROPHY WITH HEART FAILURE: ICD-10-CM

## 2018-06-11 DIAGNOSIS — I44.7 LBBB (LEFT BUNDLE BRANCH BLOCK): ICD-10-CM

## 2018-06-11 DIAGNOSIS — I10 ESSENTIAL HYPERTENSION: ICD-10-CM

## 2018-06-11 DIAGNOSIS — I50.32 DIASTOLIC DYSFUNCTION WITH CHRONIC HEART FAILURE: ICD-10-CM

## 2018-06-11 PROCEDURE — 3074F SYST BP LT 130 MM HG: CPT | Mod: CPTII,S$GLB,, | Performed by: INTERNAL MEDICINE

## 2018-06-11 PROCEDURE — 99999 PR PBB SHADOW E&M-EST. PATIENT-LVL III: CPT | Mod: PBBFAC,,, | Performed by: INTERNAL MEDICINE

## 2018-06-11 PROCEDURE — 3078F DIAST BP <80 MM HG: CPT | Mod: CPTII,S$GLB,, | Performed by: INTERNAL MEDICINE

## 2018-06-11 PROCEDURE — 99499 UNLISTED E&M SERVICE: CPT | Mod: S$PBB,,, | Performed by: INTERNAL MEDICINE

## 2018-06-11 PROCEDURE — 99214 OFFICE O/P EST MOD 30 MIN: CPT | Mod: S$GLB,,, | Performed by: INTERNAL MEDICINE

## 2018-06-11 NOTE — PATIENT INSTRUCTIONS
Heart Disease Education    The heart beats 60 to 100 times per minute, 24 hours a day. This equals almost 1000,000 times a day. It pumps blood with oxygen and nutrients to the tissues and organs of the body. But the heart is a muscle and needs its own supply of blood. Blood flow to the heart is supplied by the coronary arteries. Coronary artery disease (atherosclerosis) is a result of cholesterol, saturated fat, and calcium deposits (plaques) that build up inside the walls. This causes inflammation within the coronary arteries. These plaques narrow the artery and reduce blood flow to the heart muscle. The reduction in blood flow to the heart muscle decreases oxygen supply to the heart. If the narrowing is significant enough, the oxygen supply to one or more regions of the heart can be temporarily or permanently shut down. This can cause chest pain, and possibly death of heart tissue (heart attack).  Types of chest pain  Angina is the name for pain in the heart muscle. Angina is a warning sign of serious heart disease. When untreated it can lead to a heart attack, also known as acute myocardial infarction, or AMI. Angina occurs when there is not enough blood and oxygen flowing to the heart for the amount of work it is doing. This most often happens during physical exertion, when the heart is working hardest. It is usually relieved by rest or nitroglycerin. Angina may also occur after a large meal when extra blood is sent to the digestive organs and less goes to the heart. In the case of advanced or unstable heart disease, angina can occur at rest or awaken you from sleep. Angina usually lasts from a few minutes up to 20 minutes or more. When treated early, the effects of angina can be reversed without permanent damage to the heart. Angina is a serious condition and needs to be evaluated by a medical professional immediately.  There are two types of angina -- stable and unstable:  · Stable angina usually occurs  with a predictable level of activity. Being stable, its character, severity, and occurrence do not change much over time. It usually starts with activity, and resolves with rest or taking your medicine as instructed by your doctor. The symptoms usually do not last long.  · Unstable angina changes or gets worse over time. It is different from whatever you are used to. It may feel different or worse, begin without cause, occur with exercise or exertion, wake you up from sleep, and last longer. It may not respond in the same way as it does when you take your usual medicines for an attack. This type of angina can be a warning sign of an impending heart attack.     A heart attack is usually the result of a blood clot that suddenly forms in a coronary artery that has been narrowed with plaque. When this occurs, blood flow may be cut off to a part of the heart muscle, causing the cells to die. This weakens the pumping action of the heart, which affects the delivery of blood to all the other organs in the body including the brain. This damage is not reversible. However, early treatment can limit the amount of damage.  The pain you feel with angina and a heart attack may have a similar quality. However, it is usually different in intensity and duration. Here are some typical descriptions of a heart attack:  · It is most often experienced as a squeezing, crushing, pressure-like sensation in the center of the chest.  · It is sometimes described as something heavy sitting on my chest.  · It may feel more like a bad case of indigestion.  · The pain may spread from the chest to the arm, shoulder, throat or jaw.  · Sometimes the pain is not felt in the chest at all, but only in the arm, shoulder, throat or jaw.  · There may also be nausea, vomiting, dizziness or light-headedness, sweating and trouble breathing.  · Palpitations, or your heart beating rapidly  · A new, irregular heart beat  · Unexplained weakness  You may not be  "able to tell the difference between "bad" angina and a heart attack at home. Seek help if your symptoms are different than usual. Do not be in denial or just try to "tough it out."  Call 911  This is the fastest and safest way to get to the emergency department. The paramedics can also start treatment on the way to the hospital, saving valuable time for your heart.  · If the angina gets worse, if it continues, or if it stops and returns, call 911 immediately. Do not delay. You may be having a heart attack.  · After you call 911, take a second tablet or spray unless instructed otherwise. When repeating doses, sit down if possible, because it can make you feel lightheaded or dizzy. Wait another 5 minutes. If the angina still does not go away, take a third tablet or spray. Do not take more than 3 tablets or sprays within 15 minutes. Stay on the phone with 911 for further instruction.  · Your healthcare provider may give you slightly different instructions than those above. If so, follow them carefully.  Do not wait until symptoms become severe to call 911.  Other reasons to call 911 include:  · Trouble breathing  · Feeling lightheaded, faint, or dizzy  · Rapid heart beat  · Slower than usual heart rate compared to your normal  · Angina with weakness, dizziness, fainting, heavy sweating, nausea, or vomiting  · Extreme drowsiness, confusion  · Weakness of an arm or leg or one side of the face  · Difficulty with speech or vision  When to seek medical care  Remember, the signs and symptoms of a heart attack are not always like they are on TV. Sometimes they are not so obvious. You may only feel weak, or just not right. If it is not clear or if you have any doubt, call for advice.  · Seek help if there is a change in the type of pain, if it feels different, or if your symptoms are mild.  · Do not drive yourself. Have someone else drive you. If no one can drive, call 911.  · Do not delay. Fast diagnosis and treatment can " "prevent or limit the amount of heart damage during a heart attack.  · Do not go to your doctor's office or a clinic as they may not be able to provide all the testing and treatment required for this condition.  · If your doctor has given you medicine to take when symptoms occur, take them but don't delay getting help trying to locate medicines.  What happens in the emergency department  The emergency department is connected to your local emergency medical system (EMS) through 911. That's why during a cardiac emergency, calling 911 is the fastest way to get help. The goal of the emergency department is to rapidly screen, evaluate, and treat people.  Once you are there, an electrocardiogram (ECG or heart tracing) will be done. Blood samples may be taken to look for the presence of heart enzymes that leak from damaged heart cells and show if a heart attack is occurring. You will often be evaluated by a heart specialist (cardiologist) who decides the best course of action. In the case of severe angina or early heart attack, and depending on the circumstances, powerful "clot busting" medicines can be used to dissolve blood clots in the coronary artery. In other cases, you may be taken to a cardiac catheterization lab. Here, a tiny balloon-tipped catheter is advanced through blood vessels to the heart. There the balloon is inflated pushing open the blood vessel restoring blood flow.  Risk factors for heart disease  Risk factors for heart disease are a combination of genetic and lifestyle. Many risk factors work by either directly or indirectly damaging the blood vessels of the heart, or by increasing the risk of forming blood or cholesterol clots, which then clog up and block the arteries.     Examples of physical lifestyle risk factors:  · Cigarette smoking  · High blood pressure  · High blood cholesterol  · Use of stimulant drugs such as cocaine, crack, and amphetamines  · Eating a high-fat, high-cholesterol " meal  · Diabetes   · Obesity which increases risk for diabetes and high blood pressure  · Lack of regular physical activity     Examples of emotional lifestyle factors:  · Chronic high stress levels release stress hormones. These raise blood pressure and cholesterol level and makes blood clot more easily.  · Held-in anger, hostile or cynical attitude  · Social and emotional isolation, lack of intimacy  · Loss of relationship  · Depression  Other factors that increase the risk of heart attack that you cannot control :  · Age. The older you get beyond 40, the greater is your risk of significant coronary artery disease.  · Gender. More men than women get heart disease; but once past menopause, women who are not taking estrogen replacement have the same risk as men for a heart attack.  · Family history. If your mother, father, brother or sister has coronary artery disease, your risk of having it is higher than a person your age without this family history.  What can you do to decrease your risk  To reduce your risk of heart disease:  · Get regular checkups with your doctor.  · Take your medicines for blood pressure, cholesterol or diabetes as directed.  · Watch your diet. Eat a heart healthy diet choosing fresh foods, less salt, cholesterol, and fat  · Stop smoking. Get help if needed.  · Get regular exercise.  · Manage stress.  · Carry a list of medicines and doses in your wallet.  Date Last Reviewed: 12/30/2015  © 8295-1164 [employer]. [address] [place] [address], [place] [address]. All rights reserved. This information is not intended as a substitute for professional medical care. Always follow your healthcare professional's instructions.

## 2018-06-11 NOTE — PROGRESS NOTES
Subjective:   Patient ID:  Xander Sanchez is a 68 y.o. male who presents for evaluation and treatment of Coronary Artery Disease; Hyperlipidemia; Hypertension; and ICD      HPI:         Xander Sanchez 68 y.o. male is here follow up and feeling well without any new cardiaccomplaints. He was initially referred to my clinic by Dr. Munoz to rule out peripheral arterial as the cause of R arm numbness and fatigue for nearly 3 years. His vascular work up and presentation were unremarkable.       He has cervical DJD s/p 3 surgical interventions and CRESCENCIO. Last cervical CT revealed extensive hardware and severe R and moderate L neural foraminal narrowing between C3-C4; there is also moderate bilateral neural foraminal narrowing between C5-C6. He has no ulcers, discoloration or poikilothermia of his wrist to suggest ischemia. He has no pain with exertion.       He has DCM with [facility] AICD in place in the left chest.  EF 40% + diastolic dysfunctionon echo 2/2018. Arterial ultrasound revealed patent bilateral upper extremity arteries except for slightly lower velocities on the R arm. Not enough to cause rest pain, numbness, and/or paresthesia.        Wrist brachial index 12/2016:   R 1.0   L 1.0         Last ICD interrogation 5/218      Well functioning device   No therapies      Ultrasound of LE   No significant disease                Patient Active Problem List    Diagnosis Date Noted    Mixed hyperlipidemia 02/28/2018    GERD (gastroesophageal reflux disease) 02/28/2018    Prostatitis, acute 10/17/2017    Gait abnormality 09/21/2017    Weakness of right hip 09/21/2017    Gait instability 09/21/2017    History of colon polyps 07/19/2017    Incomplete bladder emptying 05/02/2017    BPH with urinary obstruction 02/01/2017    Overweight (BMI 25.0-29.9) 01/13/2017    Rotator cuff syndrome of right shoulder 09/15/2016    Muscle right arm weakness 08/22/2016    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thy  2016    Diastolic dysfunction with chronic heart failure 2016    Hypertensive left ventricular hypertrophy with heart failure 2016    Narcotic dependency, continuous 2016    Facet arthritis of cervical region 2015     Seen on CT of the cervical spine dated 08/04/15      Slow transit constipation 2015    Chronic systolic dysfunction of left ventricle 2015    S/P TURP 2015    LBBB (left bundle branch block) 2015    Chronic pain syndrome 2014    Cervical post-laminectomy syndrome 2014    CRPS (complex regional pain syndrome type I) 2014    CRPS (complex regional pain syndrome type II) 2014    HNP (herniated nucleus pulposus) with myelopathy, cervical 2014    Cardiomyopathy, nonischemic 2013    PUD (peptic ulcer disease) 2013    COPD (chronic obstructive pulmonary disease) with emphysema 2013    Degeneration of cervical intervertebral disc 2013    Cervical spondylosis with myelopathy 10/17/2012    Brachial neuritis or radiculitis NOS 10/17/2012    HTN (hypertension) 2012    Aortic atherosclerosis 2011     Seen on chest x-ray dated 11             Right Arm BP - Sittin/92        LABS    LAST HbA1c  Lab Results   Component Value Date    HGBA1C 6.1 (H) 2018       Lipid panel  Lab Results   Component Value Date    CHOL 118 (L) 2018    CHOL 114 (L) 2017    CHOL 142 2016     Lab Results   Component Value Date    HDL 39 (L) 2018    HDL 33 (L) 2017    HDL 46 2016     Lab Results   Component Value Date    LDLCALC 64.6 2018    LDLCALC 50.0 (L) 2017    LDLCALC 84.8 2016     Lab Results   Component Value Date    TRIG 72 2018    TRIG 155 (H) 2017    TRIG 56 2016     Lab Results   Component Value Date    CHOLHDL 33.1 2018    CHOLHDL 28.9 2017    CHOLHDL 32.4 2016            Review of  Systems   Constitution: Negative for diaphoresis, weakness, night sweats, weight gain and weight loss.   HENT: Negative for congestion.    Eyes: Negative for blurred vision, discharge and double vision.   Cardiovascular: Negative for chest pain, claudication, cyanosis, dyspnea on exertion, irregular heartbeat, leg swelling, near-syncope, orthopnea, palpitations, paroxysmal nocturnal dyspnea and syncope.        R arm pain   Respiratory: Negative for cough, shortness of breath and wheezing.    Endocrine: Negative for cold intolerance, heat intolerance and polyphagia.   Hematologic/Lymphatic: Negative for adenopathy and bleeding problem. Does not bruise/bleed easily.   Skin: Negative for dry skin and nail changes.   Musculoskeletal: Negative for arthritis, back pain, falls, joint pain, myalgias and neck pain.        R arm pain and numbness     Gastrointestinal: Negative for bloating, abdominal pain, change in bowel habit and constipation.   Genitourinary: Negative for bladder incontinence, dysuria, flank pain, genital sores and missed menses.   Neurological: Negative for aphonia, brief paralysis, difficulty with concentration and dizziness.   Psychiatric/Behavioral: Negative for altered mental status and memory loss. The patient does not have insomnia.    Allergic/Immunologic: Negative for environmental allergies.       Objective:   Physical Exam   Constitutional: He is oriented to person, place, and time. He appears well-developed and well-nourished. He is not intubated.   HENT:   Head: Normocephalic and atraumatic.   Right Ear: External ear normal.   Left Ear: External ear normal.   Mouth/Throat: Oropharynx is clear and moist.   Eyes: Conjunctivae and EOM are normal. Pupils are equal, round, and reactive to light. Right eye exhibits no discharge. Left eye exhibits no discharge. No scleral icterus.   Neck: Normal range of motion. Neck supple. Normal carotid pulses, no hepatojugular reflux and no JVD present. Carotid  bruit is not present. No tracheal deviation present. No thyromegaly present.   Cardiovascular: Normal rate, regular rhythm, S1 normal and S2 normal.   No extrasystoles are present. PMI is not displaced.  Exam reveals no gallop, no S3, no distant heart sounds, no friction rub and no midsystolic click.    No murmur heard.  Pulses:       Carotid pulses are 2+ on the right side, and 2+ on the left side.       Radial pulses are 1+ on the right side, and 1+ on the left side.        Femoral pulses are 2+ on the right side, and 2+ on the left side.       Popliteal pulses are 2+ on the right side, and 2+ on the left side.        Dorsalis pedis pulses are 2+ on the right side, and 2+ on the left side.        Posterior tibial pulses are 2+ on the right side, and 2+ on the left side.   Pulmonary/Chest: Effort normal and breath sounds normal. No accessory muscle usage or stridor. No apnea, no tachypnea and no bradypnea. He is not intubated. No respiratory distress. He has no decreased breath sounds. He has no wheezes. He has no rales. He exhibits no tenderness and no bony tenderness.   Abdominal: He exhibits no distension, no pulsatile liver, no abdominal bruit, no ascites, no pulsatile midline mass and no mass. There is no tenderness. There is no rebound and no guarding.   Musculoskeletal: Normal range of motion. He exhibits no edema or tenderness.   R arm, elbow, and shoulder pain with palpation   Lymphadenopathy:     He has no cervical adenopathy.   Neurological: He is alert and oriented to person, place, and time. He has normal reflexes. No cranial nerve deficit. Coordination normal.   Numbness on R forearm     Skin: Skin is warm. No rash noted. No erythema. No pallor.   Psychiatric: He has a normal mood and affect. His behavior is normal. Judgment and thought content normal.       Assessment:     No diagnosis found.      Clinically his presentation is not consistent with a vascular problem   His R arm is warm   He has equal  radial and capillary reflexes   Cervical CT with multiple abnormalities that can cause neuropathic pain       Arterial ultrasound revealed patent UE velocities with mild difference, L >> R   No obstructive arterial disease to cause rest pain   He has no symptoms with wrist exertion   Wrist brachial index in 12/2016 was normal even with severe rest pain      Even with a hot cup on his wrist he has no sensation        Plan:               Continue with current medical plan and lifestyle changes.  Return sooner for concerns or questions. If symptoms persist go to the ED  I have reviewed all pertinent data on this patient         Follow up as scheduled.   Return sooner for concerns or questions  Continue cardiovascular care with primary cardiologist  Further recommendations to follow            He expressed verbal understanding and agreed with the plan          Follow up as scheduled. Return sooner for concerns or questions  Follow up with PMR and pain management              Patient's Medications   New Prescriptions    No medications on file   Previous Medications    ACCU-CHEK SOFTCLIX LANCETS MISC        ASPIRIN (ECOTRIN) 81 MG EC TABLET    Take 81 mg by mouth once daily.    BACLOFEN (LIORESAL) 10 MG TABLET    Take 1 tablet (10 mg total) by mouth 4 (four) times daily.    DICLOFENAC SODIUM 1 % GEL    Apply 2 g topically once daily.    GABAPENTIN (NEURONTIN) 600 MG TABLET    Take 1 tablet (600 mg total) by mouth 4 (four) times daily with meals and nightly.    HYDROCHLOROTHIAZIDE (MICROZIDE) 12.5 MG CAPSULE    Take 1 capsule (12.5 mg total) by mouth once daily.    HYDROCODONE-ACETAMINOPHEN 10-325MG (NORCO)  MG TAB    Take 1 tablet by mouth every 6 (six) hours as needed.    KETOCONAZOLE (NIZORAL) 2 % CREAM    Apply topically 2 (two) times daily.    LANCETS MISC    1 lancet by Misc.(Non-Drug; Combo Route) route once daily at 6am.    LISINOPRIL (PRINIVIL,ZESTRIL) 2.5 MG TABLET    TAKE 1 TABLET EVERY DAY    METOPROLOL  SUCCINATE (TOPROL-XL) 25 MG 24 HR TABLET    TAKE 1 TABLET (25 MG TOTAL) BY MOUTH ONCE DAILY.    OXYBUTYNIN (DITROPAN) 5 MG TAB    TAKE 1 TABLET THREE TIMES DAILY.    PANTOPRAZOLE (PROTONIX) 40 MG TABLET    TAKE ONE TABLET BY MOUTH ONCE DAILY BEFORE  BREAKFAST    PRAVASTATIN (PRAVACHOL) 10 MG TABLET    TAKE ONE TABLET BY MOUTH AT BEDTIME    TADALAFIL (CIALIS) 10 MG TABLET    Take 1 tablet (10 mg total) by mouth daily as needed for Erectile Dysfunction.    TAMSULOSIN (FLOMAX) 0.4 MG CP24    TAKE 2 CAPSULES (0.8 MG TOTAL) BY MOUTH EVERY EVENING.    TRUE METRIX AIR GLUCOSE METER KIT    USE AS INSTRUCTED    TRUE METRIX GLUCOSE TEST STRIP STRP    TEST  ONE  TIME  DAILY  AT  6AM    TRUEPLUS LANCETS 33 GAUGE MISC    TEST ONE TIME DAILY  AT  6AM   Modified Medications    No medications on file   Discontinued Medications    No medications on file

## 2018-06-14 DIAGNOSIS — K29.50 CHRONIC GASTRITIS, PRESENCE OF BLEEDING UNSPECIFIED, UNSPECIFIED GASTRITIS TYPE: ICD-10-CM

## 2018-06-14 RX ORDER — PANTOPRAZOLE SODIUM 40 MG/1
TABLET, DELAYED RELEASE ORAL
Qty: 30 TABLET | Refills: 3 | Status: SHIPPED | OUTPATIENT
Start: 2018-06-14 | End: 2018-10-22 | Stop reason: SDUPTHER

## 2018-06-18 DIAGNOSIS — M54.12 BRACHIAL NEURITIS OR RADICULITIS: ICD-10-CM

## 2018-06-18 DIAGNOSIS — M79.601 RIGHT ARM PAIN: ICD-10-CM

## 2018-06-18 DIAGNOSIS — G90.50 COMPLEX REGIONAL PAIN SYNDROME TYPE 1, AFFECTING UNSPECIFIED SITE: ICD-10-CM

## 2018-06-18 DIAGNOSIS — R29.898 RIGHT HAND WEAKNESS: ICD-10-CM

## 2018-06-18 DIAGNOSIS — M50.30 DEGENERATION OF CERVICAL INTERVERTEBRAL DISC: ICD-10-CM

## 2018-06-18 DIAGNOSIS — M48.02 SPINAL STENOSIS IN CERVICAL REGION: ICD-10-CM

## 2018-06-18 DIAGNOSIS — M47.812 FACET ARTHRITIS OF CERVICAL REGION: ICD-10-CM

## 2018-06-18 DIAGNOSIS — M62.81 MUSCLE RIGHT ARM WEAKNESS: ICD-10-CM

## 2018-06-18 DIAGNOSIS — M54.12 CERVICAL RADICULOPATHY: ICD-10-CM

## 2018-06-18 DIAGNOSIS — M75.101 ROTATOR CUFF SYNDROME OF RIGHT SHOULDER: ICD-10-CM

## 2018-06-18 DIAGNOSIS — M47.12 CERVICAL SPONDYLOSIS WITH MYELOPATHY: ICD-10-CM

## 2018-06-18 DIAGNOSIS — M50.00 HNP (HERNIATED NUCLEUS PULPOSUS) WITH MYELOPATHY, CERVICAL: ICD-10-CM

## 2018-06-18 DIAGNOSIS — M96.1 CERVICAL POST-LAMINECTOMY SYNDROME: ICD-10-CM

## 2018-06-18 DIAGNOSIS — F11.20 NARCOTIC DEPENDENCY, CONTINUOUS: ICD-10-CM

## 2018-06-18 DIAGNOSIS — M54.12 CERVICAL RADICULAR PAIN: ICD-10-CM

## 2018-06-18 DIAGNOSIS — G89.4 CHRONIC PAIN SYNDROME: ICD-10-CM

## 2018-06-18 DIAGNOSIS — R29.898 RIGHT ARM WEAKNESS: ICD-10-CM

## 2018-06-18 DIAGNOSIS — M48.02 CERVICAL STENOSIS OF SPINE: ICD-10-CM

## 2018-06-18 NOTE — TELEPHONE ENCOUNTER
----- Message from Emre Recinos sent at 6/18/2018  8:30 AM CDT -----  Contact: Patient @ [phone]  Patient needs a refill on ( hydrocondone ) pls call when ready for pickup

## 2018-06-20 ENCOUNTER — TELEPHONE (OUTPATIENT)
Dept: PHYSICAL MEDICINE AND REHAB | Facility: CLINIC | Age: 68
End: 2018-06-20

## 2018-06-20 NOTE — TELEPHONE ENCOUNTER
----- Message from Emre Recinos sent at 6/20/2018  8:21 AM CDT -----  Contact: Patient @  [phone]  Patient is calling to get an update on the medication refill request, pt has been calling since last week for an update, pt states he's now out of medication ( HYDROCODONE 10MG ), patient is requesting to speak with a supervisor, pls call ASAP

## 2018-06-20 NOTE — TELEPHONE ENCOUNTER
----- Message from Palmer Melendrez sent at 6/20/2018  2:58 PM CDT -----  Rx Refill/Request     Is this a Refill or New Rx:  Refill  Rx Name and Strength:  HYDROCODONE 10MG   Preferred Pharmacy with phone number: See below  Communication Preference: [phone]  Additional Information: Pt states he's called since Monday, but script has not be filled      [facility] - [place] - [address] [facility]  [address] [facility] [address] [facility]  [place] [address]  Phone: [phone] Fax: [phone]

## 2018-06-21 NOTE — TELEPHONE ENCOUNTER
----- Message from Linda Reyes sent at 6/21/2018  1:14 PM CDT -----  Contact: self  Pt would like to get a refill on his medication.  He stated that he is completely out.  Pt uses [facility].    Pt can be reached at [phone]    hydrocodone

## 2018-06-21 NOTE — TELEPHONE ENCOUNTER
----- Message from Palmer Melendrez sent at 6/21/2018  3:48 PM CDT -----  Rx Refill/Request     Is this a Refill or New Rx:  Refill  Rx Name and Strength:  HYDROCODONE 10MG   Preferred Pharmacy with phone number: See below  Communication Preference: [phone]  Additional Information: Pt states he's called since Monday, but script has not be filled. Pt said he wants to speak w/ the doctor today.     [facility] [address] [place], [place] [id number] [address] [address]  Phone: [phone] Fax: [phone]

## 2018-06-22 RX ORDER — HYDROCODONE BITARTRATE AND ACETAMINOPHEN 10; 325 MG/1; MG/1
1 TABLET ORAL EVERY 6 HOURS PRN
Qty: 120 TABLET | Refills: 0 | Status: SHIPPED | OUTPATIENT
Start: 2018-06-22 | End: 2018-07-16 | Stop reason: SDUPTHER

## 2018-06-28 ENCOUNTER — TELEPHONE (OUTPATIENT)
Dept: UROLOGY | Facility: CLINIC | Age: 68
End: 2018-06-28

## 2018-06-28 NOTE — TELEPHONE ENCOUNTER
I asked him to keep a voiding diary for 2 to 3 days.  Please provide him with Urinal.  He will come on Monday to pick it up.

## 2018-06-28 NOTE — TELEPHONE ENCOUNTER
----- Message from Lashae Vo LPN sent at 6/27/2018  4:19 PM CDT -----  Contact: Home: [phone]   Pt knows you will call tomorrow  ----- Message -----  From: Yvrose Johnson MA  Sent: 6/27/2018   1:27 PM  To: Porter GUARDADO Staff    Needs Advice    Reason for call:   Pt wanting to let you know that he is wetting himself during the day and  At night     Communication Preference: Home: [phone]     Additional Information: He is seeing Dr Buenrostro on 7/6/18

## 2018-06-28 NOTE — TELEPHONE ENCOUNTER
----- Message from Lashae Vo LPN sent at 6/27/2018  4:19 PM CDT -----  Contact: Home: [phone]   Pt knows you will call tomorrow  ----- Message -----  From: Yvrose Johnson MA  Sent: 6/27/2018   1:27 PM  To: Porter GUARDADO Staff    Needs Advice    Reason for call:   Pt wanting to let you know that he is wetting himself during the day and  At night     Communication Preference: Home: [phone]     Additional Information: He is seeing Dr Buenrostro on 7/6/18

## 2018-06-28 NOTE — TELEPHONE ENCOUNTER
"Patient states he has tried to stop the flomax and oxybutynin , but every time he does , his symptoms return. He is currently on one flomax at night (used to be 2 ). He said for the past month he has had intermittent "bedwetting" and frequency. He said it is not every night. Last night was a good night , but the night before he woke up wet. He has an appt with you next Friday, is there another rx he can try?   "

## 2018-07-06 ENCOUNTER — OFFICE VISIT (OUTPATIENT)
Dept: UROLOGY | Facility: CLINIC | Age: 68
End: 2018-07-06
Payer: MEDICARE

## 2018-07-06 VITALS
HEART RATE: 86 BPM | BODY MASS INDEX: 26.23 KG/M2 | HEIGHT: 71 IN | SYSTOLIC BLOOD PRESSURE: 145 MMHG | DIASTOLIC BLOOD PRESSURE: 83 MMHG | WEIGHT: 187.38 LBS

## 2018-07-06 DIAGNOSIS — R32 ENURESIS: ICD-10-CM

## 2018-07-06 DIAGNOSIS — N40.1 BENIGN NON-NODULAR PROSTATIC HYPERPLASIA WITH LOWER URINARY TRACT SYMPTOMS: ICD-10-CM

## 2018-07-06 DIAGNOSIS — R33.9 INCOMPLETE BLADDER EMPTYING: ICD-10-CM

## 2018-07-06 DIAGNOSIS — Z90.79 S/P TURP: Primary | ICD-10-CM

## 2018-07-06 PROCEDURE — 99999 PR PBB SHADOW E&M-EST. PATIENT-LVL IV: CPT | Mod: PBBFAC,,, | Performed by: UROLOGY

## 2018-07-06 PROCEDURE — 3077F SYST BP >= 140 MM HG: CPT | Mod: CPTII,S$GLB,, | Performed by: UROLOGY

## 2018-07-06 PROCEDURE — 99214 OFFICE O/P EST MOD 30 MIN: CPT | Mod: S$GLB,,, | Performed by: UROLOGY

## 2018-07-06 PROCEDURE — 3079F DIAST BP 80-89 MM HG: CPT | Mod: CPTII,S$GLB,, | Performed by: UROLOGY

## 2018-07-06 RX ORDER — TAMSULOSIN HYDROCHLORIDE 0.4 MG/1
0.8 CAPSULE ORAL NIGHTLY
Qty: 180 CAPSULE | Refills: 4 | Status: SHIPPED | OUTPATIENT
Start: 2018-07-06 | End: 2019-08-16 | Stop reason: SDUPTHER

## 2018-07-06 RX ORDER — OXYBUTYNIN CHLORIDE 5 MG/1
5 TABLET ORAL 2 TIMES DAILY
Qty: 180 TABLET | Refills: 3 | Status: SHIPPED | OUTPATIENT
Start: 2018-07-06 | End: 2019-07-24 | Stop reason: SDUPTHER

## 2018-07-06 RX ORDER — GABAPENTIN 600 MG/1
TABLET ORAL
COMMUNITY
Start: 2018-05-29 | End: 2018-10-09 | Stop reason: SDUPTHER

## 2018-07-06 NOTE — PROGRESS NOTES
CC: follow up BPH, prostatitis    Xander Sanchez is here for incontinence at night when he was asleep he wakes up wet around 2x per month.    He hasn't had this for the past several days.   Nocturia x1, no dysuria or hematuria    Endorses some straining, some post void dribbling, overall happy with how he voids    He completed a voiding diary which shows: multiple small volume voids during the day usually around 100ml    He had a laser TURP on 2/1/17.  He was previosuly treated with doxycycline, oxybutynin and flomax for his dysuria, frequency and urgency, these symptoms have improved. He is still taking the flomax and oxybutynin, he previously tried to stop these but had worsening urinary symptoms   S/p laser TURP on 2/1/17.    Urodynamics before his laser TURP showed: 1. BPH with obstruction and Incomplete bladder emptying    We recommended repeat urodynamic study, but he refuses to do urodynamics again at this time.     Review of Systems    General ROS: GENERAL:  No weight gain or loss  SKIN:  No rashes or lacerations  HEAD:  No headaches  EYES:  No exophthalmos, jaundice or blindness  EARS:  No dizziness, tinnitus or hearing loss  NOSE:  No changes in smell  MOUTH & THROAT:  No dyskinetic movements or obvious goiter  CHEST:  No shortness of breath, hyperventilation or cough  CARDIOVASCULAR:  No tachycardia or chest pain  ABDOMEN:  No nausea, vomiting, pain, constipation or diarrhea  URINARY:  No frequency, dysuria or sexual dysfunction  ENDOCRINE:  No polydipsia, polyuria  MUSCULOSKELETAL:  No pain or stiffness of the joints  NEUROLOGIC:  No weakness, sensory changes, seizures, confusion, memory loss, tremor or other abnormal movements    Physical Exam     Vitals:    07/06/18 0830   BP: (!) 145/83   Pulse: 86     General Appearance:  Alert, cooperative, no distress, appears stated age   Head:  Normocephalic, without obvious abnormality, atraumatic   Eyes:  PERRL, conjunctiva/corneas clear, EOM's intact, fundi  benign, both eyes   Ears:  Normal TM's and external ear canals, both ears   Nose: Nares normal, septum midline, mucosa normal, no drainage or sinus tenderness   Throat: Lips, mucosa, and tongue normal; teeth and gums normal   Neck: Supple, symmetrical, trachea midline, no adenopathy, thyroid: not enlarged, symmetric, no tenderness/mass/nodules, no carotid bruit or JVD   Back:   Symmetric, no curvature, ROM normal, no CVA tenderness   Lungs:   Clear to auscultation bilaterally, respirations unlabored   Chest Wall:  No tenderness or deformity   Heart:  Regular rate and rhythm, S1, S2 normal, no murmur, rub or gallop   Abdomen:   Soft, non-tender, bowel sounds active all four quadrants,  no masses, no organomegaly   Genitalia:  Normal male, normal testicles, normal epididymis, normal vas palpated.   Rectal:  Normal tone, no masses or tenderness;   Extremities: Extremities normal, atraumatic, no cyanosis or edema   Pulses: 2+ and symmetric   Skin: Skin color, texture, turgor normal, no rashes or lesions   Lymph nodes: Cervical, supraclavicular, and axillary nodes normal   Neurologic: Normal       LABS:  Lab Results   Component Value Date    PSA 1.2 02/01/2016    PSA 0.66 03/28/2014    PSA 0.67 03/13/2013     Lab Results   Component Value Date    CREATININE 0.9 02/27/2018    CREATININE 0.8 11/12/2017    CREATININE 1.0 08/29/2017     No results found for: TESTOSTERONE  Urine Culture, Routine   Date Value Ref Range Status   11/12/2017 No growth  Final      ml per bladder scan ( last time he voided about 3 hours ago)    Assessment and Plan:  Diagnoses and all orders for this visit:    S/P TURP    Incomplete bladder emptying  -     tamsulosin (FLOMAX) 0.4 mg Cp24; Take 2 capsules (0.8 mg total) by mouth every evening.    Enuresis  -     oxybutynin (DITROPAN) 5 MG Tab; Take 1 tablet (5 mg total) by mouth 2 (two) times daily.    Benign non-nodular prostatic hyperplasia with lower urinary tract symptoms  -     tamsulosin  (FLOMAX) 0.4 mg Cp24; Take 2 capsules (0.8 mg total) by mouth every evening.    continue Flomax ( increase to 0.8 mg q hs) and oxybutynin ( decreased to BID from TID)  Will check his PVR again on his next visit.   Bring 3 days of voiding diary, he was also instructed to record when he has episodes of incontinence.   If still high, may consider repeating suds cysto if patient allows, or try urecholine to improve bladder contraction in this pt who already had TURP.  I spent 25 minutes with the patient of which more than half was spent in direct consultation with the patient in regards to our treatment and plan.    Follow-up:  Follow-up for , check PVR.

## 2018-07-16 DIAGNOSIS — M48.02 CERVICAL STENOSIS OF SPINE: ICD-10-CM

## 2018-07-16 DIAGNOSIS — M47.812 FACET ARTHRITIS OF CERVICAL REGION: ICD-10-CM

## 2018-07-16 DIAGNOSIS — R29.898 RIGHT ARM WEAKNESS: ICD-10-CM

## 2018-07-16 DIAGNOSIS — M50.00 HNP (HERNIATED NUCLEUS PULPOSUS) WITH MYELOPATHY, CERVICAL: ICD-10-CM

## 2018-07-16 DIAGNOSIS — G90.50 COMPLEX REGIONAL PAIN SYNDROME TYPE 1, AFFECTING UNSPECIFIED SITE: ICD-10-CM

## 2018-07-16 DIAGNOSIS — M47.12 CERVICAL SPONDYLOSIS WITH MYELOPATHY: ICD-10-CM

## 2018-07-16 DIAGNOSIS — M79.601 RIGHT ARM PAIN: ICD-10-CM

## 2018-07-16 DIAGNOSIS — R29.898 RIGHT HAND WEAKNESS: ICD-10-CM

## 2018-07-16 DIAGNOSIS — M54.12 BRACHIAL NEURITIS OR RADICULITIS: ICD-10-CM

## 2018-07-16 DIAGNOSIS — M48.02 SPINAL STENOSIS IN CERVICAL REGION: ICD-10-CM

## 2018-07-16 DIAGNOSIS — G89.4 CHRONIC PAIN SYNDROME: ICD-10-CM

## 2018-07-16 DIAGNOSIS — M50.30 DEGENERATION OF CERVICAL INTERVERTEBRAL DISC: ICD-10-CM

## 2018-07-16 DIAGNOSIS — M96.1 CERVICAL POST-LAMINECTOMY SYNDROME: ICD-10-CM

## 2018-07-16 DIAGNOSIS — M75.101 ROTATOR CUFF SYNDROME OF RIGHT SHOULDER: ICD-10-CM

## 2018-07-16 DIAGNOSIS — M62.81 MUSCLE RIGHT ARM WEAKNESS: ICD-10-CM

## 2018-07-16 DIAGNOSIS — M54.12 CERVICAL RADICULAR PAIN: ICD-10-CM

## 2018-07-16 DIAGNOSIS — F11.20 NARCOTIC DEPENDENCY, CONTINUOUS: ICD-10-CM

## 2018-07-16 DIAGNOSIS — M54.12 CERVICAL RADICULOPATHY: ICD-10-CM

## 2018-07-16 NOTE — TELEPHONE ENCOUNTER
----- Message from Aracely Garcia sent at 7/16/2018 12:51 PM CDT -----  Rx Refill/Request     Is this a Refill or New Rx:  refill  Rx Name and Strength:  HYDROcodone-acetaminophen (NORCO)  mg per tablet  Preferred Pharmacy with phone number:   [facility] [address] [place] [address] [address] [facility]  [address] [facility] [address]  Phone: [phone] Fax: [phone]    Communication Preference:phone# [phone]  Additional Information: calling to ask why his date was changed from the 17th to the 22nd , because last month he went 5 days with no medication. Please call.

## 2018-07-17 RX ORDER — HYDROCODONE BITARTRATE AND ACETAMINOPHEN 10; 325 MG/1; MG/1
1 TABLET ORAL EVERY 6 HOURS PRN
Qty: 120 TABLET | Refills: 0 | Status: SHIPPED | OUTPATIENT
Start: 2018-07-17 | End: 2018-08-15 | Stop reason: SDUPTHER

## 2018-08-13 RX ORDER — PRAVASTATIN SODIUM 10 MG/1
TABLET ORAL
Qty: 90 TABLET | Refills: 0 | Status: SHIPPED | OUTPATIENT
Start: 2018-08-13 | End: 2018-11-15 | Stop reason: SDUPTHER

## 2018-08-15 DIAGNOSIS — M54.12 CERVICAL RADICULAR PAIN: ICD-10-CM

## 2018-08-15 DIAGNOSIS — M96.1 CERVICAL POST-LAMINECTOMY SYNDROME: ICD-10-CM

## 2018-08-15 DIAGNOSIS — M47.812 FACET ARTHRITIS OF CERVICAL REGION: ICD-10-CM

## 2018-08-15 DIAGNOSIS — M50.00 HNP (HERNIATED NUCLEUS PULPOSUS) WITH MYELOPATHY, CERVICAL: ICD-10-CM

## 2018-08-15 DIAGNOSIS — M54.12 BRACHIAL NEURITIS OR RADICULITIS: ICD-10-CM

## 2018-08-15 DIAGNOSIS — G90.50 COMPLEX REGIONAL PAIN SYNDROME TYPE 1, AFFECTING UNSPECIFIED SITE: ICD-10-CM

## 2018-08-15 DIAGNOSIS — M79.601 RIGHT ARM PAIN: ICD-10-CM

## 2018-08-15 DIAGNOSIS — M50.30 DEGENERATION OF CERVICAL INTERVERTEBRAL DISC: ICD-10-CM

## 2018-08-15 DIAGNOSIS — F11.20 NARCOTIC DEPENDENCY, CONTINUOUS: ICD-10-CM

## 2018-08-15 DIAGNOSIS — M75.101 ROTATOR CUFF SYNDROME OF RIGHT SHOULDER: ICD-10-CM

## 2018-08-15 DIAGNOSIS — M54.12 CERVICAL RADICULOPATHY: ICD-10-CM

## 2018-08-15 DIAGNOSIS — M48.02 SPINAL STENOSIS IN CERVICAL REGION: ICD-10-CM

## 2018-08-15 DIAGNOSIS — M47.12 CERVICAL SPONDYLOSIS WITH MYELOPATHY: ICD-10-CM

## 2018-08-15 DIAGNOSIS — R29.898 RIGHT ARM WEAKNESS: ICD-10-CM

## 2018-08-15 DIAGNOSIS — G89.4 CHRONIC PAIN SYNDROME: ICD-10-CM

## 2018-08-15 DIAGNOSIS — M48.02 CERVICAL STENOSIS OF SPINE: ICD-10-CM

## 2018-08-15 DIAGNOSIS — M62.81 MUSCLE RIGHT ARM WEAKNESS: ICD-10-CM

## 2018-08-15 DIAGNOSIS — R29.898 RIGHT HAND WEAKNESS: ICD-10-CM

## 2018-08-15 NOTE — TELEPHONE ENCOUNTER
----- Message from Emre Recinos sent at 8/15/2018  9:17 AM CDT -----  Contact: Patient @ [phone]  Rx Refill/Request     Is this a Refill or New Rx:  Yes   Rx Name and Strength: HYDROcodone-acetaminophen (NORCO)  mg per tablet     Preferred Pharmacy with phone number:     [facility]  [place], [place] - [id number] [facility] [address]  Phone: [phone] Fax: [phone]

## 2018-08-17 RX ORDER — HYDROCODONE BITARTRATE AND ACETAMINOPHEN 10; 325 MG/1; MG/1
1 TABLET ORAL EVERY 6 HOURS PRN
Qty: 120 TABLET | Refills: 0 | Status: SHIPPED | OUTPATIENT
Start: 2018-08-17 | End: 2018-09-19 | Stop reason: SDUPTHER

## 2018-08-20 ENCOUNTER — CLINICAL SUPPORT (OUTPATIENT)
Dept: ELECTROPHYSIOLOGY | Facility: CLINIC | Age: 68
End: 2018-08-20
Attending: INTERNAL MEDICINE
Payer: MEDICARE

## 2018-08-20 DIAGNOSIS — Z95.810 IMPLANTABLE CARDIOVERTER-DEFIBRILLATOR (ICD) IN SITU: ICD-10-CM

## 2018-08-20 DIAGNOSIS — I42.8 NICM (NONISCHEMIC CARDIOMYOPATHY): ICD-10-CM

## 2018-08-20 PROCEDURE — 93296 REM INTERROG EVL PM/IDS: CPT

## 2018-08-21 PROCEDURE — 93295 DEV INTERROG REMOTE 1/2/MLT: CPT | Mod: ,,, | Performed by: INTERNAL MEDICINE

## 2018-09-10 DIAGNOSIS — E11.9 TYPE 2 DIABETES MELLITUS WITHOUT COMPLICATION: ICD-10-CM

## 2018-09-13 DIAGNOSIS — I10 ESSENTIAL HYPERTENSION: ICD-10-CM

## 2018-09-13 DIAGNOSIS — M79.89 FOOT SWELLING: ICD-10-CM

## 2018-09-13 RX ORDER — HYDROCHLOROTHIAZIDE 12.5 MG/1
12.5 CAPSULE ORAL DAILY
Qty: 90 CAPSULE | Refills: 0 | Status: SHIPPED | OUTPATIENT
Start: 2018-09-13 | End: 2019-03-06 | Stop reason: SDUPTHER

## 2018-09-13 NOTE — TELEPHONE ENCOUNTER
----- Message from Moni Shelley sent at 9/13/2018  9:08 AM CDT -----  Contact: Self [phone]  Patient is requesting a refill of the following:    HYDROcodone-acetaminophen (NORCO)  mg per tablet 120 tablet 0 8/17/2018 9/16/2018     Pharmacy Contact     Telephone Fax  [phone]    Patient would like a call once that has been sent in and may be reached at [phone].    Thank you.  LC

## 2018-09-17 ENCOUNTER — TELEPHONE (OUTPATIENT)
Dept: PHYSICAL MEDICINE AND REHAB | Facility: CLINIC | Age: 68
End: 2018-09-17

## 2018-09-17 NOTE — TELEPHONE ENCOUNTER
----- Message from Graciela Sellers sent at 9/17/2018  8:13 AM CDT -----  Contact: self  Patient is calling for a RX refill of Hydrocodone  called into [facility]  pharmacy on     PT can be reached at [phone] to let know done

## 2018-09-18 NOTE — TELEPHONE ENCOUNTER
----- Message from Emre Recinos sent at 9/18/2018  8:18 AM CDT -----  Contact: Patient @ [phone]  Patient is calling to get an update on the medication refill request for ([id number]) pt is out of medication     [facility] [place], [place] - [address] [facility]  [address] [facility] [address]  Phone: [phone] Fax: [phone]

## 2018-09-19 DIAGNOSIS — M47.12 CERVICAL SPONDYLOSIS WITH MYELOPATHY: ICD-10-CM

## 2018-09-19 DIAGNOSIS — M79.601 RIGHT ARM PAIN: ICD-10-CM

## 2018-09-19 DIAGNOSIS — M96.1 CERVICAL POST-LAMINECTOMY SYNDROME: ICD-10-CM

## 2018-09-19 DIAGNOSIS — M54.12 BRACHIAL NEURITIS OR RADICULITIS: ICD-10-CM

## 2018-09-19 DIAGNOSIS — G89.4 CHRONIC PAIN SYNDROME: ICD-10-CM

## 2018-09-19 DIAGNOSIS — M75.101 ROTATOR CUFF SYNDROME OF RIGHT SHOULDER: ICD-10-CM

## 2018-09-19 DIAGNOSIS — M54.12 CERVICAL RADICULOPATHY: ICD-10-CM

## 2018-09-19 DIAGNOSIS — F11.20 NARCOTIC DEPENDENCY, CONTINUOUS: ICD-10-CM

## 2018-09-19 DIAGNOSIS — M62.81 MUSCLE RIGHT ARM WEAKNESS: ICD-10-CM

## 2018-09-19 DIAGNOSIS — M54.12 CERVICAL RADICULAR PAIN: ICD-10-CM

## 2018-09-19 DIAGNOSIS — M48.02 SPINAL STENOSIS IN CERVICAL REGION: ICD-10-CM

## 2018-09-19 DIAGNOSIS — M50.00 HNP (HERNIATED NUCLEUS PULPOSUS) WITH MYELOPATHY, CERVICAL: ICD-10-CM

## 2018-09-19 DIAGNOSIS — R29.898 RIGHT ARM WEAKNESS: ICD-10-CM

## 2018-09-19 DIAGNOSIS — M47.812 FACET ARTHRITIS OF CERVICAL REGION: ICD-10-CM

## 2018-09-19 DIAGNOSIS — M50.30 DEGENERATION OF CERVICAL INTERVERTEBRAL DISC: ICD-10-CM

## 2018-09-19 DIAGNOSIS — G90.50 COMPLEX REGIONAL PAIN SYNDROME TYPE 1, AFFECTING UNSPECIFIED SITE: ICD-10-CM

## 2018-09-19 DIAGNOSIS — M48.02 CERVICAL STENOSIS OF SPINE: ICD-10-CM

## 2018-09-19 DIAGNOSIS — R29.898 RIGHT HAND WEAKNESS: ICD-10-CM

## 2018-09-19 NOTE — TELEPHONE ENCOUNTER
Informed the patient that his request was sent over and waiting for the Dr to send to the pharmacy

## 2018-09-19 NOTE — TELEPHONE ENCOUNTER
----- Message from Rut Barnes sent at 9/19/2018  9:03 AM CDT -----  Contact: Pt  PT called to speak to the nurse to request and Rx refill for hydroCHLOROthiazide (MICROZIDE) 12.5 mg capsule and would like the Rx sent to [facility] on [address]. Pt would like a call back today asap due to being out of his pain medication which ran out on yesterday.    Pt can be reached at [phone].    Thanks

## 2018-09-19 NOTE — TELEPHONE ENCOUNTER
----- Message from Graciela Sellers sent at 9/19/2018  1:28 PM CDT -----  Contact: self  Patient is calling for a RX refill of Hydrocodone called into [facility] pharmacy on [address].   Patient states out of medication.  Patient states arm in a lot of pain.    PT can be reached at [phone]

## 2018-09-21 ENCOUNTER — TELEPHONE (OUTPATIENT)
Dept: PHYSICAL MEDICINE AND REHAB | Facility: CLINIC | Age: 68
End: 2018-09-21

## 2018-09-21 NOTE — TELEPHONE ENCOUNTER
----- Message from Aracely Garcia sent at 9/21/2018 12:18 PM CDT -----  Contact: pt @ [phone]  Asking to get a returned call from Dr. Ruiz's office. Please call.

## 2018-09-22 RX ORDER — HYDROCODONE BITARTRATE AND ACETAMINOPHEN 10; 325 MG/1; MG/1
1 TABLET ORAL EVERY 6 HOURS PRN
Qty: 120 TABLET | Refills: 0 | Status: SHIPPED | OUTPATIENT
Start: 2018-09-22 | End: 2018-10-09 | Stop reason: SDUPTHER

## 2018-10-09 ENCOUNTER — TELEPHONE (OUTPATIENT)
Dept: ELECTROPHYSIOLOGY | Facility: CLINIC | Age: 68
End: 2018-10-09

## 2018-10-09 ENCOUNTER — OFFICE VISIT (OUTPATIENT)
Dept: PHYSICAL MEDICINE AND REHAB | Facility: CLINIC | Age: 68
End: 2018-10-09
Payer: MEDICARE

## 2018-10-09 VITALS — BODY MASS INDEX: 25.9 KG/M2 | WEIGHT: 185 LBS | HEIGHT: 71 IN

## 2018-10-09 DIAGNOSIS — F11.20 NARCOTIC DEPENDENCY, CONTINUOUS: Primary | ICD-10-CM

## 2018-10-09 DIAGNOSIS — M48.02 CERVICAL STENOSIS OF SPINE: ICD-10-CM

## 2018-10-09 DIAGNOSIS — R29.898 RIGHT HAND WEAKNESS: ICD-10-CM

## 2018-10-09 DIAGNOSIS — G89.4 CHRONIC PAIN SYNDROME: ICD-10-CM

## 2018-10-09 DIAGNOSIS — M75.101 ROTATOR CUFF SYNDROME OF RIGHT SHOULDER: ICD-10-CM

## 2018-10-09 DIAGNOSIS — M62.81 MUSCLE RIGHT ARM WEAKNESS: ICD-10-CM

## 2018-10-09 DIAGNOSIS — M47.812 FACET ARTHRITIS OF CERVICAL REGION: ICD-10-CM

## 2018-10-09 DIAGNOSIS — G90.50 COMPLEX REGIONAL PAIN SYNDROME TYPE 1, AFFECTING UNSPECIFIED SITE: ICD-10-CM

## 2018-10-09 DIAGNOSIS — R29.898 RIGHT ARM WEAKNESS: ICD-10-CM

## 2018-10-09 DIAGNOSIS — M50.30 DEGENERATION OF CERVICAL INTERVERTEBRAL DISC: ICD-10-CM

## 2018-10-09 DIAGNOSIS — M50.00 HNP (HERNIATED NUCLEUS PULPOSUS) WITH MYELOPATHY, CERVICAL: ICD-10-CM

## 2018-10-09 DIAGNOSIS — G56.41 COMPLEX REGIONAL PAIN SYNDROME TYPE 2 OF RIGHT UPPER EXTREMITY: ICD-10-CM

## 2018-10-09 DIAGNOSIS — M54.12 CERVICAL RADICULOPATHY: ICD-10-CM

## 2018-10-09 DIAGNOSIS — M54.12 CERVICAL RADICULAR PAIN: ICD-10-CM

## 2018-10-09 DIAGNOSIS — M47.12 CERVICAL SPONDYLOSIS WITH MYELOPATHY: ICD-10-CM

## 2018-10-09 DIAGNOSIS — M48.02 SPINAL STENOSIS IN CERVICAL REGION: ICD-10-CM

## 2018-10-09 DIAGNOSIS — M54.12 BRACHIAL NEURITIS OR RADICULITIS: ICD-10-CM

## 2018-10-09 DIAGNOSIS — M96.1 CERVICAL POST-LAMINECTOMY SYNDROME: ICD-10-CM

## 2018-10-09 DIAGNOSIS — M79.601 RIGHT ARM PAIN: ICD-10-CM

## 2018-10-09 DIAGNOSIS — R26.9 GAIT ABNORMALITY: ICD-10-CM

## 2018-10-09 PROCEDURE — 1101F PT FALLS ASSESS-DOCD LE1/YR: CPT | Mod: CPTII,,, | Performed by: PHYSICAL MEDICINE & REHABILITATION

## 2018-10-09 PROCEDURE — 99213 OFFICE O/P EST LOW 20 MIN: CPT | Mod: PBBFAC | Performed by: PHYSICAL MEDICINE & REHABILITATION

## 2018-10-09 PROCEDURE — 99999 PR PBB SHADOW E&M-EST. PATIENT-LVL III: CPT | Mod: PBBFAC,,, | Performed by: PHYSICAL MEDICINE & REHABILITATION

## 2018-10-09 PROCEDURE — 99214 OFFICE O/P EST MOD 30 MIN: CPT | Mod: S$PBB,,, | Performed by: PHYSICAL MEDICINE & REHABILITATION

## 2018-10-09 PROCEDURE — 99499 UNLISTED E&M SERVICE: CPT | Mod: S$GLB,,, | Performed by: PHYSICAL MEDICINE & REHABILITATION

## 2018-10-09 RX ORDER — HYDROCODONE BITARTRATE AND ACETAMINOPHEN 10; 325 MG/1; MG/1
1 TABLET ORAL EVERY 6 HOURS PRN
Qty: 120 TABLET | Refills: 0 | Status: SHIPPED | OUTPATIENT
Start: 2018-10-22 | End: 2018-10-09 | Stop reason: SDUPTHER

## 2018-10-09 RX ORDER — HYDROCODONE BITARTRATE AND ACETAMINOPHEN 10; 325 MG/1; MG/1
1 TABLET ORAL EVERY 6 HOURS PRN
Qty: 120 TABLET | Refills: 0 | Status: SHIPPED | OUTPATIENT
Start: 2018-12-22 | End: 2019-01-21 | Stop reason: SDUPTHER

## 2018-10-09 RX ORDER — HYDROCODONE BITARTRATE AND ACETAMINOPHEN 10; 325 MG/1; MG/1
1 TABLET ORAL EVERY 6 HOURS PRN
Qty: 120 TABLET | Refills: 0 | Status: SHIPPED | OUTPATIENT
Start: 2018-11-22 | End: 2018-10-09 | Stop reason: SDUPTHER

## 2018-10-09 RX ORDER — BACLOFEN 20 MG/1
20 TABLET ORAL 3 TIMES DAILY
Qty: 270 TABLET | Refills: 2 | Status: SHIPPED | OUTPATIENT
Start: 2018-10-09 | End: 2019-06-18 | Stop reason: SDUPTHER

## 2018-10-09 RX ORDER — GABAPENTIN 600 MG/1
600 TABLET ORAL
Qty: 360 TABLET | Refills: 2 | Status: SHIPPED | OUTPATIENT
Start: 2018-10-09 | End: 2019-11-15 | Stop reason: SDUPTHER

## 2018-10-09 NOTE — TELEPHONE ENCOUNTER
10/9/18 Contacted patient in regards to getting home monitor reconnected and sending a manual transmission due to battery advisory.

## 2018-10-09 NOTE — PROGRESS NOTES
Subjective:       Patient ID: Xander Sanchez is a 68 y.o. male.    Chief Complaint: Neck Pain; Extremity Weakness; and spasticity    Extremity Weakness    Numbness: Right arm paina nd numbness.   Arm Pain    Pertinent negatives include no chest pain. Numbness: Right arm paina nd numbness.   Neck Pain    Pertinent negatives include no chest pain or headaches. Numbness: Right arm paina nd numbness. Weakness: right arm weak.      Mr Sanchez is Right handed male, who returns to clinic for right arm pain, and weakness that worsened after second neck surgery with Dr. Meyer.   Third surgery with Dr. De Dios did not changed his symptoms.   He states that all symptoms stayed the same as before surgery.   He reports some improvement in pain, but the tightness, cold sensation in right arm is same as before.  Mr. Sanchez returns to clinic for chronic pain management.  Last clinic visit was on  3/15/18    Current right arm pain is 5, average pain is 4-5/10, the worst pain is 7/10.   He has not that much of neck pain, current neck pain is 1, worst pain maybe 2.   He complains mainly about tightness, pressure in right arm, in right arm , more in forearm, than above elbow, tightness  runs down the arm to right thumb.   Right arm pain is constant, day and night, intensity changes, pain is worse during day time, does not awake him at night.   In morning feels tight, stiff, as pressure around the arm, also feels swollen and tight in hand, and feels cold at all time.   It hurts more bellow \the elbow than above elbow.   He reports that he does not have feelings, cannot write, does not know if things are going to drop out of his hand.   Sometimes squeezes too hard plastic cup.   Right hand is clumsy, getting smaller, hardly can dress, cannot button shirt.   He states that he is not able to dress. He states that before surgery he has had similar problems, but they just got worse after second surgery.   It is swelling feeling, and tightness  that borders him, not that much pain.   Sometimes right arm feels cold, and cold weather affect him more,  Pain is better controled with Hydrocodone.   He went for CRESCENCIO, once before surgery and few time after surgery, total  > 3   Takes Hydrocodone 10/325 mg PO TID, that brings his pain down to tolerable 2.   He is now taking Neurontin 600 mg po QID, total 2400 mg /day, and tolerates it well.   But, Neurontin does not help much, in his opinion.   Hydrocodone helps and brings pain down to 2, and gives him ~ 5 hrs pain relief.  Also, takes Baclofen 10 mg po TID , for tightness in right arm, that helps also.  Now, awaiting procedure, pain stimulator, that is a little complicated since he has AICD.  Here for follow up, re evaluation and chronic pain management with opiates.    Past Medical History:   Diagnosis Date    Asthma     Cardiomyopathy     Cervical radicular pain     Cervical spondylosis with myelopathy 10/17/2012    Chronic bronchitis     Chronic systolic dysfunction of left ventricle 7/27/2015    Constipation 7/27/2015    COPD (chronic obstructive pulmonary disease)     CRPS (complex regional pain syndrome type I) 12/29/2014    Diabetes mellitus, type 2     DM type 2 (diabetes mellitus, type 2) 7/27/2015    Enlarged prostate 7/27/2015    Enuresis 7/6/2018    Hypertension     ICD (implantable cardiac defibrillator) in place     Mixed hyperlipidemia 2/28/2018    Presence of biventricular AICD 7/27/2015    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thy 2/1/2016    Urinary tract infection     pt does not know       Past Surgical History:   Procedure Laterality Date    BLOCK SELECTIVE NERVE ROOT TRANSFORAMINAL LUMBAR Right 10/9/2014    Performed [facility] PAIN MGT    CARDIAC DEFIBRILLATOR PLACEMENT      CERVICAL SPINE SURGERY      COLONOSCOPY N/A 7/19/2017    Procedure: COLONOSCOPY  golytely;  Surgeon: Vannessa Uribe MD;  Location: [facility];  Service: Endoscopy;   Laterality: N/A;    COLONOSCOPY  golytely N/A 7/19/2017    Per[facility] at [facility] ENDO    DISCECTOMY, SPINE, CERVICAL, ANTERIOR APPROACH, WITH FUSION N/A 4/10/2014    Performed by Magdaleno Meyer MD at [facility] OR 2ND FLR    EGD (ESOPHAGOGASTRODUODENOSCOPY) N/A 8/16/2013    Performed by [facility] at [facility] ENDO    EGD (ESOPHAGOGASTRODUODENOSCOPY) N/A 6/19/2013    Performed by [facility]ribe [facility] at [facility] ENDO    EXTRACTION-LEAD N/A 3/24/2015    Performed by Braeden Park MD at [facility] CATH LAB    Formainotomy-CERVICAL/FUSION-POSTERIOR N/A 8/10/2015    Performed by David De Dios MD at [facility] OR 2ND FLR    INSERTION-ICD-BIVENTRICULAR N/A 3/24/2015    Performed by Braeden Park MD at [facility] CATH LAB    MYELOGRAM N/A 8/15/2014    [facility] at [facility] OR 2ND FLR    MYELOGRAM N/A 12/6/2013    Performed by [facility] at [facility] OR 2ND FLR    PROSTATECTOMY-TRANSURETHRAL W QUANTA LASER N/A 2/1/2017    Performed by Graham Buenrostro MD at [facility] OR 1ST FLR    SPINE SURGERY         Family History   Problem Relation Age of Onset    Hypertension Mother     Hypertension Father     COPD Father     No Known Problems Sister     No Known Problems Brother     No Known Problems Daughter     Prostate cancer Neg Hx     Kidney disease Neg Hx        Social History     Socioeconomic History    Marital status:      Spouse name: Not on file    Number of children: Not on file    Years of education: Not on file    Highest education level: Not on file   Social Needs    Financial resource strain: Not on file    Food insecurity - worry: Not on file    Food insecurity - inability: Not on file    Transportation needs - medical: Not on file    Transportation needs - non-medical: Not on file   Occupational History    Not on file   Tobacco Use    Smoking status: Former Smoker     Packs/day: 1.00     Years: 40.00     Pack years: 40.00      Types: Cigarettes     Last attempt to quit: 2009     Years since quittin.2    Smokeless tobacco: Never Used   Substance and Sexual Activity    Alcohol use: Yes     Alcohol/week: 4.2 oz     Types: 6 Cans of beer, 1 Shots of liquor per week    Drug use: No    Sexual activity: Yes     Partners: Female   Other Topics Concern    Not on file   Social History Narrative    Not on file       Current Outpatient Medications   Medication Sig Dispense Refill    ACCU-CHEK SOFTCLIX LANCETS Misc       aspirin (ECOTRIN) 81 MG EC tablet Take 81 mg by mouth once daily.      diclofenac sodium 1 % Gel Apply 2 g topically once daily. 100 g 0    gabapentin (NEURONTIN) 600 MG tablet Take 1 tablet (600 mg total) by mouth 4 (four) times daily with meals and nightly. 360 tablet 2    hydroCHLOROthiazide (MICROZIDE) 12.5 mg capsule Take 1 capsule (12.5 mg total) by mouth once daily. 90 capsule 0    [START ON 2018] HYDROcodone-acetaminophen (NORCO)  mg per tablet Take 1 tablet by mouth every 6 (six) hours as needed. 120 tablet 0    ketoconazole (NIZORAL) 2 % cream Apply topically 2 (two) times daily. 30 g 0    lancets Misc 1 lancet by Misc.(Non-Drug; Combo Route) route once daily at 6am. 100 each 3    lisinopril (PRINIVIL,ZESTRIL) 2.5 MG tablet TAKE 1 TABLET EVERY DAY 90 tablet 3    metoprolol succinate (TOPROL-XL) 25 MG 24 hr tablet TAKE 1 TABLET (25 MG TOTAL) BY MOUTH ONCE DAILY. 90 tablet 3    oxybutynin (DITROPAN) 5 MG Tab Take 1 tablet (5 mg total) by mouth 2 (two) times daily. 180 tablet 3    pantoprazole (PROTONIX) 40 MG tablet TAKE ONE TABLET BY MOUTH ONCE DAILY BEFORE BREAKFAST 30 tablet 3    pravastatin (PRAVACHOL) 10 MG tablet TAKE 1 TABLET BY MOUTH ONCE DAILY AT BEDTIME 90 tablet 0    tadalafil (CIALIS) 10 MG tablet Take 1 tablet (10 mg total) by mouth daily as needed for Erectile Dysfunction. 2 tablet 0    tamsulosin (FLOMAX) 0.4 mg Cp24 Take 2 capsules (0.8 mg total) by mouth every  evening. 180 capsule 4    TRUE METRIX AIR GLUCOSE METER kit USE AS INSTRUCTED 1 each 0    TRUE METRIX GLUCOSE TEST STRIP Strp TEST  ONE  TIME  DAILY  AT  6AM 100 strip 3    TRUEPLUS LANCETS 33 gauge Misc TEST ONE TIME DAILY  AT  6AM 100 each 3    baclofen (LIORESAL) 20 MG tablet Take 1 tablet (20 mg total) by mouth 3 (three) times daily. 270 tablet 2     No current facility-administered medications for this visit.        Review of patient's allergies indicates:  No Known Allergies  Review of Systems   Constitutional: Negative for appetite change and fatigue.   Eyes: Negative for visual disturbance.   Respiratory: Negative for shortness of breath.    Cardiovascular: Negative for chest pain.   Gastrointestinal: Negative for constipation and diarrhea.   Genitourinary: Negative for dysuria, frequency and urgency.   Musculoskeletal: Positive for extremity weakness and neck pain. Negative for arthralgias, back pain, gait problem, joint swelling, myalgias and neck stiffness.   Neurological: Negative for dizziness, tremors and headaches. Weakness: right arm weak. Numbness: Right arm paina nd numbness.   Psychiatric/Behavioral: Negative for dysphoric mood.   All other systems reviewed and are negative.          Objective:      Physical Exam    GENERAL: The patient is alert, oriented, pleasant.   MUSCULOSKELETAL:   Gait: on wide basis, COG moved forward.   GENERAL: The patient is alert, oriented, pleasant.   HEENT; PERRLA  NECK: supple, minimaly webbed neck.   Cervical spine :  Minimally decreased AROM in cervical spine, flexion to 60, extension to 0,,   side bending and rotation to 20-25 degrees with pain.  + mild-moderate paravertebral cervical musculature tenderness on Right.  + mild- moderate tenderness in upper trapezius muscles on Right.   Lumbar spine, full range of motion in all planes, flexion to 90 degrees , ext. 0.  Side bending and rotation to 25--30 degrees.   Straight leg raising Negative bilaterally.   Full  range of motion in all joints x4 extremities.   Muscle strength 5/5 throughout x4 extremities.   No joint laxity throughout x4 extremities.   NEUROLOGIC: Cranial nerves II through XII intact.   Deep tendon reflexes is normal, +2 in the upper and lower extremities bilaterally.   Muscle tone is normal.   Sensory is intact to light touch and pinprick throughout x4 extremities.   Skin: no hypersensitivity, alodynia, no somatosensory changes, other than cold skin in right arm, no atrophic skin changes.        CT of Cervical spine showed:  Stable remote operative change right C3, C4 and C6 hemilaminectomies with metallic laminal plasty.  Operative change with C5/C6 anterior spinal fusion no evidence for hardware failure. with interval reduction of protruding disk at C5/C6.  continued truncation of the cervical spinal cord most pronounced at the C6 level with mild/moderate small caliber of the cord   concerning for myelomalacia stable.  Superimposed multilevel degenerative change most pronounced at C6/C7 with bulging disk resulting in mild central canal stenosis.   Please note there is additional degenerative change with mild neural foraminal stenosis C3/C4 and C4/C5 levels.    Assessment:          1. Narcotic dependency, continuous    2. Cervical spondylosis with myelopathy    3. Chronic pain syndrome    4. Complex regional pain syndrome type 1, affecting unspecified site    5. Complex regional pain syndrome type 2 of right upper extremity    6. HNP (herniated nucleus pulposus) with myelopathy, cervical    7. Degeneration of cervical intervertebral disc    8. Gait abnormality    9. Muscle right arm weakness    10. Cervical post-laminectomy syndrome    11. Facet arthritis of cervical region    12. Rotator cuff syndrome of right shoulder    13. Right hand weakness    14. Cervical stenosis of spine    15. Brachial neuritis or radiculitis    16. Right arm weakness    17. Right arm pain    18. Spinal stenosis in cervical  region    19. Cervical radicular pain    20. Cervical radiculopathy      Plan:       Narcotic dependency, continuous  -     baclofen (LIORESAL) 20 MG tablet; Take 1 tablet (20 mg total) by mouth 3 (three) times daily.  Dispense: 270 tablet; Refill: 2  -     gabapentin (NEURONTIN) 600 MG tablet; Take 1 tablet (600 mg total) by mouth 4 (four) times daily with meals and nightly.  Dispense: 360 tablet; Refill: 2  -     Discontinue: HYDROcodone-acetaminophen (NORCO)  mg per tablet; Take 1 tablet by mouth every 6 (six) hours as needed.  Dispense: 120 tablet; Refill: 0  -     Discontinue: HYDROcodone-acetaminophen (NORCO)  mg per tablet; Take 1 tablet by mouth every 6 (six) hours as needed.  Dispense: 120 tablet; Refill: 0  -     Discontinue: HYDROcodone-acetaminophen (NORCO)  mg per tablet; Take 1 tablet by mouth every 6 (six) hours as needed.  Dispense: 120 tablet; Refill: 0  -     HYDROcodone-acetaminophen (NORCO)  mg per tablet; Take 1 tablet by mouth every 6 (six) hours as needed.  Dispense: 120 tablet; Refill: 0  -     Pain Clinic Drug Screen; Future    Cervical spondylosis with myelopathy  -     baclofen (LIORESAL) 20 MG tablet; Take 1 tablet (20 mg total) by mouth 3 (three) times daily.  Dispense: 270 tablet; Refill: 2  -     gabapentin (NEURONTIN) 600 MG tablet; Take 1 tablet (600 mg total) by mouth 4 (four) times daily with meals and nightly.  Dispense: 360 tablet; Refill: 2  -     Discontinue: HYDROcodone-acetaminophen (NORCO)  mg per tablet; Take 1 tablet by mouth every 6 (six) hours as needed.  Dispense: 120 tablet; Refill: 0  -     Discontinue: HYDROcodone-acetaminophen (NORCO)  mg per tablet; Take 1 tablet by mouth every 6 (six) hours as needed.  Dispense: 120 tablet; Refill: 0  -     Discontinue: HYDROcodone-acetaminophen (NORCO)  mg per tablet; Take 1 tablet by mouth every 6 (six) hours as needed.  Dispense: 120 tablet; Refill: 0  -     HYDROcodone-acetaminophen  (NORCO)  mg per tablet; Take 1 tablet by mouth every 6 (six) hours as needed.  Dispense: 120 tablet; Refill: 0  -     Pain Clinic Drug Screen; Future    Chronic pain syndrome  -     baclofen (LIORESAL) 20 MG tablet; Take 1 tablet (20 mg total) by mouth 3 (three) times daily.  Dispense: 270 tablet; Refill: 2  -     gabapentin (NEURONTIN) 600 MG tablet; Take 1 tablet (600 mg total) by mouth 4 (four) times daily with meals and nightly.  Dispense: 360 tablet; Refill: 2  -     Discontinue: HYDROcodone-acetaminophen (NORCO)  mg per tablet; Take 1 tablet by mouth every 6 (six) hours as needed.  Dispense: 120 tablet; Refill: 0  -     Discontinue: HYDROcodone-acetaminophen (NORCO)  mg per tablet; Take 1 tablet by mouth every 6 (six) hours as needed.  Dispense: 120 tablet; Refill: 0  -     Discontinue: HYDROcodone-acetaminophen (NORCO)  mg per tablet; Take 1 tablet by mouth every 6 (six) hours as needed.  Dispense: 120 tablet; Refill: 0  -     HYDROcodone-acetaminophen (NORCO)  mg per tablet; Take 1 tablet by mouth every 6 (six) hours as needed.  Dispense: 120 tablet; Refill: 0  -     Pain Clinic Drug Screen; Future    Complex regional pain syndrome type 1, affecting unspecified site  -     baclofen (LIORESAL) 20 MG tablet; Take 1 tablet (20 mg total) by mouth 3 (three) times daily.  Dispense: 270 tablet; Refill: 2  -     gabapentin (NEURONTIN) 600 MG tablet; Take 1 tablet (600 mg total) by mouth 4 (four) times daily with meals and nightly.  Dispense: 360 tablet; Refill: 2  -     Discontinue: HYDROcodone-acetaminophen (NORCO)  mg per tablet; Take 1 tablet by mouth every 6 (six) hours as needed.  Dispense: 120 tablet; Refill: 0  -     Discontinue: HYDROcodone-acetaminophen (NORCO)  mg per tablet; Take 1 tablet by mouth every 6 (six) hours as needed.  Dispense: 120 tablet; Refill: 0  -     Discontinue: HYDROcodone-acetaminophen (NORCO)  mg per tablet; Take 1 tablet by mouth every  6 (six) hours as needed.  Dispense: 120 tablet; Refill: 0  -     HYDROcodone-acetaminophen (NORCO)  mg per tablet; Take 1 tablet by mouth every 6 (six) hours as needed.  Dispense: 120 tablet; Refill: 0  -     Pain Clinic Drug Screen; Future    Complex regional pain syndrome type 2 of right upper extremity  -     gabapentin (NEURONTIN) 600 MG tablet; Take 1 tablet (600 mg total) by mouth 4 (four) times daily with meals and nightly.  Dispense: 360 tablet; Refill: 2  -     Pain Clinic Drug Screen; Future    HNP (herniated nucleus pulposus) with myelopathy, cervical  -     baclofen (LIORESAL) 20 MG tablet; Take 1 tablet (20 mg total) by mouth 3 (three) times daily.  Dispense: 270 tablet; Refill: 2  -     gabapentin (NEURONTIN) 600 MG tablet; Take 1 tablet (600 mg total) by mouth 4 (four) times daily with meals and nightly.  Dispense: 360 tablet; Refill: 2  -     Discontinue: HYDROcodone-acetaminophen (NORCO)  mg per tablet; Take 1 tablet by mouth every 6 (six) hours as needed.  Dispense: 120 tablet; Refill: 0  -     Discontinue: HYDROcodone-acetaminophen (NORCO)  mg per tablet; Take 1 tablet by mouth every 6 (six) hours as needed.  Dispense: 120 tablet; Refill: 0  -     Discontinue: HYDROcodone-acetaminophen (NORCO)  mg per tablet; Take 1 tablet by mouth every 6 (six) hours as needed.  Dispense: 120 tablet; Refill: 0  -     HYDROcodone-acetaminophen (NORCO)  mg per tablet; Take 1 tablet by mouth every 6 (six) hours as needed.  Dispense: 120 tablet; Refill: 0    Degeneration of cervical intervertebral disc  -     baclofen (LIORESAL) 20 MG tablet; Take 1 tablet (20 mg total) by mouth 3 (three) times daily.  Dispense: 270 tablet; Refill: 2  -     gabapentin (NEURONTIN) 600 MG tablet; Take 1 tablet (600 mg total) by mouth 4 (four) times daily with meals and nightly.  Dispense: 360 tablet; Refill: 2  -     Discontinue: HYDROcodone-acetaminophen (NORCO)  mg per tablet; Take 1 tablet by  mouth every 6 (six) hours as needed.  Dispense: 120 tablet; Refill: 0  -     Discontinue: HYDROcodone-acetaminophen (NORCO)  mg per tablet; Take 1 tablet by mouth every 6 (six) hours as needed.  Dispense: 120 tablet; Refill: 0  -     Discontinue: HYDROcodone-acetaminophen (NORCO)  mg per tablet; Take 1 tablet by mouth every 6 (six) hours as needed.  Dispense: 120 tablet; Refill: 0  -     HYDROcodone-acetaminophen (NORCO)  mg per tablet; Take 1 tablet by mouth every 6 (six) hours as needed.  Dispense: 120 tablet; Refill: 0    Gait abnormality  -     gabapentin (NEURONTIN) 600 MG tablet; Take 1 tablet (600 mg total) by mouth 4 (four) times daily with meals and nightly.  Dispense: 360 tablet; Refill: 2    Muscle right arm weakness  -     baclofen (LIORESAL) 20 MG tablet; Take 1 tablet (20 mg total) by mouth 3 (three) times daily.  Dispense: 270 tablet; Refill: 2  -     gabapentin (NEURONTIN) 600 MG tablet; Take 1 tablet (600 mg total) by mouth 4 (four) times daily with meals and nightly.  Dispense: 360 tablet; Refill: 2  -     Discontinue: HYDROcodone-acetaminophen (NORCO)  mg per tablet; Take 1 tablet by mouth every 6 (six) hours as needed.  Dispense: 120 tablet; Refill: 0  -     Discontinue: HYDROcodone-acetaminophen (NORCO)  mg per tablet; Take 1 tablet by mouth every 6 (six) hours as needed.  Dispense: 120 tablet; Refill: 0  -     Discontinue: HYDROcodone-acetaminophen (NORCO)  mg per tablet; Take 1 tablet by mouth every 6 (six) hours as needed.  Dispense: 120 tablet; Refill: 0  -     HYDROcodone-acetaminophen (NORCO)  mg per tablet; Take 1 tablet by mouth every 6 (six) hours as needed.  Dispense: 120 tablet; Refill: 0    Cervical post-laminectomy syndrome  -     baclofen (LIORESAL) 20 MG tablet; Take 1 tablet (20 mg total) by mouth 3 (three) times daily.  Dispense: 270 tablet; Refill: 2  -     gabapentin (NEURONTIN) 600 MG tablet; Take 1 tablet (600 mg total) by mouth 4  (four) times daily with meals and nightly.  Dispense: 360 tablet; Refill: 2  -     Discontinue: HYDROcodone-acetaminophen (NORCO)  mg per tablet; Take 1 tablet by mouth every 6 (six) hours as needed.  Dispense: 120 tablet; Refill: 0  -     Discontinue: HYDROcodone-acetaminophen (NORCO)  mg per tablet; Take 1 tablet by mouth every 6 (six) hours as needed.  Dispense: 120 tablet; Refill: 0  -     Discontinue: HYDROcodone-acetaminophen (NORCO)  mg per tablet; Take 1 tablet by mouth every 6 (six) hours as needed.  Dispense: 120 tablet; Refill: 0  -     HYDROcodone-acetaminophen (NORCO)  mg per tablet; Take 1 tablet by mouth every 6 (six) hours as needed.  Dispense: 120 tablet; Refill: 0  -     Pain Clinic Drug Screen; Future    Facet arthritis of cervical region  -     baclofen (LIORESAL) 20 MG tablet; Take 1 tablet (20 mg total) by mouth 3 (three) times daily.  Dispense: 270 tablet; Refill: 2  -     gabapentin (NEURONTIN) 600 MG tablet; Take 1 tablet (600 mg total) by mouth 4 (four) times daily with meals and nightly.  Dispense: 360 tablet; Refill: 2  -     Discontinue: HYDROcodone-acetaminophen (NORCO)  mg per tablet; Take 1 tablet by mouth every 6 (six) hours as needed.  Dispense: 120 tablet; Refill: 0  -     Discontinue: HYDROcodone-acetaminophen (NORCO)  mg per tablet; Take 1 tablet by mouth every 6 (six) hours as needed.  Dispense: 120 tablet; Refill: 0  -     Discontinue: HYDROcodone-acetaminophen (NORCO)  mg per tablet; Take 1 tablet by mouth every 6 (six) hours as needed.  Dispense: 120 tablet; Refill: 0  -     HYDROcodone-acetaminophen (NORCO)  mg per tablet; Take 1 tablet by mouth every 6 (six) hours as needed.  Dispense: 120 tablet; Refill: 0    Rotator cuff syndrome of right shoulder  -     baclofen (LIORESAL) 20 MG tablet; Take 1 tablet (20 mg total) by mouth 3 (three) times daily.  Dispense: 270 tablet; Refill: 2  -     gabapentin (NEURONTIN) 600 MG tablet;  Take 1 tablet (600 mg total) by mouth 4 (four) times daily with meals and nightly.  Dispense: 360 tablet; Refill: 2  -     Discontinue: HYDROcodone-acetaminophen (NORCO)  mg per tablet; Take 1 tablet by mouth every 6 (six) hours as needed.  Dispense: 120 tablet; Refill: 0  -     Discontinue: HYDROcodone-acetaminophen (NORCO)  mg per tablet; Take 1 tablet by mouth every 6 (six) hours as needed.  Dispense: 120 tablet; Refill: 0  -     Discontinue: HYDROcodone-acetaminophen (NORCO)  mg per tablet; Take 1 tablet by mouth every 6 (six) hours as needed.  Dispense: 120 tablet; Refill: 0  -     HYDROcodone-acetaminophen (NORCO)  mg per tablet; Take 1 tablet by mouth every 6 (six) hours as needed.  Dispense: 120 tablet; Refill: 0    Right hand weakness  -     baclofen (LIORESAL) 20 MG tablet; Take 1 tablet (20 mg total) by mouth 3 (three) times daily.  Dispense: 270 tablet; Refill: 2  -     gabapentin (NEURONTIN) 600 MG tablet; Take 1 tablet (600 mg total) by mouth 4 (four) times daily with meals and nightly.  Dispense: 360 tablet; Refill: 2  -     Discontinue: HYDROcodone-acetaminophen (NORCO)  mg per tablet; Take 1 tablet by mouth every 6 (six) hours as needed.  Dispense: 120 tablet; Refill: 0  -     Discontinue: HYDROcodone-acetaminophen (NORCO)  mg per tablet; Take 1 tablet by mouth every 6 (six) hours as needed.  Dispense: 120 tablet; Refill: 0  -     Discontinue: HYDROcodone-acetaminophen (NORCO)  mg per tablet; Take 1 tablet by mouth every 6 (six) hours as needed.  Dispense: 120 tablet; Refill: 0  -     HYDROcodone-acetaminophen (NORCO)  mg per tablet; Take 1 tablet by mouth every 6 (six) hours as needed.  Dispense: 120 tablet; Refill: 0    Cervical stenosis of spine  -     baclofen (LIORESAL) 20 MG tablet; Take 1 tablet (20 mg total) by mouth 3 (three) times daily.  Dispense: 270 tablet; Refill: 2  -     gabapentin (NEURONTIN) 600 MG tablet; Take 1 tablet (600 mg total)  by mouth 4 (four) times daily with meals and nightly.  Dispense: 360 tablet; Refill: 2  -     Discontinue: HYDROcodone-acetaminophen (NORCO)  mg per tablet; Take 1 tablet by mouth every 6 (six) hours as needed.  Dispense: 120 tablet; Refill: 0  -     Discontinue: HYDROcodone-acetaminophen (NORCO)  mg per tablet; Take 1 tablet by mouth every 6 (six) hours as needed.  Dispense: 120 tablet; Refill: 0  -     Discontinue: HYDROcodone-acetaminophen (NORCO)  mg per tablet; Take 1 tablet by mouth every 6 (six) hours as needed.  Dispense: 120 tablet; Refill: 0  -     HYDROcodone-acetaminophen (NORCO)  mg per tablet; Take 1 tablet by mouth every 6 (six) hours as needed.  Dispense: 120 tablet; Refill: 0    Brachial neuritis or radiculitis  -     baclofen (LIORESAL) 20 MG tablet; Take 1 tablet (20 mg total) by mouth 3 (three) times daily.  Dispense: 270 tablet; Refill: 2  -     gabapentin (NEURONTIN) 600 MG tablet; Take 1 tablet (600 mg total) by mouth 4 (four) times daily with meals and nightly.  Dispense: 360 tablet; Refill: 2  -     Discontinue: HYDROcodone-acetaminophen (NORCO)  mg per tablet; Take 1 tablet by mouth every 6 (six) hours as needed.  Dispense: 120 tablet; Refill: 0  -     Discontinue: HYDROcodone-acetaminophen (NORCO)  mg per tablet; Take 1 tablet by mouth every 6 (six) hours as needed.  Dispense: 120 tablet; Refill: 0  -     Discontinue: HYDROcodone-acetaminophen (NORCO)  mg per tablet; Take 1 tablet by mouth every 6 (six) hours as needed.  Dispense: 120 tablet; Refill: 0  -     HYDROcodone-acetaminophen (NORCO)  mg per tablet; Take 1 tablet by mouth every 6 (six) hours as needed.  Dispense: 120 tablet; Refill: 0    Right arm weakness  -     baclofen (LIORESAL) 20 MG tablet; Take 1 tablet (20 mg total) by mouth 3 (three) times daily.  Dispense: 270 tablet; Refill: 2  -     gabapentin (NEURONTIN) 600 MG tablet; Take 1 tablet (600 mg total) by mouth 4 (four) times  daily with meals and nightly.  Dispense: 360 tablet; Refill: 2  -     Discontinue: HYDROcodone-acetaminophen (NORCO)  mg per tablet; Take 1 tablet by mouth every 6 (six) hours as needed.  Dispense: 120 tablet; Refill: 0  -     Discontinue: HYDROcodone-acetaminophen (NORCO)  mg per tablet; Take 1 tablet by mouth every 6 (six) hours as needed.  Dispense: 120 tablet; Refill: 0  -     Discontinue: HYDROcodone-acetaminophen (NORCO)  mg per tablet; Take 1 tablet by mouth every 6 (six) hours as needed.  Dispense: 120 tablet; Refill: 0  -     HYDROcodone-acetaminophen (NORCO)  mg per tablet; Take 1 tablet by mouth every 6 (six) hours as needed.  Dispense: 120 tablet; Refill: 0    Right arm pain  -     baclofen (LIORESAL) 20 MG tablet; Take 1 tablet (20 mg total) by mouth 3 (three) times daily.  Dispense: 270 tablet; Refill: 2  -     gabapentin (NEURONTIN) 600 MG tablet; Take 1 tablet (600 mg total) by mouth 4 (four) times daily with meals and nightly.  Dispense: 360 tablet; Refill: 2  -     Discontinue: HYDROcodone-acetaminophen (NORCO)  mg per tablet; Take 1 tablet by mouth every 6 (six) hours as needed.  Dispense: 120 tablet; Refill: 0  -     Discontinue: HYDROcodone-acetaminophen (NORCO)  mg per tablet; Take 1 tablet by mouth every 6 (six) hours as needed.  Dispense: 120 tablet; Refill: 0  -     Discontinue: HYDROcodone-acetaminophen (NORCO)  mg per tablet; Take 1 tablet by mouth every 6 (six) hours as needed.  Dispense: 120 tablet; Refill: 0  -     HYDROcodone-acetaminophen (NORCO)  mg per tablet; Take 1 tablet by mouth every 6 (six) hours as needed.  Dispense: 120 tablet; Refill: 0    Spinal stenosis in cervical region  -     baclofen (LIORESAL) 20 MG tablet; Take 1 tablet (20 mg total) by mouth 3 (three) times daily.  Dispense: 270 tablet; Refill: 2  -     gabapentin (NEURONTIN) 600 MG tablet; Take 1 tablet (600 mg total) by mouth 4 (four) times daily with meals and  nightly.  Dispense: 360 tablet; Refill: 2  -     Discontinue: HYDROcodone-acetaminophen (NORCO)  mg per tablet; Take 1 tablet by mouth every 6 (six) hours as needed.  Dispense: 120 tablet; Refill: 0  -     Discontinue: HYDROcodone-acetaminophen (NORCO)  mg per tablet; Take 1 tablet by mouth every 6 (six) hours as needed.  Dispense: 120 tablet; Refill: 0  -     Discontinue: HYDROcodone-acetaminophen (NORCO)  mg per tablet; Take 1 tablet by mouth every 6 (six) hours as needed.  Dispense: 120 tablet; Refill: 0  -     HYDROcodone-acetaminophen (NORCO)  mg per tablet; Take 1 tablet by mouth every 6 (six) hours as needed.  Dispense: 120 tablet; Refill: 0    Cervical radicular pain  -     baclofen (LIORESAL) 20 MG tablet; Take 1 tablet (20 mg total) by mouth 3 (three) times daily.  Dispense: 270 tablet; Refill: 2  -     gabapentin (NEURONTIN) 600 MG tablet; Take 1 tablet (600 mg total) by mouth 4 (four) times daily with meals and nightly.  Dispense: 360 tablet; Refill: 2  -     Discontinue: HYDROcodone-acetaminophen (NORCO)  mg per tablet; Take 1 tablet by mouth every 6 (six) hours as needed.  Dispense: 120 tablet; Refill: 0  -     Discontinue: HYDROcodone-acetaminophen (NORCO)  mg per tablet; Take 1 tablet by mouth every 6 (six) hours as needed.  Dispense: 120 tablet; Refill: 0  -     Discontinue: HYDROcodone-acetaminophen (NORCO)  mg per tablet; Take 1 tablet by mouth every 6 (six) hours as needed.  Dispense: 120 tablet; Refill: 0  -     HYDROcodone-acetaminophen (NORCO)  mg per tablet; Take 1 tablet by mouth every 6 (six) hours as needed.  Dispense: 120 tablet; Refill: 0    Cervical radiculopathy  -     Discontinue: HYDROcodone-acetaminophen (NORCO)  mg per tablet; Take 1 tablet by mouth every 6 (six) hours as needed.  Dispense: 120 tablet; Refill: 0  -     Discontinue: HYDROcodone-acetaminophen (NORCO)  mg per tablet; Take 1 tablet by mouth every 6 (six) hours  as needed.  Dispense: 120 tablet; Refill: 0  -     Discontinue: HYDROcodone-acetaminophen (NORCO)  mg per tablet; Take 1 tablet by mouth every 6 (six) hours as needed.  Dispense: 120 tablet; Refill: 0  -     HYDROcodone-acetaminophen (NORCO)  mg per tablet; Take 1 tablet by mouth every 6 (six) hours as needed.  Dispense: 120 tablet; Refill: 0        Patient with cervical myelopathy, with tightness and pain is Right UE, s/p Cervical fusion, here for chronic pain management.     1. Pain management : Gabapentin , increased to 600 mg po QID, and Hydrocodone 10/325 mg PO QID ( #120 tabs,2 refills).  Opioid Risk Score       Value Time User    Opioid Risk Score  0 2/15/2018  1:07 PM Sara Ruiz MD         reviewed and appropriate.  Hydrocodone refill on 9/22, 8/17/18.  UDS ordered.    2. Spasticity, will increase Baclofen to 20 mg PO TID.    RTC in 3-4  months.    Total time spent face to face with patient was 25 minutes.   More than 50% of that time was spent in counseling on diagnosis , prognosis and treatment options.   I also  patient  on common and most usual side effect of prescribed medications. Risk and benefits of opiates, possible risk of developing opiate dependence and tolerance, need of strict compliance with prescribed medications.  Pain contract, rules and obligations were discussed with patient in details.  He is aware that I would be the only doctor prescribing him pain medications and ED in a case of emergency.  I reviewed Primary care , and other specialty's notes to better coordinate patient's  care.   All questions were answered, and patient voiced understanding.

## 2018-10-10 ENCOUNTER — TELEPHONE (OUTPATIENT)
Dept: ELECTROPHYSIOLOGY | Facility: CLINIC | Age: 68
End: 2018-10-10

## 2018-10-10 NOTE — TELEPHONE ENCOUNTER
10/10/18 Returned patient phone call in regards to getting home monitor reconnected and sending a manual transmission due to battery advisory. Did not receive an answer left a vm.

## 2018-10-10 NOTE — TELEPHONE ENCOUNTER
----- Message from Luz Maria Cates sent at 10/10/2018  8:14 AM CDT -----  Mr. Wilson called back and asked for you to please give him a call again. [phone]  He asked for you to let him know if he is re-connected or not. Please return his call. Thanks.

## 2018-10-22 DIAGNOSIS — K29.50 CHRONIC GASTRITIS, PRESENCE OF BLEEDING UNSPECIFIED, UNSPECIFIED GASTRITIS TYPE: ICD-10-CM

## 2018-10-22 RX ORDER — PANTOPRAZOLE SODIUM 40 MG/1
TABLET, DELAYED RELEASE ORAL
Qty: 30 TABLET | Refills: 11 | Status: SHIPPED | OUTPATIENT
Start: 2018-10-22

## 2018-10-23 ENCOUNTER — LAB VISIT (OUTPATIENT)
Dept: LAB | Facility: HOSPITAL | Age: 68
End: 2018-10-23
Attending: PHYSICAL MEDICINE & REHABILITATION
Payer: MEDICARE

## 2018-10-23 DIAGNOSIS — G90.50 COMPLEX REGIONAL PAIN SYNDROME TYPE 1, AFFECTING UNSPECIFIED SITE: ICD-10-CM

## 2018-10-23 DIAGNOSIS — M96.1 CERVICAL POST-LAMINECTOMY SYNDROME: ICD-10-CM

## 2018-10-23 DIAGNOSIS — F11.20 NARCOTIC DEPENDENCY, CONTINUOUS: ICD-10-CM

## 2018-10-23 DIAGNOSIS — M47.12 CERVICAL SPONDYLOSIS WITH MYELOPATHY: ICD-10-CM

## 2018-10-23 DIAGNOSIS — G56.41 COMPLEX REGIONAL PAIN SYNDROME TYPE 2 OF RIGHT UPPER EXTREMITY: ICD-10-CM

## 2018-10-23 DIAGNOSIS — G89.4 CHRONIC PAIN SYNDROME: ICD-10-CM

## 2018-10-23 PROCEDURE — 80307 DRUG TEST PRSMV CHEM ANLYZR: CPT

## 2018-10-24 ENCOUNTER — OFFICE VISIT (OUTPATIENT)
Dept: FAMILY MEDICINE | Facility: CLINIC | Age: 68
End: 2018-10-24
Payer: MEDICARE

## 2018-10-24 VITALS
DIASTOLIC BLOOD PRESSURE: 60 MMHG | HEART RATE: 65 BPM | HEIGHT: 71 IN | BODY MASS INDEX: 25.62 KG/M2 | WEIGHT: 183 LBS | SYSTOLIC BLOOD PRESSURE: 120 MMHG

## 2018-10-24 DIAGNOSIS — Z23 NEED FOR INFLUENZA VACCINATION: ICD-10-CM

## 2018-10-24 DIAGNOSIS — I10 ESSENTIAL HYPERTENSION: ICD-10-CM

## 2018-10-24 DIAGNOSIS — M25.572 PAIN IN JOINTS OF BOTH FEET: ICD-10-CM

## 2018-10-24 DIAGNOSIS — M79.672 FOOT PAIN, BILATERAL: ICD-10-CM

## 2018-10-24 DIAGNOSIS — M25.571 PAIN IN JOINTS OF BOTH FEET: ICD-10-CM

## 2018-10-24 DIAGNOSIS — R20.9 COLD HANDS: ICD-10-CM

## 2018-10-24 DIAGNOSIS — M79.89 FOOT SWELLING: Primary | ICD-10-CM

## 2018-10-24 DIAGNOSIS — E11.40 TYPE 2 DIABETES MELLITUS WITH DIABETIC NEUROPATHY, WITHOUT LONG-TERM CURRENT USE OF INSULIN: ICD-10-CM

## 2018-10-24 DIAGNOSIS — M79.671 FOOT PAIN, BILATERAL: ICD-10-CM

## 2018-10-24 PROCEDURE — 99214 OFFICE O/P EST MOD 30 MIN: CPT | Mod: S$PBB,HCNC,, | Performed by: FAMILY MEDICINE

## 2018-10-24 PROCEDURE — 99214 OFFICE O/P EST MOD 30 MIN: CPT | Mod: PBBFAC,PO | Performed by: FAMILY MEDICINE

## 2018-10-24 PROCEDURE — 99999 PR PBB SHADOW E&M-EST. PATIENT-LVL IV: CPT | Mod: PBBFAC,,, | Performed by: FAMILY MEDICINE

## 2018-10-24 PROCEDURE — 1101F PT FALLS ASSESS-DOCD LE1/YR: CPT | Mod: CPTII,HCNC,, | Performed by: FAMILY MEDICINE

## 2018-10-24 PROCEDURE — 3044F HG A1C LEVEL LT 7.0%: CPT | Mod: CPTII,HCNC,, | Performed by: FAMILY MEDICINE

## 2018-10-24 PROCEDURE — 3074F SYST BP LT 130 MM HG: CPT | Mod: CPTII,HCNC,, | Performed by: FAMILY MEDICINE

## 2018-10-24 PROCEDURE — 90662 IIV NO PRSV INCREASED AG IM: CPT | Mod: PBBFAC,HCNC,PO

## 2018-10-24 PROCEDURE — 3078F DIAST BP <80 MM HG: CPT | Mod: CPTII,HCNC,, | Performed by: FAMILY MEDICINE

## 2018-10-24 NOTE — PROGRESS NOTES
Subjective:       Patient ID: Xander Sanchez is a 68 y.o. male.    Chief Complaint: Follow-up; Foot Pain; and Hand Problem    68 years old male who came to the clinic with both feet swelling sensation associated sometimes with mild pain. Patient currently taking gabapentin for possible neuropathy.  Patient also reports both hands cold sensation for the last month .  Pressure today stable.  No chest pain, palpitation, orthopnea or PND.  Last A1c was stable.  No polyuria, polydipsia or polyphagia.  Patient with good compliance medical regimen .      Review of Systems   Constitutional: Negative.    HENT: Negative.    Eyes: Negative.    Respiratory: Negative.    Cardiovascular: Negative.  Negative for chest pain, palpitations and leg swelling.   Gastrointestinal: Negative.    Endocrine: Negative for polydipsia, polyphagia and polyuria.   Genitourinary: Negative.    Musculoskeletal: Positive for arthralgias.   Skin: Negative.    Neurological: Negative.    Psychiatric/Behavioral: Negative.        Objective:      Physical Exam   Constitutional: He is oriented to person, place, and time. He appears well-developed and well-nourished. No distress.   HENT:   Head: Normocephalic and atraumatic.   Right Ear: External ear normal.   Left Ear: External ear normal.   Nose: Nose normal.   Mouth/Throat: Oropharynx is clear and moist. No oropharyngeal exudate.   Eyes: Conjunctivae and EOM are normal. Pupils are equal, round, and reactive to light. Right eye exhibits no discharge. Left eye exhibits no discharge. No scleral icterus.   Neck: Normal range of motion. Neck supple. No JVD present. No tracheal deviation present. No thyromegaly present.   Cardiovascular: Normal rate, regular rhythm, normal heart sounds and intact distal pulses. Exam reveals no gallop and no friction rub.   No murmur heard.  Pulmonary/Chest: Effort normal and breath sounds normal. No stridor. No respiratory distress. He has no wheezes. He has no rales. He  exhibits no tenderness.   Abdominal: Soft. Bowel sounds are normal. He exhibits no distension and no mass. There is no tenderness. There is no rebound and no guarding.   Musculoskeletal: Normal range of motion. He exhibits no edema.        Right foot: There is tenderness.        Left foot: There is tenderness.   Lymphadenopathy:     He has no cervical adenopathy.   Neurological: He is alert and oriented to person, place, and time. He has normal reflexes. He displays normal reflexes. No cranial nerve deficit. He exhibits normal muscle tone. Coordination normal.   Skin: Skin is warm and dry. No rash noted. He is not diaphoretic. No erythema. No pallor.   Psychiatric: He has a normal mood and affect. His behavior is normal. Judgment and thought content normal.   Nursing note and vitals reviewed.      Assessment:       1. Foot swelling    2. Foot pain, bilateral    3. Cold hands    4. 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thy, without long-term current use of insulin    5. Pain in joints of both feet    6. Essential hypertension    7. Need for influenza vaccination        Plan:         Xander was seen today for follow-up, foot pain and hand problem.    Diagnoses and all orders for this visit:    Foot swelling  -     Ambulatory referral to Podiatry    Foot pain, bilateral  -     Ambulatory referral to Podiatry    Cold hands  -     US Upper Extremity Arteries Bilateral; Future    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thy, without long-term current use of insulin  -     Ambulatory referral to Podiatry  -     Comprehensive metabolic panel; Future  -     Lipid panel; Future  -     Hemoglobin A1c; Future    Pain in joints of both feet  -     Sedimentation rate; Future  -     C-reactive protein; Future  -     ANIBAL; Future  -     Rheumatoid factor; Future  -     Uric acid; Future    Essential hypertension  -     Comprehensive metabolic panel; Future  -     Lipid panel; Future  -     Urinalysis; Future  -      TSH; Future  -     CBC auto differential; Future    Need for influenza vaccination  -     Influenza - High Dose (65+) (PF) (IM)

## 2018-10-24 NOTE — PATIENT INSTRUCTIONS
Diabetes: Activity Tips    Being more active can help you manage your diabetes. The tips on this sheet can help you get the most from your exercise. They can also help you stay safe.  Staying Active  Its important for adults to spend less time sitting and being inactive. This is especially true if you have type 2 diabetes. When you are sitting for long periods of time, get up for short sessions of light activity every 30 minutes.  You should aim for at least 150 minutes a week of exercise or physical activity. Dont let more than 2 days go by without being active.  Benefit from briskness  Brisk activity gets your heart beating faster. This can help you increase your fitness, lose extra weight, and manage your blood sugar level. Try brisk walking. Or, if you have foot or leg problems, you can try swimming or bike riding. You can break up your exercise into chunks throughout the day. Work up to at least 30 minutes of steady, brisk exercise on most days.  Warm up and cool down  Warming up and cooling down reduce your risk of injury. They also help limit muscle soreness. Do a mild version of your activity for 5 minutes before and after your routine. You can also learn stretches that will help keep your muscles loose. Your healthcare provider may show you good ways to warm up and stretch.  Do the talk-sing test  The talk-sing test is a simple way to tell how hard youre exercising. If you can talk while exercising, youre in a safe range. If youre out of breath, slow down. If you can carry a tune, its time to  the pace. Walk up a hill. Increase the resistance on your stationary bike. Or swim faster.  What about eating?  You may be told to plan your exercise for 1 to 2 hours after a meal. In most cases, you dont need to eat while being active. If you take insulin or medicine that can cause low blood sugar, test your blood sugar before exercising. And carry a fast-acting sugar that will raise your blood sugar  level quickly. This includes glucose tablets or hard candy. Use it if you feel low blood sugar symptoms.  Safety tips  These tips can help you stay safe as you become fit:  · Exercise with a friend or carry a cell phone if you have one.  · Carry or wear identification, such as a necklace or bracelet, that says you have diabetes.  · Use the proper footwear and safety equipment for your activity.  · Drink water before, during, and after exercise.  · Dress properly for the weather.  · Dont exercise in very hot or very cold weather.  · Dont exercise if you are sick.  · If you are instructed to do so, test your blood sugar before and after you exercise. Have a small carbohydrate snack if your blood sugar is low before you start exercising.   When to stop exercising and call your healthcare provider  Stop exercising and call your healthcare provider right away if you notice any of the following:  · Pain, pressure, tightness, or heaviness in the chest  · Pain or heaviness in the neck, shoulders, back, arms, legs, or feet  · Unusual shortness of breath  · Dizziness or lightheadedness  · Unusually rapid or slow pulse  · Increased joint or muscle pain  · Nausea or vomiting  Date Last Reviewed: 5/1/2016  © 1716-3615 [employer]. [address] [place] [address], [place] [address]. All rights reserved. This information is not intended as a substitute for professional medical care. Always follow your healthcare professional's instructions.

## 2018-10-25 ENCOUNTER — LAB VISIT (OUTPATIENT)
Dept: LAB | Facility: HOSPITAL | Age: 68
End: 2018-10-25
Attending: FAMILY MEDICINE
Payer: MEDICARE

## 2018-10-25 DIAGNOSIS — M25.571 PAIN IN JOINTS OF BOTH FEET: ICD-10-CM

## 2018-10-25 DIAGNOSIS — M25.572 PAIN IN JOINTS OF BOTH FEET: ICD-10-CM

## 2018-10-25 DIAGNOSIS — I10 ESSENTIAL HYPERTENSION: ICD-10-CM

## 2018-10-25 DIAGNOSIS — E11.40 TYPE 2 DIABETES MELLITUS WITH DIABETIC NEUROPATHY, WITHOUT LONG-TERM CURRENT USE OF INSULIN: ICD-10-CM

## 2018-10-25 LAB
ALBUMIN SERPL BCP-MCNC: 3.9 G/DL
ALP SERPL-CCNC: 79 U/L
ALT SERPL W/O P-5'-P-CCNC: 18 U/L
ANION GAP SERPL CALC-SCNC: 7 MMOL/L
AST SERPL-CCNC: 20 U/L
BASOPHILS # BLD AUTO: 0.04 K/UL
BASOPHILS NFR BLD: 0.6 %
BILIRUB SERPL-MCNC: 0.6 MG/DL
BUN SERPL-MCNC: 11 MG/DL
CALCIUM SERPL-MCNC: 9.5 MG/DL
CHLORIDE SERPL-SCNC: 100 MMOL/L
CHOLEST SERPL-MCNC: 124 MG/DL
CHOLEST/HDLC SERPL: 2.9 {RATIO}
CO2 SERPL-SCNC: 30 MMOL/L
CREAT SERPL-MCNC: 0.9 MG/DL
CRP SERPL-MCNC: 4.9 MG/L
DIFFERENTIAL METHOD: NORMAL
EOSINOPHIL # BLD AUTO: 0.1 K/UL
EOSINOPHIL NFR BLD: 2.1 %
ERYTHROCYTE [DISTWIDTH] IN BLOOD BY AUTOMATED COUNT: 11.8 %
ERYTHROCYTE [SEDIMENTATION RATE] IN BLOOD BY WESTERGREN METHOD: 29 MM/HR
EST. GFR  (AFRICAN AMERICAN): >60 ML/MIN/1.73 M^2
EST. GFR  (NON AFRICAN AMERICAN): >60 ML/MIN/1.73 M^2
ESTIMATED AVG GLUCOSE: 131 MG/DL
GLUCOSE SERPL-MCNC: 112 MG/DL
HBA1C MFR BLD HPLC: 6.2 %
HCT VFR BLD AUTO: 43.8 %
HDLC SERPL-MCNC: 43 MG/DL
HDLC SERPL: 34.7 %
HGB BLD-MCNC: 14.2 G/DL
IMM GRANULOCYTES # BLD AUTO: 0.02 K/UL
IMM GRANULOCYTES NFR BLD AUTO: 0.3 %
LDLC SERPL CALC-MCNC: 67.8 MG/DL
LYMPHOCYTES # BLD AUTO: 1.7 K/UL
LYMPHOCYTES NFR BLD: 24.7 %
MCH RBC QN AUTO: 29.5 PG
MCHC RBC AUTO-ENTMCNC: 32.4 G/DL
MCV RBC AUTO: 91 FL
MONOCYTES # BLD AUTO: 0.6 K/UL
MONOCYTES NFR BLD: 8.7 %
NEUTROPHILS # BLD AUTO: 4.3 K/UL
NEUTROPHILS NFR BLD: 63.6 %
NONHDLC SERPL-MCNC: 81 MG/DL
NRBC BLD-RTO: 0 /100 WBC
PLATELET # BLD AUTO: 243 K/UL
PMV BLD AUTO: 11.3 FL
POTASSIUM SERPL-SCNC: 4.2 MMOL/L
PROT SERPL-MCNC: 7.8 G/DL
RBC # BLD AUTO: 4.81 M/UL
RHEUMATOID FACT SERPL-ACNC: <10 IU/ML
SODIUM SERPL-SCNC: 137 MMOL/L
TRIGL SERPL-MCNC: 66 MG/DL
TSH SERPL DL<=0.005 MIU/L-ACNC: 1.16 UIU/ML
URATE SERPL-MCNC: 6.6 MG/DL
WBC # BLD AUTO: 6.8 K/UL

## 2018-10-25 PROCEDURE — 84550 ASSAY OF BLOOD/URIC ACID: CPT | Mod: HCNC

## 2018-10-25 PROCEDURE — 84443 ASSAY THYROID STIM HORMONE: CPT | Mod: HCNC

## 2018-10-25 PROCEDURE — 86140 C-REACTIVE PROTEIN: CPT | Mod: HCNC

## 2018-10-25 PROCEDURE — 83036 HEMOGLOBIN GLYCOSYLATED A1C: CPT | Mod: HCNC

## 2018-10-25 PROCEDURE — 85652 RBC SED RATE AUTOMATED: CPT | Mod: HCNC

## 2018-10-25 PROCEDURE — 80053 COMPREHEN METABOLIC PANEL: CPT | Mod: HCNC

## 2018-10-25 PROCEDURE — 86038 ANTINUCLEAR ANTIBODIES: CPT | Mod: HCNC

## 2018-10-25 PROCEDURE — 85025 COMPLETE CBC W/AUTO DIFF WBC: CPT | Mod: HCNC

## 2018-10-25 PROCEDURE — 86431 RHEUMATOID FACTOR QUANT: CPT | Mod: HCNC

## 2018-10-25 PROCEDURE — 36415 COLL VENOUS BLD VENIPUNCTURE: CPT | Mod: HCNC,PO

## 2018-10-25 PROCEDURE — 80061 LIPID PANEL: CPT | Mod: HCNC

## 2018-10-26 LAB — ANA SER QL IF: NORMAL

## 2018-10-28 LAB
6MAM UR QL: NOT DETECTED
7AMINOCLONAZEPAM UR QL: NOT DETECTED
A-OH ALPRAZ UR QL: NOT DETECTED
ALPRAZ UR QL: NOT DETECTED
AMPHET UR QL SCN: NOT DETECTED
ANNOTATION COMMENT IMP: NORMAL
ANNOTATION COMMENT IMP: NORMAL
BARBITURATES UR QL: NOT DETECTED
BUPRENORPHINE UR QL: NOT DETECTED
BZE UR QL: NOT DETECTED
CARBOXYTHC UR QL: PRESENT
CARISOPRODOL UR QL: NOT DETECTED
CLONAZEPAM UR QL: NOT DETECTED
CODEINE UR QL: NOT DETECTED
CREAT UR-MCNC: 104.1 MG/DL (ref 20–400)
DIAZEPAM UR QL: NOT DETECTED
ETHYL GLUCURONIDE UR QL: NOT DETECTED
FENTANYL UR QL: NOT DETECTED
HYDROCODONE UR QL: PRESENT
HYDROMORPHONE UR QL: PRESENT
LORAZEPAM UR QL: NOT DETECTED
MDA UR QL: NOT DETECTED
MDEA UR QL: NOT DETECTED
MDMA UR QL: NOT DETECTED
ME-PHENIDATE UR QL: NOT DETECTED
MEPERIDINE UR QL: NOT DETECTED
METHADONE UR QL: NOT DETECTED
METHAMPHET UR QL: NOT DETECTED
MIDAZOLAM UR QL SCN: NOT DETECTED
MORPHINE UR QL: NOT DETECTED
NORBUPRENORPHINE UR QL CFM: NOT DETECTED
NORDIAZEPAM UR QL: NOT DETECTED
NORFENTANYL UR QL: NOT DETECTED
NORHYDROCODONE UR QL CFM: PRESENT
NOROXYCODONE UR QL CFM: NOT DETECTED
NOROXYMORPHONE: NOT DETECTED
OXAZEPAM UR QL: NOT DETECTED
OXYCODONE UR QL: NOT DETECTED
OXYMORPHONE UR QL: NOT DETECTED
PATHOLOGY STUDY: NORMAL
PCP UR QL: NOT DETECTED
PHENTERMINE UR QL: NOT DETECTED
PROPOXYPH UR QL: NOT DETECTED
SERVICE CMNT-IMP: NORMAL
TAPENTADOL UR QL SCN: NOT DETECTED
TAPENTADOL-O-SULF: NOT DETECTED
TEMAZEPAM UR QL: NOT DETECTED
TRAMADOL UR QL: NOT DETECTED
ZOLPIDEM UR QL: NOT DETECTED

## 2018-10-31 ENCOUNTER — HOSPITAL ENCOUNTER (OUTPATIENT)
Dept: RADIOLOGY | Facility: HOSPITAL | Age: 68
Discharge: HOME | End: 2018-10-31
Attending: FAMILY MEDICINE
Payer: MEDICARE

## 2018-10-31 DIAGNOSIS — R20.9 COLD HANDS: ICD-10-CM

## 2018-10-31 PROCEDURE — 93930 UPPER EXTREMITY STUDY: CPT | Mod: TC,HCNC

## 2018-10-31 PROCEDURE — 93930 UPPER EXTREMITY STUDY: CPT | Mod: 26,HCNC,, | Performed by: RADIOLOGY

## 2018-11-02 RX ORDER — POLYETHYLENE GLYCOL 3350 17 G/17G
17 POWDER, FOR SOLUTION ORAL DAILY
Qty: 1530 G | Refills: 0 | Status: SHIPPED | OUTPATIENT
Start: 2018-11-02 | End: 2018-12-14 | Stop reason: SDUPTHER

## 2018-11-02 NOTE — TELEPHONE ENCOUNTER
----- Message from Marilin Rosario sent at 11/2/2018  8:39 AM CDT -----  Contact: [phone]  Patient would like refill of polyethylene glycol 3350 sent to [id number]. Please call.

## 2018-11-15 RX ORDER — PRAVASTATIN SODIUM 10 MG/1
TABLET ORAL
Qty: 90 TABLET | Refills: 0 | Status: SHIPPED | OUTPATIENT
Start: 2018-11-15 | End: 2019-02-20 | Stop reason: SDUPTHER

## 2018-11-19 DIAGNOSIS — Z95.810 CARDIAC DEFIBRILLATOR IN PLACE: Primary | ICD-10-CM

## 2018-11-20 DIAGNOSIS — Z95.810 CARDIAC DEFIBRILLATOR IN PLACE: Primary | ICD-10-CM

## 2018-11-30 ENCOUNTER — OFFICE VISIT (OUTPATIENT)
Dept: PODIATRY | Facility: CLINIC | Age: 68
End: 2018-11-30
Payer: MEDICARE

## 2018-11-30 VITALS
HEART RATE: 76 BPM | HEIGHT: 71 IN | SYSTOLIC BLOOD PRESSURE: 151 MMHG | WEIGHT: 182 LBS | DIASTOLIC BLOOD PRESSURE: 98 MMHG | BODY MASS INDEX: 25.48 KG/M2

## 2018-11-30 DIAGNOSIS — E11.40 TYPE 2 DIABETES MELLITUS WITH DIABETIC NEUROPATHY, WITHOUT LONG-TERM CURRENT USE OF INSULIN: Primary | ICD-10-CM

## 2018-11-30 DIAGNOSIS — I87.2 VENOUS INSUFFICIENCY OF BOTH LOWER EXTREMITIES: ICD-10-CM

## 2018-11-30 DIAGNOSIS — B35.1 ONYCHOMYCOSIS: ICD-10-CM

## 2018-11-30 DIAGNOSIS — I50.32 DIASTOLIC DYSFUNCTION WITH CHRONIC HEART FAILURE: ICD-10-CM

## 2018-11-30 PROCEDURE — 99999 PR PBB SHADOW E&M-EST. PATIENT-LVL III: CPT | Mod: PBBFAC,HCNC,, | Performed by: PODIATRIST

## 2018-11-30 PROCEDURE — 11721 DEBRIDE NAIL 6 OR MORE: CPT | Mod: HCNC,Q9,S$GLB, | Performed by: PODIATRIST

## 2018-11-30 PROCEDURE — 3080F DIAST BP >= 90 MM HG: CPT | Mod: CPTII,HCNC,S$GLB, | Performed by: PODIATRIST

## 2018-11-30 PROCEDURE — 3077F SYST BP >= 140 MM HG: CPT | Mod: CPTII,HCNC,S$GLB, | Performed by: PODIATRIST

## 2018-11-30 PROCEDURE — 99213 OFFICE O/P EST LOW 20 MIN: CPT | Mod: 25,HCNC,S$GLB, | Performed by: PODIATRIST

## 2018-11-30 PROCEDURE — 3044F HG A1C LEVEL LT 7.0%: CPT | Mod: CPTII,HCNC,S$GLB, | Performed by: PODIATRIST

## 2018-11-30 PROCEDURE — 1101F PT FALLS ASSESS-DOCD LE1/YR: CPT | Mod: CPTII,HCNC,S$GLB, | Performed by: PODIATRIST

## 2018-11-30 NOTE — PROCEDURES
"Routine Foot Care  Date/Time: 11/30/2018 8:56 AM  Performed by: Scott Garcia DPM  Authorized by: Scott Garcia DPM     Time out: Immediately prior to procedure a "time out" was called to verify the correct patient, procedure, equipment, support staff and site/side marked as required.    Consent Done?:  Yes (Verbal)  Hyperkeratotic Skin Lesions?: No      Nail Care Type:  Debride  Location(s): All  (Left 1st Toe, Left 3rd Toe, Left 2nd Toe, Left 4th Toe, Left 5th Toe, Right 1st Toe, Right 2nd Toe, Right 3rd Toe, Right 4th Toe and Right 5th Toe)  Patient tolerance:  Patient tolerated the procedure well with no immediate complications      "

## 2018-11-30 NOTE — LETTER
December 2, 2018      Williams Alvarenga MD  [address] [facility]  [place] [address]           [facility]  [address]  [place] [address]  Phone: [phone]  Fax: [phone]          Patient: Xander Sanchez   MR Number: [id number]   YOB: 1950   Date of Visit: 11/30/2018       Dear Dr. Williams Alvarenga:    Thank you for referring Xander Sanchez to me for evaluation. Attached you will find relevant portions of my assessment and plan of care.    If you have questions, please do not hesitate to call me. I look forward to following Xander Sanchez along with you.    Sincerely,    Scott Garcia, SAYRA    Enclosure  CC:  No Recipients    If you would like to receive this communication electronically, please contact [email or web address] or [phone] to request more information on [email or web address] Link access.    For providers and/or their staff who would like to refer a patient to [facility], please contact us through our one-stop-shop provider referral line, [facility], at [phone].    If you feel you have received this communication in error or would no longer like to receive these types of communications, please e-mail [email or web address]

## 2018-12-02 NOTE — PROGRESS NOTES
Subjective:      Patient ID: Xander Sanchez is a 68 y.o. male.    Chief Complaint: Follow-up (Dr. August 10/24/18); Diabetes Mellitus; and Nail Care    Xander is a 68 y.o. male who presents to the clinic for evaluation and treatment of high risk feet. Xander has a past medical history of Asthma, Cardiomyopathy, Cervical radicular pain, Cervical spondylosis with myelopathy (10/17/2012), Chronic bronchitis, Chronic systolic dysfunction of left ventricle (7/27/2015), Constipation (7/27/2015), COPD (chronic obstructive pulmonary disease), CRPS (complex regional pain syndrome type I) (12/29/2014), Diabetes mellitus, type 2, DM type 2 (diabetes mellitus, type 2) (7/27/2015), Enlarged prostate (7/27/2015), Enuresis (7/6/2018), Hypertension, ICD (implantable cardiac defibrillator) in place, Mixed hyperlipidemia (2/28/2018), Presence of biventricular AICD (7/27/2015), 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thy (2/1/2016), and Urinary tract infection.  This patient has documented high risk feet requiring routine maintenance secondary to diabetes mellitis and those secondary complications of diabetes, as mentioned.  Treated per Physical medicine for chronic back pain. Complains that his pain in his feet are mostly at night when he lays down. Taking gabapentin 600 mg po qid for chronic neuropathic pain. No new complaints.    Williams Alvarenga MD  LOV: 10/24/18    Past Medical History:   Diagnosis Date    Asthma     Cardiomyopathy     Cervical radicular pain     Cervical spondylosis with myelopathy 10/17/2012    Chronic bronchitis     Chronic systolic dysfunction of left ventricle 7/27/2015    Constipation 7/27/2015    COPD (chronic obstructive pulmonary disease)     CRPS (complex regional pain syndrome type I) 12/29/2014    Diabetes mellitus, type 2     DM type 2 (diabetes mellitus, type 2) 7/27/2015    Enlarged prostate 7/27/2015    Enuresis 7/6/2018    Hypertension     ICD (implantable cardiac  defibrillator) in place     Mixed hyperlipidemia 2/28/2018    Presence of biventricular AICD 7/27/2015    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thy 2/1/2016    Urinary tract infection     pt does not know       Past Surgical History:   Procedure Laterality Date    BLOCK SELECTIVE NERVE ROOT TRANSFORAMINAL LUMBAR Right 10/9/2014    Performed by [facility]ons [facility] at [facility] PAIN MGT    CARDIAC DEFIBRILLATOR PLACEMENT      CERVICAL SPINE SURGERY      COLONOSCOPY N/A 7/19/2017    Procedure: COLONOSCOPY  golytely;  Surgeon: Vannessa Uribe MD;  Location: [facility];  Service: Endoscopy;  Laterality: N/A;    COLONOSCOPY  golytely N/A 7/19/2017    Performed by [facility]ribe MD at [facility] ENDO    DISCECTOMY, SPINE, CERVICAL, ANTERIOR APPROACH, WITH FUSION N/A 4/10/2014    Performed by Magdaleno Meyer MD at [facility] OR 2ND FLR    EGD (ESOPHAGOGASTRODUODENOSCOPY) N/A 8/16/2013    Performed by [facility]ribe MD at [facility] ENDO    EGD (ESOPHAGOGASTRODUODENOSCOPY) N/A 6/19/2013    Performed by [facility]ribe MD at [facility] ENDO    EXTRACTION-LEAD N/A 3/24/2015    Performed by Braeden Park MD at [facility] CATH LAB    Formainotomy-CERVICAL/FUSION-POSTERIOR N/A 8/10/2015    Performed by David De Dios MD at [facility] OR 2ND FLR    INSERTION-ICD-BIVENTRICULAR N/A 3/24/2015    Performed by Braeden Park MD at [facility] CATH LAB    MYELOGRAM N/A 8/15/2014    Performed by [facility] Diagnostic Provider at [facility] OR 2ND FLR    MYELOGRAM N/A 12/6/2013    Performed by [facility] Diagnostic Provider at [facility] OR 2ND FLR    PROSTATECTOMY-TRANSURETHRAL W QUANTA LASER N/A 2/1/2017    Performed by Graham Buenrostro MD at [facility] OR 1ST FLR    SPINE SURGERY         Family History   Problem Relation Age of Onset    Hypertension Mother     Hypertension Father     COPD Father     No Known Problems Sister     No Known Problems Brother     No Known Problems Daughter     Prostate cancer Neg Hx     Kidney disease Neg Hx         Social History     Socioeconomic History    Marital status:      Spouse name: None    Number of children: None    Years of education: None    Highest education level: None   Social Needs    Financial resource strain: None    Food insecurity - worry: None    Food insecurity - inability: None    Transportation needs - medical: None    Transportation needs - non-medical: None   Occupational History    None   Tobacco Use    Smoking status: Former Smoker     Packs/day: 1.00     Years: 40.00     Pack years: 40.00     Types: Cigarettes     Last attempt to quit: 2009     Years since quittin.3    Smokeless tobacco: Never Used   Substance and Sexual Activity    Alcohol use: Yes     Alcohol/week: 4.2 oz     Types: 6 Cans of beer, 1 Shots of liquor per week    Drug use: No    Sexual activity: Yes     Partners: Female   Other Topics Concern    None   Social History Narrative    None       Current Outpatient Medications   Medication Sig Dispense Refill    ACCU-CHEK SOFTCLIX LANCETS Misc       aspirin (ECOTRIN) 81 MG EC tablet Take 81 mg by mouth once daily.      baclofen (LIORESAL) 20 MG tablet Take 1 tablet (20 mg total) by mouth 3 (three) times daily. 270 tablet 2    diclofenac sodium 1 % Gel Apply 2 g topically once daily. 100 g 0    gabapentin (NEURONTIN) 600 MG tablet Take 1 tablet (600 mg total) by mouth 4 (four) times daily with meals and nightly. 360 tablet 2    hydroCHLOROthiazide (MICROZIDE) 12.5 mg capsule Take 1 capsule (12.5 mg total) by mouth once daily. 90 capsule 0    [START ON 2018] HYDROcodone-acetaminophen (NORCO)  mg per tablet Take 1 tablet by mouth every 6 (six) hours as needed. 120 tablet 0    ketoconazole (NIZORAL) 2 % cream Apply topically 2 (two) times daily. 30 g 0    lancets Misc 1 lancet by Misc.(Non-Drug; Combo Route) route once daily at 6am. 100 each 3    lisinopril (PRINIVIL,ZESTRIL) 2.5 MG tablet TAKE 1 TABLET EVERY DAY 90 tablet 3     metoprolol succinate (TOPROL-XL) 25 MG 24 hr tablet TAKE 1 TABLET (25 MG TOTAL) BY MOUTH ONCE DAILY. 90 tablet 3    oxybutynin (DITROPAN) 5 MG Tab Take 1 tablet (5 mg total) by mouth 2 (two) times daily. 180 tablet 3    pantoprazole (PROTONIX) 40 MG tablet TAKE 1 TABLET BY MOUTH ONCE DAILY BEFORE BREAKFAST 30 tablet 11    polyethylene glycol (GLYCOLAX) 17 gram/dose powder Take 17 g by mouth once daily. 1530 g 0    pravastatin (PRAVACHOL) 10 MG tablet TAKE 1 TABLET BY MOUTH ONCE DAILY AT BEDTIME 90 tablet 0    tadalafil (CIALIS) 10 MG tablet Take 1 tablet (10 mg total) by mouth daily as needed for Erectile Dysfunction. 2 tablet 0    tamsulosin (FLOMAX) 0.4 mg Cp24 Take 2 capsules (0.8 mg total) by mouth every evening. 180 capsule 4    TRUE METRIX AIR GLUCOSE METER kit USE AS INSTRUCTED 1 each 0    TRUE METRIX GLUCOSE TEST STRIP Strp TEST  ONE  TIME  DAILY  AT  6AM 100 strip 3    TRUEPLUS LANCETS 33 gauge Misc TEST ONE TIME DAILY  AT  6AM 100 each 3     No current facility-administered medications for this visit.        Review of patient's allergies indicates:  No Known Allergies    PCP: Williams Alvarenga MD    Date Last Seen by PCP:     Current shoe gear:  Affected Foot: Casual shoes     Unaffected Foot: Casual shoes    Hemoglobin A1C   Date Value Ref Range Status   10/25/2018 6.2 (H) 4.0 - 5.6 % Final     Comment:     ADA Screening Guidelines:  5.7-6.4%  Consistent with prediabetes  >or=6.5%  Consistent with diabetes  High levels of fetal hemoglobin interfere with the HbA1C  assay. Heterozygous hemoglobin variants (HbS, HgC, etc)do  not significantly interfere with this assay.   However, presence of multiple variants may affect accuracy.     02/27/2018 6.1 (H) 4.0 - 5.6 % Final     Comment:     According to ADA guidelines, hemoglobin A1c <7.0% represents  optimal control in non-pregnant diabetic patients. Different  metrics may apply to specific patient populations.   Standards of Medical Care in  Diabetes-2016.  For the purpose of screening for the presence of diabetes:  <5.7%     Consistent with the absence of diabetes  5.7-6.4%  Consistent with increasing risk for diabetes   (prediabetes)  >or=6.5%  Consistent with diabetes  Currently, no consensus exists for use of hemoglobin A1c  for diagnosis of diabetes for children.  This Hemoglobin A1c assay has significant interference with fetal   hemoglobin   (HbF). The results are invalid for patients with abnormal amounts of   HbF,   including those with known Hereditary Persistence   of Fetal Hemoglobin. Heterozygous hemoglobin variants (HbAS, HbAC,   HbAD, HbAE, HbA2) do not significantly interfere with this assay;   however, presence of multiple variants in a sample may impact the %   interference.     08/29/2017 6.2 (H) 4.0 - 5.6 % Final     Comment:     According to ADA guidelines, hemoglobin A1c <7.0% represents  optimal control in non-pregnant diabetic patients. Different  metrics may apply to specific patient populations.   Standards of Medical Care in Diabetes-2016.  For the purpose of screening for the presence of diabetes:  <5.7%     Consistent with the absence of diabetes  5.7-6.4%  Consistent with increasing risk for diabetes   (prediabetes)  >or=6.5%  Consistent with diabetes  Currently, no consensus exists for use of hemoglobin A1c  for diagnosis of diabetes for children.  This Hemoglobin A1c assay has significant interference with fetal   hemoglobin   (HbF). The results are invalid for patients with abnormal amounts of   HbF,   including those with known Hereditary Persistence   of Fetal Hemoglobin. Heterozygous hemoglobin variants (HbAS, HbAC,   HbAD, HbAE, HbA2) do not significantly interfere with this assay;   however, presence of multiple variants in a sample may impact the %   interference.         Review of Systems   Constitution: Negative for chills and fever.   Respiratory: Negative for shortness of breath.    Skin: Positive for color  change and nail changes. Negative for itching.   Musculoskeletal: Negative for falls, joint pain and muscle weakness.   Gastrointestinal: Negative for nausea and vomiting.   Neurological: Negative for focal weakness, loss of balance, numbness and paresthesias.           Objective:      Physical Exam   Constitutional: He is oriented to person, place, and time. He appears well-nourished. No distress.   Cardiovascular: Intact distal pulses.   Pulses:       Dorsalis pedis pulses are 2+ on the right side, and 2+ on the left side.        Posterior tibial pulses are 2+ on the right side, and 2+ on the left side.   CRT < 3 seconds to digits 1-5 bilateral, mild to moderate edema of bilateral lower extremity with varicosities.   Musculoskeletal:        Right foot: There is decreased range of motion. There is no deformity.        Left foot: There is decreased range of motion. There is no deformity.   Equinus noted b/l ankles with < 10 deg DF noted. MMT 5/5 in DF/PF/Inv/Ev resistance with no reproduction of pain in any direction. Passive range of motion of ankle and pedal joints is painless b/l.    No pain on palpation bilateral foot.     Feet:   Right Foot:   Protective Sensation: 10 sites tested. 9 sites sensed.   Skin Integrity: Positive for dry skin. Negative for ulcer, skin breakdown, erythema, warmth or callus.   Left Foot:   Protective Sensation: 10 sites tested. 7 sites sensed.   Skin Integrity: Positive for dry skin. Negative for ulcer, skin breakdown, erythema, warmth or callus.   Neurological: He is alert and oriented to person, place, and time. He has normal strength. A sensory deficit is present.   Decreased vibratory sensation to the bilateral foot.   Skin: Skin is warm, dry and intact. Capillary refill takes less than 2 seconds. No ecchymosis, no lesion, no petechiae and no rash noted. He is not diaphoretic. No cyanosis or erythema. No pallor. Nails show no clubbing.   Skin is dry and flaky plantar foot  bilateral.    Hyperpigmented patch of skin dorsal left hallux.     Nails 1-5 bilateral are thickened 2-3 mm, slightly yellow with debris, loosened and elongated 4-5 mm.     Edema B/L LE 2+    No open lesions or macerations bilateral lower extremity.               Assessment:       Encounter Diagnoses   Name Primary?    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thy, without long-term current use of insulin Yes    Venous insufficiency of both lower extremities     Onychomycosis     Diastolic dysfunction with chronic heart failure          Plan:       Xander was seen today for follow-up, diabetes mellitus and nail care.    Diagnoses and all orders for this visit:    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thy, without long-term current use of insulin  -     Routine Foot Care  -     COMPRESSION STOCKINGS    Venous insufficiency of both lower extremities  -     Routine Foot Care  -     COMPRESSION STOCKINGS    Onychomycosis  -     Routine Foot Care    Diastolic dysfunction with chronic heart failure      I counseled the patient on his conditions, their implications and medical management.    Shoe inspection. Diabetic Foot Education. Patient reminded of the importance of good nutrition and blood sugar control to help prevent podiatric complications of diabetes. Patient instructed on proper foot hygeine. We discussed wearing proper shoe gear, daily foot inspections, never walking without protective shoe gear, never putting sharp instruments to feet    Routine foot care per attached note.

## 2018-12-14 RX ORDER — POLYETHYLENE GLYCOL 3350 17 G/17G
17 POWDER, FOR SOLUTION ORAL DAILY
Qty: 1530 G | Refills: 0 | Status: SHIPPED | OUTPATIENT
Start: 2018-12-14 | End: 2019-01-08 | Stop reason: SDUPTHER

## 2018-12-14 NOTE — TELEPHONE ENCOUNTER
----- Message from Elvi Covarrubias sent at 12/14/2018 12:35 PM CST -----  Contact: Self/ [phone]  Patient called in to get prescription refill. Please call and advise.     polyethylene glycol (GLYCOLAX) 17 gram/dose powder    [facility] [address] [facility] [address] [facility]

## 2019-01-02 RX ORDER — BLOOD-GLUCOSE METER
EACH MISCELLANEOUS
Qty: 1 EACH | Refills: 0 | Status: SHIPPED | OUTPATIENT
Start: 2019-01-02 | End: 2021-04-05 | Stop reason: SDUPTHER

## 2019-01-08 RX ORDER — POLYETHYLENE GLYCOL 3350 17 G/17G
17 POWDER, FOR SOLUTION ORAL DAILY
Qty: 1530 G | Refills: 0 | Status: SHIPPED | OUTPATIENT
Start: 2019-01-08 | End: 2019-04-08

## 2019-01-08 NOTE — TELEPHONE ENCOUNTER
----- Message from Cori Love sent at 1/8/2019  9:20 AM CST -----  No. [phone]    Generic Glycolax 17gm/dose powder was called into [facility].  It is on back order.   Please call into [facility] on [address].   Please call patient when called in.

## 2019-01-21 ENCOUNTER — OFFICE VISIT (OUTPATIENT)
Dept: PHYSICAL MEDICINE AND REHAB | Facility: CLINIC | Age: 69
End: 2019-01-21
Payer: MEDICARE

## 2019-01-21 VITALS
DIASTOLIC BLOOD PRESSURE: 93 MMHG | HEART RATE: 76 BPM | HEIGHT: 71 IN | WEIGHT: 183.44 LBS | SYSTOLIC BLOOD PRESSURE: 162 MMHG | BODY MASS INDEX: 25.68 KG/M2

## 2019-01-21 DIAGNOSIS — R29.898 RIGHT ARM WEAKNESS: ICD-10-CM

## 2019-01-21 DIAGNOSIS — G56.41 COMPLEX REGIONAL PAIN SYNDROME TYPE 2 OF RIGHT UPPER EXTREMITY: Primary | ICD-10-CM

## 2019-01-21 DIAGNOSIS — M54.12 CERVICAL RADICULAR PAIN: ICD-10-CM

## 2019-01-21 DIAGNOSIS — M54.12 BRACHIAL NEURITIS OR RADICULITIS: ICD-10-CM

## 2019-01-21 DIAGNOSIS — M48.02 CERVICAL STENOSIS OF SPINE: ICD-10-CM

## 2019-01-21 DIAGNOSIS — R29.898 RIGHT HAND WEAKNESS: ICD-10-CM

## 2019-01-21 DIAGNOSIS — M75.101 ROTATOR CUFF SYNDROME OF RIGHT SHOULDER: ICD-10-CM

## 2019-01-21 DIAGNOSIS — M54.12 CERVICAL RADICULOPATHY: ICD-10-CM

## 2019-01-21 DIAGNOSIS — M50.30 DEGENERATION OF CERVICAL INTERVERTEBRAL DISC: ICD-10-CM

## 2019-01-21 DIAGNOSIS — G89.4 CHRONIC PAIN SYNDROME: ICD-10-CM

## 2019-01-21 DIAGNOSIS — M50.00 HNP (HERNIATED NUCLEUS PULPOSUS) WITH MYELOPATHY, CERVICAL: ICD-10-CM

## 2019-01-21 DIAGNOSIS — F11.20 NARCOTIC DEPENDENCY, CONTINUOUS: ICD-10-CM

## 2019-01-21 DIAGNOSIS — M47.12 CERVICAL SPONDYLOSIS WITH MYELOPATHY: ICD-10-CM

## 2019-01-21 DIAGNOSIS — M47.812 FACET ARTHRITIS OF CERVICAL REGION: ICD-10-CM

## 2019-01-21 DIAGNOSIS — M48.02 SPINAL STENOSIS IN CERVICAL REGION: ICD-10-CM

## 2019-01-21 DIAGNOSIS — M79.601 RIGHT ARM PAIN: ICD-10-CM

## 2019-01-21 DIAGNOSIS — M96.1 CERVICAL POST-LAMINECTOMY SYNDROME: ICD-10-CM

## 2019-01-21 DIAGNOSIS — G90.50 COMPLEX REGIONAL PAIN SYNDROME TYPE 1, AFFECTING UNSPECIFIED SITE: ICD-10-CM

## 2019-01-21 DIAGNOSIS — M62.81 MUSCLE RIGHT ARM WEAKNESS: ICD-10-CM

## 2019-01-21 PROCEDURE — 99499 UNLISTED E&M SERVICE: CPT | Mod: HCNC,S$GLB,, | Performed by: PHYSICAL MEDICINE & REHABILITATION

## 2019-01-21 PROCEDURE — 99999 PR PBB SHADOW E&M-EST. PATIENT-LVL III: ICD-10-PCS | Mod: PBBFAC,HCNC,, | Performed by: PHYSICAL MEDICINE & REHABILITATION

## 2019-01-21 PROCEDURE — 3077F PR MOST RECENT SYSTOLIC BLOOD PRESSURE >= 140 MM HG: ICD-10-PCS | Mod: HCNC,CPTII,S$GLB, | Performed by: PHYSICAL MEDICINE & REHABILITATION

## 2019-01-21 PROCEDURE — 1101F PT FALLS ASSESS-DOCD LE1/YR: CPT | Mod: HCNC,CPTII,S$GLB, | Performed by: PHYSICAL MEDICINE & REHABILITATION

## 2019-01-21 PROCEDURE — 99214 OFFICE O/P EST MOD 30 MIN: CPT | Mod: HCNC,S$GLB,, | Performed by: PHYSICAL MEDICINE & REHABILITATION

## 2019-01-21 PROCEDURE — 3080F PR MOST RECENT DIASTOLIC BLOOD PRESSURE >= 90 MM HG: ICD-10-PCS | Mod: HCNC,CPTII,S$GLB, | Performed by: PHYSICAL MEDICINE & REHABILITATION

## 2019-01-21 PROCEDURE — 99214 PR OFFICE/OUTPT VISIT, EST, LEVL IV, 30-39 MIN: ICD-10-PCS | Mod: HCNC,S$GLB,, | Performed by: PHYSICAL MEDICINE & REHABILITATION

## 2019-01-21 PROCEDURE — 99499 RISK ADDL DX/OHS AUDIT: ICD-10-PCS | Mod: HCNC,S$GLB,, | Performed by: PHYSICAL MEDICINE & REHABILITATION

## 2019-01-21 PROCEDURE — 99999 PR PBB SHADOW E&M-EST. PATIENT-LVL III: CPT | Mod: PBBFAC,HCNC,, | Performed by: PHYSICAL MEDICINE & REHABILITATION

## 2019-01-21 PROCEDURE — 3077F SYST BP >= 140 MM HG: CPT | Mod: HCNC,CPTII,S$GLB, | Performed by: PHYSICAL MEDICINE & REHABILITATION

## 2019-01-21 PROCEDURE — 1101F PR PT FALLS ASSESS DOC 0-1 FALLS W/OUT INJ PAST YR: ICD-10-PCS | Mod: HCNC,CPTII,S$GLB, | Performed by: PHYSICAL MEDICINE & REHABILITATION

## 2019-01-21 PROCEDURE — 3080F DIAST BP >= 90 MM HG: CPT | Mod: HCNC,CPTII,S$GLB, | Performed by: PHYSICAL MEDICINE & REHABILITATION

## 2019-01-21 RX ORDER — HYDROCODONE BITARTRATE AND ACETAMINOPHEN 10; 325 MG/1; MG/1
1 TABLET ORAL EVERY 6 HOURS PRN
Qty: 120 TABLET | Refills: 0 | Status: SHIPPED | OUTPATIENT
Start: 2019-03-21 | End: 2019-04-16 | Stop reason: SDUPTHER

## 2019-01-21 RX ORDER — HYDROCODONE BITARTRATE AND ACETAMINOPHEN 10; 325 MG/1; MG/1
1 TABLET ORAL EVERY 6 HOURS PRN
Qty: 120 TABLET | Refills: 0 | Status: SHIPPED | OUTPATIENT
Start: 2019-02-21 | End: 2019-03-23

## 2019-01-21 RX ORDER — HYDROCODONE BITARTRATE AND ACETAMINOPHEN 10; 325 MG/1; MG/1
1 TABLET ORAL EVERY 6 HOURS PRN
Qty: 120 TABLET | Refills: 0 | Status: SHIPPED | OUTPATIENT
Start: 2019-01-21 | End: 2019-02-20

## 2019-01-21 NOTE — PROGRESS NOTES
Subjective:       Patient ID: Xander Sanchez is a 68 y.o. male.    Chief Complaint: Arm Pain    Neck Pain    Pertinent negatives include no chest pain or headaches. Numbness: Right arm paina nd numbness. Weakness: right arm weak.   Extremity Weakness    Numbness: Right arm paina nd numbness.   Arm Pain    Pertinent negatives include no chest pain. Numbness: Right arm paina nd numbness.      Mr Sanchez is Right handed male, who returns to clinic for right arm pain, and weakness that worsened after second neck surgery with Dr. Meyer.   Third surgery with Dr. De Dios did not changed his symptoms.   He states that all symptoms stayed the same as before surgery.   He reports some improvement in pain, but the tightness, cold sensation in right arm is same as before.  Mr. Sanchez returns to clinic for chronic pain management.    Last clinic visit was on  10/09/18.    Current right arm pain is 6, average pain is 4-5/10, the worst pain is 7/10.   He has not that much of neck pain, current neck pain is 1, worst pain maybe 2.   He complains mainly about tightness, pressure in right arm, in right arm , more in forearm, than above elbow, tightness  runs down the arm to right thumb.   Right arm pain is constant, day and night, intensity changes, pain is worse during day time, does not awake him at night.   In morning feels tight, stiff, as pressure around the arm, also feels swollen and tight in hand, and feels cold at all time.   It hurts more bellow \the elbow than above elbow.   He reports that he does not have feelings, cannot write, does not know if things are going to drop out of his hand.   Sometimes squeezes too hard plastic cup.   Right hand is clumsy, getting smaller, hardly can dress, cannot button shirt.   He states that he is not able to dress. He states that before surgery he has had similar problems, but they just got worse after second surgery.   It is swelling feeling, and tightness that borders him, not that much  pain.   Sometimes right arm feels cold, and cold weather affect him more,  Pain is better controled with Hydrocodone.   He went for CRESCENCIO, once before surgery and few time after surgery, total  > 3   Takes Hydrocodone 10/325 mg PO TID, that brings his pain down to tolerable 2.   He is now taking Neurontin 600 mg po QID, total 2400 mg /day, and tolerates it well.   But, Neurontin does not help much, in his opinion.   Hydrocodone helps and brings pain down to 2, and gives him ~ 5 hrs pain relief.  Also, takes Baclofen 10 mg po TID , for tightness in right arm, that helps also.  Now, awaiting procedure, pain stimulator, that is a little complicated since he has AICD.  Here for follow up, re evaluation and chronic pain management with opiates.    Past Medical History:   Diagnosis Date    Asthma     Cardiomyopathy     Cervical radicular pain     Cervical spondylosis with myelopathy 10/17/2012    Chronic bronchitis     Chronic systolic dysfunction of left ventricle 7/27/2015    Constipation 7/27/2015    COPD (chronic obstructive pulmonary disease)     CRPS (complex regional pain syndrome type I) 12/29/2014    Diabetes mellitus, type 2     DM type 2 (diabetes mellitus, type 2) 7/27/2015    Enlarged prostate 7/27/2015    Enuresis 7/6/2018    Hypertension     ICD (implantable cardiac defibrillator) in place     Mixed hyperlipidemia 2/28/2018    Presence of biventricular AICD 7/27/2015    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thy 2/1/2016    Urinary tract infection     pt does not know       Past Surgical History:   Procedure Laterality Date    BLOCK SELECTIVE NERVE ROOT TRANSFORAMINAL LUMBAR Right 10/9/2014    Performed by Debbie Parsons MD at [facility], [place], operated by [facility] PAIN MGT    CARDIAC DEFIBRILLATOR PLACEMENT      CERVICAL SPINE SURGERY      COLONOSCOPY  golytely N/A 7/19/2017    Performed by [facility]annessa [facility]ribe [facility] at [facility] ENDO    DISCECTOMY, SPINE, CERVICAL, ANTERIOR APPROACH, WITH FUSION N/A 4/10/2014     Performed by Magdaleno Meyer MD at [facility] OR 2ND [facility]LR    EGD (ESOPHAGOGASTRODUODENOSCOPY) N/A 2013    Performed by [facility] at [facility] ENDO    EGD (ESOPHAGOGASTRODUODENOSCOPY) N/A 2013    Performed by [facility] at [facility] ENDO    EXTRACTION-LEAD N/A 3/24/2015    Performed by Braeden Park MD at [facility] CATH LAB    Formainotomy-CERVICAL/FUSION-POSTERIOR N/A 8/10/2015    Performed by David De Dios MD at [facility] OR 2ND [facility]LR    INSERTION-ICD-BIVENTRICULAR N/A 3/24/2015    Performed by Braeden Park MD at [facility] CATH LAB    MYELOGRAM N/A 8/15/2014    [facility] at [facility]ND [facility]    MYELOGRAM N/A 2013    Performed by [facility] at [facility]    PROSTATECTOMY-TRANSURETHRAL W QUANTA LASER N/A 2017    Performed by Graham Buenrostro MD at [facility] OR 1ST FLR    SPINE SURGERY         Family History   Problem Relation Age of Onset    Hypertension Mother     Hypertension Father     COPD Father     No Known Problems Sister     No Known Problems Brother     No Known Problems Daughter     Prostate cancer Neg Hx     Kidney disease Neg Hx        Social History     Socioeconomic History    Marital status:      Spouse name: None    Number of children: None    Years of education: None    Highest education level: None   Social Needs    Financial resource strain: None    Food insecurity - worry: None    Food insecurity - inability: None    Transportation needs - medical: None    Transportation needs - non-medical: None   Occupational History    None   Tobacco Use    Smoking status: Former Smoker     Packs/day: 1.00     Years: 40.00     Pack years: 40.00     Types: Cigarettes     Last attempt to quit: 2009     Years since quittin.5    Smokeless tobacco: Never Used   Substance and Sexual Activity    Alcohol use: Yes     Alcohol/week: 4.2 oz     Types: 6 Cans of beer, 1 Shots of liquor per week    Drug  use: No    Sexual activity: Yes     Partners: Female   Other Topics Concern    None   Social History Narrative    None       Current Outpatient Medications   Medication Sig Dispense Refill    ACCU-CHEK SOFTCLIX LANCETS Misc       aspirin (ECOTRIN) 81 MG EC tablet Take 81 mg by mouth once daily.      baclofen (LIORESAL) 20 MG tablet Take 1 tablet (20 mg total) by mouth 3 (three) times daily. 270 tablet 2    diclofenac sodium 1 % Gel Apply 2 g topically once daily. 100 g 0    gabapentin (NEURONTIN) 600 MG tablet Take 1 tablet (600 mg total) by mouth 4 (four) times daily with meals and nightly. 360 tablet 2    hydroCHLOROthiazide (MICROZIDE) 12.5 mg capsule Take 1 capsule (12.5 mg total) by mouth once daily. 90 capsule 0    HYDROcodone-acetaminophen (NORCO)  mg per tablet Take 1 tablet by mouth every 6 (six) hours as needed. 120 tablet 0    [START ON 2/21/2019] HYDROcodone-acetaminophen (NORCO)  mg per tablet Take 1 tablet by mouth every 6 (six) hours as needed. 120 tablet 0    [START ON 3/21/2019] HYDROcodone-acetaminophen (NORCO)  mg per tablet Take 1 tablet by mouth every 6 (six) hours as needed. 120 tablet 0    ketoconazole (NIZORAL) 2 % cream Apply topically 2 (two) times daily. 30 g 0    lancets Misc 1 lancet by Misc.(Non-Drug; Combo Route) route once daily at 6am. 100 each 3    lisinopril (PRINIVIL,ZESTRIL) 2.5 MG tablet TAKE 1 TABLET EVERY DAY 90 tablet 3    metoprolol succinate (TOPROL-XL) 25 MG 24 hr tablet TAKE 1 TABLET (25 MG TOTAL) BY MOUTH ONCE DAILY. 90 tablet 3    oxybutynin (DITROPAN) 5 MG Tab Take 1 tablet (5 mg total) by mouth 2 (two) times daily. 180 tablet 3    pantoprazole (PROTONIX) 40 MG tablet TAKE 1 TABLET BY MOUTH ONCE DAILY BEFORE BREAKFAST 30 tablet 11    polyethylene glycol (GLYCOLAX) 17 gram/dose powder Take 17 g by mouth once daily. 1530 g 0    pravastatin (PRAVACHOL) 10 MG tablet TAKE 1 TABLET BY MOUTH ONCE DAILY AT BEDTIME 90 tablet 0    tadalafil  (CIALIS) 10 MG tablet Take 1 tablet (10 mg total) by mouth daily as needed for Erectile Dysfunction. 2 tablet 0    tamsulosin (FLOMAX) 0.4 mg Cp24 Take 2 capsules (0.8 mg total) by mouth every evening. 180 capsule 4    TRUE METRIX AIR GLUCOSE METER kit USE AS INSTRUCTED 1 each 0    TRUE METRIX GLUCOSE TEST STRIP Strp TEST  ONE  TIME  DAILY  AT  6AM 100 strip 3    TRUEPLUS LANCETS 33 gauge Misc TEST ONE TIME DAILY  AT  6AM 100 each 3     No current facility-administered medications for this visit.        Review of patient's allergies indicates:  No Known Allergies  Review of Systems   Constitutional: Negative for appetite change and fatigue.   Eyes: Negative for visual disturbance.   Respiratory: Negative for shortness of breath.    Cardiovascular: Negative for chest pain.   Gastrointestinal: Negative for constipation and diarrhea.   Genitourinary: Negative for dysuria, frequency and urgency.   Musculoskeletal: Positive for extremity weakness and neck pain. Negative for arthralgias, back pain, gait problem, joint swelling, myalgias and neck stiffness.   Neurological: Negative for dizziness, tremors and headaches. Weakness: right arm weak. Numbness: Right arm paina nd numbness.   Psychiatric/Behavioral: Negative for dysphoric mood.   All other systems reviewed and are negative.          Objective:      Physical Exam    GENERAL: The patient is alert, oriented, pleasant.   MUSCULOSKELETAL:   Gait: on wide basis, COG moved forward.   GENERAL: The patient is alert, oriented, pleasant.   HEENT; PERRLA  NECK: supple, minimaly webbed neck.   Cervical spine :  Minimally decreased AROM in cervical spine, flexion to 60, extension to 0,,   side bending and rotation to 20-25 degrees with pain.  + mild-moderate paravertebral cervical musculature tenderness on Right.  + mild- moderate tenderness in upper trapezius muscles on Right.   Lumbar spine, full range of motion in all planes, flexion to 90 degrees , ext. 0.  Side bending  and rotation to 25--30 degrees.   Straight leg raising Negative bilaterally.   Full range of motion in all joints x4 extremities.   Muscle strength 5/5 throughout x4 extremities.   No joint laxity throughout x4 extremities.   NEUROLOGIC: Cranial nerves II through XII intact.   Deep tendon reflexes is normal, +2 in the upper and lower extremities bilaterally.   Muscle tone is normal.   Sensory is intact to light touch and pinprick throughout x4 extremities.   Skin: no hypersensitivity, alodynia, no somatosensory changes, other than cold skin in right arm, no atrophic skin changes.        CT of Cervical spine showed:  Stable remote operative change right C3, C4 and C6 hemilaminectomies with metallic laminal plasty.  Operative change with C5/C6 anterior spinal fusion no evidence for hardware failure. with interval reduction of protruding disk at C5/C6.  continued truncation of the cervical spinal cord most pronounced at the C6 level with mild/moderate small caliber of the cord   concerning for myelomalacia stable.  Superimposed multilevel degenerative change most pronounced at C6/C7 with bulging disk resulting in mild central canal stenosis.   Please note there is additional degenerative change with mild neural foraminal stenosis C3/C4 and C4/C5 levels.    Assessment:          1. Complex regional pain syndrome type 2 of right upper extremity    2. Cervical spondylosis with myelopathy    3. HNP (herniated nucleus pulposus) with myelopathy, cervical    4. Chronic pain syndrome    5. Muscle right arm weakness    6. Cervical post-laminectomy syndrome    7. Facet arthritis of cervical region    8. Complex regional pain syndrome type 1, affecting unspecified site    9. Degeneration of cervical intervertebral disc    10. Rotator cuff syndrome of right shoulder    11. Right hand weakness    12. Cervical stenosis of spine    13. Brachial neuritis or radiculitis    14. Right arm weakness    15. Narcotic dependency, continuous     16. Right arm pain    17. Spinal stenosis in cervical region    18. Cervical radicular pain    19. Cervical radiculopathy      Plan:       Complex regional pain syndrome type 2 of right upper extremity  -     HYDROcodone-acetaminophen (NORCO)  mg per tablet; Take 1 tablet by mouth every 6 (six) hours as needed.  Dispense: 120 tablet; Refill: 0  -     HYDROcodone-acetaminophen (NORCO)  mg per tablet; Take 1 tablet by mouth every 6 (six) hours as needed.  Dispense: 120 tablet; Refill: 0  -     HYDROcodone-acetaminophen (NORCO)  mg per tablet; Take 1 tablet by mouth every 6 (six) hours as needed.  Dispense: 120 tablet; Refill: 0    Cervical spondylosis with myelopathy  -     HYDROcodone-acetaminophen (NORCO)  mg per tablet; Take 1 tablet by mouth every 6 (six) hours as needed.  Dispense: 120 tablet; Refill: 0  -     HYDROcodone-acetaminophen (NORCO)  mg per tablet; Take 1 tablet by mouth every 6 (six) hours as needed.  Dispense: 120 tablet; Refill: 0  -     HYDROcodone-acetaminophen (NORCO)  mg per tablet; Take 1 tablet by mouth every 6 (six) hours as needed.  Dispense: 120 tablet; Refill: 0    HNP (herniated nucleus pulposus) with myelopathy, cervical  -     HYDROcodone-acetaminophen (NORCO)  mg per tablet; Take 1 tablet by mouth every 6 (six) hours as needed.  Dispense: 120 tablet; Refill: 0  -     HYDROcodone-acetaminophen (NORCO)  mg per tablet; Take 1 tablet by mouth every 6 (six) hours as needed.  Dispense: 120 tablet; Refill: 0  -     HYDROcodone-acetaminophen (NORCO)  mg per tablet; Take 1 tablet by mouth every 6 (six) hours as needed.  Dispense: 120 tablet; Refill: 0    Chronic pain syndrome  -     HYDROcodone-acetaminophen (NORCO)  mg per tablet; Take 1 tablet by mouth every 6 (six) hours as needed.  Dispense: 120 tablet; Refill: 0  -     HYDROcodone-acetaminophen (NORCO)  mg per tablet; Take 1 tablet by mouth every 6 (six) hours as needed.   Dispense: 120 tablet; Refill: 0  -     HYDROcodone-acetaminophen (NORCO)  mg per tablet; Take 1 tablet by mouth every 6 (six) hours as needed.  Dispense: 120 tablet; Refill: 0    Muscle right arm weakness  -     HYDROcodone-acetaminophen (NORCO)  mg per tablet; Take 1 tablet by mouth every 6 (six) hours as needed.  Dispense: 120 tablet; Refill: 0  -     HYDROcodone-acetaminophen (NORCO)  mg per tablet; Take 1 tablet by mouth every 6 (six) hours as needed.  Dispense: 120 tablet; Refill: 0  -     HYDROcodone-acetaminophen (NORCO)  mg per tablet; Take 1 tablet by mouth every 6 (six) hours as needed.  Dispense: 120 tablet; Refill: 0    Cervical post-laminectomy syndrome  -     HYDROcodone-acetaminophen (NORCO)  mg per tablet; Take 1 tablet by mouth every 6 (six) hours as needed.  Dispense: 120 tablet; Refill: 0  -     HYDROcodone-acetaminophen (NORCO)  mg per tablet; Take 1 tablet by mouth every 6 (six) hours as needed.  Dispense: 120 tablet; Refill: 0  -     HYDROcodone-acetaminophen (NORCO)  mg per tablet; Take 1 tablet by mouth every 6 (six) hours as needed.  Dispense: 120 tablet; Refill: 0    Facet arthritis of cervical region  -     HYDROcodone-acetaminophen (NORCO)  mg per tablet; Take 1 tablet by mouth every 6 (six) hours as needed.  Dispense: 120 tablet; Refill: 0  -     HYDROcodone-acetaminophen (NORCO)  mg per tablet; Take 1 tablet by mouth every 6 (six) hours as needed.  Dispense: 120 tablet; Refill: 0  -     HYDROcodone-acetaminophen (NORCO)  mg per tablet; Take 1 tablet by mouth every 6 (six) hours as needed.  Dispense: 120 tablet; Refill: 0    Complex regional pain syndrome type 1, affecting unspecified site  -     HYDROcodone-acetaminophen (NORCO)  mg per tablet; Take 1 tablet by mouth every 6 (six) hours as needed.  Dispense: 120 tablet; Refill: 0  -     HYDROcodone-acetaminophen (NORCO)  mg per tablet; Take 1 tablet by mouth every 6  (six) hours as needed.  Dispense: 120 tablet; Refill: 0  -     HYDROcodone-acetaminophen (NORCO)  mg per tablet; Take 1 tablet by mouth every 6 (six) hours as needed.  Dispense: 120 tablet; Refill: 0    Degeneration of cervical intervertebral disc  -     HYDROcodone-acetaminophen (NORCO)  mg per tablet; Take 1 tablet by mouth every 6 (six) hours as needed.  Dispense: 120 tablet; Refill: 0  -     HYDROcodone-acetaminophen (NORCO)  mg per tablet; Take 1 tablet by mouth every 6 (six) hours as needed.  Dispense: 120 tablet; Refill: 0  -     HYDROcodone-acetaminophen (NORCO)  mg per tablet; Take 1 tablet by mouth every 6 (six) hours as needed.  Dispense: 120 tablet; Refill: 0    Rotator cuff syndrome of right shoulder  -     HYDROcodone-acetaminophen (NORCO)  mg per tablet; Take 1 tablet by mouth every 6 (six) hours as needed.  Dispense: 120 tablet; Refill: 0  -     HYDROcodone-acetaminophen (NORCO)  mg per tablet; Take 1 tablet by mouth every 6 (six) hours as needed.  Dispense: 120 tablet; Refill: 0  -     HYDROcodone-acetaminophen (NORCO)  mg per tablet; Take 1 tablet by mouth every 6 (six) hours as needed.  Dispense: 120 tablet; Refill: 0    Right hand weakness  -     HYDROcodone-acetaminophen (NORCO)  mg per tablet; Take 1 tablet by mouth every 6 (six) hours as needed.  Dispense: 120 tablet; Refill: 0  -     HYDROcodone-acetaminophen (NORCO)  mg per tablet; Take 1 tablet by mouth every 6 (six) hours as needed.  Dispense: 120 tablet; Refill: 0  -     HYDROcodone-acetaminophen (NORCO)  mg per tablet; Take 1 tablet by mouth every 6 (six) hours as needed.  Dispense: 120 tablet; Refill: 0    Cervical stenosis of spine  -     HYDROcodone-acetaminophen (NORCO)  mg per tablet; Take 1 tablet by mouth every 6 (six) hours as needed.  Dispense: 120 tablet; Refill: 0  -     HYDROcodone-acetaminophen (NORCO)  mg per tablet; Take 1 tablet by mouth every 6 (six)  hours as needed.  Dispense: 120 tablet; Refill: 0  -     HYDROcodone-acetaminophen (NORCO)  mg per tablet; Take 1 tablet by mouth every 6 (six) hours as needed.  Dispense: 120 tablet; Refill: 0    Brachial neuritis or radiculitis  -     HYDROcodone-acetaminophen (NORCO)  mg per tablet; Take 1 tablet by mouth every 6 (six) hours as needed.  Dispense: 120 tablet; Refill: 0  -     HYDROcodone-acetaminophen (NORCO)  mg per tablet; Take 1 tablet by mouth every 6 (six) hours as needed.  Dispense: 120 tablet; Refill: 0  -     HYDROcodone-acetaminophen (NORCO)  mg per tablet; Take 1 tablet by mouth every 6 (six) hours as needed.  Dispense: 120 tablet; Refill: 0    Right arm weakness  -     HYDROcodone-acetaminophen (NORCO)  mg per tablet; Take 1 tablet by mouth every 6 (six) hours as needed.  Dispense: 120 tablet; Refill: 0  -     HYDROcodone-acetaminophen (NORCO)  mg per tablet; Take 1 tablet by mouth every 6 (six) hours as needed.  Dispense: 120 tablet; Refill: 0  -     HYDROcodone-acetaminophen (NORCO)  mg per tablet; Take 1 tablet by mouth every 6 (six) hours as needed.  Dispense: 120 tablet; Refill: 0    Narcotic dependency, continuous  -     HYDROcodone-acetaminophen (NORCO)  mg per tablet; Take 1 tablet by mouth every 6 (six) hours as needed.  Dispense: 120 tablet; Refill: 0  -     HYDROcodone-acetaminophen (NORCO)  mg per tablet; Take 1 tablet by mouth every 6 (six) hours as needed.  Dispense: 120 tablet; Refill: 0  -     HYDROcodone-acetaminophen (NORCO)  mg per tablet; Take 1 tablet by mouth every 6 (six) hours as needed.  Dispense: 120 tablet; Refill: 0    Right arm pain  -     HYDROcodone-acetaminophen (NORCO)  mg per tablet; Take 1 tablet by mouth every 6 (six) hours as needed.  Dispense: 120 tablet; Refill: 0  -     HYDROcodone-acetaminophen (NORCO)  mg per tablet; Take 1 tablet by mouth every 6 (six) hours as needed.  Dispense: 120  tablet; Refill: 0  -     HYDROcodone-acetaminophen (NORCO)  mg per tablet; Take 1 tablet by mouth every 6 (six) hours as needed.  Dispense: 120 tablet; Refill: 0    Spinal stenosis in cervical region  -     HYDROcodone-acetaminophen (NORCO)  mg per tablet; Take 1 tablet by mouth every 6 (six) hours as needed.  Dispense: 120 tablet; Refill: 0  -     HYDROcodone-acetaminophen (NORCO)  mg per tablet; Take 1 tablet by mouth every 6 (six) hours as needed.  Dispense: 120 tablet; Refill: 0  -     HYDROcodone-acetaminophen (NORCO)  mg per tablet; Take 1 tablet by mouth every 6 (six) hours as needed.  Dispense: 120 tablet; Refill: 0    Cervical radicular pain  -     HYDROcodone-acetaminophen (NORCO)  mg per tablet; Take 1 tablet by mouth every 6 (six) hours as needed.  Dispense: 120 tablet; Refill: 0  -     HYDROcodone-acetaminophen (NORCO)  mg per tablet; Take 1 tablet by mouth every 6 (six) hours as needed.  Dispense: 120 tablet; Refill: 0  -     HYDROcodone-acetaminophen (NORCO)  mg per tablet; Take 1 tablet by mouth every 6 (six) hours as needed.  Dispense: 120 tablet; Refill: 0    Cervical radiculopathy  -     HYDROcodone-acetaminophen (NORCO)  mg per tablet; Take 1 tablet by mouth every 6 (six) hours as needed.  Dispense: 120 tablet; Refill: 0  -     HYDROcodone-acetaminophen (NORCO)  mg per tablet; Take 1 tablet by mouth every 6 (six) hours as needed.  Dispense: 120 tablet; Refill: 0  -     HYDROcodone-acetaminophen (NORCO)  mg per tablet; Take 1 tablet by mouth every 6 (six) hours as needed.  Dispense: 120 tablet; Refill: 0        Patient with cervical myelopathy, with tightness and pain is Right UE, s/p Cervical fusion, here for chronic pain management.     1. Pain management : Gabapentin , increased to 600 mg po QID, and Hydrocodone 10/325 mg PO QID ( #120 tabs,2 refills).  Opioid Risk Score       Value Time User    Opioid Risk Score  5 1/21/2019  9:06 AM  Sara Ruiz MD         reviewed and appropriate.  Hydrocodone refill on 12/22, 11/21, 10/22/18   UDS done on 10/23, and positive for Hydrocodone.      2. Spasticity, will resume Baclofen to 20 mg PO TID.    RTC in 3-4  months.    Total time spent face to face with patient was 25 minutes.   More than 50% of that time was spent in counseling on diagnosis , prognosis and treatment options.   I also  patient  on common and most usual side effect of prescribed medications. Risk and benefits of opiates, possible risk of developing opiate dependence and tolerance, need of strict compliance with prescribed medications.  Pain contract, rules and obligations were discussed with patient in details.  He is aware that I would be the only doctor prescribing him pain medications and ED in a case of emergency.  I reviewed Primary care , and other specialty's notes to better coordinate patient's  care.   All questions were answered, and patient voiced understanding.

## 2019-02-04 ENCOUNTER — OFFICE VISIT (OUTPATIENT)
Dept: FAMILY MEDICINE | Facility: CLINIC | Age: 69
End: 2019-02-04
Payer: MEDICARE

## 2019-02-04 VITALS
SYSTOLIC BLOOD PRESSURE: 130 MMHG | DIASTOLIC BLOOD PRESSURE: 80 MMHG | HEART RATE: 86 BPM | WEIGHT: 184.94 LBS | OXYGEN SATURATION: 97 % | HEIGHT: 71 IN | BODY MASS INDEX: 25.89 KG/M2

## 2019-02-04 DIAGNOSIS — E78.2 MIXED HYPERLIPIDEMIA: ICD-10-CM

## 2019-02-04 DIAGNOSIS — E66.3 OVERWEIGHT (BMI 25.0-29.9): ICD-10-CM

## 2019-02-04 DIAGNOSIS — I70.0 AORTIC ATHEROSCLEROSIS: ICD-10-CM

## 2019-02-04 DIAGNOSIS — I42.8 CARDIOMYOPATHY, NONISCHEMIC: ICD-10-CM

## 2019-02-04 DIAGNOSIS — J43.9 PULMONARY EMPHYSEMA, UNSPECIFIED EMPHYSEMA TYPE: ICD-10-CM

## 2019-02-04 DIAGNOSIS — E11.40 TYPE 2 DIABETES MELLITUS WITH DIABETIC NEUROPATHY, WITHOUT LONG-TERM CURRENT USE OF INSULIN: ICD-10-CM

## 2019-02-04 DIAGNOSIS — N13.8 BPH WITH URINARY OBSTRUCTION: ICD-10-CM

## 2019-02-04 DIAGNOSIS — M47.812 FACET ARTHRITIS OF CERVICAL REGION: ICD-10-CM

## 2019-02-04 DIAGNOSIS — G95.9 CERVICAL MYELOPATHY: ICD-10-CM

## 2019-02-04 DIAGNOSIS — K21.9 GASTROESOPHAGEAL REFLUX DISEASE, ESOPHAGITIS PRESENCE NOT SPECIFIED: ICD-10-CM

## 2019-02-04 DIAGNOSIS — Z00.00 ENCOUNTER FOR PREVENTIVE HEALTH EXAMINATION: Primary | ICD-10-CM

## 2019-02-04 DIAGNOSIS — F11.20 NARCOTIC DEPENDENCY, CONTINUOUS: ICD-10-CM

## 2019-02-04 DIAGNOSIS — I10 ESSENTIAL HYPERTENSION: ICD-10-CM

## 2019-02-04 DIAGNOSIS — N40.1 BPH WITH URINARY OBSTRUCTION: ICD-10-CM

## 2019-02-04 PROCEDURE — 3079F DIAST BP 80-89 MM HG: CPT | Mod: HCNC,CPTII,S$GLB, | Performed by: NURSE PRACTITIONER

## 2019-02-04 PROCEDURE — G0439 PR MEDICARE ANNUAL WELLNESS SUBSEQUENT VISIT: ICD-10-PCS | Mod: HCNC,S$GLB,, | Performed by: NURSE PRACTITIONER

## 2019-02-04 PROCEDURE — G0439 PPPS, SUBSEQ VISIT: HCPCS | Mod: HCNC,S$GLB,, | Performed by: NURSE PRACTITIONER

## 2019-02-04 PROCEDURE — 3044F HG A1C LEVEL LT 7.0%: CPT | Mod: HCNC,CPTII,S$GLB, | Performed by: NURSE PRACTITIONER

## 2019-02-04 PROCEDURE — 99999 PR PBB SHADOW E&M-EST. PATIENT-LVL V: CPT | Mod: PBBFAC,HCNC,, | Performed by: NURSE PRACTITIONER

## 2019-02-04 PROCEDURE — 99499 UNLISTED E&M SERVICE: CPT | Mod: HCNC,S$GLB,, | Performed by: NURSE PRACTITIONER

## 2019-02-04 PROCEDURE — 99499 RISK ADDL DX/OHS AUDIT: ICD-10-PCS | Mod: HCNC,S$GLB,, | Performed by: NURSE PRACTITIONER

## 2019-02-04 PROCEDURE — 3044F PR MOST RECENT HEMOGLOBIN A1C LEVEL <7.0%: ICD-10-PCS | Mod: HCNC,CPTII,S$GLB, | Performed by: NURSE PRACTITIONER

## 2019-02-04 PROCEDURE — 3079F PR MOST RECENT DIASTOLIC BLOOD PRESSURE 80-89 MM HG: ICD-10-PCS | Mod: HCNC,CPTII,S$GLB, | Performed by: NURSE PRACTITIONER

## 2019-02-04 PROCEDURE — 3075F SYST BP GE 130 - 139MM HG: CPT | Mod: HCNC,CPTII,S$GLB, | Performed by: NURSE PRACTITIONER

## 2019-02-04 PROCEDURE — 99999 PR PBB SHADOW E&M-EST. PATIENT-LVL V: ICD-10-PCS | Mod: PBBFAC,HCNC,, | Performed by: NURSE PRACTITIONER

## 2019-02-04 PROCEDURE — 3075F PR MOST RECENT SYSTOLIC BLOOD PRESS GE 130-139MM HG: ICD-10-PCS | Mod: HCNC,CPTII,S$GLB, | Performed by: NURSE PRACTITIONER

## 2019-02-04 NOTE — PROGRESS NOTES
"Xander Sanchez presented for a  Medicare AWV and comprehensive Health Risk Assessment today. The following components were reviewed and updated:    · Medical history  · Family History  · Social history  · Allergies and Current Medications  · Health Risk Assessment  · Health Maintenance  · Care Team     ** See Completed Assessments for Annual Wellness Visit within the encounter summary.**       The following assessments were completed:  · Living Situation  · CAGE  · Depression Screening  · Timed Get Up and Go  · Whisper Test  · Cognitive Function Screening  · Nutrition Screening  · ADL Screening  · PAQ Screening    Vitals:    02/04/19 0749   BP: 130/80   BP Location: Left arm   Patient Position: Sitting   BP Method: Medium (Manual)   Pulse: 86   SpO2: 97%   Weight: 83.9 kg (184 lb 15.5 oz)   Height: 5' 11" (1.803 m)     Body mass index is 25.8 kg/m².     Physical Exam   Constitutional: He is oriented to person, place, and time. He appears well-developed and well-nourished. No distress.   HENT:   Head: Normocephalic and atraumatic.   Eyes: EOM are normal. Pupils are equal, round, and reactive to light.   Neck: Neck supple. No JVD present. No tracheal deviation present.   Cardiovascular: Normal rate, regular rhythm, normal heart sounds and intact distal pulses.   No murmur heard.  Pulmonary/Chest: Effort normal and breath sounds normal. No respiratory distress. He has no wheezes. He has no rales.   Abdominal: Soft. Bowel sounds are normal. He exhibits no distension and no mass. There is no tenderness.   Musculoskeletal: Normal range of motion. He exhibits no edema or tenderness.   Neurological: He is alert and oriented to person, place, and time. Coordination normal.   Skin: Skin is warm and dry. No erythema. No pallor.   Psychiatric: He has a normal mood and affect. His behavior is normal. Judgment and thought content normal. Cognition and memory are normal. He expresses no homicidal and no suicidal ideation. "   Nursing note and vitals reviewed.        Diagnoses and health risks identified today and associated recommendations/orders:    1. Encounter for preventive health examination    2. Essential hypertension  Chronic; stable on medication.  Followed by PCP.    3. Mixed hyperlipidemia  Chronic; stable on medication.  Followed by PCP.    4. Aortic atherosclerosis  Chronic; stable.  Followed by Cardiology.    5. Cardiomyopathy, nonischemic  Chronic; stable on medication.  Followed by Cardiology.    6. 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thy, without long-term current use of insulin  Chronic; stable on medication.  Followed by PCP and Podiatry.    7. Pulmonary emphysema, unspecified emphysema type  Chronic; stable.  Followed by PCP.    8. Facet arthritis of cervical region  Chronic; stable on medication.  Followed by Physical Medicine.    9. Cervical myelopathy  Chronic; stable on medication.  Followed by Physical Medicine.    10. Narcotic dependency, continuous  Chronic; stable.  Followed by PCP.    11. Gastroesophageal reflux disease, esophagitis presence not specified  Chronic; stable on medication.  Followed by PCP.    12. BPH with urinary obstruction  Chronic; stable on medication.  Followed by Urology.    13. Overweight (BMI 25.0-29.9)  Chronic, stable. Therapeutic lifestyle changes discussed. Followed by PCP.      Provided Xander with a 5-10 year written screening schedule and personal prevention plan. Recommendations were developed using the USPSTF age appropriate recommendations. Education, counseling, and referrals were provided as needed. After Visit Summary printed and given to patient which includes a list of additional screenings\tests needed.    Follow-up in 3 months (on 4/24/2019) for follow-up with PCP, Annual Wellness Visit in 1 year.    Pooja Duong NP

## 2019-02-04 NOTE — PATIENT INSTRUCTIONS
Counseling and Referral of Other Preventative  (Italic type indicates deductible and co-insurance are waived)    Patient Name: Xander Sanchez  Today's Date: 2/4/2019    Health Maintenance       Date Due Completion Date    Sign Pain Contract 05/09/1966 ---    LDCT Lung Screen 05/09/2003 ---    Hemoglobin A1c 04/25/2019 10/25/2018    Eye Exam 08/21/2019 8/21/2018    Override on 12/20/2017: Done    Urine Drug Screen 10/23/2019 10/23/2018    Lipid Panel 10/25/2019 10/25/2018    High Dose Statin 11/30/2019 11/30/2018    Foot Exam 11/30/2019 11/30/2018    TETANUS VACCINE 08/17/2026 8/17/2016    Colonoscopy 07/19/2027 7/19/2017        No orders of the defined types were placed in this encounter.    The following information is provided to all patients.  This information is to help you find resources for any of the problems found today that may be affecting your health:                Living healthy guide: www.[facility].[place].[facility]      Understanding Diabetes: www.diabetes.org      Eating healthy: www.cdc.gov/healthyweight      CDC home safety checklist: www.cdc.gov/steadi/patient.html      Agency on Aging: www.goea.[place].gov      Alcoholics anonymous (AA): www.aa.org      Physical Activity: www.juvencio.nih.gov/rm4ndlm      Tobacco use: www.quitwithusla.org

## 2019-02-21 RX ORDER — PRAVASTATIN SODIUM 10 MG/1
TABLET ORAL
Qty: 90 TABLET | Refills: 0 | Status: SHIPPED | OUTPATIENT
Start: 2019-02-21 | End: 2019-05-30 | Stop reason: SDUPTHER

## 2019-03-06 DIAGNOSIS — M79.89 FOOT SWELLING: ICD-10-CM

## 2019-03-06 DIAGNOSIS — I10 ESSENTIAL HYPERTENSION: ICD-10-CM

## 2019-03-06 DIAGNOSIS — R80.9 MICROALBUMINURIA: ICD-10-CM

## 2019-03-07 RX ORDER — HYDROCHLOROTHIAZIDE 12.5 MG/1
CAPSULE ORAL
Qty: 90 CAPSULE | Refills: 3 | Status: SHIPPED | OUTPATIENT
Start: 2019-03-07 | End: 2020-01-09

## 2019-03-07 RX ORDER — LANCETS 33 GAUGE
EACH MISCELLANEOUS
Qty: 100 EACH | Refills: 3 | Status: SHIPPED | OUTPATIENT
Start: 2019-03-07 | End: 2020-01-09

## 2019-03-07 RX ORDER — METOPROLOL SUCCINATE 25 MG/1
TABLET, EXTENDED RELEASE ORAL
Qty: 90 TABLET | Refills: 3 | Status: SHIPPED | OUTPATIENT
Start: 2019-03-07 | End: 2020-01-09

## 2019-03-07 RX ORDER — LISINOPRIL 2.5 MG/1
TABLET ORAL
Qty: 90 TABLET | Refills: 3 | Status: SHIPPED | OUTPATIENT
Start: 2019-03-07 | End: 2019-04-24

## 2019-03-14 ENCOUNTER — HOSPITAL ENCOUNTER (OUTPATIENT)
Dept: CARDIOLOGY | Facility: CLINIC | Age: 69
Discharge: HOME | End: 2019-03-14
Attending: INTERNAL MEDICINE
Payer: MEDICARE

## 2019-03-14 ENCOUNTER — OFFICE VISIT (OUTPATIENT)
Dept: ELECTROPHYSIOLOGY | Facility: CLINIC | Age: 69
End: 2019-03-14
Attending: INTERNAL MEDICINE
Payer: MEDICARE

## 2019-03-14 ENCOUNTER — CLINICAL SUPPORT (OUTPATIENT)
Dept: CARDIOLOGY | Facility: HOSPITAL | Age: 69
End: 2019-03-14
Attending: INTERNAL MEDICINE
Payer: MEDICARE

## 2019-03-14 VITALS
HEIGHT: 71 IN | DIASTOLIC BLOOD PRESSURE: 72 MMHG | BODY MASS INDEX: 25.46 KG/M2 | SYSTOLIC BLOOD PRESSURE: 118 MMHG | HEART RATE: 50 BPM | WEIGHT: 181.88 LBS

## 2019-03-14 DIAGNOSIS — Z95.810 CARDIAC DEFIBRILLATOR IN PLACE: ICD-10-CM

## 2019-03-14 DIAGNOSIS — I10 ESSENTIAL HYPERTENSION: ICD-10-CM

## 2019-03-14 DIAGNOSIS — I42.8 CARDIOMYOPATHY, NONISCHEMIC: ICD-10-CM

## 2019-03-14 DIAGNOSIS — I44.7 LBBB (LEFT BUNDLE BRANCH BLOCK): ICD-10-CM

## 2019-03-14 DIAGNOSIS — Z95.810 BIVENTRICULAR IMPLANTABLE CARDIOVERTER-DEFIBRILLATOR (ICD) IN SITU: Primary | ICD-10-CM

## 2019-03-14 PROCEDURE — 99999 PR PBB SHADOW E&M-EST. PATIENT-LVL III: ICD-10-PCS | Mod: PBBFAC,HCNC,, | Performed by: NURSE PRACTITIONER

## 2019-03-14 PROCEDURE — 3074F SYST BP LT 130 MM HG: CPT | Mod: HCNC,CPTII,S$GLB, | Performed by: NURSE PRACTITIONER

## 2019-03-14 PROCEDURE — 3078F DIAST BP <80 MM HG: CPT | Mod: HCNC,CPTII,S$GLB, | Performed by: NURSE PRACTITIONER

## 2019-03-14 PROCEDURE — 1101F PT FALLS ASSESS-DOCD LE1/YR: CPT | Mod: HCNC,CPTII,S$GLB, | Performed by: NURSE PRACTITIONER

## 2019-03-14 PROCEDURE — 1101F PR PT FALLS ASSESS DOC 0-1 FALLS W/OUT INJ PAST YR: ICD-10-PCS | Mod: HCNC,CPTII,S$GLB, | Performed by: NURSE PRACTITIONER

## 2019-03-14 PROCEDURE — 93000 RHYTHM STRIP: ICD-10-PCS | Mod: HCNC,S$GLB,, | Performed by: INTERNAL MEDICINE

## 2019-03-14 PROCEDURE — 93000 ELECTROCARDIOGRAM COMPLETE: CPT | Mod: HCNC,S$GLB,, | Performed by: INTERNAL MEDICINE

## 2019-03-14 PROCEDURE — 3078F PR MOST RECENT DIASTOLIC BLOOD PRESSURE < 80 MM HG: ICD-10-PCS | Mod: HCNC,CPTII,S$GLB, | Performed by: NURSE PRACTITIONER

## 2019-03-14 PROCEDURE — 99999 PR PBB SHADOW E&M-EST. PATIENT-LVL III: CPT | Mod: PBBFAC,HCNC,, | Performed by: NURSE PRACTITIONER

## 2019-03-14 PROCEDURE — 99214 PR OFFICE/OUTPT VISIT, EST, LEVL IV, 30-39 MIN: ICD-10-PCS | Mod: HCNC,S$GLB,, | Performed by: NURSE PRACTITIONER

## 2019-03-14 PROCEDURE — 99214 OFFICE O/P EST MOD 30 MIN: CPT | Mod: HCNC,S$GLB,, | Performed by: NURSE PRACTITIONER

## 2019-03-14 PROCEDURE — 93284 PRGRMG EVAL IMPLANTABLE DFB: CPT | Mod: HCNC

## 2019-03-14 PROCEDURE — 3074F PR MOST RECENT SYSTOLIC BLOOD PRESSURE < 130 MM HG: ICD-10-PCS | Mod: HCNC,CPTII,S$GLB, | Performed by: NURSE PRACTITIONER

## 2019-03-14 NOTE — LETTER
March 14, 2019      Braeden Park MD  [address]  [facility] [address]           [facility]ade[facility] Jackson - Arrhythmia  [address]  [facility] [address]  Phone: [phone]  Fax: [phone]          Patient: Xander Sanchez   MR Number: [id number]   YOB: 1950   Date of Visit: 3/14/2019       Dear Dr. rBaeden Park:    Thank you for referring Xander Sanchez to me for evaluation. Attached you will find relevant portions of my assessment and plan of care.    If you have questions, please do not hesitate to call me. I look forward to following Xander Sanchez along with you.    Sincerely,    Nithya Whatley, ANNMARIE    Enclosure  CC:  No Recipients    If you would like to receive this communication electronically, please contact [email or web address] or [phone] to request more information on [employer] Link access.    For providers and/or their staff who would like to refer a patient to [facility], please contact us through our one-stop-shop provider referral line, [facility] [place], at [phone].    If you feel you have received this communication in error or would no longer like to receive these types of communications, please e-mail [email or web address]

## 2019-03-14 NOTE — PROGRESS NOTES
"Mr. Sanchez is a patient of Dr. Navarro and was last seen in clinic 2/15/2018.      Subjective:   Patient ID:  Xander Sanchez is a 68 y.o. male who presents for follow-up of Pacemaker Check  .     HPI:    Mr. Sanchez is a 68 y.o. male with NICM (EF now 40%), LBBB, COPD, CRT-D (LV lead off) here for annual follow up.     Background:    He was diagnosed with NICM with a LBBB at [facility] in approx ~2005, [facility] approx 5-6 years ago per patient at  that was "no blockages"  A single chamber ICD implanted for primary prevention, since he has been [place] [facility] [place] LBBB resolved and NICM normalized.   He was scheduled for a generator change, noted to be in LBBB again ( with correlative NYHA III symptoms ) and fluoroscopy of the RV (Riata) revealed lead externalization but a patent left sided venous system. Echo revealed EF 25%.  3/24/15 Extraction of ICD lead and implantation of CRT-D (Dr. Mensah). Paucity of LV vein targets, has high LV threshold.  Did not derive symptomatic benefit from upgrade, and EF remained unchanged at 30%.  We turned off LV lead 11/16 to conserve battery.    Update (03/14/2019):    Today he says he continues to feel well. No cardiac complaints. Mr. Sanchez denies chest pain with exertion or at rest, palpitations, SOB, MURDOCK, dizziness, or syncope.    Device Interrogation (3/14/2019) reveals an intrinsic sinus rhythm with stable lead and device function. No arrhythmias or treated episodes were noted.  He paces 8.6% in the RA and <1% in the RV. Estimated battery longevity 3.4 years.     I have personally reviewed the patient's EKG today, which shows sinus bradycardia at 50bpm. ND interval is 166. QT is 402.    Recent Cardiac Tests:    2D Echo (2/15/2018):  CONCLUSIONS     1 - Mildly to moderately depressed left ventricular systolic function (EF 40%).     2 - No wall motion abnormalities.     3 - Impaired LV relaxation, normal LAP (grade 1 diastolic dysfunction).     4 - Normal right " ventricular systolic function .     5 - The estimated PA systolic pressure is greater than 36 mmHg.     6 - Mild mitral regurgitation.     7 - Mild tricuspid regurgitation.     Current Outpatient Medications   Medication Sig    aspirin (ECOTRIN) 81 MG EC tablet Take 81 mg by mouth once daily.    baclofen (LIORESAL) 20 MG tablet Take 1 tablet (20 mg total) by mouth 3 (three) times daily.    gabapentin (NEURONTIN) 600 MG tablet Take 1 tablet (600 mg total) by mouth 4 (four) times daily with meals and nightly.    hydroCHLOROthiazide (MICROZIDE) 12.5 mg capsule TAKE 1 CAPSULE ONE TIME DAILY    HYDROcodone-acetaminophen (NORCO)  mg per tablet Take 1 tablet by mouth every 6 (six) hours as needed.    [START ON 3/21/2019] HYDROcodone-acetaminophen (NORCO)  mg per tablet Take 1 tablet by mouth every 6 (six) hours as needed.    lisinopril (PRINIVIL,ZESTRIL) 2.5 MG tablet TAKE 1 TABLET EVERY DAY    metoprolol succinate (TOPROL-XL) 25 MG 24 hr tablet TAKE 1 TABLET EVERY DAY    oxybutynin (DITROPAN) 5 MG Tab Take 1 tablet (5 mg total) by mouth 2 (two) times daily.    polyethylene glycol (GLYCOLAX) 17 gram/dose powder Take 17 g by mouth once daily.    pravastatin (PRAVACHOL) 10 MG tablet TAKE 1 TABLET BY MOUTH ONCE DAILY AT BEDTIME    tamsulosin (FLOMAX) 0.4 mg Cp24 Take 2 capsules (0.8 mg total) by mouth every evening.    TRUE METRIX AIR GLUCOSE METER kit USE AS INSTRUCTED    TRUE METRIX GLUCOSE TEST STRIP Strp TEST  ONE  TIME  DAILY  AT  6AM    TRUEPLUS LANCETS 33 gauge Misc TEST ONE TIME DAILY  AT  6AM    pantoprazole (PROTONIX) 40 MG tablet TAKE 1 TABLET BY MOUTH ONCE DAILY BEFORE BREAKFAST     No current facility-administered medications for this visit.        Review of Systems   Constitution: Negative for malaise/fatigue.   Cardiovascular: Negative for chest pain, dyspnea on exertion, irregular heartbeat, leg swelling and palpitations.   Respiratory: Negative for shortness of breath.   "  Hematologic/Lymphatic: Negative for bleeding problem.   Skin: Negative for rash.   Musculoskeletal: Negative for myalgias.   Gastrointestinal: Negative for hematemesis, hematochezia and nausea.   Genitourinary: Negative for hematuria.   Neurological: Negative for light-headedness.   Psychiatric/Behavioral: Negative for altered mental status.   Allergic/Immunologic: Negative for persistent infections.     Objective:          /72   Pulse (!) 50   Ht 5' 11" (1.803 m)   Wt 82.5 kg (181 lb 14.1 oz)   BMI 25.37 kg/m²     Physical Exam   Constitutional: He is oriented to person, place, and time. He appears well-developed and well-nourished.   HENT:   Head: Normocephalic.   Nose: Nose normal.   Eyes: Pupils are equal, round, and reactive to light.   Cardiovascular: Regular rhythm, S1 normal and S2 normal. Bradycardia present.   No murmur heard.  Pulses:       Radial pulses are 2+ on the right side, and 2+ on the left side.   Pulmonary/Chest: Breath sounds normal. No respiratory distress.   Device to LUCW.   Abdominal: Normal appearance.   Musculoskeletal: Normal range of motion. He exhibits no edema.   Neurological: He is alert and oriented to person, place, and time.   Skin: Skin is warm and dry. No erythema.   Psychiatric: He has a normal mood and affect. His speech is normal and behavior is normal.   Nursing note and vitals reviewed.    Lab Results   Component Value Date     10/25/2018    K 4.2 10/25/2018    MG 2.2 04/08/2013    BUN 11 10/25/2018    CREATININE 0.9 10/25/2018    ALT 18 10/25/2018    AST 20 10/25/2018    HGB 14.2 10/25/2018    HCT 43.8 10/25/2018    HCT 31 (L) 08/10/2015    TSH 1.158 10/25/2018    LDLCALC 67.8 10/25/2018           Assessment:     1. Biventricular implantable cardioverter-defibrillator (ICD) in situ    2. Cardiomyopathy, nonischemic    3. LBBB (left bundle branch block)    4. Essential hypertension      Plan:     In summary, Mr. Sanchez is a 68 y.o. male with NICM, LBBB, " CRT-D here for annual follow up.   Mr. Sanchez is doing well from a device perspective with stable lead and device function. No arrhythmia noted. No ventricular pacing. EF 40% per echo 2/2018. No CHF symptoms.    Continue current medication regimen and device settings.   Follow up in device clinic as scheduled.   Follow up in EP clinic in 1 year, sooner as needed.     *A copy of this note has been sent to Dr. Navarro*    Follow-up in about 1 year (around 3/14/2020).    ------------------------------------------------------------------    RACHEL Grace, NP-C  [facility]

## 2019-04-15 ENCOUNTER — OFFICE VISIT (OUTPATIENT)
Dept: OPTOMETRY | Facility: CLINIC | Age: 69
End: 2019-04-15
Payer: COMMERCIAL

## 2019-04-15 DIAGNOSIS — H52.203 HYPEROPIA WITH ASTIGMATISM AND PRESBYOPIA, BILATERAL: ICD-10-CM

## 2019-04-15 DIAGNOSIS — H52.4 HYPEROPIA WITH ASTIGMATISM AND PRESBYOPIA, BILATERAL: ICD-10-CM

## 2019-04-15 DIAGNOSIS — Z01.00 EYE EXAM, ROUTINE: Primary | ICD-10-CM

## 2019-04-15 DIAGNOSIS — H52.03 HYPEROPIA WITH ASTIGMATISM AND PRESBYOPIA, BILATERAL: ICD-10-CM

## 2019-04-15 PROCEDURE — 92015 PR REFRACTION: ICD-10-PCS | Mod: HCNC,S$GLB,, | Performed by: OPTOMETRIST

## 2019-04-15 PROCEDURE — 92014 PR EYE EXAM, EST PATIENT,COMPREHESV: ICD-10-PCS | Mod: HCNC,S$GLB,, | Performed by: OPTOMETRIST

## 2019-04-15 PROCEDURE — 99999 PR PBB SHADOW E&M-EST. PATIENT-LVL III: CPT | Mod: PBBFAC,HCNC,, | Performed by: OPTOMETRIST

## 2019-04-15 PROCEDURE — 92014 COMPRE OPH EXAM EST PT 1/>: CPT | Mod: HCNC,S$GLB,, | Performed by: OPTOMETRIST

## 2019-04-15 PROCEDURE — 92015 DETERMINE REFRACTIVE STATE: CPT | Mod: HCNC,S$GLB,, | Performed by: OPTOMETRIST

## 2019-04-15 PROCEDURE — 99999 PR PBB SHADOW E&M-EST. PATIENT-LVL III: ICD-10-PCS | Mod: PBBFAC,HCNC,, | Performed by: OPTOMETRIST

## 2019-04-15 NOTE — PROGRESS NOTES
HPI     68 year old male presents for routine eyemed exam OU.  Pt states that he feels as though vision has decreased for distance and   near since last exam. Pt denies any glare or halos around lights while   driving at night. Denies any flashes or floaters OU. No pain or discomfort   OU. No use of eye gtts at this time.    Pt is considered pre-diabetic x 2 years   this am    Hemoglobin A1C       Date                     Value               Ref Range             Status           10/25/2018               6.2 (H)             4.0 - 5.6 %         Final              02/27/2018               6.1 (H)             4.0 - 5.6 %         Final              08/29/2017               6.2 (H)             4.0 - 5.6 %         Final                     Last edited by Tab Pena, OD on 4/15/2019  8:29 AM. (History)            Assessment /Plan     For exam results, see Encounter Report.    Eye exam, routine  -Eyemed  -Educated patient on presence of cataracts at today's exam, monitor at annual dilated fundus exam. 2-6 years surgical estimate.    Hyperopia with astigmatism and presbyopia, bilateral  Eyeglass Final Rx     Eyeglass Final Rx       Sphere Cylinder Axis Dist VA Add    Right +1.50 +0.50 170 20/30 +2.50    Left +1.25 +0.50 025 20/25 +2.50    Type:  PAL    Expiration Date:  4/15/2020                  RTC 1 yr

## 2019-04-16 DIAGNOSIS — G90.50 COMPLEX REGIONAL PAIN SYNDROME TYPE 1, AFFECTING UNSPECIFIED SITE: ICD-10-CM

## 2019-04-16 DIAGNOSIS — G89.4 CHRONIC PAIN SYNDROME: ICD-10-CM

## 2019-04-16 DIAGNOSIS — M75.101 ROTATOR CUFF SYNDROME OF RIGHT SHOULDER: ICD-10-CM

## 2019-04-16 DIAGNOSIS — M62.81 MUSCLE RIGHT ARM WEAKNESS: ICD-10-CM

## 2019-04-16 DIAGNOSIS — M48.02 SPINAL STENOSIS IN CERVICAL REGION: ICD-10-CM

## 2019-04-16 DIAGNOSIS — M54.12 CERVICAL RADICULOPATHY: ICD-10-CM

## 2019-04-16 DIAGNOSIS — R29.898 RIGHT ARM WEAKNESS: ICD-10-CM

## 2019-04-16 DIAGNOSIS — M54.12 CERVICAL RADICULAR PAIN: ICD-10-CM

## 2019-04-16 DIAGNOSIS — F11.20 NARCOTIC DEPENDENCY, CONTINUOUS: ICD-10-CM

## 2019-04-16 DIAGNOSIS — M50.30 DEGENERATION OF CERVICAL INTERVERTEBRAL DISC: ICD-10-CM

## 2019-04-16 DIAGNOSIS — M50.00 HNP (HERNIATED NUCLEUS PULPOSUS) WITH MYELOPATHY, CERVICAL: ICD-10-CM

## 2019-04-16 DIAGNOSIS — G56.41 COMPLEX REGIONAL PAIN SYNDROME TYPE 2 OF RIGHT UPPER EXTREMITY: ICD-10-CM

## 2019-04-16 DIAGNOSIS — M48.02 CERVICAL STENOSIS OF SPINE: ICD-10-CM

## 2019-04-16 DIAGNOSIS — M47.12 CERVICAL SPONDYLOSIS WITH MYELOPATHY: ICD-10-CM

## 2019-04-16 DIAGNOSIS — R29.898 RIGHT HAND WEAKNESS: ICD-10-CM

## 2019-04-16 DIAGNOSIS — M96.1 CERVICAL POST-LAMINECTOMY SYNDROME: ICD-10-CM

## 2019-04-16 DIAGNOSIS — M79.601 RIGHT ARM PAIN: ICD-10-CM

## 2019-04-16 DIAGNOSIS — M47.812 FACET ARTHRITIS OF CERVICAL REGION: ICD-10-CM

## 2019-04-16 DIAGNOSIS — M54.12 BRACHIAL NEURITIS OR RADICULITIS: ICD-10-CM

## 2019-04-16 NOTE — TELEPHONE ENCOUNTER
Last visit: 01/21/2019  Next visit: 05/20/2019  Last refill Norco: 03/21/2019    ----- Message from Cece Strong sent at 4/16/2019 11:09 AM CDT -----  Contact: self @ [phone]  Pt is req a refill for hydrocodone 10/325.  Pt would like someone to call him to let him know when it has been sent in.    [facility] [address] [place] [address]                                             [phone] (Phone)       [phone] (Fax)

## 2019-04-18 RX ORDER — HYDROCODONE BITARTRATE AND ACETAMINOPHEN 10; 325 MG/1; MG/1
1 TABLET ORAL EVERY 6 HOURS PRN
Qty: 120 TABLET | Refills: 0 | Status: SHIPPED | OUTPATIENT
Start: 2019-04-20 | End: 2019-05-20 | Stop reason: SDUPTHER

## 2019-04-22 ENCOUNTER — TELEPHONE (OUTPATIENT)
Dept: PHYSICAL MEDICINE AND REHAB | Facility: CLINIC | Age: 69
End: 2019-04-22

## 2019-04-22 NOTE — TELEPHONE ENCOUNTER
----- Message from Ana Allen sent at 4/22/2019  7:49 AM CDT -----  Contact: [phone]  Pt is calling to request the nurse to give him a call back concerning his medication refill.      Pharmacy is requesting a call back for HYDROcodone-acetaminophen (NORCO)  mg per tablet  Pt is out of medication and his arm is hurting him      [facility] [phone]    Thank you!

## 2019-04-24 ENCOUNTER — OFFICE VISIT (OUTPATIENT)
Dept: FAMILY MEDICINE | Facility: CLINIC | Age: 69
End: 2019-04-24
Payer: MEDICARE

## 2019-04-24 ENCOUNTER — LAB VISIT (OUTPATIENT)
Dept: LAB | Facility: HOSPITAL | Age: 69
End: 2019-04-24
Attending: FAMILY MEDICINE
Payer: MEDICARE

## 2019-04-24 VITALS
WEIGHT: 179.88 LBS | BODY MASS INDEX: 25.18 KG/M2 | SYSTOLIC BLOOD PRESSURE: 110 MMHG | DIASTOLIC BLOOD PRESSURE: 74 MMHG | HEART RATE: 70 BPM | OXYGEN SATURATION: 95 % | HEIGHT: 71 IN

## 2019-04-24 DIAGNOSIS — R63.4 UNINTENTIONAL WEIGHT LOSS: ICD-10-CM

## 2019-04-24 DIAGNOSIS — R42 LIGHTHEADEDNESS: ICD-10-CM

## 2019-04-24 DIAGNOSIS — R68.89 COLD INTOLERANCE: ICD-10-CM

## 2019-04-24 DIAGNOSIS — I10 ESSENTIAL HYPERTENSION: Primary | ICD-10-CM

## 2019-04-24 DIAGNOSIS — E11.40 TYPE 2 DIABETES MELLITUS WITH DIABETIC NEUROPATHY, WITHOUT LONG-TERM CURRENT USE OF INSULIN: ICD-10-CM

## 2019-04-24 DIAGNOSIS — Z87.891 FORMER SMOKER: ICD-10-CM

## 2019-04-24 DIAGNOSIS — I10 ESSENTIAL HYPERTENSION: ICD-10-CM

## 2019-04-24 LAB
ALBUMIN SERPL BCP-MCNC: 3.8 G/DL (ref 3.5–5.2)
ALBUMIN/CREAT UR: 13.3 UG/MG (ref 0–30)
ALP SERPL-CCNC: 84 U/L (ref 55–135)
ALT SERPL W/O P-5'-P-CCNC: 19 U/L (ref 10–44)
ANION GAP SERPL CALC-SCNC: 8 MMOL/L (ref 8–16)
AST SERPL-CCNC: 22 U/L (ref 10–40)
BASOPHILS # BLD AUTO: 0.03 K/UL (ref 0–0.2)
BASOPHILS NFR BLD: 0.6 % (ref 0–1.9)
BILIRUB SERPL-MCNC: 0.6 MG/DL (ref 0.1–1)
BILIRUB UR QL STRIP: NEGATIVE
BUN SERPL-MCNC: 12 MG/DL (ref 8–23)
CALCIUM SERPL-MCNC: 9.6 MG/DL (ref 8.7–10.5)
CHLORIDE SERPL-SCNC: 100 MMOL/L (ref 95–110)
CHOLEST SERPL-MCNC: 113 MG/DL (ref 120–199)
CHOLEST/HDLC SERPL: 2.8 {RATIO} (ref 2–5)
CLARITY UR REFRACT.AUTO: CLEAR
CO2 SERPL-SCNC: 28 MMOL/L (ref 23–29)
COLOR UR AUTO: YELLOW
CREAT SERPL-MCNC: 0.8 MG/DL (ref 0.5–1.4)
CREAT UR-MCNC: 98 MG/DL (ref 23–375)
DIFFERENTIAL METHOD: NORMAL
EOSINOPHIL # BLD AUTO: 0.2 K/UL (ref 0–0.5)
EOSINOPHIL NFR BLD: 4.6 % (ref 0–8)
ERYTHROCYTE [DISTWIDTH] IN BLOOD BY AUTOMATED COUNT: 11.8 % (ref 11.5–14.5)
EST. GFR  (AFRICAN AMERICAN): >60 ML/MIN/1.73 M^2
EST. GFR  (NON AFRICAN AMERICAN): >60 ML/MIN/1.73 M^2
ESTIMATED AVG GLUCOSE: 140 MG/DL (ref 68–131)
GLUCOSE SERPL-MCNC: 94 MG/DL (ref 70–110)
GLUCOSE UR QL STRIP: NEGATIVE
HBA1C MFR BLD HPLC: 6.5 % (ref 4–5.6)
HCT VFR BLD AUTO: 44.7 % (ref 40–54)
HDLC SERPL-MCNC: 41 MG/DL (ref 40–75)
HDLC SERPL: 36.3 % (ref 20–50)
HGB BLD-MCNC: 14.3 G/DL (ref 14–18)
HGB UR QL STRIP: NEGATIVE
IMM GRANULOCYTES # BLD AUTO: 0.02 K/UL (ref 0–0.04)
IMM GRANULOCYTES NFR BLD AUTO: 0.4 % (ref 0–0.5)
KETONES UR QL STRIP: NEGATIVE
LDLC SERPL CALC-MCNC: 57.4 MG/DL (ref 63–159)
LEUKOCYTE ESTERASE UR QL STRIP: NEGATIVE
LYMPHOCYTES # BLD AUTO: 1.9 K/UL (ref 1–4.8)
LYMPHOCYTES NFR BLD: 38.2 % (ref 18–48)
MCH RBC QN AUTO: 29.4 PG (ref 27–31)
MCHC RBC AUTO-ENTMCNC: 32 G/DL (ref 32–36)
MCV RBC AUTO: 92 FL (ref 82–98)
MICROALBUMIN UR DL<=1MG/L-MCNC: 13 UG/ML
MONOCYTES # BLD AUTO: 0.5 K/UL (ref 0.3–1)
MONOCYTES NFR BLD: 9.6 % (ref 4–15)
NEUTROPHILS # BLD AUTO: 2.3 K/UL (ref 1.8–7.7)
NEUTROPHILS NFR BLD: 46.6 % (ref 38–73)
NITRITE UR QL STRIP: NEGATIVE
NONHDLC SERPL-MCNC: 72 MG/DL
NRBC BLD-RTO: 0 /100 WBC
PH UR STRIP: 6 [PH] (ref 5–8)
PLATELET # BLD AUTO: 229 K/UL (ref 150–350)
PMV BLD AUTO: 11.3 FL (ref 9.2–12.9)
POTASSIUM SERPL-SCNC: 4.4 MMOL/L (ref 3.5–5.1)
PROT SERPL-MCNC: 7.7 G/DL (ref 6–8.4)
PROT UR QL STRIP: NEGATIVE
RBC # BLD AUTO: 4.86 M/UL (ref 4.6–6.2)
SODIUM SERPL-SCNC: 136 MMOL/L (ref 136–145)
SP GR UR STRIP: 1.01 (ref 1–1.03)
TRIGL SERPL-MCNC: 73 MG/DL (ref 30–150)
TSH SERPL DL<=0.005 MIU/L-ACNC: 2.91 UIU/ML (ref 0.4–4)
URN SPEC COLLECT METH UR: NORMAL
WBC # BLD AUTO: 5.02 K/UL (ref 3.9–12.7)

## 2019-04-24 PROCEDURE — 3044F HG A1C LEVEL LT 7.0%: CPT | Mod: HCNC,CPTII,S$GLB, | Performed by: FAMILY MEDICINE

## 2019-04-24 PROCEDURE — 3078F PR MOST RECENT DIASTOLIC BLOOD PRESSURE < 80 MM HG: ICD-10-PCS | Mod: HCNC,CPTII,S$GLB, | Performed by: FAMILY MEDICINE

## 2019-04-24 PROCEDURE — 81003 URINALYSIS AUTO W/O SCOPE: CPT | Mod: HCNC

## 2019-04-24 PROCEDURE — 36415 COLL VENOUS BLD VENIPUNCTURE: CPT | Mod: HCNC,PO

## 2019-04-24 PROCEDURE — 80061 LIPID PANEL: CPT | Mod: HCNC

## 2019-04-24 PROCEDURE — 1101F PR PT FALLS ASSESS DOC 0-1 FALLS W/OUT INJ PAST YR: ICD-10-PCS | Mod: HCNC,CPTII,S$GLB, | Performed by: FAMILY MEDICINE

## 2019-04-24 PROCEDURE — 99999 PR PBB SHADOW E&M-EST. PATIENT-LVL III: ICD-10-PCS | Mod: PBBFAC,HCNC,, | Performed by: FAMILY MEDICINE

## 2019-04-24 PROCEDURE — 1101F PT FALLS ASSESS-DOCD LE1/YR: CPT | Mod: HCNC,CPTII,S$GLB, | Performed by: FAMILY MEDICINE

## 2019-04-24 PROCEDURE — 99214 PR OFFICE/OUTPT VISIT, EST, LEVL IV, 30-39 MIN: ICD-10-PCS | Mod: HCNC,S$GLB,, | Performed by: FAMILY MEDICINE

## 2019-04-24 PROCEDURE — 3044F PR MOST RECENT HEMOGLOBIN A1C LEVEL <7.0%: ICD-10-PCS | Mod: HCNC,CPTII,S$GLB, | Performed by: FAMILY MEDICINE

## 2019-04-24 PROCEDURE — 3074F PR MOST RECENT SYSTOLIC BLOOD PRESSURE < 130 MM HG: ICD-10-PCS | Mod: HCNC,CPTII,S$GLB, | Performed by: FAMILY MEDICINE

## 2019-04-24 PROCEDURE — 3074F SYST BP LT 130 MM HG: CPT | Mod: HCNC,CPTII,S$GLB, | Performed by: FAMILY MEDICINE

## 2019-04-24 PROCEDURE — 82043 UR ALBUMIN QUANTITATIVE: CPT | Mod: HCNC

## 2019-04-24 PROCEDURE — 83036 HEMOGLOBIN GLYCOSYLATED A1C: CPT | Mod: HCNC

## 2019-04-24 PROCEDURE — 85025 COMPLETE CBC W/AUTO DIFF WBC: CPT | Mod: HCNC

## 2019-04-24 PROCEDURE — 80053 COMPREHEN METABOLIC PANEL: CPT | Mod: HCNC

## 2019-04-24 PROCEDURE — 3078F DIAST BP <80 MM HG: CPT | Mod: HCNC,CPTII,S$GLB, | Performed by: FAMILY MEDICINE

## 2019-04-24 PROCEDURE — 99214 OFFICE O/P EST MOD 30 MIN: CPT | Mod: HCNC,S$GLB,, | Performed by: FAMILY MEDICINE

## 2019-04-24 PROCEDURE — 99999 PR PBB SHADOW E&M-EST. PATIENT-LVL III: CPT | Mod: PBBFAC,HCNC,, | Performed by: FAMILY MEDICINE

## 2019-04-24 PROCEDURE — 84443 ASSAY THYROID STIM HORMONE: CPT | Mod: HCNC

## 2019-04-24 RX ORDER — VALSARTAN 40 MG/1
40 TABLET ORAL DAILY
Qty: 90 TABLET | Refills: 3 | Status: SHIPPED | OUTPATIENT
Start: 2019-04-24 | End: 2020-04-13

## 2019-04-24 NOTE — PROGRESS NOTES
Subjective:       Patient ID: Xander Sanchez is a 68 y.o. male.    Chief Complaint: Follow-up; Hypertension; and Arm Pain (on and off)    68 years old came to the clinic with weight loss for the last year.  Patient lose around 15 lb unintentionally.  Patient smoked for almost 30 years.  Patient with episodic cold intolerance.  Patient is a former smoker 6 years ago.  Last A1c was stable.  No polyuria, polydipsia or polyphagia.  Blood pressure today stable.    Review of Systems   Constitutional: Positive for unexpected weight change.   HENT: Negative.    Eyes: Negative.    Respiratory: Negative.    Cardiovascular: Negative.  Negative for chest pain, palpitations and leg swelling.   Gastrointestinal: Negative.    Genitourinary: Negative.    Musculoskeletal: Negative.    Skin: Negative.    Neurological: Positive for light-headedness. Negative for dizziness.   Psychiatric/Behavioral: Negative.        Objective:      Physical Exam   Constitutional: He is oriented to person, place, and time. He appears well-developed and well-nourished. No distress.   HENT:   Head: Normocephalic and atraumatic.   Right Ear: External ear normal.   Left Ear: External ear normal.   Nose: Nose normal.   Mouth/Throat: Oropharynx is clear and moist. No oropharyngeal exudate.   Eyes: Pupils are equal, round, and reactive to light. Conjunctivae and EOM are normal. Right eye exhibits no discharge. Left eye exhibits no discharge. No scleral icterus.   Neck: Normal range of motion. Neck supple. No JVD present. No tracheal deviation present. No thyromegaly present.   Cardiovascular: Normal rate, regular rhythm, normal heart sounds and intact distal pulses. Exam reveals no gallop and no friction rub.   No murmur heard.  Pulmonary/Chest: Effort normal and breath sounds normal. No stridor. No respiratory distress. He has no wheezes. He has no rales. He exhibits no tenderness.   Abdominal: Soft. Bowel sounds are normal. He exhibits no distension and no  mass. There is no tenderness. There is no rebound and no guarding.   Musculoskeletal: Normal range of motion. He exhibits no edema or tenderness.   Lymphadenopathy:     He has no cervical adenopathy.   Neurological: He is alert and oriented to person, place, and time. He has normal reflexes. He displays normal reflexes. No cranial nerve deficit. He exhibits normal muscle tone. Coordination and gait abnormal.   Skin: Skin is warm and dry. No rash noted. He is not diaphoretic. No erythema. No pallor.   Psychiatric: He has a normal mood and affect. His behavior is normal. Judgment and thought content normal.   Nursing note and vitals reviewed.      Assessment:       1. Essential hypertension    2. Lightheadedness    3. 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thy, without long-term current use of insulin    4. Cold intolerance    5. Unintentional weight loss    6. Former smoker        Plan:         Xander was seen today for follow-up, hypertension and arm pain.    Diagnoses and all orders for this visit:    Essential hypertension  -     valsartan (DIOVAN) 40 MG tablet; Take 1 tablet (40 mg total) by mouth once daily.  -     Comprehensive metabolic panel; Future  -     Lipid panel; Future  -     Urinalysis  -     CBC auto differential; Future  -     TSH; Future    Lightheadedness  -     Comprehensive metabolic panel; Future  -     TSH; Future    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thy, without long-term current use of insulin  -     valsartan (DIOVAN) 40 MG tablet; Take 1 tablet (40 mg total) by mouth once daily.  -     Comprehensive metabolic panel; Future  -     Lipid panel; Future  -     Microalbumin/creatinine urine ratio  -     Hemoglobin A1c; Future    Cold intolerance  -     TSH; Future    Unintentional weight loss  -     CT Chest With Contrast; Future  -     CT Abdomen Pelvis With Contrast; Future  -     TSH; Future    Former smoker  -     CT Chest With Contrast; Future     Discontinue  lisinopril.  Continue monitoring blood pressure at home, low sodium diet.

## 2019-05-01 ENCOUNTER — HOSPITAL ENCOUNTER (OUTPATIENT)
Dept: RADIOLOGY | Facility: HOSPITAL | Age: 69
Discharge: HOME | End: 2019-05-01
Attending: FAMILY MEDICINE
Payer: MEDICARE

## 2019-05-01 DIAGNOSIS — Z87.891 FORMER SMOKER: ICD-10-CM

## 2019-05-01 DIAGNOSIS — R63.4 UNINTENTIONAL WEIGHT LOSS: ICD-10-CM

## 2019-05-01 PROCEDURE — 25500020 PHARM REV CODE 255: Mod: HCNC | Performed by: FAMILY MEDICINE

## 2019-05-01 PROCEDURE — 74177 CT ABD & PELVIS W/CONTRAST: CPT | Mod: TC,HCNC

## 2019-05-01 PROCEDURE — 74177 CT ABDOMEN PELVIS WITH CONTRAST: ICD-10-PCS | Mod: 26,HCNC,, | Performed by: RADIOLOGY

## 2019-05-01 PROCEDURE — 71260 CT THORAX DX C+: CPT | Mod: 26,HCNC,, | Performed by: RADIOLOGY

## 2019-05-01 PROCEDURE — 71260 CT THORAX DX C+: CPT | Mod: TC,HCNC

## 2019-05-01 PROCEDURE — 74177 CT ABD & PELVIS W/CONTRAST: CPT | Mod: 26,HCNC,, | Performed by: RADIOLOGY

## 2019-05-01 PROCEDURE — 71260 CT CHEST WITH CONTRAST: ICD-10-PCS | Mod: 26,HCNC,, | Performed by: RADIOLOGY

## 2019-05-01 RX ADMIN — IOHEXOL 30 ML: 350 INJECTION, SOLUTION INTRAVENOUS at 10:05

## 2019-05-01 RX ADMIN — IOHEXOL 100 ML: 350 INJECTION, SOLUTION INTRAVENOUS at 10:05

## 2019-05-10 ENCOUNTER — OFFICE VISIT (OUTPATIENT)
Dept: PODIATRY | Facility: CLINIC | Age: 69
End: 2019-05-10
Payer: MEDICARE

## 2019-05-10 VITALS
DIASTOLIC BLOOD PRESSURE: 79 MMHG | BODY MASS INDEX: 25.06 KG/M2 | SYSTOLIC BLOOD PRESSURE: 129 MMHG | HEART RATE: 64 BPM | WEIGHT: 179 LBS | HEIGHT: 71 IN

## 2019-05-10 DIAGNOSIS — E11.40 TYPE 2 DIABETES MELLITUS WITH DIABETIC NEUROPATHY, WITHOUT LONG-TERM CURRENT USE OF INSULIN: Primary | ICD-10-CM

## 2019-05-10 DIAGNOSIS — M79.671 FOOT PAIN, BILATERAL: ICD-10-CM

## 2019-05-10 DIAGNOSIS — Q66.89 CLAW TOE, UNSPECIFIED LATERALITY: ICD-10-CM

## 2019-05-10 DIAGNOSIS — B35.1 ONYCHOMYCOSIS: ICD-10-CM

## 2019-05-10 DIAGNOSIS — I87.2 VENOUS INSUFFICIENCY OF BOTH LOWER EXTREMITIES: ICD-10-CM

## 2019-05-10 DIAGNOSIS — M79.672 FOOT PAIN, BILATERAL: ICD-10-CM

## 2019-05-10 PROCEDURE — 1101F PT FALLS ASSESS-DOCD LE1/YR: CPT | Mod: HCNC,CPTII,S$GLB, | Performed by: PODIATRIST

## 2019-05-10 PROCEDURE — 99213 PR OFFICE/OUTPT VISIT, EST, LEVL III, 20-29 MIN: ICD-10-PCS | Mod: 25,HCNC,S$GLB, | Performed by: PODIATRIST

## 2019-05-10 PROCEDURE — 99499 RISK ADDL DX/OHS AUDIT: ICD-10-PCS | Mod: HCNC,S$GLB,, | Performed by: PODIATRIST

## 2019-05-10 PROCEDURE — 3074F SYST BP LT 130 MM HG: CPT | Mod: HCNC,CPTII,S$GLB, | Performed by: PODIATRIST

## 2019-05-10 PROCEDURE — 11721 ROUTINE FOOT CARE: ICD-10-PCS | Mod: Q9,HCNC,S$GLB, | Performed by: PODIATRIST

## 2019-05-10 PROCEDURE — 3078F DIAST BP <80 MM HG: CPT | Mod: HCNC,CPTII,S$GLB, | Performed by: PODIATRIST

## 2019-05-10 PROCEDURE — 1101F PR PT FALLS ASSESS DOC 0-1 FALLS W/OUT INJ PAST YR: ICD-10-PCS | Mod: HCNC,CPTII,S$GLB, | Performed by: PODIATRIST

## 2019-05-10 PROCEDURE — 3044F PR MOST RECENT HEMOGLOBIN A1C LEVEL <7.0%: ICD-10-PCS | Mod: HCNC,CPTII,S$GLB, | Performed by: PODIATRIST

## 2019-05-10 PROCEDURE — 3074F PR MOST RECENT SYSTOLIC BLOOD PRESSURE < 130 MM HG: ICD-10-PCS | Mod: HCNC,CPTII,S$GLB, | Performed by: PODIATRIST

## 2019-05-10 PROCEDURE — 99999 PR PBB SHADOW E&M-EST. PATIENT-LVL III: CPT | Mod: PBBFAC,HCNC,, | Performed by: PODIATRIST

## 2019-05-10 PROCEDURE — 99213 OFFICE O/P EST LOW 20 MIN: CPT | Mod: 25,HCNC,S$GLB, | Performed by: PODIATRIST

## 2019-05-10 PROCEDURE — 11721 DEBRIDE NAIL 6 OR MORE: CPT | Mod: Q9,HCNC,S$GLB, | Performed by: PODIATRIST

## 2019-05-10 PROCEDURE — 99499 UNLISTED E&M SERVICE: CPT | Mod: HCNC,S$GLB,, | Performed by: PODIATRIST

## 2019-05-10 PROCEDURE — 3078F PR MOST RECENT DIASTOLIC BLOOD PRESSURE < 80 MM HG: ICD-10-PCS | Mod: HCNC,CPTII,S$GLB, | Performed by: PODIATRIST

## 2019-05-10 PROCEDURE — 99999 PR PBB SHADOW E&M-EST. PATIENT-LVL III: ICD-10-PCS | Mod: PBBFAC,HCNC,, | Performed by: PODIATRIST

## 2019-05-10 PROCEDURE — 3044F HG A1C LEVEL LT 7.0%: CPT | Mod: HCNC,CPTII,S$GLB, | Performed by: PODIATRIST

## 2019-05-10 NOTE — PROGRESS NOTES
"Subjective:      Patient ID: Xander Sanchez is a 69 y.o. male.    Chief Complaint: Diabetes Mellitus (Dr. Alvarenga 4/24/19); Diabetic Foot Exam ("underneath toes feel like walking on something" ); and Routine Foot Care    Xander is a 69 y.o. male who presents to the clinic for evaluation and treatment of high risk feet. Xander has a past medical history of Asthma, Cardiomyopathy, Cervical radicular pain, Cervical spondylosis with myelopathy (10/17/2012), Chronic bronchitis, Chronic systolic dysfunction of left ventricle (7/27/2015), Constipation (7/27/2015), COPD (chronic obstructive pulmonary disease), CRPS (complex regional pain syndrome type I) (12/29/2014), Diabetes mellitus, type 2, DM type 2 (diabetes mellitus, type 2) (7/27/2015), Enlarged prostate (7/27/2015), Enuresis (7/6/2018), Hypertension, ICD (implantable cardiac defibrillator) in place, Mixed hyperlipidemia (2/28/2018), Presence of biventricular AICD (7/27/2015), 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thy (2/1/2016), and Urinary tract infection.  This patient has documented high risk feet requiring routine maintenance secondary to diabetes mellitis and those secondary complications of diabetes, as mentioned.  Treated per Physical medicine for chronic back pain. Complains that his pain in his feet are mostly at night when he lays down. Taking gabapentin 600 mg po qid for chronic neuropathic pain. No new complaints.    Williams Alvarenga MD  LOV: 4/24/19    Past Medical History:   Diagnosis Date    Asthma     Cardiomyopathy     Cervical radicular pain     Cervical spondylosis with myelopathy 10/17/2012    Chronic bronchitis     Chronic systolic dysfunction of left ventricle 7/27/2015    Constipation 7/27/2015    COPD (chronic obstructive pulmonary disease)     CRPS (complex regional pain syndrome type I) 12/29/2014    Diabetes mellitus, type 2     DM type 2 (diabetes mellitus, type 2) 7/27/2015    Enlarged prostate 7/27/2015 "    Enuresis 7/6/2018    Hypertension     ICD (implantable cardiac defibrillator) in place     Mixed hyperlipidemia 2/28/2018    Presence of biventricular AICD 7/27/2015    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thy 2/1/2016    Urinary tract infection     pt does not know       Past Surgical History:   Procedure Laterality Date    BLOCK SELECTIVE NERVE ROOT TRANSFORAMINAL LUMBAR Right 10/9/2014    Performed by Debbie Parsons [facility] at [facility] [place] PAIN MGT    CARDIAC DEFIBRILLATOR PLACEMENT      CERVICAL SPINE SURGERY      COLONOSCOPY  golytely N/A 7/19/2017    Performed by [facility]annessa [facility]ribe [facility] at [facility] ENDO    DISCECTOMY, SPINE, CERVICAL, ANTERIOR APPROACH, WITH FUSION N/A 4/10/2014    Performed by Magdaleno Meyer [facility] at [facility] OR 2ND FLR    EGD (ESOPHAGOGASTRODUODENOSCOPY) N/A 8/16/2013    Performed by [facility] MD at [facility] ENDO    EGD (ESOPHAGOGASTRODUODENOSCOPY) N/A 6/19/2013    Performed by [facility] MD at [facility] ENDO    EXTRACTION-LEAD N/A 3/24/2015    Performed by Braeden Park MD at [facility] CATH LAB    Formainotomy-CERVICAL/FUSION-POSTERIOR N/A 8/10/2015    Performed by David De Dios MD at [facility] OR 2ND FLR    INSERTION-ICD-BIVENTRICULAR N/A 3/24/2015    Performed by Braeden Park MD at [facility] CATH LAB    MYELOGRAM N/A 8/15/2014    Performed by [facility] at [facility] OR 2ND FLR    MYELOGRAM N/A 12/6/2013    Performed by [facility] at [facility] OR 2ND FLR    PROSTATECTOMY-TRANSURETHRAL W QUANTA LASER N/A 2/1/2017    Performed by Graham Buenrostro MD at [facility] OR 1ST FLR    SPINE SURGERY         Family History   Problem Relation Age of Onset    Hypertension Mother     Hypertension Father     COPD Father     No Known Problems Sister     No Known Problems Brother     No Known Problems Daughter     Prostate cancer Neg Hx     Kidney disease Neg Hx        Social History     Socioeconomic History    Marital status:      Spouse name: Not on  file    Number of children: Not on file    Years of education: Not on file    Highest education level: Not on file   Occupational History    Not on file   Social Needs    Financial resource strain: Not on file    Food insecurity:     Worry: Not on file     Inability: Not on file    Transportation needs:     Medical: Not on file     Non-medical: Not on file   Tobacco Use    Smoking status: Former Smoker     Packs/day: 1.00     Years: 40.00     Pack years: 40.00     Types: Cigarettes     Last attempt to quit: 2009     Years since quittin.8    Smokeless tobacco: Never Used   Substance and Sexual Activity    Alcohol use: Yes     Alcohol/week: 1.8 oz     Types: 1 Shots of liquor, 2 Cans of beer per week    Drug use: No    Sexual activity: Yes     Partners: Female   Lifestyle    Physical activity:     Days per week: Not on file     Minutes per session: Not on file    Stress: Not on file   Relationships    Social connections:     Talks on phone: Not on file     Gets together: Not on file     Attends Buddhist service: Not on file     Active member of club or organization: Not on file     Attends meetings of clubs or organizations: Not on file     Relationship status: Not on file   Other Topics Concern    Not on file   Social History Narrative    Not on file       Current Outpatient Medications   Medication Sig Dispense Refill    aspirin (ECOTRIN) 81 MG EC tablet Take 81 mg by mouth once daily.      hydroCHLOROthiazide (MICROZIDE) 12.5 mg capsule TAKE 1 CAPSULE ONE TIME DAILY 90 capsule 3    HYDROcodone-acetaminophen (NORCO)  mg per tablet Take 1 tablet by mouth every 6 (six) hours as needed. 120 tablet 0    metoprolol succinate (TOPROL-XL) 25 MG 24 hr tablet TAKE 1 TABLET EVERY DAY 90 tablet 3    oxybutynin (DITROPAN) 5 MG Tab Take 1 tablet (5 mg total) by mouth 2 (two) times daily. 180 tablet 3    pantoprazole (PROTONIX) 40 MG tablet TAKE 1 TABLET BY MOUTH ONCE DAILY BEFORE BREAKFAST  30 tablet 11    pravastatin (PRAVACHOL) 10 MG tablet TAKE 1 TABLET BY MOUTH ONCE DAILY AT BEDTIME 90 tablet 0    tamsulosin (FLOMAX) 0.4 mg Cp24 Take 2 capsules (0.8 mg total) by mouth every evening. 180 capsule 4    TRUE METRIX AIR GLUCOSE METER kit USE AS INSTRUCTED 1 each 0    TRUE METRIX GLUCOSE TEST STRIP Strp TEST  ONE  TIME  DAILY  AT  6AM 100 strip 3    TRUEPLUS LANCETS 33 gauge Misc TEST ONE TIME DAILY  AT  6AM 100 each 3    valsartan (DIOVAN) 40 MG tablet Take 1 tablet (40 mg total) by mouth once daily. 90 tablet 3    baclofen (LIORESAL) 20 MG tablet Take 1 tablet (20 mg total) by mouth 3 (three) times daily. 270 tablet 2    gabapentin (NEURONTIN) 600 MG tablet Take 1 tablet (600 mg total) by mouth 4 (four) times daily with meals and nightly. 360 tablet 2     No current facility-administered medications for this visit.        Review of patient's allergies indicates:  No Known Allergies    PCP: Williams Alvarenga MD    Date Last Seen by PCP:     Current shoe gear:  Affected Foot: Casual shoes     Unaffected Foot: Casual shoes    Hemoglobin A1C   Date Value Ref Range Status   04/24/2019 6.5 (H) 4.0 - 5.6 % Final     Comment:     ADA Screening Guidelines:  5.7-6.4%  Consistent with prediabetes  >or=6.5%  Consistent with diabetes  High levels of fetal hemoglobin interfere with the HbA1C  assay. Heterozygous hemoglobin variants (HbS, HgC, etc)do  not significantly interfere with this assay.   However, presence of multiple variants may affect accuracy.     10/25/2018 6.2 (H) 4.0 - 5.6 % Final     Comment:     ADA Screening Guidelines:  5.7-6.4%  Consistent with prediabetes  >or=6.5%  Consistent with diabetes  High levels of fetal hemoglobin interfere with the HbA1C  assay. Heterozygous hemoglobin variants (HbS, HgC, etc)do  not significantly interfere with this assay.   However, presence of multiple variants may affect accuracy.     02/27/2018 6.1 (H) 4.0 - 5.6 % Final     Comment:     According to  ADA guidelines, hemoglobin A1c <7.0% represents  optimal control in non-pregnant diabetic patients. Different  metrics may apply to specific patient populations.   Standards of Medical Care in Diabetes-2016.  For the purpose of screening for the presence of diabetes:  <5.7%     Consistent with the absence of diabetes  5.7-6.4%  Consistent with increasing risk for diabetes   (prediabetes)  >or=6.5%  Consistent with diabetes  Currently, no consensus exists for use of hemoglobin A1c  for diagnosis of diabetes for children.  This Hemoglobin A1c assay has significant interference with fetal   hemoglobin   (HbF). The results are invalid for patients with abnormal amounts of   HbF,   including those with known Hereditary Persistence   of Fetal Hemoglobin. Heterozygous hemoglobin variants (HbAS, HbAC,   HbAD, HbAE, HbA2) do not significantly interfere with this assay;   however, presence of multiple variants in a sample may impact the %   interference.         Review of Systems   Constitution: Negative for chills and fever.   Respiratory: Negative for shortness of breath.    Skin: Positive for color change and nail changes. Negative for itching.   Musculoskeletal: Negative for falls, joint pain and muscle weakness.   Gastrointestinal: Negative for nausea and vomiting.   Neurological: Negative for focal weakness, loss of balance, numbness and paresthesias.           Objective:      Physical Exam   Constitutional: He is oriented to person, place, and time. He appears well-nourished. No distress.   Cardiovascular: Intact distal pulses.   Pulses:       Dorsalis pedis pulses are 2+ on the right side, and 2+ on the left side.        Posterior tibial pulses are 2+ on the right side, and 2+ on the left side.   CRT < 3 seconds to digits 1-5 bilateral, mild to moderate edema of bilateral lower extremity with varicosities.   Musculoskeletal:        Right foot: There is decreased range of motion. There is no deformity.        Left  foot: There is decreased range of motion. There is no deformity.   Equinus noted b/l ankles with < 10 deg DF noted. MMT 5/5 in DF/PF/Inv/Ev resistance with no reproduction of pain in any direction. Passive range of motion of ankle and pedal joints is painless b/l.     Pes planovalgus bilateral foot.    No pain on palpation bilateral foot.     Feet:   Right Foot:   Protective Sensation: 10 sites tested. 9 sites sensed.   Skin Integrity: Positive for dry skin. Negative for ulcer, skin breakdown, erythema, warmth or callus.   Left Foot:   Protective Sensation: 10 sites tested. 7 sites sensed.   Skin Integrity: Positive for dry skin. Negative for ulcer, skin breakdown, erythema, warmth or callus.   Neurological: He is alert and oriented to person, place, and time. He has normal strength. A sensory deficit is present.   Decreased vibratory sensation to the bilateral foot.   Skin: Skin is warm, dry and intact. Capillary refill takes less than 2 seconds. No ecchymosis, no lesion, no petechiae and no rash noted. He is not diaphoretic. No cyanosis or erythema. No pallor. Nails show no clubbing.   Skin is dry and flaky plantar foot bilateral.    Hyperpigmented patch of skin dorsal left hallux.     Nails 1-5 bilateral are thickened 2-3 mm, slightly yellow with debris, loosened and elongated 4-5 mm.     Edema B/L LE 2+    No open lesions or macerations bilateral lower extremity.               Assessment:       Encounter Diagnoses   Name Primary?    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thy, without long-term current use of insulin Yes    Onychomycosis     Foot pain, bilateral     Venous insufficiency of both lower extremities     Claw toe, unspecified laterality          Plan:       Xander was seen today for diabetes mellitus, diabetic foot exam and routine foot care.    Diagnoses and all orders for this visit:    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thy, without long-term current use of insulin  -      Routine Foot Care  -     DIABETIC SHOES FOR HOME USE    Onychomycosis  -     Routine Foot Care    Foot pain, bilateral  -     DIABETIC SHOES FOR HOME USE    Venous insufficiency of both lower extremities  -     DIABETIC SHOES FOR HOME USE    Claw toe, unspecified laterality  -     DIABETIC SHOES FOR HOME USE      I counseled the patient on his conditions, their implications and medical management.    Shoe inspection. Diabetic Foot Education. Patient reminded of the importance of good nutrition and blood sugar control to help prevent podiatric complications of diabetes. Patient instructed on proper foot hygeine. We discussed wearing proper shoe gear, daily foot inspections, never walking without protective shoe gear, never putting sharp instruments to feet    Routine foot care per attached note.    Jazmín Hankins DPM, PGY3    I have personally taken the history and examined this patient and agree with the resident's note as stated as above.   Scott BRODYM, FACFAS    Discussed importance of wearing shoes with adequate support and room in toe box.    Prescribed extra depth shoes with custom orthotics. Standing patient is noted to excessively pronate bilateral.

## 2019-05-10 NOTE — PROCEDURES
"Xander Sanchez is a 69 y.o. male patient.    Pulse: 64 (05/10/19 1350)  BP: 129/79 (05/10/19 1350)  Weight: 81.2 kg (179 lb) (05/10/19 1350)  Height: 5' 11" (180.3 cm) (05/10/19 1350)       Routine Foot Care  Date/Time: 5/10/2019 2:35 PM  Performed by: Scott Garcia DPM  Authorized by: Scott Garcia DPM     Time out: Immediately prior to procedure a "time out" was called to verify the correct patient, procedure, equipment, support staff and site/side marked as required.    Consent Done?:  Yes (Verbal)  Hyperkeratotic Skin Lesions?: No      Nail Care Type:  Debride  Location(s): All  (Left 1st Toe, Left 3rd Toe, Left 2nd Toe, Left 4th Toe, Left 5th Toe, Right 1st Toe, Right 2nd Toe, Right 3rd Toe, Right 4th Toe and Right 5th Toe)  Patient tolerance:  Patient tolerated the procedure well with no immediate complications        Scott Garcia  5/10/2019    "

## 2019-05-20 ENCOUNTER — OFFICE VISIT (OUTPATIENT)
Dept: PHYSICAL MEDICINE AND REHAB | Facility: CLINIC | Age: 69
End: 2019-05-20
Payer: MEDICARE

## 2019-05-20 VITALS
HEIGHT: 71 IN | SYSTOLIC BLOOD PRESSURE: 160 MMHG | WEIGHT: 180.75 LBS | HEART RATE: 61 BPM | BODY MASS INDEX: 25.31 KG/M2 | DIASTOLIC BLOOD PRESSURE: 95 MMHG

## 2019-05-20 DIAGNOSIS — M79.601 RIGHT ARM PAIN: ICD-10-CM

## 2019-05-20 DIAGNOSIS — R29.898 RIGHT ARM WEAKNESS: ICD-10-CM

## 2019-05-20 DIAGNOSIS — M47.812 FACET ARTHRITIS OF CERVICAL REGION: ICD-10-CM

## 2019-05-20 DIAGNOSIS — M50.00 HNP (HERNIATED NUCLEUS PULPOSUS) WITH MYELOPATHY, CERVICAL: ICD-10-CM

## 2019-05-20 DIAGNOSIS — M54.12 CERVICAL RADICULAR PAIN: ICD-10-CM

## 2019-05-20 DIAGNOSIS — M62.81 MUSCLE RIGHT ARM WEAKNESS: ICD-10-CM

## 2019-05-20 DIAGNOSIS — M48.02 CERVICAL STENOSIS OF SPINE: ICD-10-CM

## 2019-05-20 DIAGNOSIS — G56.41 COMPLEX REGIONAL PAIN SYNDROME TYPE 2 OF RIGHT UPPER EXTREMITY: ICD-10-CM

## 2019-05-20 DIAGNOSIS — F11.20 NARCOTIC DEPENDENCY, CONTINUOUS: ICD-10-CM

## 2019-05-20 DIAGNOSIS — M54.12 CERVICAL RADICULOPATHY: ICD-10-CM

## 2019-05-20 DIAGNOSIS — G89.4 CHRONIC PAIN SYNDROME: ICD-10-CM

## 2019-05-20 DIAGNOSIS — M47.12 CERVICAL SPONDYLOSIS WITH MYELOPATHY: Primary | ICD-10-CM

## 2019-05-20 DIAGNOSIS — M75.101 ROTATOR CUFF SYNDROME OF RIGHT SHOULDER: ICD-10-CM

## 2019-05-20 DIAGNOSIS — M48.02 SPINAL STENOSIS IN CERVICAL REGION: ICD-10-CM

## 2019-05-20 DIAGNOSIS — M96.1 CERVICAL POST-LAMINECTOMY SYNDROME: ICD-10-CM

## 2019-05-20 DIAGNOSIS — G90.50 COMPLEX REGIONAL PAIN SYNDROME TYPE 1, AFFECTING UNSPECIFIED SITE: ICD-10-CM

## 2019-05-20 DIAGNOSIS — M50.30 DEGENERATION OF CERVICAL INTERVERTEBRAL DISC: ICD-10-CM

## 2019-05-20 DIAGNOSIS — R29.898 RIGHT HAND WEAKNESS: ICD-10-CM

## 2019-05-20 DIAGNOSIS — M54.12 BRACHIAL NEURITIS OR RADICULITIS: ICD-10-CM

## 2019-05-20 PROCEDURE — 3080F DIAST BP >= 90 MM HG: CPT | Mod: HCNC,CPTII,S$GLB, | Performed by: PHYSICAL MEDICINE & REHABILITATION

## 2019-05-20 PROCEDURE — 3080F PR MOST RECENT DIASTOLIC BLOOD PRESSURE >= 90 MM HG: ICD-10-PCS | Mod: HCNC,CPTII,S$GLB, | Performed by: PHYSICAL MEDICINE & REHABILITATION

## 2019-05-20 PROCEDURE — 3077F PR MOST RECENT SYSTOLIC BLOOD PRESSURE >= 140 MM HG: ICD-10-PCS | Mod: HCNC,CPTII,S$GLB, | Performed by: PHYSICAL MEDICINE & REHABILITATION

## 2019-05-20 PROCEDURE — 99214 PR OFFICE/OUTPT VISIT, EST, LEVL IV, 30-39 MIN: ICD-10-PCS | Mod: HCNC,S$GLB,, | Performed by: PHYSICAL MEDICINE & REHABILITATION

## 2019-05-20 PROCEDURE — 1101F PR PT FALLS ASSESS DOC 0-1 FALLS W/OUT INJ PAST YR: ICD-10-PCS | Mod: HCNC,CPTII,S$GLB, | Performed by: PHYSICAL MEDICINE & REHABILITATION

## 2019-05-20 PROCEDURE — 99999 PR PBB SHADOW E&M-EST. PATIENT-LVL III: ICD-10-PCS | Mod: PBBFAC,HCNC,, | Performed by: PHYSICAL MEDICINE & REHABILITATION

## 2019-05-20 PROCEDURE — 3077F SYST BP >= 140 MM HG: CPT | Mod: HCNC,CPTII,S$GLB, | Performed by: PHYSICAL MEDICINE & REHABILITATION

## 2019-05-20 PROCEDURE — 1101F PT FALLS ASSESS-DOCD LE1/YR: CPT | Mod: HCNC,CPTII,S$GLB, | Performed by: PHYSICAL MEDICINE & REHABILITATION

## 2019-05-20 PROCEDURE — 99214 OFFICE O/P EST MOD 30 MIN: CPT | Mod: HCNC,S$GLB,, | Performed by: PHYSICAL MEDICINE & REHABILITATION

## 2019-05-20 PROCEDURE — 99999 PR PBB SHADOW E&M-EST. PATIENT-LVL III: CPT | Mod: PBBFAC,HCNC,, | Performed by: PHYSICAL MEDICINE & REHABILITATION

## 2019-05-20 PROCEDURE — 99499 UNLISTED E&M SERVICE: CPT | Mod: S$GLB,,, | Performed by: PHYSICAL MEDICINE & REHABILITATION

## 2019-05-20 PROCEDURE — 99499 RISK ADDL DX/OHS AUDIT: ICD-10-PCS | Mod: S$GLB,,, | Performed by: PHYSICAL MEDICINE & REHABILITATION

## 2019-05-20 RX ORDER — HYDROCODONE BITARTRATE AND ACETAMINOPHEN 10; 325 MG/1; MG/1
1 TABLET ORAL EVERY 6 HOURS PRN
Qty: 110 TABLET | Refills: 0 | Status: SHIPPED | OUTPATIENT
Start: 2019-06-20 | End: 2019-07-20

## 2019-05-20 RX ORDER — HYDROCODONE BITARTRATE AND ACETAMINOPHEN 10; 325 MG/1; MG/1
1 TABLET ORAL EVERY 6 HOURS PRN
Qty: 115 TABLET | Refills: 0 | Status: SHIPPED | OUTPATIENT
Start: 2019-05-20 | End: 2019-06-19

## 2019-05-20 RX ORDER — HYDROCODONE BITARTRATE AND ACETAMINOPHEN 10; 325 MG/1; MG/1
1 TABLET ORAL EVERY 6 HOURS PRN
Qty: 110 TABLET | Refills: 0 | Status: SHIPPED | OUTPATIENT
Start: 2019-07-20 | End: 2019-08-23 | Stop reason: SDUPTHER

## 2019-05-20 NOTE — PROGRESS NOTES
Subjective:       Patient ID: Xander Sanchez is a 69 y.o. male.    Chief Complaint: Arm Pain and Neck Pain    Arm Pain    Pertinent negatives include no chest pain. Numbness: Right arm paina nd numbness.   Neck Pain    Pertinent negatives include no chest pain or headaches. Numbness: Right arm paina nd numbness. Weakness: right arm weak.   Extremity Weakness    Numbness: Right arm paina nd numbness.      Mr Sanchez is Right handed male, who returns to clinic for right arm pain, and weakness that worsened after second neck surgery with Dr. Meyer.   Third surgery with Dr. De Dios did not changed his symptoms.   He states that all symptoms stayed the same as before surgery.   He reports some improvement in pain, but the tightness, cold sensation in right arm is same as before.  Mr. Sanchez returns to clinic for chronic pain management with opioids.    Last clinic visit was on 01/21/19.    Current right arm pain is 5, average pain is 4-5/10, the worst pain is 7/10.   He has not that much of neck pain, current neck pain is 1, worst pain maybe 2.   He complains mainly about tightness, pressure in right arm, in right arm , more in forearm, than above elbow, tightness  runs down the arm to right thumb.   Right arm pain is constant, day and night, intensity changes, pain is worse during day time, does not awake him at night.   In morning feels tight, stiff, as pressure around the arm, also feels swollen and tight in hand, and feels cold at all time.   It hurts more bellow \the elbow than above elbow.   He reports that he does not have feelings, cannot write, does not know if things are going to drop out of his hand.   Sometimes squeezes too hard plastic cup.   Right hand is clumsy, getting smaller, hardly can dress, cannot button shirt.   He states that he is not able to dress. He states that before surgery he has had similar problems, but they just got worse after second surgery.   It is swelling feeling, and tightness that  borders him, not that much pain.   Sometimes right arm feels cold, and cold weather affect him more,  Pain is better controled with Hydrocodone.   He went for CRESCENCIO, once before surgery and few time after surgery, total  > 3   Takes Hydrocodone 10/325 mg PO TID, that brings his pain down to tolerable 2.   He is now taking Neurontin 600 mg po QID, total 2400 mg /day, and tolerates it well.   But, Neurontin does not help much, in his opinion.   Hydrocodone helps and brings pain down to 2, and gives him ~ 5 hrs pain relief.  Also, takes Baclofen 10 mg po TID , for tightness in right arm, that helps also.  Now, awaiting procedure, pain stimulator, that is a little complicated since he has AICD.  Here for follow up, re evaluation and chronic pain management with opiates.    Past Medical History:   Diagnosis Date    Asthma     Cardiomyopathy     Cervical radicular pain     Cervical spondylosis with myelopathy 10/17/2012    Chronic bronchitis     Chronic systolic dysfunction of left ventricle 7/27/2015    Constipation 7/27/2015    COPD (chronic obstructive pulmonary disease)     CRPS (complex regional pain syndrome type I) 12/29/2014    Diabetes mellitus, type 2     DM type 2 (diabetes mellitus, type 2) 7/27/2015    Enlarged prostate 7/27/2015    Enuresis 7/6/2018    Hypertension     ICD (implantable cardiac defibrillator) in place     Mixed hyperlipidemia 2/28/2018    Presence of biventricular AICD 7/27/2015    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thy 2/1/2016    Urinary tract infection     pt does not know       Past Surgical History:   Procedure Laterality Date    BLOCK SELECTIVE NERVE ROOT TRANSFORAMINAL LUMBAR Right 10/9/2014    Performed by Debbie Parsons [facility] at [facility] [place] PAIN MGT    CARDIAC DEFIBRILLATOR PLACEMENT      CERVICAL SPINE SURGERY      COLONOSCOPY  golytely N/A 7/19/2017    Performed by [facility]essa [facility]ribe [facility] at [facility] ENDO    DISCECTOMY, SPINE, CERVICAL, ANTERIOR APPROACH, WITH  FUSION N/A 4/10/2014    Performed by Magdaleno Meyer MD at [facility] OR 2ND FLR    EGD (ESOPHAGOGASTRODUODENOSCOPY) N/A 2013    Performed by [facility] at [facility] ENDO    EGD (ESOPHAGOGASTRODUODENOSCOPY) N/A 2013    Performed by [facility] at [facility] ENDO    EXTRACTION-LEAD N/A 3/24/2015    Performed by Braeden Park MD at [facility] CATH LAB    Formainotomy-CERVICAL/FUSION-POSTERIOR N/A 8/10/2015    Performed by David De Dios MD at [facility] OR 2ND FLR    INSERTION-ICD-BIVENTRICULAR N/A 3/24/2015    Performed by Braeden Park MD at [facility] CATH LAB    MYELOGRAM N/A 8/15/2014    Performed by [facility] at [facility] OR 2ND FLR    MYELOGRAM N/A 2013    Performed by [facility] at [facility] OR 2ND FLR    PROSTATECTOMY-TRANSURETHRAL W QUANTA LASER N/A 2017    Performed by Graham Buenrostro MD at [facility] OR 1ST FLR    SPINE SURGERY         Family History   Problem Relation Age of Onset    Hypertension Mother     Hypertension Father     COPD Father     No Known Problems Sister     No Known Problems Brother     No Known Problems Daughter     Prostate cancer Neg Hx     Kidney disease Neg Hx        Social History     Socioeconomic History    Marital status:      Spouse name: Not on file    Number of children: Not on file    Years of education: Not on file    Highest education level: Not on file   Occupational History    Not on file   Social Needs    Financial resource strain: Not on file    Food insecurity:     Worry: Not on file     Inability: Not on file    Transportation needs:     Medical: Not on file     Non-medical: Not on file   Tobacco Use    Smoking status: Former Smoker     Packs/day: 1.00     Years: 40.00     Pack years: 40.00     Types: Cigarettes     Last attempt to quit: 2009     Years since quittin.8    Smokeless tobacco: Never Used   Substance and Sexual Activity    Alcohol use: Yes     Alcohol/week: 1.8 oz      Types: 1 Shots of liquor, 2 Cans of beer per week    Drug use: No    Sexual activity: Yes     Partners: Female   Lifestyle    Physical activity:     Days per week: Not on file     Minutes per session: Not on file    Stress: Not on file   Relationships    Social connections:     Talks on phone: Not on file     Gets together: Not on file     Attends [employer] service: Not on file     Active member of club or organization: Not on file     Attends meetings of clubs or organizations: Not on file     Relationship status: Not on file   Other Topics Concern    Not on file   Social History Narrative    Not on file       Current Outpatient Medications   Medication Sig Dispense Refill    aspirin (ECOTRIN) 81 MG EC tablet Take 81 mg by mouth once daily.      baclofen (LIORESAL) 20 MG tablet Take 1 tablet (20 mg total) by mouth 3 (three) times daily. 270 tablet 2    gabapentin (NEURONTIN) 600 MG tablet Take 1 tablet (600 mg total) by mouth 4 (four) times daily with meals and nightly. 360 tablet 2    hydroCHLOROthiazide (MICROZIDE) 12.5 mg capsule TAKE 1 CAPSULE ONE TIME DAILY 90 capsule 3    HYDROcodone-acetaminophen (NORCO)  mg per tablet Take 1 tablet by mouth every 6 (six) hours as needed. 115 tablet 0    [START ON 6/20/2019] HYDROcodone-acetaminophen (NORCO)  mg per tablet Take 1 tablet by mouth every 6 (six) hours as needed. 110 tablet 0    [START ON 7/20/2019] HYDROcodone-acetaminophen (NORCO)  mg per tablet Take 1 tablet by mouth every 6 (six) hours as needed. 110 tablet 0    metoprolol succinate (TOPROL-XL) 25 MG 24 hr tablet TAKE 1 TABLET EVERY DAY 90 tablet 3    oxybutynin (DITROPAN) 5 MG Tab Take 1 tablet (5 mg total) by mouth 2 (two) times daily. 180 tablet 3    pantoprazole (PROTONIX) 40 MG tablet TAKE 1 TABLET BY MOUTH ONCE DAILY BEFORE BREAKFAST 30 tablet 11    pravastatin (PRAVACHOL) 10 MG tablet TAKE 1 TABLET BY MOUTH ONCE DAILY AT BEDTIME 90 tablet 0    tamsulosin (FLOMAX)  0.4 mg Cp24 Take 2 capsules (0.8 mg total) by mouth every evening. 180 capsule 4    TRUE METRIX AIR GLUCOSE METER kit USE AS INSTRUCTED 1 each 0    TRUE METRIX GLUCOSE TEST STRIP Strp TEST  ONE  TIME  DAILY  AT  6AM 100 strip 3    TRUEPLUS LANCETS 33 gauge Misc TEST ONE TIME DAILY  AT  6AM 100 each 3    valsartan (DIOVAN) 40 MG tablet Take 1 tablet (40 mg total) by mouth once daily. 90 tablet 3     No current facility-administered medications for this visit.        Review of patient's allergies indicates:  No Known Allergies  Review of Systems   Constitutional: Negative for appetite change and fatigue.   Eyes: Negative for visual disturbance.   Respiratory: Negative for shortness of breath.    Cardiovascular: Negative for chest pain.   Gastrointestinal: Negative for constipation and diarrhea.   Genitourinary: Negative for dysuria, frequency and urgency.   Musculoskeletal: Positive for extremity weakness and neck pain. Negative for arthralgias, back pain, gait problem, joint swelling, myalgias and neck stiffness.   Neurological: Negative for dizziness, tremors and headaches. Weakness: right arm weak. Numbness: Right arm paina nd numbness.   Psychiatric/Behavioral: Negative for dysphoric mood.   All other systems reviewed and are negative.          Objective:      Physical Exam    GENERAL: The patient is alert, oriented, pleasant.   MUSCULOSKELETAL:   Gait: on wide basis, COG moved forward.   GENERAL: The patient is alert, oriented, pleasant.   HEENT; PERRLA  NECK: supple, minimaly webbed neck.   Cervical spine :  Minimally decreased AROM in cervical spine, flexion to 60, extension to 0,,   side bending and rotation to 20-25 degrees with pain.  + mild-moderate paravertebral cervical musculature tenderness on Right.  + mild- moderate tenderness in upper trapezius muscles on Right.   Lumbar spine, full range of motion in all planes, flexion to 90 degrees , ext. 0.  Side bending and rotation to 25--30 degrees.    Straight leg raising Negative bilaterally.   Full range of motion in all joints x4 extremities.   Muscle strength 5/5 throughout x4 extremities.   No joint laxity throughout x4 extremities.   NEUROLOGIC: Cranial nerves II through XII intact.   Deep tendon reflexes is normal, +2 in the upper and lower extremities bilaterally.   Muscle tone is normal.   Sensory is intact to light touch and pinprick throughout x4 extremities.   Skin: no hypersensitivity, alodynia, no somatosensory changes, other than cold skin in right arm, no atrophic skin changes.        CT of Cervical spine showed:  Stable remote operative change right C3, C4 and C6 hemilaminectomies with metallic laminal plasty.  Operative change with C5/C6 anterior spinal fusion no evidence for hardware failure. with interval reduction of protruding disk at C5/C6.  continued truncation of the cervical spinal cord most pronounced at the C6 level with mild/moderate small caliber of the cord   concerning for myelomalacia stable.  Superimposed multilevel degenerative change most pronounced at C6/C7 with bulging disk resulting in mild central canal stenosis.   Please note there is additional degenerative change with mild neural foraminal stenosis C3/C4 and C4/C5 levels.    Assessment:          1. Cervical spondylosis with myelopathy    2. Complex regional pain syndrome type 2 of right upper extremity    3. HNP (herniated nucleus pulposus) with myelopathy, cervical    4. Degeneration of cervical intervertebral disc    5. Facet arthritis of cervical region    6. Muscle right arm weakness    7. Cervical post-laminectomy syndrome    8. Complex regional pain syndrome type 1, affecting unspecified site    9. Chronic pain syndrome    10. Rotator cuff syndrome of right shoulder    11. Right hand weakness    12. Cervical stenosis of spine    13. Brachial neuritis or radiculitis    14. Right arm weakness    15. Narcotic dependency, continuous    16. Right arm pain    17.  Spinal stenosis in cervical region    18. Cervical radicular pain    19. Cervical radiculopathy      Plan:       Cervical spondylosis with myelopathy  -     HYDROcodone-acetaminophen (NORCO)  mg per tablet; Take 1 tablet by mouth every 6 (six) hours as needed.  Dispense: 115 tablet; Refill: 0  -     HYDROcodone-acetaminophen (NORCO)  mg per tablet; Take 1 tablet by mouth every 6 (six) hours as needed.  Dispense: 110 tablet; Refill: 0  -     HYDROcodone-acetaminophen (NORCO)  mg per tablet; Take 1 tablet by mouth every 6 (six) hours as needed.  Dispense: 110 tablet; Refill: 0    Complex regional pain syndrome type 2 of right upper extremity  -     HYDROcodone-acetaminophen (NORCO)  mg per tablet; Take 1 tablet by mouth every 6 (six) hours as needed.  Dispense: 115 tablet; Refill: 0  -     HYDROcodone-acetaminophen (NORCO)  mg per tablet; Take 1 tablet by mouth every 6 (six) hours as needed.  Dispense: 110 tablet; Refill: 0  -     HYDROcodone-acetaminophen (NORCO)  mg per tablet; Take 1 tablet by mouth every 6 (six) hours as needed.  Dispense: 110 tablet; Refill: 0    HNP (herniated nucleus pulposus) with myelopathy, cervical  -     HYDROcodone-acetaminophen (NORCO)  mg per tablet; Take 1 tablet by mouth every 6 (six) hours as needed.  Dispense: 115 tablet; Refill: 0  -     HYDROcodone-acetaminophen (NORCO)  mg per tablet; Take 1 tablet by mouth every 6 (six) hours as needed.  Dispense: 110 tablet; Refill: 0  -     HYDROcodone-acetaminophen (NORCO)  mg per tablet; Take 1 tablet by mouth every 6 (six) hours as needed.  Dispense: 110 tablet; Refill: 0    Degeneration of cervical intervertebral disc  -     HYDROcodone-acetaminophen (NORCO)  mg per tablet; Take 1 tablet by mouth every 6 (six) hours as needed.  Dispense: 115 tablet; Refill: 0  -     HYDROcodone-acetaminophen (NORCO)  mg per tablet; Take 1 tablet by mouth every 6 (six) hours as needed.   Dispense: 110 tablet; Refill: 0  -     HYDROcodone-acetaminophen (NORCO)  mg per tablet; Take 1 tablet by mouth every 6 (six) hours as needed.  Dispense: 110 tablet; Refill: 0    Facet arthritis of cervical region  -     HYDROcodone-acetaminophen (NORCO)  mg per tablet; Take 1 tablet by mouth every 6 (six) hours as needed.  Dispense: 115 tablet; Refill: 0  -     HYDROcodone-acetaminophen (NORCO)  mg per tablet; Take 1 tablet by mouth every 6 (six) hours as needed.  Dispense: 110 tablet; Refill: 0  -     HYDROcodone-acetaminophen (NORCO)  mg per tablet; Take 1 tablet by mouth every 6 (six) hours as needed.  Dispense: 110 tablet; Refill: 0    Muscle right arm weakness  -     HYDROcodone-acetaminophen (NORCO)  mg per tablet; Take 1 tablet by mouth every 6 (six) hours as needed.  Dispense: 115 tablet; Refill: 0  -     HYDROcodone-acetaminophen (NORCO)  mg per tablet; Take 1 tablet by mouth every 6 (six) hours as needed.  Dispense: 110 tablet; Refill: 0  -     HYDROcodone-acetaminophen (NORCO)  mg per tablet; Take 1 tablet by mouth every 6 (six) hours as needed.  Dispense: 110 tablet; Refill: 0    Cervical post-laminectomy syndrome  -     HYDROcodone-acetaminophen (NORCO)  mg per tablet; Take 1 tablet by mouth every 6 (six) hours as needed.  Dispense: 115 tablet; Refill: 0  -     HYDROcodone-acetaminophen (NORCO)  mg per tablet; Take 1 tablet by mouth every 6 (six) hours as needed.  Dispense: 110 tablet; Refill: 0  -     HYDROcodone-acetaminophen (NORCO)  mg per tablet; Take 1 tablet by mouth every 6 (six) hours as needed.  Dispense: 110 tablet; Refill: 0    Complex regional pain syndrome type 1, affecting unspecified site  -     HYDROcodone-acetaminophen (NORCO)  mg per tablet; Take 1 tablet by mouth every 6 (six) hours as needed.  Dispense: 115 tablet; Refill: 0  -     HYDROcodone-acetaminophen (NORCO)  mg per tablet; Take 1 tablet by mouth every 6  (six) hours as needed.  Dispense: 110 tablet; Refill: 0  -     HYDROcodone-acetaminophen (NORCO)  mg per tablet; Take 1 tablet by mouth every 6 (six) hours as needed.  Dispense: 110 tablet; Refill: 0    Chronic pain syndrome  -     HYDROcodone-acetaminophen (NORCO)  mg per tablet; Take 1 tablet by mouth every 6 (six) hours as needed.  Dispense: 115 tablet; Refill: 0  -     HYDROcodone-acetaminophen (NORCO)  mg per tablet; Take 1 tablet by mouth every 6 (six) hours as needed.  Dispense: 110 tablet; Refill: 0  -     HYDROcodone-acetaminophen (NORCO)  mg per tablet; Take 1 tablet by mouth every 6 (six) hours as needed.  Dispense: 110 tablet; Refill: 0    Rotator cuff syndrome of right shoulder  -     HYDROcodone-acetaminophen (NORCO)  mg per tablet; Take 1 tablet by mouth every 6 (six) hours as needed.  Dispense: 115 tablet; Refill: 0  -     HYDROcodone-acetaminophen (NORCO)  mg per tablet; Take 1 tablet by mouth every 6 (six) hours as needed.  Dispense: 110 tablet; Refill: 0  -     HYDROcodone-acetaminophen (NORCO)  mg per tablet; Take 1 tablet by mouth every 6 (six) hours as needed.  Dispense: 110 tablet; Refill: 0    Right hand weakness  -     HYDROcodone-acetaminophen (NORCO)  mg per tablet; Take 1 tablet by mouth every 6 (six) hours as needed.  Dispense: 115 tablet; Refill: 0  -     HYDROcodone-acetaminophen (NORCO)  mg per tablet; Take 1 tablet by mouth every 6 (six) hours as needed.  Dispense: 110 tablet; Refill: 0  -     HYDROcodone-acetaminophen (NORCO)  mg per tablet; Take 1 tablet by mouth every 6 (six) hours as needed.  Dispense: 110 tablet; Refill: 0    Cervical stenosis of spine  -     HYDROcodone-acetaminophen (NORCO)  mg per tablet; Take 1 tablet by mouth every 6 (six) hours as needed.  Dispense: 115 tablet; Refill: 0  -     HYDROcodone-acetaminophen (NORCO)  mg per tablet; Take 1 tablet by mouth every 6 (six) hours as needed.   Dispense: 110 tablet; Refill: 0  -     HYDROcodone-acetaminophen (NORCO)  mg per tablet; Take 1 tablet by mouth every 6 (six) hours as needed.  Dispense: 110 tablet; Refill: 0    Brachial neuritis or radiculitis  -     HYDROcodone-acetaminophen (NORCO)  mg per tablet; Take 1 tablet by mouth every 6 (six) hours as needed.  Dispense: 115 tablet; Refill: 0  -     HYDROcodone-acetaminophen (NORCO)  mg per tablet; Take 1 tablet by mouth every 6 (six) hours as needed.  Dispense: 110 tablet; Refill: 0  -     HYDROcodone-acetaminophen (NORCO)  mg per tablet; Take 1 tablet by mouth every 6 (six) hours as needed.  Dispense: 110 tablet; Refill: 0    Right arm weakness  -     HYDROcodone-acetaminophen (NORCO)  mg per tablet; Take 1 tablet by mouth every 6 (six) hours as needed.  Dispense: 115 tablet; Refill: 0  -     HYDROcodone-acetaminophen (NORCO)  mg per tablet; Take 1 tablet by mouth every 6 (six) hours as needed.  Dispense: 110 tablet; Refill: 0  -     HYDROcodone-acetaminophen (NORCO)  mg per tablet; Take 1 tablet by mouth every 6 (six) hours as needed.  Dispense: 110 tablet; Refill: 0    Narcotic dependency, continuous  -     HYDROcodone-acetaminophen (NORCO)  mg per tablet; Take 1 tablet by mouth every 6 (six) hours as needed.  Dispense: 115 tablet; Refill: 0  -     HYDROcodone-acetaminophen (NORCO)  mg per tablet; Take 1 tablet by mouth every 6 (six) hours as needed.  Dispense: 110 tablet; Refill: 0  -     HYDROcodone-acetaminophen (NORCO)  mg per tablet; Take 1 tablet by mouth every 6 (six) hours as needed.  Dispense: 110 tablet; Refill: 0    Right arm pain  -     HYDROcodone-acetaminophen (NORCO)  mg per tablet; Take 1 tablet by mouth every 6 (six) hours as needed.  Dispense: 115 tablet; Refill: 0  -     HYDROcodone-acetaminophen (NORCO)  mg per tablet; Take 1 tablet by mouth every 6 (six) hours as needed.  Dispense: 110 tablet; Refill: 0  -      HYDROcodone-acetaminophen (NORCO)  mg per tablet; Take 1 tablet by mouth every 6 (six) hours as needed.  Dispense: 110 tablet; Refill: 0    Spinal stenosis in cervical region  -     HYDROcodone-acetaminophen (NORCO)  mg per tablet; Take 1 tablet by mouth every 6 (six) hours as needed.  Dispense: 115 tablet; Refill: 0  -     HYDROcodone-acetaminophen (NORCO)  mg per tablet; Take 1 tablet by mouth every 6 (six) hours as needed.  Dispense: 110 tablet; Refill: 0  -     HYDROcodone-acetaminophen (NORCO)  mg per tablet; Take 1 tablet by mouth every 6 (six) hours as needed.  Dispense: 110 tablet; Refill: 0    Cervical radicular pain  -     HYDROcodone-acetaminophen (NORCO)  mg per tablet; Take 1 tablet by mouth every 6 (six) hours as needed.  Dispense: 115 tablet; Refill: 0  -     HYDROcodone-acetaminophen (NORCO)  mg per tablet; Take 1 tablet by mouth every 6 (six) hours as needed.  Dispense: 110 tablet; Refill: 0  -     HYDROcodone-acetaminophen (NORCO)  mg per tablet; Take 1 tablet by mouth every 6 (six) hours as needed.  Dispense: 110 tablet; Refill: 0    Cervical radiculopathy  -     HYDROcodone-acetaminophen (NORCO)  mg per tablet; Take 1 tablet by mouth every 6 (six) hours as needed.  Dispense: 115 tablet; Refill: 0  -     HYDROcodone-acetaminophen (NORCO)  mg per tablet; Take 1 tablet by mouth every 6 (six) hours as needed.  Dispense: 110 tablet; Refill: 0  -     HYDROcodone-acetaminophen (NORCO)  mg per tablet; Take 1 tablet by mouth every 6 (six) hours as needed.  Dispense: 110 tablet; Refill: 0        Patient with cervical myelopathy, with tightness and pain is Right UE, s/p Cervical fusion, here for chronic pain management.     1. Pain management : Gabapentin ,  Will resume Gabapentin 600 mg po QID, and Hydrocodone 10/325 mg PO QID,  Pt is agreeable to decrease pain meds, by 5  Tabs/month, (#115,/ may,  #110/jun, #110/july).   Opioid Risk Score        Value Time User    Opioid Risk Score  5 1/21/2019  9:06 AM Sara Ruiz MD         reviewed and appropriate.  Hydrocodone refill on 4/22, 3/21, 2/21/19.  UDS done on 10/23, and positive for Hydrocodone.      2. Spasticity, will resume Baclofen to 20 mg PO TID.    RTC in 3-4  months.    Total time spent face to face with patient was 25 minutes.   More than 50% of that time was spent in counseling on diagnosis , prognosis and treatment options.   I also  patient  on common and most usual side effect of prescribed medications. Risk and benefits of opiates, possible risk of developing opiate dependence and tolerance, need of strict compliance with prescribed medications.  Pain contract, rules and obligations were discussed with patient in details.  He is aware that I would be the only doctor prescribing him pain medications and ED in a case of emergency.  I reviewed Primary care , and other specialty's notes to better coordinate patient's  care.   All questions were answered, and patient voiced understanding.

## 2019-05-30 RX ORDER — PRAVASTATIN SODIUM 10 MG/1
TABLET ORAL
Qty: 90 TABLET | Refills: 3 | Status: SHIPPED | OUTPATIENT
Start: 2019-05-30 | End: 2020-06-02

## 2019-06-18 DIAGNOSIS — R29.898 RIGHT HAND WEAKNESS: ICD-10-CM

## 2019-06-18 DIAGNOSIS — F11.20 NARCOTIC DEPENDENCY, CONTINUOUS: ICD-10-CM

## 2019-06-18 DIAGNOSIS — G89.4 CHRONIC PAIN SYNDROME: ICD-10-CM

## 2019-06-18 DIAGNOSIS — R29.898 RIGHT ARM WEAKNESS: ICD-10-CM

## 2019-06-18 DIAGNOSIS — M62.81 MUSCLE RIGHT ARM WEAKNESS: ICD-10-CM

## 2019-06-18 DIAGNOSIS — G90.50 COMPLEX REGIONAL PAIN SYNDROME TYPE 1, AFFECTING UNSPECIFIED SITE: ICD-10-CM

## 2019-06-18 DIAGNOSIS — M47.12 CERVICAL SPONDYLOSIS WITH MYELOPATHY: ICD-10-CM

## 2019-06-18 DIAGNOSIS — M54.12 BRACHIAL NEURITIS OR RADICULITIS: ICD-10-CM

## 2019-06-18 DIAGNOSIS — M48.02 SPINAL STENOSIS IN CERVICAL REGION: ICD-10-CM

## 2019-06-18 DIAGNOSIS — M75.101 ROTATOR CUFF SYNDROME OF RIGHT SHOULDER: ICD-10-CM

## 2019-06-18 DIAGNOSIS — M50.00 HNP (HERNIATED NUCLEUS PULPOSUS) WITH MYELOPATHY, CERVICAL: ICD-10-CM

## 2019-06-18 DIAGNOSIS — M79.601 RIGHT ARM PAIN: ICD-10-CM

## 2019-06-18 DIAGNOSIS — M48.02 CERVICAL STENOSIS OF SPINE: ICD-10-CM

## 2019-06-18 DIAGNOSIS — M50.30 DEGENERATION OF CERVICAL INTERVERTEBRAL DISC: ICD-10-CM

## 2019-06-18 DIAGNOSIS — M54.12 CERVICAL RADICULAR PAIN: ICD-10-CM

## 2019-06-18 DIAGNOSIS — M96.1 CERVICAL POST-LAMINECTOMY SYNDROME: ICD-10-CM

## 2019-06-18 DIAGNOSIS — M47.812 FACET ARTHRITIS OF CERVICAL REGION: ICD-10-CM

## 2019-06-19 RX ORDER — BACLOFEN 20 MG/1
TABLET ORAL
Qty: 270 TABLET | Refills: 0 | Status: SHIPPED | OUTPATIENT
Start: 2019-06-19 | End: 2019-11-15 | Stop reason: SDUPTHER

## 2019-06-20 ENCOUNTER — TELEPHONE (OUTPATIENT)
Dept: PHYSICAL MEDICINE AND REHAB | Facility: CLINIC | Age: 69
End: 2019-06-20

## 2019-06-20 NOTE — TELEPHONE ENCOUNTER
Called a second time, answered questions a second time.    ----- Message from Palmer Melendrez sent at 6/20/2019  3:52 PM CDT -----  Needs Advice    Reason for call: Pharmacy states they did not speak w/ the nurse today regarding HYDROcodone-acetaminophen (NORCO)  mg per tablet. Pt is asking the nurse contact [facility] again regarding the prescription.        Communication Preference: [phone]    Additional Information:    [facility] [address] [place] [id number]  Phone: [phone] Fax: [phone]

## 2019-06-20 NOTE — TELEPHONE ENCOUNTER
Called pt to find out if he was requesting a new refill, pt says no, [facility] just needs some questions answered.  Called [facility] and answered questions.    ----- Message from Emre Recinos sent at 6/20/2019 11:38 AM CDT -----  Contact: Patient    Patient calling to get an update on the medication refill request for (HYDROcodone-acetaminophen (NORCO)  mg per tablet ) pharmacy is awaiting a call from the 's office     [facility] [address] [place] [id number]  Phone: [phone] Fax: [phone]

## 2019-07-07 NOTE — PATIENT INSTRUCTIONS
Stop oxybutynin now.  In 2 wks, wean flomax from 2 capsules to 1 capsule at night.  In the next 2 wks, stop flomax all together.    Lab Results   Component Value Date    PSA 1.2 02/01/2016    PSA 0.66 03/28/2014    PSA 0.67 03/13/2013    PSADIAG 1.1 04/06/2015      eyeglasses

## 2019-07-08 ENCOUNTER — TELEPHONE (OUTPATIENT)
Dept: FAMILY MEDICINE | Facility: CLINIC | Age: 69
End: 2019-07-08

## 2019-07-08 NOTE — TELEPHONE ENCOUNTER
----- Message from Ismael Cabezas sent at 7/8/2019 10:32 AM CDT -----  Contact: [phone]/self  Patient would like to speak with you concerning his paperwork that was not filled out.  Patient states he dropped off paperwork a week ago.   Please call back to assist at [phone]

## 2019-07-24 DIAGNOSIS — R32 ENURESIS: ICD-10-CM

## 2019-07-25 RX ORDER — OXYBUTYNIN CHLORIDE 5 MG/1
TABLET ORAL
Qty: 180 TABLET | Refills: 3 | Status: SHIPPED | OUTPATIENT
Start: 2019-07-25 | End: 2020-09-14

## 2019-08-02 RX ORDER — FLUTICASONE PROPIONATE AND SALMETEROL XINAFOATE 230; 21 UG/1; UG/1
AEROSOL, METERED RESPIRATORY (INHALATION)
Qty: 1 INHALER | Refills: 0 | Status: SHIPPED | OUTPATIENT
Start: 2019-08-02 | End: 2020-04-17 | Stop reason: SDUPTHER

## 2019-08-16 DIAGNOSIS — N40.1 BENIGN NON-NODULAR PROSTATIC HYPERPLASIA WITH LOWER URINARY TRACT SYMPTOMS: ICD-10-CM

## 2019-08-16 DIAGNOSIS — R33.9 INCOMPLETE BLADDER EMPTYING: ICD-10-CM

## 2019-08-16 RX ORDER — TAMSULOSIN HYDROCHLORIDE 0.4 MG/1
0.8 CAPSULE ORAL NIGHTLY
Qty: 180 CAPSULE | Refills: 1 | Status: SHIPPED | OUTPATIENT
Start: 2019-08-16 | End: 2020-09-14

## 2019-08-16 NOTE — TELEPHONE ENCOUNTER
----- Message from Stella Wilson sent at 8/16/2019  8:45 AM CDT -----  Contact: Patient   Type: RX Refill Request    Who Called: Patient     Refill or New Rx: Refill    RX Name and Strength: tamsulosin (FLOMAX) 0.4 mg Cp24    How is the patient currently taking it? (ex. 1XDay):    Is this a 30 day or 90 day RX:    Preferred Pharmacy with phone number: [facility] [place], [place] - [address] [place] [address] [phone] (Phone)  [phone] (Fax)      Local or Mail Order: Local    Ordering Provider: Dr. August    Would the patient rather a call back or a response via My [facility]? Call back     Best Call Back Number:[phone]    Additional Information: Pt is requesting a call back as well.

## 2019-08-19 DIAGNOSIS — M62.81 MUSCLE RIGHT ARM WEAKNESS: ICD-10-CM

## 2019-08-19 DIAGNOSIS — M54.12 BRACHIAL NEURITIS OR RADICULITIS: ICD-10-CM

## 2019-08-19 DIAGNOSIS — M54.12 CERVICAL RADICULAR PAIN: ICD-10-CM

## 2019-08-19 DIAGNOSIS — M54.12 CERVICAL RADICULOPATHY: ICD-10-CM

## 2019-08-19 DIAGNOSIS — R29.898 RIGHT HAND WEAKNESS: ICD-10-CM

## 2019-08-19 DIAGNOSIS — G89.4 CHRONIC PAIN SYNDROME: ICD-10-CM

## 2019-08-19 DIAGNOSIS — M48.02 SPINAL STENOSIS IN CERVICAL REGION: ICD-10-CM

## 2019-08-19 DIAGNOSIS — R29.898 RIGHT ARM WEAKNESS: ICD-10-CM

## 2019-08-19 DIAGNOSIS — M75.101 ROTATOR CUFF SYNDROME OF RIGHT SHOULDER: ICD-10-CM

## 2019-08-19 DIAGNOSIS — F11.20 NARCOTIC DEPENDENCY, CONTINUOUS: ICD-10-CM

## 2019-08-19 DIAGNOSIS — G56.41 COMPLEX REGIONAL PAIN SYNDROME TYPE 2 OF RIGHT UPPER EXTREMITY: ICD-10-CM

## 2019-08-19 DIAGNOSIS — M48.02 CERVICAL STENOSIS OF SPINE: ICD-10-CM

## 2019-08-19 DIAGNOSIS — M79.601 RIGHT ARM PAIN: ICD-10-CM

## 2019-08-19 DIAGNOSIS — M50.00 HNP (HERNIATED NUCLEUS PULPOSUS) WITH MYELOPATHY, CERVICAL: ICD-10-CM

## 2019-08-19 DIAGNOSIS — M47.12 CERVICAL SPONDYLOSIS WITH MYELOPATHY: ICD-10-CM

## 2019-08-19 DIAGNOSIS — G90.50 COMPLEX REGIONAL PAIN SYNDROME TYPE 1, AFFECTING UNSPECIFIED SITE: ICD-10-CM

## 2019-08-19 DIAGNOSIS — M47.812 FACET ARTHRITIS OF CERVICAL REGION: ICD-10-CM

## 2019-08-19 DIAGNOSIS — M96.1 CERVICAL POST-LAMINECTOMY SYNDROME: ICD-10-CM

## 2019-08-19 DIAGNOSIS — M50.30 DEGENERATION OF CERVICAL INTERVERTEBRAL DISC: ICD-10-CM

## 2019-08-19 RX ORDER — TAMSULOSIN HYDROCHLORIDE 0.4 MG/1
CAPSULE ORAL
Qty: 180 CAPSULE | Refills: 3 | Status: SHIPPED | OUTPATIENT
Start: 2019-08-19 | End: 2019-09-19 | Stop reason: SDUPTHER

## 2019-08-19 NOTE — TELEPHONE ENCOUNTER
Trenton Ruiz Staff   Caller: Self/ [phone] (Today, 11:23 AM)             Rx Refill/Request       Is this a Refill or New Rx: Refill.     Rx Name and Strength:  HYDROcodone-acetaminophen (NORCO)  mg.     Preferred Pharmacy with phone number:[facility]  mg phone number  [phone].   Communication Preference: [phone].   Additional Information:  Patient would like a call when his medication is sent over.

## 2019-08-20 DIAGNOSIS — M79.601 RIGHT ARM PAIN: ICD-10-CM

## 2019-08-20 DIAGNOSIS — M62.81 MUSCLE RIGHT ARM WEAKNESS: ICD-10-CM

## 2019-08-20 DIAGNOSIS — M75.101 ROTATOR CUFF SYNDROME OF RIGHT SHOULDER: ICD-10-CM

## 2019-08-20 DIAGNOSIS — M47.812 FACET ARTHRITIS OF CERVICAL REGION: ICD-10-CM

## 2019-08-20 DIAGNOSIS — M54.12 CERVICAL RADICULOPATHY: ICD-10-CM

## 2019-08-20 DIAGNOSIS — M48.02 SPINAL STENOSIS IN CERVICAL REGION: ICD-10-CM

## 2019-08-20 DIAGNOSIS — G90.50 COMPLEX REGIONAL PAIN SYNDROME TYPE 1, AFFECTING UNSPECIFIED SITE: ICD-10-CM

## 2019-08-20 DIAGNOSIS — F11.20 NARCOTIC DEPENDENCY, CONTINUOUS: ICD-10-CM

## 2019-08-20 DIAGNOSIS — M50.30 DEGENERATION OF CERVICAL INTERVERTEBRAL DISC: ICD-10-CM

## 2019-08-20 DIAGNOSIS — M96.1 CERVICAL POST-LAMINECTOMY SYNDROME: ICD-10-CM

## 2019-08-20 DIAGNOSIS — M50.00 HNP (HERNIATED NUCLEUS PULPOSUS) WITH MYELOPATHY, CERVICAL: ICD-10-CM

## 2019-08-20 DIAGNOSIS — R29.898 RIGHT ARM WEAKNESS: ICD-10-CM

## 2019-08-20 DIAGNOSIS — M54.12 CERVICAL RADICULAR PAIN: ICD-10-CM

## 2019-08-20 DIAGNOSIS — R29.898 RIGHT HAND WEAKNESS: ICD-10-CM

## 2019-08-20 DIAGNOSIS — G56.41 COMPLEX REGIONAL PAIN SYNDROME TYPE 2 OF RIGHT UPPER EXTREMITY: ICD-10-CM

## 2019-08-20 DIAGNOSIS — M47.12 CERVICAL SPONDYLOSIS WITH MYELOPATHY: ICD-10-CM

## 2019-08-20 DIAGNOSIS — G89.4 CHRONIC PAIN SYNDROME: ICD-10-CM

## 2019-08-20 DIAGNOSIS — M48.02 CERVICAL STENOSIS OF SPINE: ICD-10-CM

## 2019-08-20 DIAGNOSIS — M54.12 BRACHIAL NEURITIS OR RADICULITIS: ICD-10-CM

## 2019-08-20 NOTE — TELEPHONE ENCOUNTER
----- Message from Palmer Melendrez sent at 8/20/2019 12:59 PM CDT -----  Needs Advice    Reason for call: Pt is asking to speak w/ the nurse regarding request for refill on HYDROcodone-acetaminophen (NORCO)  mg per tablet        Communication Preference: [phone]    Additional Information:

## 2019-08-20 NOTE — TELEPHONE ENCOUNTER
----- Message from Palmer Melendrez sent at 8/20/2019  8:09 AM CDT -----  Rx Refill/Request     Is this a Refill or New Rx: Refill  Rx Name and Strength:  HYDROcodone-acetaminophen (NORCO)  mg per tablet  Preferred Pharmacy with phone number: see below  Communication Preference: [phone]  Additional Information: Pt states he will be out of medication today. Pt is asking to speak w/ someone regarding the request.    [facility] [address] [place], [place] [address] [address] [address]  Phone: [phone] Fax: [phone]

## 2019-08-21 ENCOUNTER — TELEPHONE (OUTPATIENT)
Dept: PHYSICAL MEDICINE AND REHAB | Facility: CLINIC | Age: 69
End: 2019-08-21

## 2019-08-21 RX ORDER — HYDROCODONE BITARTRATE AND ACETAMINOPHEN 10; 325 MG/1; MG/1
1 TABLET ORAL EVERY 6 HOURS PRN
Qty: 110 TABLET | Refills: 0 | OUTPATIENT
Start: 2019-08-21 | End: 2019-09-20

## 2019-08-21 NOTE — TELEPHONE ENCOUNTER
----- Message from Cece Strong sent at 8/21/2019  8:13 AM CDT -----  Contact: self @ [phone]  Pt is calling for the status of his refill request for hydrocodone 10/325.  Pt says he requested the refill this past Monday and it has not been sent to his pharmacy yet.  Pt says he is now out of medication.  Pls call.     [facility] [address] [place] [address] - [address] [place] [address] [phone] (Phone)        [phone] (Fax)

## 2019-08-21 NOTE — TELEPHONE ENCOUNTER
----- Message from Cece Strong sent at 8/21/2019  8:13 AM CDT -----  Contact: self @ [phone]  Pt is calling for the status of his refill request for hydrocodone 10/325.  Pt says he requested the refill this past Monday and it has not been sent to his pharmacy yet.  Pt says he is now out of medication.  Pls call.     [facility] [address] [place] [address] - [address] [place] [address] [phone] (Phone)        [phone] (Fax)

## 2019-08-22 DIAGNOSIS — M47.12 CERVICAL SPONDYLOSIS WITH MYELOPATHY: ICD-10-CM

## 2019-08-22 DIAGNOSIS — G89.4 CHRONIC PAIN SYNDROME: ICD-10-CM

## 2019-08-22 DIAGNOSIS — F11.20 NARCOTIC DEPENDENCY, CONTINUOUS: ICD-10-CM

## 2019-08-22 DIAGNOSIS — M54.12 CERVICAL RADICULOPATHY: ICD-10-CM

## 2019-08-22 DIAGNOSIS — M47.812 FACET ARTHRITIS OF CERVICAL REGION: ICD-10-CM

## 2019-08-22 DIAGNOSIS — M54.12 CERVICAL RADICULAR PAIN: ICD-10-CM

## 2019-08-22 DIAGNOSIS — M96.1 CERVICAL POST-LAMINECTOMY SYNDROME: ICD-10-CM

## 2019-08-22 DIAGNOSIS — R29.898 RIGHT HAND WEAKNESS: ICD-10-CM

## 2019-08-22 DIAGNOSIS — M50.00 HNP (HERNIATED NUCLEUS PULPOSUS) WITH MYELOPATHY, CERVICAL: ICD-10-CM

## 2019-08-22 DIAGNOSIS — G56.41 COMPLEX REGIONAL PAIN SYNDROME TYPE 2 OF RIGHT UPPER EXTREMITY: ICD-10-CM

## 2019-08-22 DIAGNOSIS — M54.12 BRACHIAL NEURITIS OR RADICULITIS: ICD-10-CM

## 2019-08-22 DIAGNOSIS — M48.02 SPINAL STENOSIS IN CERVICAL REGION: ICD-10-CM

## 2019-08-22 DIAGNOSIS — G90.50 COMPLEX REGIONAL PAIN SYNDROME TYPE 1, AFFECTING UNSPECIFIED SITE: ICD-10-CM

## 2019-08-22 DIAGNOSIS — M50.30 DEGENERATION OF CERVICAL INTERVERTEBRAL DISC: ICD-10-CM

## 2019-08-22 DIAGNOSIS — R29.898 RIGHT ARM WEAKNESS: ICD-10-CM

## 2019-08-22 DIAGNOSIS — M62.81 MUSCLE RIGHT ARM WEAKNESS: ICD-10-CM

## 2019-08-22 DIAGNOSIS — M79.601 RIGHT ARM PAIN: ICD-10-CM

## 2019-08-22 DIAGNOSIS — M48.02 CERVICAL STENOSIS OF SPINE: ICD-10-CM

## 2019-08-22 DIAGNOSIS — M75.101 ROTATOR CUFF SYNDROME OF RIGHT SHOULDER: ICD-10-CM

## 2019-08-23 DIAGNOSIS — M48.02 SPINAL STENOSIS IN CERVICAL REGION: ICD-10-CM

## 2019-08-23 DIAGNOSIS — F11.20 NARCOTIC DEPENDENCY, CONTINUOUS: ICD-10-CM

## 2019-08-23 DIAGNOSIS — G89.4 CHRONIC PAIN SYNDROME: ICD-10-CM

## 2019-08-23 DIAGNOSIS — G56.41 COMPLEX REGIONAL PAIN SYNDROME TYPE 2 OF RIGHT UPPER EXTREMITY: ICD-10-CM

## 2019-08-23 DIAGNOSIS — M50.30 DEGENERATION OF CERVICAL INTERVERTEBRAL DISC: ICD-10-CM

## 2019-08-23 DIAGNOSIS — M54.12 BRACHIAL NEURITIS OR RADICULITIS: ICD-10-CM

## 2019-08-23 DIAGNOSIS — M75.101 ROTATOR CUFF SYNDROME OF RIGHT SHOULDER: ICD-10-CM

## 2019-08-23 DIAGNOSIS — M54.12 CERVICAL RADICULAR PAIN: ICD-10-CM

## 2019-08-23 DIAGNOSIS — M47.812 FACET ARTHRITIS OF CERVICAL REGION: ICD-10-CM

## 2019-08-23 DIAGNOSIS — M79.601 RIGHT ARM PAIN: ICD-10-CM

## 2019-08-23 DIAGNOSIS — M96.1 CERVICAL POST-LAMINECTOMY SYNDROME: ICD-10-CM

## 2019-08-23 DIAGNOSIS — R29.898 RIGHT HAND WEAKNESS: ICD-10-CM

## 2019-08-23 DIAGNOSIS — M47.12 CERVICAL SPONDYLOSIS WITH MYELOPATHY: ICD-10-CM

## 2019-08-23 DIAGNOSIS — M48.02 CERVICAL STENOSIS OF SPINE: ICD-10-CM

## 2019-08-23 DIAGNOSIS — M54.12 CERVICAL RADICULOPATHY: ICD-10-CM

## 2019-08-23 DIAGNOSIS — M62.81 MUSCLE RIGHT ARM WEAKNESS: ICD-10-CM

## 2019-08-23 DIAGNOSIS — G90.50 COMPLEX REGIONAL PAIN SYNDROME TYPE 1, AFFECTING UNSPECIFIED SITE: ICD-10-CM

## 2019-08-23 DIAGNOSIS — R29.898 RIGHT ARM WEAKNESS: ICD-10-CM

## 2019-08-23 DIAGNOSIS — M50.00 HNP (HERNIATED NUCLEUS PULPOSUS) WITH MYELOPATHY, CERVICAL: ICD-10-CM

## 2019-08-23 RX ORDER — BACLOFEN 20 MG/1
TABLET ORAL
Qty: 270 TABLET | Refills: 0 | OUTPATIENT
Start: 2019-08-23

## 2019-08-23 RX ORDER — HYDROCODONE BITARTRATE AND ACETAMINOPHEN 10; 325 MG/1; MG/1
1 TABLET ORAL EVERY 6 HOURS PRN
Qty: 110 TABLET | Refills: 0 | Status: SHIPPED | OUTPATIENT
Start: 2019-08-23 | End: 2019-08-23 | Stop reason: SDUPTHER

## 2019-08-23 RX ORDER — BACLOFEN 20 MG/1
20 TABLET ORAL 3 TIMES DAILY
Qty: 270 TABLET | Refills: 0 | Status: SHIPPED | OUTPATIENT
Start: 2019-08-23 | End: 2019-09-19 | Stop reason: SDUPTHER

## 2019-08-23 NOTE — TELEPHONE ENCOUNTER
----- Message from Palmer Melendrez sent at 8/23/2019  8:12 AM CDT -----  Contact: pt @ [phone]  Pt is calling back again, stating pharmacy still does not have script for HYDROcodone-acetaminophen (NORCO)  mg per tablet. Pt states he's called since Monday and he's not out of the medication. Pt is asking to speak w/ a supervisor regarding this.

## 2019-08-25 RX ORDER — HYDROCODONE BITARTRATE AND ACETAMINOPHEN 10; 325 MG/1; MG/1
1 TABLET ORAL EVERY 6 HOURS PRN
Qty: 110 TABLET | Refills: 0 | OUTPATIENT
Start: 2019-08-25 | End: 2019-09-24

## 2019-08-26 DIAGNOSIS — M79.601 RIGHT ARM PAIN: ICD-10-CM

## 2019-08-26 DIAGNOSIS — M54.12 BRACHIAL NEURITIS OR RADICULITIS: ICD-10-CM

## 2019-08-26 DIAGNOSIS — R29.898 RIGHT HAND WEAKNESS: ICD-10-CM

## 2019-08-26 DIAGNOSIS — M50.30 DEGENERATION OF CERVICAL INTERVERTEBRAL DISC: ICD-10-CM

## 2019-08-26 DIAGNOSIS — M48.02 SPINAL STENOSIS IN CERVICAL REGION: ICD-10-CM

## 2019-08-26 DIAGNOSIS — M54.12 CERVICAL RADICULOPATHY: ICD-10-CM

## 2019-08-26 DIAGNOSIS — F11.20 NARCOTIC DEPENDENCY, CONTINUOUS: ICD-10-CM

## 2019-08-26 DIAGNOSIS — M47.812 FACET ARTHRITIS OF CERVICAL REGION: ICD-10-CM

## 2019-08-26 DIAGNOSIS — M50.00 HNP (HERNIATED NUCLEUS PULPOSUS) WITH MYELOPATHY, CERVICAL: ICD-10-CM

## 2019-08-26 DIAGNOSIS — G89.4 CHRONIC PAIN SYNDROME: ICD-10-CM

## 2019-08-26 DIAGNOSIS — R29.898 RIGHT ARM WEAKNESS: ICD-10-CM

## 2019-08-26 DIAGNOSIS — M47.12 CERVICAL SPONDYLOSIS WITH MYELOPATHY: ICD-10-CM

## 2019-08-26 DIAGNOSIS — M75.101 ROTATOR CUFF SYNDROME OF RIGHT SHOULDER: ICD-10-CM

## 2019-08-26 DIAGNOSIS — M54.12 CERVICAL RADICULAR PAIN: ICD-10-CM

## 2019-08-26 DIAGNOSIS — G90.50 COMPLEX REGIONAL PAIN SYNDROME TYPE 1, AFFECTING UNSPECIFIED SITE: ICD-10-CM

## 2019-08-26 DIAGNOSIS — G56.41 COMPLEX REGIONAL PAIN SYNDROME TYPE 2 OF RIGHT UPPER EXTREMITY: ICD-10-CM

## 2019-08-26 DIAGNOSIS — M48.02 CERVICAL STENOSIS OF SPINE: ICD-10-CM

## 2019-08-26 DIAGNOSIS — M62.81 MUSCLE RIGHT ARM WEAKNESS: ICD-10-CM

## 2019-08-26 DIAGNOSIS — M96.1 CERVICAL POST-LAMINECTOMY SYNDROME: ICD-10-CM

## 2019-08-26 RX ORDER — HYDROCODONE BITARTRATE AND ACETAMINOPHEN 10; 325 MG/1; MG/1
1 TABLET ORAL EVERY 6 HOURS PRN
Qty: 110 TABLET | Refills: 0 | Status: SHIPPED | OUTPATIENT
Start: 2019-08-26 | End: 2019-08-26 | Stop reason: SDUPTHER

## 2019-08-26 NOTE — TELEPHONE ENCOUNTER
----- Message from Trenton Kurtz sent at 8/26/2019  8:07 AM CDT -----  Contact: Self/ [phone]  Patient was calling because his  medication was sent to the wrong location. Patient medication is HYDROcodone-acetaminophen (NORCO)  mg and patient pharmacy is [facility] phone number [phone].

## 2019-08-27 RX ORDER — HYDROCODONE BITARTRATE AND ACETAMINOPHEN 10; 325 MG/1; MG/1
1 TABLET ORAL EVERY 6 HOURS PRN
Qty: 110 TABLET | Refills: 0 | Status: SHIPPED | OUTPATIENT
Start: 2019-08-27 | End: 2019-09-26 | Stop reason: SDUPTHER

## 2019-09-19 ENCOUNTER — TELEPHONE (OUTPATIENT)
Dept: FAMILY MEDICINE | Facility: CLINIC | Age: 69
End: 2019-09-19

## 2019-09-19 ENCOUNTER — OFFICE VISIT (OUTPATIENT)
Dept: FAMILY MEDICINE | Facility: CLINIC | Age: 69
End: 2019-09-19
Payer: MEDICARE

## 2019-09-19 VITALS
TEMPERATURE: 98 F | SYSTOLIC BLOOD PRESSURE: 126 MMHG | WEIGHT: 170.63 LBS | HEIGHT: 71 IN | DIASTOLIC BLOOD PRESSURE: 80 MMHG | BODY MASS INDEX: 23.89 KG/M2 | OXYGEN SATURATION: 96 % | HEART RATE: 78 BPM

## 2019-09-19 DIAGNOSIS — Z23 FLU VACCINE NEED: Primary | ICD-10-CM

## 2019-09-19 DIAGNOSIS — E11.40 TYPE 2 DIABETES MELLITUS WITH DIABETIC NEUROPATHY, UNSPECIFIED WHETHER LONG TERM INSULIN USE: ICD-10-CM

## 2019-09-19 DIAGNOSIS — L08.9 TOE INFECTION: Primary | ICD-10-CM

## 2019-09-19 DIAGNOSIS — Z23 FLU VACCINE NEED: ICD-10-CM

## 2019-09-19 PROCEDURE — 99999 PR PBB SHADOW E&M-EST. PATIENT-LVL V: CPT | Mod: PBBFAC,HCNC,, | Performed by: NURSE PRACTITIONER

## 2019-09-19 PROCEDURE — 1101F PT FALLS ASSESS-DOCD LE1/YR: CPT | Mod: HCNC,CPTII,S$GLB, | Performed by: NURSE PRACTITIONER

## 2019-09-19 PROCEDURE — 90662 IIV NO PRSV INCREASED AG IM: CPT | Mod: HCNC,S$GLB,, | Performed by: NURSE PRACTITIONER

## 2019-09-19 PROCEDURE — 1101F PR PT FALLS ASSESS DOC 0-1 FALLS W/OUT INJ PAST YR: ICD-10-PCS | Mod: HCNC,CPTII,S$GLB, | Performed by: NURSE PRACTITIONER

## 2019-09-19 PROCEDURE — 99213 PR OFFICE/OUTPT VISIT, EST, LEVL III, 20-29 MIN: ICD-10-PCS | Mod: 25,HCNC,S$GLB, | Performed by: NURSE PRACTITIONER

## 2019-09-19 PROCEDURE — 3079F DIAST BP 80-89 MM HG: CPT | Mod: HCNC,CPTII,S$GLB, | Performed by: NURSE PRACTITIONER

## 2019-09-19 PROCEDURE — 99999 PR PBB SHADOW E&M-EST. PATIENT-LVL V: ICD-10-PCS | Mod: PBBFAC,HCNC,, | Performed by: NURSE PRACTITIONER

## 2019-09-19 PROCEDURE — 3044F HG A1C LEVEL LT 7.0%: CPT | Mod: HCNC,CPTII,S$GLB, | Performed by: NURSE PRACTITIONER

## 2019-09-19 PROCEDURE — 3079F PR MOST RECENT DIASTOLIC BLOOD PRESSURE 80-89 MM HG: ICD-10-PCS | Mod: HCNC,CPTII,S$GLB, | Performed by: NURSE PRACTITIONER

## 2019-09-19 PROCEDURE — 90662 FLU VACCINE - HIGH DOSE (65+) PRESERVATIVE FREE IM: ICD-10-PCS | Mod: HCNC,S$GLB,, | Performed by: NURSE PRACTITIONER

## 2019-09-19 PROCEDURE — G0008 FLU VACCINE - HIGH DOSE (65+) PRESERVATIVE FREE IM: ICD-10-PCS | Mod: HCNC,S$GLB,, | Performed by: NURSE PRACTITIONER

## 2019-09-19 PROCEDURE — 3074F PR MOST RECENT SYSTOLIC BLOOD PRESSURE < 130 MM HG: ICD-10-PCS | Mod: HCNC,CPTII,S$GLB, | Performed by: NURSE PRACTITIONER

## 2019-09-19 PROCEDURE — 3044F PR MOST RECENT HEMOGLOBIN A1C LEVEL <7.0%: ICD-10-PCS | Mod: HCNC,CPTII,S$GLB, | Performed by: NURSE PRACTITIONER

## 2019-09-19 PROCEDURE — 3074F SYST BP LT 130 MM HG: CPT | Mod: HCNC,CPTII,S$GLB, | Performed by: NURSE PRACTITIONER

## 2019-09-19 PROCEDURE — 99213 OFFICE O/P EST LOW 20 MIN: CPT | Mod: 25,HCNC,S$GLB, | Performed by: NURSE PRACTITIONER

## 2019-09-19 PROCEDURE — G0008 ADMIN INFLUENZA VIRUS VAC: HCPCS | Mod: HCNC,S$GLB,, | Performed by: NURSE PRACTITIONER

## 2019-09-19 RX ORDER — MUPIROCIN 20 MG/G
OINTMENT TOPICAL 3 TIMES DAILY
Qty: 30 G | Refills: 0 | Status: SHIPPED | OUTPATIENT
Start: 2019-09-19 | End: 2019-09-29

## 2019-09-19 RX ORDER — CLOTRIMAZOLE 1 %
CREAM (GRAM) TOPICAL 2 TIMES DAILY
Qty: 28 G | Refills: 0 | Status: SHIPPED | OUTPATIENT
Start: 2019-09-19 | End: 2020-07-31

## 2019-09-19 NOTE — TELEPHONE ENCOUNTER
Patient called asking to be seen today for light headache and possible abnormal weight loss.  Patient was explained that Dr August do not have any available appointment sooner that he will have to   Patient will be seen by ANNMARIE Leiva today on after hrs clinic.  Patient verbalized understanding.

## 2019-09-19 NOTE — PROGRESS NOTES
"Subjective:       Patient ID: Xander Sanchez is a 69 y.o. male.    Chief Complaint: Recurrent Skin Infections (right foot x 1 month)      F/u with podiatry regularly for foot care  LOV 5/10/2019      HPI  Review of Systems   Constitutional: Negative for fever.   HENT: Negative for congestion and sore throat.    Respiratory: Negative for cough and chest tightness.    Cardiovascular: Negative for chest pain and leg swelling.   Skin: Positive for wound.        On right small toe         Past Medical History:   Diagnosis Date    Asthma     Cardiomyopathy     Cervical radicular pain     Cervical spondylosis with myelopathy 10/17/2012    Chronic bronchitis     Chronic systolic dysfunction of left ventricle 7/27/2015    Constipation 7/27/2015    COPD (chronic obstructive pulmonary disease)     CRPS (complex regional pain syndrome type I) 12/29/2014    Diabetes mellitus, type 2     DM type 2 (diabetes mellitus, type 2) 7/27/2015    Enlarged prostate 7/27/2015    Enuresis 7/6/2018    Hypertension     ICD (implantable cardiac defibrillator) in place     Mixed hyperlipidemia 2/28/2018    Presence of biventricular AICD 7/27/2015    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thy 2/1/2016    Urinary tract infection     pt does not know     Lab Results   Component Value Date    CREATININE 0.8 04/24/2019    BUN 12 04/24/2019     04/24/2019    K 4.4 04/24/2019     04/24/2019    CO2 28 04/24/2019     Lab Results   Component Value Date    ALT 19 04/24/2019    AST 22 04/24/2019    ALKPHOS 84 04/24/2019    BILITOT 0.6 04/24/2019     Lab Results   Component Value Date    WBC 5.02 04/24/2019    HGB 14.3 04/24/2019    HCT 44.7 04/24/2019    MCV 92 04/24/2019     04/24/2019       Objective:  /80 (BP Location: Left arm, Patient Position: Sitting, BP Method: Medium (Manual))   Pulse 78   Temp 98.1 °F (36.7 °C) (Oral)   Ht 5' 11" (1.803 m)   Wt 77.4 kg (170 lb 10.2 oz)   SpO2 96%   BMI 23.80 " kg/m²         Physical Exam   Constitutional: He is oriented to person, place, and time. He appears well-developed and well-nourished.   HENT:   Head: Normocephalic.   Mouth/Throat: Oropharynx is clear and moist.   Eyes: Pupils are equal, round, and reactive to light. Conjunctivae and EOM are normal.   Neck: Normal range of motion. Neck supple.   Cardiovascular: Normal rate, regular rhythm and normal heart sounds.   Pulmonary/Chest: Effort normal and breath sounds normal.   Abdominal: Soft. Bowel sounds are normal.   Musculoskeletal: Normal range of motion.        Feet:    Neurological: He is alert and oriented to person, place, and time.   Skin: Skin is warm and dry.   Psychiatric: He has a normal mood and affect. His behavior is normal.   Vitals reviewed.                Assessment:       1. Toe infection    2. Flu vaccine need    3. 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thy, unspecified whether long term insulin use        Plan:          Xander was seen today for recurrent skin infections.    Diagnoses and all orders for this visit:    Toe infection  -     mupirocin (BACTROBAN) 2 % ointment; Apply topically 3 (three) times daily. for 10 days  -     clotrimazole (LOTRIMIN) 1 % cream; Apply topically 2 (two) times daily.    Flu vaccine need    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thy, unspecified whether long term insulin use    Continue medication regime as prescribed mgmt deferred top PCP    F/U scheduled with Dr Garcia / podiatry     F/U with Dr. August if symptoms persist or worsen

## 2019-09-19 NOTE — TELEPHONE ENCOUNTER
----- Message from Vince Saucedo sent at 9/19/2019  9:33 AM CDT -----  Contact: same  Patient called in and would like to get an appt something next week with Dr. August because patient has had an unexplained weight loss and sometimes feels lightheaded.    Patient call back number is [phone]

## 2019-09-26 DIAGNOSIS — M54.12 CERVICAL RADICULOPATHY: ICD-10-CM

## 2019-09-26 DIAGNOSIS — R29.898 RIGHT HAND WEAKNESS: ICD-10-CM

## 2019-09-26 DIAGNOSIS — M48.02 CERVICAL STENOSIS OF SPINE: ICD-10-CM

## 2019-09-26 DIAGNOSIS — M96.1 CERVICAL POST-LAMINECTOMY SYNDROME: ICD-10-CM

## 2019-09-26 DIAGNOSIS — M47.812 FACET ARTHRITIS OF CERVICAL REGION: ICD-10-CM

## 2019-09-26 DIAGNOSIS — M75.101 ROTATOR CUFF SYNDROME OF RIGHT SHOULDER: ICD-10-CM

## 2019-09-26 DIAGNOSIS — M62.81 MUSCLE RIGHT ARM WEAKNESS: ICD-10-CM

## 2019-09-26 DIAGNOSIS — G90.50 COMPLEX REGIONAL PAIN SYNDROME TYPE 1, AFFECTING UNSPECIFIED SITE: ICD-10-CM

## 2019-09-26 DIAGNOSIS — F11.20 NARCOTIC DEPENDENCY, CONTINUOUS: ICD-10-CM

## 2019-09-26 DIAGNOSIS — M48.02 SPINAL STENOSIS IN CERVICAL REGION: ICD-10-CM

## 2019-09-26 DIAGNOSIS — M79.601 RIGHT ARM PAIN: ICD-10-CM

## 2019-09-26 DIAGNOSIS — M47.12 CERVICAL SPONDYLOSIS WITH MYELOPATHY: ICD-10-CM

## 2019-09-26 DIAGNOSIS — G89.4 CHRONIC PAIN SYNDROME: ICD-10-CM

## 2019-09-26 DIAGNOSIS — M54.12 BRACHIAL NEURITIS OR RADICULITIS: ICD-10-CM

## 2019-09-26 DIAGNOSIS — R29.898 RIGHT ARM WEAKNESS: ICD-10-CM

## 2019-09-26 DIAGNOSIS — M50.00 HNP (HERNIATED NUCLEUS PULPOSUS) WITH MYELOPATHY, CERVICAL: ICD-10-CM

## 2019-09-26 DIAGNOSIS — M50.30 DEGENERATION OF CERVICAL INTERVERTEBRAL DISC: ICD-10-CM

## 2019-09-26 DIAGNOSIS — G56.41 COMPLEX REGIONAL PAIN SYNDROME TYPE 2 OF RIGHT UPPER EXTREMITY: ICD-10-CM

## 2019-09-26 DIAGNOSIS — M54.12 CERVICAL RADICULAR PAIN: ICD-10-CM

## 2019-09-26 NOTE — TELEPHONE ENCOUNTER
----- Message from Aracely Garcia sent at 9/26/2019  9:00 AM CDT -----  Rx Refill/Request     Is this a Refill or New Rx:  Refill    Rx Name and Strength:  HYDROcodone-acetaminophen (NORCO)  mg per tablet    Preferred Pharmacy with phone number:     [facility] [address] [facility]  [address] [facility] [address]  Phone: [phone] Fax: [phone]    [facility] - [address] [address]  Phone: [phone] Fax: [phone]    Communication Preference:pt@   Additional Information: please call patient when done

## 2019-09-26 NOTE — TELEPHONE ENCOUNTER
----- Message from Cece Strong sent at 9/25/2019 10:16 AM CDT -----  Contact: self @ [phone]  Pt is req a refill for hydrocodone 10/325.  Pt would like someone to call him after it has been sent to his pharmacy.     [facility] [address] [place] [address] [phone] (Phone)       [phone] (Fax)

## 2019-09-27 NOTE — TELEPHONE ENCOUNTER
----- Message from Cece Strong sent at 9/27/2019  3:36 PM CDT -----  Contact: self @ [phone]  Pt is calling for the status of his refill request for hydrocodone 10/325.  Pls call.     [facility] [address] [place] [address] - [address] [facility]--[phone] (Phone)       [phone] (Fax)

## 2019-09-27 NOTE — TELEPHONE ENCOUNTER
----- Message from Palmer Melendrez sent at 9/27/2019  9:09 AM CDT -----  Rx Refill/Request     Is this a Refill or New Rx: Refill   Rx Name and Strength: HYDROcodone-acetaminophen (NORCO)  mg per tablet   Preferred Pharmacy with phone number: see below  Communication Preference: [phone]  Additional Information: Pt is asking for a call back to confirm doctor will send today    [id number]  [place] - [address] [place]  [address]  [place] [address]  Phone: [phone] Fax: [phone]

## 2019-09-28 RX ORDER — HYDROCODONE BITARTRATE AND ACETAMINOPHEN 10; 325 MG/1; MG/1
1 TABLET ORAL EVERY 6 HOURS PRN
Qty: 110 TABLET | Refills: 0 | Status: SHIPPED | OUTPATIENT
Start: 2019-09-28 | End: 2019-11-01 | Stop reason: SDUPTHER

## 2019-10-28 DIAGNOSIS — M54.12 CERVICAL RADICULOPATHY: ICD-10-CM

## 2019-10-28 DIAGNOSIS — M48.02 CERVICAL STENOSIS OF SPINE: ICD-10-CM

## 2019-10-28 DIAGNOSIS — G89.4 CHRONIC PAIN SYNDROME: ICD-10-CM

## 2019-10-28 DIAGNOSIS — M75.101 ROTATOR CUFF SYNDROME OF RIGHT SHOULDER: ICD-10-CM

## 2019-10-28 DIAGNOSIS — M96.1 CERVICAL POST-LAMINECTOMY SYNDROME: ICD-10-CM

## 2019-10-28 DIAGNOSIS — M48.02 SPINAL STENOSIS IN CERVICAL REGION: ICD-10-CM

## 2019-10-28 DIAGNOSIS — M54.12 CERVICAL RADICULAR PAIN: ICD-10-CM

## 2019-10-28 DIAGNOSIS — M47.12 CERVICAL SPONDYLOSIS WITH MYELOPATHY: ICD-10-CM

## 2019-10-28 DIAGNOSIS — M50.30 DEGENERATION OF CERVICAL INTERVERTEBRAL DISC: ICD-10-CM

## 2019-10-28 DIAGNOSIS — G90.50 COMPLEX REGIONAL PAIN SYNDROME TYPE 1, AFFECTING UNSPECIFIED SITE: ICD-10-CM

## 2019-10-28 DIAGNOSIS — M50.00 HNP (HERNIATED NUCLEUS PULPOSUS) WITH MYELOPATHY, CERVICAL: ICD-10-CM

## 2019-10-28 DIAGNOSIS — F11.20 NARCOTIC DEPENDENCY, CONTINUOUS: ICD-10-CM

## 2019-10-28 DIAGNOSIS — G56.41 COMPLEX REGIONAL PAIN SYNDROME TYPE 2 OF RIGHT UPPER EXTREMITY: ICD-10-CM

## 2019-10-28 DIAGNOSIS — R29.898 RIGHT HAND WEAKNESS: ICD-10-CM

## 2019-10-28 DIAGNOSIS — M54.12 BRACHIAL NEURITIS OR RADICULITIS: ICD-10-CM

## 2019-10-28 DIAGNOSIS — M62.81 MUSCLE RIGHT ARM WEAKNESS: ICD-10-CM

## 2019-10-28 DIAGNOSIS — M47.812 FACET ARTHRITIS OF CERVICAL REGION: ICD-10-CM

## 2019-10-28 DIAGNOSIS — R29.898 RIGHT ARM WEAKNESS: ICD-10-CM

## 2019-10-28 DIAGNOSIS — M79.601 RIGHT ARM PAIN: ICD-10-CM

## 2019-10-28 RX ORDER — HYDROCODONE BITARTRATE AND ACETAMINOPHEN 10; 325 MG/1; MG/1
1 TABLET ORAL EVERY 6 HOURS PRN
Qty: 110 TABLET | Refills: 0 | Status: CANCELLED | OUTPATIENT
Start: 2019-10-28 | End: 2019-11-27

## 2019-10-29 NOTE — TELEPHONE ENCOUNTER
----- Message from Cece Strong sent at 10/29/2019  8:51 AM CDT -----  Contact: self @ [phone]  Pt is calling for the status of his refill request for hydrocodone 10/325.  Pt says he was due to refill on yesterday.  Pt would like someone to call him after it is sent to his pharmacy.     [facility] [address] [place] [address] [phone] (Phone)          [phone] (Fax)

## 2019-10-31 ENCOUNTER — TELEPHONE (OUTPATIENT)
Dept: PHYSICAL MEDICINE AND REHAB | Facility: CLINIC | Age: 69
End: 2019-10-31

## 2019-10-31 ENCOUNTER — OFFICE VISIT (OUTPATIENT)
Dept: PODIATRY | Facility: CLINIC | Age: 69
End: 2019-10-31
Payer: MEDICARE

## 2019-10-31 VITALS
HEART RATE: 73 BPM | WEIGHT: 170 LBS | BODY MASS INDEX: 23.8 KG/M2 | DIASTOLIC BLOOD PRESSURE: 89 MMHG | SYSTOLIC BLOOD PRESSURE: 143 MMHG | HEIGHT: 71 IN

## 2019-10-31 DIAGNOSIS — E11.40 TYPE 2 DIABETES MELLITUS WITH DIABETIC NEUROPATHY, WITHOUT LONG-TERM CURRENT USE OF INSULIN: Primary | ICD-10-CM

## 2019-10-31 DIAGNOSIS — B35.1 ONYCHOMYCOSIS: ICD-10-CM

## 2019-10-31 PROCEDURE — 99999 PR PBB SHADOW E&M-EST. PATIENT-LVL III: ICD-10-PCS | Mod: PBBFAC,HCNC,, | Performed by: PODIATRIST

## 2019-10-31 PROCEDURE — 99499 NO LOS: ICD-10-PCS | Mod: HCNC,S$GLB,, | Performed by: PODIATRIST

## 2019-10-31 PROCEDURE — 11721 DEBRIDE NAIL 6 OR MORE: CPT | Mod: HCNC,Q9,S$GLB, | Performed by: PODIATRIST

## 2019-10-31 PROCEDURE — 99499 UNLISTED E&M SERVICE: CPT | Mod: HCNC,S$GLB,, | Performed by: PODIATRIST

## 2019-10-31 PROCEDURE — 99999 PR PBB SHADOW E&M-EST. PATIENT-LVL III: CPT | Mod: PBBFAC,HCNC,, | Performed by: PODIATRIST

## 2019-10-31 PROCEDURE — 11721 ROUTINE FOOT CARE: ICD-10-PCS | Mod: HCNC,Q9,S$GLB, | Performed by: PODIATRIST

## 2019-10-31 RX ORDER — HYDROCODONE BITARTRATE AND ACETAMINOPHEN 10; 325 MG/1; MG/1
1 TABLET ORAL EVERY 6 HOURS PRN
Qty: 110 TABLET | Refills: 0 | Status: CANCELLED | OUTPATIENT
Start: 2019-10-31 | End: 2019-11-30

## 2019-10-31 NOTE — TELEPHONE ENCOUNTER
----- Message from Lashae Maldonadoroseanne sent at 10/30/2019 10:51 AM CDT -----  Contact: [phone]  Sara pt-Pt wants to know why his meds have not been called in yet.  Angry.  Pt uses [facility] at [phone]  Fax [phone].  Pt is ready to set up an appt with Dr. Ruiz.

## 2019-10-31 NOTE — PROCEDURES
Routine Foot Care  Date/Time: 10/31/2019 10:00 AM  Performed by: Scott Garcia DPM  Authorized by: Scott Garcia DPM     Consent Done?:  Yes (Verbal)  Hyperkeratotic Skin Lesions?: No      Nail Care Type:  Debride  Location(s): All  (Left 1st Toe, Left 3rd Toe, Left 2nd Toe, Left 4th Toe, Left 5th Toe, Right 1st Toe, Right 2nd Toe, Right 3rd Toe, Right 4th Toe and Right 5th Toe)  Patient tolerance:  Patient tolerated the procedure well with no immediate complications     Used sterile nail nipper.

## 2019-10-31 NOTE — TELEPHONE ENCOUNTER
The patient was told that they are on the wait list for Dr Ruiz and will be getting a call as soon as an appt becomes available.

## 2019-11-01 DIAGNOSIS — G56.41 COMPLEX REGIONAL PAIN SYNDROME TYPE 2 OF RIGHT UPPER EXTREMITY: ICD-10-CM

## 2019-11-01 DIAGNOSIS — M96.1 CERVICAL POST-LAMINECTOMY SYNDROME: ICD-10-CM

## 2019-11-01 DIAGNOSIS — M79.601 RIGHT ARM PAIN: ICD-10-CM

## 2019-11-01 DIAGNOSIS — E11.9 TYPE 2 DIABETES MELLITUS WITHOUT COMPLICATION: ICD-10-CM

## 2019-11-01 DIAGNOSIS — M47.12 CERVICAL SPONDYLOSIS WITH MYELOPATHY: ICD-10-CM

## 2019-11-01 DIAGNOSIS — M54.12 CERVICAL RADICULOPATHY: ICD-10-CM

## 2019-11-01 DIAGNOSIS — M47.812 FACET ARTHRITIS OF CERVICAL REGION: ICD-10-CM

## 2019-11-01 DIAGNOSIS — M48.02 SPINAL STENOSIS IN CERVICAL REGION: ICD-10-CM

## 2019-11-01 DIAGNOSIS — M48.02 CERVICAL STENOSIS OF SPINE: ICD-10-CM

## 2019-11-01 DIAGNOSIS — M50.00 HNP (HERNIATED NUCLEUS PULPOSUS) WITH MYELOPATHY, CERVICAL: ICD-10-CM

## 2019-11-01 DIAGNOSIS — G89.4 CHRONIC PAIN SYNDROME: ICD-10-CM

## 2019-11-01 DIAGNOSIS — M54.12 CERVICAL RADICULAR PAIN: ICD-10-CM

## 2019-11-01 DIAGNOSIS — G90.50 COMPLEX REGIONAL PAIN SYNDROME TYPE 1, AFFECTING UNSPECIFIED SITE: ICD-10-CM

## 2019-11-01 DIAGNOSIS — R29.898 RIGHT HAND WEAKNESS: ICD-10-CM

## 2019-11-01 DIAGNOSIS — M62.81 MUSCLE RIGHT ARM WEAKNESS: ICD-10-CM

## 2019-11-01 DIAGNOSIS — M75.101 ROTATOR CUFF SYNDROME OF RIGHT SHOULDER: ICD-10-CM

## 2019-11-01 DIAGNOSIS — M54.12 BRACHIAL NEURITIS OR RADICULITIS: ICD-10-CM

## 2019-11-01 DIAGNOSIS — R29.898 RIGHT ARM WEAKNESS: ICD-10-CM

## 2019-11-01 DIAGNOSIS — M50.30 DEGENERATION OF CERVICAL INTERVERTEBRAL DISC: ICD-10-CM

## 2019-11-01 DIAGNOSIS — F11.20 NARCOTIC DEPENDENCY, CONTINUOUS: ICD-10-CM

## 2019-11-01 NOTE — PROGRESS NOTES
Subjective:      Patient ID: Xander Sanchez is a 69 y.o. male.    Chief Complaint: Diabetes Mellitus (Dr. Alvarenga 4/24/19); Diabetic Foot Exam; and Routine Foot Care    Xander is a 69 y.o. male who presents to the clinic for evaluation and treatment of high risk feet. Xander has a past medical history of Asthma, Cardiomyopathy, Cervical radicular pain, Cervical spondylosis with myelopathy (10/17/2012), Chronic bronchitis, Chronic systolic dysfunction of left ventricle (7/27/2015), Constipation (7/27/2015), COPD (chronic obstructive pulmonary disease), CRPS (complex regional pain syndrome type I) (12/29/2014), Diabetes mellitus, type 2, DM type 2 (diabetes mellitus, type 2) (7/27/2015), Enlarged prostate (7/27/2015), Enuresis (7/6/2018), Hypertension, ICD (implantable cardiac defibrillator) in place, Mixed hyperlipidemia (2/28/2018), Presence of biventricular AICD (7/27/2015), 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thy (2/1/2016), and Urinary tract infection.  This patient has documented high risk feet requiring routine maintenance secondary to diabetes mellitis and those secondary complications of diabetes, as mentioned.  Treated per Physical medicine for chronic back pain. Complains that his pain in his feet are mostly at night when he lays down. Taking gabapentin 600 mg po qid for chronic neuropathic pain. No new complaints.    10/31/2019:  Presents routine diabetic foot care.  States that he was prescribed topical creams to be applied to dry scaly painful skin to the right 5th toe a couple months ago which has helped improve his symptoms.  Due to complaints today.  Seen by Milly Leiva NP on 09/19/2019.    Williams Alvarenga MD  LOV: 4/24/19    Past Medical History:   Diagnosis Date    Asthma     Cardiomyopathy     Cervical radicular pain     Cervical spondylosis with myelopathy 10/17/2012    Chronic bronchitis     Chronic systolic dysfunction of left ventricle 7/27/2015    Constipation  7/27/2015    COPD (chronic obstructive pulmonary disease)     CRPS (complex regional pain syndrome type I) 12/29/2014    Diabetes mellitus, type 2     DM type 2 (diabetes mellitus, type 2) 7/27/2015    Enlarged prostate 7/27/2015    Enuresis 7/6/2018    Hypertension     ICD (implantable cardiac defibrillator) in place     Mixed hyperlipidemia 2/28/2018    Presence of biventricular AICD 7/27/2015    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thy 2/1/2016    Urinary tract infection     pt does not know       Past Surgical History:   Procedure Laterality Date    CARDIAC DEFIBRILLATOR PLACEMENT      CERVICAL SPINE SURGERY      COLONOSCOPY N/A 7/19/2017    Procedure: COLONOSCOPY  golytely;  Surgeon: Vannessa Uribe MD;  Location: [facility];  Service: Endoscopy;  Laterality: N/A;    SPINE SURGERY         Family History   Problem Relation Age of Onset    Hypertension Mother     Hypertension Father     COPD Father     No Known Problems Sister     No Known Problems Brother     No Known Problems Daughter     Prostate cancer Neg Hx     Kidney disease Neg Hx        Social History     Socioeconomic History    Marital status:      Spouse name: Not on file    Number of children: Not on file    Years of education: Not on file    Highest education level: Not on file   Occupational History    Not on file   Social Needs    Financial resource strain: Not on file    Food insecurity:     Worry: Not on file     Inability: Not on file    Transportation needs:     Medical: Not on file     Non-medical: Not on file   Tobacco Use    Smoking status: Former Smoker     Packs/day: 1.00     Years: 40.00     Pack years: 40.00     Types: Cigarettes     Last attempt to quit: 7/26/2009     Years since quitting: 10.2    Smokeless tobacco: Never Used   Substance and Sexual Activity    Alcohol use: Yes     Alcohol/week: 3.0 standard drinks     Types: 1 Shots of liquor, 2 Cans of beer per week    Drug  use: No    Sexual activity: Yes     Partners: Female   Lifestyle    Physical activity:     Days per week: Not on file     Minutes per session: Not on file    Stress: Not on file   Relationships    Social connections:     Talks on phone: Not on file     Gets together: Not on file     Attends Restorationist service: Not on file     Active member of club or organization: Not on file     Attends meetings of clubs or organizations: Not on file     Relationship status: Not on file   Other Topics Concern    Not on file   Social History Narrative    Not on file       Current Outpatient Medications   Medication Sig Dispense Refill    ADVAIR -21 mcg/actuation HFAA inhaler INHALE TWO PUFFS BY MOUTH TWICE DAILY 1 Inhaler 0    aspirin (ECOTRIN) 81 MG EC tablet Take 81 mg by mouth once daily.      baclofen (LIORESAL) 20 MG tablet TAKE 1 TABLET THREE TIMES DAILY 270 tablet 0    clotrimazole (LOTRIMIN) 1 % cream Apply topically 2 (two) times daily. 28 g 0    hydroCHLOROthiazide (MICROZIDE) 12.5 mg capsule TAKE 1 CAPSULE ONE TIME DAILY 90 capsule 3    metoprolol succinate (TOPROL-XL) 25 MG 24 hr tablet TAKE 1 TABLET EVERY DAY 90 tablet 3    oxybutynin (DITROPAN) 5 MG Tab TAKE 1 TABLET TWICE DAILY 180 tablet 3    pantoprazole (PROTONIX) 40 MG tablet TAKE 1 TABLET BY MOUTH ONCE DAILY BEFORE BREAKFAST 30 tablet 11    pravastatin (PRAVACHOL) 10 MG tablet TAKE 1 TABLET BY MOUTH ONCE DAILY AT BEDTIME 90 tablet 3    tamsulosin (FLOMAX) 0.4 mg Cap Take 2 capsules (0.8 mg total) by mouth every evening. 180 capsule 1    TRUE METRIX AIR GLUCOSE METER kit USE AS INSTRUCTED 1 each 0    TRUE METRIX GLUCOSE TEST STRIP Strp TEST  ONE  TIME  DAILY  AT  6AM 100 strip 3    TRUEPLUS LANCETS 33 gauge Misc TEST ONE TIME DAILY  AT  6AM 100 each 3    valsartan (DIOVAN) 40 MG tablet Take 1 tablet (40 mg total) by mouth once daily. 90 tablet 3    gabapentin (NEURONTIN) 600 MG tablet Take 1 tablet (600 mg total) by mouth 4 (four) times  daily with meals and nightly. 360 tablet 2     No current facility-administered medications for this visit.        Review of patient's allergies indicates:  No Known Allergies    PCP: Williams Alvarenga MD    Date Last Seen by PCP:     Current shoe gear:  Affected Foot: Casual shoes     Unaffected Foot: Casual shoes    Hemoglobin A1C   Date Value Ref Range Status   04/24/2019 6.5 (H) 4.0 - 5.6 % Final     Comment:     ADA Screening Guidelines:  5.7-6.4%  Consistent with prediabetes  >or=6.5%  Consistent with diabetes  High levels of fetal hemoglobin interfere with the HbA1C  assay. Heterozygous hemoglobin variants (HbS, HgC, etc)do  not significantly interfere with this assay.   However, presence of multiple variants may affect accuracy.     10/25/2018 6.2 (H) 4.0 - 5.6 % Final     Comment:     ADA Screening Guidelines:  5.7-6.4%  Consistent with prediabetes  >or=6.5%  Consistent with diabetes  High levels of fetal hemoglobin interfere with the HbA1C  assay. Heterozygous hemoglobin variants (HbS, HgC, etc)do  not significantly interfere with this assay.   However, presence of multiple variants may affect accuracy.     02/27/2018 6.1 (H) 4.0 - 5.6 % Final     Comment:     According to ADA guidelines, hemoglobin A1c <7.0% represents  optimal control in non-pregnant diabetic patients. Different  metrics may apply to specific patient populations.   Standards of Medical Care in Diabetes-2016.  For the purpose of screening for the presence of diabetes:  <5.7%     Consistent with the absence of diabetes  5.7-6.4%  Consistent with increasing risk for diabetes   (prediabetes)  >or=6.5%  Consistent with diabetes  Currently, no consensus exists for use of hemoglobin A1c  for diagnosis of diabetes for children.  This Hemoglobin A1c assay has significant interference with fetal   hemoglobin   (HbF). The results are invalid for patients with abnormal amounts of   HbF,   including those with known Hereditary Persistence   of  Fetal Hemoglobin. Heterozygous hemoglobin variants (HbAS, HbAC,   HbAD, HbAE, HbA2) do not significantly interfere with this assay;   however, presence of multiple variants in a sample may impact the %   interference.         Review of Systems   Constitution: Negative for chills and fever.   Respiratory: Negative for shortness of breath.    Skin: Positive for color change and nail changes. Negative for itching.   Musculoskeletal: Negative for falls, joint pain and muscle weakness.   Gastrointestinal: Negative for nausea and vomiting.   Neurological: Negative for focal weakness, loss of balance, numbness and paresthesias.           Objective:      Physical Exam   Constitutional: He is oriented to person, place, and time. He appears well-nourished. No distress.   Cardiovascular: Intact distal pulses.   Pulses:       Dorsalis pedis pulses are 2+ on the right side, and 2+ on the left side.        Posterior tibial pulses are 2+ on the right side, and 2+ on the left side.   CRT < 3 seconds to digits 1-5 bilateral, mild to moderate edema of bilateral lower extremity with varicosities.   Musculoskeletal:        Right foot: There is decreased range of motion. There is no deformity.        Left foot: There is decreased range of motion. There is no deformity.   Equinus noted b/l ankles with < 10 deg DF noted. MMT 5/5 in DF/PF/Inv/Ev resistance with no reproduction of pain in any direction. Passive range of motion of ankle and pedal joints is painless b/l.     Pes planovalgus bilateral foot.    No pain on palpation bilateral foot.     Feet:   Right Foot:   Protective Sensation: 10 sites tested. 9 sites sensed.   Skin Integrity: Positive for dry skin. Negative for ulcer, skin breakdown, erythema, warmth or callus.   Left Foot:   Protective Sensation: 10 sites tested. 7 sites sensed.   Skin Integrity: Positive for dry skin. Negative for ulcer, skin breakdown, erythema, warmth or callus.   Neurological: He is alert and oriented to  person, place, and time. He has normal strength. A sensory deficit is present.   Decreased vibratory sensation to the bilateral foot.   Skin: Skin is warm, dry and intact. Capillary refill takes less than 2 seconds. No ecchymosis, no lesion, no petechiae and no rash noted. He is not diaphoretic. No cyanosis or erythema. No pallor. Nails show no clubbing.   Skin is dry and flaky plantar foot bilateral improved from previous visit.    Nails 1-5 bilateral are thickened 3- mm, slightly yellow with debris, loosened and elongated 4-5 mm.     Edema B/L LE 2+    No open lesions or macerations bilateral lower extremity.               Assessment:       Encounter Diagnoses   Name Primary?    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thy, without long-term current use of insulin Yes    Onychomycosis          Plan:       Xander was seen today for diabetes mellitus, diabetic foot exam and routine foot care.    Diagnoses and all orders for this visit:    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thy, without long-term current use of insulin  -     Routine Foot Care    Onychomycosis  -     Routine Foot Care      I counseled the patient on his conditions, their implications and medical management.    Shoe inspection. Diabetic Foot Education. Patient reminded of the importance of good nutrition and blood sugar control to help prevent podiatric complications of diabetes. Patient instructed on proper foot hygeine. We discussed wearing proper shoe gear, daily foot inspections, never walking without protective shoe gear, never putting sharp instruments to feet    Routine foot care per attached note.    Previously prescribed mupirocin ointment and Lotrimin cream.  Recommend use of Lotrimin cream to the entire foot to help with the dry scaly skin.

## 2019-11-01 NOTE — TELEPHONE ENCOUNTER
Last Rx refill-----09/28/19  Last office visit--05/20/19  Next office visit--           ----- Message from Aracely Garcia sent at 11/1/2019  9:57 AM CDT -----  Rx Refill/Request     Is this a Refill or New Rx:  Refill    Rx Name and Strength:  HYDROcodone-acetaminophen (NORCO)  mg per tablet    Preferred Pharmacy with phone number:       [id number] [place], [place] - [id number] [facility] [address]  Phone: [phone] Fax: [phone]    Communication Preference:pt @     Additional Information: please call patient when done

## 2019-11-02 RX ORDER — HYDROCODONE BITARTRATE AND ACETAMINOPHEN 10; 325 MG/1; MG/1
1 TABLET ORAL EVERY 6 HOURS PRN
Qty: 110 TABLET | Refills: 0 | Status: SHIPPED | OUTPATIENT
Start: 2019-11-02 | End: 2019-11-03 | Stop reason: SDUPTHER

## 2019-11-03 ENCOUNTER — HOSPITAL ENCOUNTER (EMERGENCY)
Facility: HOSPITAL | Age: 69
Discharge: HOME | End: 2019-11-03
Attending: EMERGENCY MEDICINE
Payer: MEDICARE

## 2019-11-03 VITALS
OXYGEN SATURATION: 97 % | BODY MASS INDEX: 23.8 KG/M2 | RESPIRATION RATE: 18 BRPM | WEIGHT: 170 LBS | HEART RATE: 67 BPM | DIASTOLIC BLOOD PRESSURE: 81 MMHG | HEIGHT: 71 IN | SYSTOLIC BLOOD PRESSURE: 128 MMHG | TEMPERATURE: 98 F

## 2019-11-03 DIAGNOSIS — F11.20 NARCOTIC DEPENDENCY, CONTINUOUS: ICD-10-CM

## 2019-11-03 DIAGNOSIS — M54.12 BRACHIAL NEURITIS OR RADICULITIS: ICD-10-CM

## 2019-11-03 DIAGNOSIS — M48.02 CERVICAL STENOSIS OF SPINE: ICD-10-CM

## 2019-11-03 DIAGNOSIS — G89.4 CHRONIC PAIN SYNDROME: ICD-10-CM

## 2019-11-03 DIAGNOSIS — G56.41 COMPLEX REGIONAL PAIN SYNDROME TYPE 2 OF RIGHT UPPER EXTREMITY: ICD-10-CM

## 2019-11-03 DIAGNOSIS — R29.898 RIGHT HAND WEAKNESS: ICD-10-CM

## 2019-11-03 DIAGNOSIS — M47.12 CERVICAL SPONDYLOSIS WITH MYELOPATHY: ICD-10-CM

## 2019-11-03 DIAGNOSIS — M54.12 CERVICAL RADICULAR PAIN: ICD-10-CM

## 2019-11-03 DIAGNOSIS — M75.101 ROTATOR CUFF SYNDROME OF RIGHT SHOULDER: ICD-10-CM

## 2019-11-03 DIAGNOSIS — G90.50 COMPLEX REGIONAL PAIN SYNDROME TYPE 1, AFFECTING UNSPECIFIED SITE: ICD-10-CM

## 2019-11-03 DIAGNOSIS — K08.89 PAIN, DENTAL: Primary | ICD-10-CM

## 2019-11-03 DIAGNOSIS — M79.601 RIGHT ARM PAIN: ICD-10-CM

## 2019-11-03 DIAGNOSIS — M62.81 MUSCLE RIGHT ARM WEAKNESS: ICD-10-CM

## 2019-11-03 DIAGNOSIS — M96.1 CERVICAL POST-LAMINECTOMY SYNDROME: ICD-10-CM

## 2019-11-03 DIAGNOSIS — M54.12 CERVICAL RADICULOPATHY: ICD-10-CM

## 2019-11-03 DIAGNOSIS — M48.02 SPINAL STENOSIS IN CERVICAL REGION: ICD-10-CM

## 2019-11-03 DIAGNOSIS — M47.812 FACET ARTHRITIS OF CERVICAL REGION: ICD-10-CM

## 2019-11-03 DIAGNOSIS — M50.00 HNP (HERNIATED NUCLEUS PULPOSUS) WITH MYELOPATHY, CERVICAL: ICD-10-CM

## 2019-11-03 DIAGNOSIS — R29.898 RIGHT ARM WEAKNESS: ICD-10-CM

## 2019-11-03 DIAGNOSIS — M50.30 DEGENERATION OF CERVICAL INTERVERTEBRAL DISC: ICD-10-CM

## 2019-11-03 PROCEDURE — 99284 EMERGENCY DEPT VISIT MOD MDM: CPT | Mod: HCNC

## 2019-11-03 RX ORDER — AMOXICILLIN 500 MG/1
500 CAPSULE ORAL 3 TIMES DAILY
Qty: 21 CAPSULE | Refills: 0 | Status: SHIPPED | OUTPATIENT
Start: 2019-11-03 | End: 2019-11-10

## 2019-11-03 RX ORDER — HYDROCODONE BITARTRATE AND ACETAMINOPHEN 10; 325 MG/1; MG/1
1 TABLET ORAL EVERY 6 HOURS PRN
Qty: 10 TABLET | Refills: 0 | Status: SHIPPED | OUTPATIENT
Start: 2019-11-03 | End: 2019-11-04 | Stop reason: SDUPTHER

## 2019-11-03 NOTE — ED NOTES
Pt presents to the ED with c/o right sided tooth/jaw pain. Pt reports he had a tooth pulled on the 25th and has since had an increase in pain at the extraction site. Reports he has been taking execdrine for pain and only has relief for 4 hours until pain returns. Denies fever. States he did not call his dentist because he has a follow up on Tuesday.

## 2019-11-03 NOTE — ED PROVIDER NOTES
"Encounter Date: 11/3/2019    SCRIBE #1 NOTE: I, Sean Wells, am scribing for, and in the presence of,  Dr. Caballero. I have scribed the entire note.       History     Chief Complaint   Patient presents with    Dental Pain     reports had a tooth pulled on the 25th, has been having pain to site since tooth was pulled. states did not call dentist because "thought pain would go away". reports taking excedrin which relieves pain for 4-5 hours. Denies SOB.      Time seen by provider: 6:04 AM    This is a 69 y.o. male who presents with complaint of dental pain. The patient reports that he had 3 right upper and lower teeth pulled on 10/25, and has had pain to the site since that time. He notes he has had some temporary relief with Excedrin, although his pain returns a few hours later. Patient denies any fever, nausea, vomiting, trouble breathing, or facial swelling. He also states that he has been out of his Hydrocodone for his chronic arm pain the past few days, and notes he has an appointment with his dentist in 2 days.    The history is provided by the patient.     Review of patient's allergies indicates:   Allergen Reactions    Ciprofloxacin Nausea And Vomiting     Past Medical History:   Diagnosis Date    Asthma     Cardiomyopathy     Cervical radicular pain     Cervical spondylosis with myelopathy 10/17/2012    Chronic bronchitis     Chronic systolic dysfunction of left ventricle 7/27/2015    Constipation 7/27/2015    COPD (chronic obstructive pulmonary disease)     CRPS (complex regional pain syndrome type I) 12/29/2014    Diabetes mellitus, type 2     DM type 2 (diabetes mellitus, type 2) 7/27/2015    Enlarged prostate 7/27/2015    Enuresis 7/6/2018    Hypertension     ICD (implantable cardiac defibrillator) in place     Mixed hyperlipidemia 2/28/2018    Presence of biventricular AICD 7/27/2015    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thy 2/1/2016    Urinary tract infection     pt does " not know     Past Surgical History:   Procedure Laterality Date    CARDIAC DEFIBRILLATOR PLACEMENT      CERVICAL SPINE SURGERY      COLONOSCOPY N/A 7/19/2017    Procedure: COLONOSCOPY  golytely;  Surgeon: Vannessa Uribe MD;  Location: [facility];  Service: Endoscopy;  Laterality: N/A;    SPINE SURGERY       Family History   Problem Relation Age of Onset    Hypertension Mother     Hypertension Father     COPD Father     No Known Problems Sister     No Known Problems Brother     No Known Problems Daughter     Prostate cancer Neg Hx     Kidney disease Neg Hx      Social History     Tobacco Use    Smoking status: Former Smoker     Packs/day: 1.00     Years: 40.00     Pack years: 40.00     Types: Cigarettes     Last attempt to quit: 7/26/2009     Years since quitting: 10.2    Smokeless tobacco: Never Used   Substance Use Topics    Alcohol use: Yes     Alcohol/week: 3.0 standard drinks     Types: 1 Shots of liquor, 2 Cans of beer per week    Drug use: No     Review of Systems   HENT: Positive for dental problem.    All other systems reviewed and are negative.      Physical Exam     Initial Vitals [11/03/19 0549]   BP Pulse Resp Temp SpO2   128/81 67 18 97.6 °F (36.4 °C) 97 %      MAP       --         Physical Exam    Nursing note and vitals reviewed.  Constitutional: He appears well-developed and well-nourished. He is not diaphoretic. No distress.   HENT:   Head: Normocephalic and atraumatic.   There is some slight erythema and swelling of the right lower gum at molar dental extraction sites  No facial swelling   Eyes: Conjunctivae and EOM are normal.   Neck: Normal range of motion. Neck supple.   Cardiovascular: Normal rate, regular rhythm and normal heart sounds.   Pulmonary/Chest: Breath sounds normal. No respiratory distress.   Abdominal: Soft. There is no tenderness.   Musculoskeletal: Normal range of motion. He exhibits no edema or tenderness.   Neurological: He is alert and oriented to  person, place, and time. He has normal strength.   Skin: Skin is warm and dry.         ED Course   Procedures  Labs Reviewed - No data to display       Imaging Results    None          Medical Decision Making:   ED Management:  69-year-old male who is having pain at a recent dental extraction site.  No facial swelling. Slight swelling and erythema at the extraction site.  I will place him on amoxicillin and also a 2 day course of Norco.  Patient informs me he has an appointment with his dentist in 2 days.                      Clinical Impression:     Dental Pain  Disposition:   Disposition: Discharged  Condition: Stable      I, Dr. Bernabe Hernández, personally performed the services described in this documentation. All medical record entries made by the scribe were at my direction and in my presence. I have reviewed the chart and agree that the record reflects my personal performance and is accurate and complete. Bernabe Hernández MD.  7:07 AM 11/03/2019       Bernabe Hernández MD  11/03/19 0709

## 2019-11-04 DIAGNOSIS — R29.898 RIGHT HAND WEAKNESS: ICD-10-CM

## 2019-11-04 DIAGNOSIS — M48.02 CERVICAL STENOSIS OF SPINE: ICD-10-CM

## 2019-11-04 DIAGNOSIS — M54.12 CERVICAL RADICULAR PAIN: ICD-10-CM

## 2019-11-04 DIAGNOSIS — G89.4 CHRONIC PAIN SYNDROME: ICD-10-CM

## 2019-11-04 DIAGNOSIS — M96.1 CERVICAL POST-LAMINECTOMY SYNDROME: ICD-10-CM

## 2019-11-04 DIAGNOSIS — M75.101 ROTATOR CUFF SYNDROME OF RIGHT SHOULDER: ICD-10-CM

## 2019-11-04 DIAGNOSIS — F11.20 NARCOTIC DEPENDENCY, CONTINUOUS: ICD-10-CM

## 2019-11-04 DIAGNOSIS — M48.02 SPINAL STENOSIS IN CERVICAL REGION: ICD-10-CM

## 2019-11-04 DIAGNOSIS — M54.12 BRACHIAL NEURITIS OR RADICULITIS: ICD-10-CM

## 2019-11-04 DIAGNOSIS — M62.81 MUSCLE RIGHT ARM WEAKNESS: ICD-10-CM

## 2019-11-04 DIAGNOSIS — G90.50 COMPLEX REGIONAL PAIN SYNDROME TYPE 1, AFFECTING UNSPECIFIED SITE: ICD-10-CM

## 2019-11-04 DIAGNOSIS — M47.812 FACET ARTHRITIS OF CERVICAL REGION: ICD-10-CM

## 2019-11-04 DIAGNOSIS — M54.12 CERVICAL RADICULOPATHY: ICD-10-CM

## 2019-11-04 DIAGNOSIS — M50.30 DEGENERATION OF CERVICAL INTERVERTEBRAL DISC: ICD-10-CM

## 2019-11-04 DIAGNOSIS — M47.12 CERVICAL SPONDYLOSIS WITH MYELOPATHY: ICD-10-CM

## 2019-11-04 DIAGNOSIS — M79.601 RIGHT ARM PAIN: ICD-10-CM

## 2019-11-04 DIAGNOSIS — R29.898 RIGHT ARM WEAKNESS: ICD-10-CM

## 2019-11-04 DIAGNOSIS — G56.41 COMPLEX REGIONAL PAIN SYNDROME TYPE 2 OF RIGHT UPPER EXTREMITY: ICD-10-CM

## 2019-11-04 DIAGNOSIS — M50.00 HNP (HERNIATED NUCLEUS PULPOSUS) WITH MYELOPATHY, CERVICAL: ICD-10-CM

## 2019-11-04 NOTE — TELEPHONE ENCOUNTER
----- Message from Palmer Melendrez sent at 11/4/2019  1:37 PM CST -----  Pharmacy Calling    Reason for call: Pharmacy states script sent on 11/02 was canceled, calling to confirm why. Pt calling pharmacy for refill.    Pharmacy Name: [facility]    Prescription Name: HYDROcodone-acetaminophen (NORCO)  mg per tablet     Phone Number: [phone]    Additional Information:

## 2019-11-10 RX ORDER — HYDROCODONE BITARTRATE AND ACETAMINOPHEN 10; 325 MG/1; MG/1
1 TABLET ORAL EVERY 6 HOURS PRN
Qty: 110 TABLET | Refills: 0 | Status: SHIPPED | OUTPATIENT
Start: 2019-11-10 | End: 2019-11-15 | Stop reason: SDUPTHER

## 2019-11-12 ENCOUNTER — OFFICE VISIT (OUTPATIENT)
Dept: FAMILY MEDICINE | Facility: CLINIC | Age: 69
End: 2019-11-12
Payer: MEDICARE

## 2019-11-12 ENCOUNTER — TELEPHONE (OUTPATIENT)
Dept: FAMILY MEDICINE | Facility: CLINIC | Age: 69
End: 2019-11-12

## 2019-11-12 ENCOUNTER — LAB VISIT (OUTPATIENT)
Dept: LAB | Facility: HOSPITAL | Age: 69
End: 2019-11-12
Attending: FAMILY MEDICINE
Payer: MEDICARE

## 2019-11-12 VITALS
DIASTOLIC BLOOD PRESSURE: 84 MMHG | WEIGHT: 181 LBS | HEIGHT: 71 IN | OXYGEN SATURATION: 98 % | BODY MASS INDEX: 25.34 KG/M2 | SYSTOLIC BLOOD PRESSURE: 138 MMHG | HEART RATE: 102 BPM

## 2019-11-12 DIAGNOSIS — I10 ESSENTIAL HYPERTENSION: ICD-10-CM

## 2019-11-12 DIAGNOSIS — M62.81 MUSCLE RIGHT ARM WEAKNESS: ICD-10-CM

## 2019-11-12 DIAGNOSIS — R29.898 RIGHT ARM WEAKNESS: ICD-10-CM

## 2019-11-12 DIAGNOSIS — G95.9 CERVICAL MYELOPATHY: ICD-10-CM

## 2019-11-12 DIAGNOSIS — M47.812 FACET ARTHRITIS OF CERVICAL REGION: ICD-10-CM

## 2019-11-12 DIAGNOSIS — M48.02 SPINAL STENOSIS IN CERVICAL REGION: ICD-10-CM

## 2019-11-12 DIAGNOSIS — M54.12 CERVICAL RADICULAR PAIN: ICD-10-CM

## 2019-11-12 DIAGNOSIS — G89.4 CHRONIC PAIN SYNDROME: ICD-10-CM

## 2019-11-12 DIAGNOSIS — M54.12 CERVICAL RADICULOPATHY: ICD-10-CM

## 2019-11-12 DIAGNOSIS — M79.601 RIGHT ARM PAIN: ICD-10-CM

## 2019-11-12 DIAGNOSIS — R29.898 RIGHT HAND WEAKNESS: ICD-10-CM

## 2019-11-12 DIAGNOSIS — F11.20 NARCOTIC DEPENDENCY, CONTINUOUS: ICD-10-CM

## 2019-11-12 DIAGNOSIS — M47.12 CERVICAL SPONDYLOSIS WITH MYELOPATHY: ICD-10-CM

## 2019-11-12 DIAGNOSIS — G90.50 COMPLEX REGIONAL PAIN SYNDROME TYPE 1, AFFECTING UNSPECIFIED SITE: ICD-10-CM

## 2019-11-12 DIAGNOSIS — E11.40 TYPE 2 DIABETES MELLITUS WITH DIABETIC NEUROPATHY, UNSPECIFIED WHETHER LONG TERM INSULIN USE: ICD-10-CM

## 2019-11-12 DIAGNOSIS — M50.30 DEGENERATION OF CERVICAL INTERVERTEBRAL DISC: ICD-10-CM

## 2019-11-12 DIAGNOSIS — M96.1 CERVICAL POST-LAMINECTOMY SYNDROME: ICD-10-CM

## 2019-11-12 DIAGNOSIS — M50.00 HNP (HERNIATED NUCLEUS PULPOSUS) WITH MYELOPATHY, CERVICAL: ICD-10-CM

## 2019-11-12 DIAGNOSIS — M75.101 ROTATOR CUFF SYNDROME OF RIGHT SHOULDER: ICD-10-CM

## 2019-11-12 DIAGNOSIS — M48.02 CERVICAL STENOSIS OF SPINE: ICD-10-CM

## 2019-11-12 DIAGNOSIS — G89.4 CHRONIC PAIN DISORDER: Primary | ICD-10-CM

## 2019-11-12 DIAGNOSIS — M54.12 BRACHIAL NEURITIS OR RADICULITIS: ICD-10-CM

## 2019-11-12 DIAGNOSIS — G56.41 COMPLEX REGIONAL PAIN SYNDROME TYPE 2 OF RIGHT UPPER EXTREMITY: ICD-10-CM

## 2019-11-12 LAB
ALBUMIN SERPL BCP-MCNC: 3.7 G/DL (ref 3.5–5.2)
ALP SERPL-CCNC: 76 U/L (ref 55–135)
ALT SERPL W/O P-5'-P-CCNC: 25 U/L (ref 10–44)
ANION GAP SERPL CALC-SCNC: 8 MMOL/L (ref 8–16)
AST SERPL-CCNC: 24 U/L (ref 10–40)
BILIRUB SERPL-MCNC: 0.5 MG/DL (ref 0.1–1)
BUN SERPL-MCNC: 11 MG/DL (ref 8–23)
CALCIUM SERPL-MCNC: 9.4 MG/DL (ref 8.7–10.5)
CHLORIDE SERPL-SCNC: 105 MMOL/L (ref 95–110)
CO2 SERPL-SCNC: 26 MMOL/L (ref 23–29)
CREAT SERPL-MCNC: 0.8 MG/DL (ref 0.5–1.4)
EST. GFR  (AFRICAN AMERICAN): >60 ML/MIN/1.73 M^2
EST. GFR  (NON AFRICAN AMERICAN): >60 ML/MIN/1.73 M^2
ESTIMATED AVG GLUCOSE: 131 MG/DL (ref 68–131)
GLUCOSE SERPL-MCNC: 103 MG/DL (ref 70–110)
HBA1C MFR BLD HPLC: 6.2 % (ref 4–5.6)
POTASSIUM SERPL-SCNC: 3.9 MMOL/L (ref 3.5–5.1)
PROT SERPL-MCNC: 7.7 G/DL (ref 6–8.4)
SODIUM SERPL-SCNC: 139 MMOL/L (ref 136–145)

## 2019-11-12 PROCEDURE — 3079F DIAST BP 80-89 MM HG: CPT | Mod: HCNC,CPTII,S$GLB, | Performed by: FAMILY MEDICINE

## 2019-11-12 PROCEDURE — 99499 RISK ADDL DX/OHS AUDIT: ICD-10-PCS | Mod: HCNC,S$GLB,, | Performed by: FAMILY MEDICINE

## 2019-11-12 PROCEDURE — 3044F HG A1C LEVEL LT 7.0%: CPT | Mod: HCNC,CPTII,S$GLB, | Performed by: FAMILY MEDICINE

## 2019-11-12 PROCEDURE — 99499 UNLISTED E&M SERVICE: CPT | Mod: HCNC,S$GLB,, | Performed by: FAMILY MEDICINE

## 2019-11-12 PROCEDURE — 83036 HEMOGLOBIN GLYCOSYLATED A1C: CPT | Mod: HCNC

## 2019-11-12 PROCEDURE — 3075F SYST BP GE 130 - 139MM HG: CPT | Mod: HCNC,CPTII,S$GLB, | Performed by: FAMILY MEDICINE

## 2019-11-12 PROCEDURE — 3075F PR MOST RECENT SYSTOLIC BLOOD PRESS GE 130-139MM HG: ICD-10-PCS | Mod: HCNC,CPTII,S$GLB, | Performed by: FAMILY MEDICINE

## 2019-11-12 PROCEDURE — 99999 PR PBB SHADOW E&M-EST. PATIENT-LVL III: ICD-10-PCS | Mod: PBBFAC,HCNC,, | Performed by: FAMILY MEDICINE

## 2019-11-12 PROCEDURE — 1101F PT FALLS ASSESS-DOCD LE1/YR: CPT | Mod: HCNC,CPTII,S$GLB, | Performed by: FAMILY MEDICINE

## 2019-11-12 PROCEDURE — 3079F PR MOST RECENT DIASTOLIC BLOOD PRESSURE 80-89 MM HG: ICD-10-PCS | Mod: HCNC,CPTII,S$GLB, | Performed by: FAMILY MEDICINE

## 2019-11-12 PROCEDURE — 80053 COMPREHEN METABOLIC PANEL: CPT | Mod: HCNC

## 2019-11-12 PROCEDURE — 99214 OFFICE O/P EST MOD 30 MIN: CPT | Mod: HCNC,S$GLB,, | Performed by: FAMILY MEDICINE

## 2019-11-12 PROCEDURE — 99999 PR PBB SHADOW E&M-EST. PATIENT-LVL III: CPT | Mod: PBBFAC,HCNC,, | Performed by: FAMILY MEDICINE

## 2019-11-12 PROCEDURE — 3044F PR MOST RECENT HEMOGLOBIN A1C LEVEL <7.0%: ICD-10-PCS | Mod: HCNC,CPTII,S$GLB, | Performed by: FAMILY MEDICINE

## 2019-11-12 PROCEDURE — 1101F PR PT FALLS ASSESS DOC 0-1 FALLS W/OUT INJ PAST YR: ICD-10-PCS | Mod: HCNC,CPTII,S$GLB, | Performed by: FAMILY MEDICINE

## 2019-11-12 PROCEDURE — 99214 PR OFFICE/OUTPT VISIT, EST, LEVL IV, 30-39 MIN: ICD-10-PCS | Mod: HCNC,S$GLB,, | Performed by: FAMILY MEDICINE

## 2019-11-12 PROCEDURE — 36415 COLL VENOUS BLD VENIPUNCTURE: CPT | Mod: HCNC,PO

## 2019-11-12 NOTE — TELEPHONE ENCOUNTER
----- Message from Aracely Garcia sent at 11/12/2019  8:55 AM CST -----  Patient is asking to speak with someone in Dr. Ruiz's office regarding his medication: Norco, the prescription was sent to the pharmacy but the pharmacy will not release without speaking with the doctor's office. Patient says he is in a lot of pain, and has been calling leaving messages, asking what is he to do to get the doctor's office to call the pharmacy?  Please call.

## 2019-11-12 NOTE — TELEPHONE ENCOUNTER
----- Message from Liana Diamond MA sent at 11/12/2019 11:24 AM CST -----  Please call patient's pharmacy regarding the pain medication, pharmacy has been trying to call your office as well as the patient with no response.  Thank you

## 2019-11-12 NOTE — PROGRESS NOTES
Subjective:       Patient ID: Xander Sanchez is a 69 y.o. male.    Chief Complaint: Follow-up; Dizziness; Hypertension; Arm Pain (on and off); Shoulder Pain (on and off); and Hand Pain (on and off)    69 years old male came to the clinic with right shoulder and neck pain associated with cervical myelopathy for the last year.  The pain is 8/10 of intensity on and off aggravated with activity and better with rest.  Patient is refill of his pain medicines.  PMR sent prescription a couple days.  Blood pressure today stable.  No chest pain, palpitation, orthopnea or PND.  No recent A1c.  Last A1c was normal.    Review of Systems   Constitutional: Negative.    HENT: Negative.    Eyes: Negative.    Respiratory: Negative.    Cardiovascular: Negative.  Negative for chest pain, palpitations and leg swelling.   Gastrointestinal: Negative.    Endocrine: Negative for polydipsia, polyphagia and polyuria.   Genitourinary: Negative.    Musculoskeletal: Negative.    Skin: Negative.    Neurological: Negative.    Psychiatric/Behavioral: Negative.        Objective:      Physical Exam   Constitutional: He is oriented to person, place, and time. He appears well-developed and well-nourished. No distress.   HENT:   Head: Normocephalic and atraumatic.   Right Ear: External ear normal.   Left Ear: External ear normal.   Nose: Nose normal.   Mouth/Throat: Oropharynx is clear and moist. No oropharyngeal exudate.   Eyes: Pupils are equal, round, and reactive to light. Conjunctivae and EOM are normal. Right eye exhibits no discharge. Left eye exhibits no discharge. No scleral icterus.   Neck: Normal range of motion. Neck supple. No JVD present. No tracheal deviation present. No thyromegaly present.   Cardiovascular: Normal rate, regular rhythm, normal heart sounds and intact distal pulses. Exam reveals no gallop and no friction rub.   No murmur heard.  Pulmonary/Chest: Effort normal and breath sounds normal. No stridor. No respiratory distress.  He has no wheezes. He has no rales. He exhibits no tenderness.   Abdominal: Soft. Bowel sounds are normal. He exhibits no distension and no mass. There is no tenderness. There is no rebound and no guarding.   Musculoskeletal: He exhibits no edema.        Right shoulder: He exhibits decreased range of motion, tenderness and pain.        Cervical back: He exhibits tenderness.   Lymphadenopathy:     He has no cervical adenopathy.   Neurological: He is alert and oriented to person, place, and time. He has normal reflexes. He displays normal reflexes. No cranial nerve deficit. He exhibits normal muscle tone. Coordination normal.   Skin: Skin is warm and dry. No rash noted. He is not diaphoretic. No erythema. No pallor.   Psychiatric: His behavior is normal. Judgment and thought content normal. His mood appears anxious. His affect is not angry, not blunt, not labile and not inappropriate. He does not exhibit a depressed mood.   Nursing note and vitals reviewed.      Assessment:       1. Chronic pain disorder    2. Essential hypertension    3. 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thy, unspecified whether long term insulin use    4. Cervical myelopathy        Plan:         Xander was seen today for follow-up, dizziness, hypertension, arm pain, shoulder pain and hand pain.    Diagnoses and all orders for this visit:    Chronic pain disorder    Essential hypertension  -     Comprehensive metabolic panel; Future    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thy, unspecified whether long term insulin use  -     Comprehensive metabolic panel; Future  -     Hemoglobin A1c; Future    Cervical myelopathy    Continue monitoring blood pressure at home, low sodium diet.  Continue monitoring blood sugar at home,ADA diet.

## 2019-11-13 RX ORDER — HYDROCODONE BITARTRATE AND ACETAMINOPHEN 10; 325 MG/1; MG/1
1 TABLET ORAL EVERY 6 HOURS PRN
Qty: 110 TABLET | Refills: 0 | Status: CANCELLED | OUTPATIENT
Start: 2019-11-13 | End: 2019-12-13

## 2019-11-13 NOTE — TELEPHONE ENCOUNTER
----- Message from Palmer Melendrez sent at 11/13/2019  8:07 AM CST -----  Contact: pt @ [phone]  Pt asking HYDROcodone-acetaminophen (NORCO)  mg per tablet be sent to pharmacy below     [id number]  [place] - [address] AT [address]  [place] [address]  Phone: [phone] Fax: [phone]

## 2019-11-14 ENCOUNTER — TELEPHONE (OUTPATIENT)
Dept: FAMILY MEDICINE | Facility: CLINIC | Age: 69
End: 2019-11-14

## 2019-11-14 NOTE — TELEPHONE ENCOUNTER
Called patient. Prescription for pain medication hydrocodone is ready at [facility].      ----- Message from Jenelle Nath sent at 11/14/2019  8:14 AM CST -----  Contact: pt  Type:  Needs Medical Advice    Who Called: pt   Symptoms (please be specific)  How long has patient had these symptoms:   Pharmacy name and phone #:   Would the patient rather a call back or a response via [facility]?   Best Call Back Number: [phone]  Additional Information:  Pt would like a called back regarding medication taking

## 2019-11-15 DIAGNOSIS — M54.12 CERVICAL RADICULAR PAIN: ICD-10-CM

## 2019-11-15 DIAGNOSIS — M79.601 RIGHT ARM PAIN: ICD-10-CM

## 2019-11-15 DIAGNOSIS — G90.50 COMPLEX REGIONAL PAIN SYNDROME TYPE 1, AFFECTING UNSPECIFIED SITE: ICD-10-CM

## 2019-11-15 DIAGNOSIS — M50.00 HNP (HERNIATED NUCLEUS PULPOSUS) WITH MYELOPATHY, CERVICAL: ICD-10-CM

## 2019-11-15 DIAGNOSIS — M47.812 FACET ARTHRITIS OF CERVICAL REGION: ICD-10-CM

## 2019-11-15 DIAGNOSIS — M54.12 BRACHIAL NEURITIS OR RADICULITIS: ICD-10-CM

## 2019-11-15 DIAGNOSIS — M47.12 CERVICAL SPONDYLOSIS WITH MYELOPATHY: ICD-10-CM

## 2019-11-15 DIAGNOSIS — M48.02 CERVICAL STENOSIS OF SPINE: ICD-10-CM

## 2019-11-15 DIAGNOSIS — F11.20 NARCOTIC DEPENDENCY, CONTINUOUS: ICD-10-CM

## 2019-11-15 DIAGNOSIS — R29.898 RIGHT ARM WEAKNESS: ICD-10-CM

## 2019-11-15 DIAGNOSIS — G56.41 COMPLEX REGIONAL PAIN SYNDROME TYPE 2 OF RIGHT UPPER EXTREMITY: ICD-10-CM

## 2019-11-15 DIAGNOSIS — M48.02 SPINAL STENOSIS IN CERVICAL REGION: ICD-10-CM

## 2019-11-15 DIAGNOSIS — M96.1 CERVICAL POST-LAMINECTOMY SYNDROME: ICD-10-CM

## 2019-11-15 DIAGNOSIS — M62.81 MUSCLE RIGHT ARM WEAKNESS: ICD-10-CM

## 2019-11-15 DIAGNOSIS — R29.898 RIGHT HAND WEAKNESS: ICD-10-CM

## 2019-11-15 DIAGNOSIS — M54.12 CERVICAL RADICULOPATHY: ICD-10-CM

## 2019-11-15 DIAGNOSIS — M75.101 ROTATOR CUFF SYNDROME OF RIGHT SHOULDER: ICD-10-CM

## 2019-11-15 DIAGNOSIS — M50.30 DEGENERATION OF CERVICAL INTERVERTEBRAL DISC: ICD-10-CM

## 2019-11-15 DIAGNOSIS — G89.4 CHRONIC PAIN SYNDROME: ICD-10-CM

## 2019-11-15 DIAGNOSIS — R26.9 GAIT ABNORMALITY: ICD-10-CM

## 2019-11-15 RX ORDER — HYDROCODONE BITARTRATE AND ACETAMINOPHEN 10; 325 MG/1; MG/1
1 TABLET ORAL EVERY 6 HOURS PRN
Qty: 110 TABLET | Refills: 0 | Status: SHIPPED | OUTPATIENT
Start: 2019-11-15 | End: 2019-12-19 | Stop reason: SDUPTHER

## 2019-11-16 RX ORDER — BACLOFEN 20 MG/1
TABLET ORAL
Qty: 270 TABLET | Refills: 0 | Status: SHIPPED | OUTPATIENT
Start: 2019-11-16 | End: 2020-01-30 | Stop reason: SDUPTHER

## 2019-11-16 RX ORDER — GABAPENTIN 600 MG/1
TABLET ORAL
Qty: 360 TABLET | Refills: 0 | Status: SHIPPED | OUTPATIENT
Start: 2019-11-16 | End: 2020-01-23

## 2019-11-19 ENCOUNTER — TELEPHONE (OUTPATIENT)
Dept: FAMILY MEDICINE | Facility: HOSPITAL | Age: 69
End: 2019-11-19

## 2019-11-20 NOTE — TELEPHONE ENCOUNTER
Discussed with patient that during a routine audit we discovered a variation in the temperature of our medication refrigerator which may have affected the potency and effectiveness of our vaccines. The vaccine was not harmful but may have been ineffective so we recommend revaccination. Appointment set up for revaccination. He/She verbalized understanding and all questions were answered.

## 2019-11-22 ENCOUNTER — CLINICAL SUPPORT (OUTPATIENT)
Dept: FAMILY MEDICINE | Facility: CLINIC | Age: 69
End: 2019-11-22
Payer: MEDICARE

## 2019-11-22 PROCEDURE — 99999 PR PBB SHADOW E&M-EST. PATIENT-LVL I: ICD-10-PCS | Mod: PBBFAC,HCNC,,

## 2019-11-22 PROCEDURE — 99999 PR PBB SHADOW E&M-EST. PATIENT-LVL I: CPT | Mod: PBBFAC,HCNC,,

## 2019-11-22 PROCEDURE — 90662 IIV NO PRSV INCREASED AG IM: CPT | Mod: PBBFAC,HCNC,PO

## 2019-12-17 ENCOUNTER — TELEPHONE (OUTPATIENT)
Dept: FAMILY MEDICINE | Facility: CLINIC | Age: 69
End: 2019-12-17

## 2019-12-17 NOTE — TELEPHONE ENCOUNTER
Notified patient of message below. He will need to call Dr. Ruiz's office. Patient verbalized understanding.

## 2019-12-17 NOTE — TELEPHONE ENCOUNTER
----- Message from Cheryl Wilson sent at 12/17/2019  8:20 AM CST -----  Contact: [phone]/self  Patient requesting to speak with you regarding getting refill his pain medication. HYDROcodone-acetaminophen (NORCO)  mg per tablet. Please advise.

## 2019-12-19 DIAGNOSIS — F11.20 NARCOTIC DEPENDENCY, CONTINUOUS: ICD-10-CM

## 2019-12-19 DIAGNOSIS — M48.02 CERVICAL STENOSIS OF SPINE: ICD-10-CM

## 2019-12-19 DIAGNOSIS — M50.00 HNP (HERNIATED NUCLEUS PULPOSUS) WITH MYELOPATHY, CERVICAL: ICD-10-CM

## 2019-12-19 DIAGNOSIS — R29.898 RIGHT HAND WEAKNESS: ICD-10-CM

## 2019-12-19 DIAGNOSIS — M50.30 DEGENERATION OF CERVICAL INTERVERTEBRAL DISC: ICD-10-CM

## 2019-12-19 DIAGNOSIS — M79.601 RIGHT ARM PAIN: ICD-10-CM

## 2019-12-19 DIAGNOSIS — M62.81 MUSCLE RIGHT ARM WEAKNESS: ICD-10-CM

## 2019-12-19 DIAGNOSIS — G90.50 COMPLEX REGIONAL PAIN SYNDROME TYPE 1, AFFECTING UNSPECIFIED SITE: ICD-10-CM

## 2019-12-19 DIAGNOSIS — M47.812 FACET ARTHRITIS OF CERVICAL REGION: ICD-10-CM

## 2019-12-19 DIAGNOSIS — M96.1 CERVICAL POST-LAMINECTOMY SYNDROME: ICD-10-CM

## 2019-12-19 DIAGNOSIS — G56.41 COMPLEX REGIONAL PAIN SYNDROME TYPE 2 OF RIGHT UPPER EXTREMITY: ICD-10-CM

## 2019-12-19 DIAGNOSIS — M48.02 SPINAL STENOSIS IN CERVICAL REGION: ICD-10-CM

## 2019-12-19 DIAGNOSIS — M47.12 CERVICAL SPONDYLOSIS WITH MYELOPATHY: ICD-10-CM

## 2019-12-19 DIAGNOSIS — M54.12 CERVICAL RADICULOPATHY: ICD-10-CM

## 2019-12-19 DIAGNOSIS — G89.4 CHRONIC PAIN SYNDROME: ICD-10-CM

## 2019-12-19 DIAGNOSIS — M75.101 ROTATOR CUFF SYNDROME OF RIGHT SHOULDER: ICD-10-CM

## 2019-12-19 DIAGNOSIS — M54.12 BRACHIAL NEURITIS OR RADICULITIS: ICD-10-CM

## 2019-12-19 DIAGNOSIS — M54.12 CERVICAL RADICULAR PAIN: ICD-10-CM

## 2019-12-19 DIAGNOSIS — R29.898 RIGHT ARM WEAKNESS: ICD-10-CM

## 2019-12-19 NOTE — TELEPHONE ENCOUNTER
----- Message from Aracely Garcia sent at 12/18/2019  1:09 PM CST -----  Rx Refill/Request     Is this a Refill or New Rx:  Refill    Rx Name and Strength:  HYDROcodone-acetaminophen (NORCO)  mg per tablet    Preferred Pharmacy with phone number:     [facility] #[id number] [facility] [address] [facility] [address]  [facility] [address]  Phone: [phone] Fax: [phone]    Communication Preference:pt @   Additional Information: call patient when done

## 2019-12-20 NOTE — TELEPHONE ENCOUNTER
Duplicated message from yesterday.        ----- Message from Ledy Gillette sent at 12/20/2019  8:57 AM CST -----  Contact: Xander Vargas wanted to know when will his pain meds will be called in. Pt stated he have been calling but still have not gotten a call back from the office or the office have not sending over his pain meds to the pharmacy. Pt stated he's in a lot of pain and needed his meds called in for him ASAP. Pt wanted his meds to call his meds in to [facility] [address]. Pt contact number is [phone]. PLEASE PLEASE PLEASE CALL IN PT MEDS. This is the second time I have spoken to Mr. Sanchez and he have been crying due to him being in so much PAIN.

## 2019-12-20 NOTE — TELEPHONE ENCOUNTER
Duplicated request       ----- Message from Bessie Ricketts sent at 12/20/2019  3:32 PM CST -----  Contact: pt       Is this a Refill or New Rx:  refill  Rx Name and Strength:  HYDROcodone-acetaminophen (NORCO)  mg per tablet   Preferred Pharmacy with phone number:     [facility] #[id number] [facility] [address] [facility] [address]  [facility] [address]  [facility] [address]  Phone: [phone] Fax: [phone]      Communication Preference:[phone]     Additional Information: py called several times for refill/ completely out

## 2019-12-20 NOTE — TELEPHONE ENCOUNTER
duplicate refill request.      ----- Message from Palmer Melendrez sent at 12/20/2019  8:43 AM CST -----  Rx Refill/Request     Is this a Refill or New Rx: Refill    Rx Name and Strength: HYDROcodone-acetaminophen (NORCO)  mg per tablet  Preferred Pharmacy with phone number: see below  Communication Preference: [phone]  Additional Information: Pt states he's been trying to get medication filled since last Firday    [id number] - [place] [address] [address] [facility] [address]  [facility] [address]  [facility] [address]  Phone: [phone] Fax: [phone]

## 2019-12-20 NOTE — TELEPHONE ENCOUNTER
Duplicate message    ----- Message from Bessie Ricketts sent at 12/20/2019 11:28 AM CST -----  Contact: pt relations  Pt has been out of meds since Sunday. Please give pt a call back ay [phone] .

## 2019-12-21 RX ORDER — HYDROCODONE BITARTRATE AND ACETAMINOPHEN 10; 325 MG/1; MG/1
1 TABLET ORAL EVERY 6 HOURS PRN
Qty: 110 TABLET | Refills: 0 | Status: SHIPPED | OUTPATIENT
Start: 2019-12-21 | End: 2020-01-17 | Stop reason: SDUPTHER

## 2020-01-08 ENCOUNTER — DOCUMENTATION ONLY (OUTPATIENT)
Dept: FAMILY MEDICINE | Facility: CLINIC | Age: 70
End: 2020-01-08

## 2020-01-08 DIAGNOSIS — M79.89 FOOT SWELLING: ICD-10-CM

## 2020-01-08 DIAGNOSIS — I10 ESSENTIAL HYPERTENSION: ICD-10-CM

## 2020-01-09 RX ORDER — METOPROLOL SUCCINATE 25 MG/1
TABLET, EXTENDED RELEASE ORAL
Qty: 90 TABLET | Refills: 3 | Status: SHIPPED | OUTPATIENT
Start: 2020-01-09 | End: 2021-01-11

## 2020-01-09 RX ORDER — LANCETS 33 GAUGE
EACH MISCELLANEOUS
Qty: 100 EACH | Refills: 3 | Status: SHIPPED | OUTPATIENT
Start: 2020-01-09 | End: 2021-01-11

## 2020-01-09 RX ORDER — HYDROCHLOROTHIAZIDE 12.5 MG/1
CAPSULE ORAL
Qty: 90 CAPSULE | Refills: 3 | Status: SHIPPED | OUTPATIENT
Start: 2020-01-09 | End: 2021-03-31

## 2020-01-10 ENCOUNTER — OFFICE VISIT (OUTPATIENT)
Dept: FAMILY MEDICINE | Facility: CLINIC | Age: 70
End: 2020-01-10
Payer: MEDICARE

## 2020-01-10 VITALS
SYSTOLIC BLOOD PRESSURE: 132 MMHG | TEMPERATURE: 99 F | HEART RATE: 93 BPM | BODY MASS INDEX: 24.75 KG/M2 | DIASTOLIC BLOOD PRESSURE: 80 MMHG | WEIGHT: 177.5 LBS | OXYGEN SATURATION: 97 %

## 2020-01-10 DIAGNOSIS — J43.9 PULMONARY EMPHYSEMA, UNSPECIFIED EMPHYSEMA TYPE: ICD-10-CM

## 2020-01-10 DIAGNOSIS — I70.0 AORTIC ATHEROSCLEROSIS: ICD-10-CM

## 2020-01-10 DIAGNOSIS — I10 ESSENTIAL HYPERTENSION: ICD-10-CM

## 2020-01-10 DIAGNOSIS — I11.0 HYPERTENSIVE LEFT VENTRICULAR HYPERTROPHY WITH HEART FAILURE: ICD-10-CM

## 2020-01-10 DIAGNOSIS — G95.9 CERVICAL MYELOPATHY: ICD-10-CM

## 2020-01-10 DIAGNOSIS — Z95.810 BIVENTRICULAR IMPLANTABLE CARDIOVERTER-DEFIBRILLATOR (ICD) IN SITU: ICD-10-CM

## 2020-01-10 DIAGNOSIS — Z00.00 ENCOUNTER FOR PREVENTIVE HEALTH EXAMINATION: Primary | ICD-10-CM

## 2020-01-10 DIAGNOSIS — N13.8 BPH WITH URINARY OBSTRUCTION: ICD-10-CM

## 2020-01-10 DIAGNOSIS — K21.9 GASTROESOPHAGEAL REFLUX DISEASE, ESOPHAGITIS PRESENCE NOT SPECIFIED: ICD-10-CM

## 2020-01-10 DIAGNOSIS — I50.32 DIASTOLIC DYSFUNCTION WITH CHRONIC HEART FAILURE: ICD-10-CM

## 2020-01-10 DIAGNOSIS — N40.1 BPH WITH URINARY OBSTRUCTION: ICD-10-CM

## 2020-01-10 DIAGNOSIS — F11.20 NARCOTIC DEPENDENCY, CONTINUOUS: ICD-10-CM

## 2020-01-10 DIAGNOSIS — E78.2 MIXED HYPERLIPIDEMIA: ICD-10-CM

## 2020-01-10 DIAGNOSIS — E11.40 TYPE 2 DIABETES MELLITUS WITH DIABETIC NEUROPATHY, UNSPECIFIED WHETHER LONG TERM INSULIN USE: ICD-10-CM

## 2020-01-10 PROCEDURE — 99499 RISK ADDL DX/OHS AUDIT: ICD-10-PCS | Mod: HCNC,S$GLB,, | Performed by: NURSE PRACTITIONER

## 2020-01-10 PROCEDURE — 99999 PR PBB SHADOW E&M-EST. PATIENT-LVL IV: ICD-10-PCS | Mod: PBBFAC,HCNC,, | Performed by: NURSE PRACTITIONER

## 2020-01-10 PROCEDURE — G0439 PPPS, SUBSEQ VISIT: HCPCS | Mod: HCNC,S$GLB,, | Performed by: NURSE PRACTITIONER

## 2020-01-10 PROCEDURE — 99499 UNLISTED E&M SERVICE: CPT | Mod: HCNC,S$GLB,, | Performed by: NURSE PRACTITIONER

## 2020-01-10 PROCEDURE — 99999 PR PBB SHADOW E&M-EST. PATIENT-LVL IV: CPT | Mod: PBBFAC,HCNC,, | Performed by: NURSE PRACTITIONER

## 2020-01-10 PROCEDURE — 3075F PR MOST RECENT SYSTOLIC BLOOD PRESS GE 130-139MM HG: ICD-10-PCS | Mod: HCNC,CPTII,S$GLB, | Performed by: NURSE PRACTITIONER

## 2020-01-10 PROCEDURE — 3075F SYST BP GE 130 - 139MM HG: CPT | Mod: HCNC,CPTII,S$GLB, | Performed by: NURSE PRACTITIONER

## 2020-01-10 PROCEDURE — 3079F DIAST BP 80-89 MM HG: CPT | Mod: HCNC,CPTII,S$GLB, | Performed by: NURSE PRACTITIONER

## 2020-01-10 PROCEDURE — 3044F PR MOST RECENT HEMOGLOBIN A1C LEVEL <7.0%: ICD-10-PCS | Mod: HCNC,CPTII,S$GLB, | Performed by: NURSE PRACTITIONER

## 2020-01-10 PROCEDURE — 3044F HG A1C LEVEL LT 7.0%: CPT | Mod: HCNC,CPTII,S$GLB, | Performed by: NURSE PRACTITIONER

## 2020-01-10 PROCEDURE — G0439 PR MEDICARE ANNUAL WELLNESS SUBSEQUENT VISIT: ICD-10-PCS | Mod: HCNC,S$GLB,, | Performed by: NURSE PRACTITIONER

## 2020-01-10 PROCEDURE — 3079F PR MOST RECENT DIASTOLIC BLOOD PRESSURE 80-89 MM HG: ICD-10-PCS | Mod: HCNC,CPTII,S$GLB, | Performed by: NURSE PRACTITIONER

## 2020-01-10 NOTE — PATIENT INSTRUCTIONS
- Be sure to receive your shingles vaccine series (Shingrix) at your local pharmacy.    Counseling and Referral of Other Preventative  (Italic type indicates deductible and co-insurance are waived)    Patient Name: Xander Sanchez  Today's Date: 1/10/2020    Health Maintenance       Date Due Completion Date    Shingles Vaccine (1 of 2) 05/09/1998 ---    Urine Drug Screen 10/23/2019 10/23/2018    Eye Exam 04/15/2020 4/15/2019    Override on 12/20/2017: Done    Lipid Panel 04/24/2020 4/24/2019    LDCT Lung Screen 05/01/2020 5/1/2019    Hemoglobin A1c 05/12/2020 11/12/2019    Foot Exam 10/31/2020 10/31/2019    High Dose Statin 01/10/2021 1/10/2020    TETANUS VACCINE 08/17/2026 8/17/2016    Colonoscopy 07/19/2027 7/19/2017        No orders of the defined types were placed in this encounter.    The following information is provided to all patients.  This information is to help you find resources for any of the problems found today that may be affecting your health:                Living healthy guide: www.[facility].[place].gov      Understanding Diabetes: www.diabetes.org      Eating healthy: www.cdc.gov/healthyweight      CDC home safety checklist: www.cdc.gov/steadi/patient.html      Agency on Aging: www.goea.[place].[facility]      Alcoholics anonymous (AA): www.aa.org      Physical Activity: www.juvencio.nih.gov/hx7cmjw      Tobacco use: www.quitwithusla.org

## 2020-01-10 NOTE — PROGRESS NOTES
Xander Sanchez presented for a  Medicare AWV and comprehensive Health Risk Assessment today. The following components were reviewed and updated:    · Medical history  · Family History  · Social history  · Allergies and Current Medications  · Health Risk Assessment  · Health Maintenance  · Care Team     ** See Completed Assessments for Annual Wellness Visit within the encounter summary.**       The following assessments were completed:  · Living Situation  · CAGE  · Depression Screening  · Timed Get Up and Go  · Whisper Test  · Cognitive Function Screening            · Nutrition Screening  · ADL Screening  · PAQ Screening    Vitals:    01/10/20 0804   BP: 132/80   BP Location: Left arm   Patient Position: Sitting   BP Method: Small (Manual)   Pulse: 93   Temp: 98.5 °F (36.9 °C)   TempSrc: Oral   SpO2: 97%   Weight: 80.5 kg (177 lb 7.5 oz)     Body mass index is 24.75 kg/m².     Physical Exam   Constitutional: He is oriented to person, place, and time. Vital signs are normal. He appears well-developed and well-nourished. He is cooperative.   HENT:   Head: Normocephalic and atraumatic.   Right Ear: Hearing, tympanic membrane, external ear and ear canal normal.   Left Ear: Hearing, tympanic membrane, external ear and ear canal normal.   Nose: Nose normal.   Mouth/Throat: Uvula is midline, oropharynx is clear and moist and mucous membranes are normal. Abnormal dentition.   Eyes: Pupils are equal, round, and reactive to light. Conjunctivae, EOM and lids are normal.   Wears glasses   Neck: Trachea normal, normal range of motion and full passive range of motion without pain. Neck supple. No JVD present.   Cardiovascular: Normal rate, regular rhythm, S1 normal, S2 normal, normal heart sounds, intact distal pulses and normal pulses.   No murmur heard.  Pulses:       Radial pulses are 2+ on the right side, and 2+ on the left side.   Pulmonary/Chest: Effort normal and breath sounds normal. No respiratory distress. He has no  wheezes.   Abdominal: Soft. Normal appearance and bowel sounds are normal. He exhibits no distension. There is no tenderness.   Musculoskeletal: He exhibits no edema.        Right shoulder: He exhibits decreased range of motion, pain and decreased strength.   Lymphadenopathy:     He has no cervical adenopathy.   Neurological: He is alert and oriented to person, place, and time. He has normal reflexes.   Skin: Skin is warm, dry and intact. Capillary refill takes less than 2 seconds.   Psychiatric: He has a normal mood and affect. His speech is normal and behavior is normal. Judgment and thought content normal.   Nursing note and vitals reviewed.        Diagnoses and health risks identified today and associated recommendations/orders:    1. Encounter for preventive health examination  Patient was seen for AWV. Encouraged patient to receive shingles vaccine series at local pharmacy. Due for UDS; says he will do this with Dr. Ruiz on 1/30/20. Up to date with all other screenings and immunizations. No issues or complaints today.    2. Essential hypertension  Chronic; stable on medication. Follow up with PCP.    3. 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thy, unspecified whether long term insulin use  Chronic; stable. Follow up with PCP.    4. Mixed hyperlipidemia  Chronic; stable on medication. Follow up with PCP.    5. Diastolic dysfunction with chronic heart failure  Chronic; stable. Followed by cardiology.    6. Hypertensive left ventricular hypertrophy with heart failure  Chronic; stable. Followed by cardiology.    7. Biventricular implantable cardioverter-defibrillator (ICD) in situ  Chronic; stable. Followed by cardiology.    8. Aortic atherosclerosis  Chronic; stable on medication. Follow up with PCP.    9. Pulmonary emphysema, unspecified emphysema type  Chronic; stable on medication. Follow up with PCP.    10. Gastroesophageal reflux disease, esophagitis presence not specified  Chronic; stable on  medication. Follow up with PCP.    11. Cervical myelopathy  Chronic; stable. Followed by Physical Medicine and Rehabilitation.    12. Narcotic dependency, continuous  Chronic; stable. Followed by Physical Medicine and Rehabilitation.    13. BPH with urinary obstruction  Chronic; stable on medication. Follow up with PCP.    14. Body mass index (BMI) of 24.0 to 24.9 in adult  Patient's BMI is WNL. Encouraged patient to continue to eat a low salt/low fat ADA diet and discussed importance of engaging in physical activity at least 5x/week for a minimum of 30 min/day, as long as it is cleared per cardiology.      Provided Xander with a 5-10 year written screening schedule and personal prevention plan. Recommendations were developed using the USPSTF age appropriate recommendations. Education, counseling, and referrals were provided as needed. After Visit Summary printed and given to patient which includes a list of additional screenings\tests needed.    I offered to discuss end of life issues, including information on how to make advance directives that the patient could use to name someone who would make medical decisions on their behalf if they became too ill to make themselves.    ___Patient declined  _X_Patient is interested, I provided paper work and offered to discuss.      Follow up for your next annual wellness visit.      Lachelle Lobato NP

## 2020-01-14 ENCOUNTER — TELEPHONE (OUTPATIENT)
Dept: FAMILY MEDICINE | Facility: CLINIC | Age: 70
End: 2020-01-14

## 2020-01-14 NOTE — TELEPHONE ENCOUNTER
Called and spoke w/ patient. He received the flu revaccination on 11/22/2020.      ----- Message from Ainsley Duarte sent at 1/14/2020  9:34 AM CST -----  Contact: self  Patient is requesting a call back concerning a letter the pt received in the mail about a revaccine. Please call

## 2020-01-17 DIAGNOSIS — M47.12 CERVICAL SPONDYLOSIS WITH MYELOPATHY: ICD-10-CM

## 2020-01-17 DIAGNOSIS — M75.101 ROTATOR CUFF SYNDROME OF RIGHT SHOULDER: ICD-10-CM

## 2020-01-17 DIAGNOSIS — G90.50 COMPLEX REGIONAL PAIN SYNDROME TYPE 1, AFFECTING UNSPECIFIED SITE: ICD-10-CM

## 2020-01-17 DIAGNOSIS — R29.898 RIGHT HAND WEAKNESS: ICD-10-CM

## 2020-01-17 DIAGNOSIS — M54.12 CERVICAL RADICULOPATHY: ICD-10-CM

## 2020-01-17 DIAGNOSIS — M48.02 CERVICAL STENOSIS OF SPINE: ICD-10-CM

## 2020-01-17 DIAGNOSIS — M62.81 MUSCLE RIGHT ARM WEAKNESS: ICD-10-CM

## 2020-01-17 DIAGNOSIS — M47.812 FACET ARTHRITIS OF CERVICAL REGION: ICD-10-CM

## 2020-01-17 DIAGNOSIS — M54.12 CERVICAL RADICULAR PAIN: ICD-10-CM

## 2020-01-17 DIAGNOSIS — M50.00 HNP (HERNIATED NUCLEUS PULPOSUS) WITH MYELOPATHY, CERVICAL: ICD-10-CM

## 2020-01-17 DIAGNOSIS — M50.30 DEGENERATION OF CERVICAL INTERVERTEBRAL DISC: ICD-10-CM

## 2020-01-17 DIAGNOSIS — M48.02 SPINAL STENOSIS IN CERVICAL REGION: ICD-10-CM

## 2020-01-17 DIAGNOSIS — M54.12 BRACHIAL NEURITIS OR RADICULITIS: ICD-10-CM

## 2020-01-17 DIAGNOSIS — M96.1 CERVICAL POST-LAMINECTOMY SYNDROME: ICD-10-CM

## 2020-01-17 DIAGNOSIS — G89.4 CHRONIC PAIN SYNDROME: ICD-10-CM

## 2020-01-17 DIAGNOSIS — F11.20 NARCOTIC DEPENDENCY, CONTINUOUS: ICD-10-CM

## 2020-01-17 DIAGNOSIS — G56.41 COMPLEX REGIONAL PAIN SYNDROME TYPE 2 OF RIGHT UPPER EXTREMITY: ICD-10-CM

## 2020-01-17 DIAGNOSIS — M79.601 RIGHT ARM PAIN: ICD-10-CM

## 2020-01-17 DIAGNOSIS — R29.898 RIGHT ARM WEAKNESS: ICD-10-CM

## 2020-01-18 RX ORDER — HYDROCODONE BITARTRATE AND ACETAMINOPHEN 10; 325 MG/1; MG/1
1 TABLET ORAL EVERY 6 HOURS PRN
Qty: 110 TABLET | Refills: 0 | Status: SHIPPED | OUTPATIENT
Start: 2020-01-18 | End: 2020-01-30 | Stop reason: SDUPTHER

## 2020-01-18 NOTE — TELEPHONE ENCOUNTER
----- Message from Emre Recinos sent at 1/17/2020  9:15 AM CST -----  Contact: Patient   Rx Refill/Request     Is this a Refill or New Rx:  Yes   Rx Name and Strength:  HYDROcodone-acetaminophen (NORCO)  mg per tablet  Preferred Pharmacy with phone number:     [facility] #[id number] [place], [place] - [address] AT [facility] [address]  [address]  [facility] [address]  Phone: [phone] Fax: [phone]

## 2020-01-20 ENCOUNTER — TELEPHONE (OUTPATIENT)
Dept: ELECTROPHYSIOLOGY | Facility: CLINIC | Age: 70
End: 2020-01-20

## 2020-01-22 ENCOUNTER — DOCUMENTATION ONLY (OUTPATIENT)
Dept: CARDIOLOGY | Facility: HOSPITAL | Age: 70
End: 2020-01-22

## 2020-01-22 DIAGNOSIS — M79.601 RIGHT ARM PAIN: ICD-10-CM

## 2020-01-22 DIAGNOSIS — M96.1 CERVICAL POST-LAMINECTOMY SYNDROME: ICD-10-CM

## 2020-01-22 DIAGNOSIS — M48.02 SPINAL STENOSIS IN CERVICAL REGION: ICD-10-CM

## 2020-01-22 DIAGNOSIS — M50.30 DEGENERATION OF CERVICAL INTERVERTEBRAL DISC: ICD-10-CM

## 2020-01-22 DIAGNOSIS — R29.898 RIGHT ARM WEAKNESS: ICD-10-CM

## 2020-01-22 DIAGNOSIS — M47.812 FACET ARTHRITIS OF CERVICAL REGION: ICD-10-CM

## 2020-01-22 DIAGNOSIS — R29.898 RIGHT HAND WEAKNESS: ICD-10-CM

## 2020-01-22 DIAGNOSIS — M54.12 CERVICAL RADICULAR PAIN: ICD-10-CM

## 2020-01-22 DIAGNOSIS — M54.12 BRACHIAL NEURITIS OR RADICULITIS: ICD-10-CM

## 2020-01-22 DIAGNOSIS — R26.9 GAIT ABNORMALITY: ICD-10-CM

## 2020-01-22 DIAGNOSIS — M62.81 MUSCLE RIGHT ARM WEAKNESS: ICD-10-CM

## 2020-01-22 DIAGNOSIS — M75.101 ROTATOR CUFF SYNDROME OF RIGHT SHOULDER: ICD-10-CM

## 2020-01-22 DIAGNOSIS — G89.4 CHRONIC PAIN SYNDROME: ICD-10-CM

## 2020-01-22 DIAGNOSIS — F11.20 NARCOTIC DEPENDENCY, CONTINUOUS: ICD-10-CM

## 2020-01-22 DIAGNOSIS — M48.02 CERVICAL STENOSIS OF SPINE: ICD-10-CM

## 2020-01-22 DIAGNOSIS — G56.41 COMPLEX REGIONAL PAIN SYNDROME TYPE 2 OF RIGHT UPPER EXTREMITY: ICD-10-CM

## 2020-01-22 DIAGNOSIS — G90.50 COMPLEX REGIONAL PAIN SYNDROME TYPE 1, AFFECTING UNSPECIFIED SITE: ICD-10-CM

## 2020-01-22 DIAGNOSIS — M47.12 CERVICAL SPONDYLOSIS WITH MYELOPATHY: ICD-10-CM

## 2020-01-22 DIAGNOSIS — M50.00 HNP (HERNIATED NUCLEUS PULPOSUS) WITH MYELOPATHY, CERVICAL: ICD-10-CM

## 2020-01-22 NOTE — PROGRESS NOTES
Patient actively followed in clinic.  Last ICD ck and MD appt was on  3/14/2019.  Remote transmission scheduled on 1/30/2020. Former FAS pt transitioning to Dr. Navarro with CV scheduled on 5/27/2020.

## 2020-01-23 RX ORDER — GABAPENTIN 600 MG/1
TABLET ORAL
Qty: 360 TABLET | Refills: 0 | Status: SHIPPED | OUTPATIENT
Start: 2020-01-23 | End: 2020-01-30 | Stop reason: SDUPTHER

## 2020-01-30 ENCOUNTER — OFFICE VISIT (OUTPATIENT)
Dept: PHYSICAL MEDICINE AND REHAB | Facility: CLINIC | Age: 70
End: 2020-01-30
Payer: MEDICARE

## 2020-01-30 VITALS
WEIGHT: 178.56 LBS | HEIGHT: 71 IN | HEART RATE: 76 BPM | SYSTOLIC BLOOD PRESSURE: 133 MMHG | BODY MASS INDEX: 25 KG/M2 | DIASTOLIC BLOOD PRESSURE: 91 MMHG

## 2020-01-30 DIAGNOSIS — M48.02 SPINAL STENOSIS IN CERVICAL REGION: ICD-10-CM

## 2020-01-30 DIAGNOSIS — M62.81 MUSCLE RIGHT ARM WEAKNESS: ICD-10-CM

## 2020-01-30 DIAGNOSIS — M47.12 CERVICAL SPONDYLOSIS WITH MYELOPATHY: ICD-10-CM

## 2020-01-30 DIAGNOSIS — R29.898 RIGHT HAND WEAKNESS: ICD-10-CM

## 2020-01-30 DIAGNOSIS — M75.101 ROTATOR CUFF SYNDROME OF RIGHT SHOULDER: ICD-10-CM

## 2020-01-30 DIAGNOSIS — Z79.891 OPIOID USE AGREEMENT EXISTS: Primary | ICD-10-CM

## 2020-01-30 DIAGNOSIS — M47.812 FACET ARTHRITIS OF CERVICAL REGION: ICD-10-CM

## 2020-01-30 DIAGNOSIS — G90.50 COMPLEX REGIONAL PAIN SYNDROME TYPE 1, AFFECTING UNSPECIFIED SITE: ICD-10-CM

## 2020-01-30 DIAGNOSIS — R29.898 RIGHT ARM WEAKNESS: ICD-10-CM

## 2020-01-30 DIAGNOSIS — M50.00 HNP (HERNIATED NUCLEUS PULPOSUS) WITH MYELOPATHY, CERVICAL: ICD-10-CM

## 2020-01-30 DIAGNOSIS — M54.12 CERVICAL RADICULOPATHY: ICD-10-CM

## 2020-01-30 DIAGNOSIS — F11.20 NARCOTIC DEPENDENCY, CONTINUOUS: ICD-10-CM

## 2020-01-30 DIAGNOSIS — G56.41 COMPLEX REGIONAL PAIN SYNDROME TYPE 2 OF RIGHT UPPER EXTREMITY: ICD-10-CM

## 2020-01-30 DIAGNOSIS — M54.12 BRACHIAL NEURITIS OR RADICULITIS: ICD-10-CM

## 2020-01-30 DIAGNOSIS — R26.9 GAIT ABNORMALITY: ICD-10-CM

## 2020-01-30 DIAGNOSIS — M79.601 RIGHT ARM PAIN: ICD-10-CM

## 2020-01-30 DIAGNOSIS — M96.1 CERVICAL POST-LAMINECTOMY SYNDROME: ICD-10-CM

## 2020-01-30 DIAGNOSIS — G89.4 CHRONIC PAIN SYNDROME: ICD-10-CM

## 2020-01-30 DIAGNOSIS — M48.02 CERVICAL STENOSIS OF SPINE: ICD-10-CM

## 2020-01-30 DIAGNOSIS — M50.30 DEGENERATION OF CERVICAL INTERVERTEBRAL DISC: ICD-10-CM

## 2020-01-30 DIAGNOSIS — M54.12 CERVICAL RADICULAR PAIN: ICD-10-CM

## 2020-01-30 PROCEDURE — 99999 PR PBB SHADOW E&M-EST. PATIENT-LVL III: ICD-10-PCS | Mod: PBBFAC,HCNC,, | Performed by: PHYSICAL MEDICINE & REHABILITATION

## 2020-01-30 PROCEDURE — 3075F PR MOST RECENT SYSTOLIC BLOOD PRESS GE 130-139MM HG: ICD-10-PCS | Mod: HCNC,CPTII,S$GLB, | Performed by: PHYSICAL MEDICINE & REHABILITATION

## 2020-01-30 PROCEDURE — 3080F PR MOST RECENT DIASTOLIC BLOOD PRESSURE >= 90 MM HG: ICD-10-PCS | Mod: HCNC,CPTII,S$GLB, | Performed by: PHYSICAL MEDICINE & REHABILITATION

## 2020-01-30 PROCEDURE — 3075F SYST BP GE 130 - 139MM HG: CPT | Mod: HCNC,CPTII,S$GLB, | Performed by: PHYSICAL MEDICINE & REHABILITATION

## 2020-01-30 PROCEDURE — 1125F PR PAIN SEVERITY QUANTIFIED, PAIN PRESENT: ICD-10-PCS | Mod: HCNC,S$GLB,, | Performed by: PHYSICAL MEDICINE & REHABILITATION

## 2020-01-30 PROCEDURE — 99214 OFFICE O/P EST MOD 30 MIN: CPT | Mod: HCNC,S$GLB,, | Performed by: PHYSICAL MEDICINE & REHABILITATION

## 2020-01-30 PROCEDURE — 1101F PT FALLS ASSESS-DOCD LE1/YR: CPT | Mod: HCNC,CPTII,S$GLB, | Performed by: PHYSICAL MEDICINE & REHABILITATION

## 2020-01-30 PROCEDURE — 1159F PR MEDICATION LIST DOCUMENTED IN MEDICAL RECORD: ICD-10-PCS | Mod: HCNC,S$GLB,, | Performed by: PHYSICAL MEDICINE & REHABILITATION

## 2020-01-30 PROCEDURE — 3080F DIAST BP >= 90 MM HG: CPT | Mod: HCNC,CPTII,S$GLB, | Performed by: PHYSICAL MEDICINE & REHABILITATION

## 2020-01-30 PROCEDURE — 1159F MED LIST DOCD IN RCRD: CPT | Mod: HCNC,S$GLB,, | Performed by: PHYSICAL MEDICINE & REHABILITATION

## 2020-01-30 PROCEDURE — 1101F PR PT FALLS ASSESS DOC 0-1 FALLS W/OUT INJ PAST YR: ICD-10-PCS | Mod: HCNC,CPTII,S$GLB, | Performed by: PHYSICAL MEDICINE & REHABILITATION

## 2020-01-30 PROCEDURE — 99214 PR OFFICE/OUTPT VISIT, EST, LEVL IV, 30-39 MIN: ICD-10-PCS | Mod: HCNC,S$GLB,, | Performed by: PHYSICAL MEDICINE & REHABILITATION

## 2020-01-30 PROCEDURE — 99999 PR PBB SHADOW E&M-EST. PATIENT-LVL III: CPT | Mod: PBBFAC,HCNC,, | Performed by: PHYSICAL MEDICINE & REHABILITATION

## 2020-01-30 PROCEDURE — 1125F AMNT PAIN NOTED PAIN PRSNT: CPT | Mod: HCNC,S$GLB,, | Performed by: PHYSICAL MEDICINE & REHABILITATION

## 2020-01-30 RX ORDER — HYDROCODONE BITARTRATE AND ACETAMINOPHEN 10; 325 MG/1; MG/1
1 TABLET ORAL EVERY 6 HOURS PRN
Qty: 120 TABLET | Refills: 0 | Status: SHIPPED | OUTPATIENT
Start: 2020-04-17 | End: 2020-05-13 | Stop reason: SDUPTHER

## 2020-01-30 RX ORDER — BACLOFEN 20 MG/1
20 TABLET ORAL 3 TIMES DAILY
Qty: 270 TABLET | Refills: 1 | Status: SHIPPED | OUTPATIENT
Start: 2020-01-30 | End: 2020-08-08

## 2020-01-30 RX ORDER — HYDROCODONE BITARTRATE AND ACETAMINOPHEN 10; 325 MG/1; MG/1
1 TABLET ORAL EVERY 6 HOURS PRN
Qty: 110 TABLET | Refills: 0 | Status: SHIPPED | OUTPATIENT
Start: 2020-03-17 | End: 2020-04-16

## 2020-01-30 RX ORDER — HYDROCODONE BITARTRATE AND ACETAMINOPHEN 10; 325 MG/1; MG/1
1 TABLET ORAL EVERY 6 HOURS PRN
Qty: 120 TABLET | Refills: 0 | Status: SHIPPED | OUTPATIENT
Start: 2020-02-17 | End: 2020-03-18

## 2020-01-30 RX ORDER — HYDROCODONE BITARTRATE AND ACETAMINOPHEN 10; 325 MG/1; MG/1
1 TABLET ORAL EVERY 6 HOURS PRN
Qty: 120 TABLET | Refills: 0 | Status: SHIPPED | OUTPATIENT
Start: 2020-03-17 | End: 2020-01-30 | Stop reason: SDUPTHER

## 2020-01-30 RX ORDER — GABAPENTIN 600 MG/1
600 TABLET ORAL
Qty: 360 TABLET | Refills: 3 | Status: SHIPPED | OUTPATIENT
Start: 2020-01-30 | End: 2021-06-04

## 2020-01-30 NOTE — PROGRESS NOTES
Subjective:       Patient ID: Xander Sanchez is a 69 y.o. male.    Chief Complaint: Arm Pain and cervical myelopathy    Arm Pain    Pertinent negatives include no chest pain. Numbness: Right arm paina nd numbness.   Neck Pain    Pertinent negatives include no chest pain or headaches. Numbness: Right arm paina nd numbness. Weakness: right arm weak.   Extremity Weakness    Numbness: Right arm paina nd numbness.      Mr Sanchez is Right handed male, who returns to clinic for right arm pain, and weakness that worsened after second neck surgery with Dr. Meyer.   Third surgery with Dr. De Dios did not changed his symptoms.   He states that all symptoms stayed the same as before surgery.   He reports some improvement in pain, but the tightness, cold sensation in right arm is same as before.  Mr. Sanchez returns to clinic for chronic pain management with opioids.    Last clinic visit was on 05/20/19.    Current right arm pain is 5, average pain is 4-5/10, the worst pain is 7/10.   He has not that much of neck pain, current neck pain is 1, worst pain maybe 2.   He complains mainly about tightness, pressure in right arm, in right arm , more in forearm, than above elbow, tightness  runs down the arm to right thumb.   Right arm pain is constant, day and night, intensity changes, pain is worse during day time, does not awake him at night.   In morning feels tight, stiff, as pressure around the arm, also feels swollen and tight in hand, and feels cold at all time.   It hurts more bellow \the elbow than above elbow.   He reports that he does not have feelings, cannot write, does not know if things are going to drop out of his hand.   Sometimes squeezes too hard plastic cup.   Right hand is clumsy, getting smaller, hardly can dress, cannot button shirt.   He states that he is not able to dress. He states that before surgery he has had similar problems, but they just got worse after second surgery.   It is swelling feeling, and  tightness that borders him, not that much pain.   Sometimes right arm feels cold, and cold weather affect him more,  Pain is better controled with Hydrocodone.   He went for CRESCENCIO, once before surgery and few time after surgery, total  > 3   Takes Hydrocodone 10/325 mg PO TID, that brings his pain down to tolerable 2.   He is now taking Neurontin 600 mg po QID, total 2400 mg /day, and tolerates it well.   But, Neurontin does not help much, in his opinion.   Hydrocodone helps and brings pain down to 2, and gives him ~ 5 hrs pain relief.  Also, takes Baclofen 10 mg po TID , for tightness in right arm, that helps also.  Now, awaiting procedure, pain stimulator, that is a little complicated since he has AICD.  Here for follow up, re evaluation and chronic pain management with opiates.    Past Medical History:   Diagnosis Date    Asthma     Cardiomyopathy     Cervical radicular pain     Cervical spondylosis with myelopathy 10/17/2012    Chronic bronchitis     Chronic systolic dysfunction of left ventricle 7/27/2015    Constipation 7/27/2015    COPD (chronic obstructive pulmonary disease)     CRPS (complex regional pain syndrome type I) 12/29/2014    Diabetes mellitus, type 2     DM type 2 (diabetes mellitus, type 2) 7/27/2015    Enlarged prostate 7/27/2015    Enuresis 7/6/2018    Hypertension     ICD (implantable cardiac defibrillator) in place     Mixed hyperlipidemia 2/28/2018    Presence of biventricular AICD 7/27/2015    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thy 2/1/2016    Urinary tract infection     pt does not know       Past Surgical History:   Procedure Laterality Date    CARDIAC DEFIBRILLATOR PLACEMENT      CERVICAL SPINE SURGERY      COLONOSCOPY N/A 7/19/2017    Procedure: COLONOSCOPY  golytely;  Surgeon: Vannessa Uribe MD;  Location: [facility];  Service: Endoscopy;  Laterality: N/A;    SPINE SURGERY         Family History   Problem Relation Age of Onset    Hypertension  Mother     Hypertension Father     COPD Father     No Known Problems Sister     No Known Problems Brother     No Known Problems Daughter     Prostate cancer Neg Hx     Kidney disease Neg Hx        Social History     Socioeconomic History    Marital status:      Spouse name: Not on file    Number of children: Not on file    Years of education: Not on file    Highest education level: Not on file   Occupational History    Not on file   Social Needs    Financial resource strain: Not on file    Food insecurity:     Worry: Not on file     Inability: Not on file    Transportation needs:     Medical: Not on file     Non-medical: Not on file   Tobacco Use    Smoking status: Former Smoker     Packs/day: 1.00     Years: 40.00     Pack years: 40.00     Types: Cigarettes     Last attempt to quit: 7/26/2009     Years since quitting: 10.5    Smokeless tobacco: Never Used   Substance and Sexual Activity    Alcohol use: Yes     Alcohol/week: 2.0 standard drinks     Types: 1 Shots of liquor, 1 Cans of beer per week     Comment: May 1-2 beers or whiskey per month    Drug use: No    Sexual activity: Yes     Partners: Female   Lifestyle    Physical activity:     Days per week: Not on file     Minutes per session: Not on file    Stress: Not on file   Relationships    Social connections:     Talks on phone: Not on file     Gets together: Not on file     Attends Jewish service: Not on file     Active member of club or organization: Not on file     Attends meetings of clubs or organizations: Not on file     Relationship status: Not on file   Other Topics Concern    Not on file   Social History Narrative    Not on file       Current Outpatient Medications   Medication Sig Dispense Refill    ADVAIR -21 mcg/actuation HFAA inhaler INHALE TWO PUFFS BY MOUTH TWICE DAILY 1 Inhaler 0    aspirin (ECOTRIN) 81 MG EC tablet Take 81 mg by mouth once daily.      baclofen (LIORESAL) 20 MG tablet Take 1 tablet  (20 mg total) by mouth 3 (three) times daily. 270 tablet 1    clotrimazole (LOTRIMIN) 1 % cream Apply topically 2 (two) times daily. (Patient not taking: Reported on 1/10/2020) 28 g 0    gabapentin (NEURONTIN) 600 MG tablet Take 1 tablet (600 mg total) by mouth 4 (four) times daily with meals and nightly. 360 tablet 3    hydroCHLOROthiazide (MICROZIDE) 12.5 mg capsule TAKE 1 CAPSULE EVERY DAY 90 capsule 3    [START ON 2/17/2020] HYDROcodone-acetaminophen (NORCO)  mg per tablet Take 1 tablet by mouth every 6 (six) hours as needed for Pain. More than 7 day supply and Medically Necessary. 120 tablet 0    [START ON 3/17/2020] HYDROcodone-acetaminophen (NORCO)  mg per tablet Take 1 tablet by mouth every 6 (six) hours as needed for Pain. More than 7 day supply and Medically Necessary. 110 tablet 0    [START ON 4/17/2020] HYDROcodone-acetaminophen (NORCO)  mg per tablet Take 1 tablet by mouth every 6 (six) hours as needed for Pain. More than 7 day supply and Medically Necessary. 120 tablet 0    metoprolol succinate (TOPROL-XL) 25 MG 24 hr tablet TAKE 1 TABLET EVERY DAY 90 tablet 3    oxybutynin (DITROPAN) 5 MG Tab TAKE 1 TABLET TWICE DAILY 180 tablet 3    pantoprazole (PROTONIX) 40 MG tablet TAKE 1 TABLET BY MOUTH ONCE DAILY BEFORE BREAKFAST 30 tablet 11    pravastatin (PRAVACHOL) 10 MG tablet TAKE 1 TABLET BY MOUTH ONCE DAILY AT BEDTIME 90 tablet 3    tamsulosin (FLOMAX) 0.4 mg Cap Take 2 capsules (0.8 mg total) by mouth every evening. 180 capsule 1    TRUE METRIX AIR GLUCOSE METER kit USE AS INSTRUCTED 1 each 0    TRUE METRIX GLUCOSE TEST STRIP Strp TEST  ONE  TIME  DAILY  AT  6AM 100 strip 3    TRUEPLUS LANCETS 33 gauge Misc TEST ONE TIME DAILY  AT  6AM 100 each 3    valsartan (DIOVAN) 40 MG tablet Take 1 tablet (40 mg total) by mouth once daily. 90 tablet 3     No current facility-administered medications for this visit.        Review of patient's allergies indicates:  No Known  Allergies  Review of Systems   Constitutional: Negative for appetite change and fatigue.   Eyes: Negative for visual disturbance.   Respiratory: Negative for shortness of breath.    Cardiovascular: Negative for chest pain.   Gastrointestinal: Negative for constipation and diarrhea.   Genitourinary: Negative for dysuria, frequency and urgency.   Musculoskeletal: Positive for extremity weakness and neck pain. Negative for arthralgias, back pain, gait problem, joint swelling, myalgias and neck stiffness.   Neurological: Negative for dizziness, tremors and headaches. Weakness: right arm weak. Numbness: Right arm paina nd numbness.   Psychiatric/Behavioral: Negative for dysphoric mood.   All other systems reviewed and are negative.          Objective:      Physical Exam    GENERAL: The patient is alert, oriented, pleasant.   MUSCULOSKELETAL:   Gait: on wide basis, COG moved forward.   GENERAL: The patient is alert, oriented, pleasant.   HEENT; PERRLA  NECK: supple, minimaly webbed neck.   Cervical spine :  Minimally decreased AROM in cervical spine, flexion to 60, extension to 0,,   side bending and rotation to 20-25 degrees with pain.  + mild-moderate paravertebral cervical musculature tenderness on Right.  + mild- moderate tenderness in upper trapezius muscles on Right.   Lumbar spine, full range of motion in all planes, flexion to 90 degrees , ext. 0.  Side bending and rotation to 25--30 degrees.   Straight leg raising Negative bilaterally.   Full range of motion in all joints x4 extremities.   Muscle strength 5/5 throughout x4 extremities.   No joint laxity throughout x4 extremities.   NEUROLOGIC: Cranial nerves II through XII intact.   Deep tendon reflexes is normal, +2 in the upper and lower extremities bilaterally.   Muscle tone is normal.   Sensory is intact to light touch and pinprick throughout x4 extremities.   Skin: no hypersensitivity, alodynia, no somatosensory changes, other than cold skin in right arm, no  atrophic skin changes.        CT of Cervical spine showed:  Stable remote operative change right C3, C4 and C6 hemilaminectomies with metallic laminal plasty.  Operative change with C5/C6 anterior spinal fusion no evidence for hardware failure. with interval reduction of protruding disk at C5/C6.  continued truncation of the cervical spinal cord most pronounced at the C6 level with mild/moderate small caliber of the cord   concerning for myelomalacia stable.  Superimposed multilevel degenerative change most pronounced at C6/C7 with bulging disk resulting in mild central canal stenosis.   Please note there is additional degenerative change with mild neural foraminal stenosis C3/C4 and C4/C5 levels.    Assessment:          1. Opioid use agreement exists    2. Complex regional pain syndrome type 2 of right upper extremity    3. Cervical spondylosis with myelopathy    4. Facet arthritis of cervical region    5. Muscle right arm weakness    6. Cervical post-laminectomy syndrome    7. Right hand weakness    8. Chronic pain syndrome    9. Spinal stenosis in cervical region    10. Cervical radicular pain    11. Right arm pain    12. Complex regional pain syndrome type 1, affecting unspecified site    13. HNP (herniated nucleus pulposus) with myelopathy, cervical    14. Degeneration of cervical intervertebral disc    15. Gait abnormality    16. Rotator cuff syndrome of right shoulder    17. Cervical stenosis of spine    18. Brachial neuritis or radiculitis    19. Right arm weakness    20. Narcotic dependency, continuous    21. Cervical radiculopathy      Plan:       Opioid use agreement exists  -     Pain Clinic Drug Screen; Future; Expected date: 01/30/2020    Complex regional pain syndrome type 2 of right upper extremity  -     gabapentin (NEURONTIN) 600 MG tablet; Take 1 tablet (600 mg total) by mouth 4 (four) times daily with meals and nightly.  Dispense: 360 tablet; Refill: 3  -     HYDROcodone-acetaminophen (NORCO)   mg per tablet; Take 1 tablet by mouth every 6 (six) hours as needed for Pain. More than 7 day supply and Medically Necessary.  Dispense: 120 tablet; Refill: 0  -     HYDROcodone-acetaminophen (NORCO)  mg per tablet; Take 1 tablet by mouth every 6 (six) hours as needed for Pain. More than 7 day supply and Medically Necessary.  Dispense: 110 tablet; Refill: 0  -     Discontinue: HYDROcodone-acetaminophen (NORCO)  mg per tablet; Take 1 tablet by mouth every 6 (six) hours as needed for Pain. More than 7 day supply and Medically Necessary.  Dispense: 120 tablet; Refill: 0  -     HYDROcodone-acetaminophen (NORCO)  mg per tablet; Take 1 tablet by mouth every 6 (six) hours as needed for Pain. More than 7 day supply and Medically Necessary.  Dispense: 120 tablet; Refill: 0    Cervical spondylosis with myelopathy  -     gabapentin (NEURONTIN) 600 MG tablet; Take 1 tablet (600 mg total) by mouth 4 (four) times daily with meals and nightly.  Dispense: 360 tablet; Refill: 3  -     baclofen (LIORESAL) 20 MG tablet; Take 1 tablet (20 mg total) by mouth 3 (three) times daily.  Dispense: 270 tablet; Refill: 1  -     HYDROcodone-acetaminophen (NORCO)  mg per tablet; Take 1 tablet by mouth every 6 (six) hours as needed for Pain. More than 7 day supply and Medically Necessary.  Dispense: 120 tablet; Refill: 0  -     HYDROcodone-acetaminophen (NORCO)  mg per tablet; Take 1 tablet by mouth every 6 (six) hours as needed for Pain. More than 7 day supply and Medically Necessary.  Dispense: 110 tablet; Refill: 0  -     Discontinue: HYDROcodone-acetaminophen (NORCO)  mg per tablet; Take 1 tablet by mouth every 6 (six) hours as needed for Pain. More than 7 day supply and Medically Necessary.  Dispense: 120 tablet; Refill: 0  -     HYDROcodone-acetaminophen (NORCO)  mg per tablet; Take 1 tablet by mouth every 6 (six) hours as needed for Pain. More than 7 day supply and Medically Necessary.   Dispense: 120 tablet; Refill: 0    Facet arthritis of cervical region  -     gabapentin (NEURONTIN) 600 MG tablet; Take 1 tablet (600 mg total) by mouth 4 (four) times daily with meals and nightly.  Dispense: 360 tablet; Refill: 3  -     baclofen (LIORESAL) 20 MG tablet; Take 1 tablet (20 mg total) by mouth 3 (three) times daily.  Dispense: 270 tablet; Refill: 1  -     HYDROcodone-acetaminophen (NORCO)  mg per tablet; Take 1 tablet by mouth every 6 (six) hours as needed for Pain. More than 7 day supply and Medically Necessary.  Dispense: 120 tablet; Refill: 0  -     HYDROcodone-acetaminophen (NORCO)  mg per tablet; Take 1 tablet by mouth every 6 (six) hours as needed for Pain. More than 7 day supply and Medically Necessary.  Dispense: 110 tablet; Refill: 0  -     Discontinue: HYDROcodone-acetaminophen (NORCO)  mg per tablet; Take 1 tablet by mouth every 6 (six) hours as needed for Pain. More than 7 day supply and Medically Necessary.  Dispense: 120 tablet; Refill: 0  -     HYDROcodone-acetaminophen (NORCO)  mg per tablet; Take 1 tablet by mouth every 6 (six) hours as needed for Pain. More than 7 day supply and Medically Necessary.  Dispense: 120 tablet; Refill: 0    Muscle right arm weakness  -     gabapentin (NEURONTIN) 600 MG tablet; Take 1 tablet (600 mg total) by mouth 4 (four) times daily with meals and nightly.  Dispense: 360 tablet; Refill: 3  -     baclofen (LIORESAL) 20 MG tablet; Take 1 tablet (20 mg total) by mouth 3 (three) times daily.  Dispense: 270 tablet; Refill: 1  -     HYDROcodone-acetaminophen (NORCO)  mg per tablet; Take 1 tablet by mouth every 6 (six) hours as needed for Pain. More than 7 day supply and Medically Necessary.  Dispense: 120 tablet; Refill: 0  -     HYDROcodone-acetaminophen (NORCO)  mg per tablet; Take 1 tablet by mouth every 6 (six) hours as needed for Pain. More than 7 day supply and Medically Necessary.  Dispense: 110 tablet; Refill: 0  -      Discontinue: HYDROcodone-acetaminophen (NORCO)  mg per tablet; Take 1 tablet by mouth every 6 (six) hours as needed for Pain. More than 7 day supply and Medically Necessary.  Dispense: 120 tablet; Refill: 0  -     HYDROcodone-acetaminophen (NORCO)  mg per tablet; Take 1 tablet by mouth every 6 (six) hours as needed for Pain. More than 7 day supply and Medically Necessary.  Dispense: 120 tablet; Refill: 0    Cervical post-laminectomy syndrome  -     gabapentin (NEURONTIN) 600 MG tablet; Take 1 tablet (600 mg total) by mouth 4 (four) times daily with meals and nightly.  Dispense: 360 tablet; Refill: 3  -     baclofen (LIORESAL) 20 MG tablet; Take 1 tablet (20 mg total) by mouth 3 (three) times daily.  Dispense: 270 tablet; Refill: 1  -     HYDROcodone-acetaminophen (NORCO)  mg per tablet; Take 1 tablet by mouth every 6 (six) hours as needed for Pain. More than 7 day supply and Medically Necessary.  Dispense: 120 tablet; Refill: 0  -     HYDROcodone-acetaminophen (NORCO)  mg per tablet; Take 1 tablet by mouth every 6 (six) hours as needed for Pain. More than 7 day supply and Medically Necessary.  Dispense: 110 tablet; Refill: 0  -     Discontinue: HYDROcodone-acetaminophen (NORCO)  mg per tablet; Take 1 tablet by mouth every 6 (six) hours as needed for Pain. More than 7 day supply and Medically Necessary.  Dispense: 120 tablet; Refill: 0  -     HYDROcodone-acetaminophen (NORCO)  mg per tablet; Take 1 tablet by mouth every 6 (six) hours as needed for Pain. More than 7 day supply and Medically Necessary.  Dispense: 120 tablet; Refill: 0    Right hand weakness  -     gabapentin (NEURONTIN) 600 MG tablet; Take 1 tablet (600 mg total) by mouth 4 (four) times daily with meals and nightly.  Dispense: 360 tablet; Refill: 3  -     baclofen (LIORESAL) 20 MG tablet; Take 1 tablet (20 mg total) by mouth 3 (three) times daily.  Dispense: 270 tablet; Refill: 1  -     HYDROcodone-acetaminophen  (NORCO)  mg per tablet; Take 1 tablet by mouth every 6 (six) hours as needed for Pain. More than 7 day supply and Medically Necessary.  Dispense: 120 tablet; Refill: 0  -     HYDROcodone-acetaminophen (NORCO)  mg per tablet; Take 1 tablet by mouth every 6 (six) hours as needed for Pain. More than 7 day supply and Medically Necessary.  Dispense: 110 tablet; Refill: 0  -     Discontinue: HYDROcodone-acetaminophen (NORCO)  mg per tablet; Take 1 tablet by mouth every 6 (six) hours as needed for Pain. More than 7 day supply and Medically Necessary.  Dispense: 120 tablet; Refill: 0  -     HYDROcodone-acetaminophen (NORCO)  mg per tablet; Take 1 tablet by mouth every 6 (six) hours as needed for Pain. More than 7 day supply and Medically Necessary.  Dispense: 120 tablet; Refill: 0    Chronic pain syndrome  -     gabapentin (NEURONTIN) 600 MG tablet; Take 1 tablet (600 mg total) by mouth 4 (four) times daily with meals and nightly.  Dispense: 360 tablet; Refill: 3  -     baclofen (LIORESAL) 20 MG tablet; Take 1 tablet (20 mg total) by mouth 3 (three) times daily.  Dispense: 270 tablet; Refill: 1  -     HYDROcodone-acetaminophen (NORCO)  mg per tablet; Take 1 tablet by mouth every 6 (six) hours as needed for Pain. More than 7 day supply and Medically Necessary.  Dispense: 120 tablet; Refill: 0  -     HYDROcodone-acetaminophen (NORCO)  mg per tablet; Take 1 tablet by mouth every 6 (six) hours as needed for Pain. More than 7 day supply and Medically Necessary.  Dispense: 110 tablet; Refill: 0  -     Discontinue: HYDROcodone-acetaminophen (NORCO)  mg per tablet; Take 1 tablet by mouth every 6 (six) hours as needed for Pain. More than 7 day supply and Medically Necessary.  Dispense: 120 tablet; Refill: 0  -     HYDROcodone-acetaminophen (NORCO)  mg per tablet; Take 1 tablet by mouth every 6 (six) hours as needed for Pain. More than 7 day supply and Medically Necessary.  Dispense:  120 tablet; Refill: 0    Spinal stenosis in cervical region  -     gabapentin (NEURONTIN) 600 MG tablet; Take 1 tablet (600 mg total) by mouth 4 (four) times daily with meals and nightly.  Dispense: 360 tablet; Refill: 3  -     baclofen (LIORESAL) 20 MG tablet; Take 1 tablet (20 mg total) by mouth 3 (three) times daily.  Dispense: 270 tablet; Refill: 1  -     HYDROcodone-acetaminophen (NORCO)  mg per tablet; Take 1 tablet by mouth every 6 (six) hours as needed for Pain. More than 7 day supply and Medically Necessary.  Dispense: 120 tablet; Refill: 0  -     HYDROcodone-acetaminophen (NORCO)  mg per tablet; Take 1 tablet by mouth every 6 (six) hours as needed for Pain. More than 7 day supply and Medically Necessary.  Dispense: 110 tablet; Refill: 0  -     Discontinue: HYDROcodone-acetaminophen (NORCO)  mg per tablet; Take 1 tablet by mouth every 6 (six) hours as needed for Pain. More than 7 day supply and Medically Necessary.  Dispense: 120 tablet; Refill: 0  -     HYDROcodone-acetaminophen (NORCO)  mg per tablet; Take 1 tablet by mouth every 6 (six) hours as needed for Pain. More than 7 day supply and Medically Necessary.  Dispense: 120 tablet; Refill: 0    Cervical radicular pain  -     gabapentin (NEURONTIN) 600 MG tablet; Take 1 tablet (600 mg total) by mouth 4 (four) times daily with meals and nightly.  Dispense: 360 tablet; Refill: 3  -     baclofen (LIORESAL) 20 MG tablet; Take 1 tablet (20 mg total) by mouth 3 (three) times daily.  Dispense: 270 tablet; Refill: 1  -     HYDROcodone-acetaminophen (NORCO)  mg per tablet; Take 1 tablet by mouth every 6 (six) hours as needed for Pain. More than 7 day supply and Medically Necessary.  Dispense: 120 tablet; Refill: 0  -     HYDROcodone-acetaminophen (NORCO)  mg per tablet; Take 1 tablet by mouth every 6 (six) hours as needed for Pain. More than 7 day supply and Medically Necessary.  Dispense: 110 tablet; Refill: 0  -      Discontinue: HYDROcodone-acetaminophen (NORCO)  mg per tablet; Take 1 tablet by mouth every 6 (six) hours as needed for Pain. More than 7 day supply and Medically Necessary.  Dispense: 120 tablet; Refill: 0  -     HYDROcodone-acetaminophen (NORCO)  mg per tablet; Take 1 tablet by mouth every 6 (six) hours as needed for Pain. More than 7 day supply and Medically Necessary.  Dispense: 120 tablet; Refill: 0    Right arm pain  -     gabapentin (NEURONTIN) 600 MG tablet; Take 1 tablet (600 mg total) by mouth 4 (four) times daily with meals and nightly.  Dispense: 360 tablet; Refill: 3  -     baclofen (LIORESAL) 20 MG tablet; Take 1 tablet (20 mg total) by mouth 3 (three) times daily.  Dispense: 270 tablet; Refill: 1  -     HYDROcodone-acetaminophen (NORCO)  mg per tablet; Take 1 tablet by mouth every 6 (six) hours as needed for Pain. More than 7 day supply and Medically Necessary.  Dispense: 120 tablet; Refill: 0  -     HYDROcodone-acetaminophen (NORCO)  mg per tablet; Take 1 tablet by mouth every 6 (six) hours as needed for Pain. More than 7 day supply and Medically Necessary.  Dispense: 110 tablet; Refill: 0  -     Discontinue: HYDROcodone-acetaminophen (NORCO)  mg per tablet; Take 1 tablet by mouth every 6 (six) hours as needed for Pain. More than 7 day supply and Medically Necessary.  Dispense: 120 tablet; Refill: 0  -     HYDROcodone-acetaminophen (NORCO)  mg per tablet; Take 1 tablet by mouth every 6 (six) hours as needed for Pain. More than 7 day supply and Medically Necessary.  Dispense: 120 tablet; Refill: 0    Complex regional pain syndrome type 1, affecting unspecified site  -     gabapentin (NEURONTIN) 600 MG tablet; Take 1 tablet (600 mg total) by mouth 4 (four) times daily with meals and nightly.  Dispense: 360 tablet; Refill: 3  -     baclofen (LIORESAL) 20 MG tablet; Take 1 tablet (20 mg total) by mouth 3 (three) times daily.  Dispense: 270 tablet; Refill: 1  -      HYDROcodone-acetaminophen (NORCO)  mg per tablet; Take 1 tablet by mouth every 6 (six) hours as needed for Pain. More than 7 day supply and Medically Necessary.  Dispense: 120 tablet; Refill: 0  -     HYDROcodone-acetaminophen (NORCO)  mg per tablet; Take 1 tablet by mouth every 6 (six) hours as needed for Pain. More than 7 day supply and Medically Necessary.  Dispense: 110 tablet; Refill: 0  -     Discontinue: HYDROcodone-acetaminophen (NORCO)  mg per tablet; Take 1 tablet by mouth every 6 (six) hours as needed for Pain. More than 7 day supply and Medically Necessary.  Dispense: 120 tablet; Refill: 0  -     HYDROcodone-acetaminophen (NORCO)  mg per tablet; Take 1 tablet by mouth every 6 (six) hours as needed for Pain. More than 7 day supply and Medically Necessary.  Dispense: 120 tablet; Refill: 0    HNP (herniated nucleus pulposus) with myelopathy, cervical  -     gabapentin (NEURONTIN) 600 MG tablet; Take 1 tablet (600 mg total) by mouth 4 (four) times daily with meals and nightly.  Dispense: 360 tablet; Refill: 3  -     baclofen (LIORESAL) 20 MG tablet; Take 1 tablet (20 mg total) by mouth 3 (three) times daily.  Dispense: 270 tablet; Refill: 1  -     HYDROcodone-acetaminophen (NORCO)  mg per tablet; Take 1 tablet by mouth every 6 (six) hours as needed for Pain. More than 7 day supply and Medically Necessary.  Dispense: 120 tablet; Refill: 0  -     HYDROcodone-acetaminophen (NORCO)  mg per tablet; Take 1 tablet by mouth every 6 (six) hours as needed for Pain. More than 7 day supply and Medically Necessary.  Dispense: 110 tablet; Refill: 0  -     Discontinue: HYDROcodone-acetaminophen (NORCO)  mg per tablet; Take 1 tablet by mouth every 6 (six) hours as needed for Pain. More than 7 day supply and Medically Necessary.  Dispense: 120 tablet; Refill: 0  -     HYDROcodone-acetaminophen (NORCO)  mg per tablet; Take 1 tablet by mouth every 6 (six) hours as needed for  Pain. More than 7 day supply and Medically Necessary.  Dispense: 120 tablet; Refill: 0    Degeneration of cervical intervertebral disc  -     gabapentin (NEURONTIN) 600 MG tablet; Take 1 tablet (600 mg total) by mouth 4 (four) times daily with meals and nightly.  Dispense: 360 tablet; Refill: 3  -     baclofen (LIORESAL) 20 MG tablet; Take 1 tablet (20 mg total) by mouth 3 (three) times daily.  Dispense: 270 tablet; Refill: 1  -     HYDROcodone-acetaminophen (NORCO)  mg per tablet; Take 1 tablet by mouth every 6 (six) hours as needed for Pain. More than 7 day supply and Medically Necessary.  Dispense: 120 tablet; Refill: 0  -     HYDROcodone-acetaminophen (NORCO)  mg per tablet; Take 1 tablet by mouth every 6 (six) hours as needed for Pain. More than 7 day supply and Medically Necessary.  Dispense: 110 tablet; Refill: 0  -     Discontinue: HYDROcodone-acetaminophen (NORCO)  mg per tablet; Take 1 tablet by mouth every 6 (six) hours as needed for Pain. More than 7 day supply and Medically Necessary.  Dispense: 120 tablet; Refill: 0  -     HYDROcodone-acetaminophen (NORCO)  mg per tablet; Take 1 tablet by mouth every 6 (six) hours as needed for Pain. More than 7 day supply and Medically Necessary.  Dispense: 120 tablet; Refill: 0    Gait abnormality  -     gabapentin (NEURONTIN) 600 MG tablet; Take 1 tablet (600 mg total) by mouth 4 (four) times daily with meals and nightly.  Dispense: 360 tablet; Refill: 3    Rotator cuff syndrome of right shoulder  -     gabapentin (NEURONTIN) 600 MG tablet; Take 1 tablet (600 mg total) by mouth 4 (four) times daily with meals and nightly.  Dispense: 360 tablet; Refill: 3  -     baclofen (LIORESAL) 20 MG tablet; Take 1 tablet (20 mg total) by mouth 3 (three) times daily.  Dispense: 270 tablet; Refill: 1  -     HYDROcodone-acetaminophen (NORCO)  mg per tablet; Take 1 tablet by mouth every 6 (six) hours as needed for Pain. More than 7 day supply and  Medically Necessary.  Dispense: 120 tablet; Refill: 0  -     HYDROcodone-acetaminophen (NORCO)  mg per tablet; Take 1 tablet by mouth every 6 (six) hours as needed for Pain. More than 7 day supply and Medically Necessary.  Dispense: 110 tablet; Refill: 0  -     Discontinue: HYDROcodone-acetaminophen (NORCO)  mg per tablet; Take 1 tablet by mouth every 6 (six) hours as needed for Pain. More than 7 day supply and Medically Necessary.  Dispense: 120 tablet; Refill: 0  -     HYDROcodone-acetaminophen (NORCO)  mg per tablet; Take 1 tablet by mouth every 6 (six) hours as needed for Pain. More than 7 day supply and Medically Necessary.  Dispense: 120 tablet; Refill: 0    Cervical stenosis of spine  -     gabapentin (NEURONTIN) 600 MG tablet; Take 1 tablet (600 mg total) by mouth 4 (four) times daily with meals and nightly.  Dispense: 360 tablet; Refill: 3  -     baclofen (LIORESAL) 20 MG tablet; Take 1 tablet (20 mg total) by mouth 3 (three) times daily.  Dispense: 270 tablet; Refill: 1  -     HYDROcodone-acetaminophen (NORCO)  mg per tablet; Take 1 tablet by mouth every 6 (six) hours as needed for Pain. More than 7 day supply and Medically Necessary.  Dispense: 120 tablet; Refill: 0  -     HYDROcodone-acetaminophen (NORCO)  mg per tablet; Take 1 tablet by mouth every 6 (six) hours as needed for Pain. More than 7 day supply and Medically Necessary.  Dispense: 110 tablet; Refill: 0  -     Discontinue: HYDROcodone-acetaminophen (NORCO)  mg per tablet; Take 1 tablet by mouth every 6 (six) hours as needed for Pain. More than 7 day supply and Medically Necessary.  Dispense: 120 tablet; Refill: 0  -     HYDROcodone-acetaminophen (NORCO)  mg per tablet; Take 1 tablet by mouth every 6 (six) hours as needed for Pain. More than 7 day supply and Medically Necessary.  Dispense: 120 tablet; Refill: 0    Brachial neuritis or radiculitis  -     gabapentin (NEURONTIN) 600 MG tablet; Take 1 tablet  (600 mg total) by mouth 4 (four) times daily with meals and nightly.  Dispense: 360 tablet; Refill: 3  -     baclofen (LIORESAL) 20 MG tablet; Take 1 tablet (20 mg total) by mouth 3 (three) times daily.  Dispense: 270 tablet; Refill: 1  -     HYDROcodone-acetaminophen (NORCO)  mg per tablet; Take 1 tablet by mouth every 6 (six) hours as needed for Pain. More than 7 day supply and Medically Necessary.  Dispense: 120 tablet; Refill: 0  -     HYDROcodone-acetaminophen (NORCO)  mg per tablet; Take 1 tablet by mouth every 6 (six) hours as needed for Pain. More than 7 day supply and Medically Necessary.  Dispense: 110 tablet; Refill: 0  -     Discontinue: HYDROcodone-acetaminophen (NORCO)  mg per tablet; Take 1 tablet by mouth every 6 (six) hours as needed for Pain. More than 7 day supply and Medically Necessary.  Dispense: 120 tablet; Refill: 0  -     HYDROcodone-acetaminophen (NORCO)  mg per tablet; Take 1 tablet by mouth every 6 (six) hours as needed for Pain. More than 7 day supply and Medically Necessary.  Dispense: 120 tablet; Refill: 0    Right arm weakness  -     gabapentin (NEURONTIN) 600 MG tablet; Take 1 tablet (600 mg total) by mouth 4 (four) times daily with meals and nightly.  Dispense: 360 tablet; Refill: 3  -     baclofen (LIORESAL) 20 MG tablet; Take 1 tablet (20 mg total) by mouth 3 (three) times daily.  Dispense: 270 tablet; Refill: 1  -     HYDROcodone-acetaminophen (NORCO)  mg per tablet; Take 1 tablet by mouth every 6 (six) hours as needed for Pain. More than 7 day supply and Medically Necessary.  Dispense: 120 tablet; Refill: 0  -     HYDROcodone-acetaminophen (NORCO)  mg per tablet; Take 1 tablet by mouth every 6 (six) hours as needed for Pain. More than 7 day supply and Medically Necessary.  Dispense: 110 tablet; Refill: 0  -     Discontinue: HYDROcodone-acetaminophen (NORCO)  mg per tablet; Take 1 tablet by mouth every 6 (six) hours as needed for Pain.  More than 7 day supply and Medically Necessary.  Dispense: 120 tablet; Refill: 0  -     HYDROcodone-acetaminophen (NORCO)  mg per tablet; Take 1 tablet by mouth every 6 (six) hours as needed for Pain. More than 7 day supply and Medically Necessary.  Dispense: 120 tablet; Refill: 0    Narcotic dependency, continuous  -     gabapentin (NEURONTIN) 600 MG tablet; Take 1 tablet (600 mg total) by mouth 4 (four) times daily with meals and nightly.  Dispense: 360 tablet; Refill: 3  -     baclofen (LIORESAL) 20 MG tablet; Take 1 tablet (20 mg total) by mouth 3 (three) times daily.  Dispense: 270 tablet; Refill: 1  -     HYDROcodone-acetaminophen (NORCO)  mg per tablet; Take 1 tablet by mouth every 6 (six) hours as needed for Pain. More than 7 day supply and Medically Necessary.  Dispense: 120 tablet; Refill: 0  -     HYDROcodone-acetaminophen (NORCO)  mg per tablet; Take 1 tablet by mouth every 6 (six) hours as needed for Pain. More than 7 day supply and Medically Necessary.  Dispense: 110 tablet; Refill: 0  -     Discontinue: HYDROcodone-acetaminophen (NORCO)  mg per tablet; Take 1 tablet by mouth every 6 (six) hours as needed for Pain. More than 7 day supply and Medically Necessary.  Dispense: 120 tablet; Refill: 0  -     HYDROcodone-acetaminophen (NORCO)  mg per tablet; Take 1 tablet by mouth every 6 (six) hours as needed for Pain. More than 7 day supply and Medically Necessary.  Dispense: 120 tablet; Refill: 0    Cervical radiculopathy  -     HYDROcodone-acetaminophen (NORCO)  mg per tablet; Take 1 tablet by mouth every 6 (six) hours as needed for Pain. More than 7 day supply and Medically Necessary.  Dispense: 120 tablet; Refill: 0  -     HYDROcodone-acetaminophen (NORCO)  mg per tablet; Take 1 tablet by mouth every 6 (six) hours as needed for Pain. More than 7 day supply and Medically Necessary.  Dispense: 110 tablet; Refill: 0  -     Discontinue: HYDROcodone-acetaminophen  (NORCO)  mg per tablet; Take 1 tablet by mouth every 6 (six) hours as needed for Pain. More than 7 day supply and Medically Necessary.  Dispense: 120 tablet; Refill: 0  -     HYDROcodone-acetaminophen (NORCO)  mg per tablet; Take 1 tablet by mouth every 6 (six) hours as needed for Pain. More than 7 day supply and Medically Necessary.  Dispense: 120 tablet; Refill: 0        Patient with cervical myelopathy, with tightness and pain is Right UE, s/p Cervical fusion, here for chronic pain management.     1. Pain management : Gabapentin ,  Will resume Gabapentin 600 mg po QID, and Hydrocodone 10/325 mg PO QID,  Pt is agreeable to decrease pain meds, by 5  Tabs/month, (#115,/ may,  #110/jun, #110/july).     Opioid Risk Score       Value Time User    Opioid Risk Score  5 1/21/2019  9:06 AM Sara Ruiz MD         reviewed and appropriate.  Hydrocodone refill on 01/8/20, 12/21, 11/16, 09/26, 08/27, /4/22, 3/21, 2/21/19.  UDS done on 10/23, and positive for Hydrocodone.  New UDS ordered.     2. Spasticity, will resume Baclofen to 20 mg PO TID.    RTC in 4 months.    Total time spent face to face with patient was 25 minutes.   More than 50% of that time was spent in counseling on diagnosis , prognosis and treatment options.   I also  patient  on common and most usual side effect of prescribed medications. Risk and benefits of opiates, possible risk of developing opiate dependence and tolerance, need of strict compliance with prescribed medications.  Pain contract, rules and obligations were discussed with patient in details.  He is aware that I would be the only doctor prescribing him pain medications and ED in a case of emergency.  I reviewed Primary care , and other specialty's notes to better coordinate patient's  care.   All questions were answered, and patient voiced understanding.

## 2020-02-01 ENCOUNTER — CLINICAL SUPPORT (OUTPATIENT)
Dept: CARDIOLOGY | Facility: HOSPITAL | Age: 70
End: 2020-02-01
Payer: MEDICARE

## 2020-02-01 DIAGNOSIS — Z95.810 PRESENCE OF AUTOMATIC (IMPLANTABLE) CARDIAC DEFIBRILLATOR: ICD-10-CM

## 2020-02-01 PROCEDURE — 93295 DEV INTERROG REMOTE 1/2/MLT: CPT | Mod: ,,, | Performed by: INTERNAL MEDICINE

## 2020-02-01 PROCEDURE — 93296 REM INTERROG EVL PM/IDS: CPT | Performed by: INTERNAL MEDICINE

## 2020-02-01 PROCEDURE — 93295 CARDIAC DEVICE CHECK - REMOTE: ICD-10-PCS | Mod: ,,, | Performed by: INTERNAL MEDICINE

## 2020-03-02 ENCOUNTER — OFFICE VISIT (OUTPATIENT)
Dept: PODIATRY | Facility: CLINIC | Age: 70
End: 2020-03-02
Payer: MEDICARE

## 2020-03-02 VITALS
WEIGHT: 178 LBS | HEART RATE: 51 BPM | SYSTOLIC BLOOD PRESSURE: 122 MMHG | DIASTOLIC BLOOD PRESSURE: 72 MMHG | BODY MASS INDEX: 24.92 KG/M2 | HEIGHT: 71 IN

## 2020-03-02 DIAGNOSIS — R26.9 ABNORMAL GAIT: ICD-10-CM

## 2020-03-02 DIAGNOSIS — M21.371 RIGHT FOOT DROP: ICD-10-CM

## 2020-03-02 DIAGNOSIS — E11.42 TYPE 2 DIABETES MELLITUS WITH POLYNEUROPATHY: Primary | ICD-10-CM

## 2020-03-02 DIAGNOSIS — M62.81 MUSCLE WEAKNESS OF LOWER EXTREMITY: ICD-10-CM

## 2020-03-02 DIAGNOSIS — B35.1 ONYCHOMYCOSIS: ICD-10-CM

## 2020-03-02 PROCEDURE — 99499 UNLISTED E&M SERVICE: CPT | Mod: HCNC,S$GLB,, | Performed by: PODIATRIST

## 2020-03-02 PROCEDURE — 1126F AMNT PAIN NOTED NONE PRSNT: CPT | Mod: HCNC,S$GLB,, | Performed by: PODIATRIST

## 2020-03-02 PROCEDURE — 3074F SYST BP LT 130 MM HG: CPT | Mod: HCNC,CPTII,S$GLB, | Performed by: PODIATRIST

## 2020-03-02 PROCEDURE — 11721 DEBRIDE NAIL 6 OR MORE: CPT | Mod: Q9,HCNC,S$GLB, | Performed by: PODIATRIST

## 2020-03-02 PROCEDURE — 99999 PR PBB SHADOW E&M-EST. PATIENT-LVL III: CPT | Mod: PBBFAC,HCNC,, | Performed by: PODIATRIST

## 2020-03-02 PROCEDURE — 99499 NO LOS: ICD-10-PCS | Mod: HCNC,S$GLB,, | Performed by: PODIATRIST

## 2020-03-02 PROCEDURE — 11721 ROUTINE FOOT CARE: ICD-10-PCS | Mod: Q9,HCNC,S$GLB, | Performed by: PODIATRIST

## 2020-03-02 PROCEDURE — 3044F HG A1C LEVEL LT 7.0%: CPT | Mod: HCNC,CPTII,S$GLB, | Performed by: PODIATRIST

## 2020-03-02 PROCEDURE — 3074F PR MOST RECENT SYSTOLIC BLOOD PRESSURE < 130 MM HG: ICD-10-PCS | Mod: HCNC,CPTII,S$GLB, | Performed by: PODIATRIST

## 2020-03-02 PROCEDURE — 1159F MED LIST DOCD IN RCRD: CPT | Mod: HCNC,S$GLB,, | Performed by: PODIATRIST

## 2020-03-02 PROCEDURE — 99999 PR PBB SHADOW E&M-EST. PATIENT-LVL III: ICD-10-PCS | Mod: PBBFAC,HCNC,, | Performed by: PODIATRIST

## 2020-03-02 PROCEDURE — 3044F PR MOST RECENT HEMOGLOBIN A1C LEVEL <7.0%: ICD-10-PCS | Mod: HCNC,CPTII,S$GLB, | Performed by: PODIATRIST

## 2020-03-02 PROCEDURE — 1101F PT FALLS ASSESS-DOCD LE1/YR: CPT | Mod: HCNC,CPTII,S$GLB, | Performed by: PODIATRIST

## 2020-03-02 PROCEDURE — 3078F PR MOST RECENT DIASTOLIC BLOOD PRESSURE < 80 MM HG: ICD-10-PCS | Mod: HCNC,CPTII,S$GLB, | Performed by: PODIATRIST

## 2020-03-02 PROCEDURE — 1159F PR MEDICATION LIST DOCUMENTED IN MEDICAL RECORD: ICD-10-PCS | Mod: HCNC,S$GLB,, | Performed by: PODIATRIST

## 2020-03-02 PROCEDURE — 1101F PR PT FALLS ASSESS DOC 0-1 FALLS W/OUT INJ PAST YR: ICD-10-PCS | Mod: HCNC,CPTII,S$GLB, | Performed by: PODIATRIST

## 2020-03-02 PROCEDURE — 1126F PR PAIN SEVERITY QUANTIFIED, NO PAIN PRESENT: ICD-10-PCS | Mod: HCNC,S$GLB,, | Performed by: PODIATRIST

## 2020-03-02 PROCEDURE — 3078F DIAST BP <80 MM HG: CPT | Mod: HCNC,CPTII,S$GLB, | Performed by: PODIATRIST

## 2020-03-02 NOTE — PROCEDURES
Routine Foot Care  Date/Time: 3/2/2020 9:45 AM  Performed by: Scott Garcia DPM  Authorized by: Scott Garcia DPM     Consent Done?:  Yes (Verbal)  Hyperkeratotic Skin Lesions?: No      Nail Care Type:  Debride  Location(s): All  (Left 1st Toe, Left 3rd Toe, Left 2nd Toe, Left 4th Toe, Left 5th Toe, Right 1st Toe, Right 2nd Toe, Right 3rd Toe, Right 4th Toe and Right 5th Toe)  Patient tolerance:  Patient tolerated the procedure well with no immediate complications     Used sterile nail nipper.

## 2020-03-02 NOTE — PROGRESS NOTES
Subjective:      Patient ID: Xander Sanchez is a 69 y.o. male.    Chief Complaint: Diabetes Mellitus (Dr. Alvarenga 11/12/19) and Diabetic Foot Exam    Xander is a 69 y.o. male who presents to the clinic for evaluation and treatment of high risk feet. Xander has a past medical history of Asthma, Cardiomyopathy, Cervical radicular pain, Cervical spondylosis with myelopathy (10/17/2012), Chronic bronchitis, Chronic systolic dysfunction of left ventricle (7/27/2015), Constipation (7/27/2015), COPD (chronic obstructive pulmonary disease), CRPS (complex regional pain syndrome type I) (12/29/2014), Diabetes mellitus, type 2, DM type 2 (diabetes mellitus, type 2) (7/27/2015), Enlarged prostate (7/27/2015), Enuresis (7/6/2018), Hypertension, ICD (implantable cardiac defibrillator) in place, Mixed hyperlipidemia (2/28/2018), Presence of biventricular AICD (7/27/2015), 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thy (2/1/2016), and Urinary tract infection.  This patient has documented high risk feet requiring routine maintenance secondary to diabetes mellitis and those secondary complications of diabetes, as mentioned.  Treated per Physical medicine for chronic back pain. Complains that his pain in his feet are mostly at night when he lays down. Taking gabapentin 600 mg po qid for chronic neuropathic pain. No new complaints.    10/31/2019:  Presents routine diabetic foot care.  States that he was prescribed topical creams to be applied to dry scaly painful skin to the right 5th toe a couple months ago which has helped improve his symptoms.  Due to complaints today.  Seen by Milly Leiva NP on 09/19/2019.    3/2/20: Reports worsening right foot drag since November. Otherwise, no other changes. Painful scaly skin has resolved. Presents today for painful long toe nails.    Williams Alvarenga MD  LOV: 11/12/19    Past Medical History:   Diagnosis Date    Asthma     Cardiomyopathy     Cervical radicular pain      Cervical spondylosis with myelopathy 10/17/2012    Chronic bronchitis     Chronic systolic dysfunction of left ventricle 7/27/2015    Constipation 7/27/2015    COPD (chronic obstructive pulmonary disease)     CRPS (complex regional pain syndrome type I) 12/29/2014    Diabetes mellitus, type 2     DM type 2 (diabetes mellitus, type 2) 7/27/2015    Enlarged prostate 7/27/2015    Enuresis 7/6/2018    Hypertension     ICD (implantable cardiac defibrillator) in place     Mixed hyperlipidemia 2/28/2018    Presence of biventricular AICD 7/27/2015    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thy 2/1/2016    Urinary tract infection     pt does not know       Past Surgical History:   Procedure Laterality Date    CARDIAC DEFIBRILLATOR PLACEMENT      CERVICAL SPINE SURGERY      COLONOSCOPY N/A 7/19/2017    Procedure: COLONOSCOPY  golytely;  Surgeon: Vannessa Uribe MD;  Location: [facility];  Service: Endoscopy;  Laterality: N/A;    SPINE SURGERY         Family History   Problem Relation Age of Onset    Hypertension Mother     Hypertension Father     COPD Father     No Known Problems Sister     No Known Problems Brother     No Known Problems Daughter     Prostate cancer Neg Hx     Kidney disease Neg Hx        Social History     Socioeconomic History    Marital status:      Spouse name: Not on file    Number of children: Not on file    Years of education: Not on file    Highest education level: Not on file   Occupational History    Not on file   Social Needs    Financial resource strain: Not on file    Food insecurity:     Worry: Not on file     Inability: Not on file    Transportation needs:     Medical: Not on file     Non-medical: Not on file   Tobacco Use    Smoking status: Former Smoker     Packs/day: 1.00     Years: 40.00     Pack years: 40.00     Types: Cigarettes     Last attempt to quit: 7/26/2009     Years since quitting: 10.6    Smokeless tobacco: Never Used    Substance and Sexual Activity    Alcohol use: Yes     Alcohol/week: 2.0 standard drinks     Types: 1 Shots of liquor, 1 Cans of beer per week     Comment: May 1-2 beers or whiskey per month    Drug use: No    Sexual activity: Yes     Partners: Female   Lifestyle    Physical activity:     Days per week: Not on file     Minutes per session: Not on file    Stress: Not on file   Relationships    Social connections:     Talks on phone: Not on file     Gets together: Not on file     Attends Cheondo[employer] service: Not on file     Active member of club or organization: Not on file     Attends meetings of clubs or organizations: Not on file     Relationship status: Not on file   Other Topics Concern    Not on file   Social History Narrative    Not on file       Current Outpatient Medications   Medication Sig Dispense Refill    ADVAIR -21 mcg/actuation HFAA inhaler INHALE TWO PUFFS BY MOUTH TWICE DAILY 1 Inhaler 0    aspirin (ECOTRIN) 81 MG EC tablet Take 81 mg by mouth once daily.      baclofen (LIORESAL) 20 MG tablet Take 1 tablet (20 mg total) by mouth 3 (three) times daily. 270 tablet 1    clotrimazole (LOTRIMIN) 1 % cream Apply topically 2 (two) times daily. 28 g 0    gabapentin (NEURONTIN) 600 MG tablet Take 1 tablet (600 mg total) by mouth 4 (four) times daily with meals and nightly. 360 tablet 3    hydroCHLOROthiazide (MICROZIDE) 12.5 mg capsule TAKE 1 CAPSULE EVERY DAY 90 capsule 3    HYDROcodone-acetaminophen (NORCO)  mg per tablet Take 1 tablet by mouth every 6 (six) hours as needed for Pain. More than 7 day supply and Medically Necessary. 120 tablet 0    metoprolol succinate (TOPROL-XL) 25 MG 24 hr tablet TAKE 1 TABLET EVERY DAY 90 tablet 3    oxybutynin (DITROPAN) 5 MG Tab TAKE 1 TABLET TWICE DAILY 180 tablet 3    pantoprazole (PROTONIX) 40 MG tablet TAKE 1 TABLET BY MOUTH ONCE DAILY BEFORE BREAKFAST 30 tablet 11    pravastatin (PRAVACHOL) 10 MG tablet TAKE 1 TABLET BY MOUTH ONCE  DAILY AT BEDTIME 90 tablet 3    tamsulosin (FLOMAX) 0.4 mg Cap Take 2 capsules (0.8 mg total) by mouth every evening. 180 capsule 1    TRUE METRIX AIR GLUCOSE METER kit USE AS INSTRUCTED 1 each 0    TRUE METRIX GLUCOSE TEST STRIP Strp TEST  ONE  TIME  DAILY  AT  6AM 100 strip 3    TRUEPLUS LANCETS 33 gauge Misc TEST ONE TIME DAILY  AT  6AM 100 each 3    valsartan (DIOVAN) 40 MG tablet Take 1 tablet (40 mg total) by mouth once daily. 90 tablet 3    [START ON 3/17/2020] HYDROcodone-acetaminophen (NORCO)  mg per tablet Take 1 tablet by mouth every 6 (six) hours as needed for Pain. More than 7 day supply and Medically Necessary. (Patient not taking: Reported on 3/2/2020) 110 tablet 0    [START ON 4/17/2020] HYDROcodone-acetaminophen (NORCO)  mg per tablet Take 1 tablet by mouth every 6 (six) hours as needed for Pain. More than 7 day supply and Medically Necessary. (Patient not taking: Reported on 3/2/2020) 120 tablet 0     No current facility-administered medications for this visit.        Review of patient's allergies indicates:  No Known Allergies    PCP: Williams Alvarenga MD    Date Last Seen by PCP:     Current shoe gear:  Affected Foot: Casual shoes     Unaffected Foot: Casual shoes    Hemoglobin A1C   Date Value Ref Range Status   11/12/2019 6.2 (H) 4.0 - 5.6 % Final     Comment:     ADA Screening Guidelines:  5.7-6.4%  Consistent with prediabetes  >or=6.5%  Consistent with diabetes  High levels of fetal hemoglobin interfere with the HbA1C  assay. Heterozygous hemoglobin variants (HbS, HgC, etc)do  not significantly interfere with this assay.   However, presence of multiple variants may affect accuracy.     04/24/2019 6.5 (H) 4.0 - 5.6 % Final     Comment:     ADA Screening Guidelines:  5.7-6.4%  Consistent with prediabetes  >or=6.5%  Consistent with diabetes  High levels of fetal hemoglobin interfere with the HbA1C  assay. Heterozygous hemoglobin variants (HbS, HgC, etc)do  not  significantly interfere with this assay.   However, presence of multiple variants may affect accuracy.     10/25/2018 6.2 (H) 4.0 - 5.6 % Final     Comment:     ADA Screening Guidelines:  5.7-6.4%  Consistent with prediabetes  >or=6.5%  Consistent with diabetes  High levels of fetal hemoglobin interfere with the HbA1C  assay. Heterozygous hemoglobin variants (HbS, HgC, etc)do  not significantly interfere with this assay.   However, presence of multiple variants may affect accuracy.         Review of Systems   Constitution: Negative for chills and fever.   HENT: Negative for hearing loss.    Cardiovascular: Negative for chest pain and claudication.   Respiratory: Negative for shortness of breath.    Skin: Positive for color change and nail changes. Negative for itching.   Musculoskeletal: Positive for back pain and muscle weakness. Negative for falls and joint pain.        Abnormal gait with dragging right foot   Gastrointestinal: Negative for nausea and vomiting.   Neurological: Positive for focal weakness and numbness. Negative for loss of balance and paresthesias.           Objective:      Physical Exam   Constitutional: He is oriented to person, place, and time. He appears well-nourished. No distress.   Cardiovascular: Intact distal pulses.   Pulses:       Dorsalis pedis pulses are 2+ on the right side, and 2+ on the left side.        Posterior tibial pulses are 0 on the right side, and 0 on the left side.   CRT < 3 seconds to digits 1-5 bilateral, mild to moderate edema of bilateral lower extremity with varicosities.   Musculoskeletal:        Right foot: There is decreased range of motion. There is no deformity.        Left foot: There is decreased range of motion. There is no deformity.   Patient has decreased dorsiflexion of the right foot. AT intact.    Pes planovalgus bilateral foot.    No pain on palpation bilateral foot.    Gait examination reveals overall slight trendelenberg gait with high steppage  foot.     Feet:   Right Foot:   Protective Sensation: 7 sites tested. 2 sites sensed.   Skin Integrity: Positive for dry skin. Negative for ulcer, skin breakdown, erythema, warmth or callus.   Left Foot:   Protective Sensation: 7 sites tested. 5 sites sensed.   Skin Integrity: Positive for dry skin. Negative for ulcer, skin breakdown, erythema, warmth or callus.   Neurological: He is alert and oriented to person, place, and time. A sensory deficit is present.   (-) Tinel's sign  Light touch intact.  MMT 5/5 DF, PF, Inv, Ev L foot. R foot with 4/5 DF otherwise 5/5 for all other compartments.   Normal muscle tone.  No signs of atrophy.  No tremor or spasticity.     Skin: Skin is warm, dry and intact. Capillary refill takes less than 2 seconds. No ecchymosis, no lesion, no petechiae and no rash noted. He is not diaphoretic. No cyanosis or erythema. No pallor. Nails show no clubbing.   Skin is dry and flaky plantar foot bilateral improved from previous visit.    Nails 1-5 bilateral are thickened 3-4 mm, slightly yellow with debris, loosened and elongated 4-5 mm.     Edema B/L LE 2+    No open lesions or macerations bilateral lower extremity.               Assessment:       Encounter Diagnoses   Name Primary?    Type 2 diabetes mellitus with polyneuropathy Yes    Right foot drop     Muscle weakness of lower extremity     Abnormal gait     Onychomycosis          Plan:       Xander was seen today for diabetes mellitus and diabetic foot exam.    Diagnoses and all orders for this visit:    Type 2 diabetes mellitus with polyneuropathy  -     ANKLE FOOT ORTHOSIS FOR HOME USE  -     Routine Foot Care    Right foot drop  -     ANKLE FOOT ORTHOSIS FOR HOME USE    Muscle weakness of lower extremity  -     ANKLE FOOT ORTHOSIS FOR HOME USE    Abnormal gait  -     ANKLE FOOT ORTHOSIS FOR HOME USE    Onychomycosis  -     Routine Foot Care      I counseled the patient on his conditions, their implications and medical  management.    Shoe inspection. Diabetic Foot Education. Patient reminded of the importance of good nutrition and blood sugar control to help prevent podiatric complications of diabetes. Patient instructed on proper foot hygeine. We discussed wearing proper shoe gear, daily foot inspections, never walking without protective shoe gear, never putting sharp instruments to feet    Routine foot care per attached note.    Rx for AFO RLE to wear as needed for his foot drop.    RTC 3-4 months or sooner as needed.    Jozef Gongora DPM, PGY-2    I have personally taken the history and examined this patient and agree with the resident's note as stated as above.   Scott Garcia DPM, FACFAS

## 2020-03-04 ENCOUNTER — TELEPHONE (OUTPATIENT)
Dept: ELECTROPHYSIOLOGY | Facility: CLINIC | Age: 70
End: 2020-03-04

## 2020-03-23 NOTE — TELEPHONE ENCOUNTER
----- Message from Nithya Agudelo sent at 10/28/2019  8:29 AM CDT -----  Contact: pt   Rx Refill/Request     Is this a Refill or New Rx:  Refill  Rx Name and Strength:  HYDROcodone-acetaminophen (NORCO)  mg per tablet  Preferred Pharmacy with phone number: pt goes to [facility]   Communication Preference: can you please call pt at [phone]  Additional Information:  Pt is out of his medication pt will be out tomorrow pt said he has two left     BILLIE    [facility]rium protocol  supportive  care

## 2020-04-12 DIAGNOSIS — I10 ESSENTIAL HYPERTENSION: ICD-10-CM

## 2020-04-12 DIAGNOSIS — E11.40 TYPE 2 DIABETES MELLITUS WITH DIABETIC NEUROPATHY, WITHOUT LONG-TERM CURRENT USE OF INSULIN: ICD-10-CM

## 2020-04-13 RX ORDER — VALSARTAN 40 MG/1
40 TABLET ORAL DAILY
Qty: 90 TABLET | Refills: 3 | Status: SHIPPED | OUTPATIENT
Start: 2020-04-13 | End: 2021-03-21

## 2020-04-17 RX ORDER — FLUTICASONE PROPIONATE AND SALMETEROL XINAFOATE 230; 21 UG/1; UG/1
2 AEROSOL, METERED RESPIRATORY (INHALATION) 2 TIMES DAILY
Qty: 1 INHALER | Refills: 0 | Status: SHIPPED | OUTPATIENT
Start: 2020-04-17 | End: 2020-04-23 | Stop reason: SDUPTHER

## 2020-04-23 RX ORDER — FLUTICASONE PROPIONATE AND SALMETEROL XINAFOATE 230; 21 UG/1; UG/1
2 AEROSOL, METERED RESPIRATORY (INHALATION) 2 TIMES DAILY
Qty: 2 INHALER | Refills: 1 | Status: SHIPPED | OUTPATIENT
Start: 2020-04-23 | End: 2020-06-30 | Stop reason: SDUPTHER

## 2020-04-23 NOTE — TELEPHONE ENCOUNTER
----- Message from Celine Flores sent at 4/23/2020  3:52 PM CDT -----  Contact: Patient   Name of caller:  Xander Sanchez contact Number/ Confirm if MyChart Preferred: [phone]    Refill: fluticasone-salmeterol 230-21 mcg/dose (ADVAIR HFA) 230-21 mcg/actuation HFAA inhaler    Pharmacy: [facility] [address] [place] [id number] [facility] [place] [address]  Phone: [phone] Fax: [phone]        Add Info: Patient said he called since Friday and following up

## 2020-05-01 ENCOUNTER — CLINICAL SUPPORT (OUTPATIENT)
Dept: CARDIOLOGY | Facility: HOSPITAL | Age: 70
End: 2020-05-01
Attending: FAMILY MEDICINE
Payer: MEDICARE

## 2020-05-01 PROCEDURE — 93296 REM INTERROG EVL PM/IDS: CPT | Mod: HCNC | Performed by: INTERNAL MEDICINE

## 2020-05-01 PROCEDURE — 93295 CARDIAC DEVICE CHECK - REMOTE: ICD-10-PCS | Mod: HCNC,,, | Performed by: INTERNAL MEDICINE

## 2020-05-01 PROCEDURE — 93295 DEV INTERROG REMOTE 1/2/MLT: CPT | Mod: HCNC,,, | Performed by: INTERNAL MEDICINE

## 2020-05-08 DIAGNOSIS — E11.9 TYPE 2 DIABETES MELLITUS WITHOUT COMPLICATION: ICD-10-CM

## 2020-05-13 DIAGNOSIS — G90.50 COMPLEX REGIONAL PAIN SYNDROME TYPE 1, AFFECTING UNSPECIFIED SITE: ICD-10-CM

## 2020-05-13 DIAGNOSIS — G56.41 COMPLEX REGIONAL PAIN SYNDROME TYPE 2 OF RIGHT UPPER EXTREMITY: ICD-10-CM

## 2020-05-13 DIAGNOSIS — M75.101 ROTATOR CUFF SYNDROME OF RIGHT SHOULDER: ICD-10-CM

## 2020-05-13 DIAGNOSIS — R29.898 RIGHT ARM WEAKNESS: ICD-10-CM

## 2020-05-13 DIAGNOSIS — M96.1 CERVICAL POST-LAMINECTOMY SYNDROME: ICD-10-CM

## 2020-05-13 DIAGNOSIS — M54.12 CERVICAL RADICULOPATHY: ICD-10-CM

## 2020-05-13 DIAGNOSIS — M54.12 CERVICAL RADICULAR PAIN: ICD-10-CM

## 2020-05-13 DIAGNOSIS — M54.12 BRACHIAL NEURITIS OR RADICULITIS: ICD-10-CM

## 2020-05-13 DIAGNOSIS — M47.812 FACET ARTHRITIS OF CERVICAL REGION: ICD-10-CM

## 2020-05-13 DIAGNOSIS — M62.81 MUSCLE RIGHT ARM WEAKNESS: ICD-10-CM

## 2020-05-13 DIAGNOSIS — M48.02 SPINAL STENOSIS IN CERVICAL REGION: ICD-10-CM

## 2020-05-13 DIAGNOSIS — G89.4 CHRONIC PAIN SYNDROME: ICD-10-CM

## 2020-05-13 DIAGNOSIS — M48.02 CERVICAL STENOSIS OF SPINE: ICD-10-CM

## 2020-05-13 DIAGNOSIS — M50.00 HNP (HERNIATED NUCLEUS PULPOSUS) WITH MYELOPATHY, CERVICAL: ICD-10-CM

## 2020-05-13 DIAGNOSIS — F11.20 NARCOTIC DEPENDENCY, CONTINUOUS: ICD-10-CM

## 2020-05-13 DIAGNOSIS — M50.30 DEGENERATION OF CERVICAL INTERVERTEBRAL DISC: ICD-10-CM

## 2020-05-13 DIAGNOSIS — R29.898 RIGHT HAND WEAKNESS: ICD-10-CM

## 2020-05-13 DIAGNOSIS — M47.12 CERVICAL SPONDYLOSIS WITH MYELOPATHY: ICD-10-CM

## 2020-05-13 DIAGNOSIS — M79.601 RIGHT ARM PAIN: ICD-10-CM

## 2020-05-13 NOTE — TELEPHONE ENCOUNTER
----- Message from Emre Recinos sent at 5/13/2020  9:19 AM CDT -----  Contact: Patient   Rx Refill/Request     Is this a Refill or New Rx: Yes    Rx Name and Strength:  HYDROcodone-acetaminophen (NORCO)  mg per tablet  Preferred Pharmacy with phone number:     [id number] [place], [place] - [address] AT [facility] [address]  [facility] [address]  Phone: [phone] Fax: [phone]

## 2020-05-16 RX ORDER — HYDROCODONE BITARTRATE AND ACETAMINOPHEN 10; 325 MG/1; MG/1
1 TABLET ORAL EVERY 6 HOURS PRN
Qty: 120 TABLET | Refills: 0 | Status: SHIPPED | OUTPATIENT
Start: 2020-05-17 | End: 2020-06-17 | Stop reason: SDUPTHER

## 2020-05-19 ENCOUNTER — TELEPHONE (OUTPATIENT)
Dept: ELECTROPHYSIOLOGY | Facility: CLINIC | Age: 70
End: 2020-05-19

## 2020-05-19 NOTE — TELEPHONE ENCOUNTER
Spoke with patient concerning appointment with VERONICA Whatley. Wants a physical clinic visit as scheduled. Aware of visitor policy. Denies symptoms of covid at this time.

## 2020-05-22 ENCOUNTER — TELEPHONE (OUTPATIENT)
Dept: ELECTROPHYSIOLOGY | Facility: CLINIC | Age: 70
End: 2020-05-22

## 2020-05-22 DIAGNOSIS — E11.9 TYPE 2 DIABETES MELLITUS WITHOUT COMPLICATION: ICD-10-CM

## 2020-05-22 NOTE — TELEPHONE ENCOUNTER
Spoke with pt's wife and l/m for pt informing them of his device check appt that we added at 8 before his f/u appt with JF on 5/27/2020.

## 2020-05-25 ENCOUNTER — PATIENT OUTREACH (OUTPATIENT)
Dept: ADMINISTRATIVE | Facility: OTHER | Age: 70
End: 2020-05-25

## 2020-05-25 DIAGNOSIS — E11.40 TYPE 2 DIABETES MELLITUS WITH DIABETIC NEUROPATHY, UNSPECIFIED WHETHER LONG TERM INSULIN USE: Primary | ICD-10-CM

## 2020-05-25 NOTE — PROGRESS NOTES
Chart reviewed.   Immunizations: updated  Orders placed: Diabetic eye cam  Upcoming appts to satisfy LESLEE topics: n/a

## 2020-05-27 ENCOUNTER — CLINICAL SUPPORT (OUTPATIENT)
Dept: CARDIOLOGY | Facility: HOSPITAL | Age: 70
End: 2020-05-27
Attending: INTERNAL MEDICINE
Payer: MEDICARE

## 2020-05-27 ENCOUNTER — HOSPITAL ENCOUNTER (OUTPATIENT)
Dept: CARDIOLOGY | Facility: CLINIC | Age: 70
Discharge: HOME | End: 2020-05-27
Payer: MEDICARE

## 2020-05-27 ENCOUNTER — OFFICE VISIT (OUTPATIENT)
Dept: ELECTROPHYSIOLOGY | Facility: CLINIC | Age: 70
End: 2020-05-27
Payer: MEDICARE

## 2020-05-27 VITALS
WEIGHT: 177.69 LBS | SYSTOLIC BLOOD PRESSURE: 120 MMHG | DIASTOLIC BLOOD PRESSURE: 78 MMHG | HEIGHT: 71 IN | BODY MASS INDEX: 24.88 KG/M2 | HEART RATE: 51 BPM

## 2020-05-27 DIAGNOSIS — I42.8 CARDIOMYOPATHY, NONISCHEMIC: ICD-10-CM

## 2020-05-27 DIAGNOSIS — Z95.810 CARDIAC DEFIBRILLATOR IN PLACE: ICD-10-CM

## 2020-05-27 DIAGNOSIS — I10 ESSENTIAL HYPERTENSION: ICD-10-CM

## 2020-05-27 DIAGNOSIS — Z95.810 BIVENTRICULAR IMPLANTABLE CARDIOVERTER-DEFIBRILLATOR (ICD) IN SITU: Primary | ICD-10-CM

## 2020-05-27 DIAGNOSIS — I44.7 LBBB (LEFT BUNDLE BRANCH BLOCK): ICD-10-CM

## 2020-05-27 PROCEDURE — 1126F PR PAIN SEVERITY QUANTIFIED, NO PAIN PRESENT: ICD-10-PCS | Mod: HCNC,S$GLB,, | Performed by: NURSE PRACTITIONER

## 2020-05-27 PROCEDURE — 99999 PR PBB SHADOW E&M-EST. PATIENT-LVL III: ICD-10-PCS | Mod: PBBFAC,HCNC,, | Performed by: NURSE PRACTITIONER

## 2020-05-27 PROCEDURE — 3078F DIAST BP <80 MM HG: CPT | Mod: HCNC,CPTII,S$GLB, | Performed by: NURSE PRACTITIONER

## 2020-05-27 PROCEDURE — 93283 PRGRMG EVAL IMPLANTABLE DFB: CPT | Mod: 26,HCNC,, | Performed by: INTERNAL MEDICINE

## 2020-05-27 PROCEDURE — 3074F SYST BP LT 130 MM HG: CPT | Mod: HCNC,CPTII,S$GLB, | Performed by: NURSE PRACTITIONER

## 2020-05-27 PROCEDURE — 93005 RHYTHM STRIP: ICD-10-PCS | Mod: HCNC,S$GLB,, | Performed by: INTERNAL MEDICINE

## 2020-05-27 PROCEDURE — 3078F PR MOST RECENT DIASTOLIC BLOOD PRESSURE < 80 MM HG: ICD-10-PCS | Mod: HCNC,CPTII,S$GLB, | Performed by: NURSE PRACTITIONER

## 2020-05-27 PROCEDURE — 99214 OFFICE O/P EST MOD 30 MIN: CPT | Mod: HCNC,S$GLB,, | Performed by: NURSE PRACTITIONER

## 2020-05-27 PROCEDURE — 93283 CARDIAC DEVICE CHECK - IN CLINIC & HOSPITAL: ICD-10-PCS | Mod: 26,HCNC,, | Performed by: INTERNAL MEDICINE

## 2020-05-27 PROCEDURE — 1159F MED LIST DOCD IN RCRD: CPT | Mod: HCNC,S$GLB,, | Performed by: NURSE PRACTITIONER

## 2020-05-27 PROCEDURE — 99499 RISK ADDL DX/OHS AUDIT: ICD-10-PCS | Mod: HCNC,S$GLB,, | Performed by: NURSE PRACTITIONER

## 2020-05-27 PROCEDURE — 99999 PR PBB SHADOW E&M-EST. PATIENT-LVL III: CPT | Mod: PBBFAC,HCNC,, | Performed by: NURSE PRACTITIONER

## 2020-05-27 PROCEDURE — 1126F AMNT PAIN NOTED NONE PRSNT: CPT | Mod: HCNC,S$GLB,, | Performed by: NURSE PRACTITIONER

## 2020-05-27 PROCEDURE — 99499 UNLISTED E&M SERVICE: CPT | Mod: HCNC,S$GLB,, | Performed by: NURSE PRACTITIONER

## 2020-05-27 PROCEDURE — 99214 PR OFFICE/OUTPT VISIT, EST, LEVL IV, 30-39 MIN: ICD-10-PCS | Mod: HCNC,S$GLB,, | Performed by: NURSE PRACTITIONER

## 2020-05-27 PROCEDURE — 93283 PRGRMG EVAL IMPLANTABLE DFB: CPT | Mod: HCNC

## 2020-05-27 PROCEDURE — 93005 ELECTROCARDIOGRAM TRACING: CPT | Mod: HCNC,S$GLB,, | Performed by: INTERNAL MEDICINE

## 2020-05-27 PROCEDURE — 1101F PT FALLS ASSESS-DOCD LE1/YR: CPT | Mod: HCNC,CPTII,S$GLB, | Performed by: NURSE PRACTITIONER

## 2020-05-27 PROCEDURE — 93010 RHYTHM STRIP: ICD-10-PCS | Mod: HCNC,S$GLB,, | Performed by: INTERNAL MEDICINE

## 2020-05-27 PROCEDURE — 3074F PR MOST RECENT SYSTOLIC BLOOD PRESSURE < 130 MM HG: ICD-10-PCS | Mod: HCNC,CPTII,S$GLB, | Performed by: NURSE PRACTITIONER

## 2020-05-27 PROCEDURE — 1159F PR MEDICATION LIST DOCUMENTED IN MEDICAL RECORD: ICD-10-PCS | Mod: HCNC,S$GLB,, | Performed by: NURSE PRACTITIONER

## 2020-05-27 PROCEDURE — 1101F PR PT FALLS ASSESS DOC 0-1 FALLS W/OUT INJ PAST YR: ICD-10-PCS | Mod: HCNC,CPTII,S$GLB, | Performed by: NURSE PRACTITIONER

## 2020-05-27 PROCEDURE — 93010 ELECTROCARDIOGRAM REPORT: CPT | Mod: HCNC,S$GLB,, | Performed by: INTERNAL MEDICINE

## 2020-05-27 NOTE — PROGRESS NOTES
"Mr. Sanchez is a patient of Dr. Navarro and was last seen in clinic 3/14/2019.      Subjective:   Patient ID:  Xander Sanchez is a 70 y.o. male who presents for follow-up of Cardiomyopathy  .     HPI:    Mr. Sanchez is a 70 y.o. male with NICM (EF now 40%), LBBB, COPD, CRT-D (LV lead off) here for annual follow up.     Background:    He was diagnosed with NICM with a LBBB at [facility] in approx ~2005, [facility] approx 5-6 years ago per patient at  that was "no blockages"  A single chamber ICD implanted for primary prevention, since he has been followed here at [facility] his LBBB resolved and NICM normalized.   He was scheduled for a generator change, noted to be in LBBB again ( with correlative NYHA III symptoms ) and fluoroscopy of the RV (Riata) revealed lead externalization but a patent left sided venous system. Echo revealed EF 25%.  3/24/15 Extraction of ICD lead and implantation of CRT-D (Dr. Mensah). Paucity of LV vein targets, has high LV threshold.  Did not derive symptomatic benefit from upgrade, and EF remained unchanged at 30%.  We turned off LV lead 11/16 to conserve battery.    Update (05/27/2020):    Today he feels well. No recent illnesses or hospitalizations. Mr. Sanchez denies chest pain with exertion or at rest, palpitations, SOB, MURDOCK, dizziness, or syncope.    Device Interrogation (5/27/2020) reveals an intrinsic SB/NSR with stable lead and device function. Variable R waves with stable trend. No arrhythmias or treated episodes were noted.  He paces 8.5% in the RA and <1% in the RV. LV lead off. Estimated battery longevity 2.5 years.     I have personally reviewed the patient's EKG today, which shows SB at 51bpm. SC interval is 148. QRS is 102. QT is 428.    Recent Cardiac Tests:    2D Echo (2/15/2018):  CONCLUSIONS     1 - Mildly to moderately depressed left ventricular systolic function (EF 40%).     2 - No wall motion abnormalities.     3 - Impaired LV relaxation, normal LAP (grade 1 diastolic " dysfunction).     4 - Normal right ventricular systolic function .     5 - The estimated PA systolic pressure is greater than 36 mmHg.     6 - Mild mitral regurgitation.     7 - Mild tricuspid regurgitation.     Current Outpatient Medications   Medication Sig    aspirin (ECOTRIN) 81 MG EC tablet Take 81 mg by mouth once daily.    baclofen (LIORESAL) 20 MG tablet Take 1 tablet (20 mg total) by mouth 3 (three) times daily.    clotrimazole (LOTRIMIN) 1 % cream Apply topically 2 (two) times daily.    fluticasone-salmeterol 230-21 mcg/dose (ADVAIR HFA) 230-21 mcg/actuation HFAA inhaler Inhale 2 puffs into the lungs 2 (two) times daily. Controller    gabapentin (NEURONTIN) 600 MG tablet Take 1 tablet (600 mg total) by mouth 4 (four) times daily with meals and nightly.    hydroCHLOROthiazide (MICROZIDE) 12.5 mg capsule TAKE 1 CAPSULE EVERY DAY    HYDROcodone-acetaminophen (NORCO)  mg per tablet Take 1 tablet by mouth every 6 (six) hours as needed for Pain. More than 7 day supply and Medically Necessary.    metoprolol succinate (TOPROL-XL) 25 MG 24 hr tablet TAKE 1 TABLET EVERY DAY    oxybutynin (DITROPAN) 5 MG Tab TAKE 1 TABLET TWICE DAILY    pantoprazole (PROTONIX) 40 MG tablet TAKE 1 TABLET BY MOUTH ONCE DAILY BEFORE BREAKFAST    pravastatin (PRAVACHOL) 10 MG tablet TAKE 1 TABLET BY MOUTH ONCE DAILY AT BEDTIME    tamsulosin (FLOMAX) 0.4 mg Cap Take 2 capsules (0.8 mg total) by mouth every evening.    valsartan (DIOVAN) 40 MG tablet TAKE 1 TABLET (40 MG TOTAL) BY MOUTH ONCE DAILY.    TRUE METRIX AIR GLUCOSE METER kit USE AS INSTRUCTED    TRUE METRIX GLUCOSE TEST STRIP Strp TEST  ONE  TIME  DAILY  AT  6AM    TRUEPLUS LANCETS 33 gauge Misc TEST ONE TIME DAILY  AT  6AM     No current facility-administered medications for this visit.        Review of Systems   Constitution: Negative for malaise/fatigue.   Cardiovascular: Negative for chest pain, dyspnea on exertion, irregular heartbeat, leg swelling and  "palpitations.   Respiratory: Negative for shortness of breath.    Hematologic/Lymphatic: Negative for bleeding problem.   Skin: Negative for rash.   Musculoskeletal: Negative for myalgias.   Gastrointestinal: Negative for hematemesis, hematochezia and nausea.   Genitourinary: Negative for hematuria.   Neurological: Negative for light-headedness.   Psychiatric/Behavioral: Negative for altered mental status.   Allergic/Immunologic: Negative for persistent infections.         Objective:          /78   Pulse (!) 51   Ht 5' 11" (1.803 m)   Wt 80.6 kg (177 lb 11.1 oz)   BMI 24.78 kg/m²     Physical Exam   Constitutional: He is oriented to person, place, and time. He appears well-developed and well-nourished.   HENT:   Head: Normocephalic.   Nose: Nose normal.   Eyes: Pupils are equal, round, and reactive to light.   Cardiovascular: Normal rate, regular rhythm, S1 normal and S2 normal.   No murmur heard.  Pulses:       Radial pulses are 2+ on the right side, and 2+ on the left side.   Pulmonary/Chest: Breath sounds normal. No respiratory distress.   Device to LUCW. Keloid. Pocket in good repair.   Abdominal: Normal appearance.   Musculoskeletal: Normal range of motion. He exhibits no edema.   Neurological: He is alert and oriented to person, place, and time.   Skin: Skin is warm and dry. No erythema.   Psychiatric: He has a normal mood and affect. His speech is normal and behavior is normal.   Nursing note and vitals reviewed.    Lab Results   Component Value Date     11/12/2019    K 3.9 11/12/2019    MG 2.2 04/08/2013    BUN 11 11/12/2019    CREATININE 0.8 11/12/2019    ALT 25 11/12/2019    AST 24 11/12/2019    HGB 14.3 04/24/2019    HCT 44.7 04/24/2019    HCT 31 (L) 08/10/2015    TSH 2.910 04/24/2019    LDLCALC 57.4 (L) 04/24/2019           Assessment:     1. Biventricular implantable cardioverter-defibrillator (ICD) in situ    2. Cardiomyopathy, nonischemic    3. Essential hypertension    4. LBBB (left " bundle branch block)      Plan:     In summary, Mr. Sanchez is a 70 y.o. male with NICM (EF now 40%), LBBB, COPD, CRT-D (LV lead off) here for annual follow up.   Mr. Sanchez is doing well from a device perspective with stable lead and device function. No arrhythmia noted. No significant RV pacing. He feels well.    Continue current medication regimen and device settings.   Follow up in device clinic as scheduled.   Follow up in EP clinic in 1 year, sooner as needed.     *A copy of this note has been sent to Dr. Navarro*    Follow up in about 1 year (around 5/27/2021).    ------------------------------------------------------------------    RACHEL Grace, NP-C  Cardiac Electrophysiology

## 2020-06-02 ENCOUNTER — PATIENT OUTREACH (OUTPATIENT)
Dept: ADMINISTRATIVE | Facility: OTHER | Age: 70
End: 2020-06-02

## 2020-06-05 ENCOUNTER — OFFICE VISIT (OUTPATIENT)
Dept: PODIATRY | Facility: CLINIC | Age: 70
End: 2020-06-05
Payer: MEDICARE

## 2020-06-05 VITALS
DIASTOLIC BLOOD PRESSURE: 81 MMHG | SYSTOLIC BLOOD PRESSURE: 133 MMHG | HEIGHT: 71 IN | WEIGHT: 177.69 LBS | HEART RATE: 64 BPM | BODY MASS INDEX: 24.88 KG/M2

## 2020-06-05 DIAGNOSIS — B35.1 ONYCHOMYCOSIS: ICD-10-CM

## 2020-06-05 DIAGNOSIS — E11.42 TYPE 2 DIABETES MELLITUS WITH POLYNEUROPATHY: Primary | ICD-10-CM

## 2020-06-05 PROCEDURE — 99499 RISK ADDL DX/OHS AUDIT: ICD-10-PCS | Mod: HCNC,S$GLB,, | Performed by: PODIATRIST

## 2020-06-05 PROCEDURE — 99999 PR PBB SHADOW E&M-EST. PATIENT-LVL III: CPT | Mod: PBBFAC,HCNC,, | Performed by: PODIATRIST

## 2020-06-05 PROCEDURE — 11721 DEBRIDE NAIL 6 OR MORE: CPT | Mod: Q9,HCNC,S$GLB, | Performed by: PODIATRIST

## 2020-06-05 PROCEDURE — 11721 ROUTINE FOOT CARE: ICD-10-PCS | Mod: Q9,HCNC,S$GLB, | Performed by: PODIATRIST

## 2020-06-05 PROCEDURE — 99999 PR PBB SHADOW E&M-EST. PATIENT-LVL III: ICD-10-PCS | Mod: PBBFAC,HCNC,, | Performed by: PODIATRIST

## 2020-06-05 PROCEDURE — 99499 UNLISTED E&M SERVICE: CPT | Mod: HCNC,S$GLB,, | Performed by: PODIATRIST

## 2020-06-05 RX ORDER — AMOXICILLIN 500 MG/1
CAPSULE ORAL
COMMUNITY
Start: 2020-05-28 | End: 2020-07-31

## 2020-06-05 RX ORDER — IBUPROFEN 800 MG/1
800 TABLET ORAL
COMMUNITY
Start: 2020-05-28 | End: 2020-11-05

## 2020-06-05 NOTE — PROGRESS NOTES
Subjective:      Patient ID: Xander Sanchez is a 70 y.o. male.    Chief Complaint: Diabetes Mellitus (office visit with PCP on 1/10/2020) and Nail Care    Xander is a 70 y.o. male who presents to the clinic for evaluation and treatment of high risk feet. Xander has a past medical history of Asthma, Cardiomyopathy, Cervical radicular pain, Cervical spondylosis with myelopathy (10/17/2012), Chronic bronchitis, Chronic systolic dysfunction of left ventricle (7/27/2015), Constipation (7/27/2015), COPD (chronic obstructive pulmonary disease), CRPS (complex regional pain syndrome type I) (12/29/2014), Diabetes mellitus, type 2, DM type 2 (diabetes mellitus, type 2) (7/27/2015), Enlarged prostate (7/27/2015), Enuresis (7/6/2018), Hypertension, ICD (implantable cardiac defibrillator) in place, Mixed hyperlipidemia (2/28/2018), Presence of biventricular AICD (7/27/2015), 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thy (2/1/2016), and Urinary tract infection.  This patient has documented high risk feet requiring routine maintenance secondary to diabetes mellitis and those secondary complications of diabetes, as mentioned.  Treated per Physical medicine for chronic back pain. Complains that his pain in his feet are mostly at night when he lays down. Taking gabapentin 600 mg po qid for chronic neuropathic pain. No new complaints.    10/31/2019:  Presents routine diabetic foot care.  States that he was prescribed topical creams to be applied to dry scaly painful skin to the right 5th toe a couple months ago which has helped improve his symptoms.  Due to complaints today.  Seen by Milly Leiva NP on 09/19/2019.    3/2/20: Reports worsening right foot drag since November. Otherwise, no other changes. Painful scaly skin has resolved. Presents today for painful long toe nails.    6/5/2020: Presents for diabetic foot care. No new complaints.    Williams Alvarenga MD  LOV: 1/10/2020    Past Medical History:   Diagnosis Date     Asthma     Cardiomyopathy     Cervical radicular pain     Cervical spondylosis with myelopathy 10/17/2012    Chronic bronchitis     Chronic systolic dysfunction of left ventricle 7/27/2015    Constipation 7/27/2015    COPD (chronic obstructive pulmonary disease)     CRPS (complex regional pain syndrome type I) 12/29/2014    Diabetes mellitus, type 2     DM type 2 (diabetes mellitus, type 2) 7/27/2015    Enlarged prostate 7/27/2015    Enuresis 7/6/2018    Hypertension     ICD (implantable cardiac defibrillator) in place     Mixed hyperlipidemia 2/28/2018    Presence of biventricular AICD 7/27/2015    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thy 2/1/2016    Urinary tract infection     pt does not know       Past Surgical History:   Procedure Laterality Date    CARDIAC DEFIBRILLATOR PLACEMENT      CERVICAL SPINE SURGERY      COLONOSCOPY N/A 7/19/2017    Procedure: COLONOSCOPY  golytely;  Surgeon: Vannessa Uribe MD;  Location: [facility];  Service: Endoscopy;  Laterality: N/A;    SPINE SURGERY         Family History   Problem Relation Age of Onset    Hypertension Mother     Hypertension Father     COPD Father     No Known Problems Sister     No Known Problems Brother     No Known Problems Daughter     Prostate cancer Neg Hx     Kidney disease Neg Hx        Social History     Socioeconomic History    Marital status:      Spouse name: Not on file    Number of children: Not on file    Years of education: Not on file    Highest education level: Not on file   Occupational History    Not on file   Social Needs    Financial resource strain: Not on file    Food insecurity:     Worry: Not on file     Inability: Not on file    Transportation needs:     Medical: Not on file     Non-medical: Not on file   Tobacco Use    Smoking status: Former Smoker     Packs/day: 1.00     Years: 40.00     Pack years: 40.00     Types: Cigarettes     Last attempt to quit: 7/26/2009      Years since quitting: 10.8    Smokeless tobacco: Never Used   Substance and Sexual Activity    Alcohol use: Yes     Alcohol/week: 2.0 standard drinks     Types: 1 Shots of liquor, 1 Cans of beer per week     Comment: May 1-2 beers or whiskey per month    Drug use: No    Sexual activity: Yes     Partners: Female   Lifestyle    Physical activity:     Days per week: Not on file     Minutes per session: Not on file    Stress: Not on file   Relationships    Social connections:     Talks on phone: Not on file     Gets together: Not on file     Attends Orthodox service: Not on file     Active member of club or organization: Not on file     Attends meetings of clubs or organizations: Not on file     Relationship status: Not on file   Other Topics Concern    Not on file   Social History Narrative    Not on file       Current Outpatient Medications   Medication Sig Dispense Refill    aspirin (ECOTRIN) 81 MG EC tablet Take 81 mg by mouth once daily.      baclofen (LIORESAL) 20 MG tablet Take 1 tablet (20 mg total) by mouth 3 (three) times daily. 270 tablet 1    clotrimazole (LOTRIMIN) 1 % cream Apply topically 2 (two) times daily. 28 g 0    fluticasone-salmeterol 230-21 mcg/dose (ADVAIR HFA) 230-21 mcg/actuation HFAA inhaler Inhale 2 puffs into the lungs 2 (two) times daily. Controller 2 Inhaler 1    hydroCHLOROthiazide (MICROZIDE) 12.5 mg capsule TAKE 1 CAPSULE EVERY DAY 90 capsule 3    HYDROcodone-acetaminophen (NORCO)  mg per tablet Take 1 tablet by mouth every 6 (six) hours as needed for Pain. More than 7 day supply and Medically Necessary. 120 tablet 0    metoprolol succinate (TOPROL-XL) 25 MG 24 hr tablet TAKE 1 TABLET EVERY DAY 90 tablet 3    oxybutynin (DITROPAN) 5 MG Tab TAKE 1 TABLET TWICE DAILY 180 tablet 3    pantoprazole (PROTONIX) 40 MG tablet TAKE 1 TABLET BY MOUTH ONCE DAILY BEFORE BREAKFAST 30 tablet 11    pravastatin (PRAVACHOL) 10 MG tablet TAKE 1 TABLET BY MOUTH ONCE DAILY AT  BEDTIME 90 tablet 0    tamsulosin (FLOMAX) 0.4 mg Cap Take 2 capsules (0.8 mg total) by mouth every evening. 180 capsule 1    TRUE METRIX AIR GLUCOSE METER kit USE AS INSTRUCTED 1 each 0    TRUE METRIX GLUCOSE TEST STRIP Strp TEST  ONE  TIME  DAILY  AT  6AM 100 strip 3    TRUEPLUS LANCETS 33 gauge Misc TEST ONE TIME DAILY  AT  6AM 100 each 3    valsartan (DIOVAN) 40 MG tablet TAKE 1 TABLET (40 MG TOTAL) BY MOUTH ONCE DAILY. 90 tablet 3    amoxicillin (AMOXIL) 500 MG capsule       gabapentin (NEURONTIN) 600 MG tablet Take 1 tablet (600 mg total) by mouth 4 (four) times daily with meals and nightly. 360 tablet 3    ibuprofen (ADVIL,MOTRIN) 800 MG tablet        No current facility-administered medications for this visit.        Review of patient's allergies indicates:  No Known Allergies    PCP: Williams Alvarenga MD    Date Last Seen by PCP:     Current shoe gear:  Affected Foot: Casual shoes     Unaffected Foot: Casual shoes    Hemoglobin A1C   Date Value Ref Range Status   11/12/2019 6.2 (H) 4.0 - 5.6 % Final     Comment:     ADA Screening Guidelines:  5.7-6.4%  Consistent with prediabetes  >or=6.5%  Consistent with diabetes  High levels of fetal hemoglobin interfere with the HbA1C  assay. Heterozygous hemoglobin variants (HbS, HgC, etc)do  not significantly interfere with this assay.   However, presence of multiple variants may affect accuracy.     04/24/2019 6.5 (H) 4.0 - 5.6 % Final     Comment:     ADA Screening Guidelines:  5.7-6.4%  Consistent with prediabetes  >or=6.5%  Consistent with diabetes  High levels of fetal hemoglobin interfere with the HbA1C  assay. Heterozygous hemoglobin variants (HbS, HgC, etc)do  not significantly interfere with this assay.   However, presence of multiple variants may affect accuracy.     10/25/2018 6.2 (H) 4.0 - 5.6 % Final     Comment:     ADA Screening Guidelines:  5.7-6.4%  Consistent with prediabetes  >or=6.5%  Consistent with diabetes  High levels of fetal  hemoglobin interfere with the HbA1C  assay. Heterozygous hemoglobin variants (HbS, HgC, etc)do  not significantly interfere with this assay.   However, presence of multiple variants may affect accuracy.         Review of Systems   Constitution: Negative for chills and fever.   HENT: Negative for hearing loss.    Cardiovascular: Negative for chest pain and claudication.   Respiratory: Negative for shortness of breath.    Skin: Positive for color change and nail changes. Negative for itching.   Musculoskeletal: Positive for back pain and muscle weakness. Negative for falls and joint pain.        Abnormal gait with dragging right foot   Gastrointestinal: Negative for nausea and vomiting.   Neurological: Positive for focal weakness and numbness. Negative for loss of balance and paresthesias.           Objective:      Physical Exam   Constitutional: He is oriented to person, place, and time. He appears well-nourished. No distress.   Cardiovascular: Intact distal pulses.   Pulses:       Dorsalis pedis pulses are 2+ on the right side, and 2+ on the left side.        Posterior tibial pulses are 0 on the right side, and 0 on the left side.   CRT < 3 seconds to digits 1-5 bilateral, mild to moderate edema of bilateral lower extremity with varicosities.   Musculoskeletal:        Right foot: There is decreased range of motion. There is no deformity.        Left foot: There is decreased range of motion. There is no deformity.   Patient has decreased dorsiflexion of the right foot. AT intact.    Pes planovalgus bilateral foot.    No pain on palpation bilateral foot.    Gait examination reveals overall slight trendelenberg gait with high steppage foot.     Feet:   Right Foot:   Protective Sensation: 7 sites tested. 2 sites sensed.   Skin Integrity: Positive for dry skin. Negative for ulcer, skin breakdown, erythema, warmth or callus.   Left Foot:   Protective Sensation: 7 sites tested. 5 sites sensed.   Skin Integrity: Positive  for dry skin. Negative for ulcer, skin breakdown, erythema, warmth or callus.   Neurological: He is alert and oriented to person, place, and time. A sensory deficit is present.   (-) Tinel's sign  Light touch intact.  MMT 5/5 DF, PF, Inv, Ev L foot. R foot with 4/5 DF otherwise 5/5 for all other compartments.   Normal muscle tone.  No signs of atrophy.  No tremor or spasticity.     Skin: Skin is warm, dry and intact. Capillary refill takes less than 2 seconds. No ecchymosis, no lesion, no petechiae and no rash noted. He is not diaphoretic. No cyanosis or erythema. No pallor. Nails show no clubbing.   Skin is dry and flaky plantar foot bilateral improved from previous visit.    Nails 1-5 bilateral are thickened 3-4 mm, slightly yellow-brown with debris, loosened and elongated 4-5 mm.     Edema B/L LE 2+    No open lesions or macerations bilateral lower extremity.               Assessment:       Encounter Diagnoses   Name Primary?    Type 2 diabetes mellitus with polyneuropathy Yes    Onychomycosis          Plan:       Xander was seen today for diabetes mellitus and nail care.    Diagnoses and all orders for this visit:    Type 2 diabetes mellitus with polyneuropathy  -     Routine Foot Care    Onychomycosis  -     Routine Foot Care      I counseled the patient on his conditions, their implications and medical management.    Shoe inspection. Diabetic Foot Education. Patient reminded of the importance of good nutrition and blood sugar control to help prevent podiatric complications of diabetes. Patient instructed on proper foot hygeine. We discussed wearing proper shoe gear, daily foot inspections, never walking without protective shoe gear, never putting sharp instruments to feet    Routine foot care per attached note.

## 2020-06-05 NOTE — PROCEDURES
Routine Foot Care  Date/Time: 6/5/2020 8:15 AM  Performed by: Scott Garcia DPM  Authorized by: Scott Garcia DPM     Consent Done?:  Yes (Verbal)  Hyperkeratotic Skin Lesions?: No      Nail Care Type:  Debride  Location(s): All  (Left 1st Toe, Left 3rd Toe, Left 2nd Toe, Left 4th Toe, Left 5th Toe, Right 1st Toe, Right 2nd Toe, Right 3rd Toe, Right 4th Toe and Right 5th Toe)  Patient tolerance:  Patient tolerated the procedure well with no immediate complications     Used sterile nail nipper.

## 2020-06-17 DIAGNOSIS — M48.02 CERVICAL STENOSIS OF SPINE: ICD-10-CM

## 2020-06-17 DIAGNOSIS — R29.898 RIGHT HAND WEAKNESS: ICD-10-CM

## 2020-06-17 DIAGNOSIS — G90.50 COMPLEX REGIONAL PAIN SYNDROME TYPE 1, AFFECTING UNSPECIFIED SITE: ICD-10-CM

## 2020-06-17 DIAGNOSIS — M79.601 RIGHT ARM PAIN: ICD-10-CM

## 2020-06-17 DIAGNOSIS — F11.20 NARCOTIC DEPENDENCY, CONTINUOUS: ICD-10-CM

## 2020-06-17 DIAGNOSIS — M50.30 DEGENERATION OF CERVICAL INTERVERTEBRAL DISC: ICD-10-CM

## 2020-06-17 DIAGNOSIS — M62.81 MUSCLE RIGHT ARM WEAKNESS: ICD-10-CM

## 2020-06-17 DIAGNOSIS — M75.101 ROTATOR CUFF SYNDROME OF RIGHT SHOULDER: ICD-10-CM

## 2020-06-17 DIAGNOSIS — G89.4 CHRONIC PAIN SYNDROME: ICD-10-CM

## 2020-06-17 DIAGNOSIS — R29.898 RIGHT ARM WEAKNESS: ICD-10-CM

## 2020-06-17 DIAGNOSIS — M54.12 CERVICAL RADICULOPATHY: ICD-10-CM

## 2020-06-17 DIAGNOSIS — M47.812 FACET ARTHRITIS OF CERVICAL REGION: ICD-10-CM

## 2020-06-17 DIAGNOSIS — M54.12 CERVICAL RADICULAR PAIN: ICD-10-CM

## 2020-06-17 DIAGNOSIS — M96.1 CERVICAL POST-LAMINECTOMY SYNDROME: ICD-10-CM

## 2020-06-17 DIAGNOSIS — G56.41 COMPLEX REGIONAL PAIN SYNDROME TYPE 2 OF RIGHT UPPER EXTREMITY: ICD-10-CM

## 2020-06-17 DIAGNOSIS — M54.12 BRACHIAL NEURITIS OR RADICULITIS: ICD-10-CM

## 2020-06-17 DIAGNOSIS — M48.02 SPINAL STENOSIS IN CERVICAL REGION: ICD-10-CM

## 2020-06-17 DIAGNOSIS — M50.00 HNP (HERNIATED NUCLEUS PULPOSUS) WITH MYELOPATHY, CERVICAL: ICD-10-CM

## 2020-06-17 DIAGNOSIS — M47.12 CERVICAL SPONDYLOSIS WITH MYELOPATHY: ICD-10-CM

## 2020-06-18 RX ORDER — HYDROCODONE BITARTRATE AND ACETAMINOPHEN 10; 325 MG/1; MG/1
1 TABLET ORAL EVERY 6 HOURS PRN
Qty: 120 TABLET | Refills: 0 | Status: SHIPPED | OUTPATIENT
Start: 2020-06-18 | End: 2020-07-15 | Stop reason: SDUPTHER

## 2020-06-30 RX ORDER — FLUTICASONE PROPIONATE AND SALMETEROL XINAFOATE 230; 21 UG/1; UG/1
2 AEROSOL, METERED RESPIRATORY (INHALATION) 2 TIMES DAILY
Qty: 2 INHALER | Refills: 1 | Status: SHIPPED | OUTPATIENT
Start: 2020-06-30 | End: 2021-04-05 | Stop reason: SDUPTHER

## 2020-06-30 NOTE — TELEPHONE ENCOUNTER
----- Message from Dior Crain sent at 6/30/2020  8:43 AM CDT -----  Contact: [phone]  Pt is requesting a refill on the medication Advair.    Preferred Pharmacy:  [facility] [address] [phone] (Phone)  [phone] (Fax)    Please Advise

## 2020-07-08 ENCOUNTER — TELEPHONE (OUTPATIENT)
Dept: FAMILY MEDICINE | Facility: CLINIC | Age: 70
End: 2020-07-08

## 2020-07-08 NOTE — TELEPHONE ENCOUNTER
----- Message from Matti Cisneros sent at 7/8/2020  9:02 AM CDT -----  Type:  Needs Medical Advice    Who Called: self  Reason:Patient says he was told to give the doctor a call  Would the patient rather a call back or a response via [employer]? callback  Best Call Back Number:[phone]   Additional Information: none

## 2020-07-08 NOTE — TELEPHONE ENCOUNTER
Spoke with patient and he reports occasional constipation. Reports has miralax at home. Instructed to take the miralax daily and drink fluids to aid with constipation. Patient voices understanding.

## 2020-07-15 DIAGNOSIS — M75.101 ROTATOR CUFF SYNDROME OF RIGHT SHOULDER: ICD-10-CM

## 2020-07-15 DIAGNOSIS — M54.12 BRACHIAL NEURITIS OR RADICULITIS: ICD-10-CM

## 2020-07-15 DIAGNOSIS — M47.812 FACET ARTHRITIS OF CERVICAL REGION: ICD-10-CM

## 2020-07-15 DIAGNOSIS — M62.81 MUSCLE RIGHT ARM WEAKNESS: ICD-10-CM

## 2020-07-15 DIAGNOSIS — M50.30 DEGENERATION OF CERVICAL INTERVERTEBRAL DISC: ICD-10-CM

## 2020-07-15 DIAGNOSIS — G56.41 COMPLEX REGIONAL PAIN SYNDROME TYPE 2 OF RIGHT UPPER EXTREMITY: ICD-10-CM

## 2020-07-15 DIAGNOSIS — M96.1 CERVICAL POST-LAMINECTOMY SYNDROME: ICD-10-CM

## 2020-07-15 DIAGNOSIS — M47.12 CERVICAL SPONDYLOSIS WITH MYELOPATHY: ICD-10-CM

## 2020-07-15 DIAGNOSIS — M54.12 CERVICAL RADICULAR PAIN: ICD-10-CM

## 2020-07-15 DIAGNOSIS — G90.50 COMPLEX REGIONAL PAIN SYNDROME TYPE 1, AFFECTING UNSPECIFIED SITE: ICD-10-CM

## 2020-07-15 DIAGNOSIS — M48.02 CERVICAL STENOSIS OF SPINE: ICD-10-CM

## 2020-07-15 DIAGNOSIS — R29.898 RIGHT HAND WEAKNESS: ICD-10-CM

## 2020-07-15 DIAGNOSIS — M48.02 SPINAL STENOSIS IN CERVICAL REGION: ICD-10-CM

## 2020-07-15 DIAGNOSIS — F11.20 NARCOTIC DEPENDENCY, CONTINUOUS: ICD-10-CM

## 2020-07-15 DIAGNOSIS — M79.601 RIGHT ARM PAIN: ICD-10-CM

## 2020-07-15 DIAGNOSIS — R29.898 RIGHT ARM WEAKNESS: ICD-10-CM

## 2020-07-15 DIAGNOSIS — M50.00 HNP (HERNIATED NUCLEUS PULPOSUS) WITH MYELOPATHY, CERVICAL: ICD-10-CM

## 2020-07-15 DIAGNOSIS — M54.12 CERVICAL RADICULOPATHY: ICD-10-CM

## 2020-07-15 DIAGNOSIS — G89.4 CHRONIC PAIN SYNDROME: ICD-10-CM

## 2020-07-15 NOTE — TELEPHONE ENCOUNTER
----- Message from Ga Pozo sent at 7/15/2020  9:18 AM CDT -----  Regarding: refill  Contact: self  Mg  Pt calling for refill  on hydrocodone     Contact

## 2020-07-18 RX ORDER — HYDROCODONE BITARTRATE AND ACETAMINOPHEN 10; 325 MG/1; MG/1
1 TABLET ORAL EVERY 6 HOURS PRN
Qty: 120 TABLET | Refills: 0 | Status: SHIPPED | OUTPATIENT
Start: 2020-07-18 | End: 2020-08-14 | Stop reason: SDUPTHER

## 2020-07-31 ENCOUNTER — OFFICE VISIT (OUTPATIENT)
Dept: FAMILY MEDICINE | Facility: CLINIC | Age: 70
End: 2020-07-31
Payer: MEDICARE

## 2020-07-31 VITALS
SYSTOLIC BLOOD PRESSURE: 110 MMHG | OXYGEN SATURATION: 96 % | HEIGHT: 71 IN | WEIGHT: 182.13 LBS | BODY MASS INDEX: 25.5 KG/M2 | HEART RATE: 89 BPM | DIASTOLIC BLOOD PRESSURE: 70 MMHG | TEMPERATURE: 98 F

## 2020-07-31 DIAGNOSIS — H61.21 EXCESSIVE EAR WAX, RIGHT: ICD-10-CM

## 2020-07-31 DIAGNOSIS — Z12.2 ENCOUNTER FOR SCREENING FOR MALIGNANT NEOPLASM OF RESPIRATORY ORGANS: ICD-10-CM

## 2020-07-31 DIAGNOSIS — E78.2 MIXED HYPERLIPIDEMIA: ICD-10-CM

## 2020-07-31 DIAGNOSIS — I70.0 AORTIC ATHEROSCLEROSIS: ICD-10-CM

## 2020-07-31 DIAGNOSIS — E66.3 OVERWEIGHT (BMI 25.0-29.9): ICD-10-CM

## 2020-07-31 DIAGNOSIS — E11.40 TYPE 2 DIABETES MELLITUS WITH DIABETIC NEUROPATHY, WITHOUT LONG-TERM CURRENT USE OF INSULIN: ICD-10-CM

## 2020-07-31 DIAGNOSIS — Z87.891 PERSONAL HISTORY OF NICOTINE DEPENDENCE: ICD-10-CM

## 2020-07-31 DIAGNOSIS — I10 ESSENTIAL HYPERTENSION: Primary | ICD-10-CM

## 2020-07-31 PROCEDURE — 3078F PR MOST RECENT DIASTOLIC BLOOD PRESSURE < 80 MM HG: ICD-10-PCS | Mod: HCNC,CPTII,S$GLB, | Performed by: NURSE PRACTITIONER

## 2020-07-31 PROCEDURE — 3008F PR BODY MASS INDEX (BMI) DOCUMENTED: ICD-10-PCS | Mod: HCNC,CPTII,S$GLB, | Performed by: NURSE PRACTITIONER

## 2020-07-31 PROCEDURE — 1101F PT FALLS ASSESS-DOCD LE1/YR: CPT | Mod: HCNC,CPTII,S$GLB, | Performed by: NURSE PRACTITIONER

## 2020-07-31 PROCEDURE — 1101F PR PT FALLS ASSESS DOC 0-1 FALLS W/OUT INJ PAST YR: ICD-10-PCS | Mod: HCNC,CPTII,S$GLB, | Performed by: NURSE PRACTITIONER

## 2020-07-31 PROCEDURE — 99214 OFFICE O/P EST MOD 30 MIN: CPT | Mod: HCNC,S$GLB,, | Performed by: NURSE PRACTITIONER

## 2020-07-31 PROCEDURE — 3074F SYST BP LT 130 MM HG: CPT | Mod: HCNC,CPTII,S$GLB, | Performed by: NURSE PRACTITIONER

## 2020-07-31 PROCEDURE — 3074F PR MOST RECENT SYSTOLIC BLOOD PRESSURE < 130 MM HG: ICD-10-PCS | Mod: HCNC,CPTII,S$GLB, | Performed by: NURSE PRACTITIONER

## 2020-07-31 PROCEDURE — 3044F PR MOST RECENT HEMOGLOBIN A1C LEVEL <7.0%: ICD-10-PCS | Mod: HCNC,CPTII,S$GLB, | Performed by: NURSE PRACTITIONER

## 2020-07-31 PROCEDURE — 1159F PR MEDICATION LIST DOCUMENTED IN MEDICAL RECORD: ICD-10-PCS | Mod: HCNC,S$GLB,, | Performed by: NURSE PRACTITIONER

## 2020-07-31 PROCEDURE — 99999 PR PBB SHADOW E&M-EST. PATIENT-LVL V: ICD-10-PCS | Mod: PBBFAC,HCNC,, | Performed by: NURSE PRACTITIONER

## 2020-07-31 PROCEDURE — 3078F DIAST BP <80 MM HG: CPT | Mod: HCNC,CPTII,S$GLB, | Performed by: NURSE PRACTITIONER

## 2020-07-31 PROCEDURE — 1159F MED LIST DOCD IN RCRD: CPT | Mod: HCNC,S$GLB,, | Performed by: NURSE PRACTITIONER

## 2020-07-31 PROCEDURE — 3044F HG A1C LEVEL LT 7.0%: CPT | Mod: HCNC,CPTII,S$GLB, | Performed by: NURSE PRACTITIONER

## 2020-07-31 PROCEDURE — 1125F PR PAIN SEVERITY QUANTIFIED, PAIN PRESENT: ICD-10-PCS | Mod: HCNC,S$GLB,, | Performed by: NURSE PRACTITIONER

## 2020-07-31 PROCEDURE — 99999 PR PBB SHADOW E&M-EST. PATIENT-LVL V: CPT | Mod: PBBFAC,HCNC,, | Performed by: NURSE PRACTITIONER

## 2020-07-31 PROCEDURE — 3008F BODY MASS INDEX DOCD: CPT | Mod: HCNC,CPTII,S$GLB, | Performed by: NURSE PRACTITIONER

## 2020-07-31 PROCEDURE — 1125F AMNT PAIN NOTED PAIN PRSNT: CPT | Mod: HCNC,S$GLB,, | Performed by: NURSE PRACTITIONER

## 2020-07-31 PROCEDURE — 99214 PR OFFICE/OUTPT VISIT, EST, LEVL IV, 30-39 MIN: ICD-10-PCS | Mod: HCNC,S$GLB,, | Performed by: NURSE PRACTITIONER

## 2020-07-31 NOTE — PROGRESS NOTES
Subjective:       Patient ID: Xander Sanchez is a 70 y.o. male.    Chief Complaint: Follow-up and Hypertension    This is a 71 yo male patient of Dr. August who is new to me. He presents today for a HTN follow-up. PMH includes HTN, CHF, DM2, HLD, GERD, PUD, COPD, BPH, and chronic pain syndrome, among others. VSS today. Denies chest pain, SOB, dyspnea, or palpitations. Denies polydipsia, polyphagia or polyuria. Reports drinking plenty water and follows a low-salt, heart-healthy, ADA diet. Last eye exam was April 2019. Last foot exam was June 2020. Exercises daily for about 10 minutes. Doing well emotionally. See more below.    Hypertension  This is a chronic problem. The current episode started more than 1 year ago. The problem is controlled. Pertinent negatives include no blurred vision, chest pain, headaches, malaise/fatigue, neck pain, palpitations, peripheral edema or shortness of breath. Risk factors for coronary artery disease include male gender, dyslipidemia and diabetes mellitus. Past treatments include diuretics, beta blockers, angiotensin blockers and lifestyle changes. The current treatment provides significant improvement. There are no compliance problems.  Hypertensive end-organ damage includes heart failure.   Diabetes  He presents for his follow-up diabetic visit. He has type 2 diabetes mellitus. His disease course has been stable. Pertinent negatives for hypoglycemia include no dizziness, headaches or nervousness/anxiousness. Pertinent negatives for diabetes include no blurred vision, no chest pain, no fatigue, no foot ulcerations, no polydipsia, no polyphagia, no polyuria, no visual change and no weakness. There are no hypoglycemic complications. Symptoms are stable. Risk factors for coronary artery disease include dyslipidemia, diabetes mellitus, male sex and hypertension. Current diabetic treatment includes diet. He is compliant with treatment all of the time. His weight is stable. He is following  a diabetic, low fat/cholesterol and low salt diet. He participates in exercise daily. There is no change in his home blood glucose trend. His breakfast blood glucose is taken between 7-8 am. His breakfast blood glucose range is generally 110-130 mg/dl. An ACE inhibitor/angiotensin II receptor blocker is being taken. He sees a podiatrist.Eye exam is not current.     Review of Systems   Constitutional: Negative for activity change, appetite change, chills, fatigue, fever and malaise/fatigue.   HENT: Negative for rhinorrhea, sore throat and trouble swallowing.    Eyes: Negative for blurred vision and visual disturbance.   Respiratory: Negative for cough, chest tightness, shortness of breath and wheezing.    Cardiovascular: Negative for chest pain, palpitations and leg swelling.   Gastrointestinal: Negative for abdominal pain, blood in stool, diarrhea, nausea and vomiting.   Endocrine: Negative for polydipsia, polyphagia and polyuria.   Genitourinary: Negative for difficulty urinating and hematuria.   Musculoskeletal: Positive for back pain. Negative for gait problem and neck pain.   Neurological: Negative for dizziness, weakness, light-headedness, numbness, headaches, disturbances in coordination and coordination difficulties.   Psychiatric/Behavioral: Negative for sleep disturbance. The patient is not nervous/anxious.          Objective:      Physical Exam  Vitals signs reviewed.   Constitutional:       General: He is not in acute distress.     Appearance: Normal appearance. He is well-developed, well-groomed and overweight.   HENT:      Head: Normocephalic and atraumatic.      Right Ear: Hearing normal.      Left Ear: Hearing, tympanic membrane, ear canal and external ear normal.      Ears:      Comments: Excessive cerumen to right ear canal     Nose: Nose normal.      Mouth/Throat:      Lips: Pink.      Mouth: Mucous membranes are moist.      Dentition: Abnormal dentition.      Pharynx: Oropharynx is clear. Uvula  midline.      Comments: edentulous  Eyes:      General: Lids are normal.         Right eye: No discharge.         Left eye: No discharge.      Conjunctiva/sclera: Conjunctivae normal.      Pupils: Pupils are equal, round, and reactive to light.      Comments: Wears glasses   Neck:      Musculoskeletal: Full passive range of motion without pain, normal range of motion and neck supple.      Vascular: No carotid bruit.      Trachea: Trachea normal.   Cardiovascular:      Rate and Rhythm: Normal rate and regular rhythm.      Pulses: Normal pulses.           Carotid pulses are 2+ on the right side and 2+ on the left side.       Radial pulses are 2+ on the right side and 2+ on the left side.        Dorsalis pedis pulses are 2+ on the right side and 2+ on the left side.      Heart sounds: Normal heart sounds, S1 normal and S2 normal. No murmur.      Comments: Cardiac defibrillator in situ to left chest  Pulmonary:      Effort: Pulmonary effort is normal. No respiratory distress.      Breath sounds: Normal breath sounds. No wheezing or rhonchi.   Abdominal:      General: Bowel sounds are normal. There is no distension.      Palpations: Abdomen is soft.      Tenderness: There is no abdominal tenderness.   Musculoskeletal: Normal range of motion.      Right lower leg: No edema.      Left lower leg: No edema.   Skin:     General: Skin is warm and dry.      Capillary Refill: Capillary refill takes less than 2 seconds.      Coloration: Skin is not pale.   Neurological:      Mental Status: He is alert and oriented to person, place, and time.      Coordination: Coordination normal.      Gait: Gait normal.      Deep Tendon Reflexes: Reflexes are normal and symmetric.   Psychiatric:         Mood and Affect: Mood and affect normal.         Speech: Speech normal.         Behavior: Behavior normal. Behavior is cooperative.         Thought Content: Thought content normal.         Assessment:       1. Essential hypertension    2. Type 2  diabetes mellitus with diabetic neuropathy, without long-term current use of insulin    3. Mixed hyperlipidemia    4. Aortic atherosclerosis    5. Encounter for screening for malignant neoplasm of respiratory organs    6. Excessive ear wax, right    7. Personal history of nicotine dependence     8. Overweight (BMI 25.0-29.9)        Plan:       Xander was seen today for follow-up and hypertension.    Diagnoses and all orders for this visit:    Essential hypertension  -     Ambulatory referral/consult to Optometry; Future  -     CBC auto differential; Future  -     Comprehensive metabolic panel; Future  -     TSH; Future  -     Lipid Panel; Future  -     Microalbumin/creatinine urine ratio; Future    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thy, without long-term current use of insulin  -     Ambulatory referral/consult to Optometry; Future  -     Comprehensive metabolic panel; Future  -     Lipid Panel; Future  -     Microalbumin/creatinine urine ratio; Future  -     Hemoglobin A1C; Future    Mixed hyperlipidemia  -     Lipid Panel; Future    Aortic atherosclerosis    Encounter for screening for malignant neoplasm of respiratory organs  -     CT Chest Lung Screening Low Dose; Future    Excessive ear wax, right  -     Ear wax removal    Personal history of nicotine dependence   -     CT Chest Lung Screening Low Dose; Future    Overweight (BMI 25.0-29.9)      - Fasting labs ordered for tomorrow  - Encouraged patient to continue to eat a low salt/low fat ADA diet and discussed importance of engaging in physical activity at least 5x/week for a minimum of 30 min/day.  - RTC in 6 months for a follow-up, or sooner if needed

## 2020-08-01 ENCOUNTER — CLINICAL SUPPORT (OUTPATIENT)
Dept: CARDIOLOGY | Facility: HOSPITAL | Age: 70
End: 2020-08-01
Payer: MEDICARE

## 2020-08-01 DIAGNOSIS — I42.9 CARDIOMYOPATHY, UNSPECIFIED: ICD-10-CM

## 2020-08-01 DIAGNOSIS — Z95.810 PRESENCE OF AUTOMATIC (IMPLANTABLE) CARDIAC DEFIBRILLATOR: ICD-10-CM

## 2020-08-01 PROCEDURE — 93296 REM INTERROG EVL PM/IDS: CPT | Performed by: INTERNAL MEDICINE

## 2020-08-01 PROCEDURE — 93295 DEV INTERROG REMOTE 1/2/MLT: CPT | Mod: ,,, | Performed by: INTERNAL MEDICINE

## 2020-08-01 PROCEDURE — 93295 CARDIAC DEVICE CHECK - REMOTE: ICD-10-PCS | Mod: ,,, | Performed by: INTERNAL MEDICINE

## 2020-08-03 ENCOUNTER — HOSPITAL ENCOUNTER (OUTPATIENT)
Dept: RADIOLOGY | Facility: HOSPITAL | Age: 70
Discharge: HOME | End: 2020-08-03
Attending: NURSE PRACTITIONER
Payer: MEDICARE

## 2020-08-03 ENCOUNTER — LAB VISIT (OUTPATIENT)
Dept: PRIMARY CARE CLINIC | Facility: OTHER | Age: 70
End: 2020-08-03
Attending: INTERNAL MEDICINE
Payer: MEDICARE

## 2020-08-03 DIAGNOSIS — Z03.818 ENCOUNTER FOR OBSERVATION FOR SUSPECTED EXPOSURE TO OTHER BIOLOGICAL AGENTS RULED OUT: ICD-10-CM

## 2020-08-03 DIAGNOSIS — Z12.2 ENCOUNTER FOR SCREENING FOR MALIGNANT NEOPLASM OF RESPIRATORY ORGANS: ICD-10-CM

## 2020-08-03 DIAGNOSIS — Z87.891 PERSONAL HISTORY OF NICOTINE DEPENDENCE: ICD-10-CM

## 2020-08-03 PROCEDURE — U0003 INFECTIOUS AGENT DETECTION BY NUCLEIC ACID (DNA OR RNA); SEVERE ACUTE RESPIRATORY SYNDROME CORONAVIRUS 2 (SARS-COV-2) (CORONAVIRUS DISEASE [COVID-19]), AMPLIFIED PROBE TECHNIQUE, MAKING USE OF HIGH THROUGHPUT TECHNOLOGIES AS DESCRIBED BY CMS-2020-01-R: HCPCS | Mod: HCNC

## 2020-08-03 PROCEDURE — G0297 LDCT FOR LUNG CA SCREEN: HCPCS | Mod: TC,HCNC

## 2020-08-03 PROCEDURE — G0297 LDCT FOR LUNG CA SCREEN: HCPCS | Mod: 26,HCNC,, | Performed by: RADIOLOGY

## 2020-08-03 PROCEDURE — G0297 CT CHEST LUNG SCREENING LOW DOSE: ICD-10-PCS | Mod: 26,HCNC,, | Performed by: RADIOLOGY

## 2020-08-04 LAB — SARS-COV-2 RNA RESP QL NAA+PROBE: NOT DETECTED

## 2020-08-14 DIAGNOSIS — F11.20 NARCOTIC DEPENDENCY, CONTINUOUS: ICD-10-CM

## 2020-08-14 DIAGNOSIS — M48.02 CERVICAL STENOSIS OF SPINE: ICD-10-CM

## 2020-08-14 DIAGNOSIS — G89.4 CHRONIC PAIN SYNDROME: ICD-10-CM

## 2020-08-14 DIAGNOSIS — M50.30 DEGENERATION OF CERVICAL INTERVERTEBRAL DISC: ICD-10-CM

## 2020-08-14 DIAGNOSIS — M75.101 ROTATOR CUFF SYNDROME OF RIGHT SHOULDER: ICD-10-CM

## 2020-08-14 DIAGNOSIS — M47.12 CERVICAL SPONDYLOSIS WITH MYELOPATHY: ICD-10-CM

## 2020-08-14 DIAGNOSIS — M54.12 CERVICAL RADICULOPATHY: ICD-10-CM

## 2020-08-14 DIAGNOSIS — G90.50 COMPLEX REGIONAL PAIN SYNDROME TYPE 1, AFFECTING UNSPECIFIED SITE: ICD-10-CM

## 2020-08-14 DIAGNOSIS — R29.898 RIGHT ARM WEAKNESS: ICD-10-CM

## 2020-08-14 DIAGNOSIS — M54.12 CERVICAL RADICULAR PAIN: ICD-10-CM

## 2020-08-14 DIAGNOSIS — M54.12 BRACHIAL NEURITIS OR RADICULITIS: ICD-10-CM

## 2020-08-14 DIAGNOSIS — R29.898 RIGHT HAND WEAKNESS: ICD-10-CM

## 2020-08-14 DIAGNOSIS — M62.81 MUSCLE RIGHT ARM WEAKNESS: ICD-10-CM

## 2020-08-14 DIAGNOSIS — M48.02 SPINAL STENOSIS IN CERVICAL REGION: ICD-10-CM

## 2020-08-14 DIAGNOSIS — M50.00 HNP (HERNIATED NUCLEUS PULPOSUS) WITH MYELOPATHY, CERVICAL: ICD-10-CM

## 2020-08-14 DIAGNOSIS — M79.601 RIGHT ARM PAIN: ICD-10-CM

## 2020-08-14 DIAGNOSIS — M96.1 CERVICAL POST-LAMINECTOMY SYNDROME: ICD-10-CM

## 2020-08-14 DIAGNOSIS — M47.812 FACET ARTHRITIS OF CERVICAL REGION: ICD-10-CM

## 2020-08-14 DIAGNOSIS — G56.41 COMPLEX REGIONAL PAIN SYNDROME TYPE 2 OF RIGHT UPPER EXTREMITY: ICD-10-CM

## 2020-08-14 NOTE — TELEPHONE ENCOUNTER
----- Message from Cece Strong sent at 8/14/2020  9:33 AM CDT -----  Contact: self @ [phone]  Pt is req a refill for hydrocodone 10/325.     [id number] [place] [id number] [address] [facility] [address] [phone] (Phone)      [phone] (Fax)

## 2020-08-15 RX ORDER — HYDROCODONE BITARTRATE AND ACETAMINOPHEN 10; 325 MG/1; MG/1
1 TABLET ORAL EVERY 6 HOURS PRN
Qty: 120 TABLET | Refills: 0 | Status: SHIPPED | OUTPATIENT
Start: 2020-08-17 | End: 2020-09-14 | Stop reason: SDUPTHER

## 2020-08-27 ENCOUNTER — TELEPHONE (OUTPATIENT)
Dept: FAMILY MEDICINE | Facility: CLINIC | Age: 70
End: 2020-08-27

## 2020-08-27 NOTE — TELEPHONE ENCOUNTER
----- Message from Raeann Ray sent at 8/27/2020  1:23 PM CDT -----  Regarding: Health Advice  Contact: Self/ [phone]  Patient requesting to speak with you regarding his right leg swelling. Please advise.

## 2020-08-27 NOTE — TELEPHONE ENCOUNTER
Returned pt's phone call, scheduled appointment with Lachelle Smith NP. Pt verbalized an understanding.

## 2020-08-28 ENCOUNTER — OFFICE VISIT (OUTPATIENT)
Dept: FAMILY MEDICINE | Facility: CLINIC | Age: 70
End: 2020-08-28
Payer: MEDICARE

## 2020-08-28 VITALS
OXYGEN SATURATION: 96 % | WEIGHT: 187.81 LBS | HEIGHT: 71 IN | SYSTOLIC BLOOD PRESSURE: 104 MMHG | DIASTOLIC BLOOD PRESSURE: 70 MMHG | TEMPERATURE: 98 F | HEART RATE: 71 BPM | BODY MASS INDEX: 26.29 KG/M2

## 2020-08-28 DIAGNOSIS — E66.3 OVERWEIGHT (BMI 25.0-29.9): ICD-10-CM

## 2020-08-28 DIAGNOSIS — M79.89 FOOT SWELLING: ICD-10-CM

## 2020-08-28 DIAGNOSIS — I50.32 DIASTOLIC DYSFUNCTION WITH CHRONIC HEART FAILURE: ICD-10-CM

## 2020-08-28 DIAGNOSIS — R60.0 BILATERAL LEG EDEMA: Primary | ICD-10-CM

## 2020-08-28 DIAGNOSIS — I10 ESSENTIAL HYPERTENSION: ICD-10-CM

## 2020-08-28 PROCEDURE — 1126F AMNT PAIN NOTED NONE PRSNT: CPT | Mod: HCNC,S$GLB,, | Performed by: NURSE PRACTITIONER

## 2020-08-28 PROCEDURE — 99999 PR PBB SHADOW E&M-EST. PATIENT-LVL V: CPT | Mod: PBBFAC,HCNC,, | Performed by: NURSE PRACTITIONER

## 2020-08-28 PROCEDURE — 99214 PR OFFICE/OUTPT VISIT, EST, LEVL IV, 30-39 MIN: ICD-10-PCS | Mod: HCNC,S$GLB,, | Performed by: NURSE PRACTITIONER

## 2020-08-28 PROCEDURE — 1126F PR PAIN SEVERITY QUANTIFIED, NO PAIN PRESENT: ICD-10-PCS | Mod: HCNC,S$GLB,, | Performed by: NURSE PRACTITIONER

## 2020-08-28 PROCEDURE — 3074F PR MOST RECENT SYSTOLIC BLOOD PRESSURE < 130 MM HG: ICD-10-PCS | Mod: HCNC,CPTII,S$GLB, | Performed by: NURSE PRACTITIONER

## 2020-08-28 PROCEDURE — 99999 PR PBB SHADOW E&M-EST. PATIENT-LVL V: ICD-10-PCS | Mod: PBBFAC,HCNC,, | Performed by: NURSE PRACTITIONER

## 2020-08-28 PROCEDURE — 1159F MED LIST DOCD IN RCRD: CPT | Mod: HCNC,S$GLB,, | Performed by: NURSE PRACTITIONER

## 2020-08-28 PROCEDURE — 3008F BODY MASS INDEX DOCD: CPT | Mod: HCNC,CPTII,S$GLB, | Performed by: NURSE PRACTITIONER

## 2020-08-28 PROCEDURE — 99214 OFFICE O/P EST MOD 30 MIN: CPT | Mod: HCNC,S$GLB,, | Performed by: NURSE PRACTITIONER

## 2020-08-28 PROCEDURE — 1101F PR PT FALLS ASSESS DOC 0-1 FALLS W/OUT INJ PAST YR: ICD-10-PCS | Mod: HCNC,CPTII,S$GLB, | Performed by: NURSE PRACTITIONER

## 2020-08-28 PROCEDURE — 3078F PR MOST RECENT DIASTOLIC BLOOD PRESSURE < 80 MM HG: ICD-10-PCS | Mod: HCNC,CPTII,S$GLB, | Performed by: NURSE PRACTITIONER

## 2020-08-28 PROCEDURE — 3078F DIAST BP <80 MM HG: CPT | Mod: HCNC,CPTII,S$GLB, | Performed by: NURSE PRACTITIONER

## 2020-08-28 PROCEDURE — 3008F PR BODY MASS INDEX (BMI) DOCUMENTED: ICD-10-PCS | Mod: HCNC,CPTII,S$GLB, | Performed by: NURSE PRACTITIONER

## 2020-08-28 PROCEDURE — 1159F PR MEDICATION LIST DOCUMENTED IN MEDICAL RECORD: ICD-10-PCS | Mod: HCNC,S$GLB,, | Performed by: NURSE PRACTITIONER

## 2020-08-28 PROCEDURE — 3074F SYST BP LT 130 MM HG: CPT | Mod: HCNC,CPTII,S$GLB, | Performed by: NURSE PRACTITIONER

## 2020-08-28 PROCEDURE — 1101F PT FALLS ASSESS-DOCD LE1/YR: CPT | Mod: HCNC,CPTII,S$GLB, | Performed by: NURSE PRACTITIONER

## 2020-08-28 NOTE — PROGRESS NOTES
Subjective:       Patient ID: Xander Sanchez is a 70 y.o. male.    Chief Complaint: Leg Swelling (x 2 weeks)    This is a 71 yo male patient of Dr. August who is known to me. He presents today with c/o leg swelling x past 2 weeks that has gradually worsened since onset. PMH includes    Patient Active Problem List:     Biventricular implantable cardioverter-defibrillator (ICD) in situ     Essential hypertension     Cervical spondylosis with myelopathy     Brachial neuritis or radiculitis NOS     Degeneration of cervical intervertebral disc     PUD (peptic ulcer disease)     COPD (chronic obstructive pulmonary disease) with emphysema     Cardiomyopathy, nonischemic     HNP (herniated nucleus pulposus) with myelopathy, cervical     CRPS (complex regional pain syndrome type II)     Chronic pain syndrome     Cervical post-laminectomy syndrome     LBBB (left bundle branch block)     Slow transit constipation     Chronic systolic dysfunction of left ventricle     S/P TURP     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thy     Diastolic dysfunction with chronic heart failure     Hypertensive left ventricular hypertrophy with heart failure     Narcotic dependency, continuous     Muscle right arm weakness     Rotator cuff syndrome of right shoulder     Facet arthritis of cervical region     Aortic atherosclerosis     Body mass index (BMI) of 24.0 to 24.9 in adult     BPH with urinary obstruction     Incomplete bladder emptying     History of colon polyps     Gait abnormality     Weakness of right hip     Gait instability     Prostatitis, acute     Mixed hyperlipidemia     GERD (gastroesophageal reflux disease)     Enuresis     Cervical myelopathy     Systolic heart failure    VSS today. Patient reports he noticed right leg swelling about 2 weeks ago, then the left leg started swelling yesterday with slightly increased swelling to right leg. Denies fever, chills, chest pain, worsening SOB, dyspnea, palpitations, or N/V/D.  Denies pain, injury or trauma. Compliant with current medication regimen. Has tried to elevate legs. Says he drinks a lot of water and tries to limit his salt intake. Has no current exercise routine. See more below.    Edema  This is a new problem. The current episode started 1 to 4 weeks ago. The problem occurs constantly. The problem has been gradually worsening. Pertinent negatives include no abdominal pain, chest pain, chills, coughing, fatigue, fever, headaches, nausea, numbness, sore throat or vomiting. Nothing aggravates the symptoms. He has tried rest for the symptoms. The treatment provided no relief.     Review of Systems   Constitutional: Negative for chills, fatigue and fever.   HENT: Negative for sore throat and trouble swallowing.    Eyes: Negative for visual disturbance.   Respiratory: Negative for cough, chest tightness and shortness of breath.    Cardiovascular: Positive for leg swelling. Negative for chest pain and palpitations.   Gastrointestinal: Negative for abdominal pain, constipation, diarrhea, nausea and vomiting.   Endocrine: Negative for polydipsia, polyphagia and polyuria.   Genitourinary: Negative for difficulty urinating and frequency.   Musculoskeletal: Negative for gait problem.   Neurological: Negative for dizziness, numbness and headaches.   Psychiatric/Behavioral: The patient is not nervous/anxious.          Objective:      Physical Exam  Vitals signs reviewed.   Constitutional:       General: He is not in acute distress.     Appearance: Normal appearance. He is well-developed, well-groomed and overweight.   HENT:      Head: Normocephalic and atraumatic.      Right Ear: External ear normal.      Left Ear: External ear normal.   Eyes:      General: Lids are normal.         Right eye: No discharge.         Left eye: No discharge.      Pupils: Pupils are equal, round, and reactive to light.      Comments: Wearing glasses   Neck:      Musculoskeletal: Full passive range of motion  without pain, normal range of motion and neck supple.      Vascular: No carotid bruit.      Trachea: Trachea normal.   Cardiovascular:      Rate and Rhythm: Normal rate and regular rhythm.      Pulses: Normal pulses.           Carotid pulses are 2+ on the right side and 2+ on the left side.       Radial pulses are 2+ on the right side and 2+ on the left side.        Dorsalis pedis pulses are 2+ on the right side and 2+ on the left side.        Posterior tibial pulses are 2+ on the right side and 2+ on the left side.      Heart sounds: Normal heart sounds, S1 normal and S2 normal. No murmur.      Comments: Cardiac defibrillator in situ to left chest  Pulmonary:      Effort: Pulmonary effort is normal. No respiratory distress.      Breath sounds: Normal breath sounds. No wheezing or rhonchi.   Abdominal:      General: Bowel sounds are normal. There is no distension.      Palpations: Abdomen is soft.      Tenderness: There is no abdominal tenderness.   Musculoskeletal: Normal range of motion.         General: No tenderness or signs of injury.      Right lower le+ Pitting Edema present.      Left lower le+ Edema present.   Skin:     General: Skin is warm and dry.      Capillary Refill: Capillary refill takes less than 2 seconds.      Findings: No erythema.   Neurological:      Mental Status: He is alert and oriented to person, place, and time.      Coordination: Coordination abnormal.      Gait: Gait abnormal.      Deep Tendon Reflexes: Reflexes are normal and symmetric.   Psychiatric:         Mood and Affect: Mood normal.         Speech: Speech normal.         Behavior: Behavior normal. Behavior is cooperative.         Thought Content: Thought content normal.         Assessment:       1. Bilateral leg edema    2. Diastolic dysfunction with chronic heart failure    3. Essential hypertension    4. Foot swelling        Plan:       Xander was seen today for leg swelling.    Diagnoses and all orders for this  visit:    Bilateral leg edema    Diastolic dysfunction with chronic heart failure  -     COMPRESSION STOCKINGS    Essential hypertension    Foot swelling    Overweight (BMI 25.0-29.9)      - Given clear instructions to take 2 tablets of rx hydrochlorothiazide for the next 3 days, then continue with usual treatment plan after that  - Instructed to monitor BP and to maintain a BP log for the next week  - Advised to contact our office right away if BP drops too low  - Warning signs discussed  - Advised to wear compression stockings during the day  - Encouraged patient to eat a low salt/low fat diet and discussed importance of engaging in physical activity at least 5x/week for a minimum of 30 min/day  - Also encouraged to drink more water and to elevate legs when sitting or lying down  - RTC next week for a follow-up, or sooner if needed

## 2020-08-28 NOTE — PATIENT INSTRUCTIONS
- Start taking 2 capsules of hydrochlorothiazide (25mg total) for the next 3 days starting Saturday--last double dose will be on Monday  - Starting Tuesday, continue taking your medications as before  - Please keep a blood pressure log for the next week  - Follow up with me next week  - Drink more water, limit your salt intake, elevate your legs when sitting or lying down  - Wear compression stockings while awake, remove during naps or at night for sleep      Discharge Instructions: Taking Your Blood Pressure  Blood pressure is the force of blood as it moves from the heart through the blood vessels. You can take your own blood pressure reading using a digital monitor. Take readings as often as your healthcare provider instructs. Take your readings each time in the same way, using the same arm. Here are guidelines for taking your blood pressure.  The American Heart Association (AHA) recommends purchasing a blood pressure monitor that is validated and approved by the Association for the Advancement of Medical Instrumentation, the [place] Hypertension Society, and the International Protocol for the Validation of Automated BP Measuring Devices. If the blood pressure monitor is for a senior adult, a pregnant woman, or a child, make certain it is validated for use with such a population. For the most reliable readings, the AHA recommends an automatic, cuff-style, upper arm (bicep) monitor. The readings from finger and wrist monitors are not as reliable as the upper arm monitor.        Step 1. Relax    · Wait at least a half hour after smoking, eating, or exercising. Do not drink coffee, tea, soda, or other caffeinated beverages before checking your blood pressure.   · Sit comfortably at a table. Place the monitor near you.  · Rest for a few minutes before you begin.        Step 2. Wrap the cuff    · Place your arm on the table, palm up. Put your arm in a position that is level with your heart. Wrap the cuff around your  upper arm, about an inch above your elbow. Its best to wrap the cuff on bare skin, not over clothing.  · Make sure your cuff fits. If it doesnt wrap around your upper arm, order a larger cuff. A cuff that is too large or too small can result in an inaccurate blood pressure reading.           Step 3. Inflate the cuff    · Pump the cuff until the scale reads 200. If you have a self-inflating cuff, push the button that starts the pump.  · The cuff will tighten, then loosen.  · The numbers will change. When they stop changing, your blood pressure reading will appear.  · If you get a reading that is too high or too low for you, relax for a few minutes. Then do the test again.    Step 4. Write down the results  · Write down your blood pressure numbers. Vladimir the date and time. Keep your results in one place, such as a notebook.  · Remove the cuff from your arm. Turn off the machine.  · Take the readings with you to your medical appointments.  · If you start a new blood pressure medicine, or change a blood pressure medicine dose, note the day you started the new drug or dosage on your blood pressure recording sheet. This will help your healthcare provider monitor the effect of medication changes.     Date Last Reviewed: 4/27/2016  © 2874-5770 [employer]. [address] [place] [address], [place] [address]. All rights reserved. This information is not intended as a substitute for professional medical care. Always follow your healthcare professional's instructions.        Eating Heart-Healthy Foods  Eating has a big impact on your heart health. In fact, eating healthier can improve several of your heart risks at once. For instance, it helps you manage weight, cholesterol, and blood pressure. Here are ideas to help you make heart-healthy changes without giving up all the foods and flavors you love.  Getting started  · Talk with your health care provider about eating plans, such as the DASH or Mediterranean diet. You may  also be referred to a dietitian.  · Change a few things at a time. Give yourself time to get used to a few eating changes before adding more.  · Work to create a tasty, healthy eating plan that you can stick to for the rest of your life.    Goals for healthy eating  Below are some tips to improve your eating habits:  · Limit saturated fats and trans fats. Saturated fats raise your levels of cholesterol, so keep these fats to a minimum. They are found in foods such as fatty meats, whole milk, cheese, and palm and coconut oils. Avoid trans fats because they lower good cholesterol as well as raise bad cholesterol. Trans fats are most often found in processed foods.  · Reduce sodium (salt) intake. Eating too much salt may increase your blood pressure. Limit your sodium intake to 2,300 milligrams (mg) per day, or less if your health care provider recommends it. Dining out less often and eating fewer processed foods are two great ways to decrease the amount of salt you consume.  · Managing calories. A calorie is a unit of energy. Your body burns calories for fuel, but if you eat more calories than your body burns, the extras are stored as fat. Your health care provider can help you create a diet plan to manage your calories. This will likely include eating healthier foods as well as exercising regularly. To help you track your progress, keep a diary to record what you eat and how often you exercise.  Choose the right foods  Aim to make these foods staples of your diet. If you have diabetes, you may have different recommendations than what is listed here:  · Fruits and vegetable provide plenty of nutrients without a lot of calories. At meals, fill half your plate with these foods. Split the other half of your plate between whole grains and lean protein.  · Whole grains are high in fiber and rich in vitamins and nutrients. Good choices include whole-wheat bread, pasta, and brown rice.  · Lean proteins give you nutrition  with less fat. Good choices include fish, skinless chicken, and beans.  · Low-fat or nonfat dairy provides nutrients without a lot of fat. Try low-fat or nonfat milk, cheese, or yogurt.  · Healthy fats can be good for you in small amounts. These are unsaturated fats, such as olive oil, nuts, and fish. Try to have at least 2 servings per week of fatty fish such as salmon, sardines, mackerel, rainbow trout, and albacore tuna. These contain omega-3 fatty acids, which are good for your heart. Flaxseed is another source of a heart-healthy fat.  More on heart healthy eating    Read food labels  Healthy eating starts at the grocery store. Be sure to pay attention to food labels on packaged foods. Look for products that are high in fiber and protein, and low in saturated fat, cholesterol, and sodium. Avoid products that contain trans fat. And pay close attention to serving size. For instance, if you plan to eat two servings, double all the numbers on the label.  Prepare food right  A key part of healthy cooking is cutting down on added fat and salt. Look on the internet for lower-fat, lower-sodium recipes. Also, try these tips:  · Remove fat from meat and skin from poultry before cooking.  · Skim fat from the surface of soups and sauces.  · Broil, boil, bake, steam, grill, and microwave food without added fats.  · Choose ingredients that spice up your food without adding calories, fat, or sodium. Try these items: horseradish, hot sauce, lemon, mustard, nonfat salad dressings, and vinegar. For salt-free herbs and spices, try basil, cilantro, cinnamon, pepper, and rosemary.  Date Last Reviewed: 6/25/2015  © 0235-6189 [employer]. [address] [place] [address], [place] [address]. All rights reserved. This information is not intended as a substitute for professional medical care. Always follow your healthcare professional's instructions.        Aerobic Exercise for a Healthy Heart  Exercise is a lot more than an energy  booster and a stress reliever. It also strengthens your heart muscle, lowers your blood pressure and cholesterol, and burns calories. It can also improve your resting muscle tone, and your mood.     Remember, some activity is better than none.    Choose an aerobic activity  Choose an activity that makes your heart and lungs work harder than they do when you rest or walk normally. This aerobic exercise can improve the way your heart and other muscles use oxygen. Make it fun by exercising with a friend and choosing an activity you enjoy. Here are some ideas:  · Walking  · Swimming  · Bicycling  · Stair climbing  · Dancing  · Jogging  · Gardening  Exercise regularly  If you havent been exercising regularly,  get your doctors OK first. Then start slowly.  Here are some tips:  · Begin exercising 3 times a week for 5 to 10 minutes at a time.  · When you feel comfortable, add a few minutes each session.  · Slowly build up to exercising 3 to 4 times each week. Each session should last for 40 minutes, on average, and involve moderate- to vigorous-intensity physical activity.  · If you have been given nitroglycerin, be sure to carry it when you exercise.  · If you get chest pain (angina) when youre exercising, stop what youre doing, take your nitroglycerin, and call your doctor.  Date Last Reviewed: 6/2/2016  © 1844-8356 [employer]. [address] [place] [address], [place] [address]. All rights reserved. This information is not intended as a substitute for professional medical care. Always follow your healthcare professional's instructions.        Low-Salt Diet  This diet removes foods that are high in salt. It also limits the amount of salt you use when cooking. It is most often used for people with high blood pressure, edema (fluid retention), and kidney, liver, or heart disease.  Table salt contains the mineral sodium. Your body needs sodium to work normally. But too much sodium can make your health problems  worse. Your healthcare provider is recommending a low-salt (also called low-sodium) diet for you. Your total daily allowance of salt is 1,500 to 2,300 milligrams (mg). It is less than 1 teaspoon of table salt. This means you can have only about 500 to 700 mg of sodium at each meal. People with certain health problems should limit salt intake to the lower end of the recommended range.    When you cook, dont add much salt. If you can cook without using salt, even better. Dont add salt to your food at the table.  When shopping, read food labels. Salt is often called sodium on the label. Choose foods that are salt-free, low salt, or very low salt. Note that foods with reduced salt may not lower your salt intake enough.    Beans, potatoes, and pasta  Ok: Dry beans, split peas, lentils, potatoes, rice, macaroni, pasta, spaghetti without added salt  Avoid: Potato chips, tortilla chips, and similar products  Breads and cereals  Ok: Low-sodium breads, rolls, cereals, and cakes; low-salt crackers, matzo crackers  Avoid: Salted crackers, pretzels, popcorn, Greenlandic toast, pancakes, muffins  Dairy  Ok: Milk, chocolate milk, hot chocolate mix, low-salt cheeses, and yogurt  Avoid: Processed cheese and cheese spreads; Roquefort, Camembert, and cottage cheese; buttermilk, instant breakfast drink  Desserts  Ok: Ice cream, frozen yogurt, juice bars, gelatin, cookies and pies, sugar, honey, jelly, hard candy  Avoid: Most pies, cakes and cookies prepared or processed with salt; instant pudding  Drinks  Ok: Tea, coffee, fizzy (carbonated) drinks, juices  Avoid: Flavored coffees, electrolyte replacement drinks, sports drinks  Meats  Ok: All fresh meat, fish, poultry, low-salt tuna, eggs, egg substitute  Avoid: Smoked, pickled, brine-cured, or salted meats and fish. This includes gentile, chipped beef, corned beef, hot dogs, deli meats, ham, kosher meats, salt pork, sausage, canned tuna, salted codfish, smoked salmon, herring, sardines, or  anchovies.  Seasonings and spices  Ok: Most seasonings are okay. Good substitutes for salt include: fresh herb blends, hot sauce, lemon, garlic, garcia, vinegar, dry mustard, parsley, cilantro, horseradish, tomato paste, regular margarine, mayonnaise, unsalted butter, cream cheese, vegetable oil, cream, low-salt salad dressing and gravy.  Avoid: Regular ketchup, relishes, pickles, soy sauce, teriyaki sauce, Worcestershire sauce, BBQ sauce, tartar sauce, meat tenderizer, chili sauce, regular gravy, regular salad dressing, salted butter  Soups  Ok: Low-salt soups and broths made with allowed foods  Avoid: Bouillon cubes, soups with smoked or salted meats, regular soup and broth  Vegetables  Ok: Most vegetables are okay; also low-salt tomato and vegetable juices  Avoid: Sauerkraut and other brine-soaked vegetables; pickles and other pickled vegetables; tomato juice, olives  Date Last Reviewed: 8/1/2016 © 2000-2017 [employer]. [address] [place] [address]. All rights reserved. This information is not intended as a substitute for professional medical care. Always follow your healthcare professional's instructions.        Discharge Instructions: Eating a Low-Salt Diet  Your health care provider has prescribed a low-salt diet for you. Most people with heart problems need to eat less salt, which is full of sodium. Too much sodium is linked to high blood pressure, which is linked to a greater risk of heart disease, stroke, blindness, and kidney problems.  Home care    Learn ways to cut back on salt (sodium):  · Eat less frozen, canned, dried, packaged, and fast foods. These often contain high amounts of sodium.  · Season foods with herbs instead of salt when you cook.  · Season with flavorings such as pepper, lemon, garlic, and onion.  · Dont add salt to your food at the table.  · Sprinkle salt-free herbal blends on meats and vegetables.  Learn to read food labels carefully:  · Look for the total  amount of sodium per serving.  · Look for foods labeled low sodium, reduced sodium, or no added salt.  · Beware. Salt goes by many names. Cut down on foods with these words (all forms of salt) listed as ingredients:  ¨ Salt  ¨ Sodium  ¨ Soy sauce  ¨ Baking soda  ¨ Baking powder  ¨ MSG  ¨ Monosodium  ¨ Na (the chemical symbol for sodium)  Other ideas:  · Use more fresh food. Buy more fruits and vegetables.  · Select lean meats, fish, and poultry.  · Find a cookbook with low-salt recipes. Youll find ideas for tasty meals that are healthy for your heart.  ·   When eating out, ask questions about the menu. Tell the  you're on a low-salt diet.  ¨ If you order fish, chicken, beef, or pork, ask the  to have it broiled, baked, poached, or grilled without salt, butter, or breading.  ¨ Choose plain steamed rice, boiled noodles, and baked or boiled potatoes. Top potatoes with chives and a little sour cream instead of butter.  · Avoid antacids that are high in salt. Check the label before you buy.  Follow-up  Make a follow-up appointment with a nutritionist as directed by our staff.  Date Last Reviewed: 6/20/2015  © 5679-3453 [employer]. [address] [place] [address]. All rights reserved. This information is not intended as a substitute for professional medical care. Always follow your healthcare professional's instructions.        Low-Salt Choices  Eating salt (sodium) can make your body retain too much water. Excess water makes your heart work harder. Canned, packaged, and frozen foods are easy to prepare, but they are often high in sodium. Here are some ideas for low-salt foods you can easily prepare yourself.    For breakfast  · Fruit or 100% fruit juice  · Whole-wheat bread or an English muffin. Compare sodium content on labels.  · Low-fat milk or yogurt  · Unsalted eggs  · Shredded wheat  · Corn tortillas  · Unsalted steamed rice  · Regular (not instant) hot cereal, made without  salt  Stay away from:  · Sausage, gentile, and ham  · Flour tortillas  · Packaged muffins, pancakes, and biscuits  · Instant hot cereals  · Cottage cheese  For lunch and dinner  · Fresh fish, chicken, turkey, or meat--baked, broiled, or roasted without salt  · Dry beans, cooked without salt  · Tofu, stir-fried without salt  · Unsalted fresh fruit and vegetables, or frozen or canned fruit and vegetables with no added salt  Stay away from:  · Lunch or deli meat that is cured or smoked  · Cheese  · Tomato juice and catsup  · Canned vegetables, soups, and fish not labeled as no-salt-added or reduced sodium  · Packaged gravies and sauces  · Olives, pickles, and relish  · Bottled salad dressings  For snacks and desserts  · Yogurt  · Unsalted, air popped popcorn  · Unsalted nuts or seeds  Stay away from:  · Pies and cakes  · Packaged dessert mixes  · Pizza  · Canned and packaged puddings  · Pretzels, chips, crackers, and nuts--unless the label says unsalted  Date Last Reviewed: 6/17/2015  © 6082-8638 [employer]. [address] [place] [address], [place] [address]. All rights reserved. This information is not intended as a substitute for professional medical care. Always follow your healthcare professional's instructions.

## 2020-08-30 ENCOUNTER — PATIENT OUTREACH (OUTPATIENT)
Dept: ADMINISTRATIVE | Facility: OTHER | Age: 70
End: 2020-08-30

## 2020-08-31 ENCOUNTER — OFFICE VISIT (OUTPATIENT)
Dept: OPTOMETRY | Facility: CLINIC | Age: 70
End: 2020-08-31
Payer: MEDICARE

## 2020-08-31 ENCOUNTER — TELEPHONE (OUTPATIENT)
Dept: INTERNAL MEDICINE | Facility: CLINIC | Age: 70
End: 2020-08-31

## 2020-08-31 DIAGNOSIS — H25.13 NUCLEAR SCLEROSIS, BILATERAL: ICD-10-CM

## 2020-08-31 DIAGNOSIS — Z13.5 GLAUCOMA SCREENING: ICD-10-CM

## 2020-08-31 DIAGNOSIS — E11.9 TYPE 2 DIABETES MELLITUS WITHOUT RETINOPATHY: Primary | ICD-10-CM

## 2020-08-31 DIAGNOSIS — E11.40 TYPE 2 DIABETES MELLITUS WITH DIABETIC NEUROPATHY, WITHOUT LONG-TERM CURRENT USE OF INSULIN: ICD-10-CM

## 2020-08-31 DIAGNOSIS — H52.4 PRESBYOPIA: ICD-10-CM

## 2020-08-31 DIAGNOSIS — I10 ESSENTIAL HYPERTENSION: ICD-10-CM

## 2020-08-31 PROCEDURE — 92014 COMPRE OPH EXAM EST PT 1/>: CPT | Mod: S$GLB,,, | Performed by: OPTOMETRIST

## 2020-08-31 PROCEDURE — 92015 PR REFRACTION: ICD-10-PCS | Mod: S$GLB,,, | Performed by: OPTOMETRIST

## 2020-08-31 PROCEDURE — 92015 DETERMINE REFRACTIVE STATE: CPT | Mod: S$GLB,,, | Performed by: OPTOMETRIST

## 2020-08-31 PROCEDURE — 99999 PR PBB SHADOW E&M-EST. PATIENT-LVL III: ICD-10-PCS | Mod: PBBFAC,,, | Performed by: OPTOMETRIST

## 2020-08-31 PROCEDURE — 92014 PR EYE EXAM, EST PATIENT,COMPREHESV: ICD-10-PCS | Mod: S$GLB,,, | Performed by: OPTOMETRIST

## 2020-08-31 PROCEDURE — 99999 PR PBB SHADOW E&M-EST. PATIENT-LVL III: CPT | Mod: PBBFAC,,, | Performed by: OPTOMETRIST

## 2020-08-31 NOTE — PROGRESS NOTES
HPI     DLS; 4/15/19  Pt states his reading VA has gotten worse with his RX glasses on. Also   states at night his VA is not that great.   No f/f  No gtt  LBS: 117  Hemoglobin A1C       Date                     Value               Ref Range             Status                08/01/2020               6.2 (H)             4.0 - 5.6 %           Final                      11/12/2019               6.2 (H)             4.0 - 5.6 %           Final                      04/24/2019               6.5 (H)             4.0 - 5.6 %           Final                   Last edited by Stella Devine MA on 8/31/2020 12:45 PM. (History)        ROS     Positive for: Endocrine (DM)    Negative for: Constitutional, Gastrointestinal, Neurological, Skin,   Genitourinary, Musculoskeletal, HENT, Cardiovascular, Eyes, Respiratory,   Psychiatric, Allergic/Imm, Heme/Lymph    Last edited by Marcelo Toney, MAKEDA on 8/31/2020 12:59 PM. (History)        Assessment /Plan     For exam results, see Encounter Report.    Type 2 diabetes mellitus without retinopathy    Essential hypertension  -     Ambulatory referral/consult to Optometry    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thy, without long-term current use of insulin  -     Ambulatory referral/consult to Optometry    Nuclear sclerosis, bilateral    Glaucoma screening    Presbyopia      1. Cat OU--pt reports problems at night.  Wishes surgery if is a candidate  2. DM- WITHOUT RETINOPATHY.  Advised yearly DFE    PLAN:    surgical consult--Dr López

## 2020-08-31 NOTE — LETTER
September 1, 2020      Lachelle Lobato, NP  [address] [facility]  [place] [address]           [facility]  [address] [facility]  [place] [address]  Phone: [phone]  Fax: [phone]          Patient: Xander Sanchez   MR Number: [id number]   YOB: 1950   Date of Visit: 8/31/2020       Dear Lachelle Lobato:    Thank you for referring Xander Sanchez to me for evaluation. Attached you will find relevant portions of my assessment and plan of care.    If you have questions, please do not hesitate to call me. I look forward to following Xander Sanchez along with you.    Sincerely,    Marcelo Toney, OD    Enclosure  CC:  No Recipients    If you would like to receive this communication electronically, please contact [email or web address] or [phone] to request more information on [employer] (formerly [employer] Link access.    For providers and/or their staff who would like to refer a patient to [facility], please contact us through our one-stop-shop provider referral line, Dionte Wood, at [phone].    If you feel you have received this communication in error or would no longer like to receive these types of communications, please e-mail [email or web address]

## 2020-08-31 NOTE — TELEPHONE ENCOUNTER
----- Message from Mayra Soriano sent at 8/31/2020  7:50 AM CDT -----  Contact: [phone]/Self  Patient is requesting to speak with nurse regarding medication hydroCHLOROthiazide (MICROZIDE) 12.5 mg capsule. Please advise.

## 2020-08-31 NOTE — PROGRESS NOTES
Care Everywhere: updated  Immunization: updated  Health Maintenance: updated  Media Review:   Legacy Review:   Order placed:   Upcoming appts:optometry 8/31/2020

## 2020-09-04 ENCOUNTER — OFFICE VISIT (OUTPATIENT)
Dept: FAMILY MEDICINE | Facility: CLINIC | Age: 70
End: 2020-09-04
Payer: MEDICARE

## 2020-09-04 ENCOUNTER — TELEPHONE (OUTPATIENT)
Dept: FAMILY MEDICINE | Facility: CLINIC | Age: 70
End: 2020-09-04

## 2020-09-04 ENCOUNTER — HOSPITAL ENCOUNTER (OUTPATIENT)
Dept: RADIOLOGY | Facility: HOSPITAL | Age: 70
Discharge: HOME | End: 2020-09-04
Attending: NURSE PRACTITIONER
Payer: MEDICARE

## 2020-09-04 VITALS
SYSTOLIC BLOOD PRESSURE: 110 MMHG | DIASTOLIC BLOOD PRESSURE: 80 MMHG | BODY MASS INDEX: 25.24 KG/M2 | OXYGEN SATURATION: 99 % | WEIGHT: 180.31 LBS | HEIGHT: 71 IN | HEART RATE: 87 BPM | TEMPERATURE: 97 F

## 2020-09-04 DIAGNOSIS — R07.1 CHEST PAIN ON BREATHING: ICD-10-CM

## 2020-09-04 DIAGNOSIS — E66.3 OVERWEIGHT (BMI 25.0-29.9): ICD-10-CM

## 2020-09-04 DIAGNOSIS — I50.32 DIASTOLIC DYSFUNCTION WITH CHRONIC HEART FAILURE: ICD-10-CM

## 2020-09-04 DIAGNOSIS — I10 ESSENTIAL HYPERTENSION: Primary | ICD-10-CM

## 2020-09-04 DIAGNOSIS — J43.9 PULMONARY EMPHYSEMA, UNSPECIFIED EMPHYSEMA TYPE: ICD-10-CM

## 2020-09-04 PROCEDURE — 93010 ELECTROCARDIOGRAM REPORT: CPT | Mod: HCNC,S$GLB,, | Performed by: INTERNAL MEDICINE

## 2020-09-04 PROCEDURE — 99214 PR OFFICE/OUTPT VISIT, EST, LEVL IV, 30-39 MIN: ICD-10-PCS | Mod: HCNC,S$GLB,, | Performed by: NURSE PRACTITIONER

## 2020-09-04 PROCEDURE — 3008F PR BODY MASS INDEX (BMI) DOCUMENTED: ICD-10-PCS | Mod: HCNC,CPTII,S$GLB, | Performed by: NURSE PRACTITIONER

## 2020-09-04 PROCEDURE — 1159F PR MEDICATION LIST DOCUMENTED IN MEDICAL RECORD: ICD-10-PCS | Mod: HCNC,S$GLB,, | Performed by: NURSE PRACTITIONER

## 2020-09-04 PROCEDURE — 1125F PR PAIN SEVERITY QUANTIFIED, PAIN PRESENT: ICD-10-PCS | Mod: HCNC,S$GLB,, | Performed by: NURSE PRACTITIONER

## 2020-09-04 PROCEDURE — 93005 EKG 12-LEAD: ICD-10-PCS | Mod: HCNC,S$GLB,, | Performed by: NURSE PRACTITIONER

## 2020-09-04 PROCEDURE — 3079F PR MOST RECENT DIASTOLIC BLOOD PRESSURE 80-89 MM HG: ICD-10-PCS | Mod: HCNC,CPTII,S$GLB, | Performed by: NURSE PRACTITIONER

## 2020-09-04 PROCEDURE — 3074F SYST BP LT 130 MM HG: CPT | Mod: HCNC,CPTII,S$GLB, | Performed by: NURSE PRACTITIONER

## 2020-09-04 PROCEDURE — 93005 ELECTROCARDIOGRAM TRACING: CPT | Mod: HCNC,S$GLB,, | Performed by: NURSE PRACTITIONER

## 2020-09-04 PROCEDURE — 3008F BODY MASS INDEX DOCD: CPT | Mod: HCNC,CPTII,S$GLB, | Performed by: NURSE PRACTITIONER

## 2020-09-04 PROCEDURE — 1159F MED LIST DOCD IN RCRD: CPT | Mod: HCNC,S$GLB,, | Performed by: NURSE PRACTITIONER

## 2020-09-04 PROCEDURE — 71046 XR CHEST PA AND LATERAL: ICD-10-PCS | Mod: 26,HCNC,, | Performed by: RADIOLOGY

## 2020-09-04 PROCEDURE — 93010 EKG 12-LEAD: ICD-10-PCS | Mod: HCNC,S$GLB,, | Performed by: INTERNAL MEDICINE

## 2020-09-04 PROCEDURE — 1125F AMNT PAIN NOTED PAIN PRSNT: CPT | Mod: HCNC,S$GLB,, | Performed by: NURSE PRACTITIONER

## 2020-09-04 PROCEDURE — 1101F PR PT FALLS ASSESS DOC 0-1 FALLS W/OUT INJ PAST YR: ICD-10-PCS | Mod: HCNC,CPTII,S$GLB, | Performed by: NURSE PRACTITIONER

## 2020-09-04 PROCEDURE — 71046 X-RAY EXAM CHEST 2 VIEWS: CPT | Mod: 26,HCNC,, | Performed by: RADIOLOGY

## 2020-09-04 PROCEDURE — 3079F DIAST BP 80-89 MM HG: CPT | Mod: HCNC,CPTII,S$GLB, | Performed by: NURSE PRACTITIONER

## 2020-09-04 PROCEDURE — 99999 PR PBB SHADOW E&M-EST. PATIENT-LVL V: CPT | Mod: PBBFAC,HCNC,, | Performed by: NURSE PRACTITIONER

## 2020-09-04 PROCEDURE — 1101F PT FALLS ASSESS-DOCD LE1/YR: CPT | Mod: HCNC,CPTII,S$GLB, | Performed by: NURSE PRACTITIONER

## 2020-09-04 PROCEDURE — 99214 OFFICE O/P EST MOD 30 MIN: CPT | Mod: HCNC,S$GLB,, | Performed by: NURSE PRACTITIONER

## 2020-09-04 PROCEDURE — 71046 X-RAY EXAM CHEST 2 VIEWS: CPT | Mod: TC,HCNC,FY,PO

## 2020-09-04 PROCEDURE — 99999 PR PBB SHADOW E&M-EST. PATIENT-LVL V: ICD-10-PCS | Mod: PBBFAC,HCNC,, | Performed by: NURSE PRACTITIONER

## 2020-09-04 PROCEDURE — 3074F PR MOST RECENT SYSTOLIC BLOOD PRESSURE < 130 MM HG: ICD-10-PCS | Mod: HCNC,CPTII,S$GLB, | Performed by: NURSE PRACTITIONER

## 2020-09-04 NOTE — PROGRESS NOTES
Subjective:       Patient ID: Xander Sanchez is a 70 y.o. male.    Chief Complaint: Follow-up, Hypertension, Leg Swelling (both sides), and Chest Pain (when breathing deep)    This is a 71 yo male patient of Dr. August who is known to me. He presents today for a HTN follow-up. PMH includes    Patient Active Problem List:     Biventricular implantable cardioverter-defibrillator (ICD) in situ     Essential hypertension     Cervical spondylosis with myelopathy     Brachial neuritis or radiculitis NOS     Degeneration of cervical intervertebral disc     PUD (peptic ulcer disease)     COPD (chronic obstructive pulmonary disease) with emphysema     Cardiomyopathy, nonischemic     HNP (herniated nucleus pulposus) with myelopathy, cervical     CRPS (complex regional pain syndrome type II)     Chronic pain syndrome     Cervical post-laminectomy syndrome     LBBB (left bundle branch block)     Slow transit constipation     Chronic systolic dysfunction of left ventricle     S/P TURP     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thy     Diastolic dysfunction with chronic heart failure     Hypertensive left ventricular hypertrophy with heart failure     Narcotic dependency, continuous     Muscle right arm weakness     Rotator cuff syndrome of right shoulder     Facet arthritis of cervical region     Aortic atherosclerosis     Body mass index (BMI) of 24.0 to 24.9 in adult     BPH with urinary obstruction     Incomplete bladder emptying     History of colon polyps     Gait abnormality     Weakness of right hip     Gait instability     Prostatitis, acute     Mixed hyperlipidemia     GERD (gastroesophageal reflux disease)     Enuresis     Cervical myelopathy     Systolic heart failure    VSS today. Patient saw me one week ago with bilateral leg edema that had been gradually worsening over 2 weeks. Was instructed to increase his diuretic dose for 3 days and to monitor BP for the week. Also instructed to wear compression  stockings, minimize salt intake and drink more water. Patient reports significant improvement of leg swelling. Denies fever, chills, body aches, N/V/D, loss appetite, loss of sense of taste, SOB, dyspnea, palpitations, HAs, or blurred vision. Patient is c/o chest pain with breathing x past 2 days that has not worsened or improved since onset. Said it mostly happens in the AM, and he feels fine during the rest of the day. Currently in 1/10 pain. Says pain worsens with certain positions. Has not tried taking anything for the pain. Says he has never felt this pain before. Patient has chronic cardiac history and is followed by Cardiology; most recent visit was May 2020. Denies cough or congestion. Denies injury or trauma. See more below.    Chest Pain   This is a new problem. The current episode started in the past 7 days. The problem occurs daily. The problem has been unchanged. The pain is at a severity of 1/10. The pain is mild. The pain radiates to the mid back. Associated symptoms include back pain. Pertinent negatives include no abdominal pain, cough, dizziness, fever, headaches, nausea, numbness, palpitations, shortness of breath, vomiting or weakness. He has tried nothing for the symptoms. Risk factors include male gender.   His past medical history is significant for COPD, CHF, diabetes and hyperlipidemia.   Pertinent negatives for past medical history include no aortic aneurysm and no aortic dissection.     Review of Systems   Constitutional: Negative for activity change, appetite change, chills, fatigue and fever.   HENT: Negative for nasal congestion, sore throat and trouble swallowing.    Eyes: Negative for visual disturbance.   Respiratory: Negative for cough, chest tightness and shortness of breath.    Cardiovascular: Positive for chest pain. Negative for palpitations and leg swelling.   Gastrointestinal: Negative for abdominal pain, diarrhea, nausea and vomiting.   Genitourinary: Negative for difficulty  urinating.   Musculoskeletal: Positive for back pain. Negative for myalgias.   Neurological: Negative for dizziness, weakness, light-headedness, numbness, headaches, disturbances in coordination and coordination difficulties.   Psychiatric/Behavioral: The patient is not nervous/anxious.          Objective:      Physical Exam  Vitals signs reviewed.   Constitutional:       General: He is not in acute distress.     Appearance: Normal appearance. He is well-developed, well-groomed and overweight.   HENT:      Head: Normocephalic and atraumatic.      Right Ear: Hearing, tympanic membrane, ear canal and external ear normal.      Left Ear: Hearing, tympanic membrane, ear canal and external ear normal.      Nose: Nose normal.      Mouth/Throat:      Pharynx: Uvula midline.   Eyes:      General: Lids are normal.         Right eye: No discharge.         Left eye: No discharge.      Conjunctiva/sclera: Conjunctivae normal.      Pupils: Pupils are equal, round, and reactive to light.      Comments: Wearing glasses   Neck:      Musculoskeletal: Full passive range of motion without pain, normal range of motion and neck supple.      Vascular: No carotid bruit.      Trachea: Trachea normal.   Cardiovascular:      Rate and Rhythm: Normal rate and regular rhythm.      Pulses: Normal pulses.           Carotid pulses are 2+ on the right side and 2+ on the left side.       Radial pulses are 2+ on the right side and 2+ on the left side.        Dorsalis pedis pulses are 2+ on the right side and 2+ on the left side.        Posterior tibial pulses are 2+ on the right side and 2+ on the left side.      Heart sounds: Normal heart sounds, S1 normal and S2 normal.      Comments: Cardiac defibrillator in situ to left chest  Pulmonary:      Effort: Pulmonary effort is normal. No respiratory distress.      Breath sounds: Normal breath sounds. No wheezing or rhonchi.   Abdominal:      General: Bowel sounds are normal. There is no distension.       Palpations: Abdomen is soft.      Tenderness: There is no abdominal tenderness.   Musculoskeletal: Normal range of motion.      Right lower leg: No edema.      Left lower leg: No edema.   Skin:     General: Skin is warm and dry.      Capillary Refill: Capillary refill takes less than 2 seconds.      Coloration: Skin is not pale.      Findings: No erythema.   Neurological:      Mental Status: He is alert and oriented to person, place, and time.      Coordination: Coordination normal.      Gait: Gait abnormal.      Deep Tendon Reflexes: Reflexes are normal and symmetric.   Psychiatric:         Mood and Affect: Mood normal.         Speech: Speech normal.         Behavior: Behavior normal. Behavior is cooperative.         Thought Content: Thought content normal.         Assessment:       1. Essential hypertension    2. Chest pain on breathing    3. Diastolic dysfunction with chronic heart failure    4. Pulmonary emphysema, unspecified emphysema type    5. Overweight (BMI 25.0-29.9)        Plan:       Xander was seen today for follow-up, hypertension, leg swelling and chest pain.    Diagnoses and all orders for this visit:    Essential hypertension    Chest pain on breathing  -     IN OFFICE EKG 12-LEAD (to Muse)  -     X-Ray Chest PA And Lateral; Future    Diastolic dysfunction with chronic heart failure    Pulmonary emphysema, unspecified emphysema type    Overweight (BMI 25.0-29.9)        - EKG & CXR ordered today  - Encouraged patient to continue to eat a low salt/low fat diet and discussed importance of engaging in physical activity at least 5x/week for a minimum of 30 min/day  - Advised to wear compression stockings during waking hours to improve circulation  - Warning signs discussed  - Follow up in 3 months, or sooner if needed

## 2020-09-04 NOTE — TELEPHONE ENCOUNTER
Left message with wife to tell  the the patient to call back. Patients wife voices understanding.

## 2020-09-14 DIAGNOSIS — M62.81 MUSCLE RIGHT ARM WEAKNESS: ICD-10-CM

## 2020-09-14 DIAGNOSIS — G56.41 COMPLEX REGIONAL PAIN SYNDROME TYPE 2 OF RIGHT UPPER EXTREMITY: ICD-10-CM

## 2020-09-14 DIAGNOSIS — M47.812 FACET ARTHRITIS OF CERVICAL REGION: ICD-10-CM

## 2020-09-14 DIAGNOSIS — M47.12 CERVICAL SPONDYLOSIS WITH MYELOPATHY: ICD-10-CM

## 2020-09-14 DIAGNOSIS — R29.898 RIGHT ARM WEAKNESS: ICD-10-CM

## 2020-09-14 DIAGNOSIS — M54.12 CERVICAL RADICULOPATHY: ICD-10-CM

## 2020-09-14 DIAGNOSIS — M54.12 BRACHIAL NEURITIS OR RADICULITIS: ICD-10-CM

## 2020-09-14 DIAGNOSIS — G90.50 COMPLEX REGIONAL PAIN SYNDROME TYPE 1, AFFECTING UNSPECIFIED SITE: ICD-10-CM

## 2020-09-14 DIAGNOSIS — M50.30 DEGENERATION OF CERVICAL INTERVERTEBRAL DISC: ICD-10-CM

## 2020-09-14 DIAGNOSIS — M48.02 CERVICAL STENOSIS OF SPINE: ICD-10-CM

## 2020-09-14 DIAGNOSIS — M54.12 CERVICAL RADICULAR PAIN: ICD-10-CM

## 2020-09-14 DIAGNOSIS — G89.4 CHRONIC PAIN SYNDROME: ICD-10-CM

## 2020-09-14 DIAGNOSIS — M79.601 RIGHT ARM PAIN: ICD-10-CM

## 2020-09-14 DIAGNOSIS — M48.02 SPINAL STENOSIS IN CERVICAL REGION: ICD-10-CM

## 2020-09-14 DIAGNOSIS — M96.1 CERVICAL POST-LAMINECTOMY SYNDROME: ICD-10-CM

## 2020-09-14 DIAGNOSIS — M50.00 HNP (HERNIATED NUCLEUS PULPOSUS) WITH MYELOPATHY, CERVICAL: ICD-10-CM

## 2020-09-14 DIAGNOSIS — M75.101 ROTATOR CUFF SYNDROME OF RIGHT SHOULDER: ICD-10-CM

## 2020-09-14 DIAGNOSIS — R29.898 RIGHT HAND WEAKNESS: ICD-10-CM

## 2020-09-14 DIAGNOSIS — F11.20 NARCOTIC DEPENDENCY, CONTINUOUS: ICD-10-CM

## 2020-09-14 RX ORDER — HYDROCODONE BITARTRATE AND ACETAMINOPHEN 10; 325 MG/1; MG/1
1 TABLET ORAL EVERY 6 HOURS PRN
Qty: 120 TABLET | Refills: 0 | Status: SHIPPED | OUTPATIENT
Start: 2020-09-14 | End: 2020-10-12 | Stop reason: SDUPTHER

## 2020-09-14 NOTE — TELEPHONE ENCOUNTER
----- Message from Aracely Garcia sent at 9/14/2020  9:13 AM CDT -----  Regarding: refill  Contact: pt @ [phone]  Rx Refill/Request     Is this a Refill or New Rx:  refill  Rx Name and Strength:  HYDROcodone-acetaminophen (NORCO)  mg per tablet    Preferred Pharmacy with phone number:     [id number] [address] [facility] [address]  [facility] [address]  [facility] [address]  Phone: [phone] Fax: [phone]    Communication Preference:pt @ [phone]  Additional Information:

## 2020-09-28 ENCOUNTER — OFFICE VISIT (OUTPATIENT)
Dept: OPHTHALMOLOGY | Facility: CLINIC | Age: 70
End: 2020-09-28
Payer: MEDICARE

## 2020-09-28 DIAGNOSIS — I10 ESSENTIAL HYPERTENSION: ICD-10-CM

## 2020-09-28 DIAGNOSIS — H52.7 REFRACTIVE ERROR: ICD-10-CM

## 2020-09-28 DIAGNOSIS — H25.13 NUCLEAR SCLEROSIS OF BOTH EYES: Primary | ICD-10-CM

## 2020-09-28 DIAGNOSIS — E11.9 DM TYPE 2 WITHOUT RETINOPATHY: ICD-10-CM

## 2020-09-28 PROCEDURE — 99999 PR PBB SHADOW E&M-EST. PATIENT-LVL III: CPT | Mod: PBBFAC,HCNC,, | Performed by: OPHTHALMOLOGY

## 2020-09-28 PROCEDURE — 92136 OPHTHALMIC BIOMETRY: CPT | Mod: HCNC,RT,S$GLB, | Performed by: OPHTHALMOLOGY

## 2020-09-28 PROCEDURE — 99999 PR PBB SHADOW E&M-EST. PATIENT-LVL III: ICD-10-PCS | Mod: PBBFAC,HCNC,, | Performed by: OPHTHALMOLOGY

## 2020-09-28 PROCEDURE — 92014 PR EYE EXAM, EST PATIENT,COMPREHESV: ICD-10-PCS | Mod: HCNC,S$GLB,, | Performed by: OPHTHALMOLOGY

## 2020-09-28 PROCEDURE — 92014 COMPRE OPH EXAM EST PT 1/>: CPT | Mod: HCNC,S$GLB,, | Performed by: OPHTHALMOLOGY

## 2020-09-28 PROCEDURE — 92136 BIOMETRY: ICD-10-PCS | Mod: HCNC,RT,S$GLB, | Performed by: OPHTHALMOLOGY

## 2020-09-28 RX ORDER — NEPAFENAC 3 MG/ML
1 SUSPENSION/ DROPS OPHTHALMIC DAILY
Qty: 3 ML | Refills: 1 | Status: SHIPPED | OUTPATIENT
Start: 2020-11-07 | End: 2020-12-07

## 2020-09-28 RX ORDER — DUREZOL 0.5 MG/ML
1 EMULSION OPHTHALMIC 4 TIMES DAILY
Qty: 5 ML | Refills: 1 | Status: SHIPPED | OUTPATIENT
Start: 2020-11-10 | End: 2020-12-10

## 2020-09-28 RX ORDER — OFLOXACIN 3 MG/ML
1 SOLUTION/ DROPS OPHTHALMIC 4 TIMES DAILY
Qty: 5 ML | Refills: 1 | Status: SHIPPED | OUTPATIENT
Start: 2020-11-07 | End: 2020-11-18

## 2020-09-28 NOTE — PROGRESS NOTES
Subjective:       Patient ID: Xander Sanchez is a 70 y.o. male.    Chief Complaint: Cataract (Eval)    HPI     Cataract      Additional comments: Eval              Comments     Referred: Dr. Toney     71 y/o male is here for Cataract Evaluation. H/o of Nuclear Sclerosis,   bilateral. Pt reports a decreased in vision. BSL was 117 on 9/27/2020. Pt   denies glare, flashes, floaters, and eye allergies.     Eyemeds  No gtts    Hemoglobin A1C       Date                     Value               Ref Range             Status                08/01/2020               6.2 (H)             4.0 - 5.6 %           Final                        Last edited by Kemi Stockton on 9/28/2020  8:34 AM. (History)             Assessment:       1. Nuclear sclerosis of both eyes    2. DM type 2 without retinopathy    3. Essential hypertension    4. Refractive error        Plan:       Visually significant cataract OU -Pt. Wants Sx.     DM-No NPDR OU.  HTN-No retinopathy OU.  RE      Cataract Surgery Consent: Patient with a visually significant cataract with difficulties of ADLs, reading, driving, night vision, glare (any and all).  Discussed with Patient/Family/Caregiver: options, risks and benefits, expectations of cataract surgery, utilized an eye model with questions and answers to facilitate discussion.  Discussed lens options and patient understands that glasses may be required for optimal vision for distance and/or near vision after cataract surgery.  The Patient/Family/Caregiver  voice good understanding and patient wishes to proceed with surgery.  The patient will likely benefit from surgery and patient signed consent for Right Eye.  CE OD 11/10/2020 SN60WF 22.0,        OS 11/24/2020 SN60WF 22.0.  Control DM & HTN.

## 2020-09-28 NOTE — LETTER
September 28, 2020      Marcelo Toney, OD  [address]  [facility] [address]           [facility]  [address] [facility]  [place] [address]  Phone: [phone]  Fax: [phone]          Patient: Xander Sanchez   MR Number: [id number]   YOB: 1950   Date of Visit: 9/28/2020       Dear Dr. Marcelo Toney:    Thank you for referring Xander Sanchez to me for evaluation. Attached you will find relevant portions of my assessment and plan of care.    If you have questions, please do not hesitate to call me. I look forward to following Xander Sanchez along with you.    Sincerely,    Oral Graham MD    Enclosure  CC:  No Recipients    If you would like to receive this communication electronically, please contact [email or web address] or [phone] to request more information on [employer] Link access.    For providers and/or their staff who would like to refer a patient to [facility], please contact us through our one-stop-shop provider referral line, [facility] , at [phone].    If you feel you have received this communication in error or would no longer like to receive these types of communications, please e-mail [email or web address]

## 2020-10-07 ENCOUNTER — PATIENT OUTREACH (OUTPATIENT)
Dept: ADMINISTRATIVE | Facility: OTHER | Age: 70
End: 2020-10-07

## 2020-10-07 NOTE — PROGRESS NOTES
Health Maintenance Due   Topic Date Due    High Dose Statin  05/09/1971    Shingles Vaccine (1 of 2) 05/09/1998    Influenza Vaccine (1) 08/01/2020     Updates were requested from care everywhere.  Chart was reviewed for overdue Proactive [facility] Encounters (LESLEE) topics (CRS, Breast Cancer Screening, Eye exam)  Health Maintenance has been updated.  LINKS immunization registry triggered.  Immunizations were reconciled.

## 2020-10-09 ENCOUNTER — OFFICE VISIT (OUTPATIENT)
Dept: PODIATRY | Facility: CLINIC | Age: 70
End: 2020-10-09
Payer: MEDICARE

## 2020-10-09 VITALS
WEIGHT: 180.31 LBS | HEART RATE: 72 BPM | HEIGHT: 71 IN | BODY MASS INDEX: 25.24 KG/M2 | SYSTOLIC BLOOD PRESSURE: 133 MMHG | DIASTOLIC BLOOD PRESSURE: 78 MMHG

## 2020-10-09 DIAGNOSIS — E11.42 TYPE 2 DIABETES MELLITUS WITH POLYNEUROPATHY: Primary | ICD-10-CM

## 2020-10-09 DIAGNOSIS — B35.1 ONYCHOMYCOSIS: ICD-10-CM

## 2020-10-09 PROCEDURE — 99499 UNLISTED E&M SERVICE: CPT | Mod: HCNC,S$GLB,, | Performed by: PODIATRIST

## 2020-10-09 PROCEDURE — 99999 PR PBB SHADOW E&M-EST. PATIENT-LVL IV: CPT | Mod: PBBFAC,HCNC,, | Performed by: PODIATRIST

## 2020-10-09 PROCEDURE — 11721 ROUTINE FOOT CARE: ICD-10-PCS | Mod: Q9,HCNC,S$GLB, | Performed by: PODIATRIST

## 2020-10-09 PROCEDURE — 99999 PR PBB SHADOW E&M-EST. PATIENT-LVL IV: ICD-10-PCS | Mod: PBBFAC,HCNC,, | Performed by: PODIATRIST

## 2020-10-09 PROCEDURE — 11721 DEBRIDE NAIL 6 OR MORE: CPT | Mod: Q9,HCNC,S$GLB, | Performed by: PODIATRIST

## 2020-10-09 PROCEDURE — 99499 NO LOS: ICD-10-PCS | Mod: HCNC,S$GLB,, | Performed by: PODIATRIST

## 2020-10-09 NOTE — PROGRESS NOTES
Subjective:      Patient ID: Xander Sanchez is a 70 y.o. male.    Chief Complaint: Diabetic Foot Exam    Xander is a 70 y.o. male who presents to the clinic for evaluation and treatment of high risk feet. Xander has a past medical history of Asthma, Cardiomyopathy, Cervical radicular pain, Cervical spondylosis with myelopathy (10/17/2012), Chronic bronchitis, Chronic systolic dysfunction of left ventricle (7/27/2015), Constipation (7/27/2015), COPD (chronic obstructive pulmonary disease), CRPS (complex regional pain syndrome type I) (12/29/2014), Diabetes mellitus, type 2, DM type 2 (diabetes mellitus, type 2) (7/27/2015), Enlarged prostate (7/27/2015), Enuresis (7/6/2018), Hypertension, ICD (implantable cardiac defibrillator) in place, Mixed hyperlipidemia (2/28/2018), Presence of biventricular AICD (7/27/2015), 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thy (2/1/2016), and Urinary tract infection.  This patient has documented high risk feet requiring routine maintenance secondary to diabetes mellitis and those secondary complications of diabetes, as mentioned.  Treated per Physical medicine for chronic back pain. Complains that his pain in his feet are mostly at night when he lays down. Taking gabapentin 600 mg po qid for chronic neuropathic pain. No new complaints.    10/31/2019:  Presents routine diabetic foot care.  States that he was prescribed topical creams to be applied to dry scaly painful skin to the right 5th toe a couple months ago which has helped improve his symptoms.  Due to complaints today.  Seen by Milly Leiva NP on 09/19/2019.    3/2/20: Reports worsening right foot drag since November. Otherwise, no other changes. Painful scaly skin has resolved. Presents today for painful long toe nails.    6/5/2020: Presents for diabetic foot care. No new complaints.  10/09/2020:  Returns for routine foot care.  No new complaints.    LINDA Perezeen by Lachelle Lobato NP on  08/28/2020  LOV: 1/10/2020    Past Medical History:   Diagnosis Date    Asthma     Cardiomyopathy     Cervical radicular pain     Cervical spondylosis with myelopathy 10/17/2012    Chronic bronchitis     Chronic systolic dysfunction of left ventricle 7/27/2015    Constipation 7/27/2015    COPD (chronic obstructive pulmonary disease)     CRPS (complex regional pain syndrome type I) 12/29/2014    Diabetes mellitus, type 2     DM type 2 (diabetes mellitus, type 2) 7/27/2015    Enlarged prostate 7/27/2015    Enuresis 7/6/2018    Hypertension     ICD (implantable cardiac defibrillator) in place     Mixed hyperlipidemia 2/28/2018    Presence of biventricular AICD 7/27/2015    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thy 2/1/2016    Urinary tract infection     pt does not know       Past Surgical History:   Procedure Laterality Date    CARDIAC DEFIBRILLATOR PLACEMENT      CERVICAL SPINE SURGERY      COLONOSCOPY N/A 7/19/2017    Procedure: COLONOSCOPY  golytely;  Surgeon: Vannessa Uribe MD;  [facility];  Service: Endoscopy;  Laterality: N/A;    SPINE SURGERY         Family History   Problem Relation Age of Onset    Hypertension Mother     Hypertension Father     COPD Father     No Known Problems Sister     No Known Problems Brother     No Known Problems Daughter     Prostate cancer Neg Hx     Kidney disease Neg Hx        Social History     Socioeconomic History    Marital status:      Spouse name: Not on file    Number of children: Not on file    Years of education: Not on file    Highest education level: Not on file   Occupational History    Not on file   Social Needs    Financial resource strain: Not on file    Food insecurity     Worry: Not on file     Inability: Not on file    Transportation needs     Medical: Not on file     Non-medical: Not on file   Tobacco Use    Smoking status: Former Smoker     Packs/day: 1.00     Years: 40.00     Pack years:  40.00     Types: Cigarettes     Quit date: 2009     Years since quittin.2    Smokeless tobacco: Never Used   Substance and Sexual Activity    Alcohol use: Yes     Alcohol/week: 2.0 standard drinks     Types: 1 Shots of liquor, 1 Cans of beer per week     Comment: May 1-2 beers or whiskey per month    Drug use: No    Sexual activity: Yes     Partners: Female   Lifestyle    Physical activity     Days per week: Not on file     Minutes per session: Not on file    Stress: Not on file   Relationships    Social connections     Talks on phone: Not on file     Gets together: Not on file     Attends [employer] service: Not on file     Active member of club or organization: Not on file     Attends meetings of clubs or organizations: Not on file     Relationship status: Not on file   Other Topics Concern    Not on file   Social History Narrative    Not on file       Current Outpatient Medications   Medication Sig Dispense Refill    aspirin (ECOTRIN) 81 MG EC tablet Take 81 mg by mouth once daily.      baclofen (LIORESAL) 20 MG tablet TAKE 1 TABLET (20 MG TOTAL) BY MOUTH 3 (THREE) TIMES DAILY. 270 tablet 1    [START ON 11/10/2020] difluprednate (DUREZOL) 0.05 % Drop ophthalmic solution Place 1 drop into the right eye 4 (four) times daily. For 30 days 5 mL 1    fluticasone-salmeterol 230-21 mcg/dose (ADVAIR HFA) 230-21 mcg/actuation HFAA inhaler Inhale 2 puffs into the lungs 2 (two) times daily. Controller 2 Inhaler 1    hydroCHLOROthiazide (MICROZIDE) 12.5 mg capsule TAKE 1 CAPSULE EVERY DAY 90 capsule 3    HYDROcodone-acetaminophen (NORCO)  mg per tablet Take 1 tablet by mouth every 6 (six) hours as needed for Pain. More than 7 day supply and Medically Necessary. 120 tablet 0    ibuprofen (ADVIL,MOTRIN) 800 MG tablet       [START ON 2020] ILEVRO 0.3 % DrpS Place 1 drop into both eyes once daily. For 30 days 3 mL 1    metoprolol succinate (TOPROL-XL) 25 MG 24 hr tablet TAKE 1 TABLET EVERY  DAY 90 tablet 3    [START ON 11/7/2020] ofloxacin (OCUFLOX) 0.3 % ophthalmic solution Place 1 drop into the right eye 4 (four) times daily. for 10 days 5 mL 1    oxybutynin (DITROPAN) 5 MG Tab TAKE 1 TABLET TWICE DAILY 180 tablet 3    pantoprazole (PROTONIX) 40 MG tablet TAKE 1 TABLET BY MOUTH ONCE DAILY BEFORE BREAKFAST 30 tablet 11    pravastatin (PRAVACHOL) 10 MG tablet TAKE 1 TABLET BY MOUTH ONCE DAILY AT BEDTIME 90 tablet 0    tamsulosin (FLOMAX) 0.4 mg Cap TAKE 2 CAPSULES  EVERY EVENING. 180 capsule 1    TRUE METRIX AIR GLUCOSE METER kit USE AS INSTRUCTED 1 each 0    TRUE METRIX GLUCOSE TEST STRIP Strp TEST  ONE  TIME  DAILY  AT  6AM 100 strip 3    TRUEPLUS LANCETS 33 gauge Misc TEST ONE TIME DAILY  AT  6AM 100 each 3    valsartan (DIOVAN) 40 MG tablet TAKE 1 TABLET (40 MG TOTAL) BY MOUTH ONCE DAILY. 90 tablet 3    gabapentin (NEURONTIN) 600 MG tablet Take 1 tablet (600 mg total) by mouth 4 (four) times daily with meals and nightly. 360 tablet 3     No current facility-administered medications for this visit.        Review of patient's allergies indicates:  No Known Allergies    PCP: Williams Alvarenga MD    Date Last Seen by PCP:     Current shoe gear:  Affected Foot: Casual shoes     Unaffected Foot: Casual shoes    Hemoglobin A1C   Date Value Ref Range Status   08/01/2020 6.2 (H) 4.0 - 5.6 % Final     Comment:     ADA Screening Guidelines:  5.7-6.4%  Consistent with prediabetes  >or=6.5%  Consistent with diabetes  High levels of fetal hemoglobin interfere with the HbA1C  assay. Heterozygous hemoglobin variants (HbS, HgC, etc)do  not significantly interfere with this assay.   However, presence of multiple variants may affect accuracy.     11/12/2019 6.2 (H) 4.0 - 5.6 % Final     Comment:     ADA Screening Guidelines:  5.7-6.4%  Consistent with prediabetes  >or=6.5%  Consistent with diabetes  High levels of fetal hemoglobin interfere with the HbA1C  assay. Heterozygous hemoglobin variants (HbS,  HgC, etc)do  not significantly interfere with this assay.   However, presence of multiple variants may affect accuracy.     04/24/2019 6.5 (H) 4.0 - 5.6 % Final     Comment:     ADA Screening Guidelines:  5.7-6.4%  Consistent with prediabetes  >or=6.5%  Consistent with diabetes  High levels of fetal hemoglobin interfere with the HbA1C  assay. Heterozygous hemoglobin variants (HbS, HgC, etc)do  not significantly interfere with this assay.   However, presence of multiple variants may affect accuracy.         Review of Systems   Constitution: Negative for chills and fever.   HENT: Negative for hearing loss.    Cardiovascular: Negative for chest pain and claudication.   Respiratory: Negative for shortness of breath.    Skin: Positive for color change and nail changes. Negative for itching.   Musculoskeletal: Positive for back pain and muscle weakness. Negative for falls and joint pain.        Abnormal gait with dragging right foot   Gastrointestinal: Negative for nausea and vomiting.   Neurological: Positive for focal weakness and numbness. Negative for loss of balance and paresthesias.           Objective:      Physical Exam  Constitutional:       General: He is not in acute distress.     Appearance: He is not diaphoretic.   Cardiovascular:      Pulses:           Dorsalis pedis pulses are 2+ on the right side and 2+ on the left side.        Posterior tibial pulses are 0 on the right side and 0 on the left side.      Comments: CRT < 3 seconds to digits 1-5 bilateral, mild to moderate edema of bilateral lower extremity with varicosities.  Musculoskeletal:      Right foot: Decreased range of motion. No deformity.      Left foot: Decreased range of motion. No deformity.      Comments: Patient has decreased dorsiflexion of the right foot. AT intact.    Pes planovalgus bilateral foot.    No pain on palpation bilateral foot.    Gait examination reveals overall slight trendelenberg gait with high steppage foot.     Feet:       Right foot:      Protective Sensation: 7 sites tested. 2 sites sensed.      Skin integrity: Dry skin present. No ulcer, skin breakdown, erythema, warmth or callus.      Left foot:      Protective Sensation: 7 sites tested. 5 sites sensed.      Skin integrity: Dry skin present. No ulcer, skin breakdown, erythema, warmth or callus.   Skin:     General: Skin is warm and dry.      Capillary Refill: Capillary refill takes less than 2 seconds.      Coloration: Skin is not pale.      Findings: No ecchymosis, erythema, lesion, petechiae or rash.      Nails: There is no clubbing.        Comments: Skin is dry and flaky plantar foot bilateral improved from previous visit.    Nails 1-5 bilateral are thickened 3-4 mm, slightly yellow-brown with debris, loosened and elongated 4-5 mm.     Edema B/L LE 2+    No open lesions or macerations bilateral lower extremity.       Neurological:      Mental Status: He is alert and oriented to person, place, and time.      Sensory: Sensory deficit present.      Comments: (-) Tinel's sign  Light touch intact.  MMT 5/5 DF, PF, Inv, Ev L foot. R foot with 4/5 DF otherwise 5/5 for all other compartments.   Normal muscle tone.  No signs of atrophy.  No tremor or spasticity.               Assessment:       Encounter Diagnoses   Name Primary?    Type 2 diabetes mellitus with polyneuropathy Yes    Onychomycosis          Plan:       Xander was seen today for diabetic foot exam.    Diagnoses and all orders for this visit:    Type 2 diabetes mellitus with polyneuropathy  -     Routine Foot Care    Onychomycosis  -     Routine Foot Care      I counseled the patient on his conditions, their implications and medical management.    Shoe inspection. Diabetic Foot Education. Patient reminded of the importance of good nutrition and blood sugar control to help prevent podiatric complications of diabetes. Patient instructed on proper foot hygeine. We discussed wearing proper shoe gear, daily foot inspections,  never walking without protective shoe gear, never putting sharp instruments to feet    Routine foot care per attached note.

## 2020-10-09 NOTE — PROCEDURES
"Routine Foot Care    Date/Time: 10/9/2020 8:00 AM  Performed by: Scott Garcia DPM  Authorized by: Scott Garcia DPM     Time out: Immediately prior to procedure a "time out" was called to verify the correct patient, procedure, equipment, support staff and site/side marked as required.    Consent Done?:  Yes (Verbal)  Hyperkeratotic Skin Lesions?: No      Nail Care Type:  Debride  Location(s): All  (Left 1st Toe, Left 3rd Toe, Left 2nd Toe, Left 4th Toe, Left 5th Toe, Right 1st Toe, Right 2nd Toe, Right 3rd Toe, Right 4th Toe and Right 5th Toe)  Patient tolerance:  Patient tolerated the procedure well with no immediate complications     Used sterile nail nipper.        "

## 2020-10-12 DIAGNOSIS — M48.02 CERVICAL STENOSIS OF SPINE: ICD-10-CM

## 2020-10-12 DIAGNOSIS — M79.601 RIGHT ARM PAIN: ICD-10-CM

## 2020-10-12 DIAGNOSIS — M50.00 HNP (HERNIATED NUCLEUS PULPOSUS) WITH MYELOPATHY, CERVICAL: ICD-10-CM

## 2020-10-12 DIAGNOSIS — M54.12 CERVICAL RADICULOPATHY: ICD-10-CM

## 2020-10-12 DIAGNOSIS — R29.898 RIGHT ARM WEAKNESS: ICD-10-CM

## 2020-10-12 DIAGNOSIS — M54.12 BRACHIAL NEURITIS OR RADICULITIS: ICD-10-CM

## 2020-10-12 DIAGNOSIS — F11.20 NARCOTIC DEPENDENCY, CONTINUOUS: ICD-10-CM

## 2020-10-12 DIAGNOSIS — M75.101 ROTATOR CUFF SYNDROME OF RIGHT SHOULDER: ICD-10-CM

## 2020-10-12 DIAGNOSIS — M50.30 DEGENERATION OF CERVICAL INTERVERTEBRAL DISC: ICD-10-CM

## 2020-10-12 DIAGNOSIS — G89.4 CHRONIC PAIN SYNDROME: ICD-10-CM

## 2020-10-12 DIAGNOSIS — M54.12 CERVICAL RADICULAR PAIN: ICD-10-CM

## 2020-10-12 DIAGNOSIS — M96.1 CERVICAL POST-LAMINECTOMY SYNDROME: ICD-10-CM

## 2020-10-12 DIAGNOSIS — G90.50 COMPLEX REGIONAL PAIN SYNDROME TYPE 1, AFFECTING UNSPECIFIED SITE: ICD-10-CM

## 2020-10-12 DIAGNOSIS — G56.41 COMPLEX REGIONAL PAIN SYNDROME TYPE 2 OF RIGHT UPPER EXTREMITY: ICD-10-CM

## 2020-10-12 DIAGNOSIS — M47.12 CERVICAL SPONDYLOSIS WITH MYELOPATHY: ICD-10-CM

## 2020-10-12 DIAGNOSIS — R29.898 RIGHT HAND WEAKNESS: ICD-10-CM

## 2020-10-12 DIAGNOSIS — M47.812 FACET ARTHRITIS OF CERVICAL REGION: ICD-10-CM

## 2020-10-12 DIAGNOSIS — M62.81 MUSCLE RIGHT ARM WEAKNESS: ICD-10-CM

## 2020-10-12 DIAGNOSIS — M48.02 SPINAL STENOSIS IN CERVICAL REGION: ICD-10-CM

## 2020-10-12 NOTE — TELEPHONE ENCOUNTER
----- Message from Emre Recinos sent at 10/12/2020 10:04 AM CDT -----  Contact: PT @[phone]  Rx Refill/Request     Is this a Refill or New Rx:  yes  Rx Name and Strength:  HYDROcodone-acetaminophen (NORCO)  mg per tablet  Preferred Pharmacy with phone number:     [facility] #[id number] [facility] [address] [facility] [address]  [facility] [address]  [place] [address]  Phone: [phone] Fax: [phone]

## 2020-10-14 ENCOUNTER — TELEPHONE (OUTPATIENT)
Dept: OPHTHALMOLOGY | Facility: CLINIC | Age: 70
End: 2020-10-14

## 2020-10-14 DIAGNOSIS — H25.11 NUCLEAR SCLEROSIS, RIGHT: Primary | ICD-10-CM

## 2020-10-14 DIAGNOSIS — Z13.9 SCREENING PROCEDURE: ICD-10-CM

## 2020-10-15 RX ORDER — HYDROCODONE BITARTRATE AND ACETAMINOPHEN 10; 325 MG/1; MG/1
1 TABLET ORAL EVERY 6 HOURS PRN
Qty: 120 TABLET | Refills: 0 | Status: SHIPPED | OUTPATIENT
Start: 2020-10-15 | End: 2020-11-10 | Stop reason: SDUPTHER

## 2020-10-22 ENCOUNTER — TELEPHONE (OUTPATIENT)
Dept: OPHTHALMOLOGY | Facility: CLINIC | Age: 70
End: 2020-10-22

## 2020-10-22 DIAGNOSIS — H25.12 NS (NUCLEAR SCLEROSIS), LEFT: Primary | ICD-10-CM

## 2020-10-22 DIAGNOSIS — H25.12 NUCLEAR SCLEROTIC CATARACT OF LEFT EYE: ICD-10-CM

## 2020-10-22 DIAGNOSIS — Z13.9 SCREENING PROCEDURE: ICD-10-CM

## 2020-10-24 ENCOUNTER — IMMUNIZATION (OUTPATIENT)
Dept: INTERNAL MEDICINE | Facility: CLINIC | Age: 70
End: 2020-10-24
Payer: MEDICARE

## 2020-10-24 PROCEDURE — G0008 FLU VACCINE - QUADRIVALENT - ADJUVANTED: ICD-10-PCS | Mod: HCNC,S$GLB,, | Performed by: FAMILY MEDICINE

## 2020-10-24 PROCEDURE — 90694 FLU VACCINE - QUADRIVALENT - ADJUVANTED: ICD-10-PCS | Mod: HCNC,S$GLB,, | Performed by: FAMILY MEDICINE

## 2020-10-24 PROCEDURE — 90694 VACC AIIV4 NO PRSRV 0.5ML IM: CPT | Mod: HCNC,S$GLB,, | Performed by: FAMILY MEDICINE

## 2020-10-24 PROCEDURE — G0008 ADMIN INFLUENZA VIRUS VAC: HCPCS | Mod: HCNC,S$GLB,, | Performed by: FAMILY MEDICINE

## 2020-11-04 ENCOUNTER — CLINICAL SUPPORT (OUTPATIENT)
Dept: CARDIOLOGY | Facility: HOSPITAL | Age: 70
End: 2020-11-04
Attending: NURSE PRACTITIONER
Payer: MEDICARE

## 2020-11-04 DIAGNOSIS — Z95.810 PRESENCE OF AUTOMATIC (IMPLANTABLE) CARDIAC DEFIBRILLATOR: ICD-10-CM

## 2020-11-04 PROCEDURE — 93295 CARDIAC DEVICE CHECK - REMOTE: ICD-10-PCS | Mod: HCNC,,, | Performed by: INTERNAL MEDICINE

## 2020-11-04 PROCEDURE — 93296 REM INTERROG EVL PM/IDS: CPT | Mod: HCNC | Performed by: INTERNAL MEDICINE

## 2020-11-04 PROCEDURE — 93295 DEV INTERROG REMOTE 1/2/MLT: CPT | Mod: HCNC,,, | Performed by: INTERNAL MEDICINE

## 2020-11-06 ENCOUNTER — TELEPHONE (OUTPATIENT)
Dept: OPHTHALMOLOGY | Facility: CLINIC | Age: 70
End: 2020-11-06

## 2020-11-07 ENCOUNTER — LAB VISIT (OUTPATIENT)
Dept: URGENT CARE | Facility: CLINIC | Age: 70
End: 2020-11-07
Payer: MEDICARE

## 2020-11-07 DIAGNOSIS — Z13.9 SCREENING PROCEDURE: ICD-10-CM

## 2020-11-07 PROCEDURE — U0003 INFECTIOUS AGENT DETECTION BY NUCLEIC ACID (DNA OR RNA); SEVERE ACUTE RESPIRATORY SYNDROME CORONAVIRUS 2 (SARS-COV-2) (CORONAVIRUS DISEASE [COVID-19]), AMPLIFIED PROBE TECHNIQUE, MAKING USE OF HIGH THROUGHPUT TECHNOLOGIES AS DESCRIBED BY CMS-2020-01-R: HCPCS | Mod: HCNC

## 2020-11-07 NOTE — H&P
[facility], [place]  History & Physical    Subjective:      Chief Complaint/Reason for Admission:     Xander Sanchez is a 70 y.o. male.    Past Medical History:   Diagnosis Date    Asthma     Cardiomyopathy     Cervical radicular pain     Cervical spondylosis with myelopathy 10/17/2012    Chronic bronchitis     Chronic systolic dysfunction of left ventricle 2015    Constipation 2015    COPD (chronic obstructive pulmonary disease)     CRPS (complex regional pain syndrome type I) 2014    Diabetes mellitus, type 2     DM type 2 (diabetes mellitus, type 2) 2015    Enlarged prostate 2015    Enuresis 2018    Hypertension     ICD (implantable cardiac defibrillator) in place     Mixed hyperlipidemia 2018    Presence of biventricular AICD 2015    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thy 2016    Urinary tract infection     pt does not know     Past Surgical History:   Procedure Laterality Date    CARDIAC DEFIBRILLATOR PLACEMENT      CERVICAL SPINE SURGERY      COLONOSCOPY N/A 2017    Procedure: COLONOSCOPY  golytely;  Surgeon: Vannessa Uribe MD;  Location: [facility];  Service: Endoscopy;  Laterality: N/A;    SPINE SURGERY       Family History   Problem Relation Age of Onset    Hypertension Mother     Hypertension Father     COPD Father     No Known Problems Sister     No Known Problems Brother     No Known Problems Daughter     Prostate cancer Neg Hx     Kidney disease Neg Hx      Social History     Tobacco Use    Smoking status: Former Smoker     Packs/day: 1.00     Years: 40.00     Pack years: 40.00     Types: Cigarettes     Quit date: 2009     Years since quittin.2    Smokeless tobacco: Never Used   Substance Use Topics    Alcohol use: Yes     Alcohol/week: 2.0 standard drinks     Types: 1 Shots of liquor, 1 Cans of beer per week     Comment: May 1-2 beers or whiskey per month    Drug use: No       No  medications prior to admission.     Review of patient's allergies indicates:   Allergen Reactions    Ciprofloxacin Nausea And Vomiting        Review of Systems   Eyes: Positive for blurred vision.   All other systems reviewed and are negative.      Objective:      Vital Signs (Most Recent)       Vital Signs Range (Last 24H):       Physical Exam  Constitutional:       Appearance: He is well-developed.   HENT:      Head: Normocephalic.   Eyes:      Conjunctiva/sclera: Conjunctivae normal.      Pupils: Pupils are equal, round, and reactive to light.   Neck:      Musculoskeletal: Normal range of motion and neck supple.   Cardiovascular:      Rate and Rhythm: Normal rate.   Pulmonary:      Effort: Pulmonary effort is normal.      Breath sounds: Normal breath sounds.   Abdominal:      General: Bowel sounds are normal.      Palpations: Abdomen is soft.   Musculoskeletal: Normal range of motion.   Skin:     General: Skin is warm.   Neurological:      Mental Status: He is alert and oriented to person, place, and time.         Data Review:    ECG:     Assessment:      Cataract OD.    Plan:    CE OD.

## 2020-11-08 LAB — SARS-COV-2 RNA RESP QL NAA+PROBE: NOT DETECTED

## 2020-11-09 ENCOUNTER — TELEPHONE (OUTPATIENT)
Dept: OPHTHALMOLOGY | Facility: CLINIC | Age: 70
End: 2020-11-09

## 2020-11-10 ENCOUNTER — ANESTHESIA EVENT (OUTPATIENT)
Dept: SURGERY | Facility: OTHER | Age: 70
End: 2020-11-10
Payer: MEDICARE

## 2020-11-10 ENCOUNTER — HOSPITAL ENCOUNTER (OUTPATIENT)
Facility: OTHER | Age: 70
Discharge: HOME | End: 2020-11-10
Attending: OPHTHALMOLOGY | Admitting: OPHTHALMOLOGY
Payer: MEDICARE

## 2020-11-10 ENCOUNTER — ANESTHESIA (OUTPATIENT)
Dept: SURGERY | Facility: OTHER | Age: 70
End: 2020-11-10
Payer: MEDICARE

## 2020-11-10 VITALS
WEIGHT: 180 LBS | OXYGEN SATURATION: 97 % | HEART RATE: 59 BPM | SYSTOLIC BLOOD PRESSURE: 169 MMHG | RESPIRATION RATE: 16 BRPM | DIASTOLIC BLOOD PRESSURE: 73 MMHG | HEIGHT: 71 IN | TEMPERATURE: 98 F | BODY MASS INDEX: 25.2 KG/M2

## 2020-11-10 DIAGNOSIS — M48.02 CERVICAL STENOSIS OF SPINE: ICD-10-CM

## 2020-11-10 DIAGNOSIS — F11.20 NARCOTIC DEPENDENCY, CONTINUOUS: ICD-10-CM

## 2020-11-10 DIAGNOSIS — G56.41 COMPLEX REGIONAL PAIN SYNDROME TYPE 2 OF RIGHT UPPER EXTREMITY: ICD-10-CM

## 2020-11-10 DIAGNOSIS — M79.601 RIGHT ARM PAIN: ICD-10-CM

## 2020-11-10 DIAGNOSIS — R29.898 RIGHT HAND WEAKNESS: ICD-10-CM

## 2020-11-10 DIAGNOSIS — M47.12 CERVICAL SPONDYLOSIS WITH MYELOPATHY: ICD-10-CM

## 2020-11-10 DIAGNOSIS — M54.12 CERVICAL RADICULOPATHY: ICD-10-CM

## 2020-11-10 DIAGNOSIS — M54.12 CERVICAL RADICULAR PAIN: ICD-10-CM

## 2020-11-10 DIAGNOSIS — M54.12 BRACHIAL NEURITIS OR RADICULITIS: ICD-10-CM

## 2020-11-10 DIAGNOSIS — M75.101 ROTATOR CUFF SYNDROME OF RIGHT SHOULDER: ICD-10-CM

## 2020-11-10 DIAGNOSIS — M62.81 MUSCLE RIGHT ARM WEAKNESS: ICD-10-CM

## 2020-11-10 DIAGNOSIS — G90.50 COMPLEX REGIONAL PAIN SYNDROME TYPE 1, AFFECTING UNSPECIFIED SITE: ICD-10-CM

## 2020-11-10 DIAGNOSIS — R29.898 RIGHT ARM WEAKNESS: ICD-10-CM

## 2020-11-10 DIAGNOSIS — M48.02 SPINAL STENOSIS IN CERVICAL REGION: ICD-10-CM

## 2020-11-10 DIAGNOSIS — H25.11 NS (NUCLEAR SCLEROSIS), RIGHT: Primary | ICD-10-CM

## 2020-11-10 DIAGNOSIS — M50.30 DEGENERATION OF CERVICAL INTERVERTEBRAL DISC: ICD-10-CM

## 2020-11-10 DIAGNOSIS — M96.1 CERVICAL POST-LAMINECTOMY SYNDROME: ICD-10-CM

## 2020-11-10 DIAGNOSIS — G89.4 CHRONIC PAIN SYNDROME: ICD-10-CM

## 2020-11-10 DIAGNOSIS — M50.00 HNP (HERNIATED NUCLEUS PULPOSUS) WITH MYELOPATHY, CERVICAL: ICD-10-CM

## 2020-11-10 DIAGNOSIS — M47.812 FACET ARTHRITIS OF CERVICAL REGION: ICD-10-CM

## 2020-11-10 LAB — POCT GLUCOSE: 97 MG/DL (ref 70–110)

## 2020-11-10 PROCEDURE — 37000009 HC ANESTHESIA EA ADD 15 MINS: Mod: HCNC | Performed by: OPHTHALMOLOGY

## 2020-11-10 PROCEDURE — 63600175 PHARM REV CODE 636 W HCPCS: Mod: HCNC | Performed by: NURSE ANESTHETIST, CERTIFIED REGISTERED

## 2020-11-10 PROCEDURE — 66984 XCAPSL CTRC RMVL W/O ECP: CPT | Mod: HCNC,RT,, | Performed by: OPHTHALMOLOGY

## 2020-11-10 PROCEDURE — 36000707: Mod: HCNC | Performed by: OPHTHALMOLOGY

## 2020-11-10 PROCEDURE — 82962 GLUCOSE BLOOD TEST: CPT | Mod: HCNC | Performed by: OPHTHALMOLOGY

## 2020-11-10 PROCEDURE — 71000015 HC POSTOP RECOV 1ST HR: Mod: HCNC | Performed by: OPHTHALMOLOGY

## 2020-11-10 PROCEDURE — 36000706: Mod: HCNC | Performed by: OPHTHALMOLOGY

## 2020-11-10 PROCEDURE — V2632 POST CHMBR INTRAOCULAR LENS: HCPCS | Mod: HCNC | Performed by: OPHTHALMOLOGY

## 2020-11-10 PROCEDURE — 63600175 PHARM REV CODE 636 W HCPCS: Mod: HCNC | Performed by: OPHTHALMOLOGY

## 2020-11-10 PROCEDURE — 66984 PR REMOVAL, CATARACT, W/INSRT INTRAOC LENS, W/O ENDO CYCLO: ICD-10-PCS | Mod: HCNC,RT,, | Performed by: OPHTHALMOLOGY

## 2020-11-10 PROCEDURE — 25000003 PHARM REV CODE 250: Mod: HCNC | Performed by: OPHTHALMOLOGY

## 2020-11-10 PROCEDURE — 37000008 HC ANESTHESIA 1ST 15 MINUTES: Mod: HCNC | Performed by: OPHTHALMOLOGY

## 2020-11-10 DEVICE — LENS 22.0: Type: IMPLANTABLE DEVICE | Site: EYE | Status: FUNCTIONAL

## 2020-11-10 RX ORDER — EPINEPHRINE 1 MG/ML
INJECTION, SOLUTION INTRACARDIAC; INTRAMUSCULAR; INTRAVENOUS; SUBCUTANEOUS
Status: DISCONTINUED | OUTPATIENT
Start: 2020-11-10 | End: 2020-11-10 | Stop reason: HOSPADM

## 2020-11-10 RX ORDER — OFLOXACIN 3 MG/ML
1 SOLUTION/ DROPS OPHTHALMIC
Status: DISCONTINUED | OUTPATIENT
Start: 2020-11-10 | End: 2020-11-10 | Stop reason: HOSPADM

## 2020-11-10 RX ORDER — HYDROCODONE BITARTRATE AND ACETAMINOPHEN 5; 325 MG/1; MG/1
1 TABLET ORAL EVERY 4 HOURS PRN
Status: DISCONTINUED | OUTPATIENT
Start: 2020-11-10 | End: 2020-11-10 | Stop reason: HOSPADM

## 2020-11-10 RX ORDER — SODIUM CHLORIDE 0.9 % (FLUSH) 0.9 %
10 SYRINGE (ML) INJECTION
Status: DISCONTINUED | OUTPATIENT
Start: 2020-11-10 | End: 2020-11-10 | Stop reason: HOSPADM

## 2020-11-10 RX ORDER — TETRACAINE HYDROCHLORIDE 5 MG/ML
1 SOLUTION OPHTHALMIC
Status: COMPLETED | OUTPATIENT
Start: 2020-11-10 | End: 2020-11-10

## 2020-11-10 RX ORDER — TROPICAMIDE 10 MG/ML
1 SOLUTION/ DROPS OPHTHALMIC
Status: COMPLETED | OUTPATIENT
Start: 2020-11-10 | End: 2020-11-10

## 2020-11-10 RX ORDER — PHENYLEPHRINE HYDROCHLORIDE 25 MG/ML
1 SOLUTION/ DROPS OPHTHALMIC
Status: COMPLETED | OUTPATIENT
Start: 2020-11-10 | End: 2020-11-10

## 2020-11-10 RX ORDER — TETRACAINE HYDROCHLORIDE 5 MG/ML
SOLUTION OPHTHALMIC
Status: DISCONTINUED | OUTPATIENT
Start: 2020-11-10 | End: 2020-11-10 | Stop reason: HOSPADM

## 2020-11-10 RX ORDER — PREDNISOLONE ACETATE 10 MG/ML
SUSPENSION/ DROPS OPHTHALMIC
Status: DISCONTINUED | OUTPATIENT
Start: 2020-11-10 | End: 2020-11-10 | Stop reason: HOSPADM

## 2020-11-10 RX ORDER — CYCLOPENTOLATE HYDROCHLORIDE 10 MG/ML
1 SOLUTION/ DROPS OPHTHALMIC
Status: DISCONTINUED | OUTPATIENT
Start: 2020-11-10 | End: 2020-11-10 | Stop reason: HOSPADM

## 2020-11-10 RX ORDER — MIDAZOLAM HYDROCHLORIDE 1 MG/ML
INJECTION INTRAMUSCULAR; INTRAVENOUS
Status: DISCONTINUED | OUTPATIENT
Start: 2020-11-10 | End: 2020-11-10

## 2020-11-10 RX ORDER — ACETAMINOPHEN 325 MG/1
650 TABLET ORAL EVERY 4 HOURS PRN
Status: DISCONTINUED | OUTPATIENT
Start: 2020-11-10 | End: 2020-11-10 | Stop reason: HOSPADM

## 2020-11-10 RX ORDER — LIDOCAINE HYDROCHLORIDE 40 MG/ML
INJECTION, SOLUTION RETROBULBAR
Status: DISCONTINUED | OUTPATIENT
Start: 2020-11-10 | End: 2020-11-10 | Stop reason: HOSPADM

## 2020-11-10 RX ADMIN — CYCLOPENTOLATE HYDROCHLORIDE 1 DROP: 10 SOLUTION/ DROPS OPHTHALMIC at 08:11

## 2020-11-10 RX ADMIN — TROPICAMIDE 1 DROP: 10 SOLUTION/ DROPS OPHTHALMIC at 08:11

## 2020-11-10 RX ADMIN — PHENYLEPHRINE HYDROCHLORIDE 1 DROP: 25 SOLUTION/ DROPS OPHTHALMIC at 08:11

## 2020-11-10 RX ADMIN — TETRACAINE HYDROCHLORIDE 1 DROP: 5 SOLUTION OPHTHALMIC at 08:11

## 2020-11-10 RX ADMIN — MIDAZOLAM HYDROCHLORIDE 1 MG: 1 INJECTION, SOLUTION INTRAMUSCULAR; INTRAVENOUS at 09:11

## 2020-11-10 RX ADMIN — OFLOXACIN 1 DROP: 3 SOLUTION OPHTHALMIC at 08:11

## 2020-11-10 NOTE — ANESTHESIA PREPROCEDURE EVALUATION
11/10/2020  Xander Sanchez is a 70 y.o., male.    Anesthesia Evaluation    I have reviewed the Patient Summary Reports.    I have reviewed the Nursing Notes. I have reviewed the NPO Status.   I have reviewed the Medications.     Review of Systems  Anesthesia Hx:  No problems with previous Anesthesia  Denies Family Hx of Anesthesia complications.   Denies Personal Hx of Anesthesia complications.   Social:  Non-Smoker    Hematology/Oncology:  Hematology Normal   Oncology Normal     EENT/Dental:EENT/Dental Normal   Cardiovascular:   Pacemaker Hypertension AICD   Pulmonary:   COPD    Renal/:  Renal/ Normal     Hepatic/GI:   GERD    Musculoskeletal:  Musculoskeletal Normal    Neurological:  Neurology Normal    Endocrine:   Diabetes    Dermatological:  Skin Normal    Psych:  Psychiatric Normal           Physical Exam  General:  Well nourished    Airway/Jaw/Neck:  Airway Findings: Mouth Opening: Normal     Dental:  Dental Findings: Edentulous        Mental Status:  Mental Status Findings:  Cooperative, Alert and Oriented         Anesthesia Plan  Type of Anesthesia, risks & benefits discussed:  Anesthesia Type:  MAC  Patient's Preference:   Intra-op Monitoring Plan: standard ASA monitors  Intra-op Monitoring Plan Comments:   Post Op Pain Control Plan: multimodal analgesia  Post Op Pain Control Plan Comments:   Induction:   IV  Beta Blocker:         Informed Consent: Patient understands risks and agrees with Anesthesia plan.  Questions answered. Anesthesia consent signed with patient.  ASA Score: 3     Day of Surgery Review of History & Physical:    H&P update referred to the surgeon.         Ready For Surgery From Anesthesia Perspective.

## 2020-11-10 NOTE — INTERVAL H&P NOTE
The patient has been examined and the H&P has been reviewed:    I concur with the findings and no changes have occurred since H&P was written.              Active Hospital Problems    Diagnosis  POA    NS (nuclear sclerosis), right [H25.11]  Yes      Resolved Hospital Problems   No resolved problems to display.

## 2020-11-10 NOTE — TELEPHONE ENCOUNTER
----- Message from Aracely Garcia sent at 11/10/2020 11:49 AM CST -----  Regarding: refill  Contact: pt @ [phone]  Rx Refill/Request     Is this a Refill or New Rx:  refill    Rx Name and Strength:  HYDROcodone-acetaminophen (NORCO)  mg per tablet    Preferred Pharmacy with phone number:     [facility] #[id number] [facility] [address] AT [facility] [address]  [facility] [address]  [facility] [address]  Phone: [phone] Fax: [phone]      Communication Preference:  Additional Information:

## 2020-11-10 NOTE — DISCHARGE INSTRUCTIONS
Anesthesia: Monitored Anesthesia Care (MAC)  Anesthesia safety  Tips for anesthesia safety include the following:   · Follow all instructions you are given for how long not to eat or drink before your procedure.  · Be sure your healthcare provider knows what medicines you take, especially any anti-inflammatory medicine or blood thinners. This includes aspirin and any other over-the-counter medicines, herbs, and supplements.  · Have an adult family member or friend drive you home after the procedure.  · For the first 24 hours after your surgery:  ¨ Do not drive or use heavy equipment.  ¨ Do not make important decisions or sign documents.  ¨ Avoid alcohol.  ¨ Have someone stay with you, if possible. They can watch for problems and help keep you safe.  Date Last Reviewed: 12/1/2016 © 2000-2017 [employer] [place]. [address] [place] [address], [place] [address]. All rights reserved. This information is not intended as a substitute for professional medical care. Always follow your healthcare professional's instructions.

## 2020-11-10 NOTE — OP NOTE
Operative Date:  11/10/2020    Discharge Date:  11/10/2020    Discharge Patient Home    Report Title: Operative Note      SURGEON: Oral Graham MD     ASSISTANT:     PREOPERATIVE DIAGNOSIS: Visually significant NSC cataract,  Right Eye.    POSTOPERATIVE DIAGNOSIS: Visually-significant NSC cataract,  Right Eye.    PROCEDURE PERFORMED: Phacoemulsification of the cataract with posterior chamber intraocular lens Right Eye.    ANESTHESIA: Topical with MAC    COMPLICATIONS: None    ESTIMATED BLOOD LOSS: Minimal    INDICATIONS FOR PROCEDURE:   The patient is a pleasant 70 year old gentleman  with increasing difficulties with activities of daily living secondary to a dense visually significant cataract in the Right Eye.  Discussions have been carried out with this patient concerning the options to surgery, risks, benefits and expectations.  The patient voiced good understanding and wished to proceed with the above procedure.    PROCEDURE IN DETAIL: The patient was brought to the operating room and received topical anesthetic to the eye and then was prepped and draped in the usual sterile fashion.  Using the Steel ring and the guarded jaret blade set at 0.37 mm, a partial thickness clear cornea incision was made temporally.  The paracentesis site was made at the twelve o'clock position.  Omidria was injected into the anterior chamber through the paracentesis.  Viscoat was then injected into the anterior chamber.  The eye was then reentered at the primary surgical site with a 2.4 mm keratome followed by continuous capsulotomy, hydrodissection, hydrodelineation and phacoemulsification of the cataract.  Residual cortical material was removed using automated irrigation-aspiration technique.  Healon was injected into the posterior chamber and an SN60WF 22.0 diopter lens was placed in the bag without difficulty. Residual viscoelastic was removed using automated irrigation-aspiration technique. The eye was  re-pressurized using BSS solution and both the paracentesis site and the primary surgical site were demonstrated to be watertight at the end of the case with Weck--Sue manipulation.  One drop of Ofloxacin and one drop of Pred acetate 1% was applied to the Right Eye .The eye was closed, patched and a Keith shield placed.  The patient was taken to the recovery room in good and stable condition.  The patient tolerated the procedure well.  The patient was instructed to refrain from any heavy lifting, bending, stooping or straining activities, discharged home  and to follow-up in the morning for routine postoperative care with Oral Graham MD.

## 2020-11-10 NOTE — PLAN OF CARE
Xander Sanchez has met all discharge criteria from Phase II. Vital Signs are stable, ambulating  without difficulty. Discharge instructions given, patient verbalized understanding. Discharged from facility via wheelchair in stable condition.

## 2020-11-10 NOTE — ANESTHESIA POSTPROCEDURE EVALUATION
Anesthesia Post Evaluation    Patient: Xander Sanchez    Procedure(s) Performed: Procedure(s) (LRB):  EXTRACTION, CATARACT, WITH IOL INSERTION (Right)    Final Anesthesia Type: MAC    Patient location during evaluation: [facility]  Patient participation: Yes- Able to Participate  Level of consciousness: awake and alert  Post-procedure vital signs: reviewed and stable  Pain management: adequate  Airway patency: patent      Anesthetic complications: no      Cardiovascular status: blood pressure returned to baseline  Respiratory status: unassisted  Hydration status: euvolemic  Follow-up not needed.          Vitals Value Taken Time   /96 11/10/20 0811   Temp 36.2 °C (97.2 °F) 11/10/20 0811   Pulse 53 11/10/20 0811   Resp 18 11/10/20 0811   SpO2 99 % 11/10/20 0811         No case tracking events are documented in the log.      Pain/Odell Score: No data recorded

## 2020-11-10 NOTE — PLAN OF CARE
Dr. Graham notified of patients allergy to ciprofloxacin. States it is ok to give ofloxacin patient has been using this antibiotic at home without reaction

## 2020-11-11 ENCOUNTER — OFFICE VISIT (OUTPATIENT)
Dept: OPHTHALMOLOGY | Facility: CLINIC | Age: 70
End: 2020-11-11
Payer: MEDICARE

## 2020-11-11 VITALS — DIASTOLIC BLOOD PRESSURE: 75 MMHG | HEART RATE: 62 BPM | SYSTOLIC BLOOD PRESSURE: 133 MMHG

## 2020-11-11 DIAGNOSIS — Z98.890 POST-OPERATIVE STATE: Primary | ICD-10-CM

## 2020-11-11 PROCEDURE — 99999 PR PBB SHADOW E&M-EST. PATIENT-LVL III: CPT | Mod: PBBFAC,HCNC,, | Performed by: OPHTHALMOLOGY

## 2020-11-11 PROCEDURE — 99024 POSTOP FOLLOW-UP VISIT: CPT | Mod: HCNC,S$GLB,, | Performed by: OPHTHALMOLOGY

## 2020-11-11 PROCEDURE — 99024 PR POST-OP FOLLOW-UP VISIT: ICD-10-PCS | Mod: HCNC,S$GLB,, | Performed by: OPHTHALMOLOGY

## 2020-11-11 PROCEDURE — 99999 PR PBB SHADOW E&M-EST. PATIENT-LVL III: ICD-10-PCS | Mod: PBBFAC,HCNC,, | Performed by: OPHTHALMOLOGY

## 2020-11-11 NOTE — PROGRESS NOTES
Subjective:       Patient ID: Xander Sanchez is a 70 y.o. male.    Chief Complaint: Post-op Evaluation (1 day po od)    HPI     Post-op Evaluation      Additional comments: 1 day po od              Comments     S/p phaco w/IOL OD- 1/10/2020    71 y/o male is here for 1 day post op of the RT eye. Pt states sx went   well. Pt denies pain and discomfort.     Eyemeds  Ofloxacin QID OD  Ilevro QD OD  Durezol QID OD           Last edited by Kemi Stockton on 11/11/2020  1:42 PM. (History)             Assessment:       1. Post-operative state        Plan:       S/p CE OD- Doing well.         CPM OD.  RTC 1 wk.

## 2020-11-14 RX ORDER — HYDROCODONE BITARTRATE AND ACETAMINOPHEN 10; 325 MG/1; MG/1
1 TABLET ORAL EVERY 6 HOURS PRN
Qty: 120 TABLET | Refills: 0 | Status: SHIPPED | OUTPATIENT
Start: 2020-11-14 | End: 2020-12-14 | Stop reason: SDUPTHER

## 2020-11-18 ENCOUNTER — OFFICE VISIT (OUTPATIENT)
Dept: OPHTHALMOLOGY | Facility: CLINIC | Age: 70
End: 2020-11-18
Payer: MEDICARE

## 2020-11-18 DIAGNOSIS — H25.12 NS (NUCLEAR SCLEROSIS), LEFT: ICD-10-CM

## 2020-11-18 DIAGNOSIS — Z98.890 POST-OPERATIVE STATE: Primary | ICD-10-CM

## 2020-11-18 PROCEDURE — 1126F PR PAIN SEVERITY QUANTIFIED, NO PAIN PRESENT: ICD-10-PCS | Mod: HCNC,S$GLB,, | Performed by: OPHTHALMOLOGY

## 2020-11-18 PROCEDURE — 92136 PR OPHTHAL BIOMETRY,INTRAOC LENS POW CALC: ICD-10-PCS | Mod: 26,HCNC,LT,S$GLB | Performed by: OPHTHALMOLOGY

## 2020-11-18 PROCEDURE — 3288F FALL RISK ASSESSMENT DOCD: CPT | Mod: HCNC,CPTII,S$GLB, | Performed by: OPHTHALMOLOGY

## 2020-11-18 PROCEDURE — 99999 PR PBB SHADOW E&M-EST. PATIENT-LVL III: ICD-10-PCS | Mod: PBBFAC,HCNC,, | Performed by: OPHTHALMOLOGY

## 2020-11-18 PROCEDURE — 3288F PR FALLS RISK ASSESSMENT DOCUMENTED: ICD-10-PCS | Mod: HCNC,CPTII,S$GLB, | Performed by: OPHTHALMOLOGY

## 2020-11-18 PROCEDURE — 99024 PR POST-OP FOLLOW-UP VISIT: ICD-10-PCS | Mod: HCNC,S$GLB,, | Performed by: OPHTHALMOLOGY

## 2020-11-18 PROCEDURE — 1101F PT FALLS ASSESS-DOCD LE1/YR: CPT | Mod: HCNC,CPTII,S$GLB, | Performed by: OPHTHALMOLOGY

## 2020-11-18 PROCEDURE — 99024 POSTOP FOLLOW-UP VISIT: CPT | Mod: HCNC,S$GLB,, | Performed by: OPHTHALMOLOGY

## 2020-11-18 PROCEDURE — 99999 PR PBB SHADOW E&M-EST. PATIENT-LVL III: CPT | Mod: PBBFAC,HCNC,, | Performed by: OPHTHALMOLOGY

## 2020-11-18 PROCEDURE — 92136 OPHTHALMIC BIOMETRY: CPT | Mod: 26,HCNC,LT,S$GLB | Performed by: OPHTHALMOLOGY

## 2020-11-18 PROCEDURE — 1126F AMNT PAIN NOTED NONE PRSNT: CPT | Mod: HCNC,S$GLB,, | Performed by: OPHTHALMOLOGY

## 2020-11-18 PROCEDURE — 1101F PR PT FALLS ASSESS DOC 0-1 FALLS W/OUT INJ PAST YR: ICD-10-PCS | Mod: HCNC,CPTII,S$GLB, | Performed by: OPHTHALMOLOGY

## 2020-11-18 RX ORDER — IBUPROFEN 800 MG/1
800 TABLET ORAL EVERY 8 HOURS PRN
COMMUNITY
Start: 2020-11-05 | End: 2021-06-25

## 2020-11-18 NOTE — PROGRESS NOTES
Subjective:       Patient ID: Xander Sanchez is a 70 y.o. male.    Chief Complaint: Post-op Evaluation    HPI     DLS: 11/10/20    Pt here for 1 week post phaco w/IOL OD- 11/10/20;  Pt states no eye pain or discomfort. Pt states he is still using his eye   drops as directed.         Last edited by Myrna Barth on 11/18/2020 10:41 AM. (History)             Assessment:       1. Post-operative state    2. NS (nuclear sclerosis), left        Plan:       S/p CE OD- Doing well.     Visually significant cataract OS -Pt. Wants Sx.        Taper gtts OD.  Cataract Surgery Consent: Patient with a visually significant cataract with difficulties of ADLs, reading, driving, night vision, glare (any and all).  Discussed with Patient/Family/Caregiver: options, risks and benefits, expectations of cataract surgery, utilized an eye model with questions and answers to facilitate discussion.  Discussed lens options and patient understands that glasses may be required for optimal vision for distance and/or near vision after cataract surgery.  The Patient/Family/Caregiver  voice good understanding and patient wishes to proceed with surgery.  The patient will likely benefit from surgery and patient signed consent for Left Eye.  CE OS 11/24/2020.

## 2020-11-20 ENCOUNTER — TELEPHONE (OUTPATIENT)
Dept: OPHTHALMOLOGY | Facility: CLINIC | Age: 70
End: 2020-11-20

## 2020-11-21 ENCOUNTER — LAB VISIT (OUTPATIENT)
Dept: URGENT CARE | Facility: CLINIC | Age: 70
End: 2020-11-21
Payer: MEDICARE

## 2020-11-21 DIAGNOSIS — Z13.9 SCREENING PROCEDURE: ICD-10-CM

## 2020-11-21 DIAGNOSIS — H25.12 NUCLEAR SCLEROTIC CATARACT OF LEFT EYE: ICD-10-CM

## 2020-11-21 PROCEDURE — U0003 INFECTIOUS AGENT DETECTION BY NUCLEIC ACID (DNA OR RNA); SEVERE ACUTE RESPIRATORY SYNDROME CORONAVIRUS 2 (SARS-COV-2) (CORONAVIRUS DISEASE [COVID-19]), AMPLIFIED PROBE TECHNIQUE, MAKING USE OF HIGH THROUGHPUT TECHNOLOGIES AS DESCRIBED BY CMS-2020-01-R: HCPCS | Mod: HCNC

## 2020-11-21 NOTE — PROGRESS NOTES

## 2020-11-21 NOTE — H&P
[facility]  History & Physical    Subjective:      Chief Complaint/Reason for Admission:     Xander Sanchez is a 70 y.o. male.    Past Medical History:   Diagnosis Date    Asthma     Cardiomyopathy     Cervical radicular pain     Cervical spondylosis with myelopathy 10/17/2012    Chronic bronchitis     Chronic systolic dysfunction of left ventricle 2015    Constipation 2015    COPD (chronic obstructive pulmonary disease)     CRPS (complex regional pain syndrome type I) 2014    Diabetes mellitus, type 2     DM type 2 (diabetes mellitus, type 2) 2015    Enlarged prostate 2015    Enuresis 2018    Hypertension     ICD (implantable cardiac defibrillator) in place     Mixed hyperlipidemia 2018    Presence of biventricular AICD 2015    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thy 2016    Urinary tract infection     pt does not know     Past Surgical History:   Procedure Laterality Date    CARDIAC DEFIBRILLATOR PLACEMENT      CATARACT EXTRACTION W/  INTRAOCULAR LENS IMPLANT Right 11/10/2020    Procedure: EXTRACTION, CATARACT, WITH IOL INSERTION;  Surgeon: Oral Graham MD;  Location: [facility] OR;  Service: Ophthalmology;  Laterality: Right;    CERVICAL SPINE SURGERY      COLONOSCOPY N/A 2017    Procedure: COLONOSCOPY  golytely;  Surgeon: Vannessa Uribe MD;  Location: [facility] ENDO;  Service: Endoscopy;  Laterality: N/A;    SPINE SURGERY       Family History   Problem Relation Age of Onset    Hypertension Mother     Hypertension Father     COPD Father     No Known Problems Sister     No Known Problems Brother     No Known Problems Daughter     Prostate cancer Neg Hx     Kidney disease Neg Hx      Social History     Tobacco Use    Smoking status: Former Smoker     Packs/day: 1.00     Years: 40.00     Pack years: 40.00     Types: Cigarettes     Quit date: 2009     Years since quittin.3    Smokeless  tobacco: Never Used   Substance Use Topics    Alcohol use: Yes     Alcohol/week: 2.0 standard drinks     Types: 1 Shots of liquor, 1 Cans of beer per week     Comment: May 1-2 beers or whiskey per month    Drug use: No       No medications prior to admission.     Review of patient's allergies indicates:   Allergen Reactions    Ciprofloxacin Nausea And Vomiting        Review of Systems   Eyes: Positive for blurred vision.   All other systems reviewed and are negative.      Objective:      Vital Signs (Most Recent)       Vital Signs Range (Last 24H):       Physical Exam  Constitutional:       Appearance: He is well-developed.   HENT:      Head: Normocephalic.   Eyes:      Conjunctiva/sclera: Conjunctivae normal.      Pupils: Pupils are equal, round, and reactive to light.   Neck:      Musculoskeletal: Normal range of motion and neck supple.   Cardiovascular:      Rate and Rhythm: Normal rate.   Pulmonary:      Effort: Pulmonary effort is normal.      Breath sounds: Normal breath sounds.   Abdominal:      General: Bowel sounds are normal.      Palpations: Abdomen is soft.   Musculoskeletal: Normal range of motion.   Skin:     General: Skin is warm.   Neurological:      Mental Status: He is alert and oriented to person, place, and time.         Data Review:   ECG:      Assessment:      Cataract OS.    Plan:    CE OS.

## 2020-11-22 LAB — SARS-COV-2 RNA RESP QL NAA+PROBE: NOT DETECTED

## 2020-11-23 ENCOUNTER — TELEPHONE (OUTPATIENT)
Dept: OPHTHALMOLOGY | Facility: CLINIC | Age: 70
End: 2020-11-23

## 2020-11-23 NOTE — TELEPHONE ENCOUNTER
----- Message from Frances Back sent at 11/23/2020 12:28 PM CST -----  Contact: Xander Vargas would like to speak with office. Pt states he started eye drops on today. He wants to know if he can still have procedure done.      [phone]

## 2020-11-24 ENCOUNTER — HOSPITAL ENCOUNTER (OUTPATIENT)
Facility: OTHER | Age: 70
Discharge: HOME | End: 2020-11-24
Attending: OPHTHALMOLOGY | Admitting: OPHTHALMOLOGY
Payer: MEDICARE

## 2020-11-24 ENCOUNTER — ANESTHESIA EVENT (OUTPATIENT)
Dept: SURGERY | Facility: OTHER | Age: 70
End: 2020-11-24
Payer: MEDICARE

## 2020-11-24 ENCOUNTER — ANESTHESIA (OUTPATIENT)
Dept: SURGERY | Facility: OTHER | Age: 70
End: 2020-11-24
Payer: MEDICARE

## 2020-11-24 VITALS
SYSTOLIC BLOOD PRESSURE: 122 MMHG | TEMPERATURE: 98 F | DIASTOLIC BLOOD PRESSURE: 70 MMHG | HEART RATE: 64 BPM | WEIGHT: 180 LBS | RESPIRATION RATE: 18 BRPM | OXYGEN SATURATION: 96 % | HEIGHT: 71 IN | BODY MASS INDEX: 25.2 KG/M2

## 2020-11-24 DIAGNOSIS — H25.12 NUCLEAR SCLEROSIS, LEFT: Primary | ICD-10-CM

## 2020-11-24 LAB — POCT GLUCOSE: 111 MG/DL (ref 70–110)

## 2020-11-24 PROCEDURE — 36000706: Mod: HCNC | Performed by: OPHTHALMOLOGY

## 2020-11-24 PROCEDURE — 63600175 PHARM REV CODE 636 W HCPCS: Mod: HCNC | Performed by: NURSE ANESTHETIST, CERTIFIED REGISTERED

## 2020-11-24 PROCEDURE — 63600175 PHARM REV CODE 636 W HCPCS: Mod: HCNC | Performed by: OPHTHALMOLOGY

## 2020-11-24 PROCEDURE — 25000003 PHARM REV CODE 250: Mod: HCNC | Performed by: OPHTHALMOLOGY

## 2020-11-24 PROCEDURE — V2632 POST CHMBR INTRAOCULAR LENS: HCPCS | Mod: HCNC | Performed by: OPHTHALMOLOGY

## 2020-11-24 PROCEDURE — 66984 XCAPSL CTRC RMVL W/O ECP: CPT | Mod: 79,HCNC,LT, | Performed by: OPHTHALMOLOGY

## 2020-11-24 PROCEDURE — 37000009 HC ANESTHESIA EA ADD 15 MINS: Mod: HCNC | Performed by: OPHTHALMOLOGY

## 2020-11-24 PROCEDURE — 66984 PR REMOVAL, CATARACT, W/INSRT INTRAOC LENS, W/O ENDO CYCLO: ICD-10-PCS | Mod: 79,HCNC,LT, | Performed by: OPHTHALMOLOGY

## 2020-11-24 PROCEDURE — 36000707: Mod: HCNC | Performed by: OPHTHALMOLOGY

## 2020-11-24 PROCEDURE — 37000008 HC ANESTHESIA 1ST 15 MINUTES: Mod: HCNC | Performed by: OPHTHALMOLOGY

## 2020-11-24 PROCEDURE — 71000015 HC POSTOP RECOV 1ST HR: Mod: HCNC | Performed by: OPHTHALMOLOGY

## 2020-11-24 PROCEDURE — 71000016 HC POSTOP RECOV ADDL HR: Mod: HCNC | Performed by: OPHTHALMOLOGY

## 2020-11-24 DEVICE — LENS 22.0: Type: IMPLANTABLE DEVICE | Site: EYE | Status: FUNCTIONAL

## 2020-11-24 RX ORDER — TETRACAINE HYDROCHLORIDE 5 MG/ML
SOLUTION OPHTHALMIC
Status: DISCONTINUED | OUTPATIENT
Start: 2020-11-24 | End: 2020-11-24 | Stop reason: HOSPADM

## 2020-11-24 RX ORDER — PHENYLEPHRINE HYDROCHLORIDE 25 MG/ML
1 SOLUTION/ DROPS OPHTHALMIC
Status: COMPLETED | OUTPATIENT
Start: 2020-11-24 | End: 2020-11-24

## 2020-11-24 RX ORDER — LIDOCAINE HYDROCHLORIDE 40 MG/ML
INJECTION, SOLUTION RETROBULBAR
Status: DISCONTINUED | OUTPATIENT
Start: 2020-11-24 | End: 2020-11-24 | Stop reason: HOSPADM

## 2020-11-24 RX ORDER — MIDAZOLAM HYDROCHLORIDE 1 MG/ML
INJECTION INTRAMUSCULAR; INTRAVENOUS
Status: DISCONTINUED | OUTPATIENT
Start: 2020-11-24 | End: 2020-11-24

## 2020-11-24 RX ORDER — SODIUM CHLORIDE 0.9 % (FLUSH) 0.9 %
10 SYRINGE (ML) INJECTION
Status: DISCONTINUED | OUTPATIENT
Start: 2020-11-24 | End: 2020-11-24 | Stop reason: HOSPADM

## 2020-11-24 RX ORDER — HYDROCODONE BITARTRATE AND ACETAMINOPHEN 5; 325 MG/1; MG/1
1 TABLET ORAL EVERY 4 HOURS PRN
Status: DISCONTINUED | OUTPATIENT
Start: 2020-11-24 | End: 2020-11-24 | Stop reason: HOSPADM

## 2020-11-24 RX ORDER — EPINEPHRINE 1 MG/ML
INJECTION, SOLUTION INTRACARDIAC; INTRAMUSCULAR; INTRAVENOUS; SUBCUTANEOUS
Status: DISCONTINUED | OUTPATIENT
Start: 2020-11-24 | End: 2020-11-24 | Stop reason: HOSPADM

## 2020-11-24 RX ORDER — TETRACAINE HYDROCHLORIDE 5 MG/ML
1 SOLUTION OPHTHALMIC
Status: COMPLETED | OUTPATIENT
Start: 2020-11-24 | End: 2020-11-24

## 2020-11-24 RX ORDER — OFLOXACIN 3 MG/ML
1 SOLUTION/ DROPS OPHTHALMIC
Status: DISCONTINUED | OUTPATIENT
Start: 2020-11-24 | End: 2020-11-24 | Stop reason: HOSPADM

## 2020-11-24 RX ORDER — TROPICAMIDE 10 MG/ML
1 SOLUTION/ DROPS OPHTHALMIC
Status: COMPLETED | OUTPATIENT
Start: 2020-11-24 | End: 2020-11-24

## 2020-11-24 RX ORDER — FENTANYL CITRATE 50 UG/ML
INJECTION, SOLUTION INTRAMUSCULAR; INTRAVENOUS
Status: DISCONTINUED | OUTPATIENT
Start: 2020-11-24 | End: 2020-11-24

## 2020-11-24 RX ORDER — PREDNISOLONE ACETATE 10 MG/ML
SUSPENSION/ DROPS OPHTHALMIC
Status: DISCONTINUED | OUTPATIENT
Start: 2020-11-24 | End: 2020-11-24 | Stop reason: HOSPADM

## 2020-11-24 RX ORDER — ACETAMINOPHEN 325 MG/1
650 TABLET ORAL EVERY 4 HOURS PRN
Status: DISCONTINUED | OUTPATIENT
Start: 2020-11-24 | End: 2020-11-24 | Stop reason: HOSPADM

## 2020-11-24 RX ORDER — CYCLOPENTOLATE HYDROCHLORIDE 10 MG/ML
1 SOLUTION/ DROPS OPHTHALMIC
Status: DISCONTINUED | OUTPATIENT
Start: 2020-11-24 | End: 2020-11-24 | Stop reason: HOSPADM

## 2020-11-24 RX ADMIN — TETRACAINE HYDROCHLORIDE 1 DROP: 5 SOLUTION OPHTHALMIC at 07:11

## 2020-11-24 RX ADMIN — CYCLOPENTOLATE HYDROCHLORIDE 1 DROP: 10 SOLUTION/ DROPS OPHTHALMIC at 07:11

## 2020-11-24 RX ADMIN — FENTANYL CITRATE 25 MCG: 50 INJECTION, SOLUTION INTRAMUSCULAR; INTRAVENOUS at 08:11

## 2020-11-24 RX ADMIN — OFLOXACIN 1 DROP: 3 SOLUTION OPHTHALMIC at 07:11

## 2020-11-24 RX ADMIN — TROPICAMIDE 1 DROP: 10 SOLUTION/ DROPS OPHTHALMIC at 07:11

## 2020-11-24 RX ADMIN — MIDAZOLAM HYDROCHLORIDE 2 MG: 1 INJECTION, SOLUTION INTRAMUSCULAR; INTRAVENOUS at 08:11

## 2020-11-24 RX ADMIN — PHENYLEPHRINE HYDROCHLORIDE 1 DROP: 25 SOLUTION/ DROPS OPHTHALMIC at 07:11

## 2020-11-24 NOTE — ANESTHESIA PREPROCEDURE EVALUATION
11/24/2020  Xander Sanchez is a 70 y.o., male.    Pre-op Assessment    I have reviewed the Patient Summary Reports.     I have reviewed the Nursing Notes. I have reviewed the NPO Status.   I have reviewed the Medications.     Review of Systems  Anesthesia Hx:  No problems with previous Anesthesia  Denies Family Hx of Anesthesia complications.   Denies Personal Hx of Anesthesia complications.   Social:  Non-Smoker    Hematology/Oncology:  Hematology Normal   Oncology Normal     EENT/Dental:EENT/Dental Normal   Cardiovascular:   Pacemaker Hypertension AICD   Pulmonary:   COPD    Renal/:  Renal/ Normal     Hepatic/GI:   GERD    Musculoskeletal:  Musculoskeletal Normal    Neurological:  Neurology Normal    Endocrine:   Diabetes    Dermatological:  Skin Normal    Psych:  Psychiatric Normal           Physical Exam  General:  Well nourished    Airway/Jaw/Neck:  Airway Findings: Mouth Opening: Normal     Dental:  Dental Findings: Edentulous        Mental Status:  Mental Status Findings:  Cooperative, Alert and Oriented         Anesthesia Plan  Type of Anesthesia, risks & benefits discussed:  Anesthesia Type:  MAC  Patient's Preference:   Intra-op Monitoring Plan: standard ASA monitors  Intra-op Monitoring Plan Comments:   Post Op Pain Control Plan: multimodal analgesia  Post Op Pain Control Plan Comments:   Induction:   IV  Beta Blocker:         Informed Consent: Patient understands risks and agrees with Anesthesia plan.  Questions answered. Anesthesia consent signed with patient.  ASA Score: 3     Day of Surgery Review of History & Physical:    H&P update referred to the surgeon.         Ready For Surgery From Anesthesia Perspective.

## 2020-11-24 NOTE — BRIEF OP NOTE
[facility]  Brief Operative Note    Surgery Date: 11/24/2020     Surgeon(s) and Role:     * Oral Graham MD - Primary    Assisting Surgeon: None    Pre-op Diagnosis:  NS (nuclear sclerosis), left [H25.12]    Post-op Diagnosis:  Post-Op Diagnosis Codes:     * NS (nuclear sclerosis), left [H25.12]    Procedure(s) (LRB):  EXTRACTION, CATARACT, WITH IOL INSERTION (Left)    Anesthesia: Local MAC    Description of the findings of the procedure(s):     Estimated Blood Loss: * No values recorded between 11/24/2020  8:03 AM and 11/24/2020  8:40 AM *         Specimens:   Specimen (12h ago, onward)    None            Discharge Note    OUTCOME: Patient tolerated treatment/procedure well without complication and is now ready for discharge.    DISPOSITION: Home or Self Care    FINAL DIAGNOSIS:  Nuclear sclerosis, left    FOLLOWUP: In clinic    DISCHARGE INSTRUCTIONS:    Discharge Procedure Orders   Other restrictions (specify):   Order Comments: No heavy lifting or bending for 1 week.

## 2020-11-24 NOTE — OP NOTE
Operative Date:  11/24/2020    Discharge Date:  11/24/2020    Discharge Patient Home    Report Title: Operative Note      SURGEON: Oral Graham MD     ASSISTANT:     PREOPERATIVE DIAGNOSIS: Visually significant NSC cataract,  Left Eye.    POSTOPERATIVE DIAGNOSIS: Visually-significant NSC cataract,  Left Eye.    PROCEDURE PERFORMED: Phacoemulsification of the cataract with posterior chamber intraocular lens Left Eye.    ANESTHESIA: Topical with MAC    COMPLICATIONS: None    ESTIMATED BLOOD LOSS: Minimal    INDICATIONS FOR PROCEDURE:   The patient is a pleasant 70 year old gentleman with increasing difficulties with activities of daily living secondary to a dense visually significant cataract in the Left Eye.  Discussions have been carried out with this patient concerning the options to surgery, risks, benefits and expectations.  The patient voiced good understanding and wished to proceed with the above procedure.    PROCEDURE IN DETAIL: The patient was brought to the operating room and received topical anesthetic to the eye and then was prepped and draped in the usual sterile fashion.  Using the Steel ring and the guarded jaret blade set at 0.37 mm, a partial thickness clear cornea incision was made temporally.  The paracentesis site was made at the six o'clock position.  Omidria was injected into the anterior chamber through the paracentesis.  Viscoat was then injected into the anterior chamber.  The eye was then reentered at the primary surgical site with a 2.4 mm keratome followed by continuous capsulotomy, hydrodissection, hydrodelineation and phacoemulsification of the cataract.  Residual cortical material was removed using automated irrigation-aspiration technique.  Healon was injected into the posterior chamber and an SN60WF 22.0 diopter lens was placed in the bag without difficulty. Residual viscoelastic was removed using automated irrigation-aspiration technique. The eye was re-pressurized  using BSS solution and both the paracentesis site and the primary surgical site were demonstrated to be watertight at the end of the case with Weck--Sue manipulation.  One drop of Ofloxacin and one drop of Pred acetate 1% was applied to the Left Eye .The eye was closed, patched and a Keith shield placed.  The patient was taken to the recovery room in good and stable condition.  The patient tolerated the procedure well.  The patient was instructed to refrain from any heavy lifting, bending, stooping or straining activities, discharged home  and to follow-up in the morning for routine postoperative care with Oral Graham MD.

## 2020-11-24 NOTE — PLAN OF CARE
Discharge delayed to car battery dead; spouse with Security jumping battery.  Patient brought to Waiting room.  
Xander Sanchez has met all discharge criteria from Phase II. Vital Signs are stable, ambulating  without difficulty. Discharge instructions given, patient verbalized understanding. Discharged from facility via wheelchair in stable condition.       
Laps, needles and instruments count was reported as correct.

## 2020-11-24 NOTE — ANESTHESIA POSTPROCEDURE EVALUATION
Anesthesia Post Evaluation    Patient: Xander Sanchez    Procedure(s) Performed: Procedure(s) (LRB):  EXTRACTION, CATARACT, WITH IOL INSERTION (Left)    Final Anesthesia Type: MAC    Patient location during evaluation: [facility]  Patient participation: Yes- Able to Participate  Level of consciousness: awake and alert  Post-procedure vital signs: reviewed and stable  Pain management: adequate  Airway patency: patent    PONV status at discharge: No PONV  Anesthetic complications: no      Cardiovascular status: blood pressure returned to baseline  Respiratory status: unassisted  Follow-up not needed.          Vitals Value Taken Time   /93 11/24/20 0711   Temp 36.4 °C (97.5 °F) 11/24/20 0711   Pulse 65 11/24/20 0711   Resp 18 11/24/20 0711   SpO2 98 % 11/24/20 0711         No case tracking events are documented in the log.      Pain/Odell Score: No data recorded

## 2020-11-25 ENCOUNTER — OFFICE VISIT (OUTPATIENT)
Dept: OPHTHALMOLOGY | Facility: CLINIC | Age: 70
End: 2020-11-25
Payer: MEDICARE

## 2020-11-25 VITALS — DIASTOLIC BLOOD PRESSURE: 68 MMHG | HEART RATE: 81 BPM | SYSTOLIC BLOOD PRESSURE: 96 MMHG

## 2020-11-25 DIAGNOSIS — Z98.890 POST-OPERATIVE STATE: Primary | ICD-10-CM

## 2020-11-25 PROCEDURE — 3288F FALL RISK ASSESSMENT DOCD: CPT | Mod: HCNC,CPTII,S$GLB, | Performed by: OPHTHALMOLOGY

## 2020-11-25 PROCEDURE — 99999 PR PBB SHADOW E&M-EST. PATIENT-LVL III: CPT | Mod: PBBFAC,HCNC,, | Performed by: OPHTHALMOLOGY

## 2020-11-25 PROCEDURE — 99024 PR POST-OP FOLLOW-UP VISIT: ICD-10-PCS | Mod: HCNC,S$GLB,, | Performed by: OPHTHALMOLOGY

## 2020-11-25 PROCEDURE — 99999 PR PBB SHADOW E&M-EST. PATIENT-LVL III: ICD-10-PCS | Mod: PBBFAC,HCNC,, | Performed by: OPHTHALMOLOGY

## 2020-11-25 PROCEDURE — 99024 POSTOP FOLLOW-UP VISIT: CPT | Mod: HCNC,S$GLB,, | Performed by: OPHTHALMOLOGY

## 2020-11-25 PROCEDURE — 1126F PR PAIN SEVERITY QUANTIFIED, NO PAIN PRESENT: ICD-10-PCS | Mod: HCNC,S$GLB,, | Performed by: OPHTHALMOLOGY

## 2020-11-25 PROCEDURE — 1126F AMNT PAIN NOTED NONE PRSNT: CPT | Mod: HCNC,S$GLB,, | Performed by: OPHTHALMOLOGY

## 2020-11-25 PROCEDURE — 3288F PR FALLS RISK ASSESSMENT DOCUMENTED: ICD-10-PCS | Mod: HCNC,CPTII,S$GLB, | Performed by: OPHTHALMOLOGY

## 2020-11-25 PROCEDURE — 1101F PT FALLS ASSESS-DOCD LE1/YR: CPT | Mod: HCNC,CPTII,S$GLB, | Performed by: OPHTHALMOLOGY

## 2020-11-25 PROCEDURE — 1101F PR PT FALLS ASSESS DOC 0-1 FALLS W/OUT INJ PAST YR: ICD-10-PCS | Mod: HCNC,CPTII,S$GLB, | Performed by: OPHTHALMOLOGY

## 2020-11-25 NOTE — PROGRESS NOTES
Subjective:       Patient ID: Xander Sanchez is a 70 y.o. male.    Chief Complaint: Post-op Evaluation    HPI     1 day PO CE/IOL OS.  Slept well, no discomfort.  PO instructions and   follow up appointment given.    Last edited by Princess Aponte on 11/25/2020  1:39 PM. (History)             Assessment:       1. Post-operative state        Plan:       S/p CE OU- Doing well.      CPM OS.  Taper gtts OD.  RTC 1 wk.

## 2020-12-01 ENCOUNTER — TELEPHONE (OUTPATIENT)
Dept: OPHTHALMOLOGY | Facility: CLINIC | Age: 70
End: 2020-12-01

## 2020-12-01 NOTE — TELEPHONE ENCOUNTER
----- Message from Tiffany Unger sent at 12/1/2020  8:32 AM CST -----  Regarding: Medication  Contact: Pt @ [phone]  Pt stating he is completely out of the following medication:    difluprednate (DUREZOL) 0.05 % Drop ophthalmic solution    Call back: [phone]    Pt states that he does not need a refill just wants to inform staff.

## 2020-12-01 NOTE — TELEPHONE ENCOUNTER
Spoke with pt about  Refills on Durezol. Pt states he does not have enough money for refill. I told pt that I will check will check with Dr. Graham if he has any refills  On Durezol.

## 2020-12-02 ENCOUNTER — OFFICE VISIT (OUTPATIENT)
Dept: OPHTHALMOLOGY | Facility: CLINIC | Age: 70
End: 2020-12-02
Payer: MEDICARE

## 2020-12-02 DIAGNOSIS — Z98.890 POST-OPERATIVE STATE: Primary | ICD-10-CM

## 2020-12-02 PROCEDURE — 1101F PR PT FALLS ASSESS DOC 0-1 FALLS W/OUT INJ PAST YR: ICD-10-PCS | Mod: HCNC,CPTII,S$GLB, | Performed by: OPHTHALMOLOGY

## 2020-12-02 PROCEDURE — 99999 PR PBB SHADOW E&M-EST. PATIENT-LVL III: ICD-10-PCS | Mod: PBBFAC,HCNC,, | Performed by: OPHTHALMOLOGY

## 2020-12-02 PROCEDURE — 99999 PR PBB SHADOW E&M-EST. PATIENT-LVL III: CPT | Mod: PBBFAC,HCNC,, | Performed by: OPHTHALMOLOGY

## 2020-12-02 PROCEDURE — 99024 PR POST-OP FOLLOW-UP VISIT: ICD-10-PCS | Mod: HCNC,S$GLB,, | Performed by: OPHTHALMOLOGY

## 2020-12-02 PROCEDURE — 3288F PR FALLS RISK ASSESSMENT DOCUMENTED: ICD-10-PCS | Mod: HCNC,CPTII,S$GLB, | Performed by: OPHTHALMOLOGY

## 2020-12-02 PROCEDURE — 3288F FALL RISK ASSESSMENT DOCD: CPT | Mod: HCNC,CPTII,S$GLB, | Performed by: OPHTHALMOLOGY

## 2020-12-02 PROCEDURE — 99024 POSTOP FOLLOW-UP VISIT: CPT | Mod: HCNC,S$GLB,, | Performed by: OPHTHALMOLOGY

## 2020-12-02 PROCEDURE — 1126F AMNT PAIN NOTED NONE PRSNT: CPT | Mod: HCNC,S$GLB,, | Performed by: OPHTHALMOLOGY

## 2020-12-02 PROCEDURE — 1126F PR PAIN SEVERITY QUANTIFIED, NO PAIN PRESENT: ICD-10-PCS | Mod: HCNC,S$GLB,, | Performed by: OPHTHALMOLOGY

## 2020-12-02 PROCEDURE — 1101F PT FALLS ASSESS-DOCD LE1/YR: CPT | Mod: HCNC,CPTII,S$GLB, | Performed by: OPHTHALMOLOGY

## 2020-12-03 NOTE — PROGRESS NOTES
Subjective:       Patient ID: Xander Sanchez is a 70 y.o. male.    Chief Complaint: Post-op Evaluation    HPI     DLS: 11/25/20    Pt here for 1 week post phaco w/IOL OS-  11/24/20  Pt states no eye pain or discomfort. Pt states he ran out of the Durezol   eye drops and didn't use today or yesterday. Pt states he is using all his   other drops as directed.     Last edited by Myrna Barth on 12/2/2020 11:41 AM. (History)             Assessment:       1. Post-operative state        Plan:       S/p CE OU- Doing well.      Taper gtts OU.  RTC 3 wks.

## 2020-12-11 ENCOUNTER — PATIENT MESSAGE (OUTPATIENT)
Dept: OTHER | Facility: OTHER | Age: 70
End: 2020-12-11

## 2020-12-14 ENCOUNTER — TELEPHONE (OUTPATIENT)
Dept: OPTOMETRY | Facility: CLINIC | Age: 70
End: 2020-12-14

## 2020-12-14 DIAGNOSIS — M50.00 HNP (HERNIATED NUCLEUS PULPOSUS) WITH MYELOPATHY, CERVICAL: ICD-10-CM

## 2020-12-14 DIAGNOSIS — G89.4 CHRONIC PAIN SYNDROME: ICD-10-CM

## 2020-12-14 DIAGNOSIS — M50.30 DEGENERATION OF CERVICAL INTERVERTEBRAL DISC: ICD-10-CM

## 2020-12-14 DIAGNOSIS — M48.02 SPINAL STENOSIS IN CERVICAL REGION: ICD-10-CM

## 2020-12-14 DIAGNOSIS — R29.898 RIGHT HAND WEAKNESS: ICD-10-CM

## 2020-12-14 DIAGNOSIS — M47.12 CERVICAL SPONDYLOSIS WITH MYELOPATHY: ICD-10-CM

## 2020-12-14 DIAGNOSIS — G90.50 COMPLEX REGIONAL PAIN SYNDROME TYPE 1, AFFECTING UNSPECIFIED SITE: ICD-10-CM

## 2020-12-14 DIAGNOSIS — M96.1 CERVICAL POST-LAMINECTOMY SYNDROME: ICD-10-CM

## 2020-12-14 DIAGNOSIS — M62.81 MUSCLE RIGHT ARM WEAKNESS: ICD-10-CM

## 2020-12-14 DIAGNOSIS — M54.12 CERVICAL RADICULAR PAIN: ICD-10-CM

## 2020-12-14 DIAGNOSIS — M54.12 BRACHIAL NEURITIS OR RADICULITIS: ICD-10-CM

## 2020-12-14 DIAGNOSIS — M54.12 CERVICAL RADICULOPATHY: ICD-10-CM

## 2020-12-14 DIAGNOSIS — R29.898 RIGHT ARM WEAKNESS: ICD-10-CM

## 2020-12-14 DIAGNOSIS — M79.601 RIGHT ARM PAIN: ICD-10-CM

## 2020-12-14 DIAGNOSIS — M47.812 FACET ARTHRITIS OF CERVICAL REGION: ICD-10-CM

## 2020-12-14 DIAGNOSIS — F11.20 NARCOTIC DEPENDENCY, CONTINUOUS: ICD-10-CM

## 2020-12-14 DIAGNOSIS — M48.02 CERVICAL STENOSIS OF SPINE: ICD-10-CM

## 2020-12-14 DIAGNOSIS — G56.41 COMPLEX REGIONAL PAIN SYNDROME TYPE 2 OF RIGHT UPPER EXTREMITY: ICD-10-CM

## 2020-12-14 DIAGNOSIS — M75.101 ROTATOR CUFF SYNDROME OF RIGHT SHOULDER: ICD-10-CM

## 2020-12-14 NOTE — TELEPHONE ENCOUNTER
----- Message from Cece Strong sent at 12/14/2020 10:47 AM CST -----  Contact: self @ [phone]  Pt is calling for the status of his refill request for hydrocodone 10/325.  Pt is due to refill today.  Pt says he sent his request for refill last Thursday.  Pls call.     [facility] [address] [address] [place] [address] [address] AT [facility] [address] [phone] (Phone)       [phone] (Fax)

## 2020-12-16 ENCOUNTER — TELEPHONE (OUTPATIENT)
Dept: OPHTHALMOLOGY | Facility: CLINIC | Age: 70
End: 2020-12-16

## 2020-12-16 RX ORDER — HYDROCODONE BITARTRATE AND ACETAMINOPHEN 10; 325 MG/1; MG/1
1 TABLET ORAL EVERY 6 HOURS PRN
Qty: 120 TABLET | Refills: 0 | Status: SHIPPED | OUTPATIENT
Start: 2020-12-16 | End: 2021-01-11 | Stop reason: SDUPTHER

## 2020-12-23 ENCOUNTER — OFFICE VISIT (OUTPATIENT)
Dept: OPHTHALMOLOGY | Facility: CLINIC | Age: 70
End: 2020-12-23
Payer: MEDICARE

## 2020-12-23 DIAGNOSIS — H52.7 REFRACTIVE ERROR: ICD-10-CM

## 2020-12-23 DIAGNOSIS — Z98.890 POST-OPERATIVE STATE: Primary | ICD-10-CM

## 2020-12-23 PROCEDURE — 1101F PT FALLS ASSESS-DOCD LE1/YR: CPT | Mod: HCNC,CPTII,S$GLB, | Performed by: OPHTHALMOLOGY

## 2020-12-23 PROCEDURE — 3288F FALL RISK ASSESSMENT DOCD: CPT | Mod: HCNC,CPTII,S$GLB, | Performed by: OPHTHALMOLOGY

## 2020-12-23 PROCEDURE — 1126F PR PAIN SEVERITY QUANTIFIED, NO PAIN PRESENT: ICD-10-PCS | Mod: HCNC,S$GLB,, | Performed by: OPHTHALMOLOGY

## 2020-12-23 PROCEDURE — 1101F PR PT FALLS ASSESS DOC 0-1 FALLS W/OUT INJ PAST YR: ICD-10-PCS | Mod: HCNC,CPTII,S$GLB, | Performed by: OPHTHALMOLOGY

## 2020-12-23 PROCEDURE — 99999 PR PBB SHADOW E&M-EST. PATIENT-LVL III: ICD-10-PCS | Mod: PBBFAC,HCNC,, | Performed by: OPHTHALMOLOGY

## 2020-12-23 PROCEDURE — 1126F AMNT PAIN NOTED NONE PRSNT: CPT | Mod: HCNC,S$GLB,, | Performed by: OPHTHALMOLOGY

## 2020-12-23 PROCEDURE — 3288F PR FALLS RISK ASSESSMENT DOCUMENTED: ICD-10-PCS | Mod: HCNC,CPTII,S$GLB, | Performed by: OPHTHALMOLOGY

## 2020-12-23 PROCEDURE — 99024 POSTOP FOLLOW-UP VISIT: CPT | Mod: HCNC,S$GLB,, | Performed by: OPHTHALMOLOGY

## 2020-12-23 PROCEDURE — 99999 PR PBB SHADOW E&M-EST. PATIENT-LVL III: CPT | Mod: PBBFAC,HCNC,, | Performed by: OPHTHALMOLOGY

## 2020-12-23 PROCEDURE — 99024 PR POST-OP FOLLOW-UP VISIT: ICD-10-PCS | Mod: HCNC,S$GLB,, | Performed by: OPHTHALMOLOGY

## 2020-12-23 NOTE — PROGRESS NOTES
Subjective:       Patient ID: Xander Sanchez is a 70 y.o. male.    Chief Complaint: Post-op Evaluation (3 week PO OU )    HPI     Post-op Evaluation      Additional comments: 3 week PO OU               Comments     DLS: 12/02/2020 Dr. Graham    70 y.o. male is here for 3 week PO OU. Denies eye pain and f/f. No   discomfort or light sensitivity. No blurred vision. Declined Mrx on   12/23/2020.    Eye Med's: no gtt           Last edited by OSVALDO Banks on 12/23/2020  9:30 AM. (History)             Assessment:       1. Post-operative state    2. Refractive error        Plan:       S/p CE OU- Doing well.  RE-Pt does not want MRx. Doing well with monovision.      RTC Dr Toney in 6 mos.

## 2021-01-11 DIAGNOSIS — M47.12 CERVICAL SPONDYLOSIS WITH MYELOPATHY: ICD-10-CM

## 2021-01-11 DIAGNOSIS — F11.20 NARCOTIC DEPENDENCY, CONTINUOUS: ICD-10-CM

## 2021-01-11 DIAGNOSIS — G56.41 COMPLEX REGIONAL PAIN SYNDROME TYPE 2 OF RIGHT UPPER EXTREMITY: ICD-10-CM

## 2021-01-11 DIAGNOSIS — M54.12 BRACHIAL NEURITIS OR RADICULITIS: ICD-10-CM

## 2021-01-11 DIAGNOSIS — M79.601 RIGHT ARM PAIN: ICD-10-CM

## 2021-01-11 DIAGNOSIS — M75.101 ROTATOR CUFF SYNDROME OF RIGHT SHOULDER: ICD-10-CM

## 2021-01-11 DIAGNOSIS — G90.50 COMPLEX REGIONAL PAIN SYNDROME TYPE 1, AFFECTING UNSPECIFIED SITE: ICD-10-CM

## 2021-01-11 DIAGNOSIS — M54.12 CERVICAL RADICULAR PAIN: ICD-10-CM

## 2021-01-11 DIAGNOSIS — R29.898 RIGHT ARM WEAKNESS: ICD-10-CM

## 2021-01-11 DIAGNOSIS — M50.00 HNP (HERNIATED NUCLEUS PULPOSUS) WITH MYELOPATHY, CERVICAL: ICD-10-CM

## 2021-01-11 DIAGNOSIS — M54.12 CERVICAL RADICULOPATHY: ICD-10-CM

## 2021-01-11 DIAGNOSIS — R29.898 RIGHT HAND WEAKNESS: ICD-10-CM

## 2021-01-11 DIAGNOSIS — G89.4 CHRONIC PAIN SYNDROME: ICD-10-CM

## 2021-01-11 DIAGNOSIS — M96.1 CERVICAL POST-LAMINECTOMY SYNDROME: ICD-10-CM

## 2021-01-11 DIAGNOSIS — M62.81 MUSCLE RIGHT ARM WEAKNESS: ICD-10-CM

## 2021-01-11 DIAGNOSIS — M48.02 SPINAL STENOSIS IN CERVICAL REGION: ICD-10-CM

## 2021-01-11 DIAGNOSIS — M48.02 CERVICAL STENOSIS OF SPINE: ICD-10-CM

## 2021-01-11 DIAGNOSIS — M50.30 DEGENERATION OF CERVICAL INTERVERTEBRAL DISC: ICD-10-CM

## 2021-01-11 DIAGNOSIS — M47.812 FACET ARTHRITIS OF CERVICAL REGION: ICD-10-CM

## 2021-01-16 RX ORDER — HYDROCODONE BITARTRATE AND ACETAMINOPHEN 10; 325 MG/1; MG/1
1 TABLET ORAL EVERY 6 HOURS PRN
Qty: 120 TABLET | Refills: 0 | Status: SHIPPED | OUTPATIENT
Start: 2021-01-16 | End: 2021-02-17 | Stop reason: SDUPTHER

## 2021-01-26 RX ORDER — PRAVASTATIN SODIUM 10 MG/1
10 TABLET ORAL NIGHTLY
Qty: 90 TABLET | Refills: 3 | Status: SHIPPED | OUTPATIENT
Start: 2021-01-26 | End: 2021-01-27 | Stop reason: SDUPTHER

## 2021-01-27 RX ORDER — PRAVASTATIN SODIUM 10 MG/1
10 TABLET ORAL NIGHTLY
Qty: 90 TABLET | Refills: 3 | Status: SHIPPED | OUTPATIENT
Start: 2021-01-27 | End: 2021-02-03 | Stop reason: SDUPTHER

## 2021-01-29 ENCOUNTER — TELEPHONE (OUTPATIENT)
Dept: OPHTHALMOLOGY | Facility: CLINIC | Age: 71
End: 2021-01-29

## 2021-02-02 ENCOUNTER — CLINICAL SUPPORT (OUTPATIENT)
Dept: CARDIOLOGY | Facility: HOSPITAL | Age: 71
End: 2021-02-02
Payer: MEDICARE

## 2021-02-02 DIAGNOSIS — Z95.810 PRESENCE OF AUTOMATIC (IMPLANTABLE) CARDIAC DEFIBRILLATOR: ICD-10-CM

## 2021-02-02 DIAGNOSIS — I42.9 CARDIOMYOPATHY, UNSPECIFIED: ICD-10-CM

## 2021-02-02 PROCEDURE — 93295 CARDIAC DEVICE CHECK - REMOTE: ICD-10-PCS | Mod: HCNC,,, | Performed by: INTERNAL MEDICINE

## 2021-02-02 PROCEDURE — 93295 DEV INTERROG REMOTE 1/2/MLT: CPT | Mod: HCNC,,, | Performed by: INTERNAL MEDICINE

## 2021-02-02 PROCEDURE — 93296 REM INTERROG EVL PM/IDS: CPT | Mod: HCNC | Performed by: INTERNAL MEDICINE

## 2021-02-03 RX ORDER — PRAVASTATIN SODIUM 10 MG/1
10 TABLET ORAL NIGHTLY
Qty: 90 TABLET | Refills: 3 | Status: SHIPPED | OUTPATIENT
Start: 2021-02-03 | End: 2022-02-10

## 2021-02-11 DIAGNOSIS — R29.898 RIGHT HAND WEAKNESS: ICD-10-CM

## 2021-02-11 DIAGNOSIS — M48.02 CERVICAL STENOSIS OF SPINE: ICD-10-CM

## 2021-02-11 DIAGNOSIS — M62.81 MUSCLE RIGHT ARM WEAKNESS: ICD-10-CM

## 2021-02-11 DIAGNOSIS — M54.12 BRACHIAL NEURITIS OR RADICULITIS: ICD-10-CM

## 2021-02-11 DIAGNOSIS — M54.12 CERVICAL RADICULOPATHY: ICD-10-CM

## 2021-02-11 DIAGNOSIS — G90.50 COMPLEX REGIONAL PAIN SYNDROME TYPE 1, AFFECTING UNSPECIFIED SITE: ICD-10-CM

## 2021-02-11 DIAGNOSIS — F11.20 NARCOTIC DEPENDENCY, CONTINUOUS: ICD-10-CM

## 2021-02-11 DIAGNOSIS — M47.812 FACET ARTHRITIS OF CERVICAL REGION: ICD-10-CM

## 2021-02-11 DIAGNOSIS — M50.00 HNP (HERNIATED NUCLEUS PULPOSUS) WITH MYELOPATHY, CERVICAL: ICD-10-CM

## 2021-02-11 DIAGNOSIS — M54.12 CERVICAL RADICULAR PAIN: ICD-10-CM

## 2021-02-11 DIAGNOSIS — M47.12 CERVICAL SPONDYLOSIS WITH MYELOPATHY: ICD-10-CM

## 2021-02-11 DIAGNOSIS — M79.601 RIGHT ARM PAIN: ICD-10-CM

## 2021-02-11 DIAGNOSIS — G89.4 CHRONIC PAIN SYNDROME: ICD-10-CM

## 2021-02-11 DIAGNOSIS — M48.02 SPINAL STENOSIS IN CERVICAL REGION: ICD-10-CM

## 2021-02-11 DIAGNOSIS — G56.41 COMPLEX REGIONAL PAIN SYNDROME TYPE 2 OF RIGHT UPPER EXTREMITY: ICD-10-CM

## 2021-02-11 DIAGNOSIS — M75.101 ROTATOR CUFF SYNDROME OF RIGHT SHOULDER: ICD-10-CM

## 2021-02-11 DIAGNOSIS — M96.1 CERVICAL POST-LAMINECTOMY SYNDROME: ICD-10-CM

## 2021-02-11 DIAGNOSIS — R29.898 RIGHT ARM WEAKNESS: ICD-10-CM

## 2021-02-11 DIAGNOSIS — M50.30 DEGENERATION OF CERVICAL INTERVERTEBRAL DISC: ICD-10-CM

## 2021-02-11 RX ORDER — HYDROCODONE BITARTRATE AND ACETAMINOPHEN 10; 325 MG/1; MG/1
1 TABLET ORAL EVERY 6 HOURS PRN
Qty: 120 TABLET | Refills: 0 | Status: CANCELLED | OUTPATIENT
Start: 2021-02-11 | End: 2021-03-13

## 2021-02-12 ENCOUNTER — IMMUNIZATION (OUTPATIENT)
Dept: INTERNAL MEDICINE | Facility: CLINIC | Age: 71
End: 2021-02-12
Payer: MEDICARE

## 2021-02-12 DIAGNOSIS — Z23 NEED FOR VACCINATION: Primary | ICD-10-CM

## 2021-02-12 PROCEDURE — 0011A COVID-19, MRNA, LNP-S, PF, 100 MCG/0.5 ML DOSE VACCINE: CPT | Mod: PBBFAC | Performed by: INTERNAL MEDICINE

## 2021-02-17 DIAGNOSIS — G90.50 COMPLEX REGIONAL PAIN SYNDROME TYPE 1, AFFECTING UNSPECIFIED SITE: ICD-10-CM

## 2021-02-17 DIAGNOSIS — M62.81 MUSCLE RIGHT ARM WEAKNESS: ICD-10-CM

## 2021-02-17 DIAGNOSIS — G56.41 COMPLEX REGIONAL PAIN SYNDROME TYPE 2 OF RIGHT UPPER EXTREMITY: ICD-10-CM

## 2021-02-17 DIAGNOSIS — M47.12 CERVICAL SPONDYLOSIS WITH MYELOPATHY: ICD-10-CM

## 2021-02-17 DIAGNOSIS — R29.898 RIGHT ARM WEAKNESS: ICD-10-CM

## 2021-02-17 DIAGNOSIS — M96.1 CERVICAL POST-LAMINECTOMY SYNDROME: ICD-10-CM

## 2021-02-17 DIAGNOSIS — M50.00 HNP (HERNIATED NUCLEUS PULPOSUS) WITH MYELOPATHY, CERVICAL: ICD-10-CM

## 2021-02-17 DIAGNOSIS — M79.601 RIGHT ARM PAIN: ICD-10-CM

## 2021-02-17 DIAGNOSIS — M54.12 CERVICAL RADICULOPATHY: ICD-10-CM

## 2021-02-17 DIAGNOSIS — M47.812 FACET ARTHRITIS OF CERVICAL REGION: ICD-10-CM

## 2021-02-17 DIAGNOSIS — F11.20 NARCOTIC DEPENDENCY, CONTINUOUS: ICD-10-CM

## 2021-02-17 DIAGNOSIS — R29.898 RIGHT HAND WEAKNESS: ICD-10-CM

## 2021-02-17 DIAGNOSIS — M54.12 CERVICAL RADICULAR PAIN: ICD-10-CM

## 2021-02-17 DIAGNOSIS — M48.02 CERVICAL STENOSIS OF SPINE: ICD-10-CM

## 2021-02-17 DIAGNOSIS — M50.30 DEGENERATION OF CERVICAL INTERVERTEBRAL DISC: ICD-10-CM

## 2021-02-17 DIAGNOSIS — G89.4 CHRONIC PAIN SYNDROME: ICD-10-CM

## 2021-02-17 DIAGNOSIS — M75.101 ROTATOR CUFF SYNDROME OF RIGHT SHOULDER: ICD-10-CM

## 2021-02-17 DIAGNOSIS — M54.12 BRACHIAL NEURITIS OR RADICULITIS: ICD-10-CM

## 2021-02-17 DIAGNOSIS — M48.02 SPINAL STENOSIS IN CERVICAL REGION: ICD-10-CM

## 2021-02-21 RX ORDER — HYDROCODONE BITARTRATE AND ACETAMINOPHEN 10; 325 MG/1; MG/1
1 TABLET ORAL EVERY 6 HOURS PRN
Qty: 120 TABLET | Refills: 0 | Status: SHIPPED | OUTPATIENT
Start: 2021-02-21 | End: 2021-03-15 | Stop reason: SDUPTHER

## 2021-03-08 ENCOUNTER — PATIENT OUTREACH (OUTPATIENT)
Dept: ADMINISTRATIVE | Facility: OTHER | Age: 71
End: 2021-03-08

## 2021-03-08 ENCOUNTER — TELEPHONE (OUTPATIENT)
Dept: OPTOMETRY | Facility: CLINIC | Age: 71
End: 2021-03-08

## 2021-03-09 ENCOUNTER — OFFICE VISIT (OUTPATIENT)
Dept: OPTOMETRY | Facility: CLINIC | Age: 71
End: 2021-03-09
Payer: MEDICARE

## 2021-03-09 DIAGNOSIS — H04.123 DRY EYES, BILATERAL: Primary | ICD-10-CM

## 2021-03-09 DIAGNOSIS — E11.9 TYPE 2 DIABETES MELLITUS WITHOUT RETINOPATHY: ICD-10-CM

## 2021-03-09 PROCEDURE — 2023F DILAT RTA XM W/O RTNOPTHY: CPT | Mod: S$GLB,,, | Performed by: OPTOMETRIST

## 2021-03-09 PROCEDURE — 3288F PR FALLS RISK ASSESSMENT DOCUMENTED: ICD-10-PCS | Mod: CPTII,S$GLB,, | Performed by: OPTOMETRIST

## 2021-03-09 PROCEDURE — 92012 PR EYE EXAM, EST PATIENT,INTERMED: ICD-10-PCS | Mod: S$GLB,,, | Performed by: OPTOMETRIST

## 2021-03-09 PROCEDURE — 99499 UNLISTED E&M SERVICE: CPT | Mod: S$GLB,,, | Performed by: OPTOMETRIST

## 2021-03-09 PROCEDURE — 2023F PR DILATED RETINAL EXAM W/O EVID OF RETINOPATHY: ICD-10-PCS | Mod: S$GLB,,, | Performed by: OPTOMETRIST

## 2021-03-09 PROCEDURE — 99999 PR PBB SHADOW E&M-EST. PATIENT-LVL III: CPT | Mod: PBBFAC,,, | Performed by: OPTOMETRIST

## 2021-03-09 PROCEDURE — 1125F PR PAIN SEVERITY QUANTIFIED, PAIN PRESENT: ICD-10-PCS | Mod: S$GLB,,, | Performed by: OPTOMETRIST

## 2021-03-09 PROCEDURE — 92012 INTRM OPH EXAM EST PATIENT: CPT | Mod: S$GLB,,, | Performed by: OPTOMETRIST

## 2021-03-09 PROCEDURE — 99999 PR PBB SHADOW E&M-EST. PATIENT-LVL III: ICD-10-PCS | Mod: PBBFAC,,, | Performed by: OPTOMETRIST

## 2021-03-09 PROCEDURE — 1101F PR PT FALLS ASSESS DOC 0-1 FALLS W/OUT INJ PAST YR: ICD-10-PCS | Mod: CPTII,S$GLB,, | Performed by: OPTOMETRIST

## 2021-03-09 PROCEDURE — 99499 RISK ADDL DX/OHS AUDIT: ICD-10-PCS | Mod: S$GLB,,, | Performed by: OPTOMETRIST

## 2021-03-09 PROCEDURE — 3288F FALL RISK ASSESSMENT DOCD: CPT | Mod: CPTII,S$GLB,, | Performed by: OPTOMETRIST

## 2021-03-09 PROCEDURE — 1125F AMNT PAIN NOTED PAIN PRSNT: CPT | Mod: S$GLB,,, | Performed by: OPTOMETRIST

## 2021-03-09 PROCEDURE — 1101F PT FALLS ASSESS-DOCD LE1/YR: CPT | Mod: CPTII,S$GLB,, | Performed by: OPTOMETRIST

## 2021-03-12 ENCOUNTER — IMMUNIZATION (OUTPATIENT)
Dept: INTERNAL MEDICINE | Facility: CLINIC | Age: 71
End: 2021-03-12

## 2021-03-12 DIAGNOSIS — Z23 NEED FOR VACCINATION: Primary | ICD-10-CM

## 2021-03-12 PROCEDURE — 91301 COVID-19, MRNA, LNP-S, PF, 100 MCG/0.5 ML DOSE VACCINE: ICD-10-PCS | Mod: S$GLB,,, | Performed by: FAMILY MEDICINE

## 2021-03-12 PROCEDURE — 0012A COVID-19, MRNA, LNP-S, PF, 100 MCG/0.5 ML DOSE VACCINE: ICD-10-PCS | Mod: CV19,S$GLB,, | Performed by: FAMILY MEDICINE

## 2021-03-12 PROCEDURE — 0012A COVID-19, MRNA, LNP-S, PF, 100 MCG/0.5 ML DOSE VACCINE: CPT | Mod: CV19,S$GLB,, | Performed by: FAMILY MEDICINE

## 2021-03-12 PROCEDURE — 91301 COVID-19, MRNA, LNP-S, PF, 100 MCG/0.5 ML DOSE VACCINE: CPT | Mod: S$GLB,,, | Performed by: FAMILY MEDICINE

## 2021-03-15 DIAGNOSIS — G56.41 COMPLEX REGIONAL PAIN SYNDROME TYPE 2 OF RIGHT UPPER EXTREMITY: ICD-10-CM

## 2021-03-15 DIAGNOSIS — G90.50 COMPLEX REGIONAL PAIN SYNDROME TYPE 1, AFFECTING UNSPECIFIED SITE: ICD-10-CM

## 2021-03-15 DIAGNOSIS — G89.4 CHRONIC PAIN SYNDROME: ICD-10-CM

## 2021-03-15 DIAGNOSIS — M47.812 FACET ARTHRITIS OF CERVICAL REGION: ICD-10-CM

## 2021-03-15 DIAGNOSIS — M48.02 CERVICAL STENOSIS OF SPINE: ICD-10-CM

## 2021-03-15 DIAGNOSIS — M50.30 DEGENERATION OF CERVICAL INTERVERTEBRAL DISC: ICD-10-CM

## 2021-03-15 DIAGNOSIS — M47.12 CERVICAL SPONDYLOSIS WITH MYELOPATHY: ICD-10-CM

## 2021-03-15 DIAGNOSIS — M62.81 MUSCLE RIGHT ARM WEAKNESS: ICD-10-CM

## 2021-03-15 DIAGNOSIS — M54.12 CERVICAL RADICULAR PAIN: ICD-10-CM

## 2021-03-15 DIAGNOSIS — R29.898 RIGHT ARM WEAKNESS: ICD-10-CM

## 2021-03-15 DIAGNOSIS — R29.898 RIGHT HAND WEAKNESS: ICD-10-CM

## 2021-03-15 DIAGNOSIS — M79.601 RIGHT ARM PAIN: ICD-10-CM

## 2021-03-15 DIAGNOSIS — M50.00 HNP (HERNIATED NUCLEUS PULPOSUS) WITH MYELOPATHY, CERVICAL: ICD-10-CM

## 2021-03-15 DIAGNOSIS — M75.101 ROTATOR CUFF SYNDROME OF RIGHT SHOULDER: ICD-10-CM

## 2021-03-15 DIAGNOSIS — M54.12 BRACHIAL NEURITIS OR RADICULITIS: ICD-10-CM

## 2021-03-15 DIAGNOSIS — M96.1 CERVICAL POST-LAMINECTOMY SYNDROME: ICD-10-CM

## 2021-03-15 DIAGNOSIS — M54.12 CERVICAL RADICULOPATHY: ICD-10-CM

## 2021-03-15 DIAGNOSIS — F11.20 NARCOTIC DEPENDENCY, CONTINUOUS: ICD-10-CM

## 2021-03-15 DIAGNOSIS — M48.02 SPINAL STENOSIS IN CERVICAL REGION: ICD-10-CM

## 2021-03-15 NOTE — PROGRESS NOTES
CC: BPH, prostatitis    Xander Sanchez is here for follow up of laser TURP on 2/1/17.  Has been doing very well since surgery.   Then developed dysuria and frequency and was treated with doxycycline by Ness Diego on 9/15/17.  Is here for follow up.  After he finished doxycycline for 1 wk, he again developed dysuria and increased frequency.  He tried AZO and his dysuria got resolved.  Still c/o frequency and urgency.  Reports that he is still taking flomax and oxybutynin.    No hematuria, dysuria  Nocturia x 1.     Review of Systems    General ROS: GENERAL:  No weight gain or loss  SKIN:  No rashes or lacerations  HEAD:  No headaches  EYES:  No exophthalmos, jaundice or blindness  EARS:  No dizziness, tinnitus or hearing loss  NOSE:  No changes in smell  MOUTH & THROAT:  No dyskinetic movements or obvious goiter  CHEST:  No shortness of breath, hyperventilation or cough  CARDIOVASCULAR:  No tachycardia or chest pain  ABDOMEN:  No nausea, vomiting, pain, constipation or diarrhea  URINARY:  No frequency, dysuria or sexual dysfunction  ENDOCRINE:  No polydipsia, polyuria  MUSCULOSKELETAL:  No pain or stiffness of the joints  NEUROLOGIC:  No weakness, sensory changes, seizures, confusion, memory loss, tremor or other abnormal movements    Physical Exam     Vitals:    10/17/17 0819   BP: (!) 144/88   Pulse: 78     General Appearance:  Alert, cooperative, no distress, appears stated age   Head:  Normocephalic, without obvious abnormality, atraumatic   Eyes:  PERRL, conjunctiva/corneas clear, EOM's intact, fundi benign, both eyes   Ears:  Normal TM's and external ear canals, both ears   Nose: Nares normal, septum midline, mucosa normal, no drainage or sinus tenderness   Throat: Lips, mucosa, and tongue normal; teeth and gums normal   Neck: Supple, symmetrical, trachea midline, no adenopathy, thyroid: not enlarged, symmetric, no tenderness/mass/nodules, no carotid bruit or JVD   Back:   Symmetric, no curvature, ROM  normal, no CVA tenderness   Lungs:   Clear to auscultation bilaterally, respirations unlabored   Chest Wall:  No tenderness or deformity   Heart:  Regular rate and rhythm, S1, S2 normal, no murmur, rub or gallop   Abdomen:   Soft, non-tender, bowel sounds active all four quadrants,  no masses, no organomegaly   Genitalia:  Normal male, normal testicles, normal epididymis, normal vas palpated.   Rectal:  Normal tone, no masses or tenderness;   Extremities: Extremities normal, atraumatic, no cyanosis or edema   Pulses: 2+ and symmetric   Skin: Skin color, texture, turgor normal, no rashes or lesions   Lymph nodes: Cervical, supraclavicular, and axillary nodes normal   Neurologic: Normal       LABS:  Lab Results   Component Value Date    PSA 1.2 02/01/2016    PSA 0.66 03/28/2014    PSA 0.67 03/13/2013     Lab Results   Component Value Date    CREATININE 1.0 08/29/2017    CREATININE 0.9 05/04/2017    CREATININE 0.9 02/27/2017     No results found for: TESTOSTERONE  Urine Culture, Routine   Date Value Ref Range Status   09/15/2017 No growth  Final      ml per bladder scan    Assessment and Plan:  Xander was seen today for dysuria.    Diagnoses and all orders for this visit:    S/P TURP    Prostatitis, acute  -     doxycycline (VIBRAMYCIN) 100 MG Cap; Take 1 capsule (100 mg total) by mouth every 12 (twelve) hours.    Urinary frequency  -     doxycycline (VIBRAMYCIN) 100 MG Cap; Take 1 capsule (100 mg total) by mouth every 12 (twelve) hours.    Incomplete bladder emptying    at least 1 month of doxycycline.  Then he can stop oxybutynin and  flomax respectively and see how he does overall in term of urination.   Will see how his symptoms do with this and may stop flomax in the future  Will check his symptoms and check PVR on the next visit.  If still symptomatic, will repeat SUDS cysto.  I spent 25 minutes with the patient of which more than half was spent in direct consultation with the patient in regards to our  treatment and plan.    Follow-up:  Return in about 2 months (around 12/17/2017) for check PVR.      None

## 2021-03-19 RX ORDER — HYDROCODONE BITARTRATE AND ACETAMINOPHEN 10; 325 MG/1; MG/1
1 TABLET ORAL EVERY 6 HOURS PRN
Qty: 120 TABLET | Refills: 0 | Status: SHIPPED | OUTPATIENT
Start: 2021-03-23 | End: 2021-04-13 | Stop reason: SDUPTHER

## 2021-03-31 DIAGNOSIS — I10 ESSENTIAL HYPERTENSION: ICD-10-CM

## 2021-03-31 DIAGNOSIS — M79.89 FOOT SWELLING: ICD-10-CM

## 2021-03-31 RX ORDER — HYDROCHLOROTHIAZIDE 12.5 MG/1
CAPSULE ORAL
Qty: 90 CAPSULE | Refills: 0 | Status: SHIPPED | OUTPATIENT
Start: 2021-03-31 | End: 2021-04-05 | Stop reason: SDUPTHER

## 2021-04-01 ENCOUNTER — OFFICE VISIT (OUTPATIENT)
Dept: OPTOMETRY | Facility: CLINIC | Age: 71
End: 2021-04-01
Payer: MEDICARE

## 2021-04-01 DIAGNOSIS — H00.021 HORDEOLUM INTERNUM OF RIGHT UPPER EYELID: ICD-10-CM

## 2021-04-01 DIAGNOSIS — E11.9 TYPE 2 DIABETES MELLITUS WITHOUT RETINOPATHY: Primary | ICD-10-CM

## 2021-04-01 PROCEDURE — 92015 PR REFRACTION: ICD-10-PCS | Mod: S$GLB,,, | Performed by: OPTOMETRIST

## 2021-04-01 PROCEDURE — 99999 PR PBB SHADOW E&M-EST. PATIENT-LVL III: CPT | Mod: PBBFAC,,, | Performed by: OPTOMETRIST

## 2021-04-01 PROCEDURE — 99999 PR PBB SHADOW E&M-EST. PATIENT-LVL III: ICD-10-PCS | Mod: PBBFAC,,, | Performed by: OPTOMETRIST

## 2021-04-01 PROCEDURE — 92015 DETERMINE REFRACTIVE STATE: CPT | Mod: S$GLB,,, | Performed by: OPTOMETRIST

## 2021-04-01 PROCEDURE — 2023F PR DILATED RETINAL EXAM W/O EVID OF RETINOPATHY: ICD-10-PCS | Mod: S$GLB,,, | Performed by: OPTOMETRIST

## 2021-04-01 PROCEDURE — 92012 INTRM OPH EXAM EST PATIENT: CPT | Mod: S$GLB,,, | Performed by: OPTOMETRIST

## 2021-04-01 PROCEDURE — 92012 PR EYE EXAM, EST PATIENT,INTERMED: ICD-10-PCS | Mod: S$GLB,,, | Performed by: OPTOMETRIST

## 2021-04-01 PROCEDURE — 1125F PR PAIN SEVERITY QUANTIFIED, PAIN PRESENT: ICD-10-PCS | Mod: S$GLB,,, | Performed by: OPTOMETRIST

## 2021-04-01 PROCEDURE — 1125F AMNT PAIN NOTED PAIN PRSNT: CPT | Mod: S$GLB,,, | Performed by: OPTOMETRIST

## 2021-04-01 PROCEDURE — 99499 RISK ADDL DX/OHS AUDIT: ICD-10-PCS | Mod: S$GLB,,, | Performed by: OPTOMETRIST

## 2021-04-01 PROCEDURE — 1101F PR PT FALLS ASSESS DOC 0-1 FALLS W/OUT INJ PAST YR: ICD-10-PCS | Mod: CPTII,S$GLB,, | Performed by: OPTOMETRIST

## 2021-04-01 PROCEDURE — 1101F PT FALLS ASSESS-DOCD LE1/YR: CPT | Mod: CPTII,S$GLB,, | Performed by: OPTOMETRIST

## 2021-04-01 PROCEDURE — 3288F PR FALLS RISK ASSESSMENT DOCUMENTED: ICD-10-PCS | Mod: CPTII,S$GLB,, | Performed by: OPTOMETRIST

## 2021-04-01 PROCEDURE — 2023F DILAT RTA XM W/O RTNOPTHY: CPT | Mod: S$GLB,,, | Performed by: OPTOMETRIST

## 2021-04-01 PROCEDURE — 3288F FALL RISK ASSESSMENT DOCD: CPT | Mod: CPTII,S$GLB,, | Performed by: OPTOMETRIST

## 2021-04-01 PROCEDURE — 99499 UNLISTED E&M SERVICE: CPT | Mod: S$GLB,,, | Performed by: OPTOMETRIST

## 2021-04-01 RX ORDER — PREDNISOLONE ACETATE 10 MG/ML
1 SUSPENSION/ DROPS OPHTHALMIC 4 TIMES DAILY
Qty: 1 BOTTLE | Refills: 0 | Status: SHIPPED | OUTPATIENT
Start: 2021-04-01 | End: 2021-04-08

## 2021-04-05 ENCOUNTER — PES CALL (OUTPATIENT)
Dept: ADMINISTRATIVE | Facility: CLINIC | Age: 71
End: 2021-04-05

## 2021-04-05 DIAGNOSIS — I10 ESSENTIAL HYPERTENSION: ICD-10-CM

## 2021-04-05 DIAGNOSIS — M79.89 FOOT SWELLING: ICD-10-CM

## 2021-04-07 DIAGNOSIS — M79.89 FOOT SWELLING: ICD-10-CM

## 2021-04-07 DIAGNOSIS — I10 ESSENTIAL HYPERTENSION: ICD-10-CM

## 2021-04-07 RX ORDER — HYDROCHLOROTHIAZIDE 12.5 MG/1
12.5 CAPSULE ORAL DAILY
Qty: 90 CAPSULE | Refills: 0 | Status: SHIPPED | OUTPATIENT
Start: 2021-04-07 | End: 2021-04-07 | Stop reason: SDUPTHER

## 2021-04-07 RX ORDER — FLUTICASONE PROPIONATE AND SALMETEROL XINAFOATE 230; 21 UG/1; UG/1
2 AEROSOL, METERED RESPIRATORY (INHALATION) 2 TIMES DAILY
Qty: 2 INHALER | Refills: 1 | Status: SHIPPED | OUTPATIENT
Start: 2021-04-07 | End: 2021-04-07 | Stop reason: SDUPTHER

## 2021-04-07 RX ORDER — LANCETS 33 GAUGE
EACH MISCELLANEOUS
Qty: 100 EACH | Refills: 3 | Status: SHIPPED | OUTPATIENT
Start: 2021-04-07 | End: 2021-04-09 | Stop reason: SDUPTHER

## 2021-04-07 RX ORDER — ISOPROPYL ALCOHOL 70 ML/100ML
1 SWAB TOPICAL
Qty: 200 EACH | Refills: 3 | Status: SHIPPED | OUTPATIENT
Start: 2021-04-07 | End: 2021-04-09 | Stop reason: SDUPTHER

## 2021-04-07 RX ORDER — INSULIN PUMP SYRINGE, 3 ML
EACH MISCELLANEOUS
Qty: 1 EACH | Refills: 0 | Status: SHIPPED | OUTPATIENT
Start: 2021-04-07 | End: 2021-04-07 | Stop reason: SDUPTHER

## 2021-04-07 RX ORDER — BLOOD-GLUCOSE METER
1 EACH MISCELLANEOUS DAILY
Qty: 1 EACH | Refills: 3 | Status: SHIPPED | OUTPATIENT
Start: 2021-04-07 | End: 2021-04-09 | Stop reason: SDUPTHER

## 2021-04-08 RX ORDER — CALCIUM CITRATE/VITAMIN D3 200MG-6.25
TABLET ORAL
Qty: 100 STRIP | Refills: 3 | Status: SHIPPED | OUTPATIENT
Start: 2021-04-08 | End: 2021-04-09 | Stop reason: SDUPTHER

## 2021-04-08 RX ORDER — HYDROCHLOROTHIAZIDE 12.5 MG/1
12.5 CAPSULE ORAL DAILY
Qty: 90 CAPSULE | Refills: 3 | Status: SHIPPED | OUTPATIENT
Start: 2021-04-08 | End: 2022-06-14

## 2021-04-08 RX ORDER — INSULIN PUMP SYRINGE, 3 ML
EACH MISCELLANEOUS
Qty: 1 EACH | Refills: 0 | Status: SHIPPED | OUTPATIENT
Start: 2021-04-08

## 2021-04-08 RX ORDER — FLUTICASONE PROPIONATE AND SALMETEROL XINAFOATE 230; 21 UG/1; UG/1
2 AEROSOL, METERED RESPIRATORY (INHALATION) 2 TIMES DAILY
Qty: 2 INHALER | Refills: 1 | Status: SHIPPED | OUTPATIENT
Start: 2021-04-08 | End: 2022-07-28

## 2021-04-11 RX ORDER — BLOOD-GLUCOSE METER
1 EACH MISCELLANEOUS DAILY
Qty: 1 EACH | Refills: 3 | Status: SHIPPED | OUTPATIENT
Start: 2021-04-11 | End: 2022-08-18

## 2021-04-11 RX ORDER — LANCETS 33 GAUGE
EACH MISCELLANEOUS
Qty: 100 EACH | Refills: 3 | Status: SHIPPED | OUTPATIENT
Start: 2021-04-11

## 2021-04-11 RX ORDER — CALCIUM CITRATE/VITAMIN D3 200MG-6.25
TABLET ORAL
Qty: 100 STRIP | Refills: 3 | Status: SHIPPED | OUTPATIENT
Start: 2021-04-11

## 2021-04-11 RX ORDER — ISOPROPYL ALCOHOL 70 ML/100ML
1 SWAB TOPICAL
Qty: 200 EACH | Refills: 3 | Status: SHIPPED | OUTPATIENT
Start: 2021-04-11

## 2021-04-13 DIAGNOSIS — M50.30 DEGENERATION OF CERVICAL INTERVERTEBRAL DISC: ICD-10-CM

## 2021-04-13 DIAGNOSIS — M75.101 ROTATOR CUFF SYNDROME OF RIGHT SHOULDER: ICD-10-CM

## 2021-04-13 DIAGNOSIS — R29.898 RIGHT HAND WEAKNESS: ICD-10-CM

## 2021-04-13 DIAGNOSIS — G90.50 COMPLEX REGIONAL PAIN SYNDROME TYPE 1, AFFECTING UNSPECIFIED SITE: ICD-10-CM

## 2021-04-13 DIAGNOSIS — M50.00 HNP (HERNIATED NUCLEUS PULPOSUS) WITH MYELOPATHY, CERVICAL: ICD-10-CM

## 2021-04-13 DIAGNOSIS — M48.02 CERVICAL STENOSIS OF SPINE: ICD-10-CM

## 2021-04-13 DIAGNOSIS — M96.1 CERVICAL POST-LAMINECTOMY SYNDROME: ICD-10-CM

## 2021-04-13 DIAGNOSIS — F11.20 NARCOTIC DEPENDENCY, CONTINUOUS: ICD-10-CM

## 2021-04-13 DIAGNOSIS — M48.02 SPINAL STENOSIS IN CERVICAL REGION: ICD-10-CM

## 2021-04-13 DIAGNOSIS — G89.4 CHRONIC PAIN SYNDROME: ICD-10-CM

## 2021-04-13 DIAGNOSIS — R29.898 RIGHT ARM WEAKNESS: ICD-10-CM

## 2021-04-13 DIAGNOSIS — M54.12 BRACHIAL NEURITIS OR RADICULITIS: ICD-10-CM

## 2021-04-13 DIAGNOSIS — M62.81 MUSCLE RIGHT ARM WEAKNESS: ICD-10-CM

## 2021-04-13 DIAGNOSIS — M54.12 CERVICAL RADICULAR PAIN: ICD-10-CM

## 2021-04-13 DIAGNOSIS — G56.41 COMPLEX REGIONAL PAIN SYNDROME TYPE 2 OF RIGHT UPPER EXTREMITY: ICD-10-CM

## 2021-04-13 DIAGNOSIS — M54.12 CERVICAL RADICULOPATHY: ICD-10-CM

## 2021-04-13 DIAGNOSIS — M47.812 FACET ARTHRITIS OF CERVICAL REGION: ICD-10-CM

## 2021-04-13 DIAGNOSIS — M79.601 RIGHT ARM PAIN: ICD-10-CM

## 2021-04-13 DIAGNOSIS — M47.12 CERVICAL SPONDYLOSIS WITH MYELOPATHY: ICD-10-CM

## 2021-04-17 RX ORDER — HYDROCODONE BITARTRATE AND ACETAMINOPHEN 10; 325 MG/1; MG/1
1 TABLET ORAL EVERY 6 HOURS PRN
Qty: 120 TABLET | Refills: 0 | Status: SHIPPED | OUTPATIENT
Start: 2021-04-22 | End: 2021-05-17 | Stop reason: SDUPTHER

## 2021-05-03 ENCOUNTER — CLINICAL SUPPORT (OUTPATIENT)
Dept: CARDIOLOGY | Facility: HOSPITAL | Age: 71
End: 2021-05-03
Attending: FAMILY MEDICINE
Payer: MEDICARE

## 2021-05-03 DIAGNOSIS — Z95.810 PRESENCE OF AUTOMATIC (IMPLANTABLE) CARDIAC DEFIBRILLATOR: ICD-10-CM

## 2021-05-03 PROCEDURE — 93295 CARDIAC DEVICE CHECK - REMOTE: ICD-10-PCS | Mod: ,,, | Performed by: INTERNAL MEDICINE

## 2021-05-03 PROCEDURE — 93295 DEV INTERROG REMOTE 1/2/MLT: CPT | Mod: ,,, | Performed by: INTERNAL MEDICINE

## 2021-05-03 PROCEDURE — 93296 REM INTERROG EVL PM/IDS: CPT | Performed by: INTERNAL MEDICINE

## 2021-05-06 RX ORDER — PREDNISOLONE ACETATE 10 MG/ML
SUSPENSION/ DROPS OPHTHALMIC
Qty: 5 ML | OUTPATIENT
Start: 2021-05-06

## 2021-05-17 DIAGNOSIS — R29.898 RIGHT HAND WEAKNESS: ICD-10-CM

## 2021-05-17 DIAGNOSIS — M47.12 CERVICAL SPONDYLOSIS WITH MYELOPATHY: ICD-10-CM

## 2021-05-17 DIAGNOSIS — M96.1 CERVICAL POST-LAMINECTOMY SYNDROME: ICD-10-CM

## 2021-05-17 DIAGNOSIS — F11.20 NARCOTIC DEPENDENCY, CONTINUOUS: ICD-10-CM

## 2021-05-17 DIAGNOSIS — M50.30 DEGENERATION OF CERVICAL INTERVERTEBRAL DISC: ICD-10-CM

## 2021-05-17 DIAGNOSIS — M54.12 BRACHIAL NEURITIS OR RADICULITIS: ICD-10-CM

## 2021-05-17 DIAGNOSIS — M54.12 CERVICAL RADICULOPATHY: ICD-10-CM

## 2021-05-17 DIAGNOSIS — G90.50 COMPLEX REGIONAL PAIN SYNDROME TYPE 1, AFFECTING UNSPECIFIED SITE: ICD-10-CM

## 2021-05-17 DIAGNOSIS — M47.812 FACET ARTHRITIS OF CERVICAL REGION: ICD-10-CM

## 2021-05-17 DIAGNOSIS — M48.02 CERVICAL STENOSIS OF SPINE: ICD-10-CM

## 2021-05-17 DIAGNOSIS — G89.4 CHRONIC PAIN SYNDROME: ICD-10-CM

## 2021-05-17 DIAGNOSIS — M54.12 CERVICAL RADICULAR PAIN: ICD-10-CM

## 2021-05-17 DIAGNOSIS — G56.41 COMPLEX REGIONAL PAIN SYNDROME TYPE 2 OF RIGHT UPPER EXTREMITY: ICD-10-CM

## 2021-05-17 DIAGNOSIS — M79.601 RIGHT ARM PAIN: ICD-10-CM

## 2021-05-17 DIAGNOSIS — M62.81 MUSCLE RIGHT ARM WEAKNESS: ICD-10-CM

## 2021-05-17 DIAGNOSIS — M50.00 HNP (HERNIATED NUCLEUS PULPOSUS) WITH MYELOPATHY, CERVICAL: ICD-10-CM

## 2021-05-17 DIAGNOSIS — R29.898 RIGHT ARM WEAKNESS: ICD-10-CM

## 2021-05-17 DIAGNOSIS — M75.101 ROTATOR CUFF SYNDROME OF RIGHT SHOULDER: ICD-10-CM

## 2021-05-17 DIAGNOSIS — M48.02 SPINAL STENOSIS IN CERVICAL REGION: ICD-10-CM

## 2021-05-23 DIAGNOSIS — F11.90 CHRONIC, CONTINUOUS USE OF OPIOIDS: Primary | ICD-10-CM

## 2021-05-23 RX ORDER — HYDROCODONE BITARTRATE AND ACETAMINOPHEN 10; 325 MG/1; MG/1
1 TABLET ORAL EVERY 6 HOURS PRN
Qty: 120 TABLET | Refills: 0 | Status: SHIPPED | OUTPATIENT
Start: 2021-05-23 | End: 2021-06-23 | Stop reason: SDUPTHER

## 2021-05-24 ENCOUNTER — TELEPHONE (OUTPATIENT)
Dept: PHYSICAL MEDICINE AND REHAB | Facility: CLINIC | Age: 71
End: 2021-05-24

## 2021-05-24 ENCOUNTER — PES CALL (OUTPATIENT)
Dept: ADMINISTRATIVE | Facility: CLINIC | Age: 71
End: 2021-05-24

## 2021-05-25 ENCOUNTER — LAB VISIT (OUTPATIENT)
Dept: LAB | Facility: HOSPITAL | Age: 71
End: 2021-05-25
Attending: PHYSICAL MEDICINE & REHABILITATION
Payer: MEDICARE

## 2021-05-25 DIAGNOSIS — F11.90 CHRONIC, CONTINUOUS USE OF OPIOIDS: ICD-10-CM

## 2021-05-25 PROCEDURE — 80307 DRUG TEST PRSMV CHEM ANLYZR: CPT | Performed by: PHYSICAL MEDICINE & REHABILITATION

## 2021-05-28 LAB
6MAM UR QL: NOT DETECTED
7AMINOCLONAZEPAM UR QL: NOT DETECTED
A-OH ALPRAZ UR QL: NOT DETECTED
ALPHA-OH-MIDAZOLAM: NOT DETECTED
ALPRAZ UR QL: NOT DETECTED
AMPHET UR QL SCN: NOT DETECTED
ANNOTATION COMMENT IMP: NORMAL
ANNOTATION COMMENT IMP: NORMAL
BARBITURATES UR QL: NOT DETECTED
BUPRENORPHINE UR QL: NOT DETECTED
BZE UR QL: NOT DETECTED
CARBOXYTHC UR QL: PRESENT
CARISOPRODOL UR QL: NOT DETECTED
CLONAZEPAM UR QL: NOT DETECTED
CODEINE UR QL: NOT DETECTED
CREAT UR-MCNC: 21.4 MG/DL (ref 20–400)
DIAZEPAM UR QL: NOT DETECTED
ETHYL GLUCURONIDE UR QL: NOT DETECTED
FENTANYL UR QL: NOT DETECTED
GABAPENTIN: PRESENT
HYDROCODONE UR QL: PRESENT
HYDROMORPHONE UR QL: PRESENT
LORAZEPAM UR QL: NOT DETECTED
MDA UR QL: NOT DETECTED
MDEA UR QL: NOT DETECTED
MDMA UR QL: NOT DETECTED
ME-PHENIDATE UR QL: NOT DETECTED
METHADONE UR QL: NOT DETECTED
METHAMPHET UR QL: NOT DETECTED
MIDAZOLAM UR QL SCN: NOT DETECTED
MORPHINE UR QL: NOT DETECTED
NALOXONE: NOT DETECTED
NORBUPRENORPHINE UR QL CFM: NOT DETECTED
NORDIAZEPAM UR QL: NOT DETECTED
NORFENTANYL UR QL: NOT DETECTED
NORHYDROCODONE UR QL CFM: PRESENT
NORMEPERIDINE UR QL CFM: NOT DETECTED
NOROXYCODONE UR QL CFM: NOT DETECTED
NOROXYMORPHONE UR QL SCN: NOT DETECTED
OXAZEPAM UR QL: NOT DETECTED
OXYCODONE UR QL: NOT DETECTED
OXYMORPHONE UR QL: NOT DETECTED
PATHOLOGY STUDY: NORMAL
PCP UR QL: NOT DETECTED
PHENTERMINE UR QL: NOT DETECTED
PREGABALIN: NOT DETECTED
SERVICE CMNT-IMP: NORMAL
TAPENTADOL UR QL SCN: NOT DETECTED
TAPENTADOL-O-SULF: NOT DETECTED
TEMAZEPAM UR QL: NOT DETECTED
TRAMADOL UR QL: NOT DETECTED
ZOLPIDEM METABOLITE: NOT DETECTED
ZOLPIDEM UR QL: NOT DETECTED

## 2021-06-07 ENCOUNTER — TELEPHONE (OUTPATIENT)
Dept: OPTOMETRY | Facility: CLINIC | Age: 71
End: 2021-06-07

## 2021-06-18 DIAGNOSIS — G90.50 COMPLEX REGIONAL PAIN SYNDROME TYPE 1, AFFECTING UNSPECIFIED SITE: ICD-10-CM

## 2021-06-18 DIAGNOSIS — F11.20 NARCOTIC DEPENDENCY, CONTINUOUS: ICD-10-CM

## 2021-06-18 DIAGNOSIS — M50.00 HNP (HERNIATED NUCLEUS PULPOSUS) WITH MYELOPATHY, CERVICAL: ICD-10-CM

## 2021-06-18 DIAGNOSIS — M47.812 FACET ARTHRITIS OF CERVICAL REGION: ICD-10-CM

## 2021-06-18 DIAGNOSIS — M96.1 CERVICAL POST-LAMINECTOMY SYNDROME: ICD-10-CM

## 2021-06-18 DIAGNOSIS — M54.12 CERVICAL RADICULOPATHY: ICD-10-CM

## 2021-06-18 DIAGNOSIS — M50.30 DEGENERATION OF CERVICAL INTERVERTEBRAL DISC: ICD-10-CM

## 2021-06-18 DIAGNOSIS — R29.898 RIGHT HAND WEAKNESS: ICD-10-CM

## 2021-06-18 DIAGNOSIS — M54.12 BRACHIAL NEURITIS OR RADICULITIS: ICD-10-CM

## 2021-06-18 DIAGNOSIS — M47.12 CERVICAL SPONDYLOSIS WITH MYELOPATHY: ICD-10-CM

## 2021-06-18 DIAGNOSIS — G56.41 COMPLEX REGIONAL PAIN SYNDROME TYPE 2 OF RIGHT UPPER EXTREMITY: ICD-10-CM

## 2021-06-18 DIAGNOSIS — M62.81 MUSCLE RIGHT ARM WEAKNESS: ICD-10-CM

## 2021-06-18 DIAGNOSIS — M54.12 CERVICAL RADICULAR PAIN: ICD-10-CM

## 2021-06-18 DIAGNOSIS — M48.02 SPINAL STENOSIS IN CERVICAL REGION: ICD-10-CM

## 2021-06-18 DIAGNOSIS — M75.101 ROTATOR CUFF SYNDROME OF RIGHT SHOULDER: ICD-10-CM

## 2021-06-18 DIAGNOSIS — G89.4 CHRONIC PAIN SYNDROME: ICD-10-CM

## 2021-06-18 DIAGNOSIS — R29.898 RIGHT ARM WEAKNESS: ICD-10-CM

## 2021-06-18 DIAGNOSIS — M48.02 CERVICAL STENOSIS OF SPINE: ICD-10-CM

## 2021-06-18 DIAGNOSIS — M79.601 RIGHT ARM PAIN: ICD-10-CM

## 2021-06-18 RX ORDER — HYDROCODONE BITARTRATE AND ACETAMINOPHEN 10; 325 MG/1; MG/1
1 TABLET ORAL EVERY 6 HOURS PRN
Qty: 120 TABLET | Refills: 0 | Status: CANCELLED | OUTPATIENT
Start: 2021-06-22 | End: 2021-07-22

## 2021-06-23 DIAGNOSIS — R29.898 RIGHT HAND WEAKNESS: ICD-10-CM

## 2021-06-23 DIAGNOSIS — G90.50 COMPLEX REGIONAL PAIN SYNDROME TYPE 1, AFFECTING UNSPECIFIED SITE: ICD-10-CM

## 2021-06-23 DIAGNOSIS — R29.898 RIGHT ARM WEAKNESS: ICD-10-CM

## 2021-06-23 DIAGNOSIS — M75.101 ROTATOR CUFF SYNDROME OF RIGHT SHOULDER: ICD-10-CM

## 2021-06-23 DIAGNOSIS — M79.601 RIGHT ARM PAIN: ICD-10-CM

## 2021-06-23 DIAGNOSIS — M48.02 SPINAL STENOSIS IN CERVICAL REGION: ICD-10-CM

## 2021-06-23 DIAGNOSIS — M47.812 FACET ARTHRITIS OF CERVICAL REGION: ICD-10-CM

## 2021-06-23 DIAGNOSIS — M54.12 CERVICAL RADICULOPATHY: ICD-10-CM

## 2021-06-23 DIAGNOSIS — G89.4 CHRONIC PAIN SYNDROME: ICD-10-CM

## 2021-06-23 DIAGNOSIS — M48.02 CERVICAL STENOSIS OF SPINE: ICD-10-CM

## 2021-06-23 DIAGNOSIS — M96.1 CERVICAL POST-LAMINECTOMY SYNDROME: ICD-10-CM

## 2021-06-23 DIAGNOSIS — F11.20 NARCOTIC DEPENDENCY, CONTINUOUS: ICD-10-CM

## 2021-06-23 DIAGNOSIS — M50.30 DEGENERATION OF CERVICAL INTERVERTEBRAL DISC: ICD-10-CM

## 2021-06-23 DIAGNOSIS — M54.12 BRACHIAL NEURITIS OR RADICULITIS: ICD-10-CM

## 2021-06-23 DIAGNOSIS — M54.12 CERVICAL RADICULAR PAIN: ICD-10-CM

## 2021-06-23 DIAGNOSIS — M47.12 CERVICAL SPONDYLOSIS WITH MYELOPATHY: ICD-10-CM

## 2021-06-23 DIAGNOSIS — G56.41 COMPLEX REGIONAL PAIN SYNDROME TYPE 2 OF RIGHT UPPER EXTREMITY: ICD-10-CM

## 2021-06-23 DIAGNOSIS — M50.00 HNP (HERNIATED NUCLEUS PULPOSUS) WITH MYELOPATHY, CERVICAL: ICD-10-CM

## 2021-06-23 DIAGNOSIS — M62.81 MUSCLE RIGHT ARM WEAKNESS: ICD-10-CM

## 2021-06-24 ENCOUNTER — TELEPHONE (OUTPATIENT)
Dept: ADMINISTRATIVE | Facility: CLINIC | Age: 71
End: 2021-06-24

## 2021-06-25 ENCOUNTER — OFFICE VISIT (OUTPATIENT)
Dept: FAMILY MEDICINE | Facility: CLINIC | Age: 71
End: 2021-06-25
Payer: MEDICARE

## 2021-06-25 VITALS
RESPIRATION RATE: 18 BRPM | HEIGHT: 71 IN | WEIGHT: 166 LBS | SYSTOLIC BLOOD PRESSURE: 106 MMHG | OXYGEN SATURATION: 95 % | HEART RATE: 57 BPM | DIASTOLIC BLOOD PRESSURE: 80 MMHG | BODY MASS INDEX: 23.24 KG/M2

## 2021-06-25 DIAGNOSIS — I51.9 CHRONIC SYSTOLIC DYSFUNCTION OF LEFT VENTRICLE: ICD-10-CM

## 2021-06-25 DIAGNOSIS — I50.32 DIASTOLIC DYSFUNCTION WITH CHRONIC HEART FAILURE: ICD-10-CM

## 2021-06-25 DIAGNOSIS — I10 ESSENTIAL HYPERTENSION: ICD-10-CM

## 2021-06-25 DIAGNOSIS — I11.0 HYPERTENSIVE LEFT VENTRICULAR HYPERTROPHY WITH HEART FAILURE: ICD-10-CM

## 2021-06-25 DIAGNOSIS — E78.2 MIXED HYPERLIPIDEMIA: ICD-10-CM

## 2021-06-25 DIAGNOSIS — I70.0 AORTIC ATHEROSCLEROSIS: ICD-10-CM

## 2021-06-25 DIAGNOSIS — Z00.00 ENCOUNTER FOR PREVENTIVE HEALTH EXAMINATION: Primary | ICD-10-CM

## 2021-06-25 DIAGNOSIS — Z95.810 BIVENTRICULAR IMPLANTABLE CARDIOVERTER-DEFIBRILLATOR (ICD) IN SITU: ICD-10-CM

## 2021-06-25 DIAGNOSIS — G95.9 CERVICAL MYELOPATHY: ICD-10-CM

## 2021-06-25 DIAGNOSIS — J43.9 PULMONARY EMPHYSEMA, UNSPECIFIED EMPHYSEMA TYPE: ICD-10-CM

## 2021-06-25 DIAGNOSIS — N13.8 BPH WITH URINARY OBSTRUCTION: ICD-10-CM

## 2021-06-25 DIAGNOSIS — E11.40 TYPE 2 DIABETES MELLITUS WITH DIABETIC NEUROPATHY, WITHOUT LONG-TERM CURRENT USE OF INSULIN: ICD-10-CM

## 2021-06-25 DIAGNOSIS — N40.1 BPH WITH URINARY OBSTRUCTION: ICD-10-CM

## 2021-06-25 DIAGNOSIS — I77.9 RIGHT-SIDED CAROTID ARTERY DISEASE, UNSPECIFIED TYPE: ICD-10-CM

## 2021-06-25 DIAGNOSIS — G89.4 CHRONIC PAIN SYNDROME: ICD-10-CM

## 2021-06-25 DIAGNOSIS — K21.9 GASTROESOPHAGEAL REFLUX DISEASE, UNSPECIFIED WHETHER ESOPHAGITIS PRESENT: ICD-10-CM

## 2021-06-25 PROBLEM — N41.0 PROSTATITIS, ACUTE: Status: RESOLVED | Noted: 2017-10-17 | Resolved: 2021-06-25

## 2021-06-25 PROCEDURE — 99499 UNLISTED E&M SERVICE: CPT | Mod: S$GLB,,, | Performed by: NURSE PRACTITIONER

## 2021-06-25 PROCEDURE — 3008F BODY MASS INDEX DOCD: CPT | Mod: CPTII,S$GLB,, | Performed by: NURSE PRACTITIONER

## 2021-06-25 PROCEDURE — 3288F FALL RISK ASSESSMENT DOCD: CPT | Mod: CPTII,S$GLB,, | Performed by: NURSE PRACTITIONER

## 2021-06-25 PROCEDURE — 1158F ADVNC CARE PLAN TLK DOCD: CPT | Mod: S$GLB,,, | Performed by: NURSE PRACTITIONER

## 2021-06-25 PROCEDURE — 3008F PR BODY MASS INDEX (BMI) DOCUMENTED: ICD-10-PCS | Mod: CPTII,S$GLB,, | Performed by: NURSE PRACTITIONER

## 2021-06-25 PROCEDURE — 99499 RISK ADDL DX/OHS AUDIT: ICD-10-PCS | Mod: S$GLB,,, | Performed by: NURSE PRACTITIONER

## 2021-06-25 PROCEDURE — 99999 PR PBB SHADOW E&M-EST. PATIENT-LVL V: CPT | Mod: PBBFAC,,, | Performed by: NURSE PRACTITIONER

## 2021-06-25 PROCEDURE — 3288F PR FALLS RISK ASSESSMENT DOCUMENTED: ICD-10-PCS | Mod: CPTII,S$GLB,, | Performed by: NURSE PRACTITIONER

## 2021-06-25 PROCEDURE — 3072F LOW RISK FOR RETINOPATHY: CPT | Mod: S$GLB,,, | Performed by: NURSE PRACTITIONER

## 2021-06-25 PROCEDURE — G0439 PPPS, SUBSEQ VISIT: HCPCS | Mod: S$GLB,,, | Performed by: NURSE PRACTITIONER

## 2021-06-25 PROCEDURE — 1158F PR ADVANCE CARE PLANNING DISCUSS DOCUMENTED IN MEDICAL RECORD: ICD-10-PCS | Mod: S$GLB,,, | Performed by: NURSE PRACTITIONER

## 2021-06-25 PROCEDURE — 99999 PR PBB SHADOW E&M-EST. PATIENT-LVL V: ICD-10-PCS | Mod: PBBFAC,,, | Performed by: NURSE PRACTITIONER

## 2021-06-25 PROCEDURE — 3072F PR LOW RISK FOR RETINOPATHY: ICD-10-PCS | Mod: S$GLB,,, | Performed by: NURSE PRACTITIONER

## 2021-06-25 PROCEDURE — 1125F AMNT PAIN NOTED PAIN PRSNT: CPT | Mod: S$GLB,,, | Performed by: NURSE PRACTITIONER

## 2021-06-25 PROCEDURE — 1125F PR PAIN SEVERITY QUANTIFIED, PAIN PRESENT: ICD-10-PCS | Mod: S$GLB,,, | Performed by: NURSE PRACTITIONER

## 2021-06-25 PROCEDURE — 1101F PR PT FALLS ASSESS DOC 0-1 FALLS W/OUT INJ PAST YR: ICD-10-PCS | Mod: CPTII,S$GLB,, | Performed by: NURSE PRACTITIONER

## 2021-06-25 PROCEDURE — G0439 PR MEDICARE ANNUAL WELLNESS SUBSEQUENT VISIT: ICD-10-PCS | Mod: S$GLB,,, | Performed by: NURSE PRACTITIONER

## 2021-06-25 PROCEDURE — 1101F PT FALLS ASSESS-DOCD LE1/YR: CPT | Mod: CPTII,S$GLB,, | Performed by: NURSE PRACTITIONER

## 2021-06-26 RX ORDER — HYDROCODONE BITARTRATE AND ACETAMINOPHEN 10; 325 MG/1; MG/1
1 TABLET ORAL EVERY 6 HOURS PRN
Qty: 120 TABLET | Refills: 0 | Status: SHIPPED | OUTPATIENT
Start: 2021-06-26 | End: 2021-07-20 | Stop reason: SDUPTHER

## 2021-06-28 ENCOUNTER — LAB VISIT (OUTPATIENT)
Dept: LAB | Facility: HOSPITAL | Age: 71
End: 2021-06-28
Attending: NURSE PRACTITIONER
Payer: MEDICARE

## 2021-06-28 DIAGNOSIS — E11.40 TYPE 2 DIABETES MELLITUS WITH DIABETIC NEUROPATHY, WITHOUT LONG-TERM CURRENT USE OF INSULIN: ICD-10-CM

## 2021-06-28 DIAGNOSIS — I10 ESSENTIAL HYPERTENSION: ICD-10-CM

## 2021-06-28 LAB
ALBUMIN SERPL BCP-MCNC: 3.6 G/DL (ref 3.5–5.2)
ALP SERPL-CCNC: 78 U/L (ref 55–135)
ALT SERPL W/O P-5'-P-CCNC: 14 U/L (ref 10–44)
ANION GAP SERPL CALC-SCNC: 12 MMOL/L (ref 8–16)
AST SERPL-CCNC: 17 U/L (ref 10–40)
BASOPHILS # BLD AUTO: 0.03 K/UL (ref 0–0.2)
BASOPHILS NFR BLD: 0.4 % (ref 0–1.9)
BILIRUB SERPL-MCNC: 0.6 MG/DL (ref 0.1–1)
BUN SERPL-MCNC: 9 MG/DL (ref 8–23)
CALCIUM SERPL-MCNC: 9.3 MG/DL (ref 8.7–10.5)
CHLORIDE SERPL-SCNC: 103 MMOL/L (ref 95–110)
CHOLEST SERPL-MCNC: 107 MG/DL (ref 120–199)
CHOLEST/HDLC SERPL: 2.4 {RATIO} (ref 2–5)
CO2 SERPL-SCNC: 23 MMOL/L (ref 23–29)
CREAT SERPL-MCNC: 0.8 MG/DL (ref 0.5–1.4)
DIFFERENTIAL METHOD: ABNORMAL
EOSINOPHIL # BLD AUTO: 0.2 K/UL (ref 0–0.5)
EOSINOPHIL NFR BLD: 2.7 % (ref 0–8)
ERYTHROCYTE [DISTWIDTH] IN BLOOD BY AUTOMATED COUNT: 12.3 % (ref 11.5–14.5)
EST. GFR  (AFRICAN AMERICAN): >60 ML/MIN/1.73 M^2
EST. GFR  (NON AFRICAN AMERICAN): >60 ML/MIN/1.73 M^2
ESTIMATED AVG GLUCOSE: 128 MG/DL (ref 68–131)
GLUCOSE SERPL-MCNC: 96 MG/DL (ref 70–110)
HBA1C MFR BLD: 6.1 % (ref 4–5.6)
HCT VFR BLD AUTO: 43.9 % (ref 40–54)
HDLC SERPL-MCNC: 44 MG/DL (ref 40–75)
HDLC SERPL: 41.1 % (ref 20–50)
HGB BLD-MCNC: 13.9 G/DL (ref 14–18)
IMM GRANULOCYTES # BLD AUTO: 0.01 K/UL (ref 0–0.04)
IMM GRANULOCYTES NFR BLD AUTO: 0.1 % (ref 0–0.5)
LDLC SERPL CALC-MCNC: 52.6 MG/DL (ref 63–159)
LYMPHOCYTES # BLD AUTO: 2.8 K/UL (ref 1–4.8)
LYMPHOCYTES NFR BLD: 35.9 % (ref 18–48)
MCH RBC QN AUTO: 29.4 PG (ref 27–31)
MCHC RBC AUTO-ENTMCNC: 31.7 G/DL (ref 32–36)
MCV RBC AUTO: 93 FL (ref 82–98)
MONOCYTES # BLD AUTO: 0.7 K/UL (ref 0.3–1)
MONOCYTES NFR BLD: 8.8 % (ref 4–15)
NEUTROPHILS # BLD AUTO: 4 K/UL (ref 1.8–7.7)
NEUTROPHILS NFR BLD: 52.1 % (ref 38–73)
NONHDLC SERPL-MCNC: 63 MG/DL
NRBC BLD-RTO: 0 /100 WBC
PLATELET # BLD AUTO: 260 K/UL (ref 150–450)
PMV BLD AUTO: 10.9 FL (ref 9.2–12.9)
POTASSIUM SERPL-SCNC: 4.5 MMOL/L (ref 3.5–5.1)
PROT SERPL-MCNC: 7.4 G/DL (ref 6–8.4)
RBC # BLD AUTO: 4.72 M/UL (ref 4.6–6.2)
SODIUM SERPL-SCNC: 138 MMOL/L (ref 136–145)
TRIGL SERPL-MCNC: 52 MG/DL (ref 30–150)
TSH SERPL DL<=0.005 MIU/L-ACNC: 2.43 UIU/ML (ref 0.4–4)
WBC # BLD AUTO: 7.65 K/UL (ref 3.9–12.7)

## 2021-06-28 PROCEDURE — 84443 ASSAY THYROID STIM HORMONE: CPT | Performed by: NURSE PRACTITIONER

## 2021-06-28 PROCEDURE — 80061 LIPID PANEL: CPT | Performed by: NURSE PRACTITIONER

## 2021-06-28 PROCEDURE — 85025 COMPLETE CBC W/AUTO DIFF WBC: CPT | Performed by: NURSE PRACTITIONER

## 2021-06-28 PROCEDURE — 80053 COMPREHEN METABOLIC PANEL: CPT | Performed by: NURSE PRACTITIONER

## 2021-06-28 PROCEDURE — 36415 COLL VENOUS BLD VENIPUNCTURE: CPT | Mod: PO | Performed by: NURSE PRACTITIONER

## 2021-06-28 PROCEDURE — 83036 HEMOGLOBIN GLYCOSYLATED A1C: CPT | Performed by: NURSE PRACTITIONER

## 2021-07-01 ENCOUNTER — OFFICE VISIT (OUTPATIENT)
Dept: FAMILY MEDICINE | Facility: CLINIC | Age: 71
End: 2021-07-01
Payer: MEDICARE

## 2021-07-01 ENCOUNTER — PATIENT MESSAGE (OUTPATIENT)
Dept: ADMINISTRATIVE | Facility: OTHER | Age: 71
End: 2021-07-01

## 2021-07-01 VITALS
WEIGHT: 171.31 LBS | HEART RATE: 53 BPM | SYSTOLIC BLOOD PRESSURE: 110 MMHG | OXYGEN SATURATION: 98 % | HEIGHT: 71 IN | DIASTOLIC BLOOD PRESSURE: 72 MMHG | BODY MASS INDEX: 23.98 KG/M2

## 2021-07-01 DIAGNOSIS — E11.40 TYPE 2 DIABETES MELLITUS WITH DIABETIC NEUROPATHY, WITHOUT LONG-TERM CURRENT USE OF INSULIN: Primary | ICD-10-CM

## 2021-07-01 DIAGNOSIS — R68.2 DRY MOUTH: ICD-10-CM

## 2021-07-01 DIAGNOSIS — G89.4 CHRONIC PAIN SYNDROME: ICD-10-CM

## 2021-07-01 PROCEDURE — 3072F PR LOW RISK FOR RETINOPATHY: ICD-10-PCS | Mod: S$GLB,,, | Performed by: FAMILY MEDICINE

## 2021-07-01 PROCEDURE — 99999 PR PBB SHADOW E&M-EST. PATIENT-LVL IV: CPT | Mod: PBBFAC,,, | Performed by: FAMILY MEDICINE

## 2021-07-01 PROCEDURE — 3008F BODY MASS INDEX DOCD: CPT | Mod: CPTII,S$GLB,, | Performed by: FAMILY MEDICINE

## 2021-07-01 PROCEDURE — 1159F MED LIST DOCD IN RCRD: CPT | Mod: S$GLB,,, | Performed by: FAMILY MEDICINE

## 2021-07-01 PROCEDURE — 99214 PR OFFICE/OUTPT VISIT, EST, LEVL IV, 30-39 MIN: ICD-10-PCS | Mod: S$GLB,,, | Performed by: FAMILY MEDICINE

## 2021-07-01 PROCEDURE — 3044F PR MOST RECENT HEMOGLOBIN A1C LEVEL <7.0%: ICD-10-PCS | Mod: CPTII,S$GLB,, | Performed by: FAMILY MEDICINE

## 2021-07-01 PROCEDURE — 3008F PR BODY MASS INDEX (BMI) DOCUMENTED: ICD-10-PCS | Mod: CPTII,S$GLB,, | Performed by: FAMILY MEDICINE

## 2021-07-01 PROCEDURE — 99499 UNLISTED E&M SERVICE: CPT | Mod: HCNC,S$GLB,, | Performed by: FAMILY MEDICINE

## 2021-07-01 PROCEDURE — 1125F AMNT PAIN NOTED PAIN PRSNT: CPT | Mod: S$GLB,,, | Performed by: FAMILY MEDICINE

## 2021-07-01 PROCEDURE — 1159F PR MEDICATION LIST DOCUMENTED IN MEDICAL RECORD: ICD-10-PCS | Mod: S$GLB,,, | Performed by: FAMILY MEDICINE

## 2021-07-01 PROCEDURE — 1101F PT FALLS ASSESS-DOCD LE1/YR: CPT | Mod: CPTII,S$GLB,, | Performed by: FAMILY MEDICINE

## 2021-07-01 PROCEDURE — 3072F LOW RISK FOR RETINOPATHY: CPT | Mod: S$GLB,,, | Performed by: FAMILY MEDICINE

## 2021-07-01 PROCEDURE — 99214 OFFICE O/P EST MOD 30 MIN: CPT | Mod: S$GLB,,, | Performed by: FAMILY MEDICINE

## 2021-07-01 PROCEDURE — 99999 PR PBB SHADOW E&M-EST. PATIENT-LVL IV: ICD-10-PCS | Mod: PBBFAC,,, | Performed by: FAMILY MEDICINE

## 2021-07-01 PROCEDURE — 1101F PR PT FALLS ASSESS DOC 0-1 FALLS W/OUT INJ PAST YR: ICD-10-PCS | Mod: CPTII,S$GLB,, | Performed by: FAMILY MEDICINE

## 2021-07-01 PROCEDURE — 1125F PR PAIN SEVERITY QUANTIFIED, PAIN PRESENT: ICD-10-PCS | Mod: S$GLB,,, | Performed by: FAMILY MEDICINE

## 2021-07-01 PROCEDURE — 99499 RISK ADDL DX/OHS AUDIT: ICD-10-PCS | Mod: HCNC,S$GLB,, | Performed by: FAMILY MEDICINE

## 2021-07-01 PROCEDURE — 3288F FALL RISK ASSESSMENT DOCD: CPT | Mod: CPTII,S$GLB,, | Performed by: FAMILY MEDICINE

## 2021-07-01 PROCEDURE — 3288F PR FALLS RISK ASSESSMENT DOCUMENTED: ICD-10-PCS | Mod: CPTII,S$GLB,, | Performed by: FAMILY MEDICINE

## 2021-07-01 PROCEDURE — 3044F HG A1C LEVEL LT 7.0%: CPT | Mod: CPTII,S$GLB,, | Performed by: FAMILY MEDICINE

## 2021-07-20 DIAGNOSIS — R29.898 RIGHT HAND WEAKNESS: ICD-10-CM

## 2021-07-20 DIAGNOSIS — M62.81 MUSCLE RIGHT ARM WEAKNESS: ICD-10-CM

## 2021-07-20 DIAGNOSIS — M50.00 HNP (HERNIATED NUCLEUS PULPOSUS) WITH MYELOPATHY, CERVICAL: ICD-10-CM

## 2021-07-20 DIAGNOSIS — M47.12 CERVICAL SPONDYLOSIS WITH MYELOPATHY: ICD-10-CM

## 2021-07-20 DIAGNOSIS — M54.12 CERVICAL RADICULAR PAIN: ICD-10-CM

## 2021-07-20 DIAGNOSIS — M79.601 RIGHT ARM PAIN: ICD-10-CM

## 2021-07-20 DIAGNOSIS — G56.41 COMPLEX REGIONAL PAIN SYNDROME TYPE 2 OF RIGHT UPPER EXTREMITY: ICD-10-CM

## 2021-07-20 DIAGNOSIS — M48.02 CERVICAL STENOSIS OF SPINE: ICD-10-CM

## 2021-07-20 DIAGNOSIS — G89.4 CHRONIC PAIN SYNDROME: ICD-10-CM

## 2021-07-20 DIAGNOSIS — M48.02 SPINAL STENOSIS IN CERVICAL REGION: ICD-10-CM

## 2021-07-20 DIAGNOSIS — G90.50 COMPLEX REGIONAL PAIN SYNDROME TYPE 1, AFFECTING UNSPECIFIED SITE: ICD-10-CM

## 2021-07-20 DIAGNOSIS — M50.30 DEGENERATION OF CERVICAL INTERVERTEBRAL DISC: ICD-10-CM

## 2021-07-20 DIAGNOSIS — F11.20 NARCOTIC DEPENDENCY, CONTINUOUS: ICD-10-CM

## 2021-07-20 DIAGNOSIS — R29.898 RIGHT ARM WEAKNESS: ICD-10-CM

## 2021-07-20 DIAGNOSIS — M54.12 BRACHIAL NEURITIS OR RADICULITIS: ICD-10-CM

## 2021-07-20 DIAGNOSIS — M75.101 ROTATOR CUFF SYNDROME OF RIGHT SHOULDER: ICD-10-CM

## 2021-07-20 DIAGNOSIS — M96.1 CERVICAL POST-LAMINECTOMY SYNDROME: ICD-10-CM

## 2021-07-20 DIAGNOSIS — M47.812 FACET ARTHRITIS OF CERVICAL REGION: ICD-10-CM

## 2021-07-20 DIAGNOSIS — M54.12 CERVICAL RADICULOPATHY: ICD-10-CM

## 2021-07-23 ENCOUNTER — PATIENT OUTREACH (OUTPATIENT)
Dept: ADMINISTRATIVE | Facility: OTHER | Age: 71
End: 2021-07-23

## 2021-07-26 ENCOUNTER — OFFICE VISIT (OUTPATIENT)
Dept: OPHTHALMOLOGY | Facility: CLINIC | Age: 71
End: 2021-07-26
Payer: MEDICARE

## 2021-07-26 DIAGNOSIS — H04.123 DRY EYE SYNDROME OF BOTH EYES: ICD-10-CM

## 2021-07-26 DIAGNOSIS — H02.053 TRICHIASIS OF RIGHT EYE WITHOUT ENTROPION: Primary | ICD-10-CM

## 2021-07-26 PROCEDURE — 1126F AMNT PAIN NOTED NONE PRSNT: CPT | Mod: CPTII,S$GLB,, | Performed by: OPHTHALMOLOGY

## 2021-07-26 PROCEDURE — 67820 PR REVISE EYELASHES,FORCEPS: ICD-10-PCS | Mod: RT,S$GLB,, | Performed by: OPHTHALMOLOGY

## 2021-07-26 PROCEDURE — 1126F PR PAIN SEVERITY QUANTIFIED, NO PAIN PRESENT: ICD-10-PCS | Mod: CPTII,S$GLB,, | Performed by: OPHTHALMOLOGY

## 2021-07-26 PROCEDURE — 1101F PR PT FALLS ASSESS DOC 0-1 FALLS W/OUT INJ PAST YR: ICD-10-PCS | Mod: CPTII,S$GLB,, | Performed by: OPHTHALMOLOGY

## 2021-07-26 PROCEDURE — 3044F PR MOST RECENT HEMOGLOBIN A1C LEVEL <7.0%: ICD-10-PCS | Mod: CPTII,S$GLB,, | Performed by: OPHTHALMOLOGY

## 2021-07-26 PROCEDURE — 99999 PR PBB SHADOW E&M-EST. PATIENT-LVL III: ICD-10-PCS | Mod: PBBFAC,,, | Performed by: OPHTHALMOLOGY

## 2021-07-26 PROCEDURE — 3044F HG A1C LEVEL LT 7.0%: CPT | Mod: CPTII,S$GLB,, | Performed by: OPHTHALMOLOGY

## 2021-07-26 PROCEDURE — 92012 INTRM OPH EXAM EST PATIENT: CPT | Mod: 25,S$GLB,, | Performed by: OPHTHALMOLOGY

## 2021-07-26 PROCEDURE — 3288F PR FALLS RISK ASSESSMENT DOCUMENTED: ICD-10-PCS | Mod: CPTII,S$GLB,, | Performed by: OPHTHALMOLOGY

## 2021-07-26 PROCEDURE — 67820 REVISE EYELASHES: CPT | Mod: RT,S$GLB,, | Performed by: OPHTHALMOLOGY

## 2021-07-26 PROCEDURE — 1159F PR MEDICATION LIST DOCUMENTED IN MEDICAL RECORD: ICD-10-PCS | Mod: CPTII,S$GLB,, | Performed by: OPHTHALMOLOGY

## 2021-07-26 PROCEDURE — 99999 PR PBB SHADOW E&M-EST. PATIENT-LVL III: CPT | Mod: PBBFAC,,, | Performed by: OPHTHALMOLOGY

## 2021-07-26 PROCEDURE — 1159F MED LIST DOCD IN RCRD: CPT | Mod: CPTII,S$GLB,, | Performed by: OPHTHALMOLOGY

## 2021-07-26 PROCEDURE — 1101F PT FALLS ASSESS-DOCD LE1/YR: CPT | Mod: CPTII,S$GLB,, | Performed by: OPHTHALMOLOGY

## 2021-07-26 PROCEDURE — 3288F FALL RISK ASSESSMENT DOCD: CPT | Mod: CPTII,S$GLB,, | Performed by: OPHTHALMOLOGY

## 2021-07-26 PROCEDURE — 1160F PR REVIEW ALL MEDS BY PRESCRIBER/CLIN PHARMACIST DOCUMENTED: ICD-10-PCS | Mod: CPTII,S$GLB,, | Performed by: OPHTHALMOLOGY

## 2021-07-26 PROCEDURE — 92012 PR EYE EXAM, EST PATIENT,INTERMED: ICD-10-PCS | Mod: 25,S$GLB,, | Performed by: OPHTHALMOLOGY

## 2021-07-26 PROCEDURE — 1160F RVW MEDS BY RX/DR IN RCRD: CPT | Mod: CPTII,S$GLB,, | Performed by: OPHTHALMOLOGY

## 2021-07-26 RX ORDER — HYDROCODONE BITARTRATE AND ACETAMINOPHEN 10; 325 MG/1; MG/1
1 TABLET ORAL EVERY 6 HOURS PRN
Qty: 120 TABLET | Refills: 0 | Status: SHIPPED | OUTPATIENT
Start: 2021-07-26 | End: 2021-08-20 | Stop reason: SDUPTHER

## 2021-08-01 ENCOUNTER — CLINICAL SUPPORT (OUTPATIENT)
Dept: CARDIOLOGY | Facility: HOSPITAL | Age: 71
End: 2021-08-01
Payer: MEDICARE

## 2021-08-01 DIAGNOSIS — Z95.810 PRESENCE OF AUTOMATIC (IMPLANTABLE) CARDIAC DEFIBRILLATOR: ICD-10-CM

## 2021-08-01 PROCEDURE — 93295 CARDIAC DEVICE CHECK - REMOTE: ICD-10-PCS | Mod: ,,, | Performed by: INTERNAL MEDICINE

## 2021-08-01 PROCEDURE — 93296 REM INTERROG EVL PM/IDS: CPT | Performed by: INTERNAL MEDICINE

## 2021-08-01 PROCEDURE — 93295 DEV INTERROG REMOTE 1/2/MLT: CPT | Mod: ,,, | Performed by: INTERNAL MEDICINE

## 2021-08-20 DIAGNOSIS — M54.12 CERVICAL RADICULOPATHY: ICD-10-CM

## 2021-08-20 DIAGNOSIS — M47.812 FACET ARTHRITIS OF CERVICAL REGION: ICD-10-CM

## 2021-08-20 DIAGNOSIS — M79.601 RIGHT ARM PAIN: ICD-10-CM

## 2021-08-20 DIAGNOSIS — M48.02 CERVICAL STENOSIS OF SPINE: ICD-10-CM

## 2021-08-20 DIAGNOSIS — R29.898 RIGHT ARM WEAKNESS: ICD-10-CM

## 2021-08-20 DIAGNOSIS — M54.12 CERVICAL RADICULAR PAIN: ICD-10-CM

## 2021-08-20 DIAGNOSIS — F11.20 NARCOTIC DEPENDENCY, CONTINUOUS: ICD-10-CM

## 2021-08-20 DIAGNOSIS — M50.00 HNP (HERNIATED NUCLEUS PULPOSUS) WITH MYELOPATHY, CERVICAL: ICD-10-CM

## 2021-08-20 DIAGNOSIS — G89.4 CHRONIC PAIN SYNDROME: ICD-10-CM

## 2021-08-20 DIAGNOSIS — M62.81 MUSCLE RIGHT ARM WEAKNESS: ICD-10-CM

## 2021-08-20 DIAGNOSIS — M96.1 CERVICAL POST-LAMINECTOMY SYNDROME: ICD-10-CM

## 2021-08-20 DIAGNOSIS — M75.101 ROTATOR CUFF SYNDROME OF RIGHT SHOULDER: ICD-10-CM

## 2021-08-20 DIAGNOSIS — M54.12 BRACHIAL NEURITIS OR RADICULITIS: ICD-10-CM

## 2021-08-20 DIAGNOSIS — G90.50 COMPLEX REGIONAL PAIN SYNDROME TYPE 1, AFFECTING UNSPECIFIED SITE: ICD-10-CM

## 2021-08-20 DIAGNOSIS — R29.898 RIGHT HAND WEAKNESS: ICD-10-CM

## 2021-08-20 DIAGNOSIS — M50.30 DEGENERATION OF CERVICAL INTERVERTEBRAL DISC: ICD-10-CM

## 2021-08-20 DIAGNOSIS — M47.12 CERVICAL SPONDYLOSIS WITH MYELOPATHY: ICD-10-CM

## 2021-08-20 DIAGNOSIS — G56.41 COMPLEX REGIONAL PAIN SYNDROME TYPE 2 OF RIGHT UPPER EXTREMITY: ICD-10-CM

## 2021-08-20 DIAGNOSIS — M48.02 SPINAL STENOSIS IN CERVICAL REGION: ICD-10-CM

## 2021-08-22 RX ORDER — HYDROCODONE BITARTRATE AND ACETAMINOPHEN 10; 325 MG/1; MG/1
1 TABLET ORAL EVERY 6 HOURS PRN
Qty: 120 TABLET | Refills: 0 | Status: SHIPPED | OUTPATIENT
Start: 2021-08-25 | End: 2021-09-20 | Stop reason: SDUPTHER

## 2021-09-20 DIAGNOSIS — M50.00 HNP (HERNIATED NUCLEUS PULPOSUS) WITH MYELOPATHY, CERVICAL: ICD-10-CM

## 2021-09-20 DIAGNOSIS — M48.02 CERVICAL STENOSIS OF SPINE: ICD-10-CM

## 2021-09-20 DIAGNOSIS — M96.1 CERVICAL POST-LAMINECTOMY SYNDROME: ICD-10-CM

## 2021-09-20 DIAGNOSIS — M50.30 DEGENERATION OF CERVICAL INTERVERTEBRAL DISC: ICD-10-CM

## 2021-09-20 DIAGNOSIS — M54.12 BRACHIAL NEURITIS OR RADICULITIS: ICD-10-CM

## 2021-09-20 DIAGNOSIS — M47.12 CERVICAL SPONDYLOSIS WITH MYELOPATHY: ICD-10-CM

## 2021-09-20 DIAGNOSIS — G90.50 COMPLEX REGIONAL PAIN SYNDROME TYPE 1, AFFECTING UNSPECIFIED SITE: ICD-10-CM

## 2021-09-20 DIAGNOSIS — M54.12 CERVICAL RADICULAR PAIN: ICD-10-CM

## 2021-09-20 DIAGNOSIS — R29.898 RIGHT ARM WEAKNESS: ICD-10-CM

## 2021-09-20 DIAGNOSIS — M54.12 CERVICAL RADICULOPATHY: ICD-10-CM

## 2021-09-20 DIAGNOSIS — R29.898 RIGHT HAND WEAKNESS: ICD-10-CM

## 2021-09-20 DIAGNOSIS — M48.02 SPINAL STENOSIS IN CERVICAL REGION: ICD-10-CM

## 2021-09-20 DIAGNOSIS — G56.41 COMPLEX REGIONAL PAIN SYNDROME TYPE 2 OF RIGHT UPPER EXTREMITY: ICD-10-CM

## 2021-09-20 DIAGNOSIS — M62.81 MUSCLE RIGHT ARM WEAKNESS: ICD-10-CM

## 2021-09-20 DIAGNOSIS — F11.20 NARCOTIC DEPENDENCY, CONTINUOUS: ICD-10-CM

## 2021-09-20 DIAGNOSIS — M47.812 FACET ARTHRITIS OF CERVICAL REGION: ICD-10-CM

## 2021-09-20 DIAGNOSIS — M75.101 ROTATOR CUFF SYNDROME OF RIGHT SHOULDER: ICD-10-CM

## 2021-09-20 DIAGNOSIS — M79.601 RIGHT ARM PAIN: ICD-10-CM

## 2021-09-20 DIAGNOSIS — G89.4 CHRONIC PAIN SYNDROME: ICD-10-CM

## 2021-09-25 RX ORDER — HYDROCODONE BITARTRATE AND ACETAMINOPHEN 10; 325 MG/1; MG/1
1 TABLET ORAL EVERY 6 HOURS PRN
Qty: 120 TABLET | Refills: 0 | Status: SHIPPED | OUTPATIENT
Start: 2021-09-25 | End: 2021-10-21 | Stop reason: SDUPTHER

## 2021-09-27 ENCOUNTER — TELEPHONE (OUTPATIENT)
Dept: PHYSICAL MEDICINE AND REHAB | Facility: CLINIC | Age: 71
End: 2021-09-27

## 2021-10-04 ENCOUNTER — OFFICE VISIT (OUTPATIENT)
Dept: FAMILY MEDICINE | Facility: CLINIC | Age: 71
End: 2021-10-04
Payer: MEDICARE

## 2021-10-04 VITALS
OXYGEN SATURATION: 97 % | DIASTOLIC BLOOD PRESSURE: 84 MMHG | WEIGHT: 168.44 LBS | BODY MASS INDEX: 23.58 KG/M2 | SYSTOLIC BLOOD PRESSURE: 142 MMHG | HEART RATE: 55 BPM | HEIGHT: 71 IN

## 2021-10-04 DIAGNOSIS — M62.81 MUSCLE RIGHT ARM WEAKNESS: ICD-10-CM

## 2021-10-04 DIAGNOSIS — R26.9 GAIT ABNORMALITY: ICD-10-CM

## 2021-10-04 DIAGNOSIS — M79.601 RIGHT ARM PAIN: ICD-10-CM

## 2021-10-04 DIAGNOSIS — G90.50 COMPLEX REGIONAL PAIN SYNDROME TYPE 1, AFFECTING UNSPECIFIED SITE: ICD-10-CM

## 2021-10-04 DIAGNOSIS — M48.02 SPINAL STENOSIS IN CERVICAL REGION: ICD-10-CM

## 2021-10-04 DIAGNOSIS — I10 ESSENTIAL HYPERTENSION: ICD-10-CM

## 2021-10-04 DIAGNOSIS — M47.12 CERVICAL SPONDYLOSIS WITH MYELOPATHY: ICD-10-CM

## 2021-10-04 DIAGNOSIS — M54.12 BRACHIAL NEURITIS OR RADICULITIS: ICD-10-CM

## 2021-10-04 DIAGNOSIS — R29.898 RIGHT HAND WEAKNESS: ICD-10-CM

## 2021-10-04 DIAGNOSIS — G89.4 CHRONIC PAIN SYNDROME: Primary | ICD-10-CM

## 2021-10-04 DIAGNOSIS — G56.41 COMPLEX REGIONAL PAIN SYNDROME TYPE 2 OF RIGHT UPPER EXTREMITY: ICD-10-CM

## 2021-10-04 DIAGNOSIS — M50.00 HNP (HERNIATED NUCLEUS PULPOSUS) WITH MYELOPATHY, CERVICAL: ICD-10-CM

## 2021-10-04 DIAGNOSIS — M50.30 DEGENERATION OF CERVICAL INTERVERTEBRAL DISC: ICD-10-CM

## 2021-10-04 DIAGNOSIS — M54.12 CERVICAL RADICULAR PAIN: ICD-10-CM

## 2021-10-04 DIAGNOSIS — R29.898 RIGHT ARM WEAKNESS: ICD-10-CM

## 2021-10-04 DIAGNOSIS — F11.20 NARCOTIC DEPENDENCY, CONTINUOUS: ICD-10-CM

## 2021-10-04 DIAGNOSIS — M47.812 FACET ARTHRITIS OF CERVICAL REGION: ICD-10-CM

## 2021-10-04 DIAGNOSIS — M96.1 CERVICAL POST-LAMINECTOMY SYNDROME: ICD-10-CM

## 2021-10-04 DIAGNOSIS — M75.101 ROTATOR CUFF SYNDROME OF RIGHT SHOULDER: ICD-10-CM

## 2021-10-04 DIAGNOSIS — M48.02 CERVICAL STENOSIS OF SPINE: ICD-10-CM

## 2021-10-04 PROCEDURE — 3288F FALL RISK ASSESSMENT DOCD: CPT | Mod: HCNC,CPTII,S$GLB, | Performed by: FAMILY MEDICINE

## 2021-10-04 PROCEDURE — 1100F PR PT FALLS ASSESS DOC 2+ FALLS/FALL W/INJURY/YR: ICD-10-PCS | Mod: HCNC,CPTII,S$GLB, | Performed by: FAMILY MEDICINE

## 2021-10-04 PROCEDURE — 3072F LOW RISK FOR RETINOPATHY: CPT | Mod: HCNC,CPTII,S$GLB, | Performed by: FAMILY MEDICINE

## 2021-10-04 PROCEDURE — 3077F SYST BP >= 140 MM HG: CPT | Mod: HCNC,CPTII,S$GLB, | Performed by: FAMILY MEDICINE

## 2021-10-04 PROCEDURE — 4010F ACE/ARB THERAPY RXD/TAKEN: CPT | Mod: HCNC,CPTII,S$GLB, | Performed by: FAMILY MEDICINE

## 2021-10-04 PROCEDURE — 3079F DIAST BP 80-89 MM HG: CPT | Mod: HCNC,CPTII,S$GLB, | Performed by: FAMILY MEDICINE

## 2021-10-04 PROCEDURE — 3060F POS MICROALBUMINURIA REV: CPT | Mod: HCNC,CPTII,S$GLB, | Performed by: FAMILY MEDICINE

## 2021-10-04 PROCEDURE — 3079F PR MOST RECENT DIASTOLIC BLOOD PRESSURE 80-89 MM HG: ICD-10-PCS | Mod: HCNC,CPTII,S$GLB, | Performed by: FAMILY MEDICINE

## 2021-10-04 PROCEDURE — 3072F PR LOW RISK FOR RETINOPATHY: ICD-10-PCS | Mod: HCNC,CPTII,S$GLB, | Performed by: FAMILY MEDICINE

## 2021-10-04 PROCEDURE — 99214 PR OFFICE/OUTPT VISIT, EST, LEVL IV, 30-39 MIN: ICD-10-PCS | Mod: HCNC,S$GLB,, | Performed by: FAMILY MEDICINE

## 2021-10-04 PROCEDURE — 3288F PR FALLS RISK ASSESSMENT DOCUMENTED: ICD-10-PCS | Mod: HCNC,CPTII,S$GLB, | Performed by: FAMILY MEDICINE

## 2021-10-04 PROCEDURE — 3066F PR DOCUMENTATION OF TREATMENT FOR NEPHROPATHY: ICD-10-PCS | Mod: HCNC,CPTII,S$GLB, | Performed by: FAMILY MEDICINE

## 2021-10-04 PROCEDURE — 99999 PR PBB SHADOW E&M-EST. PATIENT-LVL IV: CPT | Mod: PBBFAC,HCNC,, | Performed by: FAMILY MEDICINE

## 2021-10-04 PROCEDURE — 3066F NEPHROPATHY DOC TX: CPT | Mod: HCNC,CPTII,S$GLB, | Performed by: FAMILY MEDICINE

## 2021-10-04 PROCEDURE — 3008F BODY MASS INDEX DOCD: CPT | Mod: HCNC,CPTII,S$GLB, | Performed by: FAMILY MEDICINE

## 2021-10-04 PROCEDURE — 4010F PR ACE/ARB THEARPY RXD/TAKEN: ICD-10-PCS | Mod: HCNC,CPTII,S$GLB, | Performed by: FAMILY MEDICINE

## 2021-10-04 PROCEDURE — 1159F MED LIST DOCD IN RCRD: CPT | Mod: HCNC,CPTII,S$GLB, | Performed by: FAMILY MEDICINE

## 2021-10-04 PROCEDURE — 3044F HG A1C LEVEL LT 7.0%: CPT | Mod: HCNC,CPTII,S$GLB, | Performed by: FAMILY MEDICINE

## 2021-10-04 PROCEDURE — 1125F PR PAIN SEVERITY QUANTIFIED, PAIN PRESENT: ICD-10-PCS | Mod: HCNC,CPTII,S$GLB, | Performed by: FAMILY MEDICINE

## 2021-10-04 PROCEDURE — 99999 PR PBB SHADOW E&M-EST. PATIENT-LVL IV: ICD-10-PCS | Mod: PBBFAC,HCNC,, | Performed by: FAMILY MEDICINE

## 2021-10-04 PROCEDURE — 1125F AMNT PAIN NOTED PAIN PRSNT: CPT | Mod: HCNC,CPTII,S$GLB, | Performed by: FAMILY MEDICINE

## 2021-10-04 PROCEDURE — 1159F PR MEDICATION LIST DOCUMENTED IN MEDICAL RECORD: ICD-10-PCS | Mod: HCNC,CPTII,S$GLB, | Performed by: FAMILY MEDICINE

## 2021-10-04 PROCEDURE — 99214 OFFICE O/P EST MOD 30 MIN: CPT | Mod: HCNC,S$GLB,, | Performed by: FAMILY MEDICINE

## 2021-10-04 PROCEDURE — 3077F PR MOST RECENT SYSTOLIC BLOOD PRESSURE >= 140 MM HG: ICD-10-PCS | Mod: HCNC,CPTII,S$GLB, | Performed by: FAMILY MEDICINE

## 2021-10-04 PROCEDURE — 3060F PR POS MICROALBUMINURIA RESULT DOCUMENTED/REVIEW: ICD-10-PCS | Mod: HCNC,CPTII,S$GLB, | Performed by: FAMILY MEDICINE

## 2021-10-04 PROCEDURE — 3008F PR BODY MASS INDEX (BMI) DOCUMENTED: ICD-10-PCS | Mod: HCNC,CPTII,S$GLB, | Performed by: FAMILY MEDICINE

## 2021-10-04 PROCEDURE — 3044F PR MOST RECENT HEMOGLOBIN A1C LEVEL <7.0%: ICD-10-PCS | Mod: HCNC,CPTII,S$GLB, | Performed by: FAMILY MEDICINE

## 2021-10-04 PROCEDURE — 1100F PTFALLS ASSESS-DOCD GE2>/YR: CPT | Mod: HCNC,CPTII,S$GLB, | Performed by: FAMILY MEDICINE

## 2021-10-04 RX ORDER — GABAPENTIN 600 MG/1
600 TABLET ORAL 2 TIMES DAILY
Qty: 180 TABLET | Refills: 3 | Status: SHIPPED | OUTPATIENT
Start: 2021-10-04 | End: 2022-11-30 | Stop reason: SDUPTHER

## 2021-10-05 ENCOUNTER — TELEPHONE (OUTPATIENT)
Dept: PHYSICAL MEDICINE AND REHAB | Facility: CLINIC | Age: 71
End: 2021-10-05

## 2021-10-21 DIAGNOSIS — M47.12 CERVICAL SPONDYLOSIS WITH MYELOPATHY: ICD-10-CM

## 2021-10-21 DIAGNOSIS — R29.898 RIGHT ARM WEAKNESS: ICD-10-CM

## 2021-10-21 DIAGNOSIS — R29.898 RIGHT HAND WEAKNESS: ICD-10-CM

## 2021-10-21 DIAGNOSIS — M96.1 CERVICAL POST-LAMINECTOMY SYNDROME: ICD-10-CM

## 2021-10-21 DIAGNOSIS — M75.101 ROTATOR CUFF SYNDROME OF RIGHT SHOULDER: ICD-10-CM

## 2021-10-21 DIAGNOSIS — M47.812 FACET ARTHRITIS OF CERVICAL REGION: ICD-10-CM

## 2021-10-21 DIAGNOSIS — M54.12 CERVICAL RADICULOPATHY: ICD-10-CM

## 2021-10-21 DIAGNOSIS — G56.41 COMPLEX REGIONAL PAIN SYNDROME TYPE 2 OF RIGHT UPPER EXTREMITY: ICD-10-CM

## 2021-10-21 DIAGNOSIS — M50.00 HNP (HERNIATED NUCLEUS PULPOSUS) WITH MYELOPATHY, CERVICAL: ICD-10-CM

## 2021-10-21 DIAGNOSIS — M79.601 RIGHT ARM PAIN: ICD-10-CM

## 2021-10-21 DIAGNOSIS — G89.4 CHRONIC PAIN SYNDROME: ICD-10-CM

## 2021-10-21 DIAGNOSIS — M54.12 CERVICAL RADICULAR PAIN: ICD-10-CM

## 2021-10-21 DIAGNOSIS — M54.12 BRACHIAL NEURITIS OR RADICULITIS: ICD-10-CM

## 2021-10-21 DIAGNOSIS — M48.02 SPINAL STENOSIS IN CERVICAL REGION: ICD-10-CM

## 2021-10-21 DIAGNOSIS — G90.50 COMPLEX REGIONAL PAIN SYNDROME TYPE 1, AFFECTING UNSPECIFIED SITE: ICD-10-CM

## 2021-10-21 DIAGNOSIS — M50.30 DEGENERATION OF CERVICAL INTERVERTEBRAL DISC: ICD-10-CM

## 2021-10-21 DIAGNOSIS — M62.81 MUSCLE RIGHT ARM WEAKNESS: ICD-10-CM

## 2021-10-21 DIAGNOSIS — F11.20 NARCOTIC DEPENDENCY, CONTINUOUS: ICD-10-CM

## 2021-10-21 DIAGNOSIS — M48.02 CERVICAL STENOSIS OF SPINE: ICD-10-CM

## 2021-10-24 RX ORDER — HYDROCODONE BITARTRATE AND ACETAMINOPHEN 10; 325 MG/1; MG/1
1 TABLET ORAL EVERY 6 HOURS PRN
Qty: 120 TABLET | Refills: 0 | Status: SHIPPED | OUTPATIENT
Start: 2021-10-24 | End: 2021-11-18 | Stop reason: SDUPTHER

## 2021-10-30 ENCOUNTER — CLINICAL SUPPORT (OUTPATIENT)
Dept: CARDIOLOGY | Facility: HOSPITAL | Age: 71
End: 2021-10-30
Payer: MEDICARE

## 2021-10-30 DIAGNOSIS — I50.9 HEART FAILURE, UNSPECIFIED: ICD-10-CM

## 2021-10-30 DIAGNOSIS — Z95.810 PRESENCE OF AUTOMATIC (IMPLANTABLE) CARDIAC DEFIBRILLATOR: ICD-10-CM

## 2021-10-30 DIAGNOSIS — I42.9 CARDIOMYOPATHY, UNSPECIFIED: ICD-10-CM

## 2021-10-30 PROCEDURE — 93295 DEV INTERROG REMOTE 1/2/MLT: CPT | Mod: HCNC,,, | Performed by: INTERNAL MEDICINE

## 2021-10-30 PROCEDURE — 93296 REM INTERROG EVL PM/IDS: CPT | Mod: HCNC | Performed by: INTERNAL MEDICINE

## 2021-10-30 PROCEDURE — 93295 CARDIAC DEVICE CHECK - REMOTE: ICD-10-PCS | Mod: HCNC,,, | Performed by: INTERNAL MEDICINE

## 2021-11-18 DIAGNOSIS — M50.30 DEGENERATION OF CERVICAL INTERVERTEBRAL DISC: ICD-10-CM

## 2021-11-18 DIAGNOSIS — M48.02 CERVICAL STENOSIS OF SPINE: ICD-10-CM

## 2021-11-18 DIAGNOSIS — M54.12 BRACHIAL NEURITIS OR RADICULITIS: ICD-10-CM

## 2021-11-18 DIAGNOSIS — G56.41 COMPLEX REGIONAL PAIN SYNDROME TYPE 2 OF RIGHT UPPER EXTREMITY: ICD-10-CM

## 2021-11-18 DIAGNOSIS — G90.50 COMPLEX REGIONAL PAIN SYNDROME TYPE 1, AFFECTING UNSPECIFIED SITE: ICD-10-CM

## 2021-11-18 DIAGNOSIS — R29.898 RIGHT ARM WEAKNESS: ICD-10-CM

## 2021-11-18 DIAGNOSIS — M47.12 CERVICAL SPONDYLOSIS WITH MYELOPATHY: ICD-10-CM

## 2021-11-18 DIAGNOSIS — M62.81 MUSCLE RIGHT ARM WEAKNESS: ICD-10-CM

## 2021-11-18 DIAGNOSIS — M48.02 SPINAL STENOSIS IN CERVICAL REGION: ICD-10-CM

## 2021-11-18 DIAGNOSIS — M47.812 FACET ARTHRITIS OF CERVICAL REGION: ICD-10-CM

## 2021-11-18 DIAGNOSIS — M75.101 ROTATOR CUFF SYNDROME OF RIGHT SHOULDER: ICD-10-CM

## 2021-11-18 DIAGNOSIS — M50.00 HNP (HERNIATED NUCLEUS PULPOSUS) WITH MYELOPATHY, CERVICAL: ICD-10-CM

## 2021-11-18 DIAGNOSIS — M54.12 CERVICAL RADICULAR PAIN: ICD-10-CM

## 2021-11-18 DIAGNOSIS — M54.12 CERVICAL RADICULOPATHY: ICD-10-CM

## 2021-11-18 DIAGNOSIS — G89.4 CHRONIC PAIN SYNDROME: ICD-10-CM

## 2021-11-18 DIAGNOSIS — F11.20 NARCOTIC DEPENDENCY, CONTINUOUS: ICD-10-CM

## 2021-11-18 DIAGNOSIS — R29.898 RIGHT HAND WEAKNESS: ICD-10-CM

## 2021-11-18 DIAGNOSIS — M96.1 CERVICAL POST-LAMINECTOMY SYNDROME: ICD-10-CM

## 2021-11-18 DIAGNOSIS — M79.601 RIGHT ARM PAIN: ICD-10-CM

## 2021-11-20 RX ORDER — HYDROCODONE BITARTRATE AND ACETAMINOPHEN 10; 325 MG/1; MG/1
1 TABLET ORAL EVERY 6 HOURS PRN
Qty: 120 TABLET | Refills: 0 | Status: SHIPPED | OUTPATIENT
Start: 2021-11-23 | End: 2021-11-26 | Stop reason: SDUPTHER

## 2021-11-24 DIAGNOSIS — G90.50 COMPLEX REGIONAL PAIN SYNDROME TYPE 1, AFFECTING UNSPECIFIED SITE: ICD-10-CM

## 2021-11-24 DIAGNOSIS — M48.02 CERVICAL STENOSIS OF SPINE: ICD-10-CM

## 2021-11-24 DIAGNOSIS — M54.12 CERVICAL RADICULOPATHY: ICD-10-CM

## 2021-11-24 DIAGNOSIS — M54.12 BRACHIAL NEURITIS OR RADICULITIS: ICD-10-CM

## 2021-11-24 DIAGNOSIS — M75.101 ROTATOR CUFF SYNDROME OF RIGHT SHOULDER: ICD-10-CM

## 2021-11-24 DIAGNOSIS — M50.30 DEGENERATION OF CERVICAL INTERVERTEBRAL DISC: ICD-10-CM

## 2021-11-24 DIAGNOSIS — M54.12 CERVICAL RADICULAR PAIN: ICD-10-CM

## 2021-11-24 DIAGNOSIS — F11.20 NARCOTIC DEPENDENCY, CONTINUOUS: ICD-10-CM

## 2021-11-24 DIAGNOSIS — M79.601 RIGHT ARM PAIN: ICD-10-CM

## 2021-11-24 DIAGNOSIS — M48.02 SPINAL STENOSIS IN CERVICAL REGION: ICD-10-CM

## 2021-11-24 DIAGNOSIS — M62.81 MUSCLE RIGHT ARM WEAKNESS: ICD-10-CM

## 2021-11-24 DIAGNOSIS — M47.812 FACET ARTHRITIS OF CERVICAL REGION: ICD-10-CM

## 2021-11-24 DIAGNOSIS — M50.00 HNP (HERNIATED NUCLEUS PULPOSUS) WITH MYELOPATHY, CERVICAL: ICD-10-CM

## 2021-11-24 DIAGNOSIS — M47.12 CERVICAL SPONDYLOSIS WITH MYELOPATHY: ICD-10-CM

## 2021-11-24 DIAGNOSIS — R29.898 RIGHT HAND WEAKNESS: ICD-10-CM

## 2021-11-24 DIAGNOSIS — G56.41 COMPLEX REGIONAL PAIN SYNDROME TYPE 2 OF RIGHT UPPER EXTREMITY: ICD-10-CM

## 2021-11-24 DIAGNOSIS — R29.898 RIGHT ARM WEAKNESS: ICD-10-CM

## 2021-11-24 DIAGNOSIS — G89.4 CHRONIC PAIN SYNDROME: ICD-10-CM

## 2021-11-24 DIAGNOSIS — M96.1 CERVICAL POST-LAMINECTOMY SYNDROME: ICD-10-CM

## 2021-11-26 ENCOUNTER — TELEPHONE (OUTPATIENT)
Dept: NEUROLOGY | Facility: CLINIC | Age: 71
End: 2021-11-26
Payer: MEDICARE

## 2021-11-26 DIAGNOSIS — R29.898 RIGHT HAND WEAKNESS: ICD-10-CM

## 2021-11-26 DIAGNOSIS — M48.02 SPINAL STENOSIS IN CERVICAL REGION: ICD-10-CM

## 2021-11-26 DIAGNOSIS — M50.30 DEGENERATION OF CERVICAL INTERVERTEBRAL DISC: ICD-10-CM

## 2021-11-26 DIAGNOSIS — M47.812 FACET ARTHRITIS OF CERVICAL REGION: ICD-10-CM

## 2021-11-26 DIAGNOSIS — M50.00 HNP (HERNIATED NUCLEUS PULPOSUS) WITH MYELOPATHY, CERVICAL: ICD-10-CM

## 2021-11-26 DIAGNOSIS — M48.02 CERVICAL STENOSIS OF SPINE: ICD-10-CM

## 2021-11-26 DIAGNOSIS — M75.101 ROTATOR CUFF SYNDROME OF RIGHT SHOULDER: ICD-10-CM

## 2021-11-26 DIAGNOSIS — M54.12 BRACHIAL NEURITIS OR RADICULITIS: ICD-10-CM

## 2021-11-26 DIAGNOSIS — M62.81 MUSCLE RIGHT ARM WEAKNESS: ICD-10-CM

## 2021-11-26 DIAGNOSIS — G89.4 CHRONIC PAIN SYNDROME: ICD-10-CM

## 2021-11-26 DIAGNOSIS — M54.12 CERVICAL RADICULAR PAIN: ICD-10-CM

## 2021-11-26 DIAGNOSIS — M47.12 CERVICAL SPONDYLOSIS WITH MYELOPATHY: ICD-10-CM

## 2021-11-26 DIAGNOSIS — M54.12 CERVICAL RADICULOPATHY: ICD-10-CM

## 2021-11-26 DIAGNOSIS — R29.898 RIGHT ARM WEAKNESS: ICD-10-CM

## 2021-11-26 DIAGNOSIS — F11.20 NARCOTIC DEPENDENCY, CONTINUOUS: ICD-10-CM

## 2021-11-26 DIAGNOSIS — G90.50 COMPLEX REGIONAL PAIN SYNDROME TYPE 1, AFFECTING UNSPECIFIED SITE: ICD-10-CM

## 2021-11-26 DIAGNOSIS — G56.41 COMPLEX REGIONAL PAIN SYNDROME TYPE 2 OF RIGHT UPPER EXTREMITY: ICD-10-CM

## 2021-11-26 DIAGNOSIS — M79.601 RIGHT ARM PAIN: ICD-10-CM

## 2021-11-26 DIAGNOSIS — M96.1 CERVICAL POST-LAMINECTOMY SYNDROME: ICD-10-CM

## 2021-11-28 RX ORDER — HYDROCODONE BITARTRATE AND ACETAMINOPHEN 10; 325 MG/1; MG/1
1 TABLET ORAL EVERY 6 HOURS PRN
Qty: 120 TABLET | Refills: 0 | OUTPATIENT
Start: 2021-11-28 | End: 2021-12-28

## 2021-11-28 RX ORDER — HYDROCODONE BITARTRATE AND ACETAMINOPHEN 10; 325 MG/1; MG/1
1 TABLET ORAL EVERY 6 HOURS PRN
Qty: 120 TABLET | Refills: 0 | Status: SHIPPED | OUTPATIENT
Start: 2021-11-28 | End: 2021-12-22 | Stop reason: SDUPTHER

## 2021-12-22 DIAGNOSIS — M48.02 SPINAL STENOSIS IN CERVICAL REGION: ICD-10-CM

## 2021-12-22 DIAGNOSIS — R29.898 RIGHT ARM WEAKNESS: ICD-10-CM

## 2021-12-22 DIAGNOSIS — M62.81 MUSCLE RIGHT ARM WEAKNESS: ICD-10-CM

## 2021-12-22 DIAGNOSIS — M54.12 BRACHIAL NEURITIS OR RADICULITIS: ICD-10-CM

## 2021-12-22 DIAGNOSIS — G90.50 COMPLEX REGIONAL PAIN SYNDROME TYPE 1, AFFECTING UNSPECIFIED SITE: ICD-10-CM

## 2021-12-22 DIAGNOSIS — M50.30 DEGENERATION OF CERVICAL INTERVERTEBRAL DISC: ICD-10-CM

## 2021-12-22 DIAGNOSIS — G89.4 CHRONIC PAIN SYNDROME: ICD-10-CM

## 2021-12-22 DIAGNOSIS — F11.20 NARCOTIC DEPENDENCY, CONTINUOUS: ICD-10-CM

## 2021-12-22 DIAGNOSIS — M48.02 CERVICAL STENOSIS OF SPINE: ICD-10-CM

## 2021-12-22 DIAGNOSIS — M96.1 CERVICAL POST-LAMINECTOMY SYNDROME: ICD-10-CM

## 2021-12-22 DIAGNOSIS — M54.12 CERVICAL RADICULAR PAIN: ICD-10-CM

## 2021-12-22 DIAGNOSIS — M47.12 CERVICAL SPONDYLOSIS WITH MYELOPATHY: ICD-10-CM

## 2021-12-22 DIAGNOSIS — M75.101 ROTATOR CUFF SYNDROME OF RIGHT SHOULDER: ICD-10-CM

## 2021-12-22 DIAGNOSIS — G56.41 COMPLEX REGIONAL PAIN SYNDROME TYPE 2 OF RIGHT UPPER EXTREMITY: ICD-10-CM

## 2021-12-22 DIAGNOSIS — M79.601 RIGHT ARM PAIN: ICD-10-CM

## 2021-12-22 DIAGNOSIS — M50.00 HNP (HERNIATED NUCLEUS PULPOSUS) WITH MYELOPATHY, CERVICAL: ICD-10-CM

## 2021-12-22 DIAGNOSIS — M54.12 CERVICAL RADICULOPATHY: ICD-10-CM

## 2021-12-22 DIAGNOSIS — M47.812 FACET ARTHRITIS OF CERVICAL REGION: ICD-10-CM

## 2021-12-22 DIAGNOSIS — R29.898 RIGHT HAND WEAKNESS: ICD-10-CM

## 2021-12-27 ENCOUNTER — IMMUNIZATION (OUTPATIENT)
Dept: PHARMACY | Facility: CLINIC | Age: 71
End: 2021-12-27
Payer: MEDICARE

## 2021-12-27 DIAGNOSIS — Z23 NEED FOR VACCINATION: Primary | ICD-10-CM

## 2021-12-27 RX ORDER — HYDROCODONE BITARTRATE AND ACETAMINOPHEN 10; 325 MG/1; MG/1
1 TABLET ORAL EVERY 6 HOURS PRN
Qty: 120 TABLET | Refills: 0 | Status: SHIPPED | OUTPATIENT
Start: 2021-12-27 | End: 2022-01-24 | Stop reason: SDUPTHER

## 2022-01-24 DIAGNOSIS — M54.12 BRACHIAL NEURITIS OR RADICULITIS: ICD-10-CM

## 2022-01-24 DIAGNOSIS — G90.50 COMPLEX REGIONAL PAIN SYNDROME TYPE 1, AFFECTING UNSPECIFIED SITE: ICD-10-CM

## 2022-01-24 DIAGNOSIS — F11.20 NARCOTIC DEPENDENCY, CONTINUOUS: ICD-10-CM

## 2022-01-24 DIAGNOSIS — R29.898 RIGHT HAND WEAKNESS: ICD-10-CM

## 2022-01-24 DIAGNOSIS — M47.812 FACET ARTHRITIS OF CERVICAL REGION: ICD-10-CM

## 2022-01-24 DIAGNOSIS — M47.12 CERVICAL SPONDYLOSIS WITH MYELOPATHY: ICD-10-CM

## 2022-01-24 DIAGNOSIS — G89.4 CHRONIC PAIN SYNDROME: ICD-10-CM

## 2022-01-24 DIAGNOSIS — M96.1 CERVICAL POST-LAMINECTOMY SYNDROME: ICD-10-CM

## 2022-01-24 DIAGNOSIS — M48.02 CERVICAL STENOSIS OF SPINE: ICD-10-CM

## 2022-01-24 DIAGNOSIS — M48.02 SPINAL STENOSIS IN CERVICAL REGION: ICD-10-CM

## 2022-01-24 DIAGNOSIS — M50.00 HNP (HERNIATED NUCLEUS PULPOSUS) WITH MYELOPATHY, CERVICAL: ICD-10-CM

## 2022-01-24 DIAGNOSIS — M75.101 ROTATOR CUFF SYNDROME OF RIGHT SHOULDER: ICD-10-CM

## 2022-01-24 DIAGNOSIS — M50.30 DEGENERATION OF CERVICAL INTERVERTEBRAL DISC: ICD-10-CM

## 2022-01-24 DIAGNOSIS — M54.12 CERVICAL RADICULAR PAIN: ICD-10-CM

## 2022-01-24 DIAGNOSIS — M79.601 RIGHT ARM PAIN: ICD-10-CM

## 2022-01-24 DIAGNOSIS — R29.898 RIGHT ARM WEAKNESS: ICD-10-CM

## 2022-01-24 DIAGNOSIS — G56.41 COMPLEX REGIONAL PAIN SYNDROME TYPE 2 OF RIGHT UPPER EXTREMITY: ICD-10-CM

## 2022-01-24 DIAGNOSIS — M54.12 CERVICAL RADICULOPATHY: ICD-10-CM

## 2022-01-24 DIAGNOSIS — M62.81 MUSCLE RIGHT ARM WEAKNESS: ICD-10-CM

## 2022-01-24 NOTE — TELEPHONE ENCOUNTER
----- Message from Marleny James sent at 1/24/2022  9:04 AM CST -----  Contact: [phone]  Pt requesting a refill on HYDROcodone-acetaminophen (NORCO)  mg per tablet. Pt would like a call once sent over to pharmacy.    [phone]    [facility] [address] [place] [address] [address] AT [facility] [address]   Phone:  [phone]  Fax:  [phone]

## 2022-01-26 DIAGNOSIS — E11.9 TYPE 2 DIABETES MELLITUS WITHOUT COMPLICATION: ICD-10-CM

## 2022-01-26 RX ORDER — HYDROCODONE BITARTRATE AND ACETAMINOPHEN 10; 325 MG/1; MG/1
1 TABLET ORAL EVERY 6 HOURS PRN
Qty: 120 TABLET | Refills: 0 | Status: SHIPPED | OUTPATIENT
Start: 2022-01-26 | End: 2022-02-23 | Stop reason: SDUPTHER

## 2022-01-27 ENCOUNTER — TELEPHONE (OUTPATIENT)
Dept: ELECTROPHYSIOLOGY | Facility: CLINIC | Age: 72
End: 2022-01-27
Payer: MEDICARE

## 2022-01-27 DIAGNOSIS — I42.8 CARDIOMYOPATHY, NONISCHEMIC: Primary | ICD-10-CM

## 2022-01-28 ENCOUNTER — CLINICAL SUPPORT (OUTPATIENT)
Dept: CARDIOLOGY | Facility: HOSPITAL | Age: 72
End: 2022-01-28
Payer: MEDICARE

## 2022-01-28 DIAGNOSIS — I42.9 CARDIOMYOPATHY, UNSPECIFIED: ICD-10-CM

## 2022-01-28 DIAGNOSIS — Z95.810 PRESENCE OF AUTOMATIC (IMPLANTABLE) CARDIAC DEFIBRILLATOR: ICD-10-CM

## 2022-01-28 PROCEDURE — 93296 REM INTERROG EVL PM/IDS: CPT | Mod: HCNC | Performed by: INTERNAL MEDICINE

## 2022-02-04 ENCOUNTER — LAB VISIT (OUTPATIENT)
Dept: LAB | Facility: HOSPITAL | Age: 72
End: 2022-02-04
Attending: FAMILY MEDICINE
Payer: MEDICARE

## 2022-02-04 DIAGNOSIS — I10 ESSENTIAL HYPERTENSION: ICD-10-CM

## 2022-02-07 ENCOUNTER — LAB VISIT (OUTPATIENT)
Dept: LAB | Facility: HOSPITAL | Age: 72
End: 2022-02-07
Attending: FAMILY MEDICINE
Payer: MEDICARE

## 2022-02-07 ENCOUNTER — OFFICE VISIT (OUTPATIENT)
Dept: FAMILY MEDICINE | Facility: CLINIC | Age: 72
End: 2022-02-07
Payer: MEDICARE

## 2022-02-07 VITALS
OXYGEN SATURATION: 98 % | BODY MASS INDEX: 23.86 KG/M2 | DIASTOLIC BLOOD PRESSURE: 86 MMHG | WEIGHT: 170.44 LBS | HEART RATE: 86 BPM | SYSTOLIC BLOOD PRESSURE: 138 MMHG | HEIGHT: 71 IN

## 2022-02-07 DIAGNOSIS — M46.92 UNSPECIFIED INFLAMMATORY SPONDYLOPATHY, CERVICAL REGION: Primary | ICD-10-CM

## 2022-02-07 DIAGNOSIS — J43.9 PULMONARY EMPHYSEMA, UNSPECIFIED EMPHYSEMA TYPE: ICD-10-CM

## 2022-02-07 DIAGNOSIS — G95.9 CERVICAL MYELOPATHY: ICD-10-CM

## 2022-02-07 DIAGNOSIS — E11.40 TYPE 2 DIABETES MELLITUS WITH DIABETIC NEUROPATHY, WITHOUT LONG-TERM CURRENT USE OF INSULIN: ICD-10-CM

## 2022-02-07 DIAGNOSIS — I11.0 HYPERTENSIVE LEFT VENTRICULAR HYPERTROPHY WITH HEART FAILURE: ICD-10-CM

## 2022-02-07 DIAGNOSIS — I70.0 AORTIC ATHEROSCLEROSIS: ICD-10-CM

## 2022-02-07 LAB
ALBUMIN SERPL BCP-MCNC: 3.8 G/DL (ref 3.5–5.2)
ALP SERPL-CCNC: 75 U/L (ref 55–135)
ALT SERPL W/O P-5'-P-CCNC: 11 U/L (ref 10–44)
ANION GAP SERPL CALC-SCNC: 13 MMOL/L (ref 8–16)
AST SERPL-CCNC: 18 U/L (ref 10–40)
BILIRUB SERPL-MCNC: 0.5 MG/DL (ref 0.1–1)
BUN SERPL-MCNC: 6 MG/DL (ref 8–23)
CALCIUM SERPL-MCNC: 9.5 MG/DL (ref 8.7–10.5)
CHLORIDE SERPL-SCNC: 101 MMOL/L (ref 95–110)
CHOLEST SERPL-MCNC: 116 MG/DL (ref 120–199)
CHOLEST/HDLC SERPL: 2.3 {RATIO} (ref 2–5)
CO2 SERPL-SCNC: 24 MMOL/L (ref 23–29)
CREAT SERPL-MCNC: 0.7 MG/DL (ref 0.5–1.4)
EST. GFR  (AFRICAN AMERICAN): >60 ML/MIN/1.73 M^2
EST. GFR  (NON AFRICAN AMERICAN): >60 ML/MIN/1.73 M^2
ESTIMATED AVG GLUCOSE: 128 MG/DL (ref 68–131)
GLUCOSE SERPL-MCNC: 89 MG/DL (ref 70–110)
HBA1C MFR BLD: 6.1 % (ref 4–5.6)
HDLC SERPL-MCNC: 50 MG/DL (ref 40–75)
HDLC SERPL: 43.1 % (ref 20–50)
LDLC SERPL CALC-MCNC: 55.2 MG/DL (ref 63–159)
NONHDLC SERPL-MCNC: 66 MG/DL
POTASSIUM SERPL-SCNC: 4.6 MMOL/L (ref 3.5–5.1)
PROT SERPL-MCNC: 7.4 G/DL (ref 6–8.4)
SODIUM SERPL-SCNC: 138 MMOL/L (ref 136–145)
TRIGL SERPL-MCNC: 54 MG/DL (ref 30–150)

## 2022-02-07 PROCEDURE — 36415 COLL VENOUS BLD VENIPUNCTURE: CPT | Mod: HCNC,PO | Performed by: FAMILY MEDICINE

## 2022-02-07 PROCEDURE — 1125F PR PAIN SEVERITY QUANTIFIED, PAIN PRESENT: ICD-10-PCS | Mod: HCNC,CPTII,S$GLB, | Performed by: FAMILY MEDICINE

## 2022-02-07 PROCEDURE — 3075F PR MOST RECENT SYSTOLIC BLOOD PRESS GE 130-139MM HG: ICD-10-PCS | Mod: HCNC,CPTII,S$GLB, | Performed by: FAMILY MEDICINE

## 2022-02-07 PROCEDURE — 99999 PR PBB SHADOW E&M-EST. PATIENT-LVL IV: CPT | Mod: PBBFAC,HCNC,, | Performed by: FAMILY MEDICINE

## 2022-02-07 PROCEDURE — 99499 UNLISTED E&M SERVICE: CPT | Mod: HCNC,S$GLB,, | Performed by: FAMILY MEDICINE

## 2022-02-07 PROCEDURE — 3008F BODY MASS INDEX DOCD: CPT | Mod: HCNC,CPTII,S$GLB, | Performed by: FAMILY MEDICINE

## 2022-02-07 PROCEDURE — 3288F PR FALLS RISK ASSESSMENT DOCUMENTED: ICD-10-PCS | Mod: HCNC,CPTII,S$GLB, | Performed by: FAMILY MEDICINE

## 2022-02-07 PROCEDURE — 3072F LOW RISK FOR RETINOPATHY: CPT | Mod: HCNC,CPTII,S$GLB, | Performed by: FAMILY MEDICINE

## 2022-02-07 PROCEDURE — 1125F AMNT PAIN NOTED PAIN PRSNT: CPT | Mod: HCNC,CPTII,S$GLB, | Performed by: FAMILY MEDICINE

## 2022-02-07 PROCEDURE — 1159F MED LIST DOCD IN RCRD: CPT | Mod: HCNC,CPTII,S$GLB, | Performed by: FAMILY MEDICINE

## 2022-02-07 PROCEDURE — 3288F FALL RISK ASSESSMENT DOCD: CPT | Mod: HCNC,CPTII,S$GLB, | Performed by: FAMILY MEDICINE

## 2022-02-07 PROCEDURE — 99999 PR PBB SHADOW E&M-EST. PATIENT-LVL IV: ICD-10-PCS | Mod: PBBFAC,HCNC,, | Performed by: FAMILY MEDICINE

## 2022-02-07 PROCEDURE — 1159F PR MEDICATION LIST DOCUMENTED IN MEDICAL RECORD: ICD-10-PCS | Mod: HCNC,CPTII,S$GLB, | Performed by: FAMILY MEDICINE

## 2022-02-07 PROCEDURE — 3075F SYST BP GE 130 - 139MM HG: CPT | Mod: HCNC,CPTII,S$GLB, | Performed by: FAMILY MEDICINE

## 2022-02-07 PROCEDURE — 99499 RISK ADDL DX/OHS AUDIT: ICD-10-PCS | Mod: HCNC,S$GLB,, | Performed by: FAMILY MEDICINE

## 2022-02-07 PROCEDURE — 1101F PT FALLS ASSESS-DOCD LE1/YR: CPT | Mod: HCNC,CPTII,S$GLB, | Performed by: FAMILY MEDICINE

## 2022-02-07 PROCEDURE — 99214 OFFICE O/P EST MOD 30 MIN: CPT | Mod: HCNC,S$GLB,, | Performed by: FAMILY MEDICINE

## 2022-02-07 PROCEDURE — 80053 COMPREHEN METABOLIC PANEL: CPT | Mod: HCNC | Performed by: FAMILY MEDICINE

## 2022-02-07 PROCEDURE — 80061 LIPID PANEL: CPT | Mod: HCNC | Performed by: FAMILY MEDICINE

## 2022-02-07 PROCEDURE — 83036 HEMOGLOBIN GLYCOSYLATED A1C: CPT | Mod: HCNC | Performed by: FAMILY MEDICINE

## 2022-02-07 PROCEDURE — 3079F DIAST BP 80-89 MM HG: CPT | Mod: HCNC,CPTII,S$GLB, | Performed by: FAMILY MEDICINE

## 2022-02-07 PROCEDURE — 3072F PR LOW RISK FOR RETINOPATHY: ICD-10-PCS | Mod: HCNC,CPTII,S$GLB, | Performed by: FAMILY MEDICINE

## 2022-02-07 PROCEDURE — 3008F PR BODY MASS INDEX (BMI) DOCUMENTED: ICD-10-PCS | Mod: HCNC,CPTII,S$GLB, | Performed by: FAMILY MEDICINE

## 2022-02-07 PROCEDURE — 99214 PR OFFICE/OUTPT VISIT, EST, LEVL IV, 30-39 MIN: ICD-10-PCS | Mod: HCNC,S$GLB,, | Performed by: FAMILY MEDICINE

## 2022-02-07 PROCEDURE — 3079F PR MOST RECENT DIASTOLIC BLOOD PRESSURE 80-89 MM HG: ICD-10-PCS | Mod: HCNC,CPTII,S$GLB, | Performed by: FAMILY MEDICINE

## 2022-02-07 PROCEDURE — 1101F PR PT FALLS ASSESS DOC 0-1 FALLS W/OUT INJ PAST YR: ICD-10-PCS | Mod: HCNC,CPTII,S$GLB, | Performed by: FAMILY MEDICINE

## 2022-02-07 NOTE — PROGRESS NOTES
Subjective:       Patient ID: Xander Sanchez is a 71 y.o. male.    Chief Complaint: Follow-up, Diabetes, and Hypertension    71 years old male came to the clinic for blood pressure check.  Blood pressure today stable.  No chest pain, palpitation, orthopnea or PND.  He was previously diagnosed with aortic atherosclerosis.  No recent flare ups of neck pain or cervical myopathy.  COPD currently stable.    Review of Systems   Constitutional: Negative.    HENT: Negative.    Eyes: Negative.    Respiratory: Negative.    Cardiovascular: Negative.  Negative for chest pain, palpitations, leg swelling and claudication.   Gastrointestinal: Negative.    Endocrine: Negative for polydipsia, polyphagia and polyuria.   Genitourinary: Negative.    Musculoskeletal: Negative.    Integumentary:  Negative.   Neurological: Negative.    Psychiatric/Behavioral: Negative.          Objective:      Physical Exam  Vitals and nursing note reviewed.   Constitutional:       General: He is not in acute distress.     Appearance: He is well-developed and well-nourished. He is not diaphoretic.   HENT:      Head: Normocephalic and atraumatic.      Right Ear: External ear normal.      Left Ear: External ear normal.      Nose: Nose normal.      Mouth/Throat:      Mouth: Oropharynx is clear and moist.      Pharynx: No oropharyngeal exudate.   Eyes:      General: No scleral icterus.        Right eye: No discharge.         Left eye: No discharge.      Extraocular Movements: EOM normal.      Conjunctiva/sclera: Conjunctivae normal.      Pupils: Pupils are equal, round, and reactive to light.   Neck:      Thyroid: No thyromegaly.      Vascular: No JVD.      Trachea: No tracheal deviation.   Cardiovascular:      Rate and Rhythm: Normal rate and regular rhythm.      Pulses: Intact distal pulses.      Heart sounds: Normal heart sounds. No murmur heard.  No friction rub. No gallop.    Pulmonary:      Effort: Pulmonary effort is normal. No respiratory distress.       Breath sounds: Normal breath sounds. No stridor. No wheezing or rales.   Chest:      Chest wall: No tenderness.   Abdominal:      General: Bowel sounds are normal. There is no distension.      Palpations: Abdomen is soft. There is no mass.      Tenderness: There is no abdominal tenderness. There is no guarding or rebound.   Musculoskeletal:         General: No tenderness or edema. Normal range of motion.      Cervical back: Normal range of motion and neck supple.   Feet:      Right foot:      Protective Sensation: 10 sites tested. 10 sites sensed.      Skin integrity: No ulcer, blister, skin breakdown, erythema, warmth, callus, dry skin or fissure.      Toenail Condition: Right toenails are abnormally thick. Fungal disease present.     Left foot:      Protective Sensation: 10 sites tested. 10 sites sensed.      Skin integrity: No ulcer, blister, warmth, callus or fissure.      Toenail Condition: Left toenails are abnormally thick. Fungal disease present.  Lymphadenopathy:      Cervical: No cervical adenopathy.   Skin:     General: Skin is warm and dry.      Coloration: Skin is not pale.      Findings: No erythema or rash.   Neurological:      Mental Status: He is alert and oriented to person, place, and time.      Cranial Nerves: No cranial nerve deficit.      Motor: No abnormal muscle tone.      Coordination: Coordination normal.      Deep Tendon Reflexes: Reflexes are normal and symmetric. Reflexes normal.   Psychiatric:         Mood and Affect: Mood and affect normal.         Behavior: Behavior normal.         Thought Content: Thought content normal.         Judgment: Judgment normal.         Assessment:       Problem List Items Addressed This Visit     COPD (chronic obstructive pulmonary disease) with emphysema    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thy    Relevant Orders    Comprehensive Metabolic Panel    Hemoglobin A1C    Lipid Panel    Microalbumin/Creatinine Ratio, Urine    Hypertensive left  ventricular hypertrophy with heart failure    Relevant Orders    Comprehensive Metabolic Panel    Lipid Panel    Aortic atherosclerosis    Relevant Orders    Lipid Panel    Cervical myelopathy    Unspecified inflammatory spondylopathy, cervical region - Primary          Plan:     Xander was seen today for follow-up, diabetes and hypertension.    Diagnoses and all orders for this visit:    Unspecified inflammatory spondylopathy, cervical region    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thy, without long-term current use of insulin  -     Comprehensive Metabolic Panel; Future  -     Hemoglobin A1C; Future  -     Lipid Panel; Future  -     Microalbumin/Creatinine Ratio, Urine; Future    Cervical myelopathy    Hypertensive left ventricular hypertrophy with heart failure  -     Comprehensive Metabolic Panel; Future  -     Lipid Panel; Future    Aortic atherosclerosis  -     Lipid Panel; Future    Pulmonary emphysema, unspecified emphysema type    Continue monitoring blood sugar at home,ADA diet.  Continue monitoring blood pressure at home, low sodium diet.

## 2022-02-22 DIAGNOSIS — M79.601 RIGHT ARM PAIN: ICD-10-CM

## 2022-02-22 DIAGNOSIS — R29.898 RIGHT ARM WEAKNESS: ICD-10-CM

## 2022-02-22 DIAGNOSIS — M54.12 BRACHIAL NEURITIS OR RADICULITIS: ICD-10-CM

## 2022-02-22 DIAGNOSIS — M48.02 CERVICAL STENOSIS OF SPINE: ICD-10-CM

## 2022-02-22 DIAGNOSIS — R29.898 RIGHT HAND WEAKNESS: ICD-10-CM

## 2022-02-22 DIAGNOSIS — M96.1 CERVICAL POST-LAMINECTOMY SYNDROME: ICD-10-CM

## 2022-02-22 DIAGNOSIS — G89.4 CHRONIC PAIN SYNDROME: ICD-10-CM

## 2022-02-22 DIAGNOSIS — M50.00 HNP (HERNIATED NUCLEUS PULPOSUS) WITH MYELOPATHY, CERVICAL: ICD-10-CM

## 2022-02-22 DIAGNOSIS — F11.20 NARCOTIC DEPENDENCY, CONTINUOUS: ICD-10-CM

## 2022-02-22 DIAGNOSIS — G90.50 COMPLEX REGIONAL PAIN SYNDROME TYPE 1, AFFECTING UNSPECIFIED SITE: ICD-10-CM

## 2022-02-22 DIAGNOSIS — M48.02 SPINAL STENOSIS IN CERVICAL REGION: ICD-10-CM

## 2022-02-22 DIAGNOSIS — M54.12 CERVICAL RADICULAR PAIN: ICD-10-CM

## 2022-02-22 DIAGNOSIS — M54.12 CERVICAL RADICULOPATHY: ICD-10-CM

## 2022-02-22 DIAGNOSIS — M47.812 FACET ARTHRITIS OF CERVICAL REGION: ICD-10-CM

## 2022-02-22 DIAGNOSIS — M47.12 CERVICAL SPONDYLOSIS WITH MYELOPATHY: ICD-10-CM

## 2022-02-22 DIAGNOSIS — M62.81 MUSCLE RIGHT ARM WEAKNESS: ICD-10-CM

## 2022-02-22 DIAGNOSIS — M75.101 ROTATOR CUFF SYNDROME OF RIGHT SHOULDER: ICD-10-CM

## 2022-02-22 DIAGNOSIS — M50.30 DEGENERATION OF CERVICAL INTERVERTEBRAL DISC: ICD-10-CM

## 2022-02-22 DIAGNOSIS — G56.41 COMPLEX REGIONAL PAIN SYNDROME TYPE 2 OF RIGHT UPPER EXTREMITY: ICD-10-CM

## 2022-02-22 RX ORDER — HYDROCODONE BITARTRATE AND ACETAMINOPHEN 10; 325 MG/1; MG/1
1 TABLET ORAL EVERY 6 HOURS PRN
Qty: 120 TABLET | Refills: 0 | Status: CANCELLED | OUTPATIENT
Start: 2022-02-22 | End: 2022-03-24

## 2022-02-22 NOTE — TELEPHONE ENCOUNTER
----- Message from Buddy Mojica sent at 2/22/2022  9:24 AM CST -----  Patient called requesting an refill on Rx HYDROcodone-acetaminophen (NORCO)  mg per tablet be sent to [facility]. Callback for confirmation [phone]     [facility] [id number] [place] [address] [address] AT [facility] [address]  [facility] [address]  Phone: [phone] Fax: [phone]

## 2022-02-26 RX ORDER — HYDROCODONE BITARTRATE AND ACETAMINOPHEN 10; 325 MG/1; MG/1
1 TABLET ORAL EVERY 6 HOURS PRN
Qty: 120 TABLET | Refills: 0 | Status: SHIPPED | OUTPATIENT
Start: 2022-02-26 | End: 2022-03-22 | Stop reason: SDUPTHER

## 2022-03-17 NOTE — PROGRESS NOTES
"Mr. Sanchez is a patient of Dr. Navarro and was last seen in clinic 5/27/2020.      Subjective:   Patient ID:  Xander Sanchez is a 71 y.o. male who presents for follow-up of Pacemaker Check  .     HPI:    Mr. Sanchez is a 71 y.o. male with NICM (EF now 40%), LBBB, COPD, CRT-D (LV lead off) here for annual follow up.     Background:    He was diagnosed with NICM with a LBBB at [facility] in approx ~2005, [facility] approx 5-6 years ago per patient at  that was "no blockages"  A single chamber ICD implanted for primary prevention, since he has been followed here at [facility] his LBBB resolved and NICM normalized.   He was scheduled for a generator change, noted to be in LBBB again ( with correlative NYHA III symptoms ) and fluoroscopy of the RV (Riata) revealed lead externalization but a patent left sided venous system. Echo revealed EF 25%.  3/24/15 Extraction of ICD lead and implantation of CRT-D (Dr. Mensah). Paucity of LV vein targets, has high LV threshold.  Did not derive symptomatic benefit from upgrade, and EF remained unchanged at 30%.  We turned off LV lead 11/16 to conserve battery.  5/27/2020: Mr. Sanchez is doing well from a device perspective with stable lead and device function. Narrow QRS. No arrhythmia noted. No significant RV pacing. He feels well.    Update (03/31/2022):    Today he says he continues to feel well. Occasional SOB in the AM which he treats with inhaler. No worsening MURDOCK, CP, LH, syncope, palpitations.    He is on metoprolol and losartan.    Device Interrogation (3/31/2022) reveals an intrinsic NSR with stable lead and device function. No arrhythmias or treated episodes were noted.  He paces 5.5% in the RA and <1% in the RV (LV off). Estimated battery longevity 1.7 years.     I have personally reviewed the patient's EKG today, which shows sinus rhythm at 76bpm. NC interval is 134. QRS is 100. QTc is 445.    Recent Cardiac Tests:    2D Echo (2/15/2018):  CONCLUSIONS     1 - Mildly to " moderately depressed left ventricular systolic function (EF 40%).     2 - No wall motion abnormalities.     3 - Impaired LV relaxation, normal LAP (grade 1 diastolic dysfunction).     4 - Normal right ventricular systolic function .     5 - The estimated PA systolic pressure is greater than 36 mmHg.     6 - Mild mitral regurgitation.     7 - Mild tricuspid regurgitation.     Current Outpatient Medications   Medication Sig    alcohol swabs PadM Apply 1 each topically as needed.    aspirin (ECOTRIN) 81 MG EC tablet Take 81 mg by mouth once daily.    blood glucose control, low (TRUE METRIX LEVEL 1) Soln 1 Units by Misc.(Non-Drug; Combo Route) route once daily.    blood sugar diagnostic (TRUE METRIX GLUCOSE TEST STRIP) Strp TEST  ONE  TIME  DAILY  AT  6AM    blood-glucose meter (TRUE METRIX AIR GLUCOSE METER) kit USE AS INSTRUCTED    fluticasone-salmeterol 230-21 mcg/dose (ADVAIR HFA) 230-21 mcg/actuation HFAA inhaler Inhale 2 puffs into the lungs 2 (two) times daily. Controller    gabapentin (NEURONTIN) 600 MG tablet Take 1 tablet (600 mg total) by mouth 2 (two) times daily.    hydroCHLOROthiazide (MICROZIDE) 12.5 mg capsule Take 1 capsule (12.5 mg total) by mouth once daily.    HYDROcodone-acetaminophen (NORCO)  mg per tablet Take 1 tablet by mouth every 6 (six) hours as needed for Pain. More than 7 day supply and Medically Necessary.    lancets (TRUEPLUS LANCETS) 33 gauge Misc TEST ONE TIME DAILY AT 6:00AM    metoprolol succinate (TOPROL-XL) 25 MG 24 hr tablet TAKE 1 TABLET EVERY DAY    oxybutynin (DITROPAN) 5 MG Tab TAKE 1 TABLET TWICE DAILY    pantoprazole (PROTONIX) 40 MG tablet TAKE 1 TABLET BY MOUTH ONCE DAILY BEFORE BREAKFAST    pravastatin (PRAVACHOL) 10 MG tablet TAKE 1 TABLET EVERY NIGHT    tamsulosin (FLOMAX) 0.4 mg Cap Take 1 capsule (0.4 mg total) by mouth once daily.    valsartan (DIOVAN) 40 MG tablet TAKE 1 TABLET EVERY DAY     No current facility-administered medications for this  "visit.     Review of Systems   Constitutional: Negative for malaise/fatigue.   Cardiovascular: Negative for chest pain, dyspnea on exertion, irregular heartbeat, leg swelling and palpitations.   Respiratory: Negative for shortness of breath.    Hematologic/Lymphatic: Negative for bleeding problem.   Skin: Negative for rash.   Musculoskeletal: Negative for myalgias.   Gastrointestinal: Negative for hematemesis, hematochezia and nausea.   Genitourinary: Negative for hematuria.   Neurological: Negative for light-headedness.   Psychiatric/Behavioral: Negative for altered mental status.   Allergic/Immunologic: Negative for persistent infections.     Objective:        /70   Ht 5' 11" (1.803 m)   Wt 79.4 kg (175 lb 0.7 oz)   BMI 24.41 kg/m²     Physical Exam  Vitals and nursing note reviewed.   Constitutional:       Appearance: Normal appearance. He is well-developed.   HENT:      Head: Normocephalic.      Nose: Nose normal.   Eyes:      Pupils: Pupils are equal, round, and reactive to light.   Cardiovascular:      Rate and Rhythm: Normal rate and regular rhythm.   Pulmonary:      Effort: No respiratory distress.      Breath sounds: Normal breath sounds.   Chest:      Comments: Device to LUCW. Incision and pocket in good repair.    Musculoskeletal:         General: Normal range of motion.   Skin:     General: Skin is warm and dry.      Findings: No erythema.   Neurological:      Mental Status: He is alert and oriented to person, place, and time.   Psychiatric:         Speech: Speech normal.         Behavior: Behavior normal.       Lab Results   Component Value Date     02/07/2022    K 4.6 02/07/2022    MG 2.2 04/08/2013    BUN 6 (L) 02/07/2022    CREATININE 0.7 02/07/2022    ALT 11 02/07/2022    AST 18 02/07/2022    HGB 13.9 (L) 06/28/2021    HCT 43.9 06/28/2021    HCT 31 (L) 08/10/2015    TSH 2.427 06/28/2021    LDLCALC 55.2 (L) 02/07/2022           Assessment:     1. Biventricular implantable " cardioverter-defibrillator (ICD) in situ    2. Chronic systolic dysfunction of left ventricle    3. Essential hypertension    4. LBBB (left bundle branch block) - resolved    5. Cardiomyopathy, nonischemic      Plan:     In summary, Mr. Sanchez is a 71 y.o. male with NICM (EF now 40%), LBBB, COPD, CRT-D (LV lead off) here for annual follow up.   Mr. Sanchez is doing well from a device perspective with stable lead and device function. No arrhythmia noted. No RV pacing. LV lead off. Narrow QRS. On GDMT. No CHF symptoms. He feels well.    Continue current medication regimen and device settings.   Follow up in device clinic as scheduled.   Follow up in EP clinic in 1 year with echo, sooner as needed.     *A copy of this note has been sent to Dr. Navarro*    Follow up in about 1 year (around 3/31/2023).    ------------------------------------------------------------------    RACHEL Grace, NP-C  Cardiac Electrophysiology

## 2022-03-22 DIAGNOSIS — R29.898 RIGHT ARM WEAKNESS: ICD-10-CM

## 2022-03-22 DIAGNOSIS — M96.1 CERVICAL POST-LAMINECTOMY SYNDROME: ICD-10-CM

## 2022-03-22 DIAGNOSIS — M54.12 BRACHIAL NEURITIS OR RADICULITIS: ICD-10-CM

## 2022-03-22 DIAGNOSIS — M50.30 DEGENERATION OF CERVICAL INTERVERTEBRAL DISC: ICD-10-CM

## 2022-03-22 DIAGNOSIS — M75.101 ROTATOR CUFF SYNDROME OF RIGHT SHOULDER: ICD-10-CM

## 2022-03-22 DIAGNOSIS — G89.4 CHRONIC PAIN SYNDROME: ICD-10-CM

## 2022-03-22 DIAGNOSIS — M47.12 CERVICAL SPONDYLOSIS WITH MYELOPATHY: ICD-10-CM

## 2022-03-22 DIAGNOSIS — F11.20 NARCOTIC DEPENDENCY, CONTINUOUS: ICD-10-CM

## 2022-03-22 DIAGNOSIS — M48.02 SPINAL STENOSIS IN CERVICAL REGION: ICD-10-CM

## 2022-03-22 DIAGNOSIS — M62.81 MUSCLE RIGHT ARM WEAKNESS: ICD-10-CM

## 2022-03-22 DIAGNOSIS — R29.898 RIGHT HAND WEAKNESS: ICD-10-CM

## 2022-03-22 DIAGNOSIS — G90.50 COMPLEX REGIONAL PAIN SYNDROME TYPE 1, AFFECTING UNSPECIFIED SITE: ICD-10-CM

## 2022-03-22 DIAGNOSIS — M50.00 HNP (HERNIATED NUCLEUS PULPOSUS) WITH MYELOPATHY, CERVICAL: ICD-10-CM

## 2022-03-22 DIAGNOSIS — M47.812 FACET ARTHRITIS OF CERVICAL REGION: ICD-10-CM

## 2022-03-22 DIAGNOSIS — M79.601 RIGHT ARM PAIN: ICD-10-CM

## 2022-03-22 DIAGNOSIS — M54.12 CERVICAL RADICULOPATHY: ICD-10-CM

## 2022-03-22 DIAGNOSIS — M54.12 CERVICAL RADICULAR PAIN: ICD-10-CM

## 2022-03-22 DIAGNOSIS — G56.41 COMPLEX REGIONAL PAIN SYNDROME TYPE 2 OF RIGHT UPPER EXTREMITY: ICD-10-CM

## 2022-03-22 DIAGNOSIS — M48.02 CERVICAL STENOSIS OF SPINE: ICD-10-CM

## 2022-03-22 NOTE — TELEPHONE ENCOUNTER
----- Message from Cary Luna sent at 3/22/2022  9:32 AM CDT -----  Regarding: refill  Contact: pt @ [phone]  Rx Refill/Request    Is this a Refill or New Rx: refill    Rx Name and Strength:HYDROcodone-acetaminophen (NORCO)  mg per tablet    Preferred Pharmacy with phone number:   [facility] #[id number] [facility] [address] [facility] [address]  [facility] [address]  [facility] [address]  Phone: [phone] Fax: [phone]    Communication Preference: pt @ [phone]    Additional Information:

## 2022-03-23 ENCOUNTER — TELEPHONE (OUTPATIENT)
Dept: OPHTHALMOLOGY | Facility: CLINIC | Age: 72
End: 2022-03-23
Payer: MEDICARE

## 2022-03-23 NOTE — TELEPHONE ENCOUNTER
----- Message from Nano Hdez sent at 3/23/2022  2:40 PM CDT -----  Regarding: appt  Patient would like to be seen before his scheduled appointment on 04/18/22, patient states that his vision is getting worse.    Xander  @  [phone]

## 2022-03-26 RX ORDER — HYDROCODONE BITARTRATE AND ACETAMINOPHEN 10; 325 MG/1; MG/1
1 TABLET ORAL EVERY 6 HOURS PRN
Qty: 120 TABLET | Refills: 0 | Status: SHIPPED | OUTPATIENT
Start: 2022-03-28 | End: 2022-04-21 | Stop reason: SDUPTHER

## 2022-03-31 ENCOUNTER — CLINICAL SUPPORT (OUTPATIENT)
Dept: CARDIOLOGY | Facility: HOSPITAL | Age: 72
End: 2022-03-31
Attending: INTERNAL MEDICINE
Payer: MEDICARE

## 2022-03-31 ENCOUNTER — OFFICE VISIT (OUTPATIENT)
Dept: ELECTROPHYSIOLOGY | Facility: CLINIC | Age: 72
End: 2022-03-31
Payer: MEDICARE

## 2022-03-31 VITALS
SYSTOLIC BLOOD PRESSURE: 130 MMHG | HEIGHT: 71 IN | DIASTOLIC BLOOD PRESSURE: 70 MMHG | BODY MASS INDEX: 24.51 KG/M2 | WEIGHT: 175.06 LBS

## 2022-03-31 DIAGNOSIS — I44.7 LBBB (LEFT BUNDLE BRANCH BLOCK): ICD-10-CM

## 2022-03-31 DIAGNOSIS — I10 ESSENTIAL HYPERTENSION: ICD-10-CM

## 2022-03-31 DIAGNOSIS — I51.9 CHRONIC SYSTOLIC DYSFUNCTION OF LEFT VENTRICLE: ICD-10-CM

## 2022-03-31 DIAGNOSIS — I42.8 CARDIOMYOPATHY, NONISCHEMIC: ICD-10-CM

## 2022-03-31 DIAGNOSIS — Z95.810 BIVENTRICULAR IMPLANTABLE CARDIOVERTER-DEFIBRILLATOR (ICD) IN SITU: Primary | ICD-10-CM

## 2022-03-31 PROCEDURE — 4010F PR ACE/ARB THEARPY RXD/TAKEN: ICD-10-PCS | Mod: CPTII,S$GLB,, | Performed by: NURSE PRACTITIONER

## 2022-03-31 PROCEDURE — 4010F ACE/ARB THERAPY RXD/TAKEN: CPT | Mod: CPTII,S$GLB,, | Performed by: NURSE PRACTITIONER

## 2022-03-31 PROCEDURE — 3044F PR MOST RECENT HEMOGLOBIN A1C LEVEL <7.0%: ICD-10-PCS | Mod: CPTII,S$GLB,, | Performed by: NURSE PRACTITIONER

## 2022-03-31 PROCEDURE — 3078F DIAST BP <80 MM HG: CPT | Mod: CPTII,S$GLB,, | Performed by: NURSE PRACTITIONER

## 2022-03-31 PROCEDURE — 3075F SYST BP GE 130 - 139MM HG: CPT | Mod: CPTII,S$GLB,, | Performed by: NURSE PRACTITIONER

## 2022-03-31 PROCEDURE — 3072F PR LOW RISK FOR RETINOPATHY: ICD-10-PCS | Mod: CPTII,S$GLB,, | Performed by: NURSE PRACTITIONER

## 2022-03-31 PROCEDURE — 93284 PRGRMG EVAL IMPLANTABLE DFB: CPT | Mod: 26,,, | Performed by: INTERNAL MEDICINE

## 2022-03-31 PROCEDURE — 3075F PR MOST RECENT SYSTOLIC BLOOD PRESS GE 130-139MM HG: ICD-10-PCS | Mod: CPTII,S$GLB,, | Performed by: NURSE PRACTITIONER

## 2022-03-31 PROCEDURE — 1159F MED LIST DOCD IN RCRD: CPT | Mod: CPTII,S$GLB,, | Performed by: NURSE PRACTITIONER

## 2022-03-31 PROCEDURE — 93005 RHYTHM STRIP: ICD-10-PCS | Mod: S$GLB,,, | Performed by: INTERNAL MEDICINE

## 2022-03-31 PROCEDURE — 1126F PR PAIN SEVERITY QUANTIFIED, NO PAIN PRESENT: ICD-10-PCS | Mod: CPTII,S$GLB,, | Performed by: NURSE PRACTITIONER

## 2022-03-31 PROCEDURE — 1160F PR REVIEW ALL MEDS BY PRESCRIBER/CLIN PHARMACIST DOCUMENTED: ICD-10-PCS | Mod: CPTII,S$GLB,, | Performed by: NURSE PRACTITIONER

## 2022-03-31 PROCEDURE — 1126F AMNT PAIN NOTED NONE PRSNT: CPT | Mod: CPTII,S$GLB,, | Performed by: NURSE PRACTITIONER

## 2022-03-31 PROCEDURE — 3044F HG A1C LEVEL LT 7.0%: CPT | Mod: CPTII,S$GLB,, | Performed by: NURSE PRACTITIONER

## 2022-03-31 PROCEDURE — 93284 PRGRMG EVAL IMPLANTABLE DFB: CPT

## 2022-03-31 PROCEDURE — 99999 PR PBB SHADOW E&M-EST. PATIENT-LVL IV: CPT | Mod: PBBFAC,,, | Performed by: NURSE PRACTITIONER

## 2022-03-31 PROCEDURE — 93010 ELECTROCARDIOGRAM REPORT: CPT | Mod: S$GLB,,, | Performed by: INTERNAL MEDICINE

## 2022-03-31 PROCEDURE — 93284 CARDIAC DEVICE CHECK - IN CLINIC & HOSPITAL: ICD-10-PCS | Mod: 26,,, | Performed by: INTERNAL MEDICINE

## 2022-03-31 PROCEDURE — 3008F PR BODY MASS INDEX (BMI) DOCUMENTED: ICD-10-PCS | Mod: CPTII,S$GLB,, | Performed by: NURSE PRACTITIONER

## 2022-03-31 PROCEDURE — 1160F RVW MEDS BY RX/DR IN RCRD: CPT | Mod: CPTII,S$GLB,, | Performed by: NURSE PRACTITIONER

## 2022-03-31 PROCEDURE — 99214 PR OFFICE/OUTPT VISIT, EST, LEVL IV, 30-39 MIN: ICD-10-PCS | Mod: S$GLB,,, | Performed by: NURSE PRACTITIONER

## 2022-03-31 PROCEDURE — 99214 OFFICE O/P EST MOD 30 MIN: CPT | Mod: S$GLB,,, | Performed by: NURSE PRACTITIONER

## 2022-03-31 PROCEDURE — 99999 PR PBB SHADOW E&M-EST. PATIENT-LVL IV: ICD-10-PCS | Mod: PBBFAC,,, | Performed by: NURSE PRACTITIONER

## 2022-03-31 PROCEDURE — 3078F PR MOST RECENT DIASTOLIC BLOOD PRESSURE < 80 MM HG: ICD-10-PCS | Mod: CPTII,S$GLB,, | Performed by: NURSE PRACTITIONER

## 2022-03-31 PROCEDURE — 3008F BODY MASS INDEX DOCD: CPT | Mod: CPTII,S$GLB,, | Performed by: NURSE PRACTITIONER

## 2022-03-31 PROCEDURE — 1159F PR MEDICATION LIST DOCUMENTED IN MEDICAL RECORD: ICD-10-PCS | Mod: CPTII,S$GLB,, | Performed by: NURSE PRACTITIONER

## 2022-03-31 PROCEDURE — 93005 ELECTROCARDIOGRAM TRACING: CPT | Mod: S$GLB,,, | Performed by: INTERNAL MEDICINE

## 2022-03-31 PROCEDURE — 3072F LOW RISK FOR RETINOPATHY: CPT | Mod: CPTII,S$GLB,, | Performed by: NURSE PRACTITIONER

## 2022-03-31 PROCEDURE — 93010 RHYTHM STRIP: ICD-10-PCS | Mod: S$GLB,,, | Performed by: INTERNAL MEDICINE

## 2022-04-13 ENCOUNTER — PATIENT OUTREACH (OUTPATIENT)
Dept: ADMINISTRATIVE | Facility: OTHER | Age: 72
End: 2022-04-13
Payer: MEDICARE

## 2022-04-13 NOTE — PROGRESS NOTES
Requested updates within Care Everywhere.  Patient's chart was reviewed for overdue LESLEE topics.  Health maintenance:updated  Immunizations:reconciled   Legacy:   Media:  Orders placed:  Tasked appts:  Labs Linked:  Upcoming appt:

## 2022-04-18 ENCOUNTER — OFFICE VISIT (OUTPATIENT)
Dept: OPHTHALMOLOGY | Facility: CLINIC | Age: 72
End: 2022-04-18
Payer: MEDICARE

## 2022-04-18 DIAGNOSIS — I10 ESSENTIAL HYPERTENSION: ICD-10-CM

## 2022-04-18 DIAGNOSIS — H04.123 DRY EYE SYNDROME OF BOTH EYES: Primary | ICD-10-CM

## 2022-04-18 DIAGNOSIS — E11.9 DM TYPE 2 WITHOUT RETINOPATHY: ICD-10-CM

## 2022-04-18 DIAGNOSIS — H52.7 REFRACTIVE ERROR: ICD-10-CM

## 2022-04-18 DIAGNOSIS — Z96.1 PSEUDOPHAKIA: ICD-10-CM

## 2022-04-18 PROCEDURE — 92014 PR EYE EXAM, EST PATIENT,COMPREHESV: ICD-10-PCS | Mod: S$GLB,,, | Performed by: OPHTHALMOLOGY

## 2022-04-18 PROCEDURE — 1160F PR REVIEW ALL MEDS BY PRESCRIBER/CLIN PHARMACIST DOCUMENTED: ICD-10-PCS | Mod: CPTII,S$GLB,, | Performed by: OPHTHALMOLOGY

## 2022-04-18 PROCEDURE — 99499 UNLISTED E&M SERVICE: CPT | Mod: HCNC,S$GLB,, | Performed by: OPHTHALMOLOGY

## 2022-04-18 PROCEDURE — 99999 PR PBB SHADOW E&M-EST. PATIENT-LVL III: CPT | Mod: PBBFAC,,, | Performed by: OPHTHALMOLOGY

## 2022-04-18 PROCEDURE — 1126F PR PAIN SEVERITY QUANTIFIED, NO PAIN PRESENT: ICD-10-PCS | Mod: CPTII,S$GLB,, | Performed by: OPHTHALMOLOGY

## 2022-04-18 PROCEDURE — 3044F HG A1C LEVEL LT 7.0%: CPT | Mod: CPTII,S$GLB,, | Performed by: OPHTHALMOLOGY

## 2022-04-18 PROCEDURE — 1160F RVW MEDS BY RX/DR IN RCRD: CPT | Mod: CPTII,S$GLB,, | Performed by: OPHTHALMOLOGY

## 2022-04-18 PROCEDURE — 1126F AMNT PAIN NOTED NONE PRSNT: CPT | Mod: CPTII,S$GLB,, | Performed by: OPHTHALMOLOGY

## 2022-04-18 PROCEDURE — 1101F PR PT FALLS ASSESS DOC 0-1 FALLS W/OUT INJ PAST YR: ICD-10-PCS | Mod: CPTII,S$GLB,, | Performed by: OPHTHALMOLOGY

## 2022-04-18 PROCEDURE — 2023F DILAT RTA XM W/O RTNOPTHY: CPT | Mod: CPTII,S$GLB,, | Performed by: OPHTHALMOLOGY

## 2022-04-18 PROCEDURE — 99999 PR PBB SHADOW E&M-EST. PATIENT-LVL III: ICD-10-PCS | Mod: PBBFAC,,, | Performed by: OPHTHALMOLOGY

## 2022-04-18 PROCEDURE — 2023F PR DILATED RETINAL EXAM W/O EVID OF RETINOPATHY: ICD-10-PCS | Mod: CPTII,S$GLB,, | Performed by: OPHTHALMOLOGY

## 2022-04-18 PROCEDURE — 1159F PR MEDICATION LIST DOCUMENTED IN MEDICAL RECORD: ICD-10-PCS | Mod: CPTII,S$GLB,, | Performed by: OPHTHALMOLOGY

## 2022-04-18 PROCEDURE — 92014 COMPRE OPH EXAM EST PT 1/>: CPT | Mod: S$GLB,,, | Performed by: OPHTHALMOLOGY

## 2022-04-18 PROCEDURE — 99499 RISK ADDL DX/OHS AUDIT: ICD-10-PCS | Mod: HCNC,S$GLB,, | Performed by: OPHTHALMOLOGY

## 2022-04-18 PROCEDURE — 3288F FALL RISK ASSESSMENT DOCD: CPT | Mod: CPTII,S$GLB,, | Performed by: OPHTHALMOLOGY

## 2022-04-18 PROCEDURE — 4010F ACE/ARB THERAPY RXD/TAKEN: CPT | Mod: CPTII,S$GLB,, | Performed by: OPHTHALMOLOGY

## 2022-04-18 PROCEDURE — 1159F MED LIST DOCD IN RCRD: CPT | Mod: CPTII,S$GLB,, | Performed by: OPHTHALMOLOGY

## 2022-04-18 PROCEDURE — 3288F PR FALLS RISK ASSESSMENT DOCUMENTED: ICD-10-PCS | Mod: CPTII,S$GLB,, | Performed by: OPHTHALMOLOGY

## 2022-04-18 PROCEDURE — 4010F PR ACE/ARB THEARPY RXD/TAKEN: ICD-10-PCS | Mod: CPTII,S$GLB,, | Performed by: OPHTHALMOLOGY

## 2022-04-18 PROCEDURE — 3044F PR MOST RECENT HEMOGLOBIN A1C LEVEL <7.0%: ICD-10-PCS | Mod: CPTII,S$GLB,, | Performed by: OPHTHALMOLOGY

## 2022-04-18 PROCEDURE — 1101F PT FALLS ASSESS-DOCD LE1/YR: CPT | Mod: CPTII,S$GLB,, | Performed by: OPHTHALMOLOGY

## 2022-04-18 NOTE — PROGRESS NOTES
Subjective:       Patient ID: Xander Sanchez is a 71 y.o. male.    Chief Complaint: Dry Eye, trichiasis, Diabetic Eye Exam, and PC IOL OU    HPI     TRENA 07/21 for chalazion removal. Diabetic  this morning.  Patient   has noticed for the past 4 months that the guide on TV is blurred off and   on. Eyes sometimes water and clear up when he rubs his eyes.  Using AT's   QID OU.   Glasses about 1 yr. Old.  Hemoglobin A1C       Date                     Value               Ref Range             Status                02/07/2022               6.1 (H)             4.0 - 5.6 %           Final                 06/28/2021               6.1 (H)             4.0 - 5.6 %           Final                 08/01/2020               6.2 (H)             4.0 - 5.6 %           Final                  Last edited by Princess Aponte on 4/18/2022  1:06 PM. (History)             Assessment:       1. Dry eye syndrome of both eyes    2. DM type 2 without retinopathy    3. Essential hypertension    4. Refractive error    5. Pseudophakia        Plan:       HANNA-Doing well.  DM-No NPDR OU.  HTN-No retinopathy OU.  RE-Pt does not want MRx.      AT's.  Control DM & HTN.  RTC 1 yr.

## 2022-04-21 DIAGNOSIS — F11.20 NARCOTIC DEPENDENCY, CONTINUOUS: ICD-10-CM

## 2022-04-21 DIAGNOSIS — M54.12 BRACHIAL NEURITIS OR RADICULITIS: ICD-10-CM

## 2022-04-21 DIAGNOSIS — M54.12 CERVICAL RADICULAR PAIN: ICD-10-CM

## 2022-04-21 DIAGNOSIS — G90.50 COMPLEX REGIONAL PAIN SYNDROME TYPE 1, AFFECTING UNSPECIFIED SITE: ICD-10-CM

## 2022-04-21 DIAGNOSIS — G89.4 CHRONIC PAIN SYNDROME: ICD-10-CM

## 2022-04-21 DIAGNOSIS — M47.12 CERVICAL SPONDYLOSIS WITH MYELOPATHY: ICD-10-CM

## 2022-04-21 DIAGNOSIS — R29.898 RIGHT ARM WEAKNESS: ICD-10-CM

## 2022-04-21 DIAGNOSIS — M79.601 RIGHT ARM PAIN: ICD-10-CM

## 2022-04-21 DIAGNOSIS — M96.1 CERVICAL POST-LAMINECTOMY SYNDROME: ICD-10-CM

## 2022-04-21 DIAGNOSIS — M50.00 HNP (HERNIATED NUCLEUS PULPOSUS) WITH MYELOPATHY, CERVICAL: ICD-10-CM

## 2022-04-21 DIAGNOSIS — M50.30 DEGENERATION OF CERVICAL INTERVERTEBRAL DISC: ICD-10-CM

## 2022-04-21 DIAGNOSIS — G56.41 COMPLEX REGIONAL PAIN SYNDROME TYPE 2 OF RIGHT UPPER EXTREMITY: ICD-10-CM

## 2022-04-21 DIAGNOSIS — M48.02 CERVICAL STENOSIS OF SPINE: ICD-10-CM

## 2022-04-21 DIAGNOSIS — M47.812 FACET ARTHRITIS OF CERVICAL REGION: ICD-10-CM

## 2022-04-21 DIAGNOSIS — M54.12 CERVICAL RADICULOPATHY: ICD-10-CM

## 2022-04-21 DIAGNOSIS — R29.898 RIGHT HAND WEAKNESS: ICD-10-CM

## 2022-04-21 DIAGNOSIS — M62.81 MUSCLE RIGHT ARM WEAKNESS: ICD-10-CM

## 2022-04-21 DIAGNOSIS — M75.101 ROTATOR CUFF SYNDROME OF RIGHT SHOULDER: ICD-10-CM

## 2022-04-21 DIAGNOSIS — M48.02 SPINAL STENOSIS IN CERVICAL REGION: ICD-10-CM

## 2022-04-21 NOTE — TELEPHONE ENCOUNTER
----- Message from Sade Shirley sent at 4/21/2022 10:26 AM CDT -----  Regarding: refill  Can the clinic reply in [facility]: NO      Please refill the medication(s) listed below. Please call the patient when the prescription(s) is ready for  at this phone number   [phone], CHARLES [[id number]]      Medication #1 HYDROcodone-acetaminophen (NORCO)  mg per tablet          Preferred Pharmacy: [facility] #[id number] [address] AT [facility] [address]

## 2022-04-25 RX ORDER — HYDROCODONE BITARTRATE AND ACETAMINOPHEN 10; 325 MG/1; MG/1
1 TABLET ORAL EVERY 6 HOURS PRN
Qty: 120 TABLET | Refills: 0 | Status: SHIPPED | OUTPATIENT
Start: 2022-04-25 | End: 2022-05-23 | Stop reason: SDUPTHER

## 2022-04-30 ENCOUNTER — CLINICAL SUPPORT (OUTPATIENT)
Dept: CARDIOLOGY | Facility: HOSPITAL | Age: 72
End: 2022-04-30
Payer: MEDICARE

## 2022-04-30 DIAGNOSIS — Z95.810 PRESENCE OF AUTOMATIC (IMPLANTABLE) CARDIAC DEFIBRILLATOR: ICD-10-CM

## 2022-04-30 DIAGNOSIS — I42.9 CARDIOMYOPATHY, UNSPECIFIED: ICD-10-CM

## 2022-04-30 DIAGNOSIS — I50.9 HEART FAILURE, UNSPECIFIED: ICD-10-CM

## 2022-04-30 PROCEDURE — 93295 CARDIAC DEVICE CHECK - REMOTE: ICD-10-PCS | Mod: ,,, | Performed by: INTERNAL MEDICINE

## 2022-04-30 PROCEDURE — 93295 DEV INTERROG REMOTE 1/2/MLT: CPT | Mod: ,,, | Performed by: INTERNAL MEDICINE

## 2022-04-30 PROCEDURE — 93296 REM INTERROG EVL PM/IDS: CPT | Performed by: INTERNAL MEDICINE

## 2022-05-16 ENCOUNTER — IMMUNIZATION (OUTPATIENT)
Dept: PHARMACY | Facility: CLINIC | Age: 72
End: 2022-05-16
Payer: MEDICARE

## 2022-05-16 DIAGNOSIS — Z23 NEED FOR VACCINATION: Primary | ICD-10-CM

## 2022-05-16 NOTE — TELEPHONE ENCOUNTER
No new care gaps identified.  [facility] Embedded Care Gaps. Reference number: [id number]. 5/16/2022   4:14:00 PM CDT

## 2022-05-17 RX ORDER — TAMSULOSIN HYDROCHLORIDE 0.4 MG/1
CAPSULE ORAL
Qty: 90 CAPSULE | Refills: 3 | Status: SHIPPED | OUTPATIENT
Start: 2022-05-17 | End: 2023-08-09

## 2022-05-23 DIAGNOSIS — R29.898 RIGHT HAND WEAKNESS: ICD-10-CM

## 2022-05-23 DIAGNOSIS — M47.812 FACET ARTHRITIS OF CERVICAL REGION: ICD-10-CM

## 2022-05-23 DIAGNOSIS — G56.41 COMPLEX REGIONAL PAIN SYNDROME TYPE 2 OF RIGHT UPPER EXTREMITY: ICD-10-CM

## 2022-05-23 DIAGNOSIS — M62.81 MUSCLE RIGHT ARM WEAKNESS: ICD-10-CM

## 2022-05-23 DIAGNOSIS — M96.1 CERVICAL POST-LAMINECTOMY SYNDROME: ICD-10-CM

## 2022-05-23 DIAGNOSIS — M79.601 RIGHT ARM PAIN: ICD-10-CM

## 2022-05-23 DIAGNOSIS — M50.30 DEGENERATION OF CERVICAL INTERVERTEBRAL DISC: ICD-10-CM

## 2022-05-23 DIAGNOSIS — M48.02 SPINAL STENOSIS IN CERVICAL REGION: ICD-10-CM

## 2022-05-23 DIAGNOSIS — M54.12 CERVICAL RADICULOPATHY: ICD-10-CM

## 2022-05-23 DIAGNOSIS — R29.898 RIGHT ARM WEAKNESS: ICD-10-CM

## 2022-05-23 DIAGNOSIS — G90.50 COMPLEX REGIONAL PAIN SYNDROME TYPE 1, AFFECTING UNSPECIFIED SITE: ICD-10-CM

## 2022-05-23 DIAGNOSIS — G89.4 CHRONIC PAIN SYNDROME: ICD-10-CM

## 2022-05-23 DIAGNOSIS — M50.00 HNP (HERNIATED NUCLEUS PULPOSUS) WITH MYELOPATHY, CERVICAL: ICD-10-CM

## 2022-05-23 DIAGNOSIS — M54.12 CERVICAL RADICULAR PAIN: ICD-10-CM

## 2022-05-23 DIAGNOSIS — M48.02 CERVICAL STENOSIS OF SPINE: ICD-10-CM

## 2022-05-23 DIAGNOSIS — F11.20 NARCOTIC DEPENDENCY, CONTINUOUS: ICD-10-CM

## 2022-05-23 DIAGNOSIS — M47.12 CERVICAL SPONDYLOSIS WITH MYELOPATHY: ICD-10-CM

## 2022-05-23 DIAGNOSIS — M54.12 BRACHIAL NEURITIS OR RADICULITIS: ICD-10-CM

## 2022-05-23 DIAGNOSIS — M75.101 ROTATOR CUFF SYNDROME OF RIGHT SHOULDER: ICD-10-CM

## 2022-05-23 NOTE — TELEPHONE ENCOUNTER
----- Message from Christen Tobar sent at 5/23/2022  9:26 AM CDT -----  Contact: pt  Refill request.      HYDROcodone-acetaminophen (NORCO)  mg per tablet            [facility] #[id number] - [place] [address] AT [facility] [address]  [facility] [address]  Phone: [phone] Fax: [phone]        Confirmed patient's contact info below:  Contact Name: Xander Sanchez  Phone Number: [phone]

## 2022-05-23 NOTE — TELEPHONE ENCOUNTER
Last Rx refill-----04/25/22        Pharmacy---------[facility] #[id number] [facility] [address] AT [facility]

## 2022-05-27 ENCOUNTER — TELEPHONE (OUTPATIENT)
Dept: NEUROLOGY | Facility: CLINIC | Age: 72
End: 2022-05-27
Payer: MEDICARE

## 2022-05-27 NOTE — TELEPHONE ENCOUNTER
----- Message from Ainsley Duarte sent at 5/27/2022 12:56 PM CDT -----  Contact: [phone]  Type:  Needs Medical Advice    Who Called: Pt   Reason for call: regarding a Refill on HYDROcodone-acetaminophen (NORCO)  mg per tablet, pt states he called in the refill on Monday  Pharmacy name and phone #:  [facility] #[id number] [place] [address] [address] AT [facility] [address]  Would the patient rather a call back or a response via [facility]? Call back  Best Call Back Number: [phone]

## 2022-05-28 ENCOUNTER — NURSE TRIAGE (OUTPATIENT)
Dept: ADMINISTRATIVE | Facility: CLINIC | Age: 72
End: 2022-05-28
Payer: MEDICARE

## 2022-05-28 RX ORDER — HYDROCODONE BITARTRATE AND ACETAMINOPHEN 10; 325 MG/1; MG/1
1 TABLET ORAL EVERY 6 HOURS PRN
Qty: 120 TABLET | Refills: 0 | Status: SHIPPED | OUTPATIENT
Start: 2022-05-28 | End: 2022-06-23 | Stop reason: SDUPTHER

## 2022-05-28 NOTE — TELEPHONE ENCOUNTER
Patient requesting a refill for norco. Advised the patient to contact pain management when the office opens. Patient VU.  Advised the patient to call back with any further questions or concerns.  Reason for Disposition   Prescription refill request for a CONTROLLED substance (e.g., narcotics, ADHD medicines)    Protocols used: MEDICATION QUESTION CALL-A-AH

## 2022-05-31 ENCOUNTER — TELEPHONE (OUTPATIENT)
Dept: PHYSICAL MEDICINE AND REHAB | Facility: CLINIC | Age: 72
End: 2022-05-31
Payer: MEDICARE

## 2022-05-31 NOTE — TELEPHONE ENCOUNTER
Called patient number provided, [phone] with no answer.    So far there are no available appointments at this time open for Dr Sara flores

## 2022-05-31 NOTE — TELEPHONE ENCOUNTER
----- Message from Wisam Dailey sent at 5/31/2022 11:22 AM CDT -----  Regarding: Appt  Contact: HIREN TEJEDA [[id number]]  Name of Who is Calling: HIREN TEJEDA [[id number]]      What is the request in detail: Would like to speak with staff in regards to scheduling an appointment to discuss medication. Please advise      Can the clinic reply by [facility]: no      What Number to Call Back [facility]: [phone]

## 2022-06-13 ENCOUNTER — TELEPHONE (OUTPATIENT)
Dept: PHYSICAL MEDICINE AND REHAB | Facility: CLINIC | Age: 72
End: 2022-06-13
Payer: MEDICARE

## 2022-06-13 ENCOUNTER — OFFICE VISIT (OUTPATIENT)
Dept: PODIATRY | Facility: CLINIC | Age: 72
End: 2022-06-13
Payer: MEDICARE

## 2022-06-13 VITALS
DIASTOLIC BLOOD PRESSURE: 76 MMHG | BODY MASS INDEX: 24.5 KG/M2 | HEART RATE: 93 BPM | SYSTOLIC BLOOD PRESSURE: 134 MMHG | HEIGHT: 71 IN | WEIGHT: 175 LBS

## 2022-06-13 DIAGNOSIS — B35.1 ONYCHOMYCOSIS: ICD-10-CM

## 2022-06-13 DIAGNOSIS — E11.49 TYPE 2 DIABETES MELLITUS WITH NEUROLOGICAL MANIFESTATIONS: Primary | ICD-10-CM

## 2022-06-13 PROCEDURE — 1160F RVW MEDS BY RX/DR IN RCRD: CPT | Mod: CPTII,S$GLB,, | Performed by: PODIATRIST

## 2022-06-13 PROCEDURE — 99213 PR OFFICE/OUTPT VISIT, EST, LEVL III, 20-29 MIN: ICD-10-PCS | Mod: 25,S$GLB,, | Performed by: PODIATRIST

## 2022-06-13 PROCEDURE — 3288F FALL RISK ASSESSMENT DOCD: CPT | Mod: CPTII,S$GLB,, | Performed by: PODIATRIST

## 2022-06-13 PROCEDURE — 3072F PR LOW RISK FOR RETINOPATHY: ICD-10-PCS | Mod: CPTII,S$GLB,, | Performed by: PODIATRIST

## 2022-06-13 PROCEDURE — 1159F PR MEDICATION LIST DOCUMENTED IN MEDICAL RECORD: ICD-10-PCS | Mod: CPTII,S$GLB,, | Performed by: PODIATRIST

## 2022-06-13 PROCEDURE — 1126F PR PAIN SEVERITY QUANTIFIED, NO PAIN PRESENT: ICD-10-PCS | Mod: CPTII,S$GLB,, | Performed by: PODIATRIST

## 2022-06-13 PROCEDURE — 3078F PR MOST RECENT DIASTOLIC BLOOD PRESSURE < 80 MM HG: ICD-10-PCS | Mod: CPTII,S$GLB,, | Performed by: PODIATRIST

## 2022-06-13 PROCEDURE — 1101F PR PT FALLS ASSESS DOC 0-1 FALLS W/OUT INJ PAST YR: ICD-10-PCS | Mod: CPTII,S$GLB,, | Performed by: PODIATRIST

## 2022-06-13 PROCEDURE — 11721 ROUTINE FOOT CARE: ICD-10-PCS | Mod: Q9,S$GLB,, | Performed by: PODIATRIST

## 2022-06-13 PROCEDURE — 99999 PR PBB SHADOW E&M-EST. PATIENT-LVL IV: CPT | Mod: PBBFAC,,, | Performed by: PODIATRIST

## 2022-06-13 PROCEDURE — 3075F SYST BP GE 130 - 139MM HG: CPT | Mod: CPTII,S$GLB,, | Performed by: PODIATRIST

## 2022-06-13 PROCEDURE — 11721 DEBRIDE NAIL 6 OR MORE: CPT | Mod: Q9,S$GLB,, | Performed by: PODIATRIST

## 2022-06-13 PROCEDURE — 4010F PR ACE/ARB THEARPY RXD/TAKEN: ICD-10-PCS | Mod: CPTII,S$GLB,, | Performed by: PODIATRIST

## 2022-06-13 PROCEDURE — 4010F ACE/ARB THERAPY RXD/TAKEN: CPT | Mod: CPTII,S$GLB,, | Performed by: PODIATRIST

## 2022-06-13 PROCEDURE — 99999 PR PBB SHADOW E&M-EST. PATIENT-LVL IV: ICD-10-PCS | Mod: PBBFAC,,, | Performed by: PODIATRIST

## 2022-06-13 PROCEDURE — 1159F MED LIST DOCD IN RCRD: CPT | Mod: CPTII,S$GLB,, | Performed by: PODIATRIST

## 2022-06-13 PROCEDURE — 3288F PR FALLS RISK ASSESSMENT DOCUMENTED: ICD-10-PCS | Mod: CPTII,S$GLB,, | Performed by: PODIATRIST

## 2022-06-13 PROCEDURE — 3078F DIAST BP <80 MM HG: CPT | Mod: CPTII,S$GLB,, | Performed by: PODIATRIST

## 2022-06-13 PROCEDURE — 3008F PR BODY MASS INDEX (BMI) DOCUMENTED: ICD-10-PCS | Mod: CPTII,S$GLB,, | Performed by: PODIATRIST

## 2022-06-13 PROCEDURE — 3072F LOW RISK FOR RETINOPATHY: CPT | Mod: CPTII,S$GLB,, | Performed by: PODIATRIST

## 2022-06-13 PROCEDURE — 3044F PR MOST RECENT HEMOGLOBIN A1C LEVEL <7.0%: ICD-10-PCS | Mod: CPTII,S$GLB,, | Performed by: PODIATRIST

## 2022-06-13 PROCEDURE — 1160F PR REVIEW ALL MEDS BY PRESCRIBER/CLIN PHARMACIST DOCUMENTED: ICD-10-PCS | Mod: CPTII,S$GLB,, | Performed by: PODIATRIST

## 2022-06-13 PROCEDURE — 3075F PR MOST RECENT SYSTOLIC BLOOD PRESS GE 130-139MM HG: ICD-10-PCS | Mod: CPTII,S$GLB,, | Performed by: PODIATRIST

## 2022-06-13 PROCEDURE — 1101F PT FALLS ASSESS-DOCD LE1/YR: CPT | Mod: CPTII,S$GLB,, | Performed by: PODIATRIST

## 2022-06-13 PROCEDURE — 1126F AMNT PAIN NOTED NONE PRSNT: CPT | Mod: CPTII,S$GLB,, | Performed by: PODIATRIST

## 2022-06-13 PROCEDURE — 3044F HG A1C LEVEL LT 7.0%: CPT | Mod: CPTII,S$GLB,, | Performed by: PODIATRIST

## 2022-06-13 PROCEDURE — 99213 OFFICE O/P EST LOW 20 MIN: CPT | Mod: 25,S$GLB,, | Performed by: PODIATRIST

## 2022-06-13 PROCEDURE — 3008F BODY MASS INDEX DOCD: CPT | Mod: CPTII,S$GLB,, | Performed by: PODIATRIST

## 2022-06-13 NOTE — TELEPHONE ENCOUNTER
----- Message from Sara Ruiz MD sent at 6/12/2022  2:51 PM CDT -----  Seems that pt stopped Baclofen on its own.   Hydrocodone prescription is set several days ago.   Thanks,   B        ----- Message -----  From: Cristina Luna MA  Sent: 6/6/2022  12:23 PM CDT  To: Sara Ruiz MD    FY    ----- Message -----  From: Solo Lieberman  Sent: 6/6/2022  11:32 AM CDT  To: Sara Ruiz Staff    Type:  Needs Medical Advice    Who Called: pt   Symptoms (please be specific): dry mouth from taking BACLOFEN 20MG  How long has patient had these symptoms:  3 yrs   Pharmacy name and phone #:    Would the patient rather a call back or a response via [facility]? Call   Best Call Back Number:  [phone]  Additional Information:     Side effects from medication   Pcp took pt off meds and informed pt to contact pcp

## 2022-06-13 NOTE — PROCEDURES
"Routine Foot Care    Date/Time: 6/13/2022 11:15 AM  Performed by: Scott Garcia DPM  Authorized by: Scott Garcia DPM     Time out: Immediately prior to procedure a "time out" was called to verify the correct patient, procedure, equipment, support staff and site/side marked as required.    Consent Done?:  Yes (Verbal)  Hyperkeratotic Skin Lesions?: No      Nail Care Type:  Debride  Location(s): All  (Left 1st Toe, Left 3rd Toe, Left 2nd Toe, Left 4th Toe, Left 5th Toe, Right 1st Toe, Right 2nd Toe, Right 3rd Toe, Right 4th Toe and Right 5th Toe)  Patient tolerance:  Patient tolerated the procedure well with no immediate complications     Used sterile nail nipper.        "

## 2022-06-13 NOTE — PROGRESS NOTES
Subjective:      Patient ID: Xander Sanchez is a 72 y.o. male.    Chief Complaint: Nail Care    Xander is a 72 y.o. male who presents to the clinic for evaluation and treatment of high risk feet. Xander has a past medical history of Arthritis, Asthma, Cardiomyopathy, Cataract, Cervical radicular pain, Cervical spondylosis with myelopathy (10/17/2012), Chronic bronchitis, Chronic systolic dysfunction of left ventricle (7/27/2015), Constipation (7/27/2015), COPD (chronic obstructive pulmonary disease), CRPS (complex regional pain syndrome type I) (12/29/2014), Diabetes mellitus, type 2, DM type 2 (diabetes mellitus, type 2) (7/27/2015), Enlarged prostate (7/27/2015), Enuresis (7/6/2018), Hypertension, ICD (implantable cardiac defibrillator) in place, Mixed hyperlipidemia (2/28/2018), Presence of biventricular AICD (7/27/2015), 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thy (2/1/2016), and Urinary tract infection.  This patient has documented high risk feet requiring routine maintenance secondary to diabetes mellitis and those secondary complications of diabetes, as mentioned.  Treated per Physical medicine for chronic back pain. Complains that his pain in his feet are mostly at night when he lays down. Taking gabapentin 600 mg po qid for chronic neuropathic pain. No new complaints.    10/31/2019:  Presents routine diabetic foot care.  States that he was prescribed topical creams to be applied to dry scaly painful skin to the right 5th toe a couple months ago which has helped improve his symptoms.  Due to complaints today.  Seen by Milly Leiva NP on 09/19/2019.    3/2/20: Reports worsening right foot drag since November. Otherwise, no other changes. Painful scaly skin has resolved. Presents today for painful long toe nails.    6/5/2020: Presents for diabetic foot care. No new complaints.  10/09/2020:  Returns for routine foot care.  No new complaints.      06/13/2022:  Returns for annual foot exam.  Complains of  elongated thickened toenails.    Williams Alvarenga MD     02/07/2022    Past Medical History:   Diagnosis Date    Arthritis     Asthma     Cardiomyopathy     Cataract     Cervical radicular pain     Cervical spondylosis with myelopathy 10/17/2012    Chronic bronchitis     Chronic systolic dysfunction of left ventricle 7/27/2015    Constipation 7/27/2015    COPD (chronic obstructive pulmonary disease)     CRPS (complex regional pain syndrome type I) 12/29/2014    Diabetes mellitus, type 2     DM type 2 (diabetes mellitus, type 2) 7/27/2015    Enlarged prostate 7/27/2015    Enuresis 7/6/2018    Hypertension     ICD (implantable cardiac defibrillator) in place     Mixed hyperlipidemia 2/28/2018    Presence of biventricular AICD 7/27/2015    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thy 2/1/2016    Urinary tract infection     pt does not know       Past Surgical History:   Procedure Laterality Date    CARDIAC DEFIBRILLATOR PLACEMENT      CATARACT EXTRACTION W/  INTRAOCULAR LENS IMPLANT Right 11/10/2020    Procedure: EXTRACTION, CATARACT, WITH IOL INSERTION;  Surgeon: Oral Graham MD;  Location: [facility];  Service: Ophthalmology;  Laterality: Right;    CATARACT EXTRACTION W/  INTRAOCULAR LENS IMPLANT Left 11/24/2020    Procedure: EXTRACTION, CATARACT, WITH IOL INSERTION;  Surgeon: Oral Graham MD;  Location: [facility];  Service: Ophthalmology;  Laterality: Left;    CERVICAL SPINE SURGERY      COLONOSCOPY N/A 7/19/2017    Procedure: COLONOSCOPY  golytely;  Surgeon: Vannessa Uribe MD;  Location: [facility];  Service: Endoscopy;  Laterality: N/A;    SPINE SURGERY         Family History   Problem Relation Age of Onset    Hypertension Mother     Hypertension Father     COPD Father     No Known Problems Sister     No Known Problems Brother     No Known Problems Daughter     Prostate cancer Neg Hx     Kidney disease Neg Hx        Social History      Socioeconomic History    Marital status:    Tobacco Use    Smoking status: Former Smoker     Packs/day: 1.00     Years: 40.00     Pack years: 40.00     Types: Cigarettes     Quit date: 2009     Years since quittin.8    Smokeless tobacco: Never Used   Substance and Sexual Activity    Alcohol use: Yes     Alcohol/week: 2.0 standard drinks     Types: 1 Cans of beer, 1 Shots of liquor per week     Comment: May 1-2 beers or whiskey per month    Drug use: No    Sexual activity: Yes     Partners: Female     Social Determinants of Health     Financial Resource Strain: Low Risk     Difficulty of Paying Living Expenses: Not hard at all   Food Insecurity: Food Insecurity Present    Worried About Running Out of Food in the Last Year: Sometimes true    Ran Out of Food in the Last Year: Never true   Transportation Needs: No Transportation Needs    Lack of Transportation (Medical): No    Lack of Transportation (Non-Medical): No   Physical Activity: Insufficiently Active    Days of Exercise per Week: 7 days    Minutes of Exercise per Session: 20 min   Stress: No Stress Concern Present    Feeling of Stress : Not at all   Social Connections: Moderately Integrated    Frequency of Communication with Friends and Family: More than three times a week    Frequency of Social Gatherings with Friends and Family: More than three times a week    Attends Yarsanism Services: More than 4 times per year    Active Member of Clubs or Organizations: No    Attends Club or Organization Meetings: Never    Marital Status:    Housing Stability: Low Risk     Unable to Pay for Housing in the Last Year: No    Number of Places Lived in the Last Year: 1    Unstable Housing in the Last Year: No       Current Outpatient Medications   Medication Sig Dispense Refill    alcohol swabs PadM Apply 1 each topically as needed. 200 each 3    aspirin (ECOTRIN) 81 MG EC tablet Take 81 mg by mouth once daily.      blood  sugar diagnostic (TRUE METRIX GLUCOSE TEST STRIP) Strp TEST  ONE  TIME  DAILY  AT  6AM 100 strip 3    blood-glucose meter (TRUE METRIX AIR GLUCOSE METER) kit USE AS INSTRUCTED 1 each 0    fluticasone-salmeterol 230-21 mcg/dose (ADVAIR HFA) 230-21 mcg/actuation HFAA inhaler Inhale 2 puffs into the lungs 2 (two) times daily. Controller 2 Inhaler 1    gabapentin (NEURONTIN) 600 MG tablet Take 1 tablet (600 mg total) by mouth 2 (two) times daily. 180 tablet 3    hydroCHLOROthiazide (MICROZIDE) 12.5 mg capsule Take 1 capsule (12.5 mg total) by mouth once daily. 90 capsule 3    HYDROcodone-acetaminophen (NORCO)  mg per tablet Take 1 tablet by mouth every 6 (six) hours as needed for Pain. More than 7 day supply and Medically Necessary. 120 tablet 0    lancets (TRUEPLUS LANCETS) 33 gauge Misc TEST ONE TIME DAILY AT 6:00AM 100 each 3    metoprolol succinate (TOPROL-XL) 25 MG 24 hr tablet TAKE 1 TABLET EVERY DAY 90 tablet 3    oxybutynin (DITROPAN) 5 MG Tab TAKE 1 TABLET TWICE DAILY 180 tablet 3    pantoprazole (PROTONIX) 40 MG tablet TAKE 1 TABLET BY MOUTH ONCE DAILY BEFORE BREAKFAST 30 tablet 11    pravastatin (PRAVACHOL) 10 MG tablet TAKE 1 TABLET EVERY NIGHT 90 tablet 3    tamsulosin (FLOMAX) 0.4 mg Cap TAKE 1 CAPSULE EVERY DAY 90 capsule 3    valsartan (DIOVAN) 40 MG tablet TAKE 1 TABLET EVERY DAY 90 tablet 3    blood glucose control, low (TRUE METRIX LEVEL 1) Soln 1 Units by Misc.(Non-Drug; Combo Route) route once daily. 1 each 3     No current facility-administered medications for this visit.       Review of patient's allergies indicates:  No Known Allergies    PCP: Williams Alvarenga MD    Date Last Seen by PCP:     Current shoe gear:  Affected Foot: Casual shoes     Unaffected Foot: Casual shoes    Hemoglobin A1C   Date Value Ref Range Status   02/07/2022 6.1 (H) 4.0 - 5.6 % Final     Comment:     ADA Screening Guidelines:  5.7-6.4%  Consistent with prediabetes  >or=6.5%  Consistent with  diabetes    High levels of fetal hemoglobin interfere with the HbA1C  assay. Heterozygous hemoglobin variants (HbS, HgC, etc)do  not significantly interfere with this assay.   However, presence of multiple variants may affect accuracy.     06/28/2021 6.1 (H) 4.0 - 5.6 % Final     Comment:     ADA Screening Guidelines:  5.7-6.4%  Consistent with prediabetes  >or=6.5%  Consistent with diabetes    High levels of fetal hemoglobin interfere with the HbA1C  assay. Heterozygous hemoglobin variants (HbS, HgC, etc)do  not significantly interfere with this assay.   However, presence of multiple variants may affect accuracy.     08/01/2020 6.2 (H) 4.0 - 5.6 % Final     Comment:     ADA Screening Guidelines:  5.7-6.4%  Consistent with prediabetes  >or=6.5%  Consistent with diabetes  High levels of fetal hemoglobin interfere with the HbA1C  assay. Heterozygous hemoglobin variants (HbS, HgC, etc)do  not significantly interfere with this assay.   However, presence of multiple variants may affect accuracy.         Review of Systems   Constitutional: Negative for chills and fever.   HENT: Negative for hearing loss.    Cardiovascular: Negative for chest pain and claudication.   Respiratory: Negative for shortness of breath.    Skin: Positive for color change and nail changes. Negative for itching.   Musculoskeletal: Negative for back pain, falls, joint pain and muscle weakness.        Abnormal gait with dragging right foot   Gastrointestinal: Negative for nausea and vomiting.   Neurological: Negative for focal weakness, loss of balance, numbness and paresthesias.           Objective:      Physical Exam  Constitutional:       General: He is not in acute distress.     Appearance: He is not diaphoretic.   Cardiovascular:      Pulses:           Dorsalis pedis pulses are 2+ on the right side and 2+ on the left side.        Posterior tibial pulses are 1+ on the right side and 1+ on the left side.      Comments: CRT < 3 seconds to digits  1-5 bilateral, mild to moderate edema of bilateral lower extremity with varicosities.  Musculoskeletal:      Right foot: Decreased range of motion. No deformity.      Left foot: Decreased range of motion. No deformity.      Comments: Pes planovalgus bilateral foot.    No pain on palpation bilateral foot.    Gait examination reveals overall slight trendelenberg gait with high steppage foot.     Feet:      Right foot:      Protective Sensation: 10 sites tested. 10 sites sensed.      Skin integrity: Dry skin present. No ulcer, skin breakdown, erythema, warmth or callus.      Toenail Condition: Right toenails are abnormally thick and long. Fungal disease present.     Left foot:      Protective Sensation: 10 sites tested. 8 sites sensed.      Skin integrity: Dry skin present. No ulcer, skin breakdown, erythema, warmth or callus.      Toenail Condition: Left toenails are abnormally thick and long. Fungal disease present.  Skin:     General: Skin is warm and dry.      Capillary Refill: Capillary refill takes less than 2 seconds.      Coloration: Skin is not pale.      Findings: No ecchymosis, erythema, lesion, petechiae or rash.      Nails: There is no clubbing.      Comments: Skin is dry and flaky plantar foot bilateral improved from previous visit.    Nails 1-5 bilateral are thickened 3-4 mm, slightly yellow-brown with debris, loosened and elongated > 10 mm. Hallux nail bilateral is dystrophic and severely thickened.    Edema B/L LE 2+    No open lesions or macerations bilateral lower extremity.       Neurological:      Mental Status: He is alert and oriented to person, place, and time.      Sensory: Sensory deficit present.      Comments: (-) Tinel's sign  Light touch intact.  MMT 5/5 DF, PF, Inv, Ev L foot. R foot with 4/5 DF otherwise 5/5 for all other compartments.   Normal muscle tone.  No signs of atrophy.  No tremor or spasticity.               Assessment:       Encounter Diagnoses   Name Primary?    Type 2  diabetes mellitus with neurological manifestations Yes    Onychomycosis          Plan:       Xander was seen today for nail care.    Diagnoses and all orders for this visit:    Type 2 diabetes mellitus with neurological manifestations  -     Routine Foot Care    Onychomycosis  -     Routine Foot Care      I counseled the patient on his conditions, their implications and medical management.    Shoe inspection. Diabetic Foot Education. Patient reminded of the importance of good nutrition and blood sugar control to help prevent podiatric complications of diabetes. Patient instructed on proper foot hygeine. We discussed wearing proper shoe gear, daily foot inspections, never walking without protective shoe gear, never putting sharp instruments to feet    Routine foot care per attached note.     comprehensive annual diabetic foot exam completed today.  Patient found to be low to intermediate risk for diabetic foot complication.    RTC within 6-12 months or p.r.n. as discussed.    A portion of this note was generated by voice recognition software and may contain spelling and grammar errors.

## 2022-06-14 DIAGNOSIS — I10 ESSENTIAL HYPERTENSION: ICD-10-CM

## 2022-06-14 DIAGNOSIS — M79.89 FOOT SWELLING: ICD-10-CM

## 2022-06-14 RX ORDER — HYDROCHLOROTHIAZIDE 12.5 MG/1
12.5 CAPSULE ORAL DAILY
Qty: 90 CAPSULE | Refills: 2 | Status: SHIPPED | OUTPATIENT
Start: 2022-06-14 | End: 2023-06-06

## 2022-06-14 NOTE — TELEPHONE ENCOUNTER
Refill Decision Note   Xander Sanchez  is requesting a refill authorization.  Brief Assessment and Rationale for Refill:  Approve     Medication Therapy Plan:       Medication Reconciliation Completed: No   Comments:     No Care Gaps recommended.     Note composed:6:06 PM 06/14/2022

## 2022-06-14 NOTE — TELEPHONE ENCOUNTER
No new care gaps identified.  [facility] Embedded Care Gaps. Reference number: [id number]. 6/14/2022   4:51:19 PM CDT

## 2022-06-23 DIAGNOSIS — G89.4 CHRONIC PAIN SYNDROME: ICD-10-CM

## 2022-06-23 DIAGNOSIS — M96.1 CERVICAL POST-LAMINECTOMY SYNDROME: ICD-10-CM

## 2022-06-23 DIAGNOSIS — M48.02 CERVICAL STENOSIS OF SPINE: ICD-10-CM

## 2022-06-23 DIAGNOSIS — G90.50 COMPLEX REGIONAL PAIN SYNDROME TYPE 1, AFFECTING UNSPECIFIED SITE: ICD-10-CM

## 2022-06-23 DIAGNOSIS — M62.81 MUSCLE RIGHT ARM WEAKNESS: ICD-10-CM

## 2022-06-23 DIAGNOSIS — M50.00 HNP (HERNIATED NUCLEUS PULPOSUS) WITH MYELOPATHY, CERVICAL: ICD-10-CM

## 2022-06-23 DIAGNOSIS — M47.812 FACET ARTHRITIS OF CERVICAL REGION: ICD-10-CM

## 2022-06-23 DIAGNOSIS — M54.12 CERVICAL RADICULAR PAIN: ICD-10-CM

## 2022-06-23 DIAGNOSIS — M54.12 BRACHIAL NEURITIS OR RADICULITIS: ICD-10-CM

## 2022-06-23 DIAGNOSIS — M47.12 CERVICAL SPONDYLOSIS WITH MYELOPATHY: ICD-10-CM

## 2022-06-23 DIAGNOSIS — R29.898 RIGHT ARM WEAKNESS: ICD-10-CM

## 2022-06-23 DIAGNOSIS — M54.12 CERVICAL RADICULOPATHY: ICD-10-CM

## 2022-06-23 DIAGNOSIS — F11.20 NARCOTIC DEPENDENCY, CONTINUOUS: ICD-10-CM

## 2022-06-23 DIAGNOSIS — R29.898 RIGHT HAND WEAKNESS: ICD-10-CM

## 2022-06-23 DIAGNOSIS — M48.02 SPINAL STENOSIS IN CERVICAL REGION: ICD-10-CM

## 2022-06-23 DIAGNOSIS — M50.30 DEGENERATION OF CERVICAL INTERVERTEBRAL DISC: ICD-10-CM

## 2022-06-23 DIAGNOSIS — G56.41 COMPLEX REGIONAL PAIN SYNDROME TYPE 2 OF RIGHT UPPER EXTREMITY: ICD-10-CM

## 2022-06-23 DIAGNOSIS — M75.101 ROTATOR CUFF SYNDROME OF RIGHT SHOULDER: ICD-10-CM

## 2022-06-23 DIAGNOSIS — M79.601 RIGHT ARM PAIN: ICD-10-CM

## 2022-06-23 NOTE — TELEPHONE ENCOUNTER
----- Message from Geetha Dolan sent at 6/23/2022  9:08 AM CDT -----  Contact: pt  Refill request.      HYDROcodone-acetaminophen (NORCO)  mg per tablet        [facility] #[id number] - [place] [address] [address] AT [facility] [address]  [address]  [facility] [address]  Phone: [phone] Fax: [phone]

## 2022-06-25 RX ORDER — HYDROCODONE BITARTRATE AND ACETAMINOPHEN 10; 325 MG/1; MG/1
1 TABLET ORAL EVERY 6 HOURS PRN
Qty: 120 TABLET | Refills: 0 | Status: SHIPPED | OUTPATIENT
Start: 2022-06-25 | End: 2022-07-21 | Stop reason: SDUPTHER

## 2022-07-21 DIAGNOSIS — M50.30 DEGENERATION OF CERVICAL INTERVERTEBRAL DISC: ICD-10-CM

## 2022-07-21 DIAGNOSIS — M79.601 RIGHT ARM PAIN: ICD-10-CM

## 2022-07-21 DIAGNOSIS — M75.101 ROTATOR CUFF SYNDROME OF RIGHT SHOULDER: ICD-10-CM

## 2022-07-21 DIAGNOSIS — F11.20 NARCOTIC DEPENDENCY, CONTINUOUS: ICD-10-CM

## 2022-07-21 DIAGNOSIS — M96.1 CERVICAL POST-LAMINECTOMY SYNDROME: ICD-10-CM

## 2022-07-21 DIAGNOSIS — G90.50 COMPLEX REGIONAL PAIN SYNDROME TYPE 1, AFFECTING UNSPECIFIED SITE: ICD-10-CM

## 2022-07-21 DIAGNOSIS — M47.812 FACET ARTHRITIS OF CERVICAL REGION: ICD-10-CM

## 2022-07-21 DIAGNOSIS — M48.02 SPINAL STENOSIS IN CERVICAL REGION: ICD-10-CM

## 2022-07-21 DIAGNOSIS — M54.12 CERVICAL RADICULOPATHY: ICD-10-CM

## 2022-07-21 DIAGNOSIS — M54.12 BRACHIAL NEURITIS OR RADICULITIS: ICD-10-CM

## 2022-07-21 DIAGNOSIS — M48.02 CERVICAL STENOSIS OF SPINE: ICD-10-CM

## 2022-07-21 DIAGNOSIS — R29.898 RIGHT HAND WEAKNESS: ICD-10-CM

## 2022-07-21 DIAGNOSIS — G56.41 COMPLEX REGIONAL PAIN SYNDROME TYPE 2 OF RIGHT UPPER EXTREMITY: ICD-10-CM

## 2022-07-21 DIAGNOSIS — M50.00 HNP (HERNIATED NUCLEUS PULPOSUS) WITH MYELOPATHY, CERVICAL: ICD-10-CM

## 2022-07-21 DIAGNOSIS — M62.81 MUSCLE RIGHT ARM WEAKNESS: ICD-10-CM

## 2022-07-21 DIAGNOSIS — M47.12 CERVICAL SPONDYLOSIS WITH MYELOPATHY: ICD-10-CM

## 2022-07-21 DIAGNOSIS — R29.898 RIGHT ARM WEAKNESS: ICD-10-CM

## 2022-07-21 DIAGNOSIS — M54.12 CERVICAL RADICULAR PAIN: ICD-10-CM

## 2022-07-21 DIAGNOSIS — G89.4 CHRONIC PAIN SYNDROME: ICD-10-CM

## 2022-07-21 NOTE — TELEPHONE ENCOUNTER
----- Message from Lolis Lal sent at 7/21/2022  9:30 AM CDT -----  Regarding: refill request  Who Called:HIREN TEJEDA [[id number]]        Refill or New Rx refill         RX Name and Strength:HYDROcodone-acetaminophen (NORCO)  mg per tablet        How is the patient currently taking it? (ex. 1XDay):Route: Take 1 tablet by mouth every 6 (six) hours as needed for Pain. More than 7 day supply and Medically Necessary. - Oral        Is this a 30 day or 90 day RX:        Preferred Pharmacy with phone number:[facility] #[id number] [facility] [address] AT [facility] [address] [phone]        Local or Mail Order: local         Ordering Provider: georgina faye         Would the patient rather a call back or a response via [facility]? No         Best Call Back Number:[phone]        Additional Information:

## 2022-07-24 RX ORDER — HYDROCODONE BITARTRATE AND ACETAMINOPHEN 10; 325 MG/1; MG/1
1 TABLET ORAL EVERY 6 HOURS PRN
Qty: 120 TABLET | Refills: 0 | Status: SHIPPED | OUTPATIENT
Start: 2022-07-24 | End: 2022-08-24 | Stop reason: SDUPTHER

## 2022-07-27 NOTE — TELEPHONE ENCOUNTER
No new care gaps identified.  [facility] Embedded Care Gaps. Reference number: [id number]. 7/27/2022   12:27:32 PM CDT

## 2022-07-28 RX ORDER — FLUTICASONE PROPIONATE AND SALMETEROL XINAFOATE 230; 21 UG/1; UG/1
2 AEROSOL, METERED RESPIRATORY (INHALATION) 2 TIMES DAILY
Qty: 36 G | Refills: 0 | Status: SHIPPED | OUTPATIENT
Start: 2022-07-28 | End: 2023-09-27 | Stop reason: SDUPTHER

## 2022-07-28 NOTE — TELEPHONE ENCOUNTER
Refill Routing Note   Medication(s) are not appropriate for processing by [facility] for the following reason(s):      - Patient displays non-adherence to medications (has run out of medication supply over 6 months ago)    [facility] action(s):  Defer Medication-related problems identified:   Non-adherence - intentional  Non-adherence (knowledge deficit) non-intentional  Medication education needs     Medication Therapy Plan: Patient should be using this medication as a controller, but adherence data reflects that he may be using it as a rescue  Medication reconciliation completed: No     Appointments  past 12m or future 3m with PCP    Date Provider   Last Visit   2/7/2022 Williams Alvarenga MD   Next Visit   8/8/2022 Williams Alvarenga MD   ED visits in past 90 days: 0        Note composed:11:25 AM 07/28/2022

## 2022-07-29 ENCOUNTER — CLINICAL SUPPORT (OUTPATIENT)
Dept: CARDIOLOGY | Facility: HOSPITAL | Age: 72
End: 2022-07-29
Payer: MEDICARE

## 2022-07-29 DIAGNOSIS — I50.42 CHRONIC COMBINED SYSTOLIC (CONGESTIVE) AND DIASTOLIC (CONGESTIVE) HEART FAILURE: ICD-10-CM

## 2022-07-29 DIAGNOSIS — I42.8 OTHER CARDIOMYOPATHIES: ICD-10-CM

## 2022-07-29 DIAGNOSIS — Z95.810 PRESENCE OF AUTOMATIC (IMPLANTABLE) CARDIAC DEFIBRILLATOR: ICD-10-CM

## 2022-07-29 PROCEDURE — 93296 REM INTERROG EVL PM/IDS: CPT | Performed by: INTERNAL MEDICINE

## 2022-08-08 ENCOUNTER — TELEPHONE (OUTPATIENT)
Dept: PHYSICAL MEDICINE AND REHAB | Facility: CLINIC | Age: 72
End: 2022-08-08
Payer: MEDICARE

## 2022-08-08 ENCOUNTER — OFFICE VISIT (OUTPATIENT)
Dept: FAMILY MEDICINE | Facility: CLINIC | Age: 72
End: 2022-08-08
Payer: MEDICARE

## 2022-08-08 ENCOUNTER — HOSPITAL ENCOUNTER (OUTPATIENT)
Dept: RADIOLOGY | Facility: HOSPITAL | Age: 72
Discharge: HOME | End: 2022-08-08
Attending: FAMILY MEDICINE
Payer: MEDICARE

## 2022-08-08 ENCOUNTER — LAB VISIT (OUTPATIENT)
Dept: LAB | Facility: HOSPITAL | Age: 72
End: 2022-08-08
Attending: FAMILY MEDICINE
Payer: MEDICARE

## 2022-08-08 VITALS
HEART RATE: 93 BPM | BODY MASS INDEX: 25.37 KG/M2 | DIASTOLIC BLOOD PRESSURE: 82 MMHG | OXYGEN SATURATION: 98 % | WEIGHT: 181.19 LBS | SYSTOLIC BLOOD PRESSURE: 122 MMHG | HEIGHT: 71 IN

## 2022-08-08 DIAGNOSIS — Z12.11 SCREEN FOR COLON CANCER: ICD-10-CM

## 2022-08-08 DIAGNOSIS — E11.40 TYPE 2 DIABETES MELLITUS WITH DIABETIC NEUROPATHY, WITHOUT LONG-TERM CURRENT USE OF INSULIN: ICD-10-CM

## 2022-08-08 DIAGNOSIS — R10.31 RIGHT LOWER QUADRANT ABDOMINAL PAIN: ICD-10-CM

## 2022-08-08 DIAGNOSIS — I10 ESSENTIAL HYPERTENSION: ICD-10-CM

## 2022-08-08 DIAGNOSIS — R14.0 ABDOMINAL BLOATING: ICD-10-CM

## 2022-08-08 DIAGNOSIS — G89.29 OTHER CHRONIC PAIN: ICD-10-CM

## 2022-08-08 DIAGNOSIS — R10.31 RIGHT LOWER QUADRANT ABDOMINAL PAIN: Primary | ICD-10-CM

## 2022-08-08 LAB
ALBUMIN SERPL BCP-MCNC: 4 G/DL (ref 3.5–5.2)
ALP SERPL-CCNC: 84 U/L (ref 55–135)
ALT SERPL W/O P-5'-P-CCNC: 21 U/L (ref 10–44)
AMYLASE SERPL-CCNC: 92 U/L (ref 20–110)
ANION GAP SERPL CALC-SCNC: 9 MMOL/L (ref 8–16)
AST SERPL-CCNC: 22 U/L (ref 10–40)
BILIRUB SERPL-MCNC: 0.6 MG/DL (ref 0.1–1)
BUN SERPL-MCNC: 8 MG/DL (ref 8–23)
CALCIUM SERPL-MCNC: 9.2 MG/DL (ref 8.7–10.5)
CHLORIDE SERPL-SCNC: 100 MMOL/L (ref 95–110)
CHOLEST SERPL-MCNC: 134 MG/DL (ref 120–199)
CHOLEST/HDLC SERPL: 3.3 {RATIO} (ref 2–5)
CO2 SERPL-SCNC: 27 MMOL/L (ref 23–29)
CREAT SERPL-MCNC: 0.8 MG/DL (ref 0.5–1.4)
EST. GFR  (NO RACE VARIABLE): >60 ML/MIN/1.73 M^2
ESTIMATED AVG GLUCOSE: 128 MG/DL (ref 68–131)
GLUCOSE SERPL-MCNC: 107 MG/DL (ref 70–110)
HBA1C MFR BLD: 6.1 % (ref 4–5.6)
HDLC SERPL-MCNC: 41 MG/DL (ref 40–75)
HDLC SERPL: 30.6 % (ref 20–50)
LDLC SERPL CALC-MCNC: 74.2 MG/DL (ref 63–159)
LIPASE SERPL-CCNC: 9 U/L (ref 4–60)
NONHDLC SERPL-MCNC: 93 MG/DL
POTASSIUM SERPL-SCNC: 4.5 MMOL/L (ref 3.5–5.1)
PROT SERPL-MCNC: 7.4 G/DL (ref 6–8.4)
SODIUM SERPL-SCNC: 136 MMOL/L (ref 136–145)
TRIGL SERPL-MCNC: 94 MG/DL (ref 30–150)

## 2022-08-08 PROCEDURE — 74177 CT ABD & PELVIS W/CONTRAST: CPT | Mod: 26,,, | Performed by: STUDENT IN AN ORGANIZED HEALTH CARE EDUCATION/TRAINING PROGRAM

## 2022-08-08 PROCEDURE — 99215 PR OFFICE/OUTPT VISIT, EST, LEVL V, 40-54 MIN: ICD-10-PCS | Mod: S$GLB,,, | Performed by: FAMILY MEDICINE

## 2022-08-08 PROCEDURE — 3044F PR MOST RECENT HEMOGLOBIN A1C LEVEL <7.0%: ICD-10-PCS | Mod: CPTII,S$GLB,, | Performed by: FAMILY MEDICINE

## 2022-08-08 PROCEDURE — 1125F AMNT PAIN NOTED PAIN PRSNT: CPT | Mod: CPTII,S$GLB,, | Performed by: FAMILY MEDICINE

## 2022-08-08 PROCEDURE — 74177 CT ABD & PELVIS W/CONTRAST: CPT | Mod: TC

## 2022-08-08 PROCEDURE — 1159F MED LIST DOCD IN RCRD: CPT | Mod: CPTII,S$GLB,, | Performed by: FAMILY MEDICINE

## 2022-08-08 PROCEDURE — 3072F LOW RISK FOR RETINOPATHY: CPT | Mod: CPTII,S$GLB,, | Performed by: FAMILY MEDICINE

## 2022-08-08 PROCEDURE — 3288F FALL RISK ASSESSMENT DOCD: CPT | Mod: CPTII,S$GLB,, | Performed by: FAMILY MEDICINE

## 2022-08-08 PROCEDURE — 83036 HEMOGLOBIN GLYCOSYLATED A1C: CPT | Performed by: FAMILY MEDICINE

## 2022-08-08 PROCEDURE — 1159F PR MEDICATION LIST DOCUMENTED IN MEDICAL RECORD: ICD-10-PCS | Mod: CPTII,S$GLB,, | Performed by: FAMILY MEDICINE

## 2022-08-08 PROCEDURE — 25500020 PHARM REV CODE 255: Performed by: FAMILY MEDICINE

## 2022-08-08 PROCEDURE — 3074F SYST BP LT 130 MM HG: CPT | Mod: CPTII,S$GLB,, | Performed by: FAMILY MEDICINE

## 2022-08-08 PROCEDURE — 3288F PR FALLS RISK ASSESSMENT DOCUMENTED: ICD-10-PCS | Mod: CPTII,S$GLB,, | Performed by: FAMILY MEDICINE

## 2022-08-08 PROCEDURE — 99999 PR PBB SHADOW E&M-EST. PATIENT-LVL IV: CPT | Mod: PBBFAC,,, | Performed by: FAMILY MEDICINE

## 2022-08-08 PROCEDURE — 3008F PR BODY MASS INDEX (BMI) DOCUMENTED: ICD-10-PCS | Mod: CPTII,S$GLB,, | Performed by: FAMILY MEDICINE

## 2022-08-08 PROCEDURE — 3008F BODY MASS INDEX DOCD: CPT | Mod: CPTII,S$GLB,, | Performed by: FAMILY MEDICINE

## 2022-08-08 PROCEDURE — 1125F PR PAIN SEVERITY QUANTIFIED, PAIN PRESENT: ICD-10-PCS | Mod: CPTII,S$GLB,, | Performed by: FAMILY MEDICINE

## 2022-08-08 PROCEDURE — 4010F ACE/ARB THERAPY RXD/TAKEN: CPT | Mod: CPTII,S$GLB,, | Performed by: FAMILY MEDICINE

## 2022-08-08 PROCEDURE — 1101F PT FALLS ASSESS-DOCD LE1/YR: CPT | Mod: CPTII,S$GLB,, | Performed by: FAMILY MEDICINE

## 2022-08-08 PROCEDURE — 99215 OFFICE O/P EST HI 40 MIN: CPT | Mod: S$GLB,,, | Performed by: FAMILY MEDICINE

## 2022-08-08 PROCEDURE — 80061 LIPID PANEL: CPT | Performed by: FAMILY MEDICINE

## 2022-08-08 PROCEDURE — 3079F DIAST BP 80-89 MM HG: CPT | Mod: CPTII,S$GLB,, | Performed by: FAMILY MEDICINE

## 2022-08-08 PROCEDURE — 3072F PR LOW RISK FOR RETINOPATHY: ICD-10-PCS | Mod: CPTII,S$GLB,, | Performed by: FAMILY MEDICINE

## 2022-08-08 PROCEDURE — 80053 COMPREHEN METABOLIC PANEL: CPT | Performed by: FAMILY MEDICINE

## 2022-08-08 PROCEDURE — 36415 COLL VENOUS BLD VENIPUNCTURE: CPT | Mod: PO | Performed by: FAMILY MEDICINE

## 2022-08-08 PROCEDURE — 1101F PR PT FALLS ASSESS DOC 0-1 FALLS W/OUT INJ PAST YR: ICD-10-PCS | Mod: CPTII,S$GLB,, | Performed by: FAMILY MEDICINE

## 2022-08-08 PROCEDURE — 82150 ASSAY OF AMYLASE: CPT | Performed by: FAMILY MEDICINE

## 2022-08-08 PROCEDURE — 3079F PR MOST RECENT DIASTOLIC BLOOD PRESSURE 80-89 MM HG: ICD-10-PCS | Mod: CPTII,S$GLB,, | Performed by: FAMILY MEDICINE

## 2022-08-08 PROCEDURE — A9698 NON-RAD CONTRAST MATERIALNOC: HCPCS | Performed by: FAMILY MEDICINE

## 2022-08-08 PROCEDURE — 3044F HG A1C LEVEL LT 7.0%: CPT | Mod: CPTII,S$GLB,, | Performed by: FAMILY MEDICINE

## 2022-08-08 PROCEDURE — 83690 ASSAY OF LIPASE: CPT | Performed by: FAMILY MEDICINE

## 2022-08-08 PROCEDURE — 4010F PR ACE/ARB THEARPY RXD/TAKEN: ICD-10-PCS | Mod: CPTII,S$GLB,, | Performed by: FAMILY MEDICINE

## 2022-08-08 PROCEDURE — 99999 PR PBB SHADOW E&M-EST. PATIENT-LVL IV: ICD-10-PCS | Mod: PBBFAC,,, | Performed by: FAMILY MEDICINE

## 2022-08-08 PROCEDURE — 3074F PR MOST RECENT SYSTOLIC BLOOD PRESSURE < 130 MM HG: ICD-10-PCS | Mod: CPTII,S$GLB,, | Performed by: FAMILY MEDICINE

## 2022-08-08 PROCEDURE — 74177 CT ABDOMEN PELVIS WITH CONTRAST: ICD-10-PCS | Mod: 26,,, | Performed by: STUDENT IN AN ORGANIZED HEALTH CARE EDUCATION/TRAINING PROGRAM

## 2022-08-08 RX ADMIN — IOHEXOL 75 ML: 350 INJECTION, SOLUTION INTRAVENOUS at 04:08

## 2022-08-08 RX ADMIN — Medication 900 ML: at 03:08

## 2022-08-08 NOTE — TELEPHONE ENCOUNTER
Spoke to the patient who said his PCP took him off of baclofen due to dry mouth. I recommended he contact his PCP to see what other medication he would recommend because Dr Ruiz is not seeing patients at this time. Said he did not know he could discuss this with his PCP and said he would try and get an appt to discuss this matter.

## 2022-08-08 NOTE — PROGRESS NOTES
Subjective:       Patient ID: Xander Sanchez is a 72 y.o. male.    Chief Complaint: Abdominal Pain, Follow-up, Hypertension, and Arm Pain    72 years old male came to the clinic for blood pressure check.  Blood pressure today was stable.  Patient with chronic abdominal pain for the last month.  The pain is 6/10 of intensity on and off aggravated with activity better with rest.  No fever, nausea or vomiting.  Patient with abdominal bloating sometimes.  Last colonoscopy was 5 years ago with evidence of 2 polyps.    Review of Systems   Constitutional: Negative.    HENT: Negative.    Eyes: Negative.    Respiratory: Negative.    Cardiovascular: Negative.  Negative for chest pain, palpitations, leg swelling and claudication.   Gastrointestinal: Positive for abdominal distention and abdominal pain. Negative for blood in stool, constipation, diarrhea, nausea, rectal pain, vomiting and fecal incontinence.   Endocrine: Negative for polydipsia, polyphagia and polyuria.   Genitourinary: Negative.    Musculoskeletal: Positive for arthralgias.   Integumentary:  Negative.   Neurological: Negative.    Psychiatric/Behavioral: Negative.          Objective:      Physical Exam  Abdominal:      Tenderness: There is abdominal tenderness in the right lower quadrant.         Assessment:       Problem List Items Addressed This Visit     Essential hypertension    Relevant Orders    CBC Auto Differential    Comprehensive Metabolic Panel    Urinalysis    Lipid Panel    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thy    Relevant Orders    Hemoglobin A1C      Other Visit Diagnoses     Right lower quadrant abdominal pain    -  Primary    Relevant Orders    CT Abdomen Pelvis With Contrast    Amylase    Lipase    Abdominal bloating        Relevant Orders    CT Abdomen Pelvis With Contrast    Amylase    Lipase    Screen for colon cancer        Relevant Orders    Case Request Endoscopy: COLONOSCOPY (Completed)    Other chronic pain        Relevant  Orders    Ambulatory referral/consult to Physical Medicine Rehab          Plan:         Xander was seen today for abdominal pain, follow-up, hypertension and arm pain.    Diagnoses and all orders for this visit:    Right lower quadrant abdominal pain  -     CT Abdomen Pelvis With Contrast; Future  -     Amylase; Future  -     Lipase; Future    Abdominal bloating  -     CT Abdomen Pelvis With Contrast; Future  -     Amylase; Future  -     Lipase; Future    Essential hypertension  -     CBC Auto Differential; Future  -     Comprehensive Metabolic Panel; Future  -     Urinalysis; Future  -     Lipid Panel; Future    Screen for colon cancer  -     Case Request Endoscopy: COLONOSCOPY    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thy, without long-term current use of insulin  -     Hemoglobin A1C; Future    Other chronic pain  -     Ambulatory referral/consult to Physical Medicine Rehab; Future    Continue monitoring blood pressure at home, low sodium diet.  Continue monitoring blood sugar at home,ADA diet.

## 2022-08-08 NOTE — TELEPHONE ENCOUNTER
----- Message from Sosa Lopez sent at 8/8/2022 10:19 AM CDT -----  Good morning  Patient with a referral to Physical Medicine and Rehab.  Patient had seen Dr Ruiz in the past. Patient has not been seen since January 2020.  Patient has been requesting for months an appointment with her because he believes his medications need to be adjusted. I was not able to find anything with Dr Ruiz so I schedule him as NP with Dr Martin.  Patient on wait list for sooner appointment.   I just wanted to share this with  you, because I don't know if you have a better suggestion to help this patient..  I also sent a message earlier about his medication,  something that needs to be address right away, so he does not suffer that much, while his appointment  date comes.    Thank you    Sosa

## 2022-08-09 ENCOUNTER — TELEPHONE (OUTPATIENT)
Dept: FAMILY MEDICINE | Facility: CLINIC | Age: 72
End: 2022-08-09
Payer: MEDICARE

## 2022-08-09 NOTE — TELEPHONE ENCOUNTER
Spoke to patient and advised him that he need to wait to see the new pain doctor in order to get new medication ( Muscle relaxer) Patient was taking Baclofen 20mg 3 times per day. Patient voiced understanding.

## 2022-08-09 NOTE — TELEPHONE ENCOUNTER
----- Message from Mulugeta See sent at 8/8/2022 11:52 AM CDT -----  .Type:  Needs Medical Advice    Who Called: self  Would the patient rather a call back or a response via [facility]? Call back  Best Call Back Number: [phone]   Additional Information: patient would like a call back abut a few concerns he has

## 2022-08-09 NOTE — TELEPHONE ENCOUNTER
----- Message from Ainsley Duarte sent at 8/9/2022  2:36 PM CDT -----  Contact: [phone]  Type:  Needs Medical Advice    Who Called: Pt   Reason for call: regarding if the Dr sent over his New prescription that he was putting the pt on  Pharmacy name and phone #: [facility] (Now [facility]) - [place], [place] - [address]  Would the patient rather a call back or a response via [facility]?  Call back  Best Call Back Number: [phone]

## 2022-08-12 ENCOUNTER — TELEPHONE (OUTPATIENT)
Dept: FAMILY MEDICINE | Facility: CLINIC | Age: 72
End: 2022-08-12
Payer: MEDICARE

## 2022-08-12 NOTE — TELEPHONE ENCOUNTER
----- Message from Matti Cisneros sent at 8/12/2022  9:33 AM CDT -----  Type:  Needs Medical Advice    Who Called: self  Reason:CT scan results  Would the patient rather a call back or a response via [facility]? call  Best Call Back Number: [phone]  Additional Information:none

## 2022-08-14 ENCOUNTER — TELEPHONE (OUTPATIENT)
Dept: FAMILY MEDICINE | Facility: CLINIC | Age: 72
End: 2022-08-14

## 2022-08-14 DIAGNOSIS — N40.0 BENIGN PROSTATIC HYPERPLASIA WITHOUT LOWER URINARY TRACT SYMPTOMS: ICD-10-CM

## 2022-08-14 DIAGNOSIS — N32.89 BLADDER WALL THICKENING: Primary | ICD-10-CM

## 2022-08-14 DIAGNOSIS — N32.3 BLADDER DIVERTICULUM: ICD-10-CM

## 2022-08-14 NOTE — TELEPHONE ENCOUNTER
CT scan.    Good news,No masses noted.    Bladder wall thickening with small pockets.    Enlarged liver.    Enlarged prostate.    Follow-up appointment with urologist.

## 2022-08-16 ENCOUNTER — TELEPHONE (OUTPATIENT)
Dept: ADMINISTRATIVE | Facility: OTHER | Age: 72
End: 2022-08-16
Payer: MEDICARE

## 2022-08-18 ENCOUNTER — OFFICE VISIT (OUTPATIENT)
Dept: UROLOGY | Facility: CLINIC | Age: 72
End: 2022-08-18
Payer: MEDICARE

## 2022-08-18 ENCOUNTER — PATIENT OUTREACH (OUTPATIENT)
Dept: ADMINISTRATIVE | Facility: HOSPITAL | Age: 72
End: 2022-08-18
Payer: MEDICARE

## 2022-08-18 VITALS
HEART RATE: 83 BPM | HEIGHT: 71 IN | WEIGHT: 184.63 LBS | SYSTOLIC BLOOD PRESSURE: 158 MMHG | DIASTOLIC BLOOD PRESSURE: 89 MMHG | BODY MASS INDEX: 25.85 KG/M2

## 2022-08-18 DIAGNOSIS — N32.89 BLADDER WALL THICKENING: ICD-10-CM

## 2022-08-18 DIAGNOSIS — N40.0 BENIGN PROSTATIC HYPERPLASIA WITHOUT LOWER URINARY TRACT SYMPTOMS: ICD-10-CM

## 2022-08-18 DIAGNOSIS — R93.41 ABNORMAL CT SCAN, BLADDER: Primary | ICD-10-CM

## 2022-08-18 DIAGNOSIS — N32.3 BLADDER DIVERTICULUM: ICD-10-CM

## 2022-08-18 PROCEDURE — 1125F AMNT PAIN NOTED PAIN PRSNT: CPT | Mod: CPTII,S$GLB,, | Performed by: STUDENT IN AN ORGANIZED HEALTH CARE EDUCATION/TRAINING PROGRAM

## 2022-08-18 PROCEDURE — 1160F RVW MEDS BY RX/DR IN RCRD: CPT | Mod: CPTII,S$GLB,, | Performed by: STUDENT IN AN ORGANIZED HEALTH CARE EDUCATION/TRAINING PROGRAM

## 2022-08-18 PROCEDURE — 3079F PR MOST RECENT DIASTOLIC BLOOD PRESSURE 80-89 MM HG: ICD-10-PCS | Mod: CPTII,S$GLB,, | Performed by: STUDENT IN AN ORGANIZED HEALTH CARE EDUCATION/TRAINING PROGRAM

## 2022-08-18 PROCEDURE — 3044F HG A1C LEVEL LT 7.0%: CPT | Mod: CPTII,S$GLB,, | Performed by: STUDENT IN AN ORGANIZED HEALTH CARE EDUCATION/TRAINING PROGRAM

## 2022-08-18 PROCEDURE — 4010F PR ACE/ARB THEARPY RXD/TAKEN: ICD-10-PCS | Mod: CPTII,S$GLB,, | Performed by: STUDENT IN AN ORGANIZED HEALTH CARE EDUCATION/TRAINING PROGRAM

## 2022-08-18 PROCEDURE — 99999 PR PBB SHADOW E&M-EST. PATIENT-LVL IV: CPT | Mod: PBBFAC,,, | Performed by: STUDENT IN AN ORGANIZED HEALTH CARE EDUCATION/TRAINING PROGRAM

## 2022-08-18 PROCEDURE — 99999 PR PBB SHADOW E&M-EST. PATIENT-LVL IV: ICD-10-PCS | Mod: PBBFAC,,, | Performed by: STUDENT IN AN ORGANIZED HEALTH CARE EDUCATION/TRAINING PROGRAM

## 2022-08-18 PROCEDURE — 1159F MED LIST DOCD IN RCRD: CPT | Mod: CPTII,S$GLB,, | Performed by: STUDENT IN AN ORGANIZED HEALTH CARE EDUCATION/TRAINING PROGRAM

## 2022-08-18 PROCEDURE — 99205 PR OFFICE/OUTPT VISIT, NEW, LEVL V, 60-74 MIN: ICD-10-PCS | Mod: S$GLB,,, | Performed by: STUDENT IN AN ORGANIZED HEALTH CARE EDUCATION/TRAINING PROGRAM

## 2022-08-18 PROCEDURE — 3077F SYST BP >= 140 MM HG: CPT | Mod: CPTII,S$GLB,, | Performed by: STUDENT IN AN ORGANIZED HEALTH CARE EDUCATION/TRAINING PROGRAM

## 2022-08-18 PROCEDURE — 3077F PR MOST RECENT SYSTOLIC BLOOD PRESSURE >= 140 MM HG: ICD-10-PCS | Mod: CPTII,S$GLB,, | Performed by: STUDENT IN AN ORGANIZED HEALTH CARE EDUCATION/TRAINING PROGRAM

## 2022-08-18 PROCEDURE — 3072F LOW RISK FOR RETINOPATHY: CPT | Mod: CPTII,S$GLB,, | Performed by: STUDENT IN AN ORGANIZED HEALTH CARE EDUCATION/TRAINING PROGRAM

## 2022-08-18 PROCEDURE — 1125F PR PAIN SEVERITY QUANTIFIED, PAIN PRESENT: ICD-10-PCS | Mod: CPTII,S$GLB,, | Performed by: STUDENT IN AN ORGANIZED HEALTH CARE EDUCATION/TRAINING PROGRAM

## 2022-08-18 PROCEDURE — 3008F BODY MASS INDEX DOCD: CPT | Mod: CPTII,S$GLB,, | Performed by: STUDENT IN AN ORGANIZED HEALTH CARE EDUCATION/TRAINING PROGRAM

## 2022-08-18 PROCEDURE — 3008F PR BODY MASS INDEX (BMI) DOCUMENTED: ICD-10-PCS | Mod: CPTII,S$GLB,, | Performed by: STUDENT IN AN ORGANIZED HEALTH CARE EDUCATION/TRAINING PROGRAM

## 2022-08-18 PROCEDURE — 99205 OFFICE O/P NEW HI 60 MIN: CPT | Mod: S$GLB,,, | Performed by: STUDENT IN AN ORGANIZED HEALTH CARE EDUCATION/TRAINING PROGRAM

## 2022-08-18 PROCEDURE — 3072F PR LOW RISK FOR RETINOPATHY: ICD-10-PCS | Mod: CPTII,S$GLB,, | Performed by: STUDENT IN AN ORGANIZED HEALTH CARE EDUCATION/TRAINING PROGRAM

## 2022-08-18 PROCEDURE — 1160F PR REVIEW ALL MEDS BY PRESCRIBER/CLIN PHARMACIST DOCUMENTED: ICD-10-PCS | Mod: CPTII,S$GLB,, | Performed by: STUDENT IN AN ORGANIZED HEALTH CARE EDUCATION/TRAINING PROGRAM

## 2022-08-18 PROCEDURE — 4010F ACE/ARB THERAPY RXD/TAKEN: CPT | Mod: CPTII,S$GLB,, | Performed by: STUDENT IN AN ORGANIZED HEALTH CARE EDUCATION/TRAINING PROGRAM

## 2022-08-18 PROCEDURE — 3044F PR MOST RECENT HEMOGLOBIN A1C LEVEL <7.0%: ICD-10-PCS | Mod: CPTII,S$GLB,, | Performed by: STUDENT IN AN ORGANIZED HEALTH CARE EDUCATION/TRAINING PROGRAM

## 2022-08-18 PROCEDURE — 3079F DIAST BP 80-89 MM HG: CPT | Mod: CPTII,S$GLB,, | Performed by: STUDENT IN AN ORGANIZED HEALTH CARE EDUCATION/TRAINING PROGRAM

## 2022-08-18 PROCEDURE — 1159F PR MEDICATION LIST DOCUMENTED IN MEDICAL RECORD: ICD-10-PCS | Mod: CPTII,S$GLB,, | Performed by: STUDENT IN AN ORGANIZED HEALTH CARE EDUCATION/TRAINING PROGRAM

## 2022-08-18 NOTE — PROGRESS NOTES
Health Maintenance Due   Topic Date Due    Shingles Vaccine (1 of 2) Never done    Urine Drug Screen  05/25/2022    COVID-19 Vaccine (3 - Moderna risk series) 06/13/2022    Colorectal Cancer Screening  07/19/2022    Diabetes Urine Screening  06/28/2022

## 2022-08-18 NOTE — PROGRESS NOTES
"Subjective:       Patient ID: Xander Sanchez is a 72 y.o. male.    Chief Complaint: Bladder wall thickening  This is a 72 y.o.  male patient that is new to me but not new to the system.  The patient was referred to me by his PCP for bladder wall thickening seen on CT. The CT was obtained for abdominal pain. He notes after drinking the PO contrast, he has several BM and since being maintained on miralax, has noted lessened distention of his abdomen and his pain has resolved.      30 min chart review of his extensive urologic history.  He was seeing Dr. Richardson [phone].  He saw Dr. Rosas in the year 2016. He underwent a benign clinic cystoscopy in 2/2016.  He then Dr. Buenrostro from [id number] and underwent an extensive workup with him as well as a laser TURP in 12/2017.   Per Dr. Buenrostro's documentation "small vol voids during day around 100cc. history of laser TURP 12/2017. previously treated with doxy, oxybutynin, flomax." he underwent UDS as well as a clinic cysto with Dr. Buenrostro 1/2017-Normal urethra.  Prostate 3.5 cm bilateral obstruction Width of Bladder Neck Opening: Approximately 18 Fr. Normal bladder with 2 + trabeculation.   Urodynamics before his laser TURP showed: 1. BPH with obstruction and Incomplete bladder emptying    LAST PSA  Lab Results   Component Value Date    PSA 1.2 02/01/2016    PSA 0.66 03/28/2014    PSA 0.67 03/13/2013    PSA 0.64 04/12/2012    PSADIAG 1.1 04/06/2015       Lab Results   Component Value Date    CREATININE 0.8 08/08/2022       ---  Past Medical History:   Diagnosis Date    Allergy     Arthritis     Asthma     Cardiomyopathy     Cataract     Cervical radicular pain     Cervical spondylosis with myelopathy 10/17/2012    Chronic bronchitis     Chronic systolic dysfunction of left ventricle 07/27/2015    Constipation 07/27/2015    COPD (chronic obstructive pulmonary disease)     CRPS (complex regional pain syndrome type I) 12/29/2014    Diabetes mellitus, type 2     DM type 2 " (diabetes mellitus, type 2) 2015    Enlarged prostate 2015    Enuresis 2018    Hypertension     ICD (implantable cardiac defibrillator) in place     Mixed hyperlipidemia 2018    Presence of biventricular AICD 2015    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thy 2016    Urinary tract infection     pt does not know       Past Surgical History:   Procedure Laterality Date    CARDIAC DEFIBRILLATOR PLACEMENT      CATARACT EXTRACTION W/  INTRAOCULAR LENS IMPLANT Right 11/10/2020    Procedure: EXTRACTION, CATARACT, WITH IOL INSERTION;  Surgeon: Oral Graham MD;  Location: [facility] OR;  Service: Ophthalmology;  Laterality: Right;    CATARACT EXTRACTION W/  INTRAOCULAR LENS IMPLANT Left 2020    Procedure: EXTRACTION, CATARACT, WITH IOL INSERTION;  Surgeon: Oral Graham MD;  Location: [facility] OR;  Service: Ophthalmology;  Laterality: Left;    CERVICAL SPINE SURGERY      COLONOSCOPY N/A 2017    Procedure: COLONOSCOPY  golytely;  Surgeon: Vannessa Uribe MD;  [facility] ENDO;  Service: Endoscopy;  Laterality: N/A;    SPINE SURGERY         Family History   Problem Relation Age of Onset    Hypertension Mother     Hypertension Father     COPD Father     No Known Problems Sister     No Known Problems Brother     No Known Problems Daughter     Prostate cancer Neg Hx     Kidney disease Neg Hx        Social History     Tobacco Use    Smoking status: Former Smoker     Packs/day: 1.00     Years: 40.00     Pack years: 40.00     Types: Cigarettes     Quit date: 2009     Years since quittin.0    Smokeless tobacco: Never Used   Substance Use Topics    Alcohol use: Yes     Alcohol/week: 2.0 standard drinks     Types: 1 Cans of beer, 1 Shots of liquor per week     Comment: May 1-2 beers or whiskey per month    Drug use: No       Current Outpatient Medications on File Prior to Visit   Medication Sig Dispense Refill    alcohol swabs  PadM Apply 1 each topically as needed. 200 each 3    aspirin (ECOTRIN) 81 MG EC tablet Take 81 mg by mouth once daily.      blood glucose control, low (TRUE METRIX LEVEL 1) Soln 1 Units by Misc.(Non-Drug; Combo Route) route once daily. 1 each 3    blood sugar diagnostic (TRUE METRIX GLUCOSE TEST STRIP) Strp TEST  ONE  TIME  DAILY  AT  6AM 100 strip 3    blood-glucose meter (TRUE METRIX AIR GLUCOSE METER) kit USE AS INSTRUCTED 1 each 0    fluticasone-salmeterol 230-21 mcg/dose (ADVAIR HFA) 230-21 mcg/actuation HFAA inhaler Inhale 2 puffs into the lungs 2 (two) times daily. Controller 36 g 0    gabapentin (NEURONTIN) 600 MG tablet Take 1 tablet (600 mg total) by mouth 2 (two) times daily. 180 tablet 3    hydroCHLOROthiazide (MICROZIDE) 12.5 mg capsule TAKE 1 CAPSULE (12.5 MG TOTAL) BY MOUTH ONCE DAILY. 90 capsule 2    HYDROcodone-acetaminophen (NORCO)  mg per tablet Take 1 tablet by mouth every 6 (six) hours as needed for Pain. More than 7 day supply and Medically Necessary. 120 tablet 0    lancets (TRUEPLUS LANCETS) 33 gauge Misc TEST ONE TIME DAILY AT 6:00AM 100 each 3    metoprolol succinate (TOPROL-XL) 25 MG 24 hr tablet TAKE 1 TABLET EVERY DAY 90 tablet 3    oxybutynin (DITROPAN) 5 MG Tab TAKE 1 TABLET TWICE DAILY 180 tablet 3    pantoprazole (PROTONIX) 40 MG tablet TAKE 1 TABLET BY MOUTH ONCE DAILY BEFORE BREAKFAST 30 tablet 11    pravastatin (PRAVACHOL) 10 MG tablet TAKE 1 TABLET EVERY NIGHT 90 tablet 3    tamsulosin (FLOMAX) 0.4 mg Cap TAKE 1 CAPSULE EVERY DAY 90 capsule 3    valsartan (DIOVAN) 40 MG tablet TAKE 1 TABLET EVERY DAY 90 tablet 3     No current facility-administered medications on file prior to visit.       Review of patient's allergies indicates:   Allergen Reactions    Ciprofloxacin Nausea And Vomiting       Review of Systems   Constitutional: Negative for chills.   HENT: Negative for congestion.    Eyes: Negative for visual disturbance.   Respiratory: Negative for  shortness of breath.    Cardiovascular: Negative for chest pain.   Gastrointestinal: Negative for abdominal distention.   Musculoskeletal: Negative for gait problem.   Skin: Negative for color change.   Neurological: Negative for dizziness.   Psychiatric/Behavioral: Negative for agitation.       Objective:      Physical Exam  Constitutional:       Appearance: He is well-developed.   HENT:      Head: Normocephalic.   Eyes:      Pupils: Pupils are equal, round, and reactive to light.   Pulmonary:      Effort: Pulmonary effort is normal.   Abdominal:      Palpations: Abdomen is soft.   Musculoskeletal:         General: Normal range of motion.      Cervical back: Normal range of motion.   Skin:     General: Skin is warm and dry.   Neurological:      Mental Status: He is alert.         Assessment:       1. Abnormal CT scan, bladder    2. Bladder wall thickening    3. Bladder diverticulum    4. Benign prostatic hyperplasia without lower urinary tract symptoms        Plan:         1. Showed pt CT scan and explained that to inspect further I would recommend a clinic cystoscopy. I would have low expectations to find anything concerning given that he has had 2 benign cystoscopies in the past and his urinalysis earlier this month was completely benign.  2. Pt currently declined the cystoscopy which I believe is reasonable. Encouraged f/u if he would like to schedule clinic cystoscopy to inspect at any time.       Abnormal CT scan, bladder    Bladder wall thickening  -     Ambulatory referral/consult to Urology    Bladder diverticulum  -     Ambulatory referral/consult to Urology    Benign prostatic hyperplasia without lower urinary tract symptoms  -     Ambulatory referral/consult to Urology

## 2022-08-24 DIAGNOSIS — M62.81 MUSCLE RIGHT ARM WEAKNESS: ICD-10-CM

## 2022-08-24 DIAGNOSIS — M48.02 CERVICAL STENOSIS OF SPINE: ICD-10-CM

## 2022-08-24 DIAGNOSIS — G89.4 CHRONIC PAIN SYNDROME: ICD-10-CM

## 2022-08-24 DIAGNOSIS — M48.02 SPINAL STENOSIS IN CERVICAL REGION: ICD-10-CM

## 2022-08-24 DIAGNOSIS — M54.12 CERVICAL RADICULAR PAIN: ICD-10-CM

## 2022-08-24 DIAGNOSIS — R29.898 RIGHT HAND WEAKNESS: ICD-10-CM

## 2022-08-24 DIAGNOSIS — G56.41 COMPLEX REGIONAL PAIN SYNDROME TYPE 2 OF RIGHT UPPER EXTREMITY: ICD-10-CM

## 2022-08-24 DIAGNOSIS — M79.601 RIGHT ARM PAIN: ICD-10-CM

## 2022-08-24 DIAGNOSIS — R29.898 RIGHT ARM WEAKNESS: ICD-10-CM

## 2022-08-24 DIAGNOSIS — G90.50 COMPLEX REGIONAL PAIN SYNDROME TYPE 1, AFFECTING UNSPECIFIED SITE: ICD-10-CM

## 2022-08-24 DIAGNOSIS — F11.20 NARCOTIC DEPENDENCY, CONTINUOUS: ICD-10-CM

## 2022-08-24 DIAGNOSIS — M96.1 CERVICAL POST-LAMINECTOMY SYNDROME: ICD-10-CM

## 2022-08-24 DIAGNOSIS — M47.812 FACET ARTHRITIS OF CERVICAL REGION: ICD-10-CM

## 2022-08-24 DIAGNOSIS — M54.12 BRACHIAL NEURITIS OR RADICULITIS: ICD-10-CM

## 2022-08-24 DIAGNOSIS — M50.30 DEGENERATION OF CERVICAL INTERVERTEBRAL DISC: ICD-10-CM

## 2022-08-24 DIAGNOSIS — M54.12 CERVICAL RADICULOPATHY: ICD-10-CM

## 2022-08-24 DIAGNOSIS — M47.12 CERVICAL SPONDYLOSIS WITH MYELOPATHY: ICD-10-CM

## 2022-08-24 DIAGNOSIS — M75.101 ROTATOR CUFF SYNDROME OF RIGHT SHOULDER: ICD-10-CM

## 2022-08-24 DIAGNOSIS — M50.00 HNP (HERNIATED NUCLEUS PULPOSUS) WITH MYELOPATHY, CERVICAL: ICD-10-CM

## 2022-08-24 NOTE — TELEPHONE ENCOUNTER
----- Message from Terri Valdovinos sent at 8/24/2022  9:28 AM CDT -----  Contact: @[phone]  Pt is calling in to get an refill on the prescription (HYDROcodone-acetaminophen (NORCO)  mg per tablet) please call to discuss further.    [id number] [place] [address] [address] [facility] [address]  [facility] [address]  [facility] [address]  Phone: [phone] Fax: [phone]

## 2022-08-28 RX ORDER — HYDROCODONE BITARTRATE AND ACETAMINOPHEN 10; 325 MG/1; MG/1
1 TABLET ORAL EVERY 6 HOURS PRN
Qty: 120 TABLET | Refills: 0 | Status: SHIPPED | OUTPATIENT
Start: 2022-08-28 | End: 2022-09-23 | Stop reason: SDUPTHER

## 2022-09-16 NOTE — PROGRESS NOTES
Diagnoses and all orders for this visit:    Prostatitis, unspecified prostatitis type  -     sulfamethoxazole-trimethoprim 800-160mg (BACTRIM DS) 800-160 mg Tab; Take 1 tablet by mouth 2 (two) times daily.    stop cipro.  Start Bactrim DS BID as given.   none

## 2022-09-23 DIAGNOSIS — M50.00 HNP (HERNIATED NUCLEUS PULPOSUS) WITH MYELOPATHY, CERVICAL: ICD-10-CM

## 2022-09-23 DIAGNOSIS — M54.12 CERVICAL RADICULAR PAIN: ICD-10-CM

## 2022-09-23 DIAGNOSIS — M79.601 RIGHT ARM PAIN: ICD-10-CM

## 2022-09-23 DIAGNOSIS — M54.12 BRACHIAL NEURITIS OR RADICULITIS: ICD-10-CM

## 2022-09-23 DIAGNOSIS — M50.30 DEGENERATION OF CERVICAL INTERVERTEBRAL DISC: ICD-10-CM

## 2022-09-23 DIAGNOSIS — M48.02 SPINAL STENOSIS IN CERVICAL REGION: ICD-10-CM

## 2022-09-23 DIAGNOSIS — M54.12 CERVICAL RADICULOPATHY: ICD-10-CM

## 2022-09-23 DIAGNOSIS — R29.898 RIGHT ARM WEAKNESS: ICD-10-CM

## 2022-09-23 DIAGNOSIS — M96.1 CERVICAL POST-LAMINECTOMY SYNDROME: ICD-10-CM

## 2022-09-23 DIAGNOSIS — G89.4 CHRONIC PAIN SYNDROME: ICD-10-CM

## 2022-09-23 DIAGNOSIS — M47.812 FACET ARTHRITIS OF CERVICAL REGION: ICD-10-CM

## 2022-09-23 DIAGNOSIS — G90.50 COMPLEX REGIONAL PAIN SYNDROME TYPE 1, AFFECTING UNSPECIFIED SITE: ICD-10-CM

## 2022-09-23 DIAGNOSIS — M62.81 MUSCLE RIGHT ARM WEAKNESS: ICD-10-CM

## 2022-09-23 DIAGNOSIS — R29.898 RIGHT HAND WEAKNESS: ICD-10-CM

## 2022-09-23 DIAGNOSIS — M47.12 CERVICAL SPONDYLOSIS WITH MYELOPATHY: ICD-10-CM

## 2022-09-23 DIAGNOSIS — M75.101 ROTATOR CUFF SYNDROME OF RIGHT SHOULDER: ICD-10-CM

## 2022-09-23 DIAGNOSIS — M48.02 CERVICAL STENOSIS OF SPINE: ICD-10-CM

## 2022-09-23 DIAGNOSIS — F11.20 NARCOTIC DEPENDENCY, CONTINUOUS: ICD-10-CM

## 2022-09-23 DIAGNOSIS — G56.41 COMPLEX REGIONAL PAIN SYNDROME TYPE 2 OF RIGHT UPPER EXTREMITY: ICD-10-CM

## 2022-09-23 RX ORDER — HYDROCODONE BITARTRATE AND ACETAMINOPHEN 10; 325 MG/1; MG/1
1 TABLET ORAL EVERY 6 HOURS PRN
Qty: 120 TABLET | Refills: 0 | Status: SHIPPED | OUTPATIENT
Start: 2022-09-28 | End: 2022-10-24 | Stop reason: SDUPTHER

## 2022-09-23 NOTE — TELEPHONE ENCOUNTER
----- Message from Christen Tobar sent at 9/23/2022  9:01 AM CDT -----  Regarding: HYDROcodone-acetaminophen (NORCO)  mg per tablet  Contact: pt  Refill request.        HYDROcodone-acetaminophen (NORCO)  mg per tablet        [facility] #[id number] [place] [address] [address] AT [facility] [address]  [facility] [address]  [facility] [address]  Phone: [phone] Fax: [phone]    Confirmed patient's contact info below:  Contact Name: Xander Sanchez  Phone Number: [phone]

## 2022-10-24 DIAGNOSIS — M79.601 RIGHT ARM PAIN: ICD-10-CM

## 2022-10-24 DIAGNOSIS — M54.12 BRACHIAL NEURITIS OR RADICULITIS: ICD-10-CM

## 2022-10-24 DIAGNOSIS — G56.41 COMPLEX REGIONAL PAIN SYNDROME TYPE 2 OF RIGHT UPPER EXTREMITY: ICD-10-CM

## 2022-10-24 DIAGNOSIS — M62.81 MUSCLE RIGHT ARM WEAKNESS: ICD-10-CM

## 2022-10-24 DIAGNOSIS — R29.898 RIGHT HAND WEAKNESS: ICD-10-CM

## 2022-10-24 DIAGNOSIS — M47.12 CERVICAL SPONDYLOSIS WITH MYELOPATHY: ICD-10-CM

## 2022-10-24 DIAGNOSIS — F11.20 NARCOTIC DEPENDENCY, CONTINUOUS: ICD-10-CM

## 2022-10-24 DIAGNOSIS — M50.30 DEGENERATION OF CERVICAL INTERVERTEBRAL DISC: ICD-10-CM

## 2022-10-24 DIAGNOSIS — M96.1 CERVICAL POST-LAMINECTOMY SYNDROME: ICD-10-CM

## 2022-10-24 DIAGNOSIS — G90.50 COMPLEX REGIONAL PAIN SYNDROME TYPE 1, AFFECTING UNSPECIFIED SITE: ICD-10-CM

## 2022-10-24 DIAGNOSIS — M54.12 CERVICAL RADICULOPATHY: ICD-10-CM

## 2022-10-24 DIAGNOSIS — G89.4 CHRONIC PAIN SYNDROME: ICD-10-CM

## 2022-10-24 DIAGNOSIS — M47.812 FACET ARTHRITIS OF CERVICAL REGION: ICD-10-CM

## 2022-10-24 DIAGNOSIS — M50.00 HNP (HERNIATED NUCLEUS PULPOSUS) WITH MYELOPATHY, CERVICAL: ICD-10-CM

## 2022-10-24 DIAGNOSIS — M48.02 CERVICAL STENOSIS OF SPINE: ICD-10-CM

## 2022-10-24 DIAGNOSIS — M48.02 SPINAL STENOSIS IN CERVICAL REGION: ICD-10-CM

## 2022-10-24 DIAGNOSIS — M54.12 CERVICAL RADICULAR PAIN: ICD-10-CM

## 2022-10-24 DIAGNOSIS — R29.898 RIGHT ARM WEAKNESS: ICD-10-CM

## 2022-10-24 DIAGNOSIS — M75.101 ROTATOR CUFF SYNDROME OF RIGHT SHOULDER: ICD-10-CM

## 2022-10-24 NOTE — TELEPHONE ENCOUNTER
----- Message from Risa Chandra sent at 10/24/2022  9:07 AM CDT -----  Regarding: pt rq med refill, see below  Contact: PT  [phone]  PT req med refill of Hydrocodone  Please send to Pharmacy on file.    Patient can be contacted @# [phone]

## 2022-10-25 RX ORDER — HYDROCODONE BITARTRATE AND ACETAMINOPHEN 10; 325 MG/1; MG/1
1 TABLET ORAL EVERY 6 HOURS PRN
Qty: 120 TABLET | Refills: 0 | Status: SHIPPED | OUTPATIENT
Start: 2022-10-25 | End: 2022-11-18 | Stop reason: SDUPTHER

## 2022-10-29 ENCOUNTER — CLINICAL SUPPORT (OUTPATIENT)
Dept: CARDIOLOGY | Facility: HOSPITAL | Age: 72
End: 2022-10-29
Payer: MEDICARE

## 2022-10-29 DIAGNOSIS — I47.29 OTHER VENTRICULAR TACHYCARDIA: ICD-10-CM

## 2022-10-29 DIAGNOSIS — Z95.810 PRESENCE OF AUTOMATIC (IMPLANTABLE) CARDIAC DEFIBRILLATOR: ICD-10-CM

## 2022-10-29 DIAGNOSIS — I48.91 UNSPECIFIED ATRIAL FIBRILLATION: ICD-10-CM

## 2022-10-29 DIAGNOSIS — I50.9 HEART FAILURE, UNSPECIFIED: ICD-10-CM

## 2022-10-29 PROCEDURE — 93296 REM INTERROG EVL PM/IDS: CPT | Performed by: INTERNAL MEDICINE

## 2022-10-29 PROCEDURE — 93295 CARDIAC DEVICE CHECK - REMOTE: ICD-10-PCS | Mod: ,,, | Performed by: INTERNAL MEDICINE

## 2022-10-29 PROCEDURE — 93295 DEV INTERROG REMOTE 1/2/MLT: CPT | Mod: ,,, | Performed by: INTERNAL MEDICINE

## 2022-11-01 ENCOUNTER — OFFICE VISIT (OUTPATIENT)
Dept: PHYSICAL MEDICINE AND REHAB | Facility: CLINIC | Age: 72
End: 2022-11-01
Payer: MEDICARE

## 2022-11-01 ENCOUNTER — LAB VISIT (OUTPATIENT)
Dept: LAB | Facility: HOSPITAL | Age: 72
End: 2022-11-01
Attending: PHYSICAL MEDICINE & REHABILITATION
Payer: MEDICARE

## 2022-11-01 VITALS
HEART RATE: 78 BPM | BODY MASS INDEX: 26.25 KG/M2 | DIASTOLIC BLOOD PRESSURE: 96 MMHG | HEIGHT: 71 IN | SYSTOLIC BLOOD PRESSURE: 152 MMHG | WEIGHT: 187.5 LBS

## 2022-11-01 DIAGNOSIS — G89.29 CHRONIC NECK PAIN: Primary | ICD-10-CM

## 2022-11-01 DIAGNOSIS — M54.2 CHRONIC NECK PAIN: Primary | ICD-10-CM

## 2022-11-01 DIAGNOSIS — Z79.891 CHRONICALLY ON OPIATE THERAPY: ICD-10-CM

## 2022-11-01 DIAGNOSIS — M47.812 SPONDYLOSIS OF CERVICAL REGION WITHOUT MYELOPATHY OR RADICULOPATHY: ICD-10-CM

## 2022-11-01 DIAGNOSIS — M96.1 CERVICAL POST-LAMINECTOMY SYNDROME: ICD-10-CM

## 2022-11-01 PROCEDURE — 3072F PR LOW RISK FOR RETINOPATHY: ICD-10-PCS | Mod: CPTII,S$GLB,, | Performed by: PHYSICAL MEDICINE & REHABILITATION

## 2022-11-01 PROCEDURE — 99215 OFFICE O/P EST HI 40 MIN: CPT | Mod: S$GLB,,, | Performed by: PHYSICAL MEDICINE & REHABILITATION

## 2022-11-01 PROCEDURE — 99999 PR PBB SHADOW E&M-EST. PATIENT-LVL III: CPT | Mod: PBBFAC,,, | Performed by: PHYSICAL MEDICINE & REHABILITATION

## 2022-11-01 PROCEDURE — 3008F PR BODY MASS INDEX (BMI) DOCUMENTED: ICD-10-PCS | Mod: CPTII,S$GLB,, | Performed by: PHYSICAL MEDICINE & REHABILITATION

## 2022-11-01 PROCEDURE — 3077F SYST BP >= 140 MM HG: CPT | Mod: CPTII,S$GLB,, | Performed by: PHYSICAL MEDICINE & REHABILITATION

## 2022-11-01 PROCEDURE — 3008F BODY MASS INDEX DOCD: CPT | Mod: CPTII,S$GLB,, | Performed by: PHYSICAL MEDICINE & REHABILITATION

## 2022-11-01 PROCEDURE — 3288F PR FALLS RISK ASSESSMENT DOCUMENTED: ICD-10-PCS | Mod: CPTII,S$GLB,, | Performed by: PHYSICAL MEDICINE & REHABILITATION

## 2022-11-01 PROCEDURE — 3080F DIAST BP >= 90 MM HG: CPT | Mod: CPTII,S$GLB,, | Performed by: PHYSICAL MEDICINE & REHABILITATION

## 2022-11-01 PROCEDURE — 80326 AMPHETAMINES 5 OR MORE: CPT | Performed by: PHYSICAL MEDICINE & REHABILITATION

## 2022-11-01 PROCEDURE — 3044F HG A1C LEVEL LT 7.0%: CPT | Mod: CPTII,S$GLB,, | Performed by: PHYSICAL MEDICINE & REHABILITATION

## 2022-11-01 PROCEDURE — 1100F PTFALLS ASSESS-DOCD GE2>/YR: CPT | Mod: CPTII,S$GLB,, | Performed by: PHYSICAL MEDICINE & REHABILITATION

## 2022-11-01 PROCEDURE — 3080F PR MOST RECENT DIASTOLIC BLOOD PRESSURE >= 90 MM HG: ICD-10-PCS | Mod: CPTII,S$GLB,, | Performed by: PHYSICAL MEDICINE & REHABILITATION

## 2022-11-01 PROCEDURE — 1125F PR PAIN SEVERITY QUANTIFIED, PAIN PRESENT: ICD-10-PCS | Mod: CPTII,S$GLB,, | Performed by: PHYSICAL MEDICINE & REHABILITATION

## 2022-11-01 PROCEDURE — 3077F PR MOST RECENT SYSTOLIC BLOOD PRESSURE >= 140 MM HG: ICD-10-PCS | Mod: CPTII,S$GLB,, | Performed by: PHYSICAL MEDICINE & REHABILITATION

## 2022-11-01 PROCEDURE — 4010F ACE/ARB THERAPY RXD/TAKEN: CPT | Mod: CPTII,S$GLB,, | Performed by: PHYSICAL MEDICINE & REHABILITATION

## 2022-11-01 PROCEDURE — 1125F AMNT PAIN NOTED PAIN PRSNT: CPT | Mod: CPTII,S$GLB,, | Performed by: PHYSICAL MEDICINE & REHABILITATION

## 2022-11-01 PROCEDURE — 3072F LOW RISK FOR RETINOPATHY: CPT | Mod: CPTII,S$GLB,, | Performed by: PHYSICAL MEDICINE & REHABILITATION

## 2022-11-01 PROCEDURE — 99215 PR OFFICE/OUTPT VISIT, EST, LEVL V, 40-54 MIN: ICD-10-PCS | Mod: S$GLB,,, | Performed by: PHYSICAL MEDICINE & REHABILITATION

## 2022-11-01 PROCEDURE — 3288F FALL RISK ASSESSMENT DOCD: CPT | Mod: CPTII,S$GLB,, | Performed by: PHYSICAL MEDICINE & REHABILITATION

## 2022-11-01 PROCEDURE — 99999 PR PBB SHADOW E&M-EST. PATIENT-LVL III: ICD-10-PCS | Mod: PBBFAC,,, | Performed by: PHYSICAL MEDICINE & REHABILITATION

## 2022-11-01 PROCEDURE — 4010F PR ACE/ARB THEARPY RXD/TAKEN: ICD-10-PCS | Mod: CPTII,S$GLB,, | Performed by: PHYSICAL MEDICINE & REHABILITATION

## 2022-11-01 PROCEDURE — 1159F MED LIST DOCD IN RCRD: CPT | Mod: CPTII,S$GLB,, | Performed by: PHYSICAL MEDICINE & REHABILITATION

## 2022-11-01 PROCEDURE — 1159F PR MEDICATION LIST DOCUMENTED IN MEDICAL RECORD: ICD-10-PCS | Mod: CPTII,S$GLB,, | Performed by: PHYSICAL MEDICINE & REHABILITATION

## 2022-11-01 PROCEDURE — 1100F PR PT FALLS ASSESS DOC 2+ FALLS/FALL W/INJURY/YR: ICD-10-PCS | Mod: CPTII,S$GLB,, | Performed by: PHYSICAL MEDICINE & REHABILITATION

## 2022-11-01 PROCEDURE — 3044F PR MOST RECENT HEMOGLOBIN A1C LEVEL <7.0%: ICD-10-PCS | Mod: CPTII,S$GLB,, | Performed by: PHYSICAL MEDICINE & REHABILITATION

## 2022-11-01 RX ORDER — TIZANIDINE 4 MG/1
4 TABLET ORAL 3 TIMES DAILY PRN
Qty: 90 TABLET | Refills: 0 | Status: SHIPPED | OUTPATIENT
Start: 2022-11-01 | End: 2022-11-07 | Stop reason: SINTOL

## 2022-11-01 NOTE — PROGRESS NOTES
Subjective:       Patient ID: Xander Sanchez is a 72 y.o. male.    Chief Complaint: Consult      HPI      Mr. Sanchez is a 72-year-old black male with past medical history of hypertension, diabetes mellitus, peripheral neuropathy, RA, nonischemic cardiomyopathy, chronic systolic heart failure, status post AICD, COPD, osteoarthritis, chronic neck pain status post 3 neck surgeries, CRPS type 1 of right arm, GERD.  The patient was referred to the Physical Medicine Clinic for help with chronic neck pain.  He was a patient of Dr. Ruiz but she has been unable to see patients.  He was last seen on 01/30/2020.  His care is being transferred to me.    The patient has been complaining of neck pain for several years.  In 11/2012, he underwent C3-6 laminectomy and fusion by [facility] due to cervical spondylosis with myelopathy.  In 04/2014 he had C5-6 ACDF by [facility].  In 08/2015, he underwent C3-7 posterior fusion by Dr. De Dios.  The patient continued to complain of neck pain and arm pain.  He was maintained on gabapentin and p.r.n. hydrocodone/APAP.    Currently, his neck pain is minimal.  His pain is mostly in the right arm from the shoulder area shooting to his hand with burning sensation.  His pain is worse when he wakes up in the morning.  It is better with his pain medications.  His max pain is 6/10 and minimum 2/10.  Today it is 6/10.  The patient denies any significant upper extremity weakness.  He denies any arm numbness.  He complains of spasms in his right arm.  He has chronic impaired hand coordination such as trouble buttoning.  He denies any gait impairment.      He is currently taking:  Gabapentin 600 mg p.o. twice per day.  He reports sedation with higher doses.    Hydrocodone/APAP 10/325 p.r.n.4 times per day   Baclofen 10 mg p.o. t.i.d. p.r.n..  It helps but he has been complaining of dry mouth with its use.      Past Medical History:   Diagnosis Date    Allergy     Arthritis     Asthma      Cardiomyopathy     Cataract     Cervical radicular pain     Cervical spondylosis with myelopathy 10/17/2012    Chronic bronchitis     Chronic systolic dysfunction of left ventricle 07/27/2015    Constipation 07/27/2015    COPD (chronic obstructive pulmonary disease)     CRPS (complex regional pain syndrome type I) 12/29/2014    Diabetes mellitus, type 2     DM type 2 (diabetes mellitus, type 2) 07/27/2015    Enlarged prostate 07/27/2015    Enuresis 07/06/2018    Hypertension     ICD (implantable cardiac defibrillator) in place     Mixed hyperlipidemia 02/28/2018    Presence of biventricular AICD 07/27/2015    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thy 02/01/2016    Urinary tract infection     pt does not know        Review of patient's allergies indicates:   Allergen Reactions    Ciprofloxacin Nausea And Vomiting        Review of Systems   Constitutional:  Negative for chills and fever.   Eyes:  Negative for visual disturbance.   Respiratory:  Positive for shortness of breath.    Cardiovascular:  Negative for chest pain.   Gastrointestinal:  Negative for blood in stool, nausea and vomiting.   Genitourinary:  Negative for difficulty urinating.   Musculoskeletal:  Positive for gait problem, myalgias and neck pain. Negative for back pain.   Neurological:  Negative for dizziness, weakness and headaches.   Psychiatric/Behavioral:  Negative for behavioral problems and sleep disturbance.            Objective:      Physical Exam  Vitals reviewed.   Constitutional:       General: He is not in acute distress.     Appearance: He is well-developed.   HENT:      Head: Normocephalic and atraumatic.   Neck:      Comments: Healed posterior and anterior neck surgical scars.  Decreased ROM in all planes.  -ve tenderness  Musculoskeletal:      Comments: BUE:  ROM:   RUE: full.   LUE: full.  Strength:    RUE: 5/5 at shoulder abduction, 5 elbow flexion, 5 wrist extension, 5 elbow extension, 5 finger flexion, 5 finger abduction.    LUE: 5/5 at shoulder abduction, 5 elbow flexion, 5 wrist extension, 5 elbow extension, 5 finger flexion, 5 finger abduction.  Sensation to pinprick:   RUE: intact.   LUE: intact.  DTR:    RUE: +1 biceps, +1 triceps.   LUE:  +1 biceps, +1 triceps.  Rojas:   RUE: -ve.   LUE: -ve.    BLE:  ROM:   RLE: full.   LLE: full.  Strength:    RLE: 5/5 at hip flexion, 5 knee extension, 5 ankle DF, 5 PF, 5 EHL.   LLE: 5/5 at hip flexion, 5 knee extension, 5 ankle DF, 5 PF, 5 EHL.  Sensation to pinprick:     RLE: intact.      LLE: intact.   DTR:     RLE: +1 knee, +1 ankle.    LLE: +1 knee, +1 ankle.  Clonus:    Rt ankle: -ve.    Lt ankle: -ve.    Gait: WNL     Skin:     General: Skin is warm.   Neurological:      Mental Status: He is alert.   Psychiatric:         Behavior: Behavior normal.       Assessment:       1. Chronic neck pain    2. Cervical post-laminectomy syndrome    3. Spondylosis of cervical region without myelopathy or radiculopathy    4. Chronically on opiate therapy          Plan:       - Oral NSAIDs will be avoided due to cardiac illness.  - Continue gabapentin 600 mg, 1 tablet by mouth twice daily   - Continue hydrocodone/APAP 10/325, 1 tablet by mouth 4 times per day as needed for pain (our clinic will take over this prescription).    - Discontinue baclofen   - Start tiZANidine (ZANAFLEX) 4 MG tablet; Take 1 tablet (4 mg total) by mouth 3 (three) times daily as needed (muscle spasms).  - A Pain contract was signed and will be scanned into the chart.  -  Pain Clinic Drug Screen; Future  - Regular home exercise program was encouraged.  - Follow up in about 4 months (around 3/1/2023).    This was a 45 minute visit (including review of records.  About 50% of the visit was spent educating the patient about the diagnosis and the treatment plan.    This note was partly generated with The [email or web address] voice recognition software. I apologize for any possible typographical errors.

## 2022-11-04 ENCOUNTER — TELEPHONE (OUTPATIENT)
Dept: NEUROLOGY | Facility: CLINIC | Age: 72
End: 2022-11-04
Payer: MEDICARE

## 2022-11-04 NOTE — TELEPHONE ENCOUNTER
----- Message from Risa Chandra sent at 11/4/2022  8:33 AM CDT -----  Regarding: PT req return call - new med may be too strong, please call to advise #urgent#  Contact: PT wife   PT req return call - new med may be too strong, please call to advise

## 2022-11-05 LAB
6MAM UR QL: NOT DETECTED
7AMINOCLONAZEPAM UR QL: NOT DETECTED
A-OH ALPRAZ UR QL: NOT DETECTED
ALPHA-OH-MIDAZOLAM: NOT DETECTED
ALPRAZ UR QL: NOT DETECTED
AMPHET UR QL SCN: NOT DETECTED
ANNOTATION COMMENT IMP: NORMAL
ANNOTATION COMMENT IMP: NORMAL
BARBITURATES UR QL: NOT DETECTED
BUPRENORPHINE UR QL: NOT DETECTED
BZE UR QL: NOT DETECTED
CARBOXYTHC UR QL: PRESENT
CARISOPRODOL UR QL: NOT DETECTED
CLONAZEPAM UR QL: NOT DETECTED
CODEINE UR QL: NOT DETECTED
CREAT UR-MCNC: 88.5 MG/DL (ref 20–400)
DIAZEPAM UR QL: NOT DETECTED
ETHYL GLUCURONIDE UR QL: NOT DETECTED
FENTANYL UR QL: NOT DETECTED
GABAPENTIN: PRESENT
HYDROCODONE UR QL: PRESENT
HYDROMORPHONE UR QL: PRESENT
LORAZEPAM UR QL: NOT DETECTED
MDA UR QL: NOT DETECTED
MDEA UR QL: NOT DETECTED
MDMA UR QL: NOT DETECTED
ME-PHENIDATE UR QL: NOT DETECTED
METHADONE UR QL: NOT DETECTED
METHAMPHET UR QL: NOT DETECTED
MIDAZOLAM UR QL SCN: NOT DETECTED
MORPHINE UR QL: NOT DETECTED
NALOXONE: NOT DETECTED
NORBUPRENORPHINE UR QL CFM: NOT DETECTED
NORDIAZEPAM UR QL: NOT DETECTED
NORFENTANYL UR QL: NOT DETECTED
NORHYDROCODONE UR QL CFM: PRESENT
NORMEPERIDINE UR QL CFM: NOT DETECTED
NOROXYCODONE UR QL CFM: NOT DETECTED
NOROXYMORPHONE UR QL SCN: NOT DETECTED
OXAZEPAM UR QL: NOT DETECTED
OXYCODONE UR QL: NOT DETECTED
OXYMORPHONE UR QL: NOT DETECTED
PATHOLOGY STUDY: NORMAL
PCP UR QL: NOT DETECTED
PHENTERMINE UR QL: NOT DETECTED
PREGABALIN: NOT DETECTED
SERVICE CMNT-IMP: NORMAL
TAPENTADOL UR QL SCN: NOT DETECTED
TAPENTADOL UR QL SCN: NOT DETECTED
TEMAZEPAM UR QL: NOT DETECTED
TRAMADOL UR QL: NOT DETECTED
ZOLPIDEM METABOLITE: NOT DETECTED
ZOLPIDEM UR QL: NOT DETECTED

## 2022-11-07 ENCOUNTER — TELEPHONE (OUTPATIENT)
Dept: NEUROLOGY | Facility: CLINIC | Age: 72
End: 2022-11-07
Payer: MEDICARE

## 2022-11-07 RX ORDER — METHOCARBAMOL 500 MG/1
500 TABLET, FILM COATED ORAL 3 TIMES DAILY PRN
Qty: 21 TABLET | Refills: 0 | Status: SHIPPED | OUTPATIENT
Start: 2022-11-07 | End: 2022-11-11

## 2022-11-07 NOTE — TELEPHONE ENCOUNTER
I called the patient.    Tizanidine made him lightheaded.  He wants to go back to baclofen and try something else.  I considered cyclobenzaprine but there was a chance of causing LBBB.  I sent a prescription for methocarbamol, a week supply as a trial before sending a month supply.

## 2022-11-07 NOTE — TELEPHONE ENCOUNTER
----- Message from Christen Barnes sent at 11/7/2022  8:40 AM CST -----  Regarding: advice  Contact: @ [phone]  Pt is calling to say that the following medication tiZANidine (ZANAFLEX) 4 MG tablet is not god for him he said he is not walk and light head said he never felt like this before wants to know can he go back to the old medicine please call and adv@ [phone]

## 2022-11-11 ENCOUNTER — TELEPHONE (OUTPATIENT)
Dept: PHYSICAL MEDICINE AND REHAB | Facility: CLINIC | Age: 72
End: 2022-11-11
Payer: MEDICARE

## 2022-11-11 RX ORDER — BACLOFEN 10 MG/1
10 TABLET ORAL 3 TIMES DAILY PRN
Qty: 90 TABLET | Refills: 1 | Status: SHIPPED | OUTPATIENT
Start: 2022-11-11 | End: 2022-11-17

## 2022-11-11 NOTE — TELEPHONE ENCOUNTER
----- Message from Priscilla Ochoa MA sent at 11/11/2022 10:39 AM CST -----  Contact: [phone]  Pt's wife Bridget is calling on behalf of pt reporting that medication methocarbamoL (ROBAXIN) 500 MG Tab is not working wants to go back to original prescription Baclofen. Pt's wife can be reached at [phone](pt) and [phone](wife)

## 2022-11-30 DIAGNOSIS — M47.12 CERVICAL SPONDYLOSIS WITH MYELOPATHY: ICD-10-CM

## 2022-11-30 DIAGNOSIS — G89.4 CHRONIC PAIN SYNDROME: ICD-10-CM

## 2022-11-30 DIAGNOSIS — G90.50 COMPLEX REGIONAL PAIN SYNDROME TYPE 1, AFFECTING UNSPECIFIED SITE: ICD-10-CM

## 2022-11-30 DIAGNOSIS — M54.12 BRACHIAL NEURITIS OR RADICULITIS: ICD-10-CM

## 2022-11-30 DIAGNOSIS — F11.20 NARCOTIC DEPENDENCY, CONTINUOUS: ICD-10-CM

## 2022-11-30 DIAGNOSIS — M48.02 SPINAL STENOSIS IN CERVICAL REGION: ICD-10-CM

## 2022-11-30 DIAGNOSIS — M50.30 DEGENERATION OF CERVICAL INTERVERTEBRAL DISC: ICD-10-CM

## 2022-11-30 DIAGNOSIS — G56.41 COMPLEX REGIONAL PAIN SYNDROME TYPE 2 OF RIGHT UPPER EXTREMITY: ICD-10-CM

## 2022-11-30 DIAGNOSIS — M96.1 CERVICAL POST-LAMINECTOMY SYNDROME: ICD-10-CM

## 2022-11-30 DIAGNOSIS — R29.898 RIGHT HAND WEAKNESS: ICD-10-CM

## 2022-11-30 DIAGNOSIS — M79.601 RIGHT ARM PAIN: ICD-10-CM

## 2022-11-30 DIAGNOSIS — M50.00 HNP (HERNIATED NUCLEUS PULPOSUS) WITH MYELOPATHY, CERVICAL: ICD-10-CM

## 2022-11-30 DIAGNOSIS — M75.101 ROTATOR CUFF SYNDROME OF RIGHT SHOULDER: ICD-10-CM

## 2022-11-30 DIAGNOSIS — M48.02 CERVICAL STENOSIS OF SPINE: ICD-10-CM

## 2022-11-30 DIAGNOSIS — R26.9 GAIT ABNORMALITY: ICD-10-CM

## 2022-11-30 DIAGNOSIS — M62.81 MUSCLE RIGHT ARM WEAKNESS: ICD-10-CM

## 2022-11-30 DIAGNOSIS — M54.12 CERVICAL RADICULAR PAIN: ICD-10-CM

## 2022-11-30 DIAGNOSIS — R29.898 RIGHT ARM WEAKNESS: ICD-10-CM

## 2022-11-30 DIAGNOSIS — M47.812 FACET ARTHRITIS OF CERVICAL REGION: ICD-10-CM

## 2022-11-30 RX ORDER — GABAPENTIN 600 MG/1
600 TABLET ORAL 2 TIMES DAILY
Qty: 180 TABLET | Refills: 3 | Status: SHIPPED | OUTPATIENT
Start: 2022-11-30 | End: 2023-02-02

## 2022-11-30 NOTE — TELEPHONE ENCOUNTER
No new care gaps identified.  [facility] Embedded Care Gaps. Reference number: [id number]. 11/30/2022   8:15:05 AM CST

## 2022-12-12 ENCOUNTER — TELEPHONE (OUTPATIENT)
Dept: INTERNAL MEDICINE | Facility: CLINIC | Age: 72
End: 2022-12-12
Payer: MEDICARE

## 2022-12-12 NOTE — TELEPHONE ENCOUNTER
Pt. Informed we only have a couple of HD flu vaccines in the office and I am afraid they will be gone by tomorrow. Informed I will call him as soon as a new batch arrives, and make his appointment at that time. Verbalized understanding.

## 2022-12-13 ENCOUNTER — CLINICAL SUPPORT (OUTPATIENT)
Dept: FAMILY MEDICINE | Facility: CLINIC | Age: 72
End: 2022-12-13
Payer: MEDICARE

## 2022-12-13 DIAGNOSIS — Z23 FLU VACCINE NEED: Primary | ICD-10-CM

## 2022-12-13 DIAGNOSIS — Z23 NEED FOR STREPTOCOCCUS PNEUMONIAE VACCINATION: Primary | ICD-10-CM

## 2022-12-13 PROCEDURE — 90677 PNEUMOCOCCAL CONJUGATE VACCINE 20-VALENT: ICD-10-PCS | Mod: S$GLB,,, | Performed by: FAMILY MEDICINE

## 2022-12-13 PROCEDURE — G0009 FLU VACCINE - QUADRIVALENT - ADJUVANTED: ICD-10-PCS | Mod: S$GLB,,, | Performed by: FAMILY MEDICINE

## 2022-12-13 PROCEDURE — 90694 FLU VACCINE - QUADRIVALENT - ADJUVANTED: ICD-10-PCS | Mod: S$GLB,,, | Performed by: FAMILY MEDICINE

## 2022-12-13 PROCEDURE — 90677 PCV20 VACCINE IM: CPT | Mod: S$GLB,,, | Performed by: FAMILY MEDICINE

## 2022-12-13 PROCEDURE — G0008 PNEUMOCOCCAL CONJUGATE VACCINE 20-VALENT: ICD-10-PCS | Mod: S$GLB,,, | Performed by: FAMILY MEDICINE

## 2022-12-13 PROCEDURE — 90694 VACC AIIV4 NO PRSRV 0.5ML IM: CPT | Mod: S$GLB,,, | Performed by: FAMILY MEDICINE

## 2022-12-13 PROCEDURE — G0009 ADMIN PNEUMOCOCCAL VACCINE: HCPCS | Mod: S$GLB,,, | Performed by: FAMILY MEDICINE

## 2022-12-13 PROCEDURE — G0008 ADMIN INFLUENZA VIRUS VAC: HCPCS | Mod: S$GLB,,, | Performed by: FAMILY MEDICINE

## 2022-12-23 DIAGNOSIS — M54.12 BRACHIAL NEURITIS OR RADICULITIS: ICD-10-CM

## 2022-12-23 DIAGNOSIS — F11.20 NARCOTIC DEPENDENCY, CONTINUOUS: ICD-10-CM

## 2022-12-23 DIAGNOSIS — R29.898 RIGHT ARM WEAKNESS: ICD-10-CM

## 2022-12-23 DIAGNOSIS — M47.812 FACET ARTHRITIS OF CERVICAL REGION: ICD-10-CM

## 2022-12-23 DIAGNOSIS — M54.12 CERVICAL RADICULAR PAIN: ICD-10-CM

## 2022-12-23 DIAGNOSIS — G89.4 CHRONIC PAIN SYNDROME: ICD-10-CM

## 2022-12-23 DIAGNOSIS — M75.101 ROTATOR CUFF SYNDROME OF RIGHT SHOULDER: ICD-10-CM

## 2022-12-23 DIAGNOSIS — G90.50 COMPLEX REGIONAL PAIN SYNDROME TYPE 1, AFFECTING UNSPECIFIED SITE: ICD-10-CM

## 2022-12-23 DIAGNOSIS — M62.81 MUSCLE RIGHT ARM WEAKNESS: ICD-10-CM

## 2022-12-23 DIAGNOSIS — M96.1 CERVICAL POST-LAMINECTOMY SYNDROME: ICD-10-CM

## 2022-12-23 DIAGNOSIS — R29.898 RIGHT HAND WEAKNESS: ICD-10-CM

## 2022-12-23 DIAGNOSIS — G56.41 COMPLEX REGIONAL PAIN SYNDROME TYPE 2 OF RIGHT UPPER EXTREMITY: ICD-10-CM

## 2022-12-23 DIAGNOSIS — M50.30 DEGENERATION OF CERVICAL INTERVERTEBRAL DISC: ICD-10-CM

## 2022-12-23 DIAGNOSIS — M50.00 HNP (HERNIATED NUCLEUS PULPOSUS) WITH MYELOPATHY, CERVICAL: ICD-10-CM

## 2022-12-23 DIAGNOSIS — M48.02 CERVICAL STENOSIS OF SPINE: ICD-10-CM

## 2022-12-23 DIAGNOSIS — M48.02 SPINAL STENOSIS IN CERVICAL REGION: ICD-10-CM

## 2022-12-23 DIAGNOSIS — M79.601 RIGHT ARM PAIN: ICD-10-CM

## 2022-12-23 DIAGNOSIS — M54.12 CERVICAL RADICULOPATHY: ICD-10-CM

## 2022-12-23 DIAGNOSIS — M47.12 CERVICAL SPONDYLOSIS WITH MYELOPATHY: ICD-10-CM

## 2022-12-23 RX ORDER — HYDROCODONE BITARTRATE AND ACETAMINOPHEN 10; 325 MG/1; MG/1
1 TABLET ORAL EVERY 6 HOURS PRN
Qty: 120 TABLET | Refills: 0 | Status: SHIPPED | OUTPATIENT
Start: 2022-12-28 | End: 2023-01-24 | Stop reason: SDUPTHER

## 2022-12-23 NOTE — TELEPHONE ENCOUNTER
----- Message from Cary Luna sent at 12/23/2022  8:56 AM CST -----  Regarding: Refill  Contact: Pt @ [phone]  Rx Refill/Request    Is this a Refill or New Rx:refill    Rx Name and Strength:HYDROcodone-acetaminophen (NORCO)  mg per tablet    Preferred Pharmacy with phone number:  [facility] #[id number] [address] [facility] [address]  [facility] [address]  [facility] [address]  Phone: [phone] Fax: [phone]    Communication Preference:Pt @ [phone]  Additional Information:

## 2023-01-24 DIAGNOSIS — R29.898 RIGHT ARM WEAKNESS: ICD-10-CM

## 2023-01-24 DIAGNOSIS — R29.898 RIGHT HAND WEAKNESS: ICD-10-CM

## 2023-01-24 DIAGNOSIS — M62.81 MUSCLE RIGHT ARM WEAKNESS: ICD-10-CM

## 2023-01-24 DIAGNOSIS — M48.02 CERVICAL STENOSIS OF SPINE: ICD-10-CM

## 2023-01-24 DIAGNOSIS — F11.20 NARCOTIC DEPENDENCY, CONTINUOUS: ICD-10-CM

## 2023-01-24 DIAGNOSIS — M47.12 CERVICAL SPONDYLOSIS WITH MYELOPATHY: ICD-10-CM

## 2023-01-24 DIAGNOSIS — M96.1 CERVICAL POST-LAMINECTOMY SYNDROME: ICD-10-CM

## 2023-01-24 DIAGNOSIS — M50.00 HNP (HERNIATED NUCLEUS PULPOSUS) WITH MYELOPATHY, CERVICAL: ICD-10-CM

## 2023-01-24 DIAGNOSIS — M75.101 ROTATOR CUFF SYNDROME OF RIGHT SHOULDER: ICD-10-CM

## 2023-01-24 DIAGNOSIS — G90.50 COMPLEX REGIONAL PAIN SYNDROME TYPE 1, AFFECTING UNSPECIFIED SITE: ICD-10-CM

## 2023-01-24 DIAGNOSIS — M48.02 SPINAL STENOSIS IN CERVICAL REGION: ICD-10-CM

## 2023-01-24 DIAGNOSIS — M54.12 CERVICAL RADICULOPATHY: ICD-10-CM

## 2023-01-24 DIAGNOSIS — G56.41 COMPLEX REGIONAL PAIN SYNDROME TYPE 2 OF RIGHT UPPER EXTREMITY: ICD-10-CM

## 2023-01-24 DIAGNOSIS — M54.12 BRACHIAL NEURITIS OR RADICULITIS: ICD-10-CM

## 2023-01-24 DIAGNOSIS — M47.812 FACET ARTHRITIS OF CERVICAL REGION: ICD-10-CM

## 2023-01-24 DIAGNOSIS — M79.601 RIGHT ARM PAIN: ICD-10-CM

## 2023-01-24 DIAGNOSIS — G89.4 CHRONIC PAIN SYNDROME: ICD-10-CM

## 2023-01-24 DIAGNOSIS — M50.30 DEGENERATION OF CERVICAL INTERVERTEBRAL DISC: ICD-10-CM

## 2023-01-24 DIAGNOSIS — M54.12 CERVICAL RADICULAR PAIN: ICD-10-CM

## 2023-01-24 RX ORDER — HYDROCODONE BITARTRATE AND ACETAMINOPHEN 10; 325 MG/1; MG/1
1 TABLET ORAL EVERY 6 HOURS PRN
Qty: 120 TABLET | Refills: 0 | Status: SHIPPED | OUTPATIENT
Start: 2023-01-27 | End: 2023-02-24 | Stop reason: SDUPTHER

## 2023-01-24 NOTE — TELEPHONE ENCOUNTER
----- Message from Cary Luna sent at 1/24/2023  9:11 AM CST -----  Regarding: Refill  Contact: Pt @ [phone]  Rx Refill/Request    Is this a Refill or New Rx:Refill    Rx Name and Strength:HYDROcodone-acetaminophen (NORCO)  mg per tablet    Preferred Pharmacy with phone number:  [facility] #[id number] [place], [place] [address] AT [facility] [address]  [facility] [address]  [facility] [address]  Phone: [phone] Fax: [phone]    Communication Preference:Pt @ [phone]    Additional Information:

## 2023-01-27 ENCOUNTER — CLINICAL SUPPORT (OUTPATIENT)
Dept: CARDIOLOGY | Facility: HOSPITAL | Age: 73
End: 2023-01-27
Payer: MEDICARE

## 2023-01-27 DIAGNOSIS — Z95.810 PRESENCE OF AUTOMATIC (IMPLANTABLE) CARDIAC DEFIBRILLATOR: ICD-10-CM

## 2023-02-02 ENCOUNTER — OFFICE VISIT (OUTPATIENT)
Dept: PHYSICAL MEDICINE AND REHAB | Facility: CLINIC | Age: 73
End: 2023-02-02
Payer: MEDICARE

## 2023-02-02 VITALS
HEIGHT: 71 IN | DIASTOLIC BLOOD PRESSURE: 85 MMHG | WEIGHT: 186.19 LBS | BODY MASS INDEX: 26.06 KG/M2 | HEART RATE: 73 BPM | SYSTOLIC BLOOD PRESSURE: 133 MMHG

## 2023-02-02 DIAGNOSIS — G90.50 COMPLEX REGIONAL PAIN SYNDROME TYPE 1, AFFECTING UNSPECIFIED SITE: ICD-10-CM

## 2023-02-02 DIAGNOSIS — M47.812 FACET ARTHRITIS OF CERVICAL REGION: ICD-10-CM

## 2023-02-02 DIAGNOSIS — M47.12 CERVICAL SPONDYLOSIS WITH MYELOPATHY: ICD-10-CM

## 2023-02-02 DIAGNOSIS — M54.12 CERVICAL RADICULAR PAIN: ICD-10-CM

## 2023-02-02 DIAGNOSIS — Z79.891 CHRONICALLY ON OPIATE THERAPY: ICD-10-CM

## 2023-02-02 DIAGNOSIS — G56.41 COMPLEX REGIONAL PAIN SYNDROME TYPE 2 OF RIGHT UPPER EXTREMITY: ICD-10-CM

## 2023-02-02 DIAGNOSIS — R26.9 GAIT DISORDER: ICD-10-CM

## 2023-02-02 DIAGNOSIS — M75.101 ROTATOR CUFF SYNDROME OF RIGHT SHOULDER: ICD-10-CM

## 2023-02-02 DIAGNOSIS — M54.2 CHRONIC NECK PAIN: Primary | ICD-10-CM

## 2023-02-02 DIAGNOSIS — R29.898 RIGHT ARM WEAKNESS: ICD-10-CM

## 2023-02-02 DIAGNOSIS — M54.12 BRACHIAL NEURITIS OR RADICULITIS: ICD-10-CM

## 2023-02-02 DIAGNOSIS — M62.81 MUSCLE RIGHT ARM WEAKNESS: ICD-10-CM

## 2023-02-02 DIAGNOSIS — M50.00 HNP (HERNIATED NUCLEUS PULPOSUS) WITH MYELOPATHY, CERVICAL: ICD-10-CM

## 2023-02-02 DIAGNOSIS — M96.1 CERVICAL POST-LAMINECTOMY SYNDROME: ICD-10-CM

## 2023-02-02 DIAGNOSIS — M48.02 CERVICAL STENOSIS OF SPINE: ICD-10-CM

## 2023-02-02 DIAGNOSIS — M50.30 DEGENERATION OF CERVICAL INTERVERTEBRAL DISC: ICD-10-CM

## 2023-02-02 DIAGNOSIS — F11.20 NARCOTIC DEPENDENCY, CONTINUOUS: ICD-10-CM

## 2023-02-02 DIAGNOSIS — M47.812 SPONDYLOSIS OF CERVICAL REGION WITHOUT MYELOPATHY OR RADICULOPATHY: ICD-10-CM

## 2023-02-02 DIAGNOSIS — M48.02 SPINAL STENOSIS IN CERVICAL REGION: ICD-10-CM

## 2023-02-02 DIAGNOSIS — R29.898 RIGHT HAND WEAKNESS: ICD-10-CM

## 2023-02-02 DIAGNOSIS — M79.601 RIGHT ARM PAIN: ICD-10-CM

## 2023-02-02 DIAGNOSIS — R26.9 GAIT ABNORMALITY: ICD-10-CM

## 2023-02-02 DIAGNOSIS — G89.29 CHRONIC NECK PAIN: Primary | ICD-10-CM

## 2023-02-02 DIAGNOSIS — G89.4 CHRONIC PAIN SYNDROME: ICD-10-CM

## 2023-02-02 PROCEDURE — 1125F PR PAIN SEVERITY QUANTIFIED, PAIN PRESENT: ICD-10-PCS | Mod: HCNC,CPTII,S$GLB, | Performed by: PHYSICAL MEDICINE & REHABILITATION

## 2023-02-02 PROCEDURE — 3288F FALL RISK ASSESSMENT DOCD: CPT | Mod: HCNC,CPTII,S$GLB, | Performed by: PHYSICAL MEDICINE & REHABILITATION

## 2023-02-02 PROCEDURE — 3008F BODY MASS INDEX DOCD: CPT | Mod: HCNC,CPTII,S$GLB, | Performed by: PHYSICAL MEDICINE & REHABILITATION

## 2023-02-02 PROCEDURE — 1101F PR PT FALLS ASSESS DOC 0-1 FALLS W/OUT INJ PAST YR: ICD-10-PCS | Mod: HCNC,CPTII,S$GLB, | Performed by: PHYSICAL MEDICINE & REHABILITATION

## 2023-02-02 PROCEDURE — 1159F MED LIST DOCD IN RCRD: CPT | Mod: HCNC,CPTII,S$GLB, | Performed by: PHYSICAL MEDICINE & REHABILITATION

## 2023-02-02 PROCEDURE — 3072F LOW RISK FOR RETINOPATHY: CPT | Mod: HCNC,CPTII,S$GLB, | Performed by: PHYSICAL MEDICINE & REHABILITATION

## 2023-02-02 PROCEDURE — 1159F PR MEDICATION LIST DOCUMENTED IN MEDICAL RECORD: ICD-10-PCS | Mod: HCNC,CPTII,S$GLB, | Performed by: PHYSICAL MEDICINE & REHABILITATION

## 2023-02-02 PROCEDURE — 3072F PR LOW RISK FOR RETINOPATHY: ICD-10-PCS | Mod: HCNC,CPTII,S$GLB, | Performed by: PHYSICAL MEDICINE & REHABILITATION

## 2023-02-02 PROCEDURE — 3079F PR MOST RECENT DIASTOLIC BLOOD PRESSURE 80-89 MM HG: ICD-10-PCS | Mod: HCNC,CPTII,S$GLB, | Performed by: PHYSICAL MEDICINE & REHABILITATION

## 2023-02-02 PROCEDURE — 99214 PR OFFICE/OUTPT VISIT, EST, LEVL IV, 30-39 MIN: ICD-10-PCS | Mod: HCNC,S$GLB,, | Performed by: PHYSICAL MEDICINE & REHABILITATION

## 2023-02-02 PROCEDURE — 3075F PR MOST RECENT SYSTOLIC BLOOD PRESS GE 130-139MM HG: ICD-10-PCS | Mod: HCNC,CPTII,S$GLB, | Performed by: PHYSICAL MEDICINE & REHABILITATION

## 2023-02-02 PROCEDURE — 3075F SYST BP GE 130 - 139MM HG: CPT | Mod: HCNC,CPTII,S$GLB, | Performed by: PHYSICAL MEDICINE & REHABILITATION

## 2023-02-02 PROCEDURE — 1125F AMNT PAIN NOTED PAIN PRSNT: CPT | Mod: HCNC,CPTII,S$GLB, | Performed by: PHYSICAL MEDICINE & REHABILITATION

## 2023-02-02 PROCEDURE — 4010F ACE/ARB THERAPY RXD/TAKEN: CPT | Mod: HCNC,CPTII,S$GLB, | Performed by: PHYSICAL MEDICINE & REHABILITATION

## 2023-02-02 PROCEDURE — 4010F PR ACE/ARB THEARPY RXD/TAKEN: ICD-10-PCS | Mod: HCNC,CPTII,S$GLB, | Performed by: PHYSICAL MEDICINE & REHABILITATION

## 2023-02-02 PROCEDURE — 3008F PR BODY MASS INDEX (BMI) DOCUMENTED: ICD-10-PCS | Mod: HCNC,CPTII,S$GLB, | Performed by: PHYSICAL MEDICINE & REHABILITATION

## 2023-02-02 PROCEDURE — 99999 PR PBB SHADOW E&M-EST. PATIENT-LVL IV: CPT | Mod: PBBFAC,HCNC,, | Performed by: PHYSICAL MEDICINE & REHABILITATION

## 2023-02-02 PROCEDURE — 3288F PR FALLS RISK ASSESSMENT DOCUMENTED: ICD-10-PCS | Mod: HCNC,CPTII,S$GLB, | Performed by: PHYSICAL MEDICINE & REHABILITATION

## 2023-02-02 PROCEDURE — 99214 OFFICE O/P EST MOD 30 MIN: CPT | Mod: HCNC,S$GLB,, | Performed by: PHYSICAL MEDICINE & REHABILITATION

## 2023-02-02 PROCEDURE — 99999 PR PBB SHADOW E&M-EST. PATIENT-LVL IV: ICD-10-PCS | Mod: PBBFAC,HCNC,, | Performed by: PHYSICAL MEDICINE & REHABILITATION

## 2023-02-02 PROCEDURE — 1101F PT FALLS ASSESS-DOCD LE1/YR: CPT | Mod: HCNC,CPTII,S$GLB, | Performed by: PHYSICAL MEDICINE & REHABILITATION

## 2023-02-02 PROCEDURE — 3079F DIAST BP 80-89 MM HG: CPT | Mod: HCNC,CPTII,S$GLB, | Performed by: PHYSICAL MEDICINE & REHABILITATION

## 2023-02-02 RX ORDER — GABAPENTIN 600 MG/1
600 TABLET ORAL 3 TIMES DAILY
Qty: 270 TABLET | Refills: 1
Start: 2023-02-02 | End: 2023-03-01 | Stop reason: SDUPTHER

## 2023-02-02 NOTE — PROGRESS NOTES
Subjective:       Patient ID: Xander Sanchez is a 72 y.o. male.    Chief Complaint: No chief complaint on file.      HPI      Mr. Sanchez is a 72-year-old black male with past medical history of hypertension, diabetes mellitus, peripheral neuropathy, RA, nonischemic cardiomyopathy, chronic systolic heart failure, status post AICD, COPD, osteoarthritis, chronic neck pain status post 3 neck surgeries, CRPS type 1 of right arm, GERD.  The patient is followed up at the [facility] for chronic neck pain with right cervical radiculopathy, status post multiple neck surgeries (C3-6 laminectomy in 11/2012, C5-6 ACDF in 04/2014, and C3-7 posterior fusion in 08/2015).  He was last seen on 11/01/2022.  He was maintained on gabapentin and p.r.n. hydrocodone/APAP.  He was started on tizanidine.    The patient send a message to the clinic on 11/07/2022 indicating that tizanidine made him lightheaded.  Cyclobenzaprine was considered but there was a chance of causing LBBB.  A prescription for methocarbamol was sent.    The patient is coming to the clinic for follow-up.  His neck pain has been under good control. However, his right arm has been worse. It feels light painful tightness and burning or cold sensation from the shoulder area shooting to his hand.  His pain is worse when he wakes up in the morning.  It is better with his pain medications.  His max pain is 6-7/10 and minimum 3/10.  Today it is 4/10.  The patient denies any significant upper extremity weakness.  He complains of spasms in his right arm.  He has chronic impaired hand coordination such as trouble buttoning or writing.  He he reports occasional stumbling and falling due to lower extremity weakness and coordination problems.    He is currently taking:  Gabapentin 600 mg p.o. 3 times per day.  He reports sedation with higher doses.    Hydrocodone/APAP 10/325 p.r.n.4 times per day   Baclofen 10 mg p.o. t.i.d. p.r.n..  Methocarbamol did not  help.      Past Medical History:   Diagnosis Date    Allergy     Arthritis     Asthma     Cardiomyopathy     Cataract     Cervical radicular pain     Cervical spondylosis with myelopathy 10/17/2012    Chronic bronchitis     Chronic systolic dysfunction of left ventricle 07/27/2015    Constipation 07/27/2015    COPD (chronic obstructive pulmonary disease)     CRPS (complex regional pain syndrome type I) 12/29/2014    Diabetes mellitus, type 2     DM type 2 (diabetes mellitus, type 2) 07/27/2015    Enlarged prostate 07/27/2015    Enuresis 07/06/2018    Hypertension     ICD (implantable cardiac defibrillator) in place     Mixed hyperlipidemia 02/28/2018    Presence of biventricular AICD 07/27/2015    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thy 02/01/2016    Urinary tract infection     pt does not know        Review of patient's allergies indicates:   Allergen Reactions    Ciprofloxacin Nausea And Vomiting        Review of Systems   Constitutional:  Negative for chills and fever.   Eyes:  Negative for visual disturbance.   Respiratory:  Positive for shortness of breath.    Cardiovascular:  Negative for chest pain.   Gastrointestinal:  Negative for constipation, nausea and vomiting.   Genitourinary:  Negative for difficulty urinating.   Musculoskeletal:  Positive for gait problem, myalgias and neck pain. Negative for back pain.   Neurological:  Negative for dizziness, weakness and headaches.   Psychiatric/Behavioral:  Negative for behavioral problems and sleep disturbance.            Objective:      Physical Exam  Vitals reviewed.   Constitutional:       General: He is not in acute distress.     Appearance: He is well-developed.   HENT:      Head: Normocephalic and atraumatic.   Neck:      Comments: Healed posterior and anterior neck surgical scars.  Decreased ROM in all planes.  -ve tenderness  Musculoskeletal:      Comments: BUE:  ROM:   RUE: full.   LUE: full.  Strength:    RUE: 5/5 at shoulder abduction, 5  elbow flexion, 5 wrist extension, 5 elbow extension, 5 finger flexion, 5 finger abduction.   LUE: 5/5 at shoulder abduction, 5 elbow flexion, 5 wrist extension, 5 elbow extension, 5 finger flexion, 5 finger abduction.  Sensation to pinprick:   RUE: intact.   LUE: intact.  DTR:    RUE: +1 biceps, +1 triceps.   LUE:  +1 biceps, +1 triceps.  Rojas:   RUE: -ve.   LUE: -ve.    BLE:  ROM:   RLE: full.   LLE: full.  Strength:    RLE: 5/5 at hip flexion, 5 knee extension, 5 ankle DF, 5 PF, 5 EHL.   LLE: 5/5 at hip flexion, 5 knee extension, 5 ankle DF, 5 PF, 5 EHL.  Sensation to pinprick:     RLE: intact.      LLE: intact.   DTR:     RLE: +1 knee, +1 ankle.    LLE: +1 knee, +1 ankle.  Clonus:    Rt ankle: -ve.    Lt ankle: -ve.    Gait:  Slow aiden, some shuffling and circumduction on the right side.     Skin:     General: Skin is warm.   Neurological:      Mental Status: He is alert.   Psychiatric:         Behavior: Behavior normal.       Assessment:       1. Chronic neck pain    2. Cervical post-laminectomy syndrome    3. Spondylosis of cervical region without myelopathy or radiculopathy    4. Chronically on opiate therapy          Plan:       - Oral NSAIDs will be avoided due to cardiac illness.  - Continue gabapentin 600 mg, 1 tablet by mouth 3 times per day   - Continue hydrocodone/APAP 10/325, 1 tablet by mouth 4 times per day as needed for pain (our clinic will take over this prescription).    - Continue baclofen 10 mg tablets; take 1 tablet by mouth 3 times per day as needed for muscle spasms.  - Referral to physical therapy to work on neck exercises and evaluation for his gait.  - Referral to occupational therapy to work on hand coordination and activities of daily living as well as adaptive devices as needed for his chronic cervical myelopathy.  - Follow up in about 4 months (around 6/2/2023).    This was a 30 minute visit (including review of records.  About 50% of the visit was spent educating the patient  about the diagnosis and the treatment plan.    This note was partly generated with [employer] voice recognition software. I apologize for any possible typographical errors.

## 2023-02-06 ENCOUNTER — OFFICE VISIT (OUTPATIENT)
Dept: PODIATRY | Facility: CLINIC | Age: 73
End: 2023-02-06
Payer: MEDICARE

## 2023-02-06 VITALS
WEIGHT: 186 LBS | DIASTOLIC BLOOD PRESSURE: 83 MMHG | HEART RATE: 59 BPM | SYSTOLIC BLOOD PRESSURE: 137 MMHG | HEIGHT: 71 IN | BODY MASS INDEX: 26.04 KG/M2

## 2023-02-06 DIAGNOSIS — E11.49 TYPE 2 DIABETES MELLITUS WITH NEUROLOGICAL MANIFESTATIONS: Primary | ICD-10-CM

## 2023-02-06 DIAGNOSIS — B35.1 ONYCHOMYCOSIS: ICD-10-CM

## 2023-02-06 PROCEDURE — 4010F ACE/ARB THERAPY RXD/TAKEN: CPT | Mod: HCNC,CPTII,S$GLB, | Performed by: PODIATRIST

## 2023-02-06 PROCEDURE — 3075F SYST BP GE 130 - 139MM HG: CPT | Mod: HCNC,CPTII,S$GLB, | Performed by: PODIATRIST

## 2023-02-06 PROCEDURE — 3288F PR FALLS RISK ASSESSMENT DOCUMENTED: ICD-10-PCS | Mod: HCNC,CPTII,S$GLB, | Performed by: PODIATRIST

## 2023-02-06 PROCEDURE — 11721 DEBRIDE NAIL 6 OR MORE: CPT | Mod: Q9,HCNC,S$GLB, | Performed by: PODIATRIST

## 2023-02-06 PROCEDURE — 4010F PR ACE/ARB THEARPY RXD/TAKEN: ICD-10-PCS | Mod: HCNC,CPTII,S$GLB, | Performed by: PODIATRIST

## 2023-02-06 PROCEDURE — 11721 ROUTINE FOOT CARE: ICD-10-PCS | Mod: Q9,HCNC,S$GLB, | Performed by: PODIATRIST

## 2023-02-06 PROCEDURE — 99999 PR PBB SHADOW E&M-EST. PATIENT-LVL IV: ICD-10-PCS | Mod: PBBFAC,HCNC,, | Performed by: PODIATRIST

## 2023-02-06 PROCEDURE — 3079F DIAST BP 80-89 MM HG: CPT | Mod: HCNC,CPTII,S$GLB, | Performed by: PODIATRIST

## 2023-02-06 PROCEDURE — 1100F PR PT FALLS ASSESS DOC 2+ FALLS/FALL W/INJURY/YR: ICD-10-PCS | Mod: HCNC,CPTII,S$GLB, | Performed by: PODIATRIST

## 2023-02-06 PROCEDURE — 1159F MED LIST DOCD IN RCRD: CPT | Mod: HCNC,CPTII,S$GLB, | Performed by: PODIATRIST

## 2023-02-06 PROCEDURE — 99499 NO LOS: ICD-10-PCS | Mod: HCNC,S$GLB,, | Performed by: PODIATRIST

## 2023-02-06 PROCEDURE — 1126F AMNT PAIN NOTED NONE PRSNT: CPT | Mod: HCNC,CPTII,S$GLB, | Performed by: PODIATRIST

## 2023-02-06 PROCEDURE — 3072F LOW RISK FOR RETINOPATHY: CPT | Mod: HCNC,CPTII,S$GLB, | Performed by: PODIATRIST

## 2023-02-06 PROCEDURE — 3008F BODY MASS INDEX DOCD: CPT | Mod: HCNC,CPTII,S$GLB, | Performed by: PODIATRIST

## 2023-02-06 PROCEDURE — 3288F FALL RISK ASSESSMENT DOCD: CPT | Mod: HCNC,CPTII,S$GLB, | Performed by: PODIATRIST

## 2023-02-06 PROCEDURE — 3008F PR BODY MASS INDEX (BMI) DOCUMENTED: ICD-10-PCS | Mod: HCNC,CPTII,S$GLB, | Performed by: PODIATRIST

## 2023-02-06 PROCEDURE — 1160F RVW MEDS BY RX/DR IN RCRD: CPT | Mod: HCNC,CPTII,S$GLB, | Performed by: PODIATRIST

## 2023-02-06 PROCEDURE — 1100F PTFALLS ASSESS-DOCD GE2>/YR: CPT | Mod: HCNC,CPTII,S$GLB, | Performed by: PODIATRIST

## 2023-02-06 PROCEDURE — 99999 PR PBB SHADOW E&M-EST. PATIENT-LVL IV: CPT | Mod: PBBFAC,HCNC,, | Performed by: PODIATRIST

## 2023-02-06 PROCEDURE — 3079F PR MOST RECENT DIASTOLIC BLOOD PRESSURE 80-89 MM HG: ICD-10-PCS | Mod: HCNC,CPTII,S$GLB, | Performed by: PODIATRIST

## 2023-02-06 PROCEDURE — 3075F PR MOST RECENT SYSTOLIC BLOOD PRESS GE 130-139MM HG: ICD-10-PCS | Mod: HCNC,CPTII,S$GLB, | Performed by: PODIATRIST

## 2023-02-06 PROCEDURE — 3072F PR LOW RISK FOR RETINOPATHY: ICD-10-PCS | Mod: HCNC,CPTII,S$GLB, | Performed by: PODIATRIST

## 2023-02-06 PROCEDURE — 1126F PR PAIN SEVERITY QUANTIFIED, NO PAIN PRESENT: ICD-10-PCS | Mod: HCNC,CPTII,S$GLB, | Performed by: PODIATRIST

## 2023-02-06 PROCEDURE — 1160F PR REVIEW ALL MEDS BY PRESCRIBER/CLIN PHARMACIST DOCUMENTED: ICD-10-PCS | Mod: HCNC,CPTII,S$GLB, | Performed by: PODIATRIST

## 2023-02-06 PROCEDURE — 99499 UNLISTED E&M SERVICE: CPT | Mod: HCNC,S$GLB,, | Performed by: PODIATRIST

## 2023-02-06 PROCEDURE — 1159F PR MEDICATION LIST DOCUMENTED IN MEDICAL RECORD: ICD-10-PCS | Mod: HCNC,CPTII,S$GLB, | Performed by: PODIATRIST

## 2023-02-06 NOTE — PROGRESS NOTES
Subjective:      Patient ID: Xander Sanchez is a 72 y.o. male.    Chief Complaint: Diabetic Foot Exam and Nail Care    Xander is a 72 y.o. male who presents to the clinic for evaluation and treatment of high risk feet. Xander has a past medical history of Allergy, Arthritis, Asthma, Cardiomyopathy, Cataract, Cervical radicular pain, Cervical spondylosis with myelopathy (10/17/2012), Chronic bronchitis, Chronic systolic dysfunction of left ventricle (07/27/2015), Constipation (07/27/2015), COPD (chronic obstructive pulmonary disease), CRPS (complex regional pain syndrome type I) (12/29/2014), Diabetes mellitus, type 2, DM type 2 (diabetes mellitus, type 2) (07/27/2015), Enlarged prostate (07/27/2015), Enuresis (07/06/2018), Hypertension, ICD (implantable cardiac defibrillator) in place, Mixed hyperlipidemia (02/28/2018), Presence of biventricular AICD (07/27/2015), 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thy (02/01/2016), and Urinary tract infection.  This patient has documented high risk feet requiring routine maintenance secondary to diabetes mellitis and those secondary complications of diabetes, as mentioned.  Treated per Physical medicine for chronic back pain. Complains that his pain in his feet are mostly at night when he lays down. Taking gabapentin 600 mg po qid for chronic neuropathic pain. No new complaints.    10/31/2019:  Presents routine diabetic foot care.  States that he was prescribed topical creams to be applied to dry scaly painful skin to the right 5th toe a couple months ago which has helped improve his symptoms.  Due to complaints today.  Seen by Milly Leiva NP on 09/19/2019.    3/2/20: Reports worsening right foot drag since November. Otherwise, no other changes. Painful scaly skin has resolved. Presents today for painful long toe nails.    6/5/2020: Presents for diabetic foot care. No new complaints.  10/09/2020:  Returns for routine foot care.  No new complaints.      06/13/2022:   Returns for annual foot exam.  Complains of elongated thickened toenails.    02/06/2023: Returns for routine foot care. Due for annual foot exam in June 2023.    Williams Alvarenga MD     08/08/2022    Past Medical History:   Diagnosis Date    Allergy     Arthritis     Asthma     Cardiomyopathy     Cataract     Cervical radicular pain     Cervical spondylosis with myelopathy 10/17/2012    Chronic bronchitis     Chronic systolic dysfunction of left ventricle 07/27/2015    Constipation 07/27/2015    COPD (chronic obstructive pulmonary disease)     CRPS (complex regional pain syndrome type I) 12/29/2014    Diabetes mellitus, type 2     DM type 2 (diabetes mellitus, type 2) 07/27/2015    Enlarged prostate 07/27/2015    Enuresis 07/06/2018    Hypertension     ICD (implantable cardiac defibrillator) in place     Mixed hyperlipidemia 02/28/2018    Presence of biventricular AICD 07/27/2015    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thy 02/01/2016    Urinary tract infection     pt does not know       Past Surgical History:   Procedure Laterality Date    CARDIAC DEFIBRILLATOR PLACEMENT      CATARACT EXTRACTION W/  INTRAOCULAR LENS IMPLANT Right 11/10/2020    Procedure: EXTRACTION, CATARACT, WITH IOL INSERTION;  Surgeon: Oral Graham MD;  Location: [facility];  Service: Ophthalmology;  Laterality: Right;    CATARACT EXTRACTION W/  INTRAOCULAR LENS IMPLANT Left 11/24/2020    Procedure: EXTRACTION, CATARACT, WITH IOL INSERTION;  Surgeon: Oral Graham MD;  Location: [facility] OR;  Service: Ophthalmology;  Laterality: Left;    CERVICAL SPINE SURGERY      COLONOSCOPY N/A 7/19/2017    Procedure: COLONOSCOPY  golytely;  Surgeon: Vannessa Uribe MD;  Location: [facility];  Service: Endoscopy;  Laterality: N/A;    SPINE SURGERY         Family History   Problem Relation Age of Onset    Hypertension Mother     Hypertension Father     COPD Father     No Known Problems Sister     No Known Problems Brother     No  Known Problems Daughter     Prostate cancer Neg Hx     Kidney disease Neg Hx        Social History     Socioeconomic History    Marital status:    Tobacco Use    Smoking status: Former     Packs/day: 1.00     Years: 40.00     Pack years: 40.00     Types: Cigarettes     Quit date: 2009     Years since quittin.5    Smokeless tobacco: Never   Substance and Sexual Activity    Alcohol use: Yes     Alcohol/week: 2.0 standard drinks     Types: 1 Cans of beer, 1 Shots of liquor per week     Comment: May 1-2 beers or whiskey per month    Drug use: No    Sexual activity: Yes     Partners: Female       Current Outpatient Medications   Medication Sig Dispense Refill    alcohol swabs PadM Apply 1 each topically as needed. 200 each 3    aspirin (ECOTRIN) 81 MG EC tablet Take 81 mg by mouth once daily.      baclofen (LIORESAL) 10 MG tablet Take 1 tablet (10 mg total) by mouth 3 (three) times daily. 270 tablet 0    blood sugar diagnostic (TRUE METRIX GLUCOSE TEST STRIP) Strp TEST  ONE  TIME  DAILY  AT  6AM 100 strip 3    blood-glucose meter (TRUE METRIX AIR GLUCOSE METER) kit USE AS INSTRUCTED 1 each 0    fluticasone-salmeterol 230-21 mcg/dose (ADVAIR HFA) 230-21 mcg/actuation HFAA inhaler Inhale 2 puffs into the lungs 2 (two) times daily. Controller 36 g 0    gabapentin (NEURONTIN) 600 MG tablet Take 1 tablet (600 mg total) by mouth 3 (three) times daily. 270 tablet 1    hydroCHLOROthiazide (MICROZIDE) 12.5 mg capsule TAKE 1 CAPSULE (12.5 MG TOTAL) BY MOUTH ONCE DAILY. 90 capsule 2    HYDROcodone-acetaminophen (NORCO)  mg per tablet Take 1 tablet by mouth every 6 (six) hours as needed for Pain. 120 tablet 0    lancets (TRUEPLUS LANCETS) 33 gauge Misc TEST ONE TIME DAILY AT 6:00AM 100 each 3    metoprolol succinate (TOPROL-XL) 25 MG 24 hr tablet TAKE 1 TABLET EVERY DAY 90 tablet 3    oxybutynin (DITROPAN) 5 MG Tab TAKE 1 TABLET TWICE DAILY 180 tablet 3    pantoprazole (PROTONIX) 40 MG tablet TAKE 1 TABLET BY  MOUTH ONCE DAILY BEFORE BREAKFAST 30 tablet 11    pravastatin (PRAVACHOL) 10 MG tablet TAKE 1 TABLET EVERY NIGHT 90 tablet 1    tamsulosin (FLOMAX) 0.4 mg Cap TAKE 1 CAPSULE EVERY DAY 90 capsule 3    valsartan (DIOVAN) 40 MG tablet TAKE 1 TABLET EVERY DAY 90 tablet 3    blood glucose control, low (TRUE METRIX LEVEL 1) Soln 1 Units by Misc.(Non-Drug; Combo Route) route once daily. 1 each 3     No current facility-administered medications for this visit.       Review of patient's allergies indicates:  No Known Allergies    PCP: Williams Alvarenga MD    Date Last Seen by PCP:     Current shoe gear:  Affected Foot: Casual shoes     Unaffected Foot: Casual shoes    Hemoglobin A1C   Date Value Ref Range Status   08/08/2022 6.1 (H) 4.0 - 5.6 % Final     Comment:     ADA Screening Guidelines:  5.7-6.4%  Consistent with prediabetes  >or=6.5%  Consistent with diabetes    High levels of fetal hemoglobin interfere with the HbA1C  assay. Heterozygous hemoglobin variants (HbS, HgC, etc)do  not significantly interfere with this assay.   However, presence of multiple variants may affect accuracy.     02/07/2022 6.1 (H) 4.0 - 5.6 % Final     Comment:     ADA Screening Guidelines:  5.7-6.4%  Consistent with prediabetes  >or=6.5%  Consistent with diabetes    High levels of fetal hemoglobin interfere with the HbA1C  assay. Heterozygous hemoglobin variants (HbS, HgC, etc)do  not significantly interfere with this assay.   However, presence of multiple variants may affect accuracy.     06/28/2021 6.1 (H) 4.0 - 5.6 % Final     Comment:     ADA Screening Guidelines:  5.7-6.4%  Consistent with prediabetes  >or=6.5%  Consistent with diabetes    High levels of fetal hemoglobin interfere with the HbA1C  assay. Heterozygous hemoglobin variants (HbS, HgC, etc)do  not significantly interfere with this assay.   However, presence of multiple variants may affect accuracy.         Review of Systems   Constitutional: Negative for chills and fever.    HENT:  Negative for hearing loss.    Cardiovascular:  Negative for chest pain and claudication.   Respiratory:  Negative for shortness of breath.    Skin:  Positive for color change and nail changes. Negative for itching.   Musculoskeletal:  Negative for back pain, falls, joint pain and muscle weakness.        Abnormal gait with dragging right foot   Gastrointestinal:  Negative for nausea and vomiting.   Neurological:  Negative for focal weakness, loss of balance, numbness and paresthesias.         Objective:      Physical Exam  Constitutional:       General: He is not in acute distress.     Appearance: He is not diaphoretic.   Cardiovascular:      Pulses:           Dorsalis pedis pulses are 2+ on the right side and 2+ on the left side.        Posterior tibial pulses are 1+ on the right side and 1+ on the left side.      Comments: CRT < 3 seconds to digits 1-5 bilateral, mild to moderate edema of bilateral lower extremity with varicosities.  Musculoskeletal:      Right foot: Decreased range of motion. No deformity.      Left foot: Decreased range of motion. No deformity.      Comments: Pes planovalgus bilateral foot.    No pain on palpation bilateral foot.    Gait examination reveals overall slight trendelenberg gait with high steppage foot.     Feet:      Right foot:      Protective Sensation: 10 sites tested.  10 sites sensed.      Skin integrity: Dry skin present. No ulcer, skin breakdown, erythema, warmth or callus.      Toenail Condition: Right toenails are abnormally thick and long. Fungal disease present.     Left foot:      Protective Sensation: 10 sites tested.  8 sites sensed.      Skin integrity: Dry skin present. No ulcer, skin breakdown, erythema, warmth or callus.      Toenail Condition: Left toenails are abnormally thick and long. Fungal disease present.  Skin:     General: Skin is warm and dry.      Capillary Refill: Capillary refill takes less than 2 seconds.      Coloration: Skin is not pale.       Findings: No ecchymosis, erythema, lesion, petechiae or rash.      Nails: There is no clubbing.      Comments: Skin is dry and flaky plantar foot bilateral improved from previous visit.    Nails 1-5 bilateral are thickened 3-4 mm, slightly yellow-brown with debris, loosened and elongated > 5 mm. Hallux nail bilateral is dystrophic and severely thickened.    Edema B/L LE 2+    No open lesions or macerations bilateral lower extremity.       Neurological:      Mental Status: He is alert and oriented to person, place, and time.      Sensory: Sensory deficit present.      Comments: (-) Tinel's sign  Light touch intact.  MMT 5/5 DF, PF, Inv, Ev L foot. R foot with 4/5 DF otherwise 5/5 for all other compartments.   Normal muscle tone.  No signs of atrophy.  No tremor or spasticity.             Assessment:       Encounter Diagnoses   Name Primary?    Type 2 diabetes mellitus with neurological manifestations Yes    Onychomycosis          Plan:       Xander was seen today for diabetic foot exam and nail care.    Diagnoses and all orders for this visit:    Type 2 diabetes mellitus with neurological manifestations  -     Routine Foot Care    Onychomycosis  -     Routine Foot Care    I counseled the patient on his conditions, their implications and medical management.    Shoe inspection. Diabetic Foot Education. Patient reminded of the importance of good nutrition and blood sugar control to help prevent podiatric complications of diabetes. Patient instructed on proper foot hygeine. We discussed wearing proper shoe gear, daily foot inspections, never walking without protective shoe gear, never putting sharp instruments to feet    Routine foot care per attached note.    RTC within 6 months or p.r.n. as discussed.    A portion of this note was generated by voice recognition software and may contain spelling and grammar errors.

## 2023-02-06 NOTE — PROCEDURES
"Routine Foot Care    Date/Time: 2/6/2023 11:15 AM  Performed by: Scott Garcia DPM  Authorized by: Scott Garcia DPM     Time out: Immediately prior to procedure a "time out" was called to verify the correct patient, procedure, equipment, support staff and site/side marked as required.    Consent Done?:  Yes (Verbal)  Hyperkeratotic Skin Lesions?: No      Nail Care Type:  Debride  Location(s): All  (Left 1st Toe, Left 3rd Toe, Left 2nd Toe, Left 4th Toe, Left 5th Toe, Right 1st Toe, Right 2nd Toe, Right 3rd Toe, Right 4th Toe and Right 5th Toe)  Patient tolerance:  Patient tolerated the procedure well with no immediate complications     Used sterile nail nipper.      "

## 2023-02-07 DIAGNOSIS — Z00.00 ENCOUNTER FOR MEDICARE ANNUAL WELLNESS EXAM: ICD-10-CM

## 2023-02-08 ENCOUNTER — CLINICAL SUPPORT (OUTPATIENT)
Dept: REHABILITATION | Facility: HOSPITAL | Age: 73
End: 2023-02-08
Payer: MEDICARE

## 2023-02-08 DIAGNOSIS — R26.9 GAIT DISORDER: ICD-10-CM

## 2023-02-08 DIAGNOSIS — R53.1 WEAKNESS: ICD-10-CM

## 2023-02-08 DIAGNOSIS — E11.9 TYPE 2 DIABETES MELLITUS WITHOUT COMPLICATION: ICD-10-CM

## 2023-02-08 DIAGNOSIS — M79.601 PAIN IN RIGHT ARM: ICD-10-CM

## 2023-02-08 DIAGNOSIS — M54.2 CHRONIC NECK PAIN: ICD-10-CM

## 2023-02-08 DIAGNOSIS — Z74.1 NEED FOR ASSISTANCE WITH PERSONAL CARE: ICD-10-CM

## 2023-02-08 DIAGNOSIS — G89.29 CHRONIC NECK PAIN: ICD-10-CM

## 2023-02-08 DIAGNOSIS — M25.60 STIFFNESS IN JOINT: ICD-10-CM

## 2023-02-08 PROCEDURE — 97166 OT EVAL MOD COMPLEX 45 MIN: CPT | Mod: HCNC,PN

## 2023-02-08 PROCEDURE — 97110 THERAPEUTIC EXERCISES: CPT | Mod: HCNC,PN

## 2023-02-08 NOTE — PLAN OF CARE
[facility] Occupational Therapy  Initial Neurological Evaluation     Date: 2/8/2023  Patient: Xander Sanchez  Chart Number: [id number]    Therapy Diagnosis:   Encounter Diagnoses   Name Primary?    Chronic neck pain     Gait disorder     Weakness     Stiffness in joint     Need for assistance with personal care     Pain in right arm      Physician: Diomedes Martin MD    Physician Orders: OT eval and treat Dx: (M54.2,  G89.29) Chronic neck pain  (primary encounter diagnosis) (M96.1) Cervical post-laminectomy syndrome (M47.812) Spondylosis of cervical region without myelopathy or radiculopathy Request: ADL evaluation and treatment. Adaptive devices   Medical Diagnosis: Chronic neck pain [M54.2, G89.29]   Gait disorder [R26.9]   Evaluation Date: 2/8/2023  Plan of Care Expiration Period: 4/8/2022  Insurance Authorization period Expiration: 3/8/23  Visit # / Visits Authorized: 1 / 1  FOTO: 2/8/2023    Time In:1045  Time Out: 1130  Total Billable (one on one) Time: 45 minutes    Precautions: Standard    Subjective     History of Current Condition:     Mr. Sanchez is a 72-year-old black male. The patient is [facility] for chronic neck pain with right cervical radiculopathy, status post multiple neck surgeries (C3-6 laminectomy in 11/2012, C5-6 ACDF in 04/2014, and C3-7 posterior fusion in 08/2015).   THis neck pain has been under good control. However, his right arm has been worse. It feels light painful tightness and burning or cold sensation from the shoulder area shooting to his hand.  His pain is worse when he wakes up in the morning.  It is better with his pain medications.   He has chronic impaired hand coordination such as trouble buttoning or writing.    Date of Onset: Before  Surgical Procedure: C3-6 laminectomy in 11/2012, C5-6 ACDF in 04/2014, and C3-7 posterior fusion in 08/2015)  Imaging: none  Previous Therapy: Y     Right Left   Involved Side   [x]     []  "  Dominant Side    [x]     []       Pain:  Pain Related Behaviors Observed: no   Functional Pain Scale Rating 0-10:   5/10 on average  3/10 at best  8/10 at worst  Location: Hand  Description: Throbbing  Aggravating Factors: Happens randomly  Easing Factors: meditation    Occupation: Retired      Functional Limitations/Social History:    Prior Level of Function: Independent  Current Level of Function:Mod Independent    Home/Living environment : lives with their spouse  Home Access: 2SH , 0 steps to enter,   DME: none     Leisure: Fishing    Driving: Y    Functional Status      Functional Mobility:  Bed mobility: I  Roll to left: I  Roll to right: I  Supine to sit: I  Sit to supine: I  Transfers to bed: I  Transfers to toilet: I  Car transfers: Mod I      ADL's:  Feeding: Mod I  Grooming: Mod I  Hygiene: Mod I  UB Dressing: Mod I  LB Dressing: Mod I  Toileting: Mod I  Bathing: Mod I    IADL's:  Homecare: Mod I  Cooking: Mod I  Laundry: Mod I  Yard work: Mod I  Use of telephone: Mod I  Money management: Mod I  Medication management: Mod I  Handwriting:D  Technology Use:I    Patient's Goals for Therapy: "To be able to use my hand again and stop dragging my foot."     Past Medical History/Physical Systems Review:     Past Medical History:  Xander Sanchez  has a past medical history of Allergy, Arthritis, Asthma, Cardiomyopathy, Cataract, Cervical radicular pain, Cervical spondylosis with myelopathy, Chronic bronchitis, Chronic systolic dysfunction of left ventricle, Constipation, COPD (chronic obstructive pulmonary disease), CRPS (complex regional pain syndrome type I), Diabetes mellitus, type 2, DM type 2 (diabetes mellitus, type 2), Enlarged prostate, Enuresis, Hypertension, ICD (implantable cardiac defibrillator) in place, Mixed hyperlipidemia, Presence of biventricular AICD, 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thy, and Urinary tract infection.    Past Surgical History:  Xander Sanchez  has a past surgical " history that includes Cervical spine surgery; Cardiac defibrillator placement; Spine surgery; Colonoscopy (N/A, 7/19/2017); Cataract extraction w/  intraocular lens implant (Right, 11/10/2020); and Cataract extraction w/  intraocular lens implant (Left, 11/24/2020).    Current Medications:  Xander has a current medication list which includes the following prescription(s): alcohol swabs, aspirin, baclofen, true metrix level 1, true metrix glucose test strip, blood-glucose meter, advair hfa, gabapentin, hydrochlorothiazide, hydrocodone-acetaminophen, lancets, metoprolol succinate, oxybutynin, pantoprazole, pravastatin, tamsulosin, and valsartan.    Allergies:  Review of patient's allergies indicates:   Allergen Reactions    Ciprofloxacin Nausea And Vomiting        Objective     Cognitive Exam:  Oriented Person, Place, Time, and Situation   Behaviors normal, cooperative, friendly and cooperative   Follows Commands/attention: Follows multistep  commands   Communication clear/fluent   Memory No Deficits noted    Safety awareness/insight to disability aware of diagnosis, treatment, and prognosis   Coping skills/emotional control Appropriate to situation           Joint Evaluation  AROM  2/8/2023 PROM   2/8/2023 MMS   2/8/2023 AROM  2/8/2023 PROM   2/8/2023 MMS   2/8/2023    Right Right Right Left Left Left   Scapular Elevation   4/5   5/5   Scapular Retraction   4/5   5/5   Shoulder Flex 0-180   4/5   5/5   Shoulder Extension 0-80   4/5   5/5   Shoulder Abd 0-180   4/5   5/5   Shoulder ER 0-90   4/5   5/5   Shoulder IR 0-90   4/5   5/5   Shoulder Horizontal adduction 0-90   4/5   5/5   Elbow Flex/Ext 0-150   4/5   5/5   Wrist Flex 0-80   4/5   5/5   Wrist Ext 0-70   4/5   5/5   Supination 0-80   4/5   5/5   Pronation 0-80   4/5   5/5   Ulnar Deviation   4/5   5/5   Radial Deviation    4/5   5/5   *WNL - Within Normal Limits  *NT = Not Tested    Digit  Flexion  AROM  2/8/2023 Flexion  PROM  2/8/2023    Distance from tip  to palm measured in cm Distance from tip to palm measured in cm   IF WNL WNL   MF WNL WNL   RF WNL WNL   LF WNL WNL   *WNL - Within Normal Limits  *NT = Not Tested    Thumb AROM   2/8/2023 PROM  2/8/2023   IP WFL WFL   MP WFL WFL   [place] WFL WFL    WFL WFL   *WNL - Within Normal Limits  *NT = Not Tested    Fist: loose     Strength: ([employer] Dynamometer in lbs.) Average 3 trials, Position II:     2/8/2023 2/8/2023   Rung II Right Left   Average 60# 70#     Normal  Average Values  Female Right Left Male: Right Left   20-29 66 lbs 62 lbs         94 lbs 86 lbs   30-39 68 lbs 64 lbs 90 lbs 82 lbs   40-49 64 lbs 62 lbs 80 lbs 74 lbs   50-59 62 lbs 57 lbs 72 lbs 65 lbs   60-69 53 lbs 51 lbs 63 lbs 56 lbs   70+ 44 lbs 42 lbs 54 lbs 48 lbs   SD = +/- 19lbs       Pinch Strength (Measured in lbs)     2/8/2023 2/8/2023    Right Left   Key Pinch 15 # 20 #   3pt Pinch 8 # 13   2pt Pinch 10 # 13 #       Fine/Gross Motor Coordination    Assessment  Right   2/8/2023 Left  2/8/2023   9 hole peg test 9 pegs in/out for time 69 27   Finger to Nose (5 times)  intact intact   Finger Flicks (coordination moving from digit flexion to digit extension)  impaired - minimum intact   *WNL - Within Normal Limits  *NT = Not Tested    Tone:  Modified Angélica Scale:   0 - No increase in muscle tone        Sensation:  Xander  reports no numbness or tingling    Balance:   Static Sit - GOOD: Takes MODERATE challenges from all directions  Dynamic sit- GOOD: Takes MODERATE challenges from all directions  Static Stand - GOOD: Takes MODERATE challenges from all directions  Dynamic stand - FAIR: Maintains without assist, but unable to take any challenges     Endurance Deficit: mild             CMS Impairment/Limitation/Restriction for FOTO  Survey    Therapist reviewed FOTO scores for Xander Sanchez on 2/8/2023.   FOTO documents entered into [employer] - see Media section.    Limitation Score: 34%  Category: Self Care         Treatment     Treatment Time  In: 1115  Treatment Time Out: 1130  Total Treatment time separate from Evaluation time:15        Xander received therapeutic exercises to develop strength, endurance, ROM, and flexibility for 15 minutes including:  - explanation and demonstration of HEP    Home Exercises and Patient Education Provided    Education provided:   -role of OT, goals for OT, scheduling/cancellations, insurance limitations with patient.      Written Home Exercises Provided: Patient instructed to cont prior HEP.  Exercises were reviewed and Xander was able to demonstrate them prior to the end of the session.    Xander demonstrated good  understanding of the education provided.     See EMR under Patient Instructions for exercises provided prior visit.    Assessment     Xander Sanchez is a 72 y.o. male referred to outpatient occupational therapy and presents with a medical diagnosis of Chronic neck pain [M54.2, G89.29]   Gait disorder [R26.9] , resulting in pain, decreased muscle strength, and impaired function and demonstrates limitations as described in the chart below. Following medical record review it is determined that patient will benefit from occupational therapy services in order to maximize pain free and/or functional use of right arm.    Patient prognosis is Good   Patient will benefit from skilled outpatient Occupational Therapy to address the deficits stated above and in the chart below, provide patient/family education, and to maximize patient's level of independence.     Plan of care discussed with patient: Yes  Patient's spiritual, cultural and educational needs considered and patient is agreeable to the plan of care and goals as stated below:     Anticipated Barriers for therapy: length of time patient has been impaired.  (co-morbidities, transportation, etc)    Medical Necessity is demonstrated by the following  Profile and History Assessment of Occupational Performance Level of Clinical Decision Making Complexity Score    Occupational Profile:   Xander Sanchez is a 72 y.o. male who lives with their spouse and is currently retired. Xander Sanchez has difficulty with  feeding, grooming, and dressing  housework/household chores  affecting his/her daily functional abilities. His/her main goal for therapy is be able to use his right arm again.     Comorbidities:   advanced age, CAD, CHF, COPD/asthma, and prior lumbar surgery    Medical and Therapy History Review:   Expanded               Performance Deficits    Physical:  Joint Mobility  Muscle Power/Strength  Muscle Endurance   Strength  Pinch Strength  Gross Motor Coordination  Fine Motor Coordination  Proprioception Functions    Cognitive:  No Deficits    Psychosocial:    Habits  Routines  Rituals     Clinical Decision Making:  moderate    Assessment Process:  Problem-Focused Assessments    Modification/Need for Assistance:  Not Necessary    Intervention Selection:  Several Treatment Options       moderate  Based on PMHX, co morbidities , data from assessments and functional level of assistance required with task and clinical presentation directly impacting function.       The following goals were discussed with the patient and patient is in agreement with them as to be addressed in the treatment plan.       Goals:    LTG's (8 weeks):  2)   Demonstrate 65 psi's of right/left  strength to grasp for ADLs/work/leisure activities.  3)   Demonstrate 20 psi's of lateral key pinch to independently for ADLs/work/leisure activities.  4)   Decrease complaints of pain to  3 out of 10 at worst to increase functional hand use for ADL/work/leisure activities.  5)   Patient to score at 30% limitation on FOTO to demonstrate improved perception of functional arm use.  6)   Pt will return to near to prior level of function for ADLs and household management reporting I or Mod I with ADLs (dressing, feeding, grooming, toileting).     STG's (4 weeks)  1)   Patient to be IND with HEP and  modalities for pain/edema managment.  2)   Demonstrate 63 psi's of right/left  strength to improve functional grasp for ADLs/work/leisure activities.   3)   Demonstrate 17 psi's of right/left lateral/tripod/pincer pinch to increase independence with button and FM Coordination.   4)   Patient to be IND with Orthotic use, wear and care precautions.   5)   Decrease complaints of pain to  5 out of 10 at worst to increase functional hand use for ADL/work/leisure activities.      Plan   Certification Period/Plan of care expiration: 2/8/2023 to 4/8/23.    Outpatient Occupational Therapy 2 times weekly for 8 weeks to include the following interventions: Aquatic Therapy, Cervical/Lumbar Traction, Debridement (nonselective), Debridement (selective), Electrical Stimulation , Fluidotherapy, Gait Training, Iontophoresis (with ), Manual Therapy, Moist Heat/ Ice, Neuromuscular Re-ed, Orthotic Management and Training, Paraffin, Patient Education, Self Care, Therapeutic Activities, Therapeutic Exercise, and Ultrasound.    Moni White, OT      I certify the need for these services furnished under this plan of treatment and while under my care.  ____________________________________ Physician/Referring Practitioner   Date of Signature

## 2023-02-08 NOTE — PROGRESS NOTES
[facility] Occupational Therapy  Initial Neurological Evaluation     Date: 2/8/2023  Patient: Xander Sanchez  Chart Number: [id number]    Therapy Diagnosis:   Encounter Diagnoses   Name Primary?    Chronic neck pain     Gait disorder     Weakness     Stiffness in joint     Need for assistance with personal care     Pain in right arm      Physician: Diomedes Martin MD    Physician Orders: OT eval and treat Dx: (M54.2,  G89.29) Chronic neck pain  (primary encounter diagnosis) (M96.1) Cervical post-laminectomy syndrome (M47.812) Spondylosis of cervical region without myelopathy or radiculopathy Request: ADL evaluation and treatment. Adaptive devices   Medical Diagnosis: Chronic neck pain [M54.2, G89.29]   Gait disorder [R26.9]   Evaluation Date: 2/8/2023  Plan of Care Expiration Period: 4/8/2022  Insurance Authorization period Expiration: 3/8/23  Visit # / Visits Authorized: 1 / 1  FOTO: 2/8/2023    Time In:1045  Time Out: 1130  Total Billable (one on one) Time: 45 minutes    Precautions: Standard    Subjective     History of Current Condition:     Mr. Sanchez is a 72-year-old black male. The patient is [facility] for chronic neck pain with right cervical radiculopathy, status post multiple neck surgeries (C3-6 laminectomy in 11/2012, C5-6 ACDF in 04/2014, and C3-7 posterior fusion in 08/2015).   THis neck pain has been under good control. However, his right arm has been worse. It feels light painful tightness and burning or cold sensation from the shoulder area shooting to his hand.  His pain is worse when he wakes up in the morning.  It is better with his pain medications.   He has chronic impaired hand coordination such as trouble buttoning or writing.    Date of Onset: Before  Surgical Procedure: C3-6 laminectomy in 11/2012, C5-6 ACDF in 04/2014, and C3-7 posterior fusion in 08/2015)  Imaging: none  Previous Therapy: Y     Right Left   Involved Side   [x]     []  "  Dominant Side    [x]     []       Pain:  Pain Related Behaviors Observed: no   Functional Pain Scale Rating 0-10:   5/10 on average  3/10 at best  8/10 at worst  Location: Hand  Description: Throbbing  Aggravating Factors: Happens randomly  Easing Factors: meditation    Occupation: Retired      Functional Limitations/Social History:    Prior Level of Function: Independent  Current Level of Function:Mod Independent    Home/Living environment : lives with their spouse  Home Access: 2SH , 0 steps to enter,   DME: none     Leisure: Fishing    Driving: Y    Functional Status      Functional Mobility:  Bed mobility: I  Roll to left: I  Roll to right: I  Supine to sit: I  Sit to supine: I  Transfers to bed: I  Transfers to toilet: I  Car transfers: Mod I      ADL's:  Feeding: Mod I  Grooming: Mod I  Hygiene: Mod I  UB Dressing: Mod I  LB Dressing: Mod I  Toileting: Mod I  Bathing: Mod I    IADL's:  Homecare: Mod I  Cooking: Mod I  Laundry: Mod I  Yard work: Mod I  Use of telephone: Mod I  Money management: Mod I  Medication management: Mod I  Handwriting:D  Technology Use:I    Patient's Goals for Therapy: "To be able to use my hand again and stop dragging my foot."     Past Medical History/Physical Systems Review:     Past Medical History:  Xander Sanchez  has a past medical history of Allergy, Arthritis, Asthma, Cardiomyopathy, Cataract, Cervical radicular pain, Cervical spondylosis with myelopathy, Chronic bronchitis, Chronic systolic dysfunction of left ventricle, Constipation, COPD (chronic obstructive pulmonary disease), CRPS (complex regional pain syndrome type I), Diabetes mellitus, type 2, DM type 2 (diabetes mellitus, type 2), Enlarged prostate, Enuresis, Hypertension, ICD (implantable cardiac defibrillator) in place, Mixed hyperlipidemia, Presence of biventricular AICD, 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thy, and Urinary tract infection.    Past Surgical History:  Xander Sanchez  has a past surgical " history that includes Cervical spine surgery; Cardiac defibrillator placement; Spine surgery; Colonoscopy (N/A, 7/19/2017); Cataract extraction w/  intraocular lens implant (Right, 11/10/2020); and Cataract extraction w/  intraocular lens implant (Left, 11/24/2020).    Current Medications:  Xander has a current medication list which includes the following prescription(s): alcohol swabs, aspirin, baclofen, true metrix level 1, true metrix glucose test strip, blood-glucose meter, advair hfa, gabapentin, hydrochlorothiazide, hydrocodone-acetaminophen, lancets, metoprolol succinate, oxybutynin, pantoprazole, pravastatin, tamsulosin, and valsartan.    Allergies:  Review of patient's allergies indicates:   Allergen Reactions    Ciprofloxacin Nausea And Vomiting        Objective     Cognitive Exam:  Oriented Person, Place, Time, and Situation   Behaviors normal, cooperative, friendly and cooperative   Follows Commands/attention: Follows multistep  commands   Communication clear/fluent   Memory No Deficits noted    Safety awareness/insight to disability aware of diagnosis, treatment, and prognosis   Coping skills/emotional control Appropriate to situation           Joint Evaluation  AROM  2/8/2023 PROM   2/8/2023 MMS   2/8/2023 AROM  2/8/2023 PROM   2/8/2023 MMS   2/8/2023    Right Right Right Left Left Left   Scapular Elevation   4/5   5/5   Scapular Retraction   4/5   5/5   Shoulder Flex 0-180   4/5   5/5   Shoulder Extension 0-80   4/5   5/5   Shoulder Abd 0-180   4/5   5/5   Shoulder ER 0-90   4/5   5/5   Shoulder IR 0-90   4/5   5/5   Shoulder Horizontal adduction 0-90   4/5   5/5   Elbow Flex/Ext 0-150   4/5   5/5   Wrist Flex 0-80   4/5   5/5   Wrist Ext 0-70   4/5   5/5   Supination 0-80   4/5   5/5   Pronation 0-80   4/5   5/5   Ulnar Deviation   4/5   5/5   Radial Deviation    4/5   5/5   *WNL - Within Normal Limits  *NT = Not Tested    Digit  Flexion  AROM  2/8/2023 Flexion  PROM  2/8/2023    Distance from tip  to palm measured in cm Distance from tip to palm measured in cm   IF WNL WNL   MF WNL WNL   RF WNL WNL   LF WNL WNL   *WNL - Within Normal Limits  *NT = Not Tested    Thumb AROM   2/8/2023 PROM  2/8/2023   IP WFL WFL   MP WFL WFL   [place] WFL WFL    WFL WFL   *WNL - Within Normal Limits  *NT = Not Tested    Fist: loose     Strength: ([employer] Dynamometer in lbs.) Average 3 trials, Position II:     2/8/2023 2/8/2023   Rung II Right Left   Average 60# 70#     Normal  Average Values  Female Right Left Male: Right Left   20-29 66 lbs 62 lbs         94 lbs 86 lbs   30-39 68 lbs 64 lbs 90 lbs 82 lbs   40-49 64 lbs 62 lbs 80 lbs 74 lbs   50-59 62 lbs 57 lbs 72 lbs 65 lbs   60-69 53 lbs 51 lbs 63 lbs 56 lbs   70+ 44 lbs 42 lbs 54 lbs 48 lbs   SD = +/- 19lbs       Pinch Strength (Measured in lbs)     2/8/2023 2/8/2023    Right Left   Key Pinch 15 # 20 #   3pt Pinch 8 # 13   2pt Pinch 10 # 13 #       Fine/Gross Motor Coordination    Assessment  Right   2/8/2023 Left  2/8/2023   9 hole peg test 9 pegs in/out for time 69 27   Finger to Nose (5 times)  intact intact   Finger Flicks (coordination moving from digit flexion to digit extension)  impaired - minimum intact   *WNL - Within Normal Limits  *NT = Not Tested    Tone:  Modified Angélica Scale:   0 - No increase in muscle tone        Sensation:  Xander  reports no numbness or tingling    Balance:   Static Sit - GOOD: Takes MODERATE challenges from all directions  Dynamic sit- GOOD: Takes MODERATE challenges from all directions  Static Stand - GOOD: Takes MODERATE challenges from all directions  Dynamic stand - FAIR: Maintains without assist, but unable to take any challenges     Endurance Deficit: mild             CMS Impairment/Limitation/Restriction for FOTO  Survey    Therapist reviewed FOTO scores for Xander Sanchez on 2/8/2023.   FOTO documents entered into [employer] - see Media section.    Limitation Score: 34%  Category: Self Care         Treatment     Treatment Time  In: 1115  Treatment Time Out: 1130  Total Treatment time separate from Evaluation time:15        Xander received therapeutic exercises to develop strength, endurance, ROM, and flexibility for 15 minutes including:  - explanation and demonstration of HEP    Home Exercises and Patient Education Provided    Education provided:   -role of OT, goals for OT, scheduling/cancellations, insurance limitations with patient.      Written Home Exercises Provided: Patient instructed to cont prior HEP.  Exercises were reviewed and Xander was able to demonstrate them prior to the end of the session.    Xander demonstrated good  understanding of the education provided.     See EMR under Patient Instructions for exercises provided prior visit.    Assessment     Xander Sanchez is a 72 y.o. male referred to outpatient occupational therapy and presents with a medical diagnosis of Chronic neck pain [M54.2, G89.29]   Gait disorder [R26.9] , resulting in pain, decreased muscle strength, and impaired function and demonstrates limitations as described in the chart below. Following medical record review it is determined that patient will benefit from occupational therapy services in order to maximize pain free and/or functional use of right arm.    Patient prognosis is Good   Patient will benefit from skilled outpatient Occupational Therapy to address the deficits stated above and in the chart below, provide patient/family education, and to maximize patient's level of independence.     Plan of care discussed with patient: Yes  Patient's spiritual, cultural and educational needs considered and patient is agreeable to the plan of care and goals as stated below:     Anticipated Barriers for therapy: length of time patient has been impaired.  (co-morbidities, transportation, etc)    Medical Necessity is demonstrated by the following  Profile and History Assessment of Occupational Performance Level of Clinical Decision Making Complexity Score    Occupational Profile:   Xander Sanchez is a 72 y.o. male who lives with their spouse and is currently retired. Xander Sanchez has difficulty with  feeding, grooming, and dressing  housework/household chores  affecting his/her daily functional abilities. His/her main goal for therapy is be able to use his right arm again.     Comorbidities:   advanced age, CAD, CHF, COPD/asthma, and prior lumbar surgery    Medical and Therapy History Review:   Expanded               Performance Deficits    Physical:  Joint Mobility  Muscle Power/Strength  Muscle Endurance   Strength  Pinch Strength  Gross Motor Coordination  Fine Motor Coordination  Proprioception Functions    Cognitive:  No Deficits    Psychosocial:    Habits  Routines  Rituals     Clinical Decision Making:  moderate    Assessment Process:  Problem-Focused Assessments    Modification/Need for Assistance:  Not Necessary    Intervention Selection:  Several Treatment Options       moderate  Based on PMHX, co morbidities , data from assessments and functional level of assistance required with task and clinical presentation directly impacting function.       The following goals were discussed with the patient and patient is in agreement with them as to be addressed in the treatment plan.       Goals:    LTG's (8 weeks):  2)   Demonstrate 65 psi's of right/left  strength to grasp for ADLs/work/leisure activities.  3)   Demonstrate 20 psi's of lateral key pinch to independently for ADLs/work/leisure activities.  4)   Decrease complaints of pain to  3 out of 10 at worst to increase functional hand use for ADL/work/leisure activities.  5)   Patient to score at 30% limitation on FOTO to demonstrate improved perception of functional arm use.  6)   Pt will return to near to prior level of function for ADLs and household management reporting I or Mod I with ADLs (dressing, feeding, grooming, toileting).     STG's (4 weeks)  1)   Patient to be IND with HEP and  modalities for pain/edema managment.  2)   Demonstrate 63 psi's of right/left  strength to improve functional grasp for ADLs/work/leisure activities.   3)   Demonstrate 17 psi's of right/left lateral/tripod/pincer pinch to increase independence with button and FM Coordination.   4)   Patient to be IND with Orthotic use, wear and care precautions.   5)   Decrease complaints of pain to  5 out of 10 at worst to increase functional hand use for ADL/work/leisure activities.      Plan   Certification Period/Plan of care expiration: 2/8/2023 to 4/8/23.    Outpatient Occupational Therapy 2 times weekly for 8 weeks to include the following interventions: Aquatic Therapy, Cervical/Lumbar Traction, Debridement (nonselective), Debridement (selective), Electrical Stimulation  , Fluidotherapy, Gait Training, Iontophoresis (with  ), Manual Therapy, Moist Heat/ Ice, Neuromuscular Re-ed, Orthotic Management and Training, Paraffin, Patient Education, Self Care, Therapeutic Activities, Therapeutic Exercise, and Ultrasound.    Moni White, OT      I certify the need for these services furnished under this plan of treatment and while under my care.  ____________________________________ Physician/Referring Practitioner   Date of Signature

## 2023-02-08 NOTE — PATIENT INSTRUCTIONS
[facility]  OCCUPATIONAL THERAPY  HOME EXERCISE PROGRAM     Complete the following strengthening program 1x/day.                       _

## 2023-02-09 DIAGNOSIS — Z00.00 ENCOUNTER FOR MEDICARE ANNUAL WELLNESS EXAM: ICD-10-CM

## 2023-02-10 ENCOUNTER — CLINICAL SUPPORT (OUTPATIENT)
Dept: REHABILITATION | Facility: HOSPITAL | Age: 73
End: 2023-02-10
Payer: MEDICARE

## 2023-02-10 DIAGNOSIS — Z74.09 IMPAIRED FUNCTIONAL MOBILITY, BALANCE, GAIT, AND ENDURANCE: ICD-10-CM

## 2023-02-10 DIAGNOSIS — G89.29 CHRONIC NECK PAIN: ICD-10-CM

## 2023-02-10 DIAGNOSIS — M54.2 CHRONIC NECK PAIN: ICD-10-CM

## 2023-02-10 PROBLEM — R26.9 GAIT ABNORMALITY: Status: RESOLVED | Noted: 2017-09-21 | Resolved: 2023-02-10

## 2023-02-10 PROBLEM — R26.81 GAIT INSTABILITY: Status: RESOLVED | Noted: 2017-09-21 | Resolved: 2023-02-10

## 2023-02-10 PROBLEM — R29.898 WEAKNESS OF RIGHT HIP: Status: RESOLVED | Noted: 2017-09-21 | Resolved: 2023-02-10

## 2023-02-10 PROCEDURE — 97110 THERAPEUTIC EXERCISES: CPT | Mod: HCNC,PN

## 2023-02-10 PROCEDURE — 97162 PT EVAL MOD COMPLEX 30 MIN: CPT | Mod: HCNC,PN

## 2023-02-10 NOTE — PLAN OF CARE
"[facility]  Physical Therapy Neurological Rehabilitation Initial Evaluation    Name: Xander Sanchez  Clinic Number: [id number]    Therapy Diagnosis:   Encounter Diagnoses   Name Primary?    Chronic neck pain     Impaired functional mobility, balance, gait, and endurance      Physician: Diomedes Martin MD    Physician Orders: PT Eval and Treat   Medical Diagnosis from Referral: Chronic neck pain [M54.2, G89.29]  (M96.1) Cervical post-laminectomy syndrome  (M47.812) Spondylosis of cervical region without myelopathy or radiculopathy    Request:  Neck exercises.  Eval gait due to h/o stumbling and occasional falls.    Evaluation Date: 2/10/2023  Authorization Period Expiration: 2/2/2024  Plan of Care Expiration: 4/7/2023  Visit # / Visits authorized: 1/1    Time In: 8:50AM  Time Out: 9:40AM  Total Billable Time: 50 minutes (1 mod eval; 1 TE)    Precautions: Standard, Fall, and History of Cervical Fusion; DM II; HTN; ICD; CRPS    Subjective   Date of onset: Chronic functional mobility deficits (since 2015)  History of current condition - Xander reports: Has had several mobility complaints since 3 cervical surgeries performed in the last decade (C3-6 laminectomy in 11/2012, C5-6 ACDF in 04/2014, and C3-7 posterior fusion in 08/2015). Feels like his right arm is "about 400 pounds" which affects his walking symmetry. Also notes that he drags his right foot when he is ambulating. Neck pain is generally well controlled; largest pain complaint is right arm (hand mostly). Last fall was 2 months ago (tripped over crack in the cement). Does not walk with assistive device. He was evaluated by occupational therapy earlier this week for right upper extremity complaints; he would like to pursue PT to improve balance/walking.     Medical History:   Past Medical History:   Diagnosis Date    Allergy     Arthritis     Asthma     Cardiomyopathy     Cataract     Cervical radicular pain     Cervical " Recvd pt at 0700 awake, alert and responsive to all stimuli.  no sob or respiratory distress noted.  pt states that his pain is much better since morphine administration. vss; pt afebrile. pt admitted to medicine team and is awaiting admitting bed with family at bedside.  will continue to monitor. spondylosis with myelopathy 10/17/2012    Chronic bronchitis     Chronic systolic dysfunction of left ventricle 07/27/2015    Constipation 07/27/2015    COPD (chronic obstructive pulmonary disease)     CRPS (complex regional pain syndrome type I) 12/29/2014    Diabetes mellitus, type 2     DM type 2 (diabetes mellitus, type 2) 07/27/2015    Enlarged prostate 07/27/2015    Enuresis 07/06/2018    Hypertension     ICD (implantable cardiac defibrillator) in place     Mixed hyperlipidemia 02/28/2018    Presence of biventricular AICD 07/27/2015    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thy 02/01/2016    Urinary tract infection     pt does not know       Surgical History:   Xander Sanchez  has a past surgical history that includes Cervical spine surgery; Cardiac defibrillator placement; Spine surgery; Colonoscopy (N/A, 7/19/2017); Cataract extraction w/  intraocular lens implant (Right, 11/10/2020); and Cataract extraction w/  intraocular lens implant (Left, 11/24/2020).    Medications:   Xander has a current medication list which includes the following prescription(s): alcohol swabs, aspirin, baclofen, true metrix level 1, true metrix glucose test strip, blood-glucose meter, advair hfa, gabapentin, hydrochlorothiazide, hydrocodone-acetaminophen, lancets, metoprolol succinate, oxybutynin, pantoprazole, pravastatin, tamsulosin, and valsartan.    Allergies:   Review of patient's allergies indicates:   Allergen Reactions    Ciprofloxacin Nausea And Vomiting      Imaging: No recent cervical imaging in chart review    Prior Therapy: In the past at this clinic for same complaints; currently active occupational therapy patient for right upper extremity pain  Social History: Lives with wife   Falls: Last one was 2 months ago   DME: No  Home Environment: Condo with bedroom upstairs  Exercise Routine / History: Walks sporadically   Family Present at time of Eval: No   Occupation: Retired  Prior Level of Function:  "Independent  Current Level of Function: Independent but worsening gait stability    Pain:  Current 6/10, worst 6/10, best 2/10   Location: right hand   Description: Shocking; tender; like a toothache   Aggravating Factors: Cold weather  Easing Factors:  Prescribed medicine    Patient's goals: "Try to walk better"    Objective     - Command followin% simple and complex   - Speech: no deficits     Mental status: alert, oriented to person, place, and time, normal mood, behavior, speech, dress, motor activity, and thought processes  Behavior:  calm, cooperative, and adequate rapport can be established  Attention Span and Concentration:  Normal    Dominant hand:  right     Posture Alignment: Flattended thoracic kyphosis and decreased lumbar lordosis; straighted cervical lordosis secondary to cervical fusion    Other Observations: Skin abnormality on right side of posterior fusion surgical scar (see clinical Media for photo - PCP Dr. Alvarenga alerted via [employer] Chat of skin abnormality per patient request and patient encouraged to seek follow up with dermatology)    Sensation:  Light Touch: Intact in bilateral lower extremity but altered sensation in right upper extremity            Proprioception: Intact    Tone: 0 - No increase in muscle tone  Limbs/muscles affected: major muscle    Coordination:   - fine motor: See occupational therapy eval  - UE coordination: See occupational therapy eval    - LE coordination:  Intact rapid alternating movement    ROM:   UPPER EXTREMITY--AROM/PROM: See occupational therapy eval         RANGE OF MOTION--LOWER EXTREMITIES  (R) LE HS 90/90: Lacking 41 degrees; severely reduced hip IR range of motion; reduced hip flexor/quad length  (L) LE: HS 90/90: Lacking 37 degrees; reduced hip flexor/quad length    Upper Extremity Strength: See occupational therapy eval    Lower Extremity Strength   RLE LLE   Hip Flexion: 4/5 4+/5   Hip Extension:  See 30STS See 30STS   Hip Abduction: " 3-/5 4-/5   Hip External Rotation: 4/5 4+/5   Knee Extension: 5/5 5/5   Knee Flexion: 4+/5 4+/5   Ankle Dorsiflexion: 4+/5 4+/5   Ankle Inversion: 4+/5 4+/5   Ankle Eversion: 4+/5 4+/5     30-Second Sit to Stand: 6 repetitions with bilateral upper extremity push off    Timed Up and Go: 12.5 seconds without AD    Dynamic Gait Index    1. Gait on level surface: 2. Mild impairment: Walks 20', uses assistive devices, slower speed, mild gait deviations.   2. Change in Gait speed: 3. Normal: Able to smoothly change walking speed without loss of balance or gait deviation. Shows a significant difference in wlaking speeds between normal, fast, and slow speeds.  3. Gait with Horizontal Head Turns: 2. Mild impairment: performs head turns smoothly with slight change in gait velocity, i.e. minor distuption to smooth gait path or uses walking aid.   4. Gait with Vertical Head Turns: 2. Mild impairment: Perform task with slight change in gait velocity ie minor disruption in smooth gait path or uses walking aid  5. Gait and Pivot turn: 2. Mild impairment: Pivot turns safely in > 3 seconds and stops with no loss of balance  6. Step Over Obstacle: 1. Moderate impairment: Is able to step over the box but must stop, then, step over. May requrie verbal cueing  7. Step around obstacles: 2. Mild impairment: Is able to step around both cones, but must slow down and adjust steps to clear cones.  8. Steps: 2. Mild impairment: Alternating feet, must use rail.    Score: 16/24    Cutoffs:   < 19/24 = predictive of falls in the elderly  > 22/24 = safe ambulators    MDC:  Geriatric = 2.9 points  Chronic Stroke = 1.9 points  Parkinson's = 2.9 points  Vestibular = 4    Gait Assessment:   - AD used: None  - Assistance: Independent  - Distance: 100+ feet throughout course of assessment  - Stairs: See DGI    Gait Analysis:  Deviations noted: Decreased right heel strike; mild circumduction of right lower extremity during swing; decreased right knee  "flexion during initial-mid swing; decreased reciprocal right arm swing; Trendelenburg pattern with left hip drop when in right single leg stance      Impairments contributing to deviations: right lower extremity weakness/incoordination and right upper extremity weakness    Abbreviated ABC: 35%    CMS Impairment/Limitation/Restriction for FOTO NOC-Neuromuscular disorder Survey    Therapist reviewed FOTO scores for Xander Sanchez on 2/10/2023.   FOTO documents entered into [employer] - see Media section.    Limitation Score: 44% (predicted = 39%)       TREATMENT   Treatment Time In: 9:30AM  Treatment Time Out: 9:40AM  Total Treatment time separate from Evaluation: 10 minutes    Xander received therapeutic exercises to develop strength, endurance, ROM, and flexibility for 10 minutes including:  - Standing hamstring stretch at stairs x30"B  - Standing heel raises x10  - Right sided clamshells (instructed to perform bilaterally) x5  - Supine bridges x8  - Patient education on recommended exercise volume (see Patient instructions for more detail)    Home Exercises and Patient Education Provided    Education provided:   - PT plan of care  - Role of physical versus occupational therapy  - HEP instruction    Written Home Exercises Provided: yes.  Exercises were reviewed and Xander was able to demonstrate them prior to the end of the session.  Xander demonstrated good  understanding of the education provided.     See EMR under Media for exercises provided 2/10/2023.    Assessment   Xander is a 72 y.o. male referred to outpatient Physical Therapy with a medical diagnosis of [M54.2, G89.29] Chronic neck pain, [M96.1] Cervical post-laminectomy syndrome, and [M47.812] Spondylosis of cervical region without myelopathy or radiculopathy. Patient presents with decreased bilateral lower extremity strength and flexibility (R>L); impaired bilateral lower extremity muscular power during transfers; postural abnormality; gait abnormalities; " and objective risk for falls per DGI. Patient also with well-controlled chronic neck pain; cervical region not formally assessed today because of chief complaint of functional mobility deficits/time constraints but plan to assess next visit. Patient would benefit from skilled physical therapy services to address deficits noted above, decrease risk for falls/future injury, improve independence with regular physical activity, and improve overall quality of life.    Patient prognosis is Good.   Patient will benefit from skilled outpatient Physical Therapy to address the deficits stated above and in the chart below, provide patient/family education, and to maximize patient's level of independence.     Plan of care discussed with patient: Yes  Patient's spiritual, cultural and educational needs considered and patient is agreeable to the plan of care and goals as stated below:     Anticipated Barriers for therapy: Co-morbidities; chronicity of impairments    Medical Necessity is demonstrated by the following  History  Co-morbidities and personal factors that may impact the plan of care Co-morbidities:   Allergy   Arthritis   Asthma   Cardiomyopathy   Cataract   Cervical radicular pain   Cervical spondylosis with myelopathy   Chronic bronchitis   Chronic systolic dysfunction of left ventricle   Constipation   COPD (chronic obstructive pulmonary disease)   CRPS (complex regional pain syndrome type I)   Diabetes mellitus, type 2   DM type 2 (diabetes mellitus, type 2)   Enlarged prostate   Enuresis   Hypertension   ICD (implantable cardiac defibrillator) in place   Mixed hyperlipidemia   Presence of biventricular AICD   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thy   Urinary tract infection     Personal Factors:   age     high   Examination  Body Structures and Functions, activity limitations and participation restrictions that may impact the plan of care Body Regions:   neck  back  lower extremities  upper  extremities  trunk    Body Systems:    gross symmetry  ROM  strength  gross coordinated movement  balance  gait  transfers  transitions  motor control  motor learning    Participation Restrictions:   Decreased quality of life secondary to impairments    Activity limitations:   Learning and applying knowledge  no deficits    General Tasks and Commands  no deficits    Communication  no deficits    Mobility  lifting and carrying objects  fine hand use (grasping/picking up)  walking    Self care  looking after one's health    Domestic Life  shopping  cooking  doing house work (cleaning house, washing dishes, laundry)    Interactions/Relationships  no deficits    Life Areas  no deficits    Community and Social Life  community life  recreation and leisure         high   Clinical Presentation evolving clinical presentation with changing clinical characteristics moderate   Decision Making/ Complexity Score: moderate     Goals:  Short Term Goals: 4 weeks   Patient will be compliant with HEP in order to maximize PT benefits  Patient will score >/= 18/24 on DGI in order to reduce risk for falls and improve postural control  Patient will score >/= 8 repetitions on 30-Second Sit to  order to improve BLE endurance and muscular power for transfers     Long Term Goals: 8 weeks   Patient will score </= 39% on FOTO limitation survey in order to improve self-perception of functional mobility deficits  Patient will score >/=50% on Abbreviated ABC in order to improve balance confidence with community mobility  Patient will improve bilateral lower extremity MMT grades by >/=1/2 grade in order to improve strength for ADL completion  Patient will improve Hamstring range of motion to lacking </= 25 degrees on 90/90 test in order to improve functional mobility for activities such as lower body dressing  Patient will score >/= 20/24 on DGI in order to reduce risk for falls and improve postural control  Patient will score >/= 10  repetitions on 30-Second Sit to  order to improve BLE endurance and muscular power for transfers   Patient will begin some form of home/community fitness in order to sustain progress gained in PT    Plan   Plan of care Certification: 2/10/2023 to 4/7/2023.    Outpatient Physical Therapy 2 times weekly for 8 weeks to include the following interventions: Gait Training, Manual Therapy, Moist Heat/ Ice, Neuromuscular Re-ed, Orthotic Management and Training, Patient Education, Self Care, Therapeutic Activities, Therapeutic Exercise, and Modalities PRN .     ISABEL MUKHERJEE, PT

## 2023-02-11 ENCOUNTER — TELEPHONE (OUTPATIENT)
Dept: FAMILY MEDICINE | Facility: CLINIC | Age: 73
End: 2023-02-11

## 2023-02-11 DIAGNOSIS — L72.3 SEBACEOUS CYST: Primary | ICD-10-CM

## 2023-02-13 ENCOUNTER — CLINICAL SUPPORT (OUTPATIENT)
Dept: REHABILITATION | Facility: HOSPITAL | Age: 73
End: 2023-02-13
Payer: MEDICARE

## 2023-02-13 ENCOUNTER — PATIENT MESSAGE (OUTPATIENT)
Dept: ADMINISTRATIVE | Facility: HOSPITAL | Age: 73
End: 2023-02-13
Payer: MEDICARE

## 2023-02-13 DIAGNOSIS — Z74.09 IMPAIRED FUNCTIONAL MOBILITY, BALANCE, GAIT, AND ENDURANCE: Primary | ICD-10-CM

## 2023-02-13 DIAGNOSIS — M25.60 STIFFNESS IN JOINT: ICD-10-CM

## 2023-02-13 DIAGNOSIS — R53.1 WEAKNESS: Primary | ICD-10-CM

## 2023-02-13 DIAGNOSIS — L72.3 SEBACEOUS CYST: Primary | ICD-10-CM

## 2023-02-13 DIAGNOSIS — Z74.1 NEED FOR ASSISTANCE WITH PERSONAL CARE: ICD-10-CM

## 2023-02-13 DIAGNOSIS — M79.601 PAIN IN RIGHT ARM: ICD-10-CM

## 2023-02-13 PROCEDURE — 97110 THERAPEUTIC EXERCISES: CPT | Mod: HCNC,PN

## 2023-02-13 PROCEDURE — 97112 NEUROMUSCULAR REEDUCATION: CPT | Mod: HCNC,PN

## 2023-02-13 PROCEDURE — 97530 THERAPEUTIC ACTIVITIES: CPT | Mod: HCNC,PN

## 2023-02-13 PROCEDURE — 97022 WHIRLPOOL THERAPY: CPT | Mod: HCNC,59,PN

## 2023-02-13 NOTE — PROGRESS NOTES
"Occupational Therapy Daily Treatment Note     Name: Xander Sanchez  [facility] Number: [id number]    Therapy Diagnosis:   Encounter Diagnoses   Name Primary?    Weakness Yes    Stiffness in joint     Need for assistance with personal care     Pain in right arm      Physician: Diomedes Martin MD    Visit Date: 2/13/2023    Physician Orders: OT eval and treat Dx: (M54.2,  G89.29) Chronic neck pain  (primary encounter diagnosis) (M96.1) Cervical post-laminectomy syndrome (M47.812) Spondylosis of cervical region without myelopathy or radiculopathy Request: ADL evaluation and treatment. Adaptive devices   Medical Diagnosis: Chronic neck pain [M54.2, G89.29]   Gait disorder [R26.9]   Evaluation Date: 2/8/2023  Plan of Care Expiration Period: 4/8/2022  Insurance Authorization period Expiration: 3/8/23  Visit # / Visits Authorized: 1 / 20  FOTO: 2/8/2023     Time In: 8:00  Time Out: 8:45  Total Billable (one on one) Time: 45 minutes   Billed Units: Fluido- 1, TA - 1, NMR - 2    Precautions: Standard    Subjective     Pt reports: "I have some pain all down my (right) arm" "I have been doing the putty but I have a hard time doing some of the exercises"   he was compliant with home exercise program given last session.   Response to previous treatment:first since eval   Functional change: first since eval     Pain: 5/10  Location: right arms     Objective        Joint Evaluation  [facility]   2/8/2023 [facility]   2/8/2023     Right Left   Scapular Elevation 4/5 5/5   Scapular Retraction 4/5 5/5   Shoulder Flex 0-180 4/5 5/5   Shoulder Extension 0-80 4/5 5/5   Shoulder Abd 0-180 4/5 5/5   Shoulder ER 0-90 4/5 5/5   Shoulder IR 0-90 4/5 5/5   Shoulder Horizontal adduction 0-90 4/5 5/5   Elbow Flex/Ext 0-150 4/5 5/5   Wrist Flex 0-80 4/5 5/5   Wrist Ext 0-70 4/5 5/5   Supination 0-80 4/5 5/5   Pronation 0-80 4/5 5/5   Ulnar Deviation 4/5 5/5   Radial Deviation  4/5 5/5   *WNL - Within Normal Limits  *NT = Not Tested      Fist: loose      " Strength: ([employer] Dynamometer in lbs.) Average 3 trials, Position II:       2/8/2023 2/8/2023   Rung II Right Left   Average 60# 70#      Normal  Average Values  Female Right Left Male: Right Left   20-29 66 lbs 62 lbs         94 lbs 86 lbs   30-39 68 lbs 64 lbs 90 lbs 82 lbs   40-49 64 lbs 62 lbs 80 lbs 74 lbs   50-59 62 lbs 57 lbs 72 lbs 65 lbs   60-69 53 lbs 51 lbs 63 lbs 56 lbs   70+ 44 lbs 42 lbs 54 lbs 48 lbs   SD = +/- 19lbs         Pinch Strength (Measured in lbs)       2/8/2023 2/8/2023     Right Left   Key Pinch 15 # 20 #   3pt Pinch 8 # 13#   2pt Pinch 10 # 13 #         Fine/Gross Motor Coordination     Assessment   Right   2/8/2023 Left  2/8/2023   9 hole peg test 9 pegs in/out for time 69 27   Finger to Nose (5 times)   intact intact   Finger Flicks (coordination moving from digit flexion to digit extension)   impaired - minimum intact   *WNL - Within Normal Limits  *NT = Not Tested    Treatment     Xander received the following supervised modalities after being cleared for contradictions for 10 minutes:   -Fluidotherapy: To right upper extremity for 10 min, continuous air, 110 deg, air speed 100 to decrease pain and increase tissue extensibility.  - moist heat on right shoulder with fluidotherapy     Xander participated in dynamic functional therapeutic activities to improve functional performance for 10  minutes, including:  -button hook training   - fine motor coordination activity with various items in yellow putty     Xander participated in neuromuscular re-education activities to improve: Coordination, Kinesthetic, Sense, and Proprioception for 35 minutes. The following activities were included:  - active range of motion in fluidotherapy   - weightbearing with active assisted range of motion using therapy ball x20   - wrist maze x2 minutes   - spheres x2 minutes   - pinch exercises to facilitate motor planning and proprioceptive input    Home Exercises and Education Provided     Education  provided:   - button hook/adaptive equipment  - Progress towards goals     Written Home Exercises Provided: yes.  Exercises were reviewed and Xander was able to demonstrate them prior to the end of the session.  Xander demonstrated good  understanding of the HEP provided.   .   See EMR under Patient Instructions for exercises provided prior visit.      Assessment     Xander tolerated today's session well. Fluidotherapy utilized start of session to improve joint mobility, decrease pain and promote blood flow to increase healing. He was able to complete all activities with decreased pain by end of session. Education provided about adaptive equipment, specifically using button hook. He successfully buttoned and unbuttoned mens shirt using button hook. He did exhibit difficulty with fine motor coordination and dexterity tasks, cues provided for efficiency. Overall good response to treatment.     Xander is progressing well towards his goals and there are no updates to goals at this time. Pt prognosis is Fair.     Pt will continue to benefit from skilled outpatient occupational therapy to address the deficits listed in the problem list on initial evaluation provide pt/family education and to maximize pt's level of independence in the home and community environment.     Anticipated barriers to occupational therapy: chronicity of impairments     Pt's spiritual, cultural and educational needs considered and pt agreeable to plan of care and goals.    Goals:    LTG's (8 weeks):  2)   Demonstrate 65 psi's of right/left  strength to grasp for ADLs/work/leisure activities. Not met, progressing  3)   Demonstrate 20 psi's of lateral key pinch to independently for ADLs/work/leisure activities. Not met, progressing  4)   Decrease complaints of pain to  3 out of 10 at worst to increase functional hand use for ADL/work/leisure activities. Not met, progressing  5)   Patient to score at 30% limitation on FOTO to demonstrate  improved perception of functional arm use. Not met, progressing  6)   Pt will return to near to prior level of function for ADLs and household management reporting I or Mod I with ADLs (dressing, feeding, grooming, toileting). Not met, progressing     STG's (4 weeks)  1)   Patient to be IND with HEP and modalities for pain/edema managment. Not met, progressing  2)   Demonstrate 63 psi's of right/left  strength to improve functional grasp for ADLs/work/leisure activities. Not met, progressing  3)   Demonstrate 17 psi's of right/left lateral/tripod/pincer pinch to increase independence with button and FM Coordination. Not met, progressing  4)   Patient to be IND with Orthotic use, wear and care precautions. Not met, progressing  5)   Decrease complaints of pain to  5 out of 10 at worst to increase functional hand use for ADL/work/leisure activities. Not met, progressing    Plan     Updates/Grading for next session: Continue with POC     Corinne Rapier, OT

## 2023-02-13 NOTE — PROGRESS NOTES
"[facility]   Physical Therapy Treatment Note     Name: Xander Sanchez  [facility] Number: [id number]    Therapy Diagnosis:   Encounter Diagnosis   Name Primary?    Impaired functional mobility, balance, gait, and endurance Yes     Physician: Diomedes Martin MD    Visit Date: 2/13/2023    Physician Orders: PT Eval and Treat   Medical Diagnosis from Referral: Chronic neck pain [M54.2, G89.29]  (M96.1) Cervical post-laminectomy syndrome  (M47.812) Spondylosis of cervical region without myelopathy or radiculopathy     Request:  Neck exercises.  Eval gait due to h/o stumbling and occasional falls.     Evaluation Date: 2/10/2023  Authorization Period Expiration: 2/2/2024  Plan of Care Expiration: 4/7/2023  Visit # / Visits authorized: 1/20 + eval  FOTO: 2/5    PTA Visit #: 0/5     Time In: 9:30AM  Time Out: 10:25AM  Total Billable Time: 55 minutes (3TE + 1NMR)    SUBJECTIVE     Patient reports: Has been performing exercises given on eval at home. No new complaints today. Fatigued by end of session  He was compliant with home exercise program.  Response to previous treatment: Last session was initial eval  Functional change: HEP complaints     Pain: 3/10  Location: right hand      Precautions: Standard, Fall, and History of Cervical Fusion; DM II; HTN; ICD; CRPS    OBJECTIVE     Objective Measures updated at progress report unless specified.     Cervical AROM  - Flexion: 35 degrees  - Extension: 30 degrees  - Right Side Bend: 26 degrees  - Left Side Bend: 20 degrees (pulling pain in right side of neck)  - Right Rotation 42 degrees  - Left Rotation: 45 degrees    Treatment     Xander received the treatments listed below:      therapeutic exercises to develop strength, endurance, ROM, flexibility, and posture for 45 minutes including:  - Cervical AROM assessment   - Lower trunk rotations x2 minutes total with 5" holds at end range  - Piriformis stretch 2x30" B  - Bridges 3x10  - Standing hamstring " "stretch at stairs 2x30"B  - Standing gastroc stretch on Fitter 2x45"B  - Standing heel raises 2x10B with bilateral upper extremity support  - Standing hip abduction 2x10B with yellow theraband around ankles and bilateral upper extremity support  - Session ended with stepper bike x 8 minutes; level 4 sprints for cardiovascular endurance training (bilateral upper extremity / bilateral lower extremity use)    neuromuscular re-education activities to improve: Balance, Coordination, and Proprioception for 10 minutes. The following activities were included:  - Modified single leg stance on 6" step + red med ball chest press 2x12  - Right lower extremity bias step ups (up with right, down with left) to 6" step with left upper extremity support x10    therapeutic activities to improve functional performance for 00 minutes, including:  NOT TODAY    gait training to improve functional mobility and safety for 00 minutes, including:  NOT TODAY    Patient Education and Home Exercises     Home Exercises Provided and Patient Education Provided     Education provided:   - Continue HEP  - Postural awareness    Written Home Exercises Provided: Patient instructed to cont prior HEP. Exercises were reviewed and Xander was able to demonstrate them prior to the end of the session.  Xander demonstrated good  understanding of the education provided. See EMR under Patient Instructions for exercises provided during therapy sessions    ASSESSMENT     Xander tolerated first full treatment session without adverse response. Moderate-high levels of fatigue achieved with all activity. Patient with significant flexibility impairments of bilateral lower extremity so mobility and stretching activity emphasized this visit. Encouraged to continue stretching with HEP. Low level balance intervention initiated without loss of balance and good minimization of upper extremity support for appropriate challenge. He would benefit from continued PT services " to achieve functional goals.    Xander Is progressing well towards his goals.   Patient prognosis is Good.     Patient will continue to benefit from skilled outpatient physical therapy to address the deficits listed in the problem list box on initial evaluation, provide pt/family education and to maximize pt's level of independence in the home and community environment.     Patient's spiritual, cultural and educational needs considered and pt agreeable to plan of care and goals.     Anticipated barriers to physical therapy: Co-morbidities; chronicity of impairments    Goals:     Short Term Goals: 4 weeks   Patient will be compliant with HEP in order to maximize PT benefits (progressing, not met)  Patient will score >/= 18/24 on DGI in order to reduce risk for falls and improve postural control (progressing, not met)  Patient will score >/= 8 repetitions on 30-Second Sit to  order to improve BLE endurance and muscular power for transfers (progressing, not met)     Long Term Goals: 8 weeks   Patient will score </= 39% on FOTO limitation survey in order to improve self-perception of functional mobility deficits (progressing, not met)  Patient will score >/=50% on Abbreviated ABC in order to improve balance confidence with community mobility (progressing, not met)  Patient will improve bilateral lower extremity MMT grades by >/=1/2 grade in order to improve strength for ADL completion (progressing, not met)  Patient will improve Hamstring range of motion to lacking </= 25 degrees on 90/90 test in order to improve functional mobility for activities such as lower body dressing (progressing, not met)  Patient will score >/= 20/24 on DGI in order to reduce risk for falls and improve postural control (progressing, not met)  Patient will score >/= 10 repetitions on 30-Second Sit to  order to improve BLE endurance and muscular power for transfers (progressing, not met)  Patient will improve cervical rotation  active range of motion to >/=50 degrees bilaterally in order to improve functional mobility during tasks such as driving (progressing, not met)  Patient will begin some form of home/community fitness in order to sustain progress gained in PT (progressing, not met)    PLAN     Continue to progress as per plan of care; expand balance program per tolerance    ISABEL MUKHERJEE, PT

## 2023-02-15 ENCOUNTER — CLINICAL SUPPORT (OUTPATIENT)
Dept: REHABILITATION | Facility: HOSPITAL | Age: 73
End: 2023-02-15
Payer: MEDICARE

## 2023-02-15 DIAGNOSIS — M79.601 PAIN IN RIGHT ARM: ICD-10-CM

## 2023-02-15 DIAGNOSIS — Z74.1 NEED FOR ASSISTANCE WITH PERSONAL CARE: ICD-10-CM

## 2023-02-15 DIAGNOSIS — R53.1 WEAKNESS: Primary | ICD-10-CM

## 2023-02-15 DIAGNOSIS — Z74.09 IMPAIRED FUNCTIONAL MOBILITY, BALANCE, GAIT, AND ENDURANCE: Primary | ICD-10-CM

## 2023-02-15 DIAGNOSIS — M25.60 STIFFNESS IN JOINT: ICD-10-CM

## 2023-02-15 PROCEDURE — 97022 WHIRLPOOL THERAPY: CPT | Mod: HCNC,PN

## 2023-02-15 PROCEDURE — 97112 NEUROMUSCULAR REEDUCATION: CPT | Mod: HCNC,PN,CQ

## 2023-02-15 PROCEDURE — 97110 THERAPEUTIC EXERCISES: CPT | Mod: HCNC,PN,CQ

## 2023-02-15 PROCEDURE — 97112 NEUROMUSCULAR REEDUCATION: CPT | Mod: HCNC,PN

## 2023-02-15 PROCEDURE — 97530 THERAPEUTIC ACTIVITIES: CPT | Mod: HCNC,PN

## 2023-02-15 NOTE — PROGRESS NOTES
"Occupational Therapy Daily Treatment Note     Name: Xander Sanchez  [facility] Number: [id number]    Therapy Diagnosis:   Encounter Diagnoses   Name Primary?    Weakness Yes    Stiffness in joint     Need for assistance with personal care     Pain in right arm        Physician: Diomedes Martin MD    Visit Date: 2/15/2023    Physician Orders: OT eval and treat Dx: (M54.2,  G89.29) Chronic neck pain  (primary encounter diagnosis) (M96.1) Cervical post-laminectomy syndrome (M47.812) Spondylosis of cervical region without myelopathy or radiculopathy Request: ADL evaluation and treatment. Adaptive devices   Medical Diagnosis: Chronic neck pain [M54.2, G89.29]   Gait disorder [R26.9]   Evaluation Date: 2/8/2023  Plan of Care Expiration Period: 4/8/2022  Insurance Authorization period Expiration: 3/8/23  Visit # / Visits Authorized: 2 / 20  FOTO: 2/8/2023     Time In: 1:45  Time Out: 2:30  Total Billable (one on one) Time: 45 minutes   Billed Units: Fluido- 1, TA - 1, NMR - 2    Precautions: Standard    Subjective     Pt reports: "I think it helped some. The pain was better. The button hook is good."   he was compliant with home exercise program given last session.   Response to previous treatment:good   Functional change: reports decreased pain     Pain: 4/10  Location: right arms     Objective        Joint Evaluation  [facility]   2/8/2023 [facility]   2/8/2023     Right Left   Scapular Elevation 4/5 5/5   Scapular Retraction 4/5 5/5   Shoulder Flex 0-180 4/5 5/5   Shoulder Extension 0-80 4/5 5/5   Shoulder Abd 0-180 4/5 5/5   Shoulder ER 0-90 4/5 5/5   Shoulder IR 0-90 4/5 5/5   Shoulder Horizontal adduction 0-90 4/5 5/5   Elbow Flex/Ext 0-150 4/5 5/5   Wrist Flex 0-80 4/5 5/5   Wrist Ext 0-70 4/5 5/5   Supination 0-80 4/5 5/5   Pronation 0-80 4/5 5/5   Ulnar Deviation 4/5 5/5   Radial Deviation  4/5 5/5   *WNL - Within Normal Limits  *NT = Not Tested      Fist: loose      Strength: ([employer] Dynamometer in lbs.) Average 3 trials, " Position II:       2/8/2023 2/8/2023   Rung II Right Left   Average 60# 70#      Normal  Average Values  Female Right Left Male: Right Left   20-29 66 lbs 62 lbs         94 lbs 86 lbs   30-39 68 lbs 64 lbs 90 lbs 82 lbs   40-49 64 lbs 62 lbs 80 lbs 74 lbs   50-59 62 lbs 57 lbs 72 lbs 65 lbs   60-69 53 lbs 51 lbs 63 lbs 56 lbs   70+ 44 lbs 42 lbs 54 lbs 48 lbs   SD = +/- 19lbs         Pinch Strength (Measured in lbs)       2/8/2023 2/8/2023     Right Left   Key Pinch 15 # 20 #   3pt Pinch 8 # 13 #   2pt Pinch 10 # 13 #         Fine/Gross Motor Coordination     Assessment   Right   2/8/2023 Left  2/8/2023   9 hole peg test 9 pegs in/out for time 69 27   Finger to Nose (5 times)   intact intact   Finger Flicks (coordination moving from digit flexion to digit extension)   impaired - minimum intact   *WNL - Within Normal Limits  *NT = Not Tested    Treatment     Xander received the following supervised modalities after being cleared for contradictions for 10 minutes:   -Fluidotherapy: To right upper extremity for 10 min, continuous air, 110 deg, air speed 100 to decrease pain and increase tissue extensibility.  - moist heat on right shoulder with fluidotherapy     Xander participated in dynamic functional therapeutic activities to improve functional performance for 10 minutes, including:  - pom poms 2 at a time with in hand manipulation     Xander participated in neuromuscular re-education activities to improve: Coordination, Kinesthetic, Sense, and Proprioception for 35 minutes. The following activities were included:  - active range of motion in fluidotherapy   - weightbearing  using therapy ball x20   - wrist proprioceptive input/ x2 minutes arm unsupported 1 stack  - hammer exercise for control, proprioceptive input and strength x2 minutes   - red flexbar twists x20   - red flexbar pronation/supination resting on table x10   - wrist flexion/extension stretch x10 right upper extremity     Home Exercises and  "Education Provided     Education provided:   - button hook/adaptive equipment  - Progress towards goals     Written Home Exercises Provided: yes.  Exercises were reviewed and Xander was able to demonstrate them prior to the end of the session.  Xander demonstrated good  understanding of the HEP provided.   .   See EMR under Patient Instructions for exercises provided prior visit.      Assessment     Xander tolerated today's session well. Fluidotherapy utilized start of session to improve joint mobility, decrease pain and promote blood flow to increase healing. He exhibited difficulty with stabilization in right hand when increased challenge of upper extremity present (arm unsupported). He also presented with difficulty managing in hand manipulation task. 1/2# wrist weight added to right wrist to provide proprioceptive input and improve stability in right shoulder. Good response. He did report fatigue and require rest breaks throughout session, stating his arm was "burning" with prolonged activity and strengthening exercises.     Xander is progressing well towards his goals and there are no updates to goals at this time. Pt prognosis is Fair.     Pt will continue to benefit from skilled outpatient occupational therapy to address the deficits listed in the problem list on initial evaluation provide pt/family education and to maximize pt's level of independence in the home and community environment.     Anticipated barriers to occupational therapy: chronicity of impairments     Pt's spiritual, cultural and educational needs considered and pt agreeable to plan of care and goals.    Goals:    LTG's (8 weeks):  2)   Demonstrate 65 psi's of right/left  strength to grasp for ADLs/work/leisure activities. Not met, progressing  3)   Demonstrate 20 psi's of lateral key pinch to independently for ADLs/work/leisure activities. Not met, progressing  4)   Decrease complaints of pain to  3 out of 10 at worst to increase " functional hand use for ADL/work/leisure activities. Not met, progressing  5)   Patient to score at 30% limitation on FOTO to demonstrate improved perception of functional arm use. Not met, progressing  6)   Pt will return to near to prior level of function for ADLs and household management reporting I or Mod I with ADLs (dressing, feeding, grooming, toileting). Not met, progressing     STG's (4 weeks)  1)   Patient to be IND with HEP and modalities for pain/edema managment. Not met, progressing  2)   Demonstrate 63 psi's of right/left  strength to improve functional grasp for ADLs/work/leisure activities. Not met, progressing  3)   Demonstrate 17 psi's of right/left lateral/tripod/pincer pinch to increase independence with button and FM Coordination. Not met, progressing  4)   Patient to be IND with Orthotic use, wear and care precautions. Not met, progressing  5)   Decrease complaints of pain to  5 out of 10 at worst to increase functional hand use for ADL/work/leisure activities. Not met, progressing    Plan     Updates/Grading for next session: Continue with POC     Corinne Rapier, OT

## 2023-02-15 NOTE — PROGRESS NOTES
"[facility]   Physical Therapy Treatment Note     Name: Xander Sanchez  Clinic Number: [id number]    Therapy Diagnosis:   Encounter Diagnosis   Name Primary?    Impaired functional mobility, balance, gait, and endurance Yes       Physician: Diomedes Martin MD    Visit Date: 2/15/2023    Physician Orders: PT Eval and Treat   Medical Diagnosis from Referral: Chronic neck pain [M54.2, G89.29]  (M96.1) Cervical post-laminectomy syndrome  (M47.812) Spondylosis of cervical region without myelopathy or radiculopathy     Request:  Neck exercises.  Eval gait due to h/o stumbling and occasional falls.     Evaluation Date: 2/10/2023  Authorization Period Expiration: 2/2/2024  Plan of Care Expiration: 4/7/2023  Visit # / Visits authorized: 2/20 + eval  FOTO: 2/5    PTA Visit #: 1/5     Time In: 2:30 PM  Time Out: 3:23 PM  Total Billable Time: 53 minutes (3TE + 1NMR)    SUBJECTIVE     Patient reports: Has been performing exercises given on eval at home. R shoulder soreness following OT session.  He was compliant with home exercise program.  Response to previous treatment: no adverse response  Functional change: HEP complaints     Pain: 3/10  Location: right shoulder      Precautions: Standard, Fall, and History of Cervical Fusion; DM II; HTN; ICD; CRPS    OBJECTIVE     Objective Measures updated at progress report unless specified.     Cervical AROM  - Flexion: 35 degrees  - Extension: 30 degrees  - Right Side Bend: 26 degrees  - Left Side Bend: 20 degrees (pulling pain in right side of neck)  - Right Rotation 42 degrees  - Left Rotation: 45 degrees    Treatment     Xander received the treatments listed below:      therapeutic exercises to develop strength, endurance, ROM, flexibility, and posture for 40 minutes including:  - Cervical AROM 10x 5" holds in all directions  - Lower trunk rotations x2 minutes total with 5" holds at end range  - Piriformis stretch 2x30" B  - Bridges 3x10  - Standing " "hamstring stretch at stairs 2x30"B  - Standing gastroc stretch on Fitter 2x45"B  - Standing heel raises 2x10B with bilateral upper extremity support  - Standing hip abduction 2x10B with yellow theraband around ankles and bilateral upper extremity support  - Session ended with stepper bike x 8 minutes; level 4 sprints for cardiovascular endurance training (bilateral upper extremity / bilateral lower extremity use)    neuromuscular re-education activities to improve: Balance, Coordination, and Proprioception for 13 minutes. The following activities were included:  - Modified single leg stance on 6" step + red med ball chest press 2x12  - Right lower extremity bias step ups (up with right, down with left) to 6" step with left upper extremity support 2x10  - Sit <> stand: 2x10    therapeutic activities to improve functional performance for 00 minutes, including:  NOT TODAY    gait training to improve functional mobility and safety for 00 minutes, including:  NOT TODAY    Patient Education anfd Home Exercises     Home Exercises Provided and Patient Education Provided     Education provided:   - Continue HEP  - Postural awareness    Written Home Exercises Provided: Patient instructed to cont prior HEP. Exercises were reviewed and Xander was able to demonstrate them prior to the end of the session.  Xander demonstrated good  understanding of the education provided. See EMR under Patient Instructions for exercises provided during therapy sessions    ASSESSMENT     Xander arrived to session directly following OT session with complaints of some shoulder pain but was otherwise agreeable to treatment.  Moderate levels of fatigue reported with brief standing rest breaks utilized appropriately throughout session. Patient reports compliance with stretching HEP. Low level balance intervention initiated without loss of balance and was receptive to limiting UE support to maximize challenge level.  He would benefit from continued " PT services to achieve functional goals.    Xander Is progressing well towards his goals.   Patient prognosis is Good.     Patient will continue to benefit from skilled outpatient physical therapy to address the deficits listed in the problem list box on initial evaluation, provide pt/family education and to maximize pt's level of independence in the home and community environment.     Patient's spiritual, cultural and educational needs considered and pt agreeable to plan of care and goals.     Anticipated barriers to physical therapy: Co-morbidities; chronicity of impairments    Goals:     Short Term Goals: 4 weeks   Patient will be compliant with HEP in order to maximize PT benefits (progressing, not met)  Patient will score >/= 18/24 on DGI in order to reduce risk for falls and improve postural control (progressing, not met)  Patient will score >/= 8 repetitions on 30-Second Sit to  order to improve BLE endurance and muscular power for transfers (progressing, not met)     Long Term Goals: 8 weeks   Patient will score </= 39% on FOTO limitation survey in order to improve self-perception of functional mobility deficits (progressing, not met)  Patient will score >/=50% on Abbreviated ABC in order to improve balance confidence with community mobility (progressing, not met)  Patient will improve bilateral lower extremity MMT grades by >/=1/2 grade in order to improve strength for ADL completion (progressing, not met)  Patient will improve Hamstring range of motion to lacking </= 25 degrees on 90/90 test in order to improve functional mobility for activities such as lower body dressing (progressing, not met)  Patient will score >/= 20/24 on DGI in order to reduce risk for falls and improve postural control (progressing, not met)  Patient will score >/= 10 repetitions on 30-Second Sit to  order to improve BLE endurance and muscular power for transfers (progressing, not met)  Patient will improve  cervical rotation active range of motion to >/=50 degrees bilaterally in order to improve functional mobility during tasks such as driving (progressing, not met)  Patient will begin some form of home/community fitness in order to sustain progress gained in PT (progressing, not met)    PLAN     Continue to progress as per plan of care; expand balance program per tolerance    Anabel Monae, PTA

## 2023-02-20 ENCOUNTER — CLINICAL SUPPORT (OUTPATIENT)
Dept: REHABILITATION | Facility: HOSPITAL | Age: 73
End: 2023-02-20
Payer: MEDICARE

## 2023-02-20 DIAGNOSIS — R53.1 WEAKNESS: Primary | ICD-10-CM

## 2023-02-20 DIAGNOSIS — Z74.1 NEED FOR ASSISTANCE WITH PERSONAL CARE: ICD-10-CM

## 2023-02-20 DIAGNOSIS — M25.60 STIFFNESS IN JOINT: ICD-10-CM

## 2023-02-20 DIAGNOSIS — M79.601 PAIN IN RIGHT ARM: ICD-10-CM

## 2023-02-20 PROCEDURE — 97110 THERAPEUTIC EXERCISES: CPT | Mod: HCNC,PN

## 2023-02-20 PROCEDURE — 97530 THERAPEUTIC ACTIVITIES: CPT | Mod: HCNC,PN

## 2023-02-20 NOTE — PROGRESS NOTES
"Occupational Therapy Daily Treatment Note     Name: Xander Sanchez  [facility] Number: [id number]    Therapy Diagnosis:   Encounter Diagnoses   Name Primary?    Weakness Yes    Stiffness in joint     Need for assistance with personal care     Pain in right arm      Physician: Diomedes Martin MD    Visit Date: 2/20/2023    Physician Orders: OT eval and treat Dx: (M54.2,  G89.29) Chronic neck pain  (primary encounter diagnosis) (M96.1) Cervical post-laminectomy syndrome (M47.812) Spondylosis of cervical region without myelopathy or radiculopathy Request: ADL evaluation and treatment. Adaptive devices   Medical Diagnosis: Chronic neck pain [M54.2, G89.29]   Gait disorder [R26.9]   Evaluation Date: 2/8/2023  Plan of Care Expiration Period: 4/8/2022  Insurance Authorization period Expiration: 3/8/23  Visit # / Visits Authorized: 3 / 16  FOTO: 2/8/2023     Time In: 8:45  Time Out: 9:45  Total Billable (one on one) Time: 60 minutes   Billed Units: TE - 2, TA - 2    Precautions: Standard    Subjective     Pt reports: "It's about the same. I didn't take my pain medications because I was driving."   he was compliant with home exercise program given last session.   Response to previous treatment:good   Functional change: continued pain in right hand      Pain: 5/10  Location: right hand      Objective        [place]  [facility] [place] [facility] [place]     Right Left   Scapular Elevation 4/5 5/5   Scapular Retraction 4/5 5/5   Shoulder Flex 0-180 4/5 5/5   Shoulder Extension 0-80 4/5 5/5   Shoulder Abd 0-180 4/5 5/5   Shoulder ER 0-90 4/5 5/5   Shoulder IR 0-90 4/5 5/5   Shoulder Horizontal adduction 0-90 4/5 5/5   Elbow Flex/Ext 0-150 4/5 5/5   Wrist Flex 0-80 4/5 5/5   Wrist Ext 0-70 4/5 5/5   Supination 0-80 4/5 5/5   Pronation 0-80 4/5 5/5   Ulnar Deviation 4/5 5/5   Radial Deviation  4/5 5/5   *WNL - Within Normal Limits  *NT = Not Tested      Fist: loose      Strength: (ROOSEVELT Dynamometer in lbs.) Average 3 trials, " "Position II:       2/8/2023 2/8/2023   Rung II Right Left   Average 60# 70#      Normal  Average Values  Female Right Left Male: Right Left   20-29 66 lbs 62 lbs         94 lbs 86 lbs   30-39 68 lbs 64 lbs 90 lbs 82 lbs   40-49 64 lbs 62 lbs 80 lbs 74 lbs   50-59 62 lbs 57 lbs 72 lbs 65 lbs   60-69 53 lbs 51 lbs 63 lbs 56 lbs   70+ 44 lbs 42 lbs 54 lbs 48 lbs   SD = +/- 19lbs         Pinch Strength (Measured in lbs)       2/8/2023 2/8/2023     Right Left   Key Pinch 15 # 20 #   3pt Pinch 8 # 13 #   2pt Pinch 10 # 13 #         Fine/Gross Motor Coordination     Assessment   Right   2/8/2023 Left  2/8/2023   9 hole peg test 9 pegs in/out for time 69 27   Finger to Nose (5 times)   intact intact   Finger Flicks (coordination moving from digit flexion to digit extension)   impaired - minimum intact   *WNL - Within Normal Limits  *NT = Not Tested    Treatment       Xander received therapeutic exercises to develop strength, endurance, and ROM for 30 minutes including:  - moist head x5 minutes shoulder and forearm   - wrist flexion/extension stretch 3x30"   - isometric wrist flexion/extension/radial deviation/ulnar deviation x10 each with 3 second holds   - wrist maze x2 minutes     Xander participated in dynamic functional therapeutic activities to improve functional performance for 30 minutes, including:  - spheres x2 minutes   - in hand manipulation with tennis ball and alphabet   - card activity pinch and flip   - handwriting activity with paper + pen as well as green putty     Home Exercises and Education Provided     Education provided:   - button hook/adaptive equipment  - Progress towards goals     Written Home Exercises Provided: yes.  Exercises were reviewed and Xander was able to demonstrate them prior to the end of the session.  Xander demonstrated good  understanding of the HEP provided.   .   See EMR under Patient Instructions for exercises provided prior visit.      Assessment     Xander tolerated " today's session well. Moist heat applied start of session to promote blood flow and prepare for therapeutic exercises/activities. Home exercise program upgraded with wrist stretching and isometrics due to Xander stating he felt achy pain in his wrist. He was able to complete all fine motor coordination with improved dexterity, most notable during spheres in clockwise rotation. He did exhibit difficulty with with translation of spiral/pen. Discussed home activities to improve overall dexterity. He reported concerns with handwriting; drills performed end of session to increase strength, coordination and fluidity of letters when signing his name. Noted improvement with letter formation using built up handle, issued brown foam covering for use at home. Overall, good response to treatment and he stated the pain decreased from 5/10 start of session to 3-4/10 end of session.     Xander is progressing well towards his goals and there are no updates to goals at this time. Pt prognosis is Fair.     Pt will continue to benefit from skilled outpatient occupational therapy to address the deficits listed in the problem list on initial evaluation provide pt/family education and to maximize pt's level of independence in the home and community environment.     Anticipated barriers to occupational therapy: chronicity of impairments     Pt's spiritual, cultural and educational needs considered and pt agreeable to plan of care and goals.    Goals:    LTG's (8 weeks):  2)   Demonstrate 65 psi's of right/left  strength to grasp for ADLs/work/leisure activities. Not met, progressing  3)   Demonstrate 20 psi's of lateral key pinch to independently for ADLs/work/leisure activities. Not met, progressing  4)   Decrease complaints of pain to  3 out of 10 at worst to increase functional hand use for ADL/work/leisure activities. Not met, progressing  5)   Patient to score at 30% limitation on FOTO to demonstrate improved perception of  functional arm use. Not met, progressing  6)   Pt will return to near to prior level of function for ADLs and household management reporting I or Mod I with ADLs (dressing, feeding, grooming, toileting). Not met, progressing     STG's (4 weeks)  1)   Patient to be IND with HEP and modalities for pain/edema managment. Not met, progressing  2)   Demonstrate 63 psi's of right/left  strength to improve functional grasp for ADLs/work/leisure activities. Not met, progressing  3)   Demonstrate 17 psi's of right/left lateral/tripod/pincer pinch to increase independence with button and FM Coordination. Not met, progressing  4)   Patient to be IND with Orthotic use, wear and care precautions. Not met, progressing  5)   Decrease complaints of pain to  5 out of 10 at worst to increase functional hand use for ADL/work/leisure activities. Not met, progressing    Plan     Updates/Grading for next session: Continue with POC     Corinne Rapier, OT

## 2023-02-22 ENCOUNTER — CLINICAL SUPPORT (OUTPATIENT)
Dept: REHABILITATION | Facility: HOSPITAL | Age: 73
End: 2023-02-22
Payer: MEDICARE

## 2023-02-22 DIAGNOSIS — M79.601 PAIN IN RIGHT ARM: ICD-10-CM

## 2023-02-22 DIAGNOSIS — Z74.1 NEED FOR ASSISTANCE WITH PERSONAL CARE: ICD-10-CM

## 2023-02-22 DIAGNOSIS — M25.60 STIFFNESS IN JOINT: ICD-10-CM

## 2023-02-22 DIAGNOSIS — R53.1 WEAKNESS: Primary | ICD-10-CM

## 2023-02-22 PROCEDURE — 97110 THERAPEUTIC EXERCISES: CPT | Mod: HCNC,PN

## 2023-02-22 PROCEDURE — 97530 THERAPEUTIC ACTIVITIES: CPT | Mod: HCNC,PN

## 2023-02-22 NOTE — PROGRESS NOTES
"Occupational Therapy Daily Treatment Note     Name: Xander Sanchez  [facility] Number: [id number]    Therapy Diagnosis:   Encounter Diagnoses   Name Primary?    Weakness Yes    Stiffness in joint     Need for assistance with personal care     Pain in right arm        Physician: Diomedes Martin MD    Visit Date: 2/22/2023    Physician Orders: OT eval and treat Dx: (M54.2,  G89.29) Chronic neck pain  (primary encounter diagnosis) (M96.1) Cervical post-laminectomy syndrome (M47.812) Spondylosis of cervical region without myelopathy or radiculopathy Request: ADL evaluation and treatment. Adaptive devices   Medical Diagnosis: Chronic neck pain [M54.2, G89.29]   Gait disorder [R26.9]   Evaluation Date: 2/8/2023  Plan of Care Expiration Period: 4/8/2022  Insurance Authorization period Expiration: 3/8/23  Visit # / Visits Authorized: 4 / 16  FOTO: 2/8/2023     Time In: 8:45  Time Out: 9:30  Total Billable (one on one) Time: 45 minutes   Billed Units: TE - 2, TA - 1     Precautions: Standard    Subjective     Pt reports: "It's a little better."   he was compliant with home exercise program given last session.   Response to previous treatment:good   Functional change: pain decreased     Pain: 4/10  Location: right hand      Objective        Joint Evaluation  [facility]   2/8/2023 [facility]   2/8/2023     Right Left   Scapular Elevation 4/5 5/5   Scapular Retraction 4/5 5/5   Shoulder Flex 0-180 4/5 5/5   Shoulder Extension 0-80 4/5 5/5   Shoulder Abd 0-180 4/5 5/5   Shoulder ER 0-90 4/5 5/5   Shoulder IR 0-90 4/5 5/5   Shoulder Horizontal adduction 0-90 4/5 5/5   Elbow Flex/Ext 0-150 4/5 5/5   Wrist Flex 0-80 4/5 5/5   Wrist Ext 0-70 4/5 5/5   Supination 0-80 4/5 5/5   Pronation 0-80 4/5 5/5   Ulnar Deviation 4/5 5/5   Radial Deviation  4/5 5/5   *WNL - Within Normal Limits  *NT = Not Tested      Fist: loose      Strength: (ROOSEVELT Dynamometer in lbs.) Average 3 trials, Position II:       2/8/2023 2/8/2023   Rung II Right Left   Average " "60# 70#      Normal  Average Values  Female Right Left Male: Right Left   20-29 66 lbs 62 lbs         94 lbs 86 lbs   30-39 68 lbs 64 lbs 90 lbs 82 lbs   40-49 64 lbs 62 lbs 80 lbs 74 lbs   50-59 62 lbs 57 lbs 72 lbs 65 lbs   60-69 53 lbs 51 lbs 63 lbs 56 lbs   70+ 44 lbs 42 lbs 54 lbs 48 lbs   SD = +/- 19lbs         Pinch Strength (Measured in lbs)       2/8/2023 2/8/2023     Right Left   Key Pinch 15 # 20 #   3pt Pinch 8 # 13 #   2pt Pinch 10 # 13 #         Fine/Gross Motor Coordination     Assessment   Right   2/8/2023 Left  2/8/2023   9 hole peg test 9 pegs in/out for time 69 27   Finger to Nose (5 times)   intact intact   Finger Flicks (coordination moving from digit flexion to digit extension)   impaired - minimum intact   *WNL - Within Normal Limits  *NT = Not Tested    Treatment     Fluidotherapy: To right upper extremity for 15 min, continuous air, 110 deg, air speed 100 to decrease pain, edema & scar tissue and increased tissue extensibility.    Xander received therapeutic exercises to develop strength, endurance, and ROM for 37 minutes including:  - moist head x5 minutes shoulder and forearm   - active range of motion wrist/digit with fluido therapy   - wrist flexion/extension stretch 3x30"   - wrist maze x2 minutes   - red flexbar pronation/supination 2x10   - wrist flexion/extension with velcro board (long dowel) x5    Xander participated in dynamic functional therapeutic activities to improve functional performance for 8 minutes, including:  - spheres x2 minutes   - handwriting activity with paper + pen     Home Exercises and Education Provided     Education provided:   - button hook/adaptive equipment  - Progress towards goals     Written Home Exercises Provided: yes.  Exercises were reviewed and Xander was able to demonstrate them prior to the end of the session.  Xander demonstrated good  understanding of the HEP provided.   .   See EMR under Patient Instructions for exercises provided prior " visit.      Assessment     Xander tolerated today's session well. He required increased time for  strengthening exercises due to fatigue, most notable during wrist extension using velcro board. Handwriting drills issued for home exercise program and discussed techniques to improve writing. Pt is motivated and reports compliance with home exercise program to date     Xander is progressing well towards his goals and there are no updates to goals at this time. Pt prognosis is Fair.     Pt will continue to benefit from skilled outpatient occupational therapy to address the deficits listed in the problem list on initial evaluation provide pt/family education and to maximize pt's level of independence in the home and community environment.     Anticipated barriers to occupational therapy: chronicity of impairments     Pt's spiritual, cultural and educational needs considered and pt agreeable to plan of care and goals.    Goals:    LTG's (8 weeks):  2)   Demonstrate 65 psi's of right/left  strength to grasp for ADLs/work/leisure activities. Not met, progressing  3)   Demonstrate 20 psi's of lateral key pinch to independently for ADLs/work/leisure activities. Not met, progressing  4)   Decrease complaints of pain to  3 out of 10 at worst to increase functional hand use for ADL/work/leisure activities. Not met, progressing  5)   Patient to score at 30% limitation on FOTO to demonstrate improved perception of functional arm use. Not met, progressing  6)   Pt will return to near to prior level of function for ADLs and household management reporting I or Mod I with ADLs (dressing, feeding, grooming, toileting). Not met, progressing     STG's (4 weeks)  1)   Patient to be IND with HEP and modalities for pain/edema managment. Not met, progressing  2)   Demonstrate 63 psi's of right/left  strength to improve functional grasp for ADLs/work/leisure activities. Not met, progressing  3)   Demonstrate 17 psi's of right/left  lateral/tripod/pincer pinch to increase independence with button and FM Coordination. Not met, progressing  4)   Patient to be IND with Orthotic use, wear and care precautions. Not met, progressing  5)   Decrease complaints of pain to  5 out of 10 at worst to increase functional hand use for ADL/work/leisure activities. Not met, progressing    Plan     Updates/Grading for next session: Continue with POC     Corinne Rapier, OT

## 2023-02-24 ENCOUNTER — CLINICAL SUPPORT (OUTPATIENT)
Dept: REHABILITATION | Facility: HOSPITAL | Age: 73
End: 2023-02-24
Payer: MEDICARE

## 2023-02-24 DIAGNOSIS — M79.601 RIGHT ARM PAIN: ICD-10-CM

## 2023-02-24 DIAGNOSIS — F11.20 NARCOTIC DEPENDENCY, CONTINUOUS: ICD-10-CM

## 2023-02-24 DIAGNOSIS — M54.12 BRACHIAL NEURITIS OR RADICULITIS: ICD-10-CM

## 2023-02-24 DIAGNOSIS — M50.30 DEGENERATION OF CERVICAL INTERVERTEBRAL DISC: ICD-10-CM

## 2023-02-24 DIAGNOSIS — G90.50 COMPLEX REGIONAL PAIN SYNDROME TYPE 1, AFFECTING UNSPECIFIED SITE: ICD-10-CM

## 2023-02-24 DIAGNOSIS — M54.12 CERVICAL RADICULAR PAIN: ICD-10-CM

## 2023-02-24 DIAGNOSIS — M48.02 SPINAL STENOSIS IN CERVICAL REGION: ICD-10-CM

## 2023-02-24 DIAGNOSIS — R29.898 RIGHT HAND WEAKNESS: ICD-10-CM

## 2023-02-24 DIAGNOSIS — M75.101 ROTATOR CUFF SYNDROME OF RIGHT SHOULDER: ICD-10-CM

## 2023-02-24 DIAGNOSIS — M47.12 CERVICAL SPONDYLOSIS WITH MYELOPATHY: ICD-10-CM

## 2023-02-24 DIAGNOSIS — M47.812 FACET ARTHRITIS OF CERVICAL REGION: ICD-10-CM

## 2023-02-24 DIAGNOSIS — G89.4 CHRONIC PAIN SYNDROME: ICD-10-CM

## 2023-02-24 DIAGNOSIS — M50.00 HNP (HERNIATED NUCLEUS PULPOSUS) WITH MYELOPATHY, CERVICAL: ICD-10-CM

## 2023-02-24 DIAGNOSIS — M48.02 CERVICAL STENOSIS OF SPINE: ICD-10-CM

## 2023-02-24 DIAGNOSIS — M62.81 MUSCLE RIGHT ARM WEAKNESS: ICD-10-CM

## 2023-02-24 DIAGNOSIS — M54.12 CERVICAL RADICULOPATHY: ICD-10-CM

## 2023-02-24 DIAGNOSIS — E11.9 TYPE 2 DIABETES MELLITUS WITHOUT COMPLICATION: ICD-10-CM

## 2023-02-24 DIAGNOSIS — G56.41 COMPLEX REGIONAL PAIN SYNDROME TYPE 2 OF RIGHT UPPER EXTREMITY: ICD-10-CM

## 2023-02-24 DIAGNOSIS — Z74.09 IMPAIRED FUNCTIONAL MOBILITY, BALANCE, GAIT, AND ENDURANCE: Primary | ICD-10-CM

## 2023-02-24 DIAGNOSIS — M96.1 CERVICAL POST-LAMINECTOMY SYNDROME: ICD-10-CM

## 2023-02-24 DIAGNOSIS — R29.898 RIGHT ARM WEAKNESS: ICD-10-CM

## 2023-02-24 PROCEDURE — 97112 NEUROMUSCULAR REEDUCATION: CPT | Mod: HCNC,PN,CQ

## 2023-02-24 PROCEDURE — 97110 THERAPEUTIC EXERCISES: CPT | Mod: HCNC,PN,CQ

## 2023-02-24 RX ORDER — HYDROCODONE BITARTRATE AND ACETAMINOPHEN 10; 325 MG/1; MG/1
1 TABLET ORAL EVERY 6 HOURS PRN
Qty: 120 TABLET | Refills: 0 | Status: SHIPPED | OUTPATIENT
Start: 2023-02-27 | End: 2023-03-24 | Stop reason: SDUPTHER

## 2023-02-24 NOTE — PROGRESS NOTES
"[facility]   Physical Therapy Treatment Note     Name: Xander Sanchez  Clinic Number: [id number]    Therapy Diagnosis:   Encounter Diagnosis   Name Primary?    Impaired functional mobility, balance, gait, and endurance Yes       Physician: Diomedes Martin MD    Visit Date: 2/24/2023    Physician Orders: PT Eval and Treat   Medical Diagnosis from Referral: Chronic neck pain [M54.2, G89.29]  (M96.1) Cervical post-laminectomy syndrome  (M47.812) Spondylosis of cervical region without myelopathy or radiculopathy     Request:  Neck exercises.  Eval gait due to h/o stumbling and occasional falls.     Evaluation Date: 2/10/2023  Authorization Period Expiration: 2/2/2024  Plan of Care Expiration: 4/7/2023  Visit # / Visits authorized: 3/12 + eval  FOTO: 3/5    PTA Visit #: 2/5     Time In: 7:15 AM  Time Out: 8:00 AM  Total Billable Time: 45 minutes (1 TE + 2 NMR)    SUBJECTIVE     Patient reports: Has been performing exercises given on eval at home. R shoulder pain is only complaint.   He was compliant with home exercise program.  Response to previous treatment: no adverse response  Functional change: HEP complaints     Pain: 3/10  Location: right shoulder      Precautions: Standard, Fall, and History of Cervical Fusion; DM II; HTN; ICD; CRPS    OBJECTIVE     Objective Measures updated at progress report unless specified.     Cervical AROM  - Flexion: 35 degrees  - Extension: 30 degrees  - Right Side Bend: 26 degrees  - Left Side Bend: 20 degrees (pulling pain in right side of neck)  - Right Rotation 42 degrees  - Left Rotation: 45 degrees    Treatment     Xander received the treatments listed below:      therapeutic exercises to develop strength, endurance, ROM, flexibility, and posture for 20 minutes including:  - Standing hamstring stretch at stairs 2x45"B  - Standing gastroc stretch on Fitter 2x45"B  - Standing heel raises 2x10B with bilateral upper extremity support  - Standing hip " "abduction 2x10B with yellow theraband around ankles and bilateral upper extremity support  - Session ended with stepper bike x 8 minutes; level 4 sprints for cardiovascular endurance training (bilateral upper extremity / bilateral lower extremity use)    Not performed today:  - Cervical AROM 10x 5" holds in all directions  - Lower trunk rotations x2 minutes total with 5" holds at end range  - Piriformis stretch 2x30" B  - Bridges 3x10      neuromuscular re-education activities to improve: Balance, Coordination, and Proprioception for 25 minutes. The following activities were included:  - Modified single leg stance on 6" step + red med ball chest press 2x12  - 6" Step Ups: forward 2x10 B                         Lateral up and over  - 6" hurdles forward 6 lengths x 10 feet                     Lateral 6 lengths x 10 feet  - Sit <> stand: 2x10 with RLE bias     therapeutic activities to improve functional performance for 00 minutes, including:  NOT TODAY    gait training to improve functional mobility and safety for 00 minutes, including:  NOT TODAY    Patient Education anfd Home Exercises     Home Exercises Provided and Patient Education Provided     Education provided:   - Continue HEP  - Postural awareness    Written Home Exercises Provided: Patient instructed to cont prior HEP. Exercises were reviewed and Xander was able to demonstrate them prior to the end of the session.  Xander demonstrated good  understanding of the education provided. See EMR under Patient Instructions for exercises provided during therapy sessions    ASSESSMENT     Xander arrived to session with moderate complaints of R sided shoulder pain but was otherwise agreeable to treatment.  Moderate levels of fatigue reported with brief standing rest breaks utilized appropriately throughout session. Low level balance intervention initiated without loss of balance and was receptive to limiting UE support to maximize challenge level.  Minor losses of " balance he was able to recover from via reaching or stepping strategy and did not require assistance from PTA.  Decreased step clearance observed with hurdles but improved with verbal cuing to increase hip flexion.  He would benefit from continued PT services to achieve functional goals.    Xander Is progressing well towards his goals.   Patient prognosis is Good.     Patient will continue to benefit from skilled outpatient physical therapy to address the deficits listed in the problem list box on initial evaluation, provide pt/family education and to maximize pt's level of independence in the home and community environment.     Patient's spiritual, cultural and educational needs considered and pt agreeable to plan of care and goals.     Anticipated barriers to physical therapy: Co-morbidities; chronicity of impairments    Goals:     Short Term Goals: 4 weeks   Patient will be compliant with HEP in order to maximize PT benefits (progressing, not met)  Patient will score >/= 18/24 on DGI in order to reduce risk for falls and improve postural control (progressing, not met)  Patient will score >/= 8 repetitions on 30-Second Sit to  order to improve BLE endurance and muscular power for transfers (progressing, not met)     Long Term Goals: 8 weeks   Patient will score </= 39% on FOTO limitation survey in order to improve self-perception of functional mobility deficits (progressing, not met)  Patient will score >/=50% on Abbreviated ABC in order to improve balance confidence with community mobility (progressing, not met)  Patient will improve bilateral lower extremity MMT grades by >/=1/2 grade in order to improve strength for ADL completion (progressing, not met)  Patient will improve Hamstring range of motion to lacking </= 25 degrees on 90/90 test in order to improve functional mobility for activities such as lower body dressing (progressing, not met)  Patient will score >/= 20/24 on DGI in order to reduce  risk for falls and improve postural control (progressing, not met)  Patient will score >/= 10 repetitions on 30-Second Sit to  order to improve BLE endurance and muscular power for transfers (progressing, not met)  Patient will improve cervical rotation active range of motion to >/=50 degrees bilaterally in order to improve functional mobility during tasks such as driving (progressing, not met)  Patient will begin some form of home/community fitness in order to sustain progress gained in PT (progressing, not met)    PLAN     Continue to progress as per plan of care; expand balance program per tolerance    Anabel Monae, PTA

## 2023-02-27 ENCOUNTER — CLINICAL SUPPORT (OUTPATIENT)
Dept: REHABILITATION | Facility: HOSPITAL | Age: 73
End: 2023-02-27
Payer: MEDICARE

## 2023-02-27 DIAGNOSIS — M25.60 STIFFNESS IN JOINT: ICD-10-CM

## 2023-02-27 DIAGNOSIS — Z74.1 NEED FOR ASSISTANCE WITH PERSONAL CARE: ICD-10-CM

## 2023-02-27 DIAGNOSIS — R53.1 WEAKNESS: Primary | ICD-10-CM

## 2023-02-27 DIAGNOSIS — M79.601 PAIN IN RIGHT ARM: ICD-10-CM

## 2023-02-27 DIAGNOSIS — Z74.09 IMPAIRED FUNCTIONAL MOBILITY, BALANCE, GAIT, AND ENDURANCE: Primary | ICD-10-CM

## 2023-02-27 PROCEDURE — 97110 THERAPEUTIC EXERCISES: CPT | Mod: HCNC,PN

## 2023-02-27 PROCEDURE — 97112 NEUROMUSCULAR REEDUCATION: CPT | Mod: HCNC,PN

## 2023-02-27 PROCEDURE — 97530 THERAPEUTIC ACTIVITIES: CPT | Mod: HCNC,PN

## 2023-02-27 NOTE — PROGRESS NOTES
"[facility]   Physical Therapy Treatment Note     Name: Xander Sanchez  Clinic Number: [id number]    Therapy Diagnosis:   Encounter Diagnosis   Name Primary?    Impaired functional mobility, balance, gait, and endurance Yes     Physician:  Diomedes Martin MD    Visit Date: 2/27/2023    Physician Orders: PT Eval and Treat   Medical Diagnosis from Referral: Chronic neck pain [M54.2, G89.29]  (M96.1) Cervical post-laminectomy syndrome  (M47.812) Spondylosis of cervical region without myelopathy or radiculopathy     Request:  Neck exercises.  Eval gait due to h/o stumbling and occasional falls.     Evaluation Date: 2/10/2023  Authorization Period Expiration: 2/2/2024  Plan of Care Expiration: 4/7/2023  Visit # / Visits authorized: 4/12 + eval  FOTO: 5/5 (NEXT)    PTA Visit #: 2/5     Time In: 12:00PM  Time Out: 12:53PM  Total Billable Time: 53 minutes (2 TE + 2 NMR)    SUBJECTIVE     Patient reports: Agrees to stay and see physical therapist after occupational therapy appointment as he was only scheduled once this week. No new complaints today, just status quo shoulder pain.  He was compliant with home exercise program.  Response to previous treatment: No adverse response  Functional change: HEP compliance     Pain: 4/10  Location: right shoulder      Precautions: Standard, Fall, and History of Cervical Fusion; DM II; HTN; ICD; CRPS    OBJECTIVE     Objective Measures updated at progress report unless specified.     Treatment     Xander received the treatments listed below:      therapeutic exercises to develop strength, endurance, ROM, flexibility, and posture for 27 minutes including:  - Standing hamstring stretch at stairs 2x45"B  - Standing gastroc stretch on Fitter 2x45"B  - Lateral resistance band stepping with yellow theraband around ankles 4 lengths x 10 feet each   - Standing heel raises 3x10B with bilateral upper extremity support; off edge of green foam disc today  - Session " "ended with stepper bike x 8 minutes; level 4 sprints for cardiovascular endurance training (bilateral upper extremity / bilateral lower extremity use)    NOT TODAY  - Cervical AROM 10x 5" holds in all directions  - Lower trunk rotations x2 minutes total with 5" holds at end range  - Piriformis stretch 2x30" B  - Bridges 3x10      neuromuscular re-education activities to improve: Balance, Coordination, and Proprioception for 26 minutes. The following activities were included:  - Modified single leg stance on 6" step + red med ball chest press 2x20  - 6" Step Ups: forward 2x10 B  - Multi-level toe taps (4" step + 2 foam discs stacked on top) with right lower extremity stepping to encourage hip flexion a); left upper extremity support (cones placed laterally to discourage circumduction) 3x45"  - Dorsiflexion walks 4 lengths x 10 feet each with bilateral upper extremity support  - Narrow base of support on airex + head turns 2x45" each vertical and horizontal    NOT TODAY  - 6" hurdles forward 6 lengths x 10 feet                     Lateral 6 lengths x 10 feet  - Sit <> stand: 2x10 with RLE bias     therapeutic activities to improve functional performance for 00 minutes, including:  NOT TODAY    gait training to improve functional mobility and safety for 00 minutes, including:  NOT TODAY    Patient Education anfd Home Exercises     Home Exercises Provided and Patient Education Provided     Education provided:   - Continue HEP  - Postural awareness    Written Home Exercises Provided: Patient instructed to cont prior HEP. Exercises were reviewed and Xander was able to demonstrate them prior to the end of the session.  Xander demonstrated good  understanding of the education provided. See EMR under Patient Instructions for exercises provided during therapy sessions    ASSESSMENT     Xander arrived to session without new complaints/changes in status and agreeable to treatment. He responded well to hip flexion activity " with verbal and visual cues to limit right sided circumduction. Still with frequent verbal cues required for postural awareness during standing exercise. Good ankle strategies noted with small base of support activity. Appropriate levels and locations of muscular fatigue achieved with all activity. He would benefit from continued PT services to achieve functional goals.    Xander Is progressing well towards his goals.   Patient prognosis is Good.     Patient will continue to benefit from skilled outpatient physical therapy to address the deficits listed in the problem list box on initial evaluation, provide pt/family education and to maximize pt's level of independence in the home and community environment.     Patient's spiritual, cultural and educational needs considered and pt agreeable to plan of care and goals.     Anticipated barriers to physical therapy: Co-morbidities; chronicity of impairments    Goals:     Short Term Goals: 4 weeks   Patient will be compliant with HEP in order to maximize PT benefits (MET)  Patient will score >/= 18/24 on DGI in order to reduce risk for falls and improve postural control (progressing, not met)  Patient will score >/= 8 repetitions on 30-Second Sit to  order to improve BLE endurance and muscular power for transfers (progressing, not met)     Long Term Goals: 8 weeks   Patient will score </= 39% on FOTO limitation survey in order to improve self-perception of functional mobility deficits (progressing, not met)  Patient will score >/=50% on Abbreviated ABC in order to improve balance confidence with community mobility (progressing, not met)  Patient will improve bilateral lower extremity MMT grades by >/=1/2 grade in order to improve strength for ADL completion (progressing, not met)  Patient will improve Hamstring range of motion to lacking </= 25 degrees on 90/90 test in order to improve functional mobility for activities such as lower body dressing (progressing,  not met)  Patient will score >/= 20/24 on DGI in order to reduce risk for falls and improve postural control (progressing, not met)  Patient will score >/= 10 repetitions on 30-Second Sit to  order to improve BLE endurance and muscular power for transfers (progressing, not met)  Patient will improve cervical rotation active range of motion to >/=50 degrees bilaterally in order to improve functional mobility during tasks such as driving (progressing, not met)  Patient will begin some form of home/community fitness in order to sustain progress gained in PT (progressing, not met)    PLAN     Continue to progress as per plan of care; expand balance program per tolerance    ISABEL MUKHERJEE, PT

## 2023-02-27 NOTE — PROGRESS NOTES
"Occupational Therapy Daily Treatment Note     Name: Xander Sanchez  [facility] Number: [id number]    Therapy Diagnosis:   Encounter Diagnoses   Name Primary?    Weakness Yes    Stiffness in joint     Need for assistance with personal care     Pain in right arm        Physician: Diomedes Martin MD    Visit Date: 2/27/2023    Physician Orders: OT eval and treat Dx: (M54.2,  G89.29) Chronic neck pain  (primary encounter diagnosis) (M96.1) Cervical post-laminectomy syndrome (M47.812) Spondylosis of cervical region without myelopathy or radiculopathy Request: ADL evaluation and treatment. Adaptive devices   Medical Diagnosis: Chronic neck pain [M54.2, G89.29]   Gait disorder [R26.9]   Evaluation Date: 2/8/2023  Plan of Care Expiration Period: 4/8/2022  Insurance Authorization period Expiration: 3/8/23  Visit # / Visits Authorized: 5 / 16  FOTO: 2/8/2023     Time In: 11:15  Time Out: 12:00  Total Billable (one on one) Time: 45 minutes   Billed Units: TE - 2, TA - 1     Precautions: Standard    Subjective     Pt reports: "It's a little better."   he was compliant with home exercise program given last session.   Response to previous treatment:good   Functional change: pain decreased     Pain: 4/10  Location: right hand      Objective        Joint Evaluation  [facility]   2/8/2023 [facility]   2/8/2023     Right Left   Scapular Elevation 4/5 5/5   Scapular Retraction 4/5 5/5   Shoulder Flex 0-180 4/5 5/5   Shoulder Extension 0-80 4/5 5/5   Shoulder Abd 0-180 4/5 5/5   Shoulder ER 0-90 4/5 5/5   Shoulder IR 0-90 4/5 5/5   Shoulder Horizontal adduction 0-90 4/5 5/5   Elbow Flex/Ext 0-150 4/5 5/5   Wrist Flex 0-80 4/5 5/5   Wrist Ext 0-70 4/5 5/5   Supination 0-80 4/5 5/5   Pronation 0-80 4/5 5/5   Ulnar Deviation 4/5 5/5   Radial Deviation  4/5 5/5   *WNL - Within Normal Limits  *NT = Not Tested      Fist: loose      Strength: ([employer] Dynamometer in lbs.) Average 3 trials, Position II:       2/8/2023 2/8/2023   Rung II Right Left   Average " 60# 70#      Normal  Average Values  Female Right Left Male: Right Left   20-29 66 lbs 62 lbs         94 lbs 86 lbs   30-39 68 lbs 64 lbs 90 lbs 82 lbs   40-49 64 lbs 62 lbs 80 lbs 74 lbs   50-59 62 lbs 57 lbs 72 lbs 65 lbs   60-69 53 lbs 51 lbs 63 lbs 56 lbs   70+ 44 lbs 42 lbs 54 lbs 48 lbs   SD = +/- 19lbs         Pinch Strength (Measured in lbs)       2/8/2023 2/8/2023     Right Left   Key Pinch 15 # 20 #   3pt Pinch 8 # 13 #   2pt Pinch 10 # 13 #         Fine/Gross Motor Coordination     Assessment   Right   2/8/2023 Left  2/8/2023   9 hole peg test 9 pegs in/out for time 69 27   Finger to Nose (5 times)   intact intact   Finger Flicks (coordination moving from digit flexion to digit extension)   impaired - minimum intact   *WNL - Within Normal Limits  *NT = Not Tested    Treatment     Xander received therapeutic exercises to develop strength, endurance, and ROM for 30 minutes including:  - moist head x5 minutes shoulder and forearm   - wrist maze x2 minutes (yellow)  - wrist flexion/extension wheel x2 minutes   - wrist flexion/extension/Radial and ulnar deviation 2x15 1# dumbbell on wedge   - red flexbar pronation/supination 2x15     Xander participated in dynamic functional therapeutic activities to improve functional performance for 15 minutes, including:  - spheres x2 minutes   - robina grooved peg board with red clothespin to remove     Home Exercises and Education Provided     Education provided:   - button hook/adaptive equipment  - Progress towards goals     Written Home Exercises Provided: yes.  Exercises were reviewed and Xander was able to demonstrate them prior to the end of the session.  Xander demonstrated good  understanding of the HEP provided.   .   See EMR under Patient Instructions for exercises provided prior visit.      Assessment     Xander tolerated today's session well. Interventions focused on strength, stability and coordination in right hand. He was able to tolerate resistance  exercises well without pain. Increased time required for fine motor coordination due to in hand manipulation impairments but he was able to complete robina grooved pegboard and removed pieces with red clothespin to improve static pinch and pinch strength. Increased time for fine motor coordination and rest breaks due to fatigue in RUE. He reports compliance with home exercise program.     Xander is progressing well towards his goals and there are no updates to goals at this time. Pt prognosis is Fair.     Pt will continue to benefit from skilled outpatient occupational therapy to address the deficits listed in the problem list on initial evaluation provide pt/family education and to maximize pt's level of independence in the home and community environment.     Anticipated barriers to occupational therapy: chronicity of impairments     Pt's spiritual, cultural and educational needs considered and pt agreeable to plan of care and goals.    Goals:    LTG's (8 weeks):  2)   Demonstrate 65 psi's of right/left  strength to grasp for ADLs/work/leisure activities. Not met, progressing  3)   Demonstrate 20 psi's of lateral key pinch to independently for ADLs/work/leisure activities. Not met, progressing  4)   Decrease complaints of pain to  3 out of 10 at worst to increase functional hand use for ADL/work/leisure activities. Not met, progressing  5)   Patient to score at 30% limitation on FOTO to demonstrate improved perception of functional arm use. Not met, progressing  6)   Pt will return to near to prior level of function for ADLs and household management reporting I or Mod I with ADLs (dressing, feeding, grooming, toileting). Not met, progressing     STG's (4 weeks)  1)   Patient to be IND with HEP and modalities for pain/edema managment. Not met, progressing  2)   Demonstrate 63 psi's of right/left  strength to improve functional grasp for ADLs/work/leisure activities. Not met, progressing  3)   Demonstrate 17  psi's of right/left lateral/tripod/pincer pinch to increase independence with button and FM Coordination. Not met, progressing  4)   Patient to be IND with Orthotic use, wear and care precautions. Not met, progressing  5)   Decrease complaints of pain to  5 out of 10 at worst to increase functional hand use for ADL/work/leisure activities. Not met, progressing    Plan     Updates/Grading for next session: Continue with POC     Corinne Rapier, OT

## 2023-02-28 ENCOUNTER — OFFICE VISIT (OUTPATIENT)
Dept: SURGERY | Facility: CLINIC | Age: 73
End: 2023-02-28
Payer: MEDICARE

## 2023-02-28 VITALS
HEIGHT: 71 IN | WEIGHT: 183.63 LBS | BODY MASS INDEX: 25.71 KG/M2 | SYSTOLIC BLOOD PRESSURE: 126 MMHG | DIASTOLIC BLOOD PRESSURE: 81 MMHG | HEART RATE: 77 BPM

## 2023-02-28 DIAGNOSIS — L72.3 SEBACEOUS CYST: ICD-10-CM

## 2023-02-28 PROCEDURE — 3008F BODY MASS INDEX DOCD: CPT | Mod: HCNC,CPTII,S$GLB, | Performed by: SURGERY

## 2023-02-28 PROCEDURE — 99203 PR OFFICE/OUTPT VISIT, NEW, LEVL III, 30-44 MIN: ICD-10-PCS | Mod: HCNC,S$GLB,, | Performed by: SURGERY

## 2023-02-28 PROCEDURE — 1160F RVW MEDS BY RX/DR IN RCRD: CPT | Mod: HCNC,CPTII,S$GLB, | Performed by: SURGERY

## 2023-02-28 PROCEDURE — 3072F PR LOW RISK FOR RETINOPATHY: ICD-10-PCS | Mod: HCNC,CPTII,S$GLB, | Performed by: SURGERY

## 2023-02-28 PROCEDURE — 3008F PR BODY MASS INDEX (BMI) DOCUMENTED: ICD-10-PCS | Mod: HCNC,CPTII,S$GLB, | Performed by: SURGERY

## 2023-02-28 PROCEDURE — 1159F PR MEDICATION LIST DOCUMENTED IN MEDICAL RECORD: ICD-10-PCS | Mod: HCNC,CPTII,S$GLB, | Performed by: SURGERY

## 2023-02-28 PROCEDURE — 3074F PR MOST RECENT SYSTOLIC BLOOD PRESSURE < 130 MM HG: ICD-10-PCS | Mod: HCNC,CPTII,S$GLB, | Performed by: SURGERY

## 2023-02-28 PROCEDURE — 3079F DIAST BP 80-89 MM HG: CPT | Mod: HCNC,CPTII,S$GLB, | Performed by: SURGERY

## 2023-02-28 PROCEDURE — 3074F SYST BP LT 130 MM HG: CPT | Mod: HCNC,CPTII,S$GLB, | Performed by: SURGERY

## 2023-02-28 PROCEDURE — 1159F MED LIST DOCD IN RCRD: CPT | Mod: HCNC,CPTII,S$GLB, | Performed by: SURGERY

## 2023-02-28 PROCEDURE — 3072F LOW RISK FOR RETINOPATHY: CPT | Mod: HCNC,CPTII,S$GLB, | Performed by: SURGERY

## 2023-02-28 PROCEDURE — 99203 OFFICE O/P NEW LOW 30 MIN: CPT | Mod: HCNC,S$GLB,, | Performed by: SURGERY

## 2023-02-28 PROCEDURE — 3079F PR MOST RECENT DIASTOLIC BLOOD PRESSURE 80-89 MM HG: ICD-10-PCS | Mod: HCNC,CPTII,S$GLB, | Performed by: SURGERY

## 2023-02-28 PROCEDURE — 99999 PR PBB SHADOW E&M-EST. PATIENT-LVL IV: ICD-10-PCS | Mod: PBBFAC,HCNC,, | Performed by: SURGERY

## 2023-02-28 PROCEDURE — 4010F ACE/ARB THERAPY RXD/TAKEN: CPT | Mod: HCNC,CPTII,S$GLB, | Performed by: SURGERY

## 2023-02-28 PROCEDURE — 4010F PR ACE/ARB THEARPY RXD/TAKEN: ICD-10-PCS | Mod: HCNC,CPTII,S$GLB, | Performed by: SURGERY

## 2023-02-28 PROCEDURE — 1125F PR PAIN SEVERITY QUANTIFIED, PAIN PRESENT: ICD-10-PCS | Mod: HCNC,CPTII,S$GLB, | Performed by: SURGERY

## 2023-02-28 PROCEDURE — 3288F FALL RISK ASSESSMENT DOCD: CPT | Mod: HCNC,CPTII,S$GLB, | Performed by: SURGERY

## 2023-02-28 PROCEDURE — 3288F PR FALLS RISK ASSESSMENT DOCUMENTED: ICD-10-PCS | Mod: HCNC,CPTII,S$GLB, | Performed by: SURGERY

## 2023-02-28 PROCEDURE — 1160F PR REVIEW ALL MEDS BY PRESCRIBER/CLIN PHARMACIST DOCUMENTED: ICD-10-PCS | Mod: HCNC,CPTII,S$GLB, | Performed by: SURGERY

## 2023-02-28 PROCEDURE — 99999 PR PBB SHADOW E&M-EST. PATIENT-LVL IV: CPT | Mod: PBBFAC,HCNC,, | Performed by: SURGERY

## 2023-02-28 PROCEDURE — 1101F PR PT FALLS ASSESS DOC 0-1 FALLS W/OUT INJ PAST YR: ICD-10-PCS | Mod: HCNC,CPTII,S$GLB, | Performed by: SURGERY

## 2023-02-28 PROCEDURE — 1101F PT FALLS ASSESS-DOCD LE1/YR: CPT | Mod: HCNC,CPTII,S$GLB, | Performed by: SURGERY

## 2023-02-28 PROCEDURE — 1125F AMNT PAIN NOTED PAIN PRSNT: CPT | Mod: HCNC,CPTII,S$GLB, | Performed by: SURGERY

## 2023-02-28 NOTE — PROGRESS NOTES
[facility]  OUTPATIENT H&P    REASON FOR VISIT/CC: Skin abnormality    HPI: Xander Sanchez is a 72 y.o. male referred by Rehab providers for right upper back skin abnormality.  Pt otherwise doing well.    I have reviewed the patient's chart including prior progress notes, procedures and testing.     ROS:   Review of Systems    PROBLEM LIST:  Patient Active Problem List   Diagnosis    Biventricular implantable cardioverter-defibrillator (ICD) in situ    Essential hypertension    Cervical spondylosis with myelopathy    Brachial neuritis or radiculitis NOS    Degeneration of cervical intervertebral disc    PUD (peptic ulcer disease)    COPD (chronic obstructive pulmonary disease) with emphysema    Cardiomyopathy, nonischemic    HNP (herniated nucleus pulposus) with myelopathy, cervical    CRPS (complex regional pain syndrome type II)    Chronic pain syndrome    Cervical post-laminectomy syndrome    LBBB (left bundle branch block)    Slow transit constipation    Chronic systolic dysfunction of left ventricle    S/P TURP    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thy    Diastolic dysfunction with chronic heart failure    Hypertensive left ventricular hypertrophy with heart failure    Narcotic dependency, continuous    Muscle right arm weakness    Rotator cuff syndrome of right shoulder    Facet arthritis of cervical region    Aortic atherosclerosis    Body mass index (BMI) of 23.0 to 23.9 in adult    BPH with urinary obstruction    Incomplete bladder emptying    History of colon polyps    Mixed hyperlipidemia    GERD (gastroesophageal reflux disease)    Enuresis    Cervical myelopathy    Systolic heart failure    NS (nuclear sclerosis), right    Nuclear sclerosis, left    Right-sided carotid artery disease    Unspecified inflammatory spondylopathy, cervical region    Weakness    Stiffness in joint    Need for assistance with personal care    Pain in right arm    Impaired functional mobility, balance,  gait, and endurance         HISTORY  Past Medical History:   Diagnosis Date    Allergy     Arthritis     Asthma     Cardiomyopathy     Cataract     Cervical radicular pain     Cervical spondylosis with myelopathy 10/17/2012    Chronic bronchitis     Chronic systolic dysfunction of left ventricle 2015    Constipation 2015    COPD (chronic obstructive pulmonary disease)     CRPS (complex regional pain syndrome type I) 2014    Diabetes mellitus, type 2     DM type 2 (diabetes mellitus, type 2) 2015    Enlarged prostate 2015    Enuresis 2018    Hypertension     ICD (implantable cardiac defibrillator) in place     Mixed hyperlipidemia 2018    Presence of biventricular AICD 2015    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thy 2016    Urinary tract infection     pt does not know       Past Surgical History:   Procedure Laterality Date    CARDIAC DEFIBRILLATOR PLACEMENT      CATARACT EXTRACTION W/  INTRAOCULAR LENS IMPLANT Right 11/10/2020    Procedure: EXTRACTION, CATARACT, WITH IOL INSERTION;  Surgeon: Oral Graham MD;  Location: [facility];  Service: Ophthalmology;  Laterality: Right;    CATARACT EXTRACTION W/  INTRAOCULAR LENS IMPLANT Left 2020    Procedure: EXTRACTION, CATARACT, WITH IOL INSERTION;  Surgeon: Oral Graham MD;  Location: [facility];  Service: Ophthalmology;  Laterality: Left;    CERVICAL SPINE SURGERY      COLONOSCOPY N/A 2017    Procedure: COLONOSCOPY  golytely;  Surgeon: Vannessa Uribe MD;  Location: [facility];  Service: Endoscopy;  Laterality: N/A;    SPINE SURGERY         Social History     Tobacco Use    Smoking status: Former     Packs/day: 1.00     Years: 40.00     Pack years: 40.00     Types: Cigarettes     Quit date: 2009     Years since quittin.6    Smokeless tobacco: Never   Substance Use Topics    Alcohol use: Yes     Alcohol/week: 2.0 standard drinks     Types: 1 Cans of beer, 1 Shots of  liquor per week     Comment: May 1-2 beers or whiskey per month    Drug use: No       Family History   Problem Relation Age of Onset    Hypertension Mother     Hypertension Father     COPD Father     No Known Problems Sister     No Known Problems Brother     No Known Problems Daughter     Prostate cancer Neg Hx     Kidney disease Neg Hx          MEDS:  Current Outpatient Medications on File Prior to Visit   Medication Sig Dispense Refill    alcohol swabs PadM Apply 1 each topically as needed. 200 each 3    aspirin (ECOTRIN) 81 MG EC tablet Take 81 mg by mouth once daily.      baclofen (LIORESAL) 10 MG tablet TAKE 1 TABLET THREE TIMES DAILY 270 tablet 0    blood sugar diagnostic (TRUE METRIX GLUCOSE TEST STRIP) Strp TEST  ONE  TIME  DAILY  AT  6AM 100 strip 3    blood-glucose meter (TRUE METRIX AIR GLUCOSE METER) kit USE AS INSTRUCTED 1 each 0    fluticasone-salmeterol 230-21 mcg/dose (ADVAIR HFA) 230-21 mcg/actuation HFAA inhaler Inhale 2 puffs into the lungs 2 (two) times daily. Controller 36 g 0    gabapentin (NEURONTIN) 600 MG tablet Take 1 tablet (600 mg total) by mouth 3 (three) times daily. 270 tablet 1    hydroCHLOROthiazide (MICROZIDE) 12.5 mg capsule TAKE 1 CAPSULE (12.5 MG TOTAL) BY MOUTH ONCE DAILY. 90 capsule 2    HYDROcodone-acetaminophen (NORCO)  mg per tablet Take 1 tablet by mouth every 6 (six) hours as needed for Pain. 120 tablet 0    lancets (TRUEPLUS LANCETS) 33 gauge Misc TEST ONE TIME DAILY AT 6:00AM 100 each 3    metoprolol succinate (TOPROL-XL) 25 MG 24 hr tablet TAKE 1 TABLET EVERY DAY 90 tablet 3    oxybutynin (DITROPAN) 5 MG Tab TAKE 1 TABLET TWICE DAILY 180 tablet 3    pantoprazole (PROTONIX) 40 MG tablet TAKE 1 TABLET BY MOUTH ONCE DAILY BEFORE BREAKFAST 30 tablet 11    pravastatin (PRAVACHOL) 10 MG tablet TAKE 1 TABLET EVERY NIGHT 90 tablet 1    tamsulosin (FLOMAX) 0.4 mg Cap TAKE 1 CAPSULE EVERY DAY 90 capsule 3    valsartan (DIOVAN) 40 MG tablet TAKE 1 TABLET EVERY DAY 90 tablet  3    blood glucose control, low (TRUE METRIX LEVEL 1) Soln 1 Units by Misc.(Non-Drug; Combo Route) route once daily. 1 each 3     No current facility-administered medications on file prior to visit.       ALLERGIES:  Review of patient's allergies indicates:   Allergen Reactions    Ciprofloxacin Nausea And Vomiting         VITALS:  Vitals:    02/28/23 0824   BP: 126/81   Pulse: 77         PHYSICAL EXAM:  Physical Exam  GEN: NAD  SKIN: large open comedo right upper back, contents removed bluntly    LABS:  Lab Results   Component Value Date    WBC 7.65 06/28/2021    RBC 4.72 06/28/2021    HGB 13.9 (L) 06/28/2021    HCT 43.9 06/28/2021    HCT 31 (L) 08/10/2015     06/28/2021     Lab Results   Component Value Date     08/08/2022     08/08/2022    K 4.5 08/08/2022     08/08/2022    CO2 27 08/08/2022    BUN 8 08/08/2022    CREATININE 0.8 08/08/2022    CALCIUM 9.2 08/08/2022     Lab Results   Component Value Date    ALT 21 08/08/2022    AST 22 08/08/2022    ALKPHOS 84 08/08/2022    BILITOT 0.6 08/08/2022     Lab Results   Component Value Date    MG 2.2 04/08/2013    PHOS 3.0 04/08/2013           ASSESSMENT & PLAN:  72 y.o. male with large open comedo, no follow-up needed      Juan Alberto Najera M.D., F.A.C.S.  [facility]  [facility]

## 2023-03-01 ENCOUNTER — PATIENT MESSAGE (OUTPATIENT)
Dept: ADMINISTRATIVE | Facility: HOSPITAL | Age: 73
End: 2023-03-01
Payer: MEDICARE

## 2023-03-01 ENCOUNTER — CLINICAL SUPPORT (OUTPATIENT)
Dept: REHABILITATION | Facility: HOSPITAL | Age: 73
End: 2023-03-01
Payer: MEDICARE

## 2023-03-01 DIAGNOSIS — M54.12 CERVICAL RADICULAR PAIN: ICD-10-CM

## 2023-03-01 DIAGNOSIS — M47.12 CERVICAL SPONDYLOSIS WITH MYELOPATHY: ICD-10-CM

## 2023-03-01 DIAGNOSIS — M75.101 ROTATOR CUFF SYNDROME OF RIGHT SHOULDER: ICD-10-CM

## 2023-03-01 DIAGNOSIS — M62.81 MUSCLE RIGHT ARM WEAKNESS: ICD-10-CM

## 2023-03-01 DIAGNOSIS — R29.898 RIGHT HAND WEAKNESS: ICD-10-CM

## 2023-03-01 DIAGNOSIS — Z74.09 IMPAIRED FUNCTIONAL MOBILITY, BALANCE, GAIT, AND ENDURANCE: Primary | ICD-10-CM

## 2023-03-01 DIAGNOSIS — M25.60 STIFFNESS IN JOINT: ICD-10-CM

## 2023-03-01 DIAGNOSIS — G56.41 COMPLEX REGIONAL PAIN SYNDROME TYPE 2 OF RIGHT UPPER EXTREMITY: ICD-10-CM

## 2023-03-01 DIAGNOSIS — R26.9 GAIT ABNORMALITY: ICD-10-CM

## 2023-03-01 DIAGNOSIS — G89.4 CHRONIC PAIN SYNDROME: ICD-10-CM

## 2023-03-01 DIAGNOSIS — Z74.1 NEED FOR ASSISTANCE WITH PERSONAL CARE: ICD-10-CM

## 2023-03-01 DIAGNOSIS — M48.02 SPINAL STENOSIS IN CERVICAL REGION: ICD-10-CM

## 2023-03-01 DIAGNOSIS — R29.898 RIGHT ARM WEAKNESS: ICD-10-CM

## 2023-03-01 DIAGNOSIS — M79.601 PAIN IN RIGHT ARM: ICD-10-CM

## 2023-03-01 DIAGNOSIS — M96.1 CERVICAL POST-LAMINECTOMY SYNDROME: ICD-10-CM

## 2023-03-01 DIAGNOSIS — M50.30 DEGENERATION OF CERVICAL INTERVERTEBRAL DISC: ICD-10-CM

## 2023-03-01 DIAGNOSIS — M79.601 RIGHT ARM PAIN: ICD-10-CM

## 2023-03-01 DIAGNOSIS — G90.50 COMPLEX REGIONAL PAIN SYNDROME TYPE 1, AFFECTING UNSPECIFIED SITE: ICD-10-CM

## 2023-03-01 DIAGNOSIS — M54.12 BRACHIAL NEURITIS OR RADICULITIS: ICD-10-CM

## 2023-03-01 DIAGNOSIS — M48.02 CERVICAL STENOSIS OF SPINE: ICD-10-CM

## 2023-03-01 DIAGNOSIS — M47.812 FACET ARTHRITIS OF CERVICAL REGION: ICD-10-CM

## 2023-03-01 DIAGNOSIS — R53.1 WEAKNESS: Primary | ICD-10-CM

## 2023-03-01 DIAGNOSIS — M50.00 HNP (HERNIATED NUCLEUS PULPOSUS) WITH MYELOPATHY, CERVICAL: ICD-10-CM

## 2023-03-01 DIAGNOSIS — F11.20 NARCOTIC DEPENDENCY, CONTINUOUS: ICD-10-CM

## 2023-03-01 PROCEDURE — 97110 THERAPEUTIC EXERCISES: CPT | Mod: HCNC,PN

## 2023-03-01 PROCEDURE — 97530 THERAPEUTIC ACTIVITIES: CPT | Mod: HCNC,PN

## 2023-03-01 PROCEDURE — 97022 WHIRLPOOL THERAPY: CPT | Mod: HCNC,PN

## 2023-03-01 PROCEDURE — 97112 NEUROMUSCULAR REEDUCATION: CPT | Mod: HCNC,PN

## 2023-03-01 RX ORDER — GABAPENTIN 600 MG/1
600 TABLET ORAL 3 TIMES DAILY
Qty: 270 TABLET | Refills: 1
Start: 2023-03-01 | End: 2023-10-06

## 2023-03-01 NOTE — PROGRESS NOTES
"Occupational Therapy Daily Treatment Note     Name: Xander Sanchez  [facility] Number: [id number]    Therapy Diagnosis:   Encounter Diagnoses   Name Primary?    Weakness Yes    Stiffness in joint     Need for assistance with personal care     Pain in right arm        Physician: Diomedes Martin MD    Visit Date: 3/1/2023    Physician Orders: OT eval and treat Dx: (M54.2,  G89.29) Chronic neck pain  (primary encounter diagnosis) (M96.1) Cervical post-laminectomy syndrome (M47.812) Spondylosis of cervical region without myelopathy or radiculopathy Request: ADL evaluation and treatment. Adaptive devices   Medical Diagnosis: Chronic neck pain [M54.2, G89.29]   Gait disorder [R26.9]   Evaluation Date: 2/8/2023  Plan of Care Expiration Period: 4/8/2022  Insurance Authorization period Expiration: 3/8/23  Visit # / Visits Authorized: 6 / 16  FOTO: 2/8/2023     Time In: 8:45  Time Out: 9:30  Total Billable (one on one) Time: 45 minutes   Billed Units: TE - 2, TA - 1     Precautions: Standard    Subjective     Pt reports: "It's ok. I think a little better. I have trouble with jars"   he was compliant with home exercise program given last session.   Response to previous treatment:good   Functional change: difficulty with jars     Pain: 5/10  Location: right hand      Objective        Joint Evaluation  [facility] [place]   2/8/2023 [facility] [place]   2/8/2023     Right Left   Scapular Elevation 4/5 5/5   Scapular Retraction 4/5 5/5   Shoulder Flex 0-180 4/5 5/5   Shoulder Extension 0-80 4/5 5/5   Shoulder Abd 0-180 4/5 5/5   Shoulder ER 0-90 4/5 5/5   Shoulder IR 0-90 4/5 5/5   Shoulder Horizontal adduction 0-90 4/5 5/5   Elbow Flex/Ext 0-150 4/5 5/5   Wrist Flex 0-80 4/5 5/5   Wrist Ext 0-70 4/5 5/5   Supination 0-80 4/5 5/5   Pronation 0-80 4/5 5/5   Ulnar Deviation 4/5 5/5   Radial Deviation  4/5 5/5   *WNL - Within Normal Limits  *NT = Not Tested      Fist: loose      Strength: ([employer] Dynamometer in lbs.) Average 3 trials, Position II:       2/8/2023 " 2/8/2023 3/1/2023 3/1/2023   Rung II Right Left Right  Left    Average 60# 70# 60.3# 69.3#      Normal  Average Values  Female Right Left Male: Right Left   20-29 66 lbs 62 lbs         94 lbs 86 lbs   30-39 68 lbs 64 lbs 90 lbs 82 lbs   40-49 64 lbs 62 lbs 80 lbs 74 lbs   50-59 62 lbs 57 lbs 72 lbs 65 lbs   60-69 53 lbs 51 lbs 63 lbs 56 lbs   70+ 44 lbs 42 lbs 54 lbs 48 lbs   SD = +/- 19lbs         Pinch Strength (Measured in lbs)       2/8/2023 2/8/2023 3/1/2023 3/1/2023     Right Left Right  Left    Key Pinch 15 # 20 # 15# 20#   3pt Pinch 8 # 13 # 9# 15#   2pt Pinch 10 # 13 # 12# 12#         Fine/Gross Motor Coordination     Assessment   Right   2/8/2023 Left  2/8/2023 Right   3/1/2023 Left   3/1/2023   9 hole peg test 9 pegs in/out for time :69 :27 1:00 :27   *WNL - Within Normal Limits  *NT = Not Tested    Treatment     Fluidotherapy: To R upper extremity for 15 min, continuous air, 110 deg, air speed 100 to decrease pain, edema & scar tissue and increased tissue extensibility.    Xander received therapeutic exercises to develop strength, endurance, and ROM for 15 minutes including:  - moist head x5 minutes shoulder and active range of motion right hand with fluidotherapy     Xander participated in dynamic functional therapeutic activities to improve functional performance for 30 minutes, including:  - reassessment + FOTO  - managing jars - issued dycem  - pom poms with resistance gripper 45# 1 tub - 1/2 with right and 1/2 with left     Home Exercises and Education Provided     Education provided:   - button hook/adaptive equipment  - Progress towards goals     Written Home Exercises Provided: yes.  Exercises were reviewed and Xander was able to demonstrate them prior to the end of the session.  Xander demonstrated good  understanding of the HEP provided.   .   See EMR under Patient Instructions for exercises provided prior visit.      Assessment     Xander tolerated today's session well. Fluidotherapy  utilized this session to promote blood flow, joint mobility and pain management to prepare for treatment. Patient was instructed on exercises to perform during fluido to maximize benefits. Reassessment performed and Xander exhibits improved fine motor coordination for right hand, completing 9 hole peg assessment in decreased time. No change with  strength and minor improvements with right/left pinch strength. Interventions focused on  strength and endurance. He required multiple rest breaks using resistance gripper set at 45#, more notable with right hand compared to left. Increased time for all activities/reassessment. He reports compliance with home exercise program and is motivated. Education provided on [employer] for managing jars; issued for home use.     Xander is progressing well towards his goals and there are no updates to goals at this time. Pt prognosis is Fair.     Pt will continue to benefit from skilled outpatient occupational therapy to address the deficits listed in the problem list on initial evaluation provide pt/family education and to maximize pt's level of independence in the home and community environment.     Anticipated barriers to occupational therapy: chronicity of impairments     Pt's spiritual, cultural and educational needs considered and pt agreeable to plan of care and goals.    Goals:    LTG's (8 weeks):  2)   Demonstrate 65 psi's of right/left  strength to grasp for ADLs/work/leisure activities. Not met, progressing  3)   Demonstrate 20 psi's of lateral key pinch to independently for ADLs/work/leisure activities. Not met, progressing  4)   Decrease complaints of pain to  3 out of 10 at worst to increase functional hand use for ADL/work/leisure activities. Not met, progressing  5)   Patient to score at 30% limitation on FOTO to demonstrate improved perception of functional arm use. Not met, progressing  6)   Pt will return to near to prior level of function for ADLs and  household management reporting I or Mod I with ADLs (dressing, feeding, grooming, toileting). Not met, progressing     STG's (4 weeks)  1)   Patient to be IND with HEP and modalities for pain/edema managment. Not met, progressing  2)   Demonstrate 63 psi's of right/left  strength to improve functional grasp for ADLs/work/leisure activities. Not met, progressing  3)   Demonstrate 17 psi's of right/left lateral/tripod/pincer pinch to increase independence with button and FM Coordination. Not met, progressing  4)   Patient to be IND with Orthotic use, wear and care precautions. Not met, progressing  5)   Decrease complaints of pain to  5 out of 10 at worst to increase functional hand use for ADL/work/leisure activities. Not met, progressing    Plan     Updates/Grading for next session: Continue with POC     Corinne Rapier, OT

## 2023-03-01 NOTE — PROGRESS NOTES
"[facility]   Physical Therapy Treatment Note     Name: Xander Sanchez  Clinic Number: [id number]    Therapy Diagnosis:   Encounter Diagnosis   Name Primary?    Impaired functional mobility, balance, gait, and endurance Yes     Physician:  Diomedes Martin MD    Visit Date: 3/1/2023    Physician Orders: PT Eval and Treat   Medical Diagnosis from Referral: Chronic neck pain [M54.2, G89.29]  (M96.1) Cervical post-laminectomy syndrome  (M47.812) Spondylosis of cervical region without myelopathy or radiculopathy     Request:  Neck exercises.  Eval gait due to h/o stumbling and occasional falls.     Evaluation Date: 2/10/2023  Authorization Period Expiration: 2/2/2024  Plan of Care Expiration: 4/7/2023  Visit # / Visits authorized: 5/12 + eval  FOTO: next session please      PTA Visit #: 2/5     Time In: 0930 AM  Time Out: 10:13 PM  Total Billable Time: 43 minutes (2 TE + 1 NMR)    SUBJECTIVE     Patient reports: reports that his neck pain is his biggest problem.   He was compliant with home exercise program.  Response to previous treatment: No adverse response  Functional change: HEP compliance     Pain: 4/10   Location: right shoulder      Precautions: Standard, Fall, and History of Cervical Fusion; DM II; HTN; ICD; CRPS    OBJECTIVE     Objective Measures updated at progress report unless specified.     Treatment     Xander received the treatments listed below:      therapeutic exercises to develop strength, endurance, ROM, flexibility, and posture for 23 minutes including:  - Session started with stepper bike x 8 minutes; level 4 sprints for cardiovascular endurance training (bilateral upper extremity / bilateral lower extremity use)  - Standing hamstring stretch at stairs 2x45"B  - Standing hip flexor stretch on stairs 2x45"B  -- standing hip abduction 2# following abd slides  2 x 10 B  -- standing hip extension 2# on wood block  2 x 10 B  -- Standing toe rases 2x10B with bilateral " "upper extremity support    NOT TODAY  -- Standing heel raises 3x10B with bilateral upper extremity support; off edge of green foam disc today  - Lateral resistance band stepping with yellow theraband around ankles 4 lengths x 10 feet each   - Cervical AROM 10x 5" holds in all directions  - Lower trunk rotations x2 minutes total with 5" holds at end range    neuromuscular re-education activities to improve: Balance, Coordination, and Proprioception for 15 minutes. The following activities were included:  -- standing hip abduction slides  2 x 10 with B UE support   - Modified single leg stance on 6" step + red med ball chest press 2x20  - Multi-level toe taps (6'' step) with right lower extremity stepping to encourage hip flexion a); left upper extremity support 3x30"  - miki (3) stepping  Forward x 3 trials with / bar   - Dorsiflexion walks 4 lengths x 10 feet each with bilateral upper extremity support (followed by gait training)  - Narrow base of support on airex + head turns 2x45" each vertical and horizontal    gait training to improve functional mobility and safety for 5 minutes, including:  -- backward walking  2 x 40''   -- 150 feet gait training in gym with cues for heel strike bilaterally after walking with DF (patient had better heel strike on right)      NOT TODAY  - Sit <> stand: 2x10 with RLE bias     therapeutic activities to improve functional performance for 00 minutes, including:  NOT TODAY  Patient Education anfd Home Exercises     Home Exercises Provided and Patient Education Provided     Education provided:   - Continue HEP  - Postural awareness    Written Home Exercises Provided: Patient instructed to cont prior HEP. Exercises were reviewed and Xander was able to demonstrate them prior to the end of the session.  Xander demonstrated good  understanding of the education provided. See EMR under Patient Instructions for exercises provided during therapy sessions    ASSESSMENT     Xander arrived " to session without new complaints/changes in status and agreeable to treatment. He responded well along with gait training. He would continue to benefit from flexibility, stability and strength training.  Appropriate levels and locations of muscular fatigue achieved with all activity. He would benefit from continued PT services to achieve functional goals.    Xander Is progressing well towards his goals.   Patient prognosis is Good.     Patient will continue to benefit from skilled outpatient physical therapy to address the deficits listed in the problem list box on initial evaluation, provide pt/family education and to maximize pt's level of independence in the home and community environment.     Patient's spiritual, cultural and educational needs considered and pt agreeable to plan of care and goals.     Anticipated barriers to physical therapy: Co-morbidities; chronicity of impairments    Goals:     Short Term Goals: 4 weeks   Patient will be compliant with HEP in order to maximize PT benefits (MET)  Patient will score >/= 18/24 on DGI in order to reduce risk for falls and improve postural control (progressing, not met)  Patient will score >/= 8 repetitions on 30-Second Sit to  order to improve BLE endurance and muscular power for transfers (progressing, not met)     Long Term Goals: 8 weeks   Patient will score </= 39% on FOTO limitation survey in order to improve self-perception of functional mobility deficits (progressing, not met)  Patient will score >/=50% on Abbreviated ABC in order to improve balance confidence with community mobility (progressing, not met)  Patient will improve bilateral lower extremity MMT grades by >/=1/2 grade in order to improve strength for ADL completion (progressing, not met)  Patient will improve Hamstring range of motion to lacking </= 25 degrees on 90/90 test in order to improve functional mobility for activities such as lower body dressing (progressing, not  met)  Patient will score >/= 20/24 on DGI in order to reduce risk for falls and improve postural control (progressing, not met)  Patient will score >/= 10 repetitions on 30-Second Sit to  order to improve BLE endurance and muscular power for transfers (progressing, not met)  Patient will improve cervical rotation active range of motion to >/=50 degrees bilaterally in order to improve functional mobility during tasks such as driving (progressing, not met)  Patient will begin some form of home/community fitness in order to sustain progress gained in PT (progressing, not met)    PLAN   Assess STGs on 3/15    Continue to progress as per plan of care; expand balance program per tolerance    Nelia Farah, PT

## 2023-03-01 NOTE — TELEPHONE ENCOUNTER
----- Message from Leonardo Molina sent at 3/1/2023 11:43 AM CST -----  Contact: [phone]  Type:  RX Refill Request        Who Called:pt        Refill or New Rx:refill         RX Name and Strength:gabapentin (NEURONTIN) 600 MG tablet        Preferred Pharmacy with phone number:  [facility] - [place], [place] - [address]  [address] [address]  Phone: [phone] Fax: [phone]            Best Call Back Number:[phone]          Additional [employer]format[employer]

## 2023-03-06 ENCOUNTER — CLINICAL SUPPORT (OUTPATIENT)
Dept: REHABILITATION | Facility: HOSPITAL | Age: 73
End: 2023-03-06
Payer: MEDICARE

## 2023-03-06 DIAGNOSIS — M79.601 PAIN IN RIGHT ARM: ICD-10-CM

## 2023-03-06 DIAGNOSIS — Z74.09 IMPAIRED FUNCTIONAL MOBILITY, BALANCE, GAIT, AND ENDURANCE: Primary | ICD-10-CM

## 2023-03-06 DIAGNOSIS — Z74.1 NEED FOR ASSISTANCE WITH PERSONAL CARE: ICD-10-CM

## 2023-03-06 DIAGNOSIS — R53.1 WEAKNESS: Primary | ICD-10-CM

## 2023-03-06 DIAGNOSIS — M25.60 STIFFNESS IN JOINT: ICD-10-CM

## 2023-03-06 PROCEDURE — 97110 THERAPEUTIC EXERCISES: CPT | Mod: HCNC,PN

## 2023-03-06 PROCEDURE — 97140 MANUAL THERAPY 1/> REGIONS: CPT | Mod: HCNC,PN

## 2023-03-06 PROCEDURE — 97530 THERAPEUTIC ACTIVITIES: CPT | Mod: HCNC,PN

## 2023-03-06 PROCEDURE — 97112 NEUROMUSCULAR REEDUCATION: CPT | Mod: HCNC,PN

## 2023-03-06 NOTE — PROGRESS NOTES
"[facility]   Physical Therapy Treatment Note     Name: Xander Sanchez  Clinic Number: [id number]    Therapy Diagnosis:   Encounter Diagnosis   Name Primary?    Impaired functional mobility, balance, gait, and endurance Yes       Physician:  Diomedes Martin MD    Visit Date: 3/6/2023    Physician Orders: PT Eval and Treat   Medical Diagnosis from Referral: Chronic neck pain [M54.2, G89.29]  (M96.1) Cervical post-laminectomy syndrome  (M47.812) Spondylosis of cervical region without myelopathy or radiculopathy     Request:  Neck exercises.  Eval gait due to h/o stumbling and occasional falls.     Evaluation Date: 2/10/2023  Authorization Period Expiration: 2/2/2024  Plan of Care Expiration: 4/7/2023  Visit # / Visits authorized: 7/12  FOTO: next session please      PTA Visit #: 1/5     Time In: 09:30 AM  Time Out: 10:15 AM  Total Billable Time: 45 minutes (2 TE + 1 NMR)    SUBJECTIVE     Patient reports: reports that his neck pain is his biggest problem.   He was compliant with home exercise program.  Response to previous treatment: No adverse response  Functional change: HEP compliance     Pain: 4/10   Location: right shoulder      Precautions: Standard, Fall, and History of Cervical Fusion; DM II; HTN; ICD; CRPS    OBJECTIVE     Objective Measures updated at progress report unless specified.     Treatment     Xander received the treatments listed below:      therapeutic exercises to develop strength, endurance, ROM, flexibility, and posture for 25 minutes including:  -- Session started with stepper bike x 8 minutes; level 4 sprints for cardiovascular endurance training (bilateral upper extremity / bilateral lower extremity use)  -- Standing hamstring stretch at stairs 2x45"B  -- Standing hip flexor stretch on stairs 2x45"B  -- standing hip abduction 2# following abd slides  2 x 10 B  -- standing hip extension 2# on wood block  2 x 10 B  -- Standing toe raises 2x10B with bilateral " "upper extremity support    NOT TODAY  - Standing heel raises 3x10B with bilateral upper extremity support; off edge of green foam disc today  - Lateral resistance band stepping with yellow theraband around ankles 4 lengths x 10 feet each   - Cervical AROM 10x 5" holds in all directions  - Lower trunk rotations x2 minutes total with 5" holds at end range    neuromuscular re-education activities to improve: Balance, Coordination, and Proprioception for 15 minutes. The following activities were included:  -- standing hip abduction slides  2 x 10 with B UE support   -- Modified single leg stance on 6" step + red med ball chest press 2x20  -- Multi-level toe taps (6'' step) with right lower extremity stepping to encourage hip flexion a); left upper extremity support 3x30"  -- miki (3) stepping  Forward x 3 trials with / bar   -- Dorsiflexion walks 4 lengths x 10 feet each with bilateral upper extremity support (followed by gait training)  -- Narrow base of support on airex + head turns 2x45" each vertical and horizontal    gait training to improve functional mobility and safety for 05 minutes, including:  -- backward walking  2 x 40''   -- 150 feet gait training in gym with cues for heel strike bilaterally after walking with DF (patient had better heel strike on right)      NOT TODAY  - Sit <> stand: 2x10 with RLE bias     therapeutic activities to improve functional performance for 00 minutes, including:  NOT TODAY  Patient Education anfd Home Exercises     Home Exercises Provided and Patient Education Provided     Education provided:   - Continue HEP  - Postural awareness    Written Home Exercises Provided: Patient instructed to cont prior HEP. Exercises were reviewed and Xander was able to demonstrate them prior to the end of the session.  Xandre demonstrated good  understanding of the education provided. See EMR under Patient Instructions for exercises provided during therapy sessions    ASSESSMENT     Xander " arrived to session without any complaints of pain and was agreeable to treatment.  Session focused on improving R sided gait mechanics, specifically increased hip flexion and dorsiflexion.  Improved heel strike observed with in clinic ambulation directly following toe raises and heel walking though declines quickly following fatigue.  Improvements with overall endurance noted with all exercises completed in standing and only one brief water break for fatigue recovery and water before exiting clinic.  He would benefit from continued PT services to achieve functional goals.    Xander Is progressing well towards his goals.   Patient prognosis is Good.     Patient will continue to benefit from skilled outpatient physical therapy to address the deficits listed in the problem list box on initial evaluation, provide pt/family education and to maximize pt's level of independence in the home and community environment.     Patient's spiritual, cultural and educational needs considered and pt agreeable to plan of care and goals.     Anticipated barriers to physical therapy: Co-morbidities; chronicity of impairments    Goals:     Short Term Goals: 4 weeks   Patient will be compliant with HEP in order to maximize PT benefits (MET)  Patient will score >/= 18/24 on DGI in order to reduce risk for falls and improve postural control (progressing, not met)  Patient will score >/= 8 repetitions on 30-Second Sit to  order to improve BLE endurance and muscular power for transfers (progressing, not met)     Long Term Goals: 8 weeks   Patient will score </= 39% on FOTO limitation survey in order to improve self-perception of functional mobility deficits (progressing, not met)  Patient will score >/=50% on Abbreviated ABC in order to improve balance confidence with community mobility (progressing, not met)  Patient will improve bilateral lower extremity MMT grades by >/=1/2 grade in order to improve strength for ADL completion  (progressing, not met)  Patient will improve Hamstring range of motion to lacking </= 25 degrees on 90/90 test in order to improve functional mobility for activities such as lower body dressing (progressing, not met)  Patient will score >/= 20/24 on DGI in order to reduce risk for falls and improve postural control (progressing, not met)  Patient will score >/= 10 repetitions on 30-Second Sit to  order to improve BLE endurance and muscular power for transfers (progressing, not met)  Patient will improve cervical rotation active range of motion to >/=50 degrees bilaterally in order to improve functional mobility during tasks such as driving (progressing, not met)  Patient will begin some form of home/community fitness in order to sustain progress gained in PT (progressing, not met)    PLAN   Assess STGs on 3/15    Continue to progress as per plan of care; expand balance program per tolerance    Anabel Monae, PTA

## 2023-03-06 NOTE — PROGRESS NOTES
"Occupational Therapy Daily Treatment Note     Name: Xander Sanchez  [facility] Number: [id number]    Therapy Diagnosis:   Encounter Diagnoses   Name Primary?    Weakness Yes    Stiffness in joint     Need for assistance with personal care     Pain in right arm          Physician: Diomedes Martin MD    Visit Date: 3/6/2023    Physician Orders: OT eval and treat Dx: (M54.2,  G89.29) Chronic neck pain  (primary encounter diagnosis) (M96.1) Cervical post-laminectomy syndrome (M47.812) Spondylosis of cervical region without myelopathy or radiculopathy Request: ADL evaluation and treatment. Adaptive devices   Medical Diagnosis: Chronic neck pain [M54.2, G89.29]   Gait disorder [R26.9]   Evaluation Date: 2/8/2023  Plan of Care Expiration Period: 4/8/2022  Insurance Authorization period Expiration: 3/8/23  Visit # / Visits Authorized: 7 / 16  FOTO: 2/8/2023     Time In: 8:45  Time Out: 9:30  Total Billable (one on one) Time: 45 minutes   Billed Units: TE - 1, TA - 1 , MT - 1     Precautions: Standard    Subjective     Pt reports: "the dycem is good. I have tightness in my wrist and my fingers"    he was compliant with home exercise program given last session.   Response to previous treatment:good   Functional change: pain in thumb MP joint     Pain: 5/10  Location: right hand      Objective        Joint Evaluation  [facility]   2/8/2023 [facility]   2/8/2023     Right Left   Scapular Elevation 4/5 5/5   Scapular Retraction 4/5 5/5   Shoulder Flex 0-180 4/5 5/5   Shoulder Extension 0-80 4/5 5/5   Shoulder Abd 0-180 4/5 5/5   Shoulder ER 0-90 4/5 5/5   Shoulder IR 0-90 4/5 5/5   Shoulder Horizontal adduction 0-90 4/5 5/5   Elbow Flex/Ext 0-150 4/5 5/5   Wrist Flex 0-80 4/5 5/5   Wrist Ext 0-70 4/5 5/5   Supination 0-80 4/5 5/5   Pronation 0-80 4/5 5/5   Ulnar Deviation 4/5 5/5   Radial Deviation  4/5 5/5   *WNL - Within Normal Limits  *NT = Not Tested      Fist: loose      Strength: (ROOSEVELT Dynamometer in lbs.) Average 3 trials, Position " "II:       2/8/2023 2/8/2023 3/1/2023 3/1/2023   Rung II Right Left Right  Left    Average 60# 70# 60.3# 69.3#      Normal  Average Values  Female Right Left Male: Right Left   20-29 66 lbs 62 lbs         94 lbs 86 lbs   30-39 68 lbs 64 lbs 90 lbs 82 lbs   40-49 64 lbs 62 lbs 80 lbs 74 lbs   50-59 62 lbs 57 lbs 72 lbs 65 lbs   60-69 53 lbs 51 lbs 63 lbs 56 lbs   70+ 44 lbs 42 lbs 54 lbs 48 lbs   SD = +/- 19lbs         Pinch Strength (Measured in lbs)       2/8/2023 2/8/2023 3/1/2023 3/1/2023     Right Left Right  Left    Key Pinch 15 # 20 # 15# 20#   3pt Pinch 8 # 13 # 9# 15#   2pt Pinch 10 # 13 # 12# 12#         Fine/Gross Motor Coordination     Assessment   Right   2/8/2023 Left  2/8/2023 Right   3/1/2023 Left   3/1/2023   9 hole peg test 9 pegs in/out for time :69 :27 1:00 :27   *WNL - Within Normal Limits  *NT = Not Tested    Treatment     Xander received the following manual therapy techniques: Myofacial release, Soft tissue Mobilization, and passive range of motion  were applied to the: right upper extremity for 15 minutes, including:  - moist heat x5 minutes shoulder and forearm     Xander received therapeutic exercises to develop strength, endurance, and ROM for 15 minutes including:  - wrist flexion/extension stretch 3x30"  - wrist maze x2 minutes   - Isospheres x2 minutes   - thumb opposition x20   - Tendon glide exercises x10     Xander participated in dynamic functional therapeutic activities to improve functional performance for 15 minutes, including:  - in hand manipulation with tennis ball - alphabet   - pom pom with resistance gripper 45#     Home Exercises and Education Provided     Education provided:   - TGE   - button hook/adaptive equipment  - Progress towards goals     Written Home Exercises Provided: yes.  Exercises were reviewed and Xander was able to demonstrate them prior to the end of the session.  Xander demonstrated good  understanding of the HEP provided.   .   See EMR under Patient " Instructions for exercises provided 3/6/2023     Assessment     Xander tolerated today's session well. Manual therapy performed to decrease tension and inflammation over the common flexors and extensors. Therapist discussed use of compression sleeve to assist with joint stability and decrease pain reported in right thumb MP joint. He completed active range of motion for right thumb and tendon glides, home exercise program upgraded to adduction thumb opposition and TGE. Overall good response to treatment. He was able to complete resistance gripper activity using 45# resistance + rest breaks.     Xander is progressing well towards his goals and there are no updates to goals at this time. Pt prognosis is Fair.     Pt will continue to benefit from skilled outpatient occupational therapy to address the deficits listed in the problem list on initial evaluation provide pt/family education and to maximize pt's level of independence in the home and community environment.     Anticipated barriers to occupational therapy: chronicity of impairments     Pt's spiritual, cultural and educational needs considered and pt agreeable to plan of care and goals.    Goals:    LTG's (8 weeks):  2)   Demonstrate 65 psi's of right/left  strength to grasp for ADLs/work/leisure activities. Not met, progressing  3)   Demonstrate 20 psi's of lateral key pinch to independently for ADLs/work/leisure activities. Not met, progressing  4)   Decrease complaints of pain to  3 out of 10 at worst to increase functional hand use for ADL/work/leisure activities. Not met, progressing  5)   Patient to score at 30% limitation on FOTO to demonstrate improved perception of functional arm use. Not met, progressing  6)   Pt will return to near to prior level of function for ADLs and household management reporting I or Mod I with ADLs (dressing, feeding, grooming, toileting). Not met, progressing     STG's (4 weeks)  1)   Patient to be IND with HEP and  modalities for pain/edema managment. Not met, progressing  2)   Demonstrate 63 psi's of right/left  strength to improve functional grasp for ADLs/work/leisure activities. Not met, progressing  3)   Demonstrate 17 psi's of right/left lateral/tripod/pincer pinch to increase independence with button and FM Coordination. Not met, progressing  4)   Patient to be IND with Orthotic use, wear and care precautions. Not met, progressing  5)   Decrease complaints of pain to  5 out of 10 at worst to increase functional hand use for ADL/work/leisure activities. Not met, progressing    Plan     Updates/Grading for next session: Continue with POC     Corinne Rapier, OT

## 2023-03-06 NOTE — PATIENT INSTRUCTIONS
Tendon Glides         Start at position A and move through each position slowly attempting to achieve full glide.  A-E is ONE repetition.     Complete 10 reps 3 times per day.

## 2023-03-08 ENCOUNTER — CLINICAL SUPPORT (OUTPATIENT)
Dept: REHABILITATION | Facility: HOSPITAL | Age: 73
End: 2023-03-08
Payer: MEDICARE

## 2023-03-08 DIAGNOSIS — Z74.1 NEED FOR ASSISTANCE WITH PERSONAL CARE: ICD-10-CM

## 2023-03-08 DIAGNOSIS — M79.601 PAIN IN RIGHT ARM: ICD-10-CM

## 2023-03-08 DIAGNOSIS — M25.60 STIFFNESS IN JOINT: ICD-10-CM

## 2023-03-08 DIAGNOSIS — Z74.09 IMPAIRED FUNCTIONAL MOBILITY, BALANCE, GAIT, AND ENDURANCE: Primary | ICD-10-CM

## 2023-03-08 DIAGNOSIS — R53.1 WEAKNESS: Primary | ICD-10-CM

## 2023-03-08 PROCEDURE — 97140 MANUAL THERAPY 1/> REGIONS: CPT | Mod: HCNC,PN

## 2023-03-08 PROCEDURE — 97112 NEUROMUSCULAR REEDUCATION: CPT | Mod: HCNC,PN,CQ

## 2023-03-08 PROCEDURE — 97110 THERAPEUTIC EXERCISES: CPT | Mod: HCNC,PN,CQ

## 2023-03-08 PROCEDURE — 97110 THERAPEUTIC EXERCISES: CPT | Mod: HCNC,PN

## 2023-03-08 NOTE — PROGRESS NOTES
"Occupational Therapy Daily Treatment Note     Name: Xander Sanchez  [facility] Number: [id number]    Therapy Diagnosis:   Encounter Diagnoses   Name Primary?    Weakness Yes    Stiffness in joint     Need for assistance with personal care     Pain in right arm      Physician: Diomedes Martin MD    Visit Date: 3/8/2023    Physician Orders: OT eval and treat Dx: (M54.2,  G89.29) Chronic neck pain  (primary encounter diagnosis) (M96.1) Cervical post-laminectomy syndrome (M47.812) Spondylosis of cervical region without myelopathy or radiculopathy Request: ADL evaluation and treatment. Adaptive devices   Medical Diagnosis: Chronic neck pain [M54.2, G89.29]   Gait disorder [R26.9]   Evaluation Date: 2/8/2023  Plan of Care Expiration Period: 4/8/2022  Insurance Authorization period Expiration: 3/8/23  Visit # / Visits Authorized: 8 / 16  FOTO: 2/8/2023     Time In: 8:00  Time Out: 8:45  Total Billable (one on one) Time: 45 minutes   Billed Units: TE - 2 , MT - 1     Precautions: Standard    Subjective     Pt reports: "My arm is ok. It is about a 4 today."    he was compliant with home exercise program given last session.   Response to previous treatment:good   Functional change: decreased pain end of session     Pain: 4/10  Location: right hand      Objective        Joint Evaluation  [facility]   2/8/2023 [facility]   2/8/2023     Right Left   Scapular Elevation 4/5 5/5   Scapular Retraction 4/5 5/5   Shoulder Flex 0-180 4/5 5/5   Shoulder Extension 0-80 4/5 5/5   Shoulder Abd 0-180 4/5 5/5   Shoulder ER 0-90 4/5 5/5   Shoulder IR 0-90 4/5 5/5   Shoulder Horizontal adduction 0-90 4/5 5/5   Elbow Flex/Ext 0-150 4/5 5/5   Wrist Flex 0-80 4/5 5/5   Wrist Ext 0-70 4/5 5/5   Supination 0-80 4/5 5/5   Pronation 0-80 4/5 5/5   Ulnar Deviation 4/5 5/5   Radial Deviation  4/5 5/5   *WNL - Within Normal Limits  *NT = Not Tested      Fist: loose      Strength: (ROOSEVELT Dynamometer in lbs.) Average 3 trials, Position II:       2/8/2023 2/8/2023 " "3/1/2023 3/1/2023   Rung II Right Left Right  Left    Average 60# 70# 60.3# 69.3#      Normal  Average Values  Female Right Left Male: Right Left   20-29 66 lbs 62 lbs         94 lbs 86 lbs   30-39 68 lbs 64 lbs 90 lbs 82 lbs   40-49 64 lbs 62 lbs 80 lbs 74 lbs   50-59 62 lbs 57 lbs 72 lbs 65 lbs   60-69 53 lbs 51 lbs 63 lbs 56 lbs   70+ 44 lbs 42 lbs 54 lbs 48 lbs   SD = +/- 19lbs         Pinch Strength (Measured in lbs)       2/8/2023 2/8/2023 3/1/2023 3/1/2023     Right Left Right  Left    Key Pinch 15 # 20 # 15# 20#   3pt Pinch 8 # 13 # 9# 15#   2pt Pinch 10 # 13 # 12# 12#         Fine/Gross Motor Coordination     Assessment   Right   2/8/2023 Left  2/8/2023 Right   3/1/2023 Left   3/1/2023   9 hole peg test 9 pegs in/out for time :69 :27 1:00 :27   *WNL - Within Normal Limits  *NT = Not Tested    Treatment     Xander received the following manual therapy techniques: Myofacial release, Soft tissue Mobilization, and passive range of motion  were applied to the: right upper extremity for 15 minutes, including:  - moist heat x5 minutes shoulder and forearm     Xander received therapeutic exercises to develop strength, endurance, and ROM for 30 minutes including:  - wrist flexion/extension stretch 3x30"  - wrist maze x2 minutes   - josep twists (red) x10   - wrist flexion/extension 2# dumbbell 2x10   - green ball wrist clockwise/counter clockwise x10 each way   - resistance gripper 45# x10 with static hold for 10 seconds     Home Exercises and Education Provided     Education provided:   - TGE   - button hook/adaptive equipment  - Progress towards goals     Written Home Exercises Provided: yes.  Exercises were reviewed and Xander was able to demonstrate them prior to the end of the session.  Xander demonstrated good  understanding of the HEP provided.   .   See EMR under Patient Instructions for exercises provided 3/6/2023     Assessment     Xander tolerated today's session well. Noted continued tenderness at " lateral epicondyle with palpation and during wrist flexion stretch with muscle at end range. Exercises adjusted to address possible lateral epicondylitis and reviewed home exercise program to ensure understanding. He was able to complete all movements with minimal stretching; therapist educated on grading dumbbell movements for eccentric strengthening if pain increased. Hand over hand assistance and stabilization of left forearm required for clockwise/counter clockwise exercises due to difficulty with coordination. Overall good response; pain decreased end of session     Xander is progressing well towards his goals and there are no updates to goals at this time. Pt prognosis is Fair.     Pt will continue to benefit from skilled outpatient occupational therapy to address the deficits listed in the problem list on initial evaluation provide pt/family education and to maximize pt's level of independence in the home and community environment.     Anticipated barriers to occupational therapy: chronicity of impairments     Pt's spiritual, cultural and educational needs considered and pt agreeable to plan of care and goals.    Goals:    LTG's (8 weeks):  2)   Demonstrate 65 psi's of right/left  strength to grasp for ADLs/work/leisure activities. Not met, progressing  3)   Demonstrate 20 psi's of lateral key pinch to independently for ADLs/work/leisure activities. Not met, progressing  4)   Decrease complaints of pain to  3 out of 10 at worst to increase functional hand use for ADL/work/leisure activities. Not met, progressing  5)   Patient to score at 30% limitation on FOTO to demonstrate improved perception of functional arm use. Not met, progressing  6)   Pt will return to near to prior level of function for ADLs and household management reporting I or Mod I with ADLs (dressing, feeding, grooming, toileting). Not met, progressing     STG's (4 weeks)  1)   Patient to be IND with HEP and modalities for pain/edema  managment. Not met, progressing  2)   Demonstrate 63 psi's of right/left  strength to improve functional grasp for ADLs/work/leisure activities. Not met, progressing  3)   Demonstrate 17 psi's of right/left lateral/tripod/pincer pinch to increase independence with button and FM Coordination. Not met, progressing  4)   Patient to be IND with Orthotic use, wear and care precautions. Not met, progressing  5)   Decrease complaints of pain to  5 out of 10 at worst to increase functional hand use for ADL/work/leisure activities. Met 3/8/2023    Plan     Updates/Grading for next session: Continue with POC     Corinne Rapier, OT

## 2023-03-08 NOTE — PROGRESS NOTES
"[facility]   Physical Therapy Treatment Note     Name: Xander Sanchez  Clinic Number: [id number]    Therapy Diagnosis:   Encounter Diagnosis   Name Primary?    Impaired functional mobility, balance, gait, and endurance Yes       Physician:  Diomedes Martin MD    Visit Date: 3/8/2023    Physician Orders: PT Eval and Treat   Medical Diagnosis from Referral: Chronic neck pain [M54.2, G89.29]  (M96.1) Cervical post-laminectomy syndrome  (M47.812) Spondylosis of cervical region without myelopathy or radiculopathy     Request:  Neck exercises.  Eval gait due to h/o stumbling and occasional falls.     Evaluation Date: 2/10/2023  Authorization Period Expiration: 2/2/2024  Plan of Care Expiration: 4/7/2023  Visit # / Visits authorized: 8/12  FOTO: next session please      PTA Visit #: 2/5     Time In: 07:20 AM  Time Out: 08:00 AM  Total Billable Time: 40 minutes (2 TE + 1 NMR)    SUBJECTIVE     Patient reports: reports that his neck and shoulder pain is his biggest problem.   He was compliant with home exercise program.  Response to previous treatment: No adverse response  Functional change: HEP compliance     Pain: 4/10   Location: right shoulder      Precautions: Standard, Fall, and History of Cervical Fusion; DM II; HTN; ICD; CRPS    OBJECTIVE     Objective Measures updated at progress report unless specified.     Treatment     Xander received the treatments listed below:      therapeutic exercises to develop strength, endurance, ROM, flexibility, and posture for 25 minutes including:  -- Session started with stepper bike x 8 minutes; level 4 sprints for cardiovascular endurance training (bilateral upper extremity / bilateral lower extremity use)  -- Standing hamstring stretch at stairs 2x45"B  -- Standing hip flexor stretch on stairs 2x45"B  -- standing hip abduction 2# following abd slides  2 x 10 B  -- standing hip extension 2# on wood block  2 x 10 B  -- Standing toe raises 2x10B with " "bilateral upper extremity support    NOT TODAY  - Standing heel raises 3x10B with bilateral upper extremity support; off edge of green foam disc today  - Lateral resistance band stepping with yellow theraband around ankles 4 lengths x 10 feet each   - Cervical AROM 10x 5" holds in all directions  - Lower trunk rotations x2 minutes total with 5" holds at end range    neuromuscular re-education activities to improve: Balance, Coordination, and Proprioception for 15 minutes. The following activities were included:  -- standing hip abduction slides  2 x 10 with B UE support   -- Modified single leg stance on 6" step + red med ball chest press 2x20  -- Multi-level toe taps (6'' step) with right lower extremity stepping to encourage hip flexion a); left upper extremity support 3x30"  -- miki (3) stepping  Forward x 3 trials with / bar   -- Dorsiflexion walks 4 lengths x 10 feet each with bilateral upper extremity support (followed by gait training)  -- Narrow base of support on airex + head turns 2x45" each vertical and horizontal    gait training to improve functional mobility and safety for 05 minutes, including:  -- backward walking  2 x 40''   -- 150 feet gait training in gym with cues for heel strike bilaterally after walking with DF (patient had better heel strike on right)      NOT TODAY  - Sit <> stand: 2x10 with RLE bias     therapeutic activities to improve functional performance for 00 minutes, including:  NOT TODAY  Patient Education anfd Home Exercises     Home Exercises Provided and Patient Education Provided     Education provided:   - Continue HEP  - Postural awareness    Written Home Exercises Provided: Patient instructed to cont prior HEP. Exercises were reviewed and Xander was able to demonstrate them prior to the end of the session.  Xander demonstrated good  understanding of the education provided. See EMR under Patient Instructions for exercises provided during therapy sessions    ASSESSMENT "     Xander arrived to session without any complaints of pain and was agreeable to treatment.  Session focused on improving R sided gait mechanics, specifically increased hip flexion and dorsiflexion.  Improved heel strike observed with in clinic ambulation directly following toe raises and heel walking though declines quickly following fatigue.  Improvements with overall endurance noted with all exercises completed in standing and only one brief water break for fatigue recovery and water before exiting clinic.  He would benefit from continued PT services to achieve functional goals.    Xander Is progressing well towards his goals.   Patient prognosis is Good.     Patient will continue to benefit from skilled outpatient physical therapy to address the deficits listed in the problem list box on initial evaluation, provide pt/family education and to maximize pt's level of independence in the home and community environment.     Patient's spiritual, cultural and educational needs considered and pt agreeable to plan of care and goals.     Anticipated barriers to physical therapy: Co-morbidities; chronicity of impairments    Goals:     Short Term Goals: 4 weeks   Patient will be compliant with HEP in order to maximize PT benefits (MET)  Patient will score >/= 18/24 on DGI in order to reduce risk for falls and improve postural control (progressing, not met)  Patient will score >/= 8 repetitions on 30-Second Sit to  order to improve BLE endurance and muscular power for transfers (progressing, not met)     Long Term Goals: 8 weeks   Patient will score </= 39% on FOTO limitation survey in order to improve self-perception of functional mobility deficits (progressing, not met)  Patient will score >/=50% on Abbreviated ABC in order to improve balance confidence with community mobility (progressing, not met)  Patient will improve bilateral lower extremity MMT grades by >/=1/2 grade in order to improve strength for ADL  completion (progressing, not met)  Patient will improve Hamstring range of motion to lacking </= 25 degrees on 90/90 test in order to improve functional mobility for activities such as lower body dressing (progressing, not met)  Patient will score >/= 20/24 on DGI in order to reduce risk for falls and improve postural control (progressing, not met)  Patient will score >/= 10 repetitions on 30-Second Sit to  order to improve BLE endurance and muscular power for transfers (progressing, not met)  Patient will improve cervical rotation active range of motion to >/=50 degrees bilaterally in order to improve functional mobility during tasks such as driving (progressing, not met)  Patient will begin some form of home/community fitness in order to sustain progress gained in PT (progressing, not met)    PLAN   Assess STGs on 3/15    Continue to progress as per plan of care; expand balance program per tolerance    Anabel Monae, PTA

## 2023-03-09 ENCOUNTER — TELEPHONE (OUTPATIENT)
Dept: CARDIOLOGY | Facility: HOSPITAL | Age: 73
End: 2023-03-09
Payer: MEDICARE

## 2023-03-09 DIAGNOSIS — I47.29 OTHER VENTRICULAR TACHYCARDIA: ICD-10-CM

## 2023-03-09 DIAGNOSIS — I48.0 PAROXYSMAL ATRIAL FIBRILLATION: ICD-10-CM

## 2023-03-09 DIAGNOSIS — Z95.810 PRESENCE OF AUTOMATIC (IMPLANTABLE) CARDIAC DEFIBRILLATOR: Primary | ICD-10-CM

## 2023-03-09 DIAGNOSIS — I42.8 CARDIOMYOPATHY, NONISCHEMIC: ICD-10-CM

## 2023-03-09 DIAGNOSIS — I50.42 CHRONIC COMBINED SYSTOLIC (CONGESTIVE) AND DIASTOLIC (CONGESTIVE) HEART FAILURE: ICD-10-CM

## 2023-03-13 ENCOUNTER — CLINICAL SUPPORT (OUTPATIENT)
Dept: REHABILITATION | Facility: HOSPITAL | Age: 73
End: 2023-03-13
Payer: MEDICARE

## 2023-03-13 DIAGNOSIS — M25.60 STIFFNESS IN JOINT: ICD-10-CM

## 2023-03-13 DIAGNOSIS — M79.601 PAIN IN RIGHT ARM: ICD-10-CM

## 2023-03-13 DIAGNOSIS — R53.1 WEAKNESS: Primary | ICD-10-CM

## 2023-03-13 DIAGNOSIS — Z74.1 NEED FOR ASSISTANCE WITH PERSONAL CARE: ICD-10-CM

## 2023-03-13 DIAGNOSIS — Z74.09 IMPAIRED FUNCTIONAL MOBILITY, BALANCE, GAIT, AND ENDURANCE: Primary | ICD-10-CM

## 2023-03-13 PROCEDURE — 97140 MANUAL THERAPY 1/> REGIONS: CPT | Mod: HCNC,PN

## 2023-03-13 PROCEDURE — 97110 THERAPEUTIC EXERCISES: CPT | Mod: HCNC,PN,CQ

## 2023-03-13 PROCEDURE — 97112 NEUROMUSCULAR REEDUCATION: CPT | Mod: HCNC,PN,CQ

## 2023-03-13 PROCEDURE — 97110 THERAPEUTIC EXERCISES: CPT | Mod: HCNC,PN

## 2023-03-13 NOTE — PROGRESS NOTES
"[facility]   Physical Therapy Treatment Note     Name: Xander Sanchez  Clinic Number: [id number]    Therapy Diagnosis:   Encounter Diagnosis   Name Primary?    Impaired functional mobility, balance, gait, and endurance Yes       Physician:  Diomedes Martin MD    Visit Date: 3/13/2023    Physician Orders: PT Eval and Treat   Medical Diagnosis from Referral: Chronic neck pain [M54.2, G89.29]  (M96.1) Cervical post-laminectomy syndrome  (M47.812) Spondylosis of cervical region without myelopathy or radiculopathy     Request:  Neck exercises.  Eval gait due to h/o stumbling and occasional falls.     Evaluation Date: 2/10/2023  Authorization Period Expiration: 2/2/2024  Plan of Care Expiration: 4/7/2023  Visit # / Visits authorized: 9/12  FOTO: 2nd survey completed 9th visit      PTA Visit #: 3/5     Time In: 10:30 AM  Time Out: 11:15 AM  Total Billable Time: 45 minutes (2 TE + 1 NMR)    SUBJECTIVE     Patient reports: R shoulder is only complaints at the moment.   He was compliant with home exercise program.  Response to previous treatment: No adverse response  Functional change: HEP compliance     Pain: 4/10   Location: right shoulder      Precautions: Standard, Fall, and History of Cervical Fusion; DM II; HTN; ICD; CRPS    OBJECTIVE     Objective Measures updated at progress report unless specified.     Treatment     Xander received the treatments listed below:      therapeutic exercises to develop strength, endurance, ROM, flexibility, and posture for 25 minutes including:  -- Session started with stepper bike x 8 minutes; level 4 sprints for cardiovascular endurance training (bilateral upper extremity / bilateral lower extremity use)  -- Standing hamstring stretch at stairs 2x45"B  -- Standing hip flexor stretch on stairs 2x45"B  -- standing hip abduction 2# following abd slides  2 x 10 B  -- standing hip extension 2# on wood block  2 x 10 B  -- Standing toe raises 2x10B with " "bilateral upper extremity support  -- FOTO survey completion    NOT TODAY  - Standing heel raises 3x10B with bilateral upper extremity support; off edge of green foam disc today  - Lateral resistance band stepping with yellow theraband around ankles 4 lengths x 10 feet each   - Cervical AROM 10x 5" holds in all directions  - Lower trunk rotations x2 minutes total with 5" holds at end range    neuromuscular re-education activities to improve: Balance, Coordination, and Proprioception for 15 minutes. The following activities were included:  -- standing hip abduction slides  2 x 10 with B UE support   -- Modified single leg stance on 6" step + red med ball chest press 2x20  -- Multi-level toe taps (6'' step) with right lower extremity stepping to encourage hip flexion a); left upper extremity support 3x30"  -- miki (3) stepping  Forward x 3 trials with / bar   -- Dorsiflexion walks 4 lengths x 10 feet each with bilateral upper extremity support (followed by gait training)  -- Narrow base of support on airex + head turns 2x45" each vertical and horizontal    gait training to improve functional mobility and safety for 05 minutes, including:  -- backward walking  2 x 40''   -- 150 feet gait training in gym with cues for heel strike bilaterally after walking with DF (patient had better heel strike on right)      NOT TODAY  - Sit <> stand: 2x10 with RLE bias     therapeutic activities to improve functional performance for 00 minutes, including:  NOT TODAY  Patient Education anfd Home Exercises     Home Exercises Provided and Patient Education Provided     Education provided:   - Continue HEP  - Postural awareness    Written Home Exercises Provided: Patient instructed to cont prior HEP. Exercises were reviewed and Xander was able to demonstrate them prior to the end of the session.  Xander demonstrated good  understanding of the education provided. See EMR under Patient Instructions for exercises provided during therapy " sessions    ASSESSMENT     Xander arrived to session without any complaints of pain and was agreeable to treatment.  Session focused on improving R sided gait mechanics, specifically increased hip flexion and dorsiflexion.  Improved heel strike observed with in clinic ambulation directly following toe raises and heel walking though declines quickly following fatigue.  Improvements with overall endurance noted with all exercises completed in standing and only one brief water break for fatigue recovery and water before exiting clinic.  He would benefit from continued PT services to achieve functional goals.    Xander Is progressing well towards his goals.   Patient prognosis is Good.     Patient will continue to benefit from skilled outpatient physical therapy to address the deficits listed in the problem list box on initial evaluation, provide pt/family education and to maximize pt's level of independence in the home and community environment.     Patient's spiritual, cultural and educational needs considered and pt agreeable to plan of care and goals.     Anticipated barriers to physical therapy: Co-morbidities; chronicity of impairments    Goals:     Short Term Goals: 4 weeks   Patient will be compliant with HEP in order to maximize PT benefits (MET)  Patient will score >/= 18/24 on DGI in order to reduce risk for falls and improve postural control (progressing, not met)  Patient will score >/= 8 repetitions on 30-Second Sit to  order to improve BLE endurance and muscular power for transfers (progressing, not met)     Long Term Goals: 8 weeks   Patient will score </= 39% on FOTO limitation survey in order to improve self-perception of functional mobility deficits (progressing, not met)  Patient will score >/=50% on Abbreviated ABC in order to improve balance confidence with community mobility (progressing, not met)  Patient will improve bilateral lower extremity MMT grades by >/=1/2 grade in order to  improve strength for ADL completion (progressing, not met)  Patient will improve Hamstring range of motion to lacking </= 25 degrees on 90/90 test in order to improve functional mobility for activities such as lower body dressing (progressing, not met)  Patient will score >/= 20/24 on DGI in order to reduce risk for falls and improve postural control (progressing, not met)  Patient will score >/= 10 repetitions on 30-Second Sit to  order to improve BLE endurance and muscular power for transfers (progressing, not met)  Patient will improve cervical rotation active range of motion to >/=50 degrees bilaterally in order to improve functional mobility during tasks such as driving (progressing, not met)  Patient will begin some form of home/community fitness in order to sustain progress gained in PT (progressing, not met)    PLAN   Assess STGs on 3/15    Continue to progress as per plan of care; expand balance program per tolerance    Anabel Monae, PTA

## 2023-03-13 NOTE — PROGRESS NOTES
"Occupational Therapy Daily Treatment Note     Name: Xander Sanchez  [facility] Number: [id number]    Therapy Diagnosis:   Encounter Diagnoses   Name Primary?    Weakness Yes    Stiffness in joint     Need for assistance with personal care     Pain in right arm      Physician: Diomedes Martin MD    Visit Date: 3/13/2023    Physician Orders: OT eval and treat Dx: (M54.2,  G89.29) Chronic neck pain  (primary encounter diagnosis) (M96.1) Cervical post-laminectomy syndrome (M47.812) Spondylosis of cervical region without myelopathy or radiculopathy Request: ADL evaluation and treatment. Adaptive devices   Medical Diagnosis: Chronic neck pain [M54.2, G89.29]   Gait disorder [R26.9]   Evaluation Date: 2/8/2023  Plan of Care Expiration Period: 4/8/2023  Insurance Authorization period Expiration: 3/8/23  Visit # / Visits Authorized: 8 / 16  FOTO: 2/8/2023     Time In: 1145  Time Out: 1230  Total Billable (one on one) Time: 45 minutes      Precautions: Standard    Subjective     Pt reports: "My arm is ok. It is about a 4 today."   he was compliant with home exercise program given last session.   Response to previous treatment:good   Functional change: decreased pain end of session     Pain: 5/10  Location: right hand      Objective        Joint Evaluation  [facility]   2/8/2023 [facility]   2/8/2023     Right Left   Scapular Elevation 4/5 5/5   Scapular Retraction 4/5 5/5   Shoulder Flex 0-180 4/5 5/5   Shoulder Extension 0-80 4/5 5/5   Shoulder Abd 0-180 4/5 5/5   Shoulder ER 0-90 4/5 5/5   Shoulder IR 0-90 4/5 5/5   Shoulder Horizontal adduction 0-90 4/5 5/5   Elbow Flex/Ext 0-150 4/5 5/5   Wrist Flex 0-80 4/5 5/5   Wrist Ext 0-70 4/5 5/5   Supination 0-80 4/5 5/5   Pronation 0-80 4/5 5/5   Ulnar Deviation 4/5 5/5   Radial Deviation  4/5 5/5   *WNL - Within Normal Limits  *NT = Not Tested      Fist: loose      Strength: (ROOSEVELT Dynamometer in lbs.) Average 3 trials, Position II:       2/8/2023 2/8/2023 3/1/2023 3/1/2023   Jeremyg II Right " "Left Right  Left    Average 60# 70# 60.3# 69.3#      Normal  Average Values  Female Right Left Male: Right Left   20-29 66 lbs 62 lbs         94 lbs 86 lbs   30-39 68 lbs 64 lbs 90 lbs 82 lbs   40-49 64 lbs 62 lbs 80 lbs 74 lbs   50-59 62 lbs 57 lbs 72 lbs 65 lbs   60-69 53 lbs 51 lbs 63 lbs 56 lbs   70+ 44 lbs 42 lbs 54 lbs 48 lbs   SD = +/- 19lbs         Pinch Strength (Measured in lbs)       2/8/2023 2/8/2023 3/1/2023 3/1/2023     Right Left Right  Left    Key Pinch 15 # 20 # 15# 20#   3pt Pinch 8 # 13 # 9# 15#   2pt Pinch 10 # 13 # 12# 12#         Fine/Gross Motor Coordination     Assessment   Right   2/8/2023 Left  2/8/2023 Right   3/1/2023 Left   3/1/2023   9 hole peg test 9 pegs in/out for time :69 :27 1:00 :27   *WNL - Within Normal Limits  *NT = Not Tested    Treatment     Xander received the following manual therapy techniques: Myofacial release, Soft tissue Mobilization, and passive range of motion  were applied to the: right upper extremity for 15 minutes, including:  - moist heat x5 minutes shoulder and forearm     Xander received therapeutic exercises to develop strength, endurance, and ROM for 30 minutes including:  - wrist flexion/extension stretch 3x30"  - wrist maze x2 minutes   - josep twists (red) x10   - wrist flexion/extension 2# dumbbell 2x10   - green ball wrist clockwise/counter clockwise x10 each way   - resistance gripper 45# x10 with static hold for 10 seconds   - hammer pro/sup 2 minutes     Home Exercises and Education Provided     Education provided:   - TGE   - button hook/adaptive equipment  - Progress towards goals     Written Home Exercises Provided: yes.  Exercises were reviewed and Xander was able to demonstrate them prior to the end of the session.  Xander demonstrated good  understanding of the HEP provided.   .   See EMR under Patient Instructions for exercises provided 3/6/2023     Assessment     Xander tolerated today's session well. He had increased tone in his bicep " brachii today causing him pain and slight difficulty completing therapeutic activities. Pt continues to need therapy to progress in overall strength, endurance, coordination, range of motion, and to decrease pain. Overall good response; pain decreased at the end of session.    Xander is progressing well towards his goals and there are no updates to goals at this time. Pt prognosis is Fair.     Pt will continue to benefit from skilled outpatient occupational therapy to address the deficits listed in the problem list on initial evaluation provide pt/family education and to maximize pt's level of independence in the home and community environment.     Anticipated barriers to occupational therapy: chronicity of impairments     Pt's spiritual, cultural and educational needs considered and pt agreeable to plan of care and goals.    Goals:    LTG's (8 weeks):  2)   Demonstrate 65 psi's of right/left  strength to grasp for ADLs/work/leisure activities. Not met, progressing  3)   Demonstrate 20 psi's of lateral key pinch to independently for ADLs/work/leisure activities. Not met, progressing  4)   Decrease complaints of pain to  3 out of 10 at worst to increase functional hand use for ADL/work/leisure activities. Not met, progressing  5)   Patient to score at 30% limitation on FOTO to demonstrate improved perception of functional arm use. Not met, progressing  6)   Pt will return to near to prior level of function for ADLs and household management reporting I or Mod I with ADLs (dressing, feeding, grooming, toileting). Not met, progressing     STG's (4 weeks)  1)   Patient to be IND with HEP and modalities for pain/edema managment. Not met, progressing  2)   Demonstrate 63 psi's of right/left  strength to improve functional grasp for ADLs/work/leisure activities. Not met, progressing  3)   Demonstrate 17 psi's of right/left lateral/tripod/pincer pinch to increase independence with button and FM Coordination. Not  met, progressing  4)   Patient to be IND with Orthotic use, wear and care precautions. Not met, progressing  5)   Decrease complaints of pain to  5 out of 10 at worst to increase functional hand use for ADL/work/leisure activities. Met 3/8/2023    Plan     Updates/Grading for next session: Continue with SHIRIN White, OT

## 2023-03-14 NOTE — PROGRESS NOTES
"Occupational Therapy Daily Treatment Note     Name: Xander Sanchez  [facility] Number: [id number]    Therapy Diagnosis:   Encounter Diagnoses   Name Primary?    Weakness Yes    Stiffness in joint     Need for assistance with personal care     Pain in right arm        Physician: Diomedes Martin MD    Visit Date: 3/15/2023    Physician Orders: OT eval and treat Dx: (M54.2,  G89.29) Chronic neck pain  (primary encounter diagnosis) (M96.1) Cervical post-laminectomy syndrome (M47.812) Spondylosis of cervical region without myelopathy or radiculopathy Request: ADL evaluation and treatment. Adaptive devices   Medical Diagnosis: Chronic neck pain [M54.2, G89.29]   Gait disorder [R26.9]   Evaluation Date: 2/8/2023  Plan of Care Expiration Period: 4/8/2023  Insurance Authorization period Expiration: 3/8/23  Visit # / Visits Authorized: 9 / 16  FOTO: 2/8/2023     Time In: 8:45  Time Out: 9:30  Total Billable (one on one) Time: 45 minutes      Precautions: Standard    Subjective     Pt reports: "My shoulder and my wrist are bothering me today."   he was compliant with home exercise program given last session.   Response to previous treatment:good   Functional change: soreness/pain in right shoulder and wrist today      Pain: 5/10  Location: right hand / shoulder     Objective       Strength: (ROOSEVELT Dynamometer in lbs.) Average 3 trials, Position II:       2/8/2023 2/8/2023 3/1/2023 3/1/2023   Rung II Right Left Right  Left    Average 60# 70# 60.3# 69.3#      Normal  Average Values  Female Right Left Male: Right Left   20-29 66 lbs 62 lbs         94 lbs 86 lbs   30-39 68 lbs 64 lbs 90 lbs 82 lbs   40-49 64 lbs 62 lbs 80 lbs 74 lbs   50-59 62 lbs 57 lbs 72 lbs 65 lbs   60-69 53 lbs 51 lbs 63 lbs 56 lbs   70+ 44 lbs 42 lbs 54 lbs 48 lbs   SD = +/- 19lbs         Pinch Strength (Measured in lbs)       2/8/2023 2/8/2023 3/1/2023 3/1/2023     Right Left Right  Left    Key Pinch 15 # 20 # 15# 20#   3pt Pinch 8 # 13 # 9# 15#   2pt " "Pinch 10 # 13 # 12# 12#         Fine/Gross Motor Coordination     Assessment   Right   2/8/2023 Left  2/8/2023 Right   3/1/2023 Left   3/1/2023   9 hole peg test 9 pegs in/out for time :69 :27 1:00 :27   *WNL - Within Normal Limits  *NT = Not Tested    Treatment     Fluidotherapy: To R upper extremity for 10 min, continuous air, 110 deg, air speed 100 to decrease pain, edema & scar tissue and increased tissue extensibility. Moist heat on shoulder     Xander received therapeutic exercises to develop strength, endurance, and ROM for 45 minutes including:  - exercises with fluidotherapy   - towel slides x10   - towel slides with shoulder abduction x10   - glenohumeral stability with wrist flexion/extension on small ball 2x10 each direction   - posterior capsule stretches 3x10  - internal rotation stretch 3x10   - pect stretch supine with towel 3x30 seconds   - yellow theraband external rotation 2x10   - yellow theraband extension 2x10  - yellow theraband triceps extension 2x10   - yellow theraband abduction 2x10     Home Exercises and Education Provided     Education provided:   - TGE   - button hook/adaptive equipment  - Progress towards goals     Written Home Exercises Provided: yes.  Exercises were reviewed and Xander was able to demonstrate them prior to the end of the session.  Xander demonstrated good  understanding of the HEP provided.   .   See EMR under Patient Instructions for exercises provided 3/6/2023     Assessment     Xander tolerated today's session well. He reported pain in his right shoulder upon arrival. Therapist discussed importance of stretching at home to manage tone. He was able to tolerate all stretching but did report "charley-horse like cramp" during glenohumeral stabilization exercise and supine pec stretch. Therapist graded exercise/stretches to increase comfort, Xander reported pain decreased/more tolerable. Theraband utilized to improve strength and postural stability. No pain " reported end of session.     Xander is progressing well towards his goals and there are no updates to goals at this time. Pt prognosis is Fair.     Pt will continue to benefit from skilled outpatient occupational therapy to address the deficits listed in the problem list on initial evaluation provide pt/family education and to maximize pt's level of independence in the home and community environment.     Anticipated barriers to occupational therapy: chronicity of impairments     Pt's spiritual, cultural and educational needs considered and pt agreeable to plan of care and goals.    Goals:    LTG's (8 weeks):  2)   Demonstrate 65 psi's of right/left  strength to grasp for ADLs/work/leisure activities. Not met, progressing  3)   Demonstrate 20 psi's of lateral key pinch to independently for ADLs/work/leisure activities. Not met, progressing  4)   Decrease complaints of pain to  3 out of 10 at worst to increase functional hand use for ADL/work/leisure activities. Not met, progressing  5)   Patient to score at 30% limitation on FOTO to demonstrate improved perception of functional arm use. Not met, progressing  6)   Pt will return to near to prior level of function for ADLs and household management reporting I or Mod I with ADLs (dressing, feeding, grooming, toileting). Not met, progressing     STG's (4 weeks)  1)   Patient to be IND with HEP and modalities for pain/edema managment. Not met, progressing  2)   Demonstrate 63 psi's of right/left  strength to improve functional grasp for ADLs/work/leisure activities. Not met, progressing  3)   Demonstrate 17 psi's of right/left lateral/tripod/pincer pinch to increase independence with button and FM Coordination. Not met, progressing  4)   Patient to be IND with Orthotic use, wear and care precautions. Not met, progressing  5)   Decrease complaints of pain to  5 out of 10 at worst to increase functional hand use for ADL/work/leisure activities. Met 3/8/2023    Plan      Updates/Grading for next session: Continue with POC     Corinne Rapier, OT

## 2023-03-15 ENCOUNTER — CLINICAL SUPPORT (OUTPATIENT)
Dept: REHABILITATION | Facility: HOSPITAL | Age: 73
End: 2023-03-15
Payer: MEDICARE

## 2023-03-15 DIAGNOSIS — M79.601 PAIN IN RIGHT ARM: ICD-10-CM

## 2023-03-15 DIAGNOSIS — Z74.1 NEED FOR ASSISTANCE WITH PERSONAL CARE: ICD-10-CM

## 2023-03-15 DIAGNOSIS — Z74.09 IMPAIRED FUNCTIONAL MOBILITY, BALANCE, GAIT, AND ENDURANCE: Primary | ICD-10-CM

## 2023-03-15 DIAGNOSIS — M25.60 STIFFNESS IN JOINT: ICD-10-CM

## 2023-03-15 DIAGNOSIS — R53.1 WEAKNESS: Primary | ICD-10-CM

## 2023-03-15 PROCEDURE — 97112 NEUROMUSCULAR REEDUCATION: CPT | Mod: HCNC,PN

## 2023-03-15 PROCEDURE — 97022 WHIRLPOOL THERAPY: CPT | Mod: HCNC,PN

## 2023-03-15 PROCEDURE — 97110 THERAPEUTIC EXERCISES: CPT | Mod: HCNC,PN

## 2023-03-15 NOTE — PROGRESS NOTES
[facility]   Physical Therapy Treatment Note     Name: Xander Sanchez  [facility] Number: [id number]    Therapy Diagnosis:   Encounter Diagnosis   Name Primary?    Impaired functional mobility, balance, gait, and endurance Yes     Physician:  Diomedes Martin MD    Visit Date: 3/15/2023    Physician Orders: PT Eval and Treat   Medical Diagnosis from Referral: Chronic neck pain [M54.2, G89.29]  (M96.1) Cervical post-laminectomy syndrome  (M47.812) Spondylosis of cervical region without myelopathy or radiculopathy     Request:  Neck exercises.  Eval gait due to h/o stumbling and occasional falls.     Evaluation Date: 2/10/2023  Authorization Period Expiration: 2/2/2024  Plan of Care Expiration: 4/7/2023  Visit # / Visits authorized: 10/12  FOTO: 2nd survey completed 9th visit; next at discharge    PTA Visit #: 3/5     Time In: 8:01 AM  Time Out: 8:45 AM  Total Billable Time: 45 minutes (2 TE + 1 NMR)    SUBJECTIVE     Patient reports: His right shoulder still painful; otherwise no new complaints.   He was compliant with home exercise program.  Response to previous treatment: No adverse response  Functional change: HEP compliance     Pain: 6/10  Location: right shoulder      Precautions: Standard, Fall, and History of Cervical Fusion; DM II; HTN; ICD; CRPS    OBJECTIVE     Objective Measures updated at progress report unless specified.     30-Second Sit to Stand: 7 repetitions without upper extremity push off     Dynamic Gait Index    *note: Measure a distance of 20 feet (~6 meters) for this assessment    1. Gait on level surface: 2. Mild impairment: Walks 20', uses assistive devices, slower speed, mild gait deviations.   2. Change in Gait speed: 3. Normal: Able to smoothly change walking speed without loss of balance or gait deviation. Shows a significant difference in wlaking speeds between normal, fast, and slow speeds.  3. Gait with Horizontal Head Turns: 2. Mild impairment: performs head  "turns smoothly with slight change in gait velocity, i.e. minor distuption to smooth gait path or uses walking aid.   4. Gait with Vertical Head Turns: 2. Mild impairment: Perform task with slight change in gait velocity ie minor disruption in smooth gait path or uses walking aid  5. Gait and Pivot turn: 3. Normal: Pivot turns safely within 3 seconds and stops quickly with no loss of balance  6. Step Over Obstacle: 1. Moderate impairment: Is able to step over the box but must stop, then, step over. May requrie verbal cueing  7. Step around obstacles: 3. Normal: Is able to walk around cones safely without changing gait speed; no evidence of imbalance.  8. Steps: 2. Mild impairment: Alternating feet, must use rail.    Score: 18/24    Cutoffs:   < 19/24 = predictive of falls in the elderly  > 22/24 = safe ambulators    MDC:  Geriatric = 2.9 points  Chronic Stroke = 1.9 points  Parkinson's = 2.9 points  Vestibular = 4    Treatment     Xander received the treatments listed below:      therapeutic exercises to develop strength, endurance, ROM, flexibility, and posture for 24 minutes including:  -- Session started with stepper bike x 6 minutes; level 4 sprints for cardiovascular endurance training (bilateral upper extremity / bilateral lower extremity use)  -- Standing gastroc stretch on Fitter 2x45"B  -- Standing hamstring stretch at stairs 2x45"B  -- Standing heel raises 2x15 with bilateral upper extremity support  -- Lateral resistance band stepping with yellow theraband around ankles 4 lengths x 10 feet each   -- 30 Second Sit to Stand reassessment (see above)    NOT TODAY  -- Standing hip flexor stretch on stairs 2x45"B  -- standing hip abduction 2# following abd slides  2 x 10 B  -- standing hip extension 2# on wood block  2 x 10 B  -- Standing toe raises 2x10B with bilateral upper extremity support  -- FOTO survey completion  - Cervical AROM 10x 5" holds in all directions  - Lower trunk rotations x2 minutes total " "with 5" holds at end range    neuromuscular re-education activities to improve: Balance, Coordination, and Proprioception for 20 minutes. The following activities were included:  -- Lunge to 6" step + airex pad with knee tap x10B; single upper extremity support  -- Tall kneel <> short kneel transitions with glute squeeze x10; single upper extremity support  -- Floor transfer training with return demonstration x 1  -- DGI reassessment (see above)    NOT TODAY  -- standing hip abduction slides  2 x 10 with B UE support   -- Modified single leg stance on 6" step + red med ball chest press 2x20  -- Multi-level toe taps (6'' step) with right lower extremity stepping to encourage hip flexion a); left upper extremity support 3x30"  -- miki (3) stepping  Forward x 3 trials with / bar   -- Dorsiflexion walks 4 lengths x 10 feet each with bilateral upper extremity support (followed by gait training)  -- Narrow base of support on airex + head turns 2x45" each vertical and horizontal    gait training to improve functional mobility and safety for 00 minutes, including:    NOT TODAY  -- 150 feet gait training in gym with cues for heel strike bilaterally after walking with DF (patient had better heel strike on right)    therapeutic activities to improve functional performance for 00 minutes, including:    NOT TODAY  Patient Education anfd Home Exercises     Home Exercises Provided and Patient Education Provided     Education provided:   - Continue HEP  - Postural awareness    Written Home Exercises Provided: Patient instructed to cont prior HEP. Exercises were reviewed and Xander was able to demonstrate them prior to the end of the session.  Xander demonstrated good  understanding of the education provided. See EMR under Patient Instructions for exercises provided during therapy sessions    ASSESSMENT     Xander arrived to session with only complaints of status quo right shoulder pain and agreeable to PT. Brief goal " reassessment with good progress toward functional goals thus far. Still at objective risk for falls per DGI. He tolerated partial and full floor transfers without adverse response and without need for external assistance from PT; developmental sequencing introduced with appropriate levels of fatigue achieved. He is progressing well and would benefit from continued PT services to achieve remaining goals.    Xander Is progressing well towards his goals.   Patient prognosis is Good.     Patient will continue to benefit from skilled outpatient physical therapy to address the deficits listed in the problem list box on initial evaluation, provide pt/family education and to maximize pt's level of independence in the home and community environment.     Patient's spiritual, cultural and educational needs considered and pt agreeable to plan of care and goals.     Anticipated barriers to physical therapy: Co-morbidities; chronicity of impairments    Goals:     Short Term Goals: 4 weeks   Patient will be compliant with HEP in order to maximize PT benefits (MET)  Patient will score >/= 18/24 on DGI in order to reduce risk for falls and improve postural control (MET)  Patient will score >/= 8 repetitions on 30-Second Sit to  order to improve BLE endurance and muscular power for transfers (progressing, not met)     Long Term Goals: 8 weeks   Patient will score </= 39% on FOTO limitation survey in order to improve self-perception of functional mobility deficits (progressing, not met)  Patient will score >/=50% on Abbreviated ABC in order to improve balance confidence with community mobility (progressing, not met)  Patient will improve bilateral lower extremity MMT grades by >/=1/2 grade in order to improve strength for ADL completion (progressing, not met)  Patient will improve Hamstring range of motion to lacking </= 25 degrees on 90/90 test in order to improve functional mobility for activities such as lower body  dressing (progressing, not met)  Patient will score >/= 20/24 on DGI in order to reduce risk for falls and improve postural control (progressing, not met)  Patient will score >/= 10 repetitions on 30-Second Sit to  order to improve BLE endurance and muscular power for transfers (progressing, not met)  Patient will improve cervical rotation active range of motion to >/=50 degrees bilaterally in order to improve functional mobility during tasks such as driving (progressing, not met)  Patient will begin some form of home/community fitness in order to sustain progress gained in PT (progressing, not met)    PLAN   Assess STGs on 3/15    Continue to progress as per plan of care; expand balance program per tolerance    ISABEL MUKHERJEE, PT

## 2023-03-22 ENCOUNTER — CLINICAL SUPPORT (OUTPATIENT)
Dept: REHABILITATION | Facility: HOSPITAL | Age: 73
End: 2023-03-22
Payer: MEDICARE

## 2023-03-22 DIAGNOSIS — Z74.09 IMPAIRED FUNCTIONAL MOBILITY, BALANCE, GAIT, AND ENDURANCE: Primary | ICD-10-CM

## 2023-03-22 DIAGNOSIS — M79.601 PAIN IN RIGHT ARM: ICD-10-CM

## 2023-03-22 DIAGNOSIS — R53.1 WEAKNESS: Primary | ICD-10-CM

## 2023-03-22 DIAGNOSIS — Z74.1 NEED FOR ASSISTANCE WITH PERSONAL CARE: ICD-10-CM

## 2023-03-22 DIAGNOSIS — M25.60 STIFFNESS IN JOINT: ICD-10-CM

## 2023-03-22 PROCEDURE — 97110 THERAPEUTIC EXERCISES: CPT | Mod: HCNC,PN

## 2023-03-22 PROCEDURE — 97022 WHIRLPOOL THERAPY: CPT | Mod: HCNC,PN

## 2023-03-22 PROCEDURE — 97112 NEUROMUSCULAR REEDUCATION: CPT | Mod: HCNC,PN

## 2023-03-22 NOTE — PROGRESS NOTES
" [facility]   Physical Therapy Treatment Note     Name: Xander Sanchez  Clinic Number: [id number]    Therapy Diagnosis:   Encounter Diagnosis   Name Primary?    Impaired functional mobility, balance, gait, and endurance Yes       Physician:  Diomedes Martin MD    Visit Date: 3/22/2023    Physician Orders: PT Eval and Treat   Medical Diagnosis from Referral: Chronic neck pain [M54.2, G89.29]  (M96.1) Cervical post-laminectomy syndrome  (M47.812) Spondylosis of cervical region without myelopathy or radiculopathy     Request:  Neck exercises.  Eval gait due to h/o stumbling and occasional falls.     Evaluation Date: 2/10/2023  Authorization Period Expiration: 2/2/2024  Plan of Care Expiration: 4/7/2023  Visit # / Visits authorized: 11/12  FOTO: 2nd survey completed 9th visit; next at discharge    PTA Visit #: 0/5     Time In: 8:47 AM  Time Out: 9:30 AM  Total Billable Time: 43 minutes (2 TE + 1 NMR)    SUBJECTIVE     Patient reports: Status quo right shoulder pain; otherwise no new complaints.  He was compliant with home exercise program.  Response to previous treatment: No adverse response  Functional change: HEP compliance     Pain: 6/10  Location: right shoulder      Precautions: Standard, Fall, and History of Cervical Fusion; DM II; HTN; ICD; CRPS    OBJECTIVE     Objective Measures updated at progress report unless specified.     Treatment     Xander received the treatments listed below:      therapeutic exercises to develop strength, endurance, ROM, flexibility, and posture for 28 minutes including:  -- Session started with recumbent bike x 6 minutes; level 2 constant resistance for cardiovascular endurance training  -- Standing gastroc stretch on Fitter 3x45"B  -- Standing hamstring stretch at stairs 2x45"B  -- Standing heel raises edge of stair 3x10 with bilateral upper extremity support  -- Lateral resistance band stepping with yellow theraband around ankles 6 lengths x 10 feet " "each     NOT TODAY  -- Standing hip flexor stretch on stairs 2x45"B  -- standing hip abduction 2# following abd slides  2 x 10 B  -- standing hip extension 2# on wood block  2 x 10 B  -- Standing toe raises 2x10B with bilateral upper extremity support  -- Cervical AROM 10x 5" holds in all directions  -- Lower trunk rotations x2 minutes total with 5" holds at end range    neuromuscular re-education activities to improve: Balance, Coordination, and Proprioception for 15 minutes. The following activities were included:  -- Lunge to 6" step + airex pad with knee tap x10B; single upper extremity support  -- Dorsiflexion walks 4 lengths x 10 feet each with bilateral upper extremity support (followed by gait training)  -- Modified single leg stance on 6" step (back leg on airex pad) + red med ball chest press 2x12    NOT TODAY  -- Tall kneel <> short kneel transitions with glute squeeze x10; single upper extremity support  -- Floor transfer training with return demonstration x 1  -- standing hip abduction slides  2 x 10 with B UE support   -- Multi-level toe taps (6'' step) with right lower extremity stepping to encourage hip flexion a); left upper extremity support 3x30"  -- miki (3) stepping  Forward x 3 trials with / bar   -- Narrow base of support on airex + head turns 2x45" each vertical and horizontal    gait training to improve functional mobility and safety for 00 minutes, including:    NOT TODAY  -- 150 feet gait training in gym with cues for heel strike bilaterally after walking with DF (patient had better heel strike on right)    therapeutic activities to improve functional performance for 00 minutes, including:    NOT TODAY  Patient Education anfd Home Exercises     Home Exercises Provided and Patient Education Provided     Education provided:   - Continue HEP  - Postural awareness    Written Home Exercises Provided: Patient instructed to cont prior HEP. Exercises were reviewed and Xanedr was able to " demonstrate them prior to the end of the session.  Xander demonstrated good  understanding of the education provided. See EMR under Patient Instructions for exercises provided during therapy sessions    ASSESSMENT     Xander arrived to session with only complaints of status quo right shoulder pain and agreeable to PT. Continued general bilateral lower extremity strength and balance intervention without adverse response. Progressed some strengthening items with appropriate levels of fatigue achieved. Good challenge noted on compliant foam surface with occasional reaching strategy employed for postural control. He is progressing well and would benefit from continued PT services to achieve functional goals.    Xander Is progressing well towards his goals.   Patient prognosis is Good.     Patient will continue to benefit from skilled outpatient physical therapy to address the deficits listed in the problem list box on initial evaluation, provide pt/family education and to maximize pt's level of independence in the home and community environment.     Patient's spiritual, cultural and educational needs considered and pt agreeable to plan of care and goals.     Anticipated barriers to physical therapy: Co-morbidities; chronicity of impairments    Goals:     Short Term Goals: 4 weeks   Patient will be compliant with HEP in order to maximize PT benefits (MET)  Patient will score >/= 18/24 on DGI in order to reduce risk for falls and improve postural control (MET)  Patient will score >/= 8 repetitions on 30-Second Sit to  order to improve BLE endurance and muscular power for transfers (progressing, not met)     Long Term Goals: 8 weeks   Patient will score </= 39% on FOTO limitation survey in order to improve self-perception of functional mobility deficits (progressing, not met)  Patient will score >/=50% on Abbreviated ABC in order to improve balance confidence with community mobility (progressing, not  met)  Patient will improve bilateral lower extremity MMT grades by >/=1/2 grade in order to improve strength for ADL completion (progressing, not met)  Patient will improve Hamstring range of motion to lacking </= 25 degrees on 90/90 test in order to improve functional mobility for activities such as lower body dressing (progressing, not met)  Patient will score >/= 20/24 on DGI in order to reduce risk for falls and improve postural control (progressing, not met)  Patient will score >/= 10 repetitions on 30-Second Sit to  order to improve BLE endurance and muscular power for transfers (progressing, not met)  Patient will improve cervical rotation active range of motion to >/=50 degrees bilaterally in order to improve functional mobility during tasks such as driving (progressing, not met)  Patient will begin some form of home/community fitness in order to sustain progress gained in PT (progressing, not met)    PLAN     Continue to progress as per plan of care; expand balance program per tolerance    ISABEL MUKHERJEE, PT

## 2023-03-22 NOTE — PROGRESS NOTES
"Occupational Therapy Daily Treatment Note     Name: Xander Sanchez  [facility] Number: [id number]    Therapy Diagnosis:   Encounter Diagnoses   Name Primary?    Weakness Yes    Stiffness in joint     Need for assistance with personal care     Pain in right arm        Physician: Diomedes Martin MD    Visit Date: 3/15/2023    Physician Orders: OT eval and treat Dx: (M54.2,  G89.29) Chronic neck pain  (primary encounter diagnosis) (M96.1) Cervical post-laminectomy syndrome (M47.812) Spondylosis of cervical region without myelopathy or radiculopathy Request: ADL evaluation and treatment. Adaptive devices   Medical Diagnosis: Chronic neck pain [M54.2, G89.29]   Gait disorder [R26.9]   Evaluation Date: 2/8/2023  Plan of Care Expiration Period: 4/8/2023  Insurance Authorization period Expiration: 3/8/23  Visit # / Visits Authorized: 10 / 16  FOTO: 2/8/2023     Time In: 9:30  Time Out: 10:15  Total Billable (one on one) Time: 45 minutes      Precautions: Standard    Subjective     Pt reports: "My shoulder always feels heavy." "I didn't come the other day because the weather makes my arm hurt. It was too cold"  he was compliant with home exercise program given last session.   Response to previous treatment:good   Functional change: fatigue in right shoulder      Pain: 4/10  Location: right hand / shoulder     Objective       Strength: ([employer]EL[employer] Dynamometer in lbs.) Average 3 trials, Position II:       2/8/2023 2/8/2023 3/1/2023 3/1/2023   Rung II Right Left Right  Left    Average 60# 70# 60.3# 69.3#      Normal  Average Values  Female Right Left Male: Right Left   20-29 66 lbs 62 lbs         94 lbs 86 lbs   30-39 68 lbs 64 lbs 90 lbs 82 lbs   40-49 64 lbs 62 lbs 80 lbs 74 lbs   50-59 62 lbs 57 lbs 72 lbs 65 lbs   60-69 53 lbs 51 lbs 63 lbs 56 lbs   70+ 44 lbs 42 lbs 54 lbs 48 lbs   SD = +/- 19lbs         Pinch Strength (Measured in lbs)       2/8/2023 2/8/2023 3/1/2023 3/1/2023     Right Left Right  Left    Key Pinch 15 # 20 " "# 15# 20#   3pt Pinch 8 # 13 # 9# 15#   2pt Pinch 10 # 13 # 12# 12#         Fine/Gross Motor Coordination     Assessment   Right   2/8/2023 Left  2/8/2023 Right   3/1/2023 Left   3/1/2023   9 hole peg test 9 pegs in/out for time :69 :27 1:00 :27   *WNL - Within Normal Limits  *NT = Not Tested    Treatment     Fluidotherapy: To R upper extremity for 10 min, continuous air, 110 deg, air speed 100 to decrease pain, edema & scar tissue and increased tissue extensibility. Moist heat on shoulder     Xander received therapeutic exercises to develop strength, endurance, and ROM for 45 minutes including:  - exercises with fluidotherapy   -home exercise program issued with shoulder active assisted range of motion and active range of motion movements x15 each   - wall slides x10     Home Exercises and Education Provided     Education provided:   - shoulder active range of motion and active assisted range of motion   - TGE   - button hook/adaptive equipment  - Progress towards goals     Written Home Exercises Provided: yes.  Exercises were reviewed and Xander was able to demonstrate them prior to the end of the session.  Xander demonstrated good  understanding of the HEP provided.   .   See EMR under Patient Instructions for exercises provided 3/6/2023     Assessment     Xander tolerated today's session well. He reported "heaviness and pain" in his right shoulder, therefore, interventions focused on muscle elongation, joint mobility and home exercise program to manage pain and increase use at home. Xander completed all movements with mild stretching reported and increased fatigue after 15 repetitions. Therapist provided cues for biomechanics and safety with movements. Handouts issued with images and written instructions. No questions end of session     Xander is progressing well towards his goals and there are no updates to goals at this time. Pt prognosis is Fair.     Pt will continue to benefit from skilled outpatient " occupational therapy to address the deficits listed in the problem list on initial evaluation provide pt/family education and to maximize pt's level of independence in the home and community environment.     Anticipated barriers to occupational therapy: chronicity of impairments     Pt's spiritual, cultural and educational needs considered and pt agreeable to plan of care and goals.    Goals:    LTG's (8 weeks):  2)   Demonstrate 65 psi's of right/left  strength to grasp for ADLs/work/leisure activities. Not met, progressing  3)   Demonstrate 20 psi's of lateral key pinch to independently for ADLs/work/leisure activities. Not met, progressing  4)   Decrease complaints of pain to  3 out of 10 at worst to increase functional hand use for ADL/work/leisure activities. Not met, progressing  5)   Patient to score at 30% limitation on FOTO to demonstrate improved perception of functional arm use. Not met, progressing  6)   Pt will return to near to prior level of function for ADLs and household management reporting I or Mod I with ADLs (dressing, feeding, grooming, toileting). Not met, progressing     STG's (4 weeks)  1)   Patient to be IND with HEP and modalities for pain/edema managment. Not met, progressing  2)   Demonstrate 63 psi's of right/left  strength to improve functional grasp for ADLs/work/leisure activities. Not met, progressing  3)   Demonstrate 17 psi's of right/left lateral/tripod/pincer pinch to increase independence with button and FM Coordination. Not met, progressing  4)   Patient to be IND with Orthotic use, wear and care precautions. Not met, progressing  5)   Decrease complaints of pain to  5 out of 10 at worst to increase functional hand use for ADL/work/leisure activities. Met 3/8/2023    Plan     Updates/Grading for next session: Continue with POC Corinne Rapier, OT

## 2023-03-23 ENCOUNTER — TELEPHONE (OUTPATIENT)
Dept: NEUROLOGY | Facility: CLINIC | Age: 73
End: 2023-03-23
Payer: MEDICARE

## 2023-03-23 NOTE — TELEPHONE ENCOUNTER
----- Message from Amy Roy sent at 3/23/2023  9:47 AM CDT -----  Contact: pt @ [phone]  Rx Refill/Request    Is this a Refill or New Rx:  refill    Rx Name and Strength:  HYDROcodone-acetaminophen (NORCO)  mg per tablet    Preferred Pharmacy with phone number:[facility] #[id number] [place] [phone] [facility] [[id number]]    Communication Preference:Asking for call back    Additional Information

## 2023-03-24 DIAGNOSIS — M50.00 HNP (HERNIATED NUCLEUS PULPOSUS) WITH MYELOPATHY, CERVICAL: ICD-10-CM

## 2023-03-24 DIAGNOSIS — M50.30 DEGENERATION OF CERVICAL INTERVERTEBRAL DISC: ICD-10-CM

## 2023-03-24 DIAGNOSIS — M47.812 FACET ARTHRITIS OF CERVICAL REGION: ICD-10-CM

## 2023-03-24 DIAGNOSIS — M54.12 BRACHIAL NEURITIS OR RADICULITIS: ICD-10-CM

## 2023-03-24 DIAGNOSIS — M48.02 SPINAL STENOSIS IN CERVICAL REGION: ICD-10-CM

## 2023-03-24 DIAGNOSIS — F11.20 NARCOTIC DEPENDENCY, CONTINUOUS: ICD-10-CM

## 2023-03-24 DIAGNOSIS — G90.50 COMPLEX REGIONAL PAIN SYNDROME TYPE 1, AFFECTING UNSPECIFIED SITE: ICD-10-CM

## 2023-03-24 DIAGNOSIS — R29.898 RIGHT ARM WEAKNESS: ICD-10-CM

## 2023-03-24 DIAGNOSIS — M54.12 CERVICAL RADICULOPATHY: ICD-10-CM

## 2023-03-24 DIAGNOSIS — M79.601 RIGHT ARM PAIN: ICD-10-CM

## 2023-03-24 DIAGNOSIS — M48.02 CERVICAL STENOSIS OF SPINE: ICD-10-CM

## 2023-03-24 DIAGNOSIS — M75.101 ROTATOR CUFF SYNDROME OF RIGHT SHOULDER: ICD-10-CM

## 2023-03-24 DIAGNOSIS — G89.4 CHRONIC PAIN SYNDROME: ICD-10-CM

## 2023-03-24 DIAGNOSIS — M96.1 CERVICAL POST-LAMINECTOMY SYNDROME: ICD-10-CM

## 2023-03-24 DIAGNOSIS — M47.12 CERVICAL SPONDYLOSIS WITH MYELOPATHY: ICD-10-CM

## 2023-03-24 DIAGNOSIS — M54.12 CERVICAL RADICULAR PAIN: ICD-10-CM

## 2023-03-24 DIAGNOSIS — R29.898 RIGHT HAND WEAKNESS: ICD-10-CM

## 2023-03-24 DIAGNOSIS — G56.41 COMPLEX REGIONAL PAIN SYNDROME TYPE 2 OF RIGHT UPPER EXTREMITY: ICD-10-CM

## 2023-03-24 DIAGNOSIS — M62.81 MUSCLE RIGHT ARM WEAKNESS: ICD-10-CM

## 2023-03-24 RX ORDER — HYDROCODONE BITARTRATE AND ACETAMINOPHEN 10; 325 MG/1; MG/1
1 TABLET ORAL EVERY 6 HOURS PRN
Qty: 120 TABLET | Refills: 0 | Status: SHIPPED | OUTPATIENT
Start: 2023-03-30 | End: 2023-04-25 | Stop reason: SDUPTHER

## 2023-03-27 ENCOUNTER — TELEPHONE (OUTPATIENT)
Dept: REHABILITATION | Facility: HOSPITAL | Age: 73
End: 2023-03-27

## 2023-03-27 NOTE — TELEPHONE ENCOUNTER
Phoned patient about missed PT/OT appointments. He stated he did not have the updated calendar with today's appointment and was unaware of the time. He confirmed 3/29 PT and OT sessions and requested a print out of future appointments.

## 2023-03-28 ENCOUNTER — DOCUMENTATION ONLY (OUTPATIENT)
Dept: REHABILITATION | Facility: HOSPITAL | Age: 73
End: 2023-03-28
Payer: MEDICARE

## 2023-03-28 NOTE — PROGRESS NOTES
Physical therapist and physical therapy assistant(s) met face to face to discuss patient's treatment plan and progress towards established goals. Pt will be seen by a physical therapist minimally every 6th visit or every 30 days.    ISABEL MUKHERJEE, PT

## 2023-03-29 ENCOUNTER — CLINICAL SUPPORT (OUTPATIENT)
Dept: REHABILITATION | Facility: HOSPITAL | Age: 73
End: 2023-03-29
Payer: MEDICARE

## 2023-03-29 DIAGNOSIS — M79.601 PAIN IN RIGHT ARM: ICD-10-CM

## 2023-03-29 DIAGNOSIS — R53.1 WEAKNESS: Primary | ICD-10-CM

## 2023-03-29 DIAGNOSIS — Z74.1 NEED FOR ASSISTANCE WITH PERSONAL CARE: ICD-10-CM

## 2023-03-29 DIAGNOSIS — Z74.09 IMPAIRED FUNCTIONAL MOBILITY, BALANCE, GAIT, AND ENDURANCE: Primary | ICD-10-CM

## 2023-03-29 DIAGNOSIS — M25.60 STIFFNESS IN JOINT: ICD-10-CM

## 2023-03-29 PROCEDURE — 97530 THERAPEUTIC ACTIVITIES: CPT | Mod: HCNC,PN

## 2023-03-29 PROCEDURE — 97110 THERAPEUTIC EXERCISES: CPT | Mod: HCNC,PN,CQ

## 2023-03-29 PROCEDURE — 97112 NEUROMUSCULAR REEDUCATION: CPT | Mod: HCNC,PN,CQ

## 2023-03-29 NOTE — PROGRESS NOTES
" [facility]   Physical Therapy Treatment Note     Name: Xander Sanchez  Clinic Number: [id number]    Therapy Diagnosis:   Encounter Diagnosis   Name Primary?    Impaired functional mobility, balance, gait, and endurance Yes       Physician:  Diomedes Martin MD    Visit Date: 3/29/2023    Physician Orders: PT Eval and Treat   Medical Diagnosis from Referral: Chronic neck pain [M54.2, G89.29]  (M96.1) Cervical post-laminectomy syndrome  (M47.812) Spondylosis of cervical region without myelopathy or radiculopathy     Request:  Neck exercises.  Eval gait due to h/o stumbling and occasional falls.     Evaluation Date: 2/10/2023  Authorization Period Expiration: 2/2/2024  Plan of Care Expiration: 4/7/2023  Visit # / Visits authorized: 12/12  FOTO: 2nd survey completed 9th visit; next at discharge    PTA Visit #: 1/5     Time In: 7:20 AM  Time Out: 8:00 AM  Total Billable Time: 40 minutes (2 TE + 1 NMR)    SUBJECTIVE     Patient reports: Status quo right shoulder pain; otherwise no new complaints.  He was compliant with home exercise program.  Response to previous treatment: No adverse response  Functional change: HEP compliance     Pain: 4/10  Location: right shoulder      Precautions: Standard, Fall, and History of Cervical Fusion; DM II; HTN; ICD; CRPS    OBJECTIVE     Objective Measures updated at progress report unless specified.     Treatment     Xander received the treatments listed below:      therapeutic exercises to develop strength, endurance, ROM, flexibility, and posture for 25 minutes including:  -- Session started with recumbent bike x 6 minutes; level 2 constant resistance for cardiovascular endurance training  -- Standing gastroc stretch on Fitter 3x45"B  -- Standing hamstring stretch at stairs 2x45"B  -- Standing heel raises edge of stair 3x10 with bilateral upper extremity support  -- Lateral resistance band stepping with yellow theraband around ankles 6 lengths x 10 feet " "each     NOT TODAY  -- Standing hip flexor stretch on stairs 2x45"B  -- standing hip abduction 2# following abd slides  2 x 10 B  -- standing hip extension 2# on wood block  2 x 10 B  -- Standing toe raises 2x10B with bilateral upper extremity support  -- Cervical AROM 10x 5" holds in all directions  -- Lower trunk rotations x2 minutes total with 5" holds at end range    neuromuscular re-education activities to improve: Balance, Coordination, and Proprioception for 15 minutes. The following activities were included:  -- Lunge to 6" step + airex pad with knee tap x10B; single upper extremity support  -- Dorsiflexion walks 4 lengths x 10 feet each with bilateral upper extremity support (followed by gait training)  -- Modified single leg stance on 6" step (back leg on airex pad) + red med ball chest press 2x12    NOT TODAY  -- Tall kneel <> short kneel transitions with glute squeeze x10; single upper extremity support  -- Floor transfer training with return demonstration x 1  -- standing hip abduction slides  2 x 10 with B UE support   -- Multi-level toe taps (6'' step) with right lower extremity stepping to encourage hip flexion a); left upper extremity support 3x30"  -- miki (3) stepping  Forward x 3 trials with / bar   -- Narrow base of support on airex + head turns 2x45" each vertical and horizontal    gait training to improve functional mobility and safety for 00 minutes, including:    NOT TODAY  -- 150 feet gait training in gym with cues for heel strike bilaterally after walking with DF (patient had better heel strike on right)    therapeutic activities to improve functional performance for 00 minutes, including:    NOT TODAY  Patient Education anfd Home Exercises     Home Exercises Provided and Patient Education Provided     Education provided:   - Continue HEP  - Postural awareness    Written Home Exercises Provided: Patient instructed to cont prior HEP. Exercises were reviewed and Xander was able to " demonstrate them prior to the end of the session.  Xander demonstrated good  understanding of the education provided. See EMR under Patient Instructions for exercises provided during therapy sessions    ASSESSMENT     Xander arrived to session with only complaints of status quo right shoulder pain and agreeable to PT. Continued general bilateral lower extremity strength and balance intervention without adverse response. Progressed some strengthening items with appropriate levels of fatigue achieved. Good challenge noted on compliant foam surface with occasional reaching strategy employed for postural control. He is progressing well and would benefit from continued PT services to achieve functional goals.    Xander Is progressing well towards his goals.   Patient prognosis is Good.     Patient will continue to benefit from skilled outpatient physical therapy to address the deficits listed in the problem list box on initial evaluation, provide pt/family education and to maximize pt's level of independence in the home and community environment.     Patient's spiritual, cultural and educational needs considered and pt agreeable to plan of care and goals.     Anticipated barriers to physical therapy: Co-morbidities; chronicity of impairments    Goals:     Short Term Goals: 4 weeks   Patient will be compliant with HEP in order to maximize PT benefits (MET)  Patient will score >/= 18/24 on DGI in order to reduce risk for falls and improve postural control (MET)  Patient will score >/= 8 repetitions on 30-Second Sit to  order to improve BLE endurance and muscular power for transfers (progressing, not met)     Long Term Goals: 8 weeks   Patient will score </= 39% on FOTO limitation survey in order to improve self-perception of functional mobility deficits (progressing, not met)  Patient will score >/=50% on Abbreviated ABC in order to improve balance confidence with community mobility (progressing, not  met)  Patient will improve bilateral lower extremity MMT grades by >/=1/2 grade in order to improve strength for ADL completion (progressing, not met)  Patient will improve Hamstring range of motion to lacking </= 25 degrees on 90/90 test in order to improve functional mobility for activities such as lower body dressing (progressing, not met)  Patient will score >/= 20/24 on DGI in order to reduce risk for falls and improve postural control (progressing, not met)  Patient will score >/= 10 repetitions on 30-Second Sit to  order to improve BLE endurance and muscular power for transfers (progressing, not met)  Patient will improve cervical rotation active range of motion to >/=50 degrees bilaterally in order to improve functional mobility during tasks such as driving (progressing, not met)  Patient will begin some form of home/community fitness in order to sustain progress gained in PT (progressing, not met)    PLAN     Continue to progress as per plan of care; expand balance program per tolerance    Anabel Monae, PTA

## 2023-03-29 NOTE — PROGRESS NOTES
"Occupational Therapy Daily Treatment Note     Name: Xander Sanchez  [facility] Number: [id number]    Therapy Diagnosis:   Encounter Diagnoses   Name Primary?    Weakness Yes    Stiffness in joint     Need for assistance with personal care     Pain in right arm        Physician: Diomedes Martin MD    Visit Date: 3/15/2023    Physician Orders: OT eval and treat Dx: (M54.2,  G89.29) Chronic neck pain  (primary encounter diagnosis) (M96.1) Cervical post-laminectomy syndrome (M47.812) Spondylosis of cervical region without myelopathy or radiculopathy Request: ADL evaluation and treatment. Adaptive devices   Medical Diagnosis: Chronic neck pain [M54.2, G89.29]   Gait disorder [R26.9]   Evaluation Date: 2/8/2023  Plan of Care Expiration Period: 4/8/2023  Insurance Authorization period Expiration: 3/8/23  Visit # / Visits Authorized: 11 / 16  FOTO: 2/8/2023     Time In: 8:00  Time Out: 8:45  Total Billable (one on one) Time: 45 minutes      Precautions: Standard    Subjective     Pt reports: "I am ok. My arm is hurting but I take the medication and it goes away."  he was compliant with home exercise program given last session.   Response to previous treatment:good   Functional change: pain with weather change     Pain: 4/10  Location: right hand / shoulder     Objective       Strength: (ROOSEVELT Dynamometer in lbs.) Average 3 trials, Position II:       2/8/2023 2/8/2023 3/1/2023 3/1/2023 3/29/2023 3/29/2023   Rung II Right Left Right  Left  Right  Left    Average 60# 70# 60.3# 69.3# 55# 74#      Normal  Average Values  Female Right Left Male: Right Left   20-29 66 lbs 62 lbs         94 lbs 86 lbs   30-39 68 lbs 64 lbs 90 lbs 82 lbs   40-49 64 lbs 62 lbs 80 lbs 74 lbs   50-59 62 lbs 57 lbs 72 lbs 65 lbs   60-69 53 lbs 51 lbs 63 lbs 56 lbs   70+ 44 lbs 42 lbs 54 lbs 48 lbs   SD = +/- 19lbs         Pinch Strength (Measured in lbs)       2/8/2023 2/8/2023 3/1/2023 3/1/2023 3/29/2023 3/29/2023     Right Left Right  Left  Right  " Left    Key Pinch 15 # 20 # 15# 20# 14# 16#   3pt Pinch 8 # 13 # 9# 15# 10# 17#   2pt Pinch 10 # 13 # 12# 12# 11# 13.5#         Fine/Gross Motor Coordination     Assessment   Right   2/8/2023 Left  2/8/2023 Right   3/1/2023 Left   3/1/2023 Right   3/23/2023   9 hole peg test 9 pegs in/out for time :69 :27 1:00 :27 1:22 without .5# wrist weight    1:08 with wrist weight    *WNL - Within Normal Limits  *NT = Not Tested    Treatment     Fluidotherapy: To R upper extremity for 10 min, continuous air, 110 deg, air speed 100 to decrease pain, edema & scar tissue and increased tissue extensibility. Moist heat on shoulder     Xander participated in dynamic functional therapeutic activities to improve functional performance for 45 minutes, including:  - sensory and active range of motion in fluidotherapy for pain management   - Reassessment   - coins 1 at a time  - small pom poms 2 at a time  - sequence game     Home Exercises and Education Provided     Education provided:   - shoulder active range of motion and active assisted range of motion   - TGE   - button hook/adaptive equipment  - Progress towards goals     Written Home Exercises Provided: yes.  Exercises were reviewed and Xander was able to demonstrate them prior to the end of the session.  Xander demonstrated good  understanding of the HEP provided.   .   See EMR under Patient Instructions for exercises provided 3/6/2023     Assessment     Xander tolerated today's session well. Reassessment performed and he made small improvements in left  and bilateral 3pt/2pt pinch, however, also showed a decline in right  and bilateral key pinch. Therapist discussed baseline, chronicity of impairments, compliance with home exercise program to maintain progress achieved in therapy and discharge from OT. Xander verbalized understanding and is agreeable to discharge at the end of this plan of care.     Xander is progressing well towards his goals and there are no  updates to goals at this time. Pt prognosis is Fair.     Pt will continue to benefit from skilled outpatient occupational therapy to address the deficits listed in the problem list on initial evaluation provide pt/family education and to maximize pt's level of independence in the home and community environment.     Anticipated barriers to occupational therapy: chronicity of impairments     Pt's spiritual, cultural and educational needs considered and pt agreeable to plan of care and goals.    Goals:    LTG's (8 weeks):  2)   Demonstrate 65 psi's of right/left  strength to grasp for ADLs/work/leisure activities. Not met, progressing  3)   Demonstrate 20 psi's of lateral key pinch to independently for ADLs/work/leisure activities. Not met, progressing  4)   Decrease complaints of pain to  3 out of 10 at worst to increase functional hand use for ADL/work/leisure activities. Not met, progressing  5)   Patient to score at 30% limitation on FOTO to demonstrate improved perception of functional arm use. Not met, progressing  6)   Pt will return to near to prior level of function for ADLs and household management reporting I or Mod I with ADLs (dressing, feeding, grooming, toileting). Not met, progressing     STG's (4 weeks)  1)   Patient to be IND with HEP and modalities for pain/edema managment. Not met, progressing  2)   Demonstrate 63 psi's of right/left  strength to improve functional grasp for ADLs/work/leisure activities. Not met, progressing  3)   Demonstrate 17 psi's of right/left lateral/tripod/pincer pinch to increase independence with button and FM Coordination. Not met, progressing  4)   Patient to be IND with Orthotic use, wear and care precautions. Discontinue  5)   Decrease complaints of pain to  5 out of 10 at worst to increase functional hand use for ADL/work/leisure activities. Met 3/8/2023    Plan     Updates/Grading for next session: Continue with POC Corinne Rapier, OT

## 2023-04-04 ENCOUNTER — TELEPHONE (OUTPATIENT)
Dept: REHABILITATION | Facility: HOSPITAL | Age: 73
End: 2023-04-04
Payer: MEDICARE

## 2023-04-04 NOTE — TELEPHONE ENCOUNTER
Spoke to patient about no show PT appointment this morning. Patient reports he is having trouble affording physical and occupational therapy due to high copay. Patient declines further help from PT to look into [facility] Financial Assistance program and would like to discontinue physical and occupational therapy at this time. Instructed patient to keep up with HEP. Occupational therapist to be notified.     Renee Crystal, PT, DPT

## 2023-04-11 ENCOUNTER — TELEPHONE (OUTPATIENT)
Dept: FAMILY MEDICINE | Facility: CLINIC | Age: 73
End: 2023-04-11
Payer: MEDICARE

## 2023-04-11 ENCOUNTER — PATIENT MESSAGE (OUTPATIENT)
Dept: ADMINISTRATIVE | Facility: HOSPITAL | Age: 73
End: 2023-04-11
Payer: MEDICARE

## 2023-04-11 NOTE — PROGRESS NOTES
"Mr. Sanchez is a patient of Dr. Navarro and was last seen in clinic 3/31/2022.      Subjective:   Patient ID:  Xander Sanchez is a 72 y.o. male who presents for follow up of CRT-D  .     HPI:    Mr. Sanchez is a 72 y.o. male with NICM (EF now 40%), LBBB, COPD, CRT-D (LV lead off) here for annual follow up.     Background:    He was diagnosed with NICM with a LBBB at [facility] in approx ~2005, LHC approx 5-6 years ago per patient at  that was "no blockages"  A single chamber ICD implanted for primary prevention, since he has been followed here at [facility] his LBBB resolved and NICM normalized.   He was scheduled for a generator change, noted to be in LBBB again ( with correlative NYHA III symptoms ) and fluoroscopy of the RV (Riata) revealed lead externalization but a patent left sided venous system. Echo revealed EF 25%.  3/24/15 Extraction of ICD lead and implantation of CRT-D (Dr. Mensah). Paucity of LV vein targets, has high LV threshold.  Did not derive symptomatic benefit from upgrade, and EF remained unchanged at 30%.  We turned off LV lead 11/16 to conserve battery.  5/27/2020: Mr. Sanchez is doing well from a device perspective with stable lead and device function. Narrow QRS. No arrhythmia noted. No significant RV pacing. He feels well.    3/31/2022: Mr. Sanchez is doing well from a device perspective with stable lead and device function. No arrhythmia noted. No RV pacing. LV lead off. Narrow QRS. On GDMT. No CHF symptoms. He feels well.    Update (04/13/2023):    Today he says he feels at baseline. Occasional MURDOCK that has not changed. No palps, CP, LH, syncope. No edema.    Device Interrogation (4/13/2023) reveals an intrinsic SR with stable lead and device function. No arrhythmias or treated episodes were noted.  He paces 8% in the RA and 0% in the RV. Estimated battery longevity 5 months.   LV lead OFF due to narrow QRS.    I have personally reviewed the patient's EKG today, which shows sinus rhythm " at 55bpm. NJ interval is 152. QRS is 102. QT is 404.    Recent Cardiac Tests:    2D Echo (2/15/2018):  CONCLUSIONS     1 - Mildly to moderately depressed left ventricular systolic function (EF 40%).     2 - No wall motion abnormalities.     3 - Impaired LV relaxation, normal LAP (grade 1 diastolic dysfunction).     4 - Normal right ventricular systolic function .     5 - The estimated PA systolic pressure is greater than 36 mmHg.     6 - Mild mitral regurgitation.     7 - Mild tricuspid regurgitation.     Current Outpatient Medications   Medication Sig    alcohol swabs PadM Apply 1 each topically as needed.    aspirin (ECOTRIN) 81 MG EC tablet Take 81 mg by mouth once daily.    baclofen (LIORESAL) 10 MG tablet TAKE 1 TABLET THREE TIMES DAILY    blood sugar diagnostic (TRUE METRIX GLUCOSE TEST STRIP) Strp TEST  ONE  TIME  DAILY  AT  6AM    blood-glucose meter (TRUE METRIX AIR GLUCOSE METER) kit USE AS INSTRUCTED    fluticasone-salmeterol 230-21 mcg/dose (ADVAIR HFA) 230-21 mcg/actuation HFAA inhaler Inhale 2 puffs into the lungs 2 (two) times daily. Controller    gabapentin (NEURONTIN) 600 MG tablet Take 1 tablet (600 mg total) by mouth 3 (three) times daily.    hydroCHLOROthiazide (MICROZIDE) 12.5 mg capsule TAKE 1 CAPSULE (12.5 MG TOTAL) BY MOUTH ONCE DAILY.    HYDROcodone-acetaminophen (NORCO)  mg per tablet Take 1 tablet by mouth every 6 (six) hours as needed for Pain.    lancets (TRUEPLUS LANCETS) 33 gauge Misc TEST ONE TIME DAILY AT 6:00AM    metoprolol succinate (TOPROL-XL) 25 MG 24 hr tablet TAKE 1 TABLET EVERY DAY    oxybutynin (DITROPAN) 5 MG Tab TAKE 1 TABLET TWICE DAILY    pantoprazole (PROTONIX) 40 MG tablet TAKE 1 TABLET BY MOUTH ONCE DAILY BEFORE BREAKFAST    pravastatin (PRAVACHOL) 10 MG tablet TAKE 1 TABLET EVERY NIGHT    tamsulosin (FLOMAX) 0.4 mg Cap TAKE 1 CAPSULE EVERY DAY    valsartan (DIOVAN) 40 MG tablet TAKE 1 TABLET EVERY DAY    blood glucose control, low (TRUE METRIX LEVEL 1) Soln 1  "Units by Misc.(Non-Drug; Combo Route) route once daily.     No current facility-administered medications for this visit.       Review of Systems   Constitutional: Negative for malaise/fatigue.   Cardiovascular:  Positive for dyspnea on exertion (mild, chronic). Negative for chest pain, irregular heartbeat, leg swelling and palpitations.   Respiratory:  Negative for shortness of breath.    Hematologic/Lymphatic: Negative for bleeding problem.   Skin:  Negative for rash.   Musculoskeletal:  Negative for myalgias.   Gastrointestinal:  Negative for hematemesis, hematochezia and nausea.   Genitourinary:  Negative for hematuria.   Neurological:  Negative for light-headedness.   Psychiatric/Behavioral:  Negative for altered mental status.    Allergic/Immunologic: Negative for persistent infections.     Objective:          BP (!) 151/71   Pulse (!) 55   Ht 5' 11" (1.803 m)   Wt 82.7 kg (182 lb 5.1 oz)   BMI 25.43 kg/m²     Physical Exam  Vitals and nursing note reviewed.   Constitutional:       Appearance: Normal appearance. He is well-developed.   HENT:      Head: Normocephalic.      Nose: Nose normal.   Eyes:      Pupils: Pupils are equal, round, and reactive to light.   Cardiovascular:      Rate and Rhythm: Regular rhythm. Bradycardia present.   Pulmonary:      Effort: No respiratory distress.      Breath sounds: Normal breath sounds.   Chest:      Comments: Device to LUCW. Incision and pocket in good repair.    Musculoskeletal:         General: Normal range of motion.   Skin:     General: Skin is warm and dry.      Findings: No erythema.   Neurological:      Mental Status: He is alert and oriented to person, place, and time.   Psychiatric:         Speech: Speech normal.         Behavior: Behavior normal.         Lab Results   Component Value Date     08/08/2022    K 4.5 08/08/2022    MG 2.2 04/08/2013    BUN 8 08/08/2022    CREATININE 0.8 08/08/2022    ALT 21 08/08/2022    AST 22 08/08/2022    HGB 13.9 (L) " 06/28/2021    HCT 43.9 06/28/2021    HCT 31 (L) 08/10/2015    TSH 2.427 06/28/2021    LDLCALC 74.2 08/08/2022             Assessment:     1. Biventricular implantable cardioverter-defibrillator (ICD) in situ    2. Essential hypertension    3. LBBB (left bundle branch block)    4. Cardiomyopathy, nonischemic    5. Chronic systolic dysfunction of left ventricle        Plan:     In summary, Mr. Sanchez is a 72 y.o. male with NICM (EF now 40%), LBBB, COPD, CRT-D (LV lead off) here for annual follow up.   Mr. Sanchez is doing well from a device perspective with stable lead and device function. No arrhythmia noted. No RV pacing. LV lead turned OFF due to narrow QRS. No new CHF symptoms. He is feeling well. 5 months to REEMA. Pt educated re: vibratory alert. Monthly device checks.    Continue current medication regimen and device settings.   Follow up in device clinic as scheduled. Monthly.  Follow up in EP clinic as scheduled after generator change.     *A copy of this note has been sent to Dr. Navarro*    Follow up as scheduled following generator change.    ------------------------------------------------------------------    RACHEL Grace, NP-C  Cardiac Electrophysiology

## 2023-04-13 ENCOUNTER — CLINICAL SUPPORT (OUTPATIENT)
Dept: CARDIOLOGY | Facility: HOSPITAL | Age: 73
End: 2023-04-13
Attending: INTERNAL MEDICINE
Payer: MEDICARE

## 2023-04-13 ENCOUNTER — OFFICE VISIT (OUTPATIENT)
Dept: ELECTROPHYSIOLOGY | Facility: CLINIC | Age: 73
End: 2023-04-13
Payer: MEDICARE

## 2023-04-13 VITALS
WEIGHT: 182.31 LBS | DIASTOLIC BLOOD PRESSURE: 71 MMHG | SYSTOLIC BLOOD PRESSURE: 151 MMHG | HEART RATE: 55 BPM | BODY MASS INDEX: 25.52 KG/M2 | HEIGHT: 71 IN

## 2023-04-13 DIAGNOSIS — I10 ESSENTIAL HYPERTENSION: ICD-10-CM

## 2023-04-13 DIAGNOSIS — I47.29 OTHER VENTRICULAR TACHYCARDIA: ICD-10-CM

## 2023-04-13 DIAGNOSIS — Z95.810 BIVENTRICULAR IMPLANTABLE CARDIOVERTER-DEFIBRILLATOR (ICD) IN SITU: Primary | ICD-10-CM

## 2023-04-13 DIAGNOSIS — I50.42 CHRONIC COMBINED SYSTOLIC (CONGESTIVE) AND DIASTOLIC (CONGESTIVE) HEART FAILURE: ICD-10-CM

## 2023-04-13 DIAGNOSIS — Z95.810 PRESENCE OF AUTOMATIC (IMPLANTABLE) CARDIAC DEFIBRILLATOR: ICD-10-CM

## 2023-04-13 DIAGNOSIS — I51.9 CHRONIC SYSTOLIC DYSFUNCTION OF LEFT VENTRICLE: ICD-10-CM

## 2023-04-13 DIAGNOSIS — I44.7 LBBB (LEFT BUNDLE BRANCH BLOCK): ICD-10-CM

## 2023-04-13 DIAGNOSIS — I42.8 CARDIOMYOPATHY, NONISCHEMIC: ICD-10-CM

## 2023-04-13 DIAGNOSIS — I48.0 PAROXYSMAL ATRIAL FIBRILLATION: ICD-10-CM

## 2023-04-13 PROCEDURE — 3078F PR MOST RECENT DIASTOLIC BLOOD PRESSURE < 80 MM HG: ICD-10-PCS | Mod: HCNC,CPTII,S$GLB, | Performed by: NURSE PRACTITIONER

## 2023-04-13 PROCEDURE — 1125F PR PAIN SEVERITY QUANTIFIED, PAIN PRESENT: ICD-10-PCS | Mod: HCNC,CPTII,S$GLB, | Performed by: NURSE PRACTITIONER

## 2023-04-13 PROCEDURE — 3008F BODY MASS INDEX DOCD: CPT | Mod: HCNC,CPTII,S$GLB, | Performed by: NURSE PRACTITIONER

## 2023-04-13 PROCEDURE — 3288F FALL RISK ASSESSMENT DOCD: CPT | Mod: HCNC,CPTII,S$GLB, | Performed by: NURSE PRACTITIONER

## 2023-04-13 PROCEDURE — 93005 ELECTROCARDIOGRAM TRACING: CPT | Mod: HCNC,S$GLB,, | Performed by: INTERNAL MEDICINE

## 2023-04-13 PROCEDURE — 1101F PR PT FALLS ASSESS DOC 0-1 FALLS W/OUT INJ PAST YR: ICD-10-PCS | Mod: HCNC,CPTII,S$GLB, | Performed by: NURSE PRACTITIONER

## 2023-04-13 PROCEDURE — 1125F AMNT PAIN NOTED PAIN PRSNT: CPT | Mod: HCNC,CPTII,S$GLB, | Performed by: NURSE PRACTITIONER

## 2023-04-13 PROCEDURE — 4010F ACE/ARB THERAPY RXD/TAKEN: CPT | Mod: HCNC,CPTII,S$GLB, | Performed by: NURSE PRACTITIONER

## 2023-04-13 PROCEDURE — 93010 RHYTHM STRIP: ICD-10-PCS | Mod: HCNC,S$GLB,, | Performed by: INTERNAL MEDICINE

## 2023-04-13 PROCEDURE — 93283 PRGRMG EVAL IMPLANTABLE DFB: CPT | Mod: 26,HCNC,, | Performed by: INTERNAL MEDICINE

## 2023-04-13 PROCEDURE — 99999 PR PBB SHADOW E&M-EST. PATIENT-LVL III: ICD-10-PCS | Mod: PBBFAC,HCNC,, | Performed by: NURSE PRACTITIONER

## 2023-04-13 PROCEDURE — 99999 PR PBB SHADOW E&M-EST. PATIENT-LVL III: CPT | Mod: PBBFAC,HCNC,, | Performed by: NURSE PRACTITIONER

## 2023-04-13 PROCEDURE — 1159F PR MEDICATION LIST DOCUMENTED IN MEDICAL RECORD: ICD-10-PCS | Mod: HCNC,CPTII,S$GLB, | Performed by: NURSE PRACTITIONER

## 2023-04-13 PROCEDURE — 1101F PT FALLS ASSESS-DOCD LE1/YR: CPT | Mod: HCNC,CPTII,S$GLB, | Performed by: NURSE PRACTITIONER

## 2023-04-13 PROCEDURE — 1159F MED LIST DOCD IN RCRD: CPT | Mod: HCNC,CPTII,S$GLB, | Performed by: NURSE PRACTITIONER

## 2023-04-13 PROCEDURE — 93005 RHYTHM STRIP: ICD-10-PCS | Mod: HCNC,S$GLB,, | Performed by: INTERNAL MEDICINE

## 2023-04-13 PROCEDURE — 93010 ELECTROCARDIOGRAM REPORT: CPT | Mod: HCNC,S$GLB,, | Performed by: INTERNAL MEDICINE

## 2023-04-13 PROCEDURE — 1160F RVW MEDS BY RX/DR IN RCRD: CPT | Mod: HCNC,CPTII,S$GLB, | Performed by: NURSE PRACTITIONER

## 2023-04-13 PROCEDURE — 93283 PRGRMG EVAL IMPLANTABLE DFB: CPT | Mod: HCNC

## 2023-04-13 PROCEDURE — 3078F DIAST BP <80 MM HG: CPT | Mod: HCNC,CPTII,S$GLB, | Performed by: NURSE PRACTITIONER

## 2023-04-13 PROCEDURE — 3008F PR BODY MASS INDEX (BMI) DOCUMENTED: ICD-10-PCS | Mod: HCNC,CPTII,S$GLB, | Performed by: NURSE PRACTITIONER

## 2023-04-13 PROCEDURE — 3072F LOW RISK FOR RETINOPATHY: CPT | Mod: HCNC,CPTII,S$GLB, | Performed by: NURSE PRACTITIONER

## 2023-04-13 PROCEDURE — 3288F PR FALLS RISK ASSESSMENT DOCUMENTED: ICD-10-PCS | Mod: HCNC,CPTII,S$GLB, | Performed by: NURSE PRACTITIONER

## 2023-04-13 PROCEDURE — 3072F PR LOW RISK FOR RETINOPATHY: ICD-10-PCS | Mod: HCNC,CPTII,S$GLB, | Performed by: NURSE PRACTITIONER

## 2023-04-13 PROCEDURE — 99214 PR OFFICE/OUTPT VISIT, EST, LEVL IV, 30-39 MIN: ICD-10-PCS | Mod: HCNC,S$GLB,, | Performed by: NURSE PRACTITIONER

## 2023-04-13 PROCEDURE — 4010F PR ACE/ARB THEARPY RXD/TAKEN: ICD-10-PCS | Mod: HCNC,CPTII,S$GLB, | Performed by: NURSE PRACTITIONER

## 2023-04-13 PROCEDURE — 93283 CARDIAC DEVICE CHECK - IN CLINIC & HOSPITAL: ICD-10-PCS | Mod: 26,HCNC,, | Performed by: INTERNAL MEDICINE

## 2023-04-13 PROCEDURE — 3077F SYST BP >= 140 MM HG: CPT | Mod: HCNC,CPTII,S$GLB, | Performed by: NURSE PRACTITIONER

## 2023-04-13 PROCEDURE — 3077F PR MOST RECENT SYSTOLIC BLOOD PRESSURE >= 140 MM HG: ICD-10-PCS | Mod: HCNC,CPTII,S$GLB, | Performed by: NURSE PRACTITIONER

## 2023-04-13 PROCEDURE — 99214 OFFICE O/P EST MOD 30 MIN: CPT | Mod: HCNC,S$GLB,, | Performed by: NURSE PRACTITIONER

## 2023-04-13 PROCEDURE — 1160F PR REVIEW ALL MEDS BY PRESCRIBER/CLIN PHARMACIST DOCUMENTED: ICD-10-PCS | Mod: HCNC,CPTII,S$GLB, | Performed by: NURSE PRACTITIONER

## 2023-04-25 DIAGNOSIS — M96.1 CERVICAL POST-LAMINECTOMY SYNDROME: ICD-10-CM

## 2023-04-25 DIAGNOSIS — G56.41 COMPLEX REGIONAL PAIN SYNDROME TYPE 2 OF RIGHT UPPER EXTREMITY: ICD-10-CM

## 2023-04-25 DIAGNOSIS — R29.898 RIGHT ARM WEAKNESS: ICD-10-CM

## 2023-04-25 DIAGNOSIS — M47.12 CERVICAL SPONDYLOSIS WITH MYELOPATHY: ICD-10-CM

## 2023-04-25 DIAGNOSIS — M48.02 CERVICAL STENOSIS OF SPINE: ICD-10-CM

## 2023-04-25 DIAGNOSIS — M54.12 CERVICAL RADICULOPATHY: ICD-10-CM

## 2023-04-25 DIAGNOSIS — M54.12 CERVICAL RADICULAR PAIN: ICD-10-CM

## 2023-04-25 DIAGNOSIS — R29.898 RIGHT HAND WEAKNESS: ICD-10-CM

## 2023-04-25 DIAGNOSIS — M50.00 HNP (HERNIATED NUCLEUS PULPOSUS) WITH MYELOPATHY, CERVICAL: ICD-10-CM

## 2023-04-25 DIAGNOSIS — F11.20 NARCOTIC DEPENDENCY, CONTINUOUS: ICD-10-CM

## 2023-04-25 DIAGNOSIS — G89.4 CHRONIC PAIN SYNDROME: ICD-10-CM

## 2023-04-25 DIAGNOSIS — M75.101 ROTATOR CUFF SYNDROME OF RIGHT SHOULDER: ICD-10-CM

## 2023-04-25 DIAGNOSIS — M47.812 FACET ARTHRITIS OF CERVICAL REGION: ICD-10-CM

## 2023-04-25 DIAGNOSIS — M62.81 MUSCLE RIGHT ARM WEAKNESS: ICD-10-CM

## 2023-04-25 DIAGNOSIS — M48.02 SPINAL STENOSIS IN CERVICAL REGION: ICD-10-CM

## 2023-04-25 DIAGNOSIS — M54.12 BRACHIAL NEURITIS OR RADICULITIS: ICD-10-CM

## 2023-04-25 DIAGNOSIS — M79.601 RIGHT ARM PAIN: ICD-10-CM

## 2023-04-25 DIAGNOSIS — G90.50 COMPLEX REGIONAL PAIN SYNDROME TYPE 1, AFFECTING UNSPECIFIED SITE: ICD-10-CM

## 2023-04-25 DIAGNOSIS — M50.30 DEGENERATION OF CERVICAL INTERVERTEBRAL DISC: ICD-10-CM

## 2023-04-25 RX ORDER — HYDROCODONE BITARTRATE AND ACETAMINOPHEN 10; 325 MG/1; MG/1
1 TABLET ORAL EVERY 6 HOURS PRN
Qty: 120 TABLET | Refills: 0 | Status: SHIPPED | OUTPATIENT
Start: 2023-04-29 | End: 2023-05-25 | Stop reason: SDUPTHER

## 2023-04-25 NOTE — TELEPHONE ENCOUNTER
----- Message from Beverley Holm sent at 4/25/2023 10:18 AM CDT -----  Regarding: Refill  Contact: [phone]  Hi, the above pt is calling in for a refill of HYDROcodone-acetaminophen (NORCO)  mg per tablet. Pt can be called at [phone]

## 2023-04-27 ENCOUNTER — CLINICAL SUPPORT (OUTPATIENT)
Dept: CARDIOLOGY | Facility: HOSPITAL | Age: 73
End: 2023-04-27
Payer: MEDICARE

## 2023-04-27 DIAGNOSIS — I47.29 OTHER VENTRICULAR TACHYCARDIA: ICD-10-CM

## 2023-04-27 DIAGNOSIS — I42.0 DILATED CARDIOMYOPATHY: ICD-10-CM

## 2023-04-27 DIAGNOSIS — Z95.810 PRESENCE OF AUTOMATIC (IMPLANTABLE) CARDIAC DEFIBRILLATOR: ICD-10-CM

## 2023-04-27 DIAGNOSIS — I48.91 UNSPECIFIED ATRIAL FIBRILLATION: ICD-10-CM

## 2023-04-27 DIAGNOSIS — I50.42 CHRONIC COMBINED SYSTOLIC (CONGESTIVE) AND DIASTOLIC (CONGESTIVE) HEART FAILURE: ICD-10-CM

## 2023-04-27 PROCEDURE — 93295 CARDIAC DEVICE CHECK - REMOTE: ICD-10-PCS | Mod: HCNC,,, | Performed by: INTERNAL MEDICINE

## 2023-04-27 PROCEDURE — 93295 DEV INTERROG REMOTE 1/2/MLT: CPT | Mod: HCNC,,, | Performed by: INTERNAL MEDICINE

## 2023-04-27 PROCEDURE — 93296 REM INTERROG EVL PM/IDS: CPT | Mod: HCNC | Performed by: INTERNAL MEDICINE

## 2023-05-17 ENCOUNTER — PES CALL (OUTPATIENT)
Dept: ADMINISTRATIVE | Facility: CLINIC | Age: 73
End: 2023-05-17
Payer: MEDICARE

## 2023-05-25 DIAGNOSIS — G56.41 COMPLEX REGIONAL PAIN SYNDROME TYPE 2 OF RIGHT UPPER EXTREMITY: ICD-10-CM

## 2023-05-25 DIAGNOSIS — M50.00 HNP (HERNIATED NUCLEUS PULPOSUS) WITH MYELOPATHY, CERVICAL: ICD-10-CM

## 2023-05-25 DIAGNOSIS — M54.12 BRACHIAL NEURITIS OR RADICULITIS: ICD-10-CM

## 2023-05-25 DIAGNOSIS — M47.812 FACET ARTHRITIS OF CERVICAL REGION: ICD-10-CM

## 2023-05-25 DIAGNOSIS — M48.02 CERVICAL STENOSIS OF SPINE: ICD-10-CM

## 2023-05-25 DIAGNOSIS — M96.1 CERVICAL POST-LAMINECTOMY SYNDROME: ICD-10-CM

## 2023-05-25 DIAGNOSIS — M48.02 SPINAL STENOSIS IN CERVICAL REGION: ICD-10-CM

## 2023-05-25 DIAGNOSIS — M54.12 CERVICAL RADICULOPATHY: ICD-10-CM

## 2023-05-25 DIAGNOSIS — G90.50 COMPLEX REGIONAL PAIN SYNDROME TYPE 1, AFFECTING UNSPECIFIED SITE: ICD-10-CM

## 2023-05-25 DIAGNOSIS — R29.898 RIGHT ARM WEAKNESS: ICD-10-CM

## 2023-05-25 DIAGNOSIS — R29.898 RIGHT HAND WEAKNESS: ICD-10-CM

## 2023-05-25 DIAGNOSIS — M47.12 CERVICAL SPONDYLOSIS WITH MYELOPATHY: ICD-10-CM

## 2023-05-25 DIAGNOSIS — M50.30 DEGENERATION OF CERVICAL INTERVERTEBRAL DISC: ICD-10-CM

## 2023-05-25 DIAGNOSIS — M62.81 MUSCLE RIGHT ARM WEAKNESS: ICD-10-CM

## 2023-05-25 DIAGNOSIS — M79.601 RIGHT ARM PAIN: ICD-10-CM

## 2023-05-25 DIAGNOSIS — G89.4 CHRONIC PAIN SYNDROME: ICD-10-CM

## 2023-05-25 DIAGNOSIS — M54.12 CERVICAL RADICULAR PAIN: ICD-10-CM

## 2023-05-25 DIAGNOSIS — F11.20 NARCOTIC DEPENDENCY, CONTINUOUS: ICD-10-CM

## 2023-05-25 DIAGNOSIS — M75.101 ROTATOR CUFF SYNDROME OF RIGHT SHOULDER: ICD-10-CM

## 2023-05-25 NOTE — TELEPHONE ENCOUNTER
----- Message from Cary Luna sent at 5/25/2023  9:03 AM CDT -----  Regarding: Refill  Contact: Pt @ [phone]  Rx Refill/Request    Is this a Refill or New Rx:refill    Rx Name and Strength:HYDROcodone-acetaminophen (NORCO)  mg per tablet    Preferred Pharmacy with phone number:  [id number] [id number] [facility] [address]  [facility] [address]  [facility] [address]  Phone: [phone] Fax: [phone]    Communication Preference:Pt @ [phone]    Additional Information:

## 2023-05-26 RX ORDER — HYDROCODONE BITARTRATE AND ACETAMINOPHEN 10; 325 MG/1; MG/1
1 TABLET ORAL EVERY 6 HOURS PRN
Qty: 120 TABLET | Refills: 0 | Status: SHIPPED | OUTPATIENT
Start: 2023-05-30 | End: 2023-06-26 | Stop reason: SDUPTHER

## 2023-06-01 ENCOUNTER — OFFICE VISIT (OUTPATIENT)
Dept: PHYSICAL MEDICINE AND REHAB | Facility: CLINIC | Age: 73
End: 2023-06-01
Payer: MEDICARE

## 2023-06-01 VITALS
WEIGHT: 177.5 LBS | DIASTOLIC BLOOD PRESSURE: 82 MMHG | HEIGHT: 71 IN | HEART RATE: 74 BPM | BODY MASS INDEX: 24.85 KG/M2 | SYSTOLIC BLOOD PRESSURE: 125 MMHG

## 2023-06-01 DIAGNOSIS — G56.41 COMPLEX REGIONAL PAIN SYNDROME TYPE 2 OF RIGHT UPPER EXTREMITY: ICD-10-CM

## 2023-06-01 DIAGNOSIS — M96.1 CERVICAL POST-LAMINECTOMY SYNDROME: ICD-10-CM

## 2023-06-01 DIAGNOSIS — M47.812 SPONDYLOSIS OF CERVICAL REGION WITHOUT MYELOPATHY OR RADICULOPATHY: ICD-10-CM

## 2023-06-01 DIAGNOSIS — Z79.891 CHRONICALLY ON OPIATE THERAPY: ICD-10-CM

## 2023-06-01 DIAGNOSIS — M54.2 CHRONIC NECK PAIN: Primary | ICD-10-CM

## 2023-06-01 DIAGNOSIS — G89.29 CHRONIC NECK PAIN: Primary | ICD-10-CM

## 2023-06-01 DIAGNOSIS — Z98.1 STATUS POST CERVICAL SPINAL FUSION: ICD-10-CM

## 2023-06-01 PROCEDURE — 1101F PT FALLS ASSESS-DOCD LE1/YR: CPT | Mod: HCNC,CPTII,S$GLB, | Performed by: PHYSICAL MEDICINE & REHABILITATION

## 2023-06-01 PROCEDURE — 3079F DIAST BP 80-89 MM HG: CPT | Mod: HCNC,CPTII,S$GLB, | Performed by: PHYSICAL MEDICINE & REHABILITATION

## 2023-06-01 PROCEDURE — 1125F PR PAIN SEVERITY QUANTIFIED, PAIN PRESENT: ICD-10-PCS | Mod: HCNC,CPTII,S$GLB, | Performed by: PHYSICAL MEDICINE & REHABILITATION

## 2023-06-01 PROCEDURE — 1101F PR PT FALLS ASSESS DOC 0-1 FALLS W/OUT INJ PAST YR: ICD-10-PCS | Mod: HCNC,CPTII,S$GLB, | Performed by: PHYSICAL MEDICINE & REHABILITATION

## 2023-06-01 PROCEDURE — 3079F PR MOST RECENT DIASTOLIC BLOOD PRESSURE 80-89 MM HG: ICD-10-PCS | Mod: HCNC,CPTII,S$GLB, | Performed by: PHYSICAL MEDICINE & REHABILITATION

## 2023-06-01 PROCEDURE — 1159F PR MEDICATION LIST DOCUMENTED IN MEDICAL RECORD: ICD-10-PCS | Mod: HCNC,CPTII,S$GLB, | Performed by: PHYSICAL MEDICINE & REHABILITATION

## 2023-06-01 PROCEDURE — 3074F PR MOST RECENT SYSTOLIC BLOOD PRESSURE < 130 MM HG: ICD-10-PCS | Mod: HCNC,CPTII,S$GLB, | Performed by: PHYSICAL MEDICINE & REHABILITATION

## 2023-06-01 PROCEDURE — 4010F PR ACE/ARB THEARPY RXD/TAKEN: ICD-10-PCS | Mod: HCNC,CPTII,S$GLB, | Performed by: PHYSICAL MEDICINE & REHABILITATION

## 2023-06-01 PROCEDURE — 3072F PR LOW RISK FOR RETINOPATHY: ICD-10-PCS | Mod: HCNC,CPTII,S$GLB, | Performed by: PHYSICAL MEDICINE & REHABILITATION

## 2023-06-01 PROCEDURE — 1159F MED LIST DOCD IN RCRD: CPT | Mod: HCNC,CPTII,S$GLB, | Performed by: PHYSICAL MEDICINE & REHABILITATION

## 2023-06-01 PROCEDURE — 4010F ACE/ARB THERAPY RXD/TAKEN: CPT | Mod: HCNC,CPTII,S$GLB, | Performed by: PHYSICAL MEDICINE & REHABILITATION

## 2023-06-01 PROCEDURE — 3072F LOW RISK FOR RETINOPATHY: CPT | Mod: HCNC,CPTII,S$GLB, | Performed by: PHYSICAL MEDICINE & REHABILITATION

## 2023-06-01 PROCEDURE — 3074F SYST BP LT 130 MM HG: CPT | Mod: HCNC,CPTII,S$GLB, | Performed by: PHYSICAL MEDICINE & REHABILITATION

## 2023-06-01 PROCEDURE — 99999 PR PBB SHADOW E&M-EST. PATIENT-LVL IV: CPT | Mod: PBBFAC,HCNC,, | Performed by: PHYSICAL MEDICINE & REHABILITATION

## 2023-06-01 PROCEDURE — 1125F AMNT PAIN NOTED PAIN PRSNT: CPT | Mod: HCNC,CPTII,S$GLB, | Performed by: PHYSICAL MEDICINE & REHABILITATION

## 2023-06-01 PROCEDURE — 99999 PR PBB SHADOW E&M-EST. PATIENT-LVL IV: ICD-10-PCS | Mod: PBBFAC,HCNC,, | Performed by: PHYSICAL MEDICINE & REHABILITATION

## 2023-06-01 PROCEDURE — 99213 OFFICE O/P EST LOW 20 MIN: CPT | Mod: HCNC,S$GLB,, | Performed by: PHYSICAL MEDICINE & REHABILITATION

## 2023-06-01 PROCEDURE — 3288F PR FALLS RISK ASSESSMENT DOCUMENTED: ICD-10-PCS | Mod: HCNC,CPTII,S$GLB, | Performed by: PHYSICAL MEDICINE & REHABILITATION

## 2023-06-01 PROCEDURE — 3288F FALL RISK ASSESSMENT DOCD: CPT | Mod: HCNC,CPTII,S$GLB, | Performed by: PHYSICAL MEDICINE & REHABILITATION

## 2023-06-01 PROCEDURE — 99213 PR OFFICE/OUTPT VISIT, EST, LEVL III, 20-29 MIN: ICD-10-PCS | Mod: HCNC,S$GLB,, | Performed by: PHYSICAL MEDICINE & REHABILITATION

## 2023-06-01 NOTE — PROGRESS NOTES
Subjective:       Patient ID: Xander Sanchez is a 73 y.o. male.    Chief Complaint: No chief complaint on file.      HPI      Mr. Sanchez is a 72-year-old black male with past medical history of hypertension, diabetes mellitus, peripheral neuropathy, RA, nonischemic cardiomyopathy, chronic systolic heart failure, status post AICD, COPD, osteoarthritis, chronic neck pain status post 3 neck surgeries, CRPS type 1 of right arm, GERD.  The patient is followed up at the [facility] for chronic neck pain with right cervical radiculopathy, status post multiple neck surgeries (C3-6 laminectomy in 11/2012, C5-6 ACDF in 04/2014, and C3-7 posterior fusion in 08/2015).  He was last seen on 2/2/2023  He was maintained on gabapentin, p.r.n. baclofen and p.r.n. hydrocodone/APAP.  He was referred to physical therapy and occupational therapy.    The patient is coming to the clinic for follow-up.  His neck pain has been under good control. It is an intermittent tightness and burning or cold sensation from the shoulder area shooting to his right hand.  His pain is worse when he wakes up in the morning.  It is better with his pain medications.  His max pain is 5/10 and minimum 0/10.  Today it is 0/10.  The patient denies any significant upper extremity weakness.  He complains of occasional spasms in his right arm.  He has chronic impaired hand coordination such as trouble buttoning or writing. This has been better with OT. He he reports occasional stumbling and falling due to lower extremity weakness and coordination problems. This has better with PT.    He is currently taking:  Gabapentin 600 mg p.o. 3 times per day.  He reports sedation with higher doses.    Hydrocodone/APAP 10/325 p.r.n.4 times per day   Baclofen 10 mg p.o. b.i.d. p.r.n..    He has been getting physical and occupational therapy until few weeks ago.  He reports symptomatic improvement.  He was told he needs new orders.    Past Medical History:    Diagnosis Date    Allergy     Arthritis     Asthma     Cardiomyopathy     Cataract     Cervical radicular pain     Cervical spondylosis with myelopathy 10/17/2012    Chronic bronchitis     Chronic systolic dysfunction of left ventricle 07/27/2015    Constipation 07/27/2015    COPD (chronic obstructive pulmonary disease)     CRPS (complex regional pain syndrome type I) 12/29/2014    Diabetes mellitus, type 2     DM type 2 (diabetes mellitus, type 2) 07/27/2015    Enlarged prostate 07/27/2015    Enuresis 07/06/2018    Hypertension     ICD (implantable cardiac defibrillator) in place     Mixed hyperlipidemia 02/28/2018    Presence of biventricular AICD 07/27/2015    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thy 02/01/2016    Urinary tract infection     pt does not know        Review of patient's allergies indicates:   Allergen Reactions    Ciprofloxacin Nausea And Vomiting        Review of Systems   Constitutional:  Negative for chills and fever.   Eyes:  Negative for visual disturbance.   Respiratory:  Positive for shortness of breath.    Cardiovascular:  Negative for chest pain.   Gastrointestinal:  Negative for constipation, nausea and vomiting.   Genitourinary:  Negative for difficulty urinating.   Musculoskeletal:  Positive for gait problem, myalgias and neck pain. Negative for back pain.   Neurological:  Negative for dizziness, weakness and headaches.   Psychiatric/Behavioral:  Negative for behavioral problems and sleep disturbance.            Objective:      Physical Exam  Vitals reviewed.   Constitutional:       General: He is not in acute distress.     Appearance: He is well-developed.   HENT:      Head: Normocephalic and atraumatic.   Neck:      Comments: Healed posterior and anterior neck surgical scars.  Decreased ROM in all planes.  -ve tenderness  Musculoskeletal:      Comments: BUE:  ROM:   RUE: full.   LUE: full.  Strength:    RUE: 5/5 at shoulder abduction, 5 elbow flexion, 5 wrist extension, 5  elbow extension, 5 finger flexion, 5 finger abduction.   LUE: 5/5 at shoulder abduction, 5 elbow flexion, 5 wrist extension, 5 elbow extension, 5 finger flexion, 5 finger abduction.  Sensation to pinprick:   RUE: intact.   LUE: intact.      BLE:  ROM:   RLE: full.   LLE: full.  Strength:    RLE: 5/5 at hip flexion, 5 knee extension, 5 ankle DF, 5 PF, 5 EHL.   LLE: 5/5 at hip flexion, 5 knee extension, 5 ankle DF, 5 PF, 5 EHL.  Sensation to pinprick:     RLE: intact.      LLE: intact.   SLR (sitting):      RLE: -ve.      LLE: -ve.    -ve tenderness lumbar spine.    Gait:  Slow aiden, some shuffling and circumduction on the right side.     Skin:     General: Skin is warm.   Neurological:      Mental Status: He is alert.   Psychiatric:         Behavior: Behavior normal.       Assessment:       1. Chronic neck pain    2. Spondylosis of cervical region without myelopathy or radiculopathy    3. Status post cervical spinal fusion    4. Cervical post-laminectomy syndrome    5. Complex regional pain syndrome type 2 of right upper extremity    6. Chronically on opiate therapy          Plan:       - Oral NSAIDs will be avoided due to cardiac illness.  - Continue gabapentin 600 mg, 1 tablet by mouth 3 times per day   - Continue hydrocodone/APAP 10/325, 1 tablet by mouth every 6 hours as needed for pain.    - Continue baclofen 10 mg tablets; take 1 tablet by mouth 3 times per day as needed for muscle spasms.  - New referral to physical therapy to work on neck exercises and evaluation for his gait.  - New referral to occupational therapy to work on hand coordination and activities of daily living as well as adaptive devices as needed for his chronic cervical myelopathy.  - Follow up in about 4 months (around 10/1/2023).      This note was partly generated with [employer] voice recognition software. I apologize for any possible typographical errors.

## 2023-06-05 ENCOUNTER — OFFICE VISIT (OUTPATIENT)
Dept: FAMILY MEDICINE | Facility: CLINIC | Age: 73
End: 2023-06-05
Payer: MEDICARE

## 2023-06-05 ENCOUNTER — LAB VISIT (OUTPATIENT)
Dept: LAB | Facility: HOSPITAL | Age: 73
End: 2023-06-05
Attending: FAMILY MEDICINE
Payer: MEDICARE

## 2023-06-05 VITALS
OXYGEN SATURATION: 95 % | WEIGHT: 177.69 LBS | DIASTOLIC BLOOD PRESSURE: 84 MMHG | SYSTOLIC BLOOD PRESSURE: 134 MMHG | HEART RATE: 73 BPM | BODY MASS INDEX: 24.88 KG/M2 | HEIGHT: 71 IN

## 2023-06-05 DIAGNOSIS — I70.0 AORTIC ATHEROSCLEROSIS: ICD-10-CM

## 2023-06-05 DIAGNOSIS — B35.3 TINEA PEDIS OF RIGHT FOOT: ICD-10-CM

## 2023-06-05 DIAGNOSIS — Z12.11 SCREEN FOR COLON CANCER: ICD-10-CM

## 2023-06-05 DIAGNOSIS — E78.2 MIXED HYPERLIPIDEMIA: ICD-10-CM

## 2023-06-05 DIAGNOSIS — I10 ESSENTIAL HYPERTENSION: ICD-10-CM

## 2023-06-05 DIAGNOSIS — E11.40 TYPE 2 DIABETES MELLITUS WITH DIABETIC NEUROPATHY, WITHOUT LONG-TERM CURRENT USE OF INSULIN: ICD-10-CM

## 2023-06-05 DIAGNOSIS — J43.9 PULMONARY EMPHYSEMA, UNSPECIFIED EMPHYSEMA TYPE: ICD-10-CM

## 2023-06-05 DIAGNOSIS — G95.9 CERVICAL MYELOPATHY: ICD-10-CM

## 2023-06-05 DIAGNOSIS — I10 ESSENTIAL HYPERTENSION: Primary | ICD-10-CM

## 2023-06-05 DIAGNOSIS — M46.92 UNSPECIFIED INFLAMMATORY SPONDYLOPATHY, CERVICAL REGION: ICD-10-CM

## 2023-06-05 LAB
ALBUMIN SERPL BCP-MCNC: 3.8 G/DL (ref 3.5–5.2)
ALP SERPL-CCNC: 79 U/L (ref 55–135)
ALT SERPL W/O P-5'-P-CCNC: 19 U/L (ref 10–44)
ANION GAP SERPL CALC-SCNC: 8 MMOL/L (ref 8–16)
AST SERPL-CCNC: 21 U/L (ref 10–40)
BILIRUB SERPL-MCNC: 0.4 MG/DL (ref 0.1–1)
BUN SERPL-MCNC: 10 MG/DL (ref 8–23)
CALCIUM SERPL-MCNC: 9.6 MG/DL (ref 8.7–10.5)
CHLORIDE SERPL-SCNC: 101 MMOL/L (ref 95–110)
CHOLEST SERPL-MCNC: 121 MG/DL (ref 120–199)
CHOLEST/HDLC SERPL: 3 {RATIO} (ref 2–5)
CO2 SERPL-SCNC: 29 MMOL/L (ref 23–29)
CREAT SERPL-MCNC: 0.8 MG/DL (ref 0.5–1.4)
EST. GFR  (NO RACE VARIABLE): >60 ML/MIN/1.73 M^2
ESTIMATED AVG GLUCOSE: 128 MG/DL (ref 68–131)
GLUCOSE SERPL-MCNC: 100 MG/DL (ref 70–110)
HBA1C MFR BLD: 6.1 % (ref 4–5.6)
HDLC SERPL-MCNC: 41 MG/DL (ref 40–75)
HDLC SERPL: 33.9 % (ref 20–50)
LDLC SERPL CALC-MCNC: 65.6 MG/DL (ref 63–159)
NONHDLC SERPL-MCNC: 80 MG/DL
POTASSIUM SERPL-SCNC: 4.5 MMOL/L (ref 3.5–5.1)
PROT SERPL-MCNC: 7.4 G/DL (ref 6–8.4)
SODIUM SERPL-SCNC: 138 MMOL/L (ref 136–145)
TRIGL SERPL-MCNC: 72 MG/DL (ref 30–150)

## 2023-06-05 PROCEDURE — 83036 HEMOGLOBIN GLYCOSYLATED A1C: CPT | Mod: HCNC | Performed by: FAMILY MEDICINE

## 2023-06-05 PROCEDURE — 1159F PR MEDICATION LIST DOCUMENTED IN MEDICAL RECORD: ICD-10-PCS | Mod: HCNC,CPTII,S$GLB, | Performed by: FAMILY MEDICINE

## 2023-06-05 PROCEDURE — 3079F DIAST BP 80-89 MM HG: CPT | Mod: HCNC,CPTII,S$GLB, | Performed by: FAMILY MEDICINE

## 2023-06-05 PROCEDURE — 1101F PT FALLS ASSESS-DOCD LE1/YR: CPT | Mod: HCNC,CPTII,S$GLB, | Performed by: FAMILY MEDICINE

## 2023-06-05 PROCEDURE — 1125F PR PAIN SEVERITY QUANTIFIED, PAIN PRESENT: ICD-10-PCS | Mod: HCNC,CPTII,S$GLB, | Performed by: FAMILY MEDICINE

## 2023-06-05 PROCEDURE — 99999 PR PBB SHADOW E&M-EST. PATIENT-LVL IV: CPT | Mod: PBBFAC,HCNC,, | Performed by: FAMILY MEDICINE

## 2023-06-05 PROCEDURE — 3288F PR FALLS RISK ASSESSMENT DOCUMENTED: ICD-10-PCS | Mod: HCNC,CPTII,S$GLB, | Performed by: FAMILY MEDICINE

## 2023-06-05 PROCEDURE — 3079F PR MOST RECENT DIASTOLIC BLOOD PRESSURE 80-89 MM HG: ICD-10-PCS | Mod: HCNC,CPTII,S$GLB, | Performed by: FAMILY MEDICINE

## 2023-06-05 PROCEDURE — 1125F AMNT PAIN NOTED PAIN PRSNT: CPT | Mod: HCNC,CPTII,S$GLB, | Performed by: FAMILY MEDICINE

## 2023-06-05 PROCEDURE — 3075F SYST BP GE 130 - 139MM HG: CPT | Mod: HCNC,CPTII,S$GLB, | Performed by: FAMILY MEDICINE

## 2023-06-05 PROCEDURE — 4010F PR ACE/ARB THEARPY RXD/TAKEN: ICD-10-PCS | Mod: HCNC,CPTII,S$GLB, | Performed by: FAMILY MEDICINE

## 2023-06-05 PROCEDURE — 1160F RVW MEDS BY RX/DR IN RCRD: CPT | Mod: HCNC,CPTII,S$GLB, | Performed by: FAMILY MEDICINE

## 2023-06-05 PROCEDURE — 3072F PR LOW RISK FOR RETINOPATHY: ICD-10-PCS | Mod: HCNC,CPTII,S$GLB, | Performed by: FAMILY MEDICINE

## 2023-06-05 PROCEDURE — 99999 PR PBB SHADOW E&M-EST. PATIENT-LVL IV: ICD-10-PCS | Mod: PBBFAC,HCNC,, | Performed by: FAMILY MEDICINE

## 2023-06-05 PROCEDURE — 3072F LOW RISK FOR RETINOPATHY: CPT | Mod: HCNC,CPTII,S$GLB, | Performed by: FAMILY MEDICINE

## 2023-06-05 PROCEDURE — 1160F PR REVIEW ALL MEDS BY PRESCRIBER/CLIN PHARMACIST DOCUMENTED: ICD-10-PCS | Mod: HCNC,CPTII,S$GLB, | Performed by: FAMILY MEDICINE

## 2023-06-05 PROCEDURE — 1159F MED LIST DOCD IN RCRD: CPT | Mod: HCNC,CPTII,S$GLB, | Performed by: FAMILY MEDICINE

## 2023-06-05 PROCEDURE — 99215 OFFICE O/P EST HI 40 MIN: CPT | Mod: HCNC,S$GLB,, | Performed by: FAMILY MEDICINE

## 2023-06-05 PROCEDURE — 4010F ACE/ARB THERAPY RXD/TAKEN: CPT | Mod: HCNC,CPTII,S$GLB, | Performed by: FAMILY MEDICINE

## 2023-06-05 PROCEDURE — 80053 COMPREHEN METABOLIC PANEL: CPT | Mod: HCNC | Performed by: FAMILY MEDICINE

## 2023-06-05 PROCEDURE — 36415 COLL VENOUS BLD VENIPUNCTURE: CPT | Mod: HCNC,PO | Performed by: FAMILY MEDICINE

## 2023-06-05 PROCEDURE — 99215 PR OFFICE/OUTPT VISIT, EST, LEVL V, 40-54 MIN: ICD-10-PCS | Mod: HCNC,S$GLB,, | Performed by: FAMILY MEDICINE

## 2023-06-05 PROCEDURE — 1101F PR PT FALLS ASSESS DOC 0-1 FALLS W/OUT INJ PAST YR: ICD-10-PCS | Mod: HCNC,CPTII,S$GLB, | Performed by: FAMILY MEDICINE

## 2023-06-05 PROCEDURE — 3288F FALL RISK ASSESSMENT DOCD: CPT | Mod: HCNC,CPTII,S$GLB, | Performed by: FAMILY MEDICINE

## 2023-06-05 PROCEDURE — 3075F PR MOST RECENT SYSTOLIC BLOOD PRESS GE 130-139MM HG: ICD-10-PCS | Mod: HCNC,CPTII,S$GLB, | Performed by: FAMILY MEDICINE

## 2023-06-05 PROCEDURE — 80061 LIPID PANEL: CPT | Mod: HCNC | Performed by: FAMILY MEDICINE

## 2023-06-05 RX ORDER — MUPIROCIN 20 MG/G
OINTMENT TOPICAL 3 TIMES DAILY
Qty: 30 G | Refills: 0 | Status: SHIPPED | OUTPATIENT
Start: 2023-06-05 | End: 2024-02-26

## 2023-06-05 RX ORDER — ECONAZOLE NITRATE 10 MG/G
CREAM TOPICAL DAILY
Qty: 85 G | Refills: 0 | Status: SHIPPED | OUTPATIENT
Start: 2023-06-05 | End: 2023-06-12 | Stop reason: SDUPTHER

## 2023-06-05 NOTE — PROGRESS NOTES
Subjective     Patient ID: Xander Snachez is a 73 y.o. male.    Chief Complaint: Follow-up and Hypertension    73 years old male came to the clinic for blood pressure check.  Blood pressure today was stable.  Patient with good with his cholesterol regimen.  He reports episodic neck pain associated with arthritis and myelopathy.  He was previously diagnosed with aortic atherosclerosis.  No flare ups of COPD.  Patient did not want additional screening for lung cancer.  He is due for his colonoscopy.  Patient with rash over the dorsal area of the right foot.  He reports significant improvement using antibiotic cream with antifungal.    Follow-up  Associated symptoms include neck pain and a rash. Pertinent negatives include no chest pain or coughing.   Hypertension  Associated symptoms include neck pain. Pertinent negatives include no chest pain, palpitations or shortness of breath.   Review of Systems   Constitutional: Negative.    HENT: Negative.     Eyes: Negative.    Respiratory: Negative.  Negative for cough and shortness of breath.    Cardiovascular: Negative.  Negative for chest pain, palpitations, leg swelling and claudication.   Gastrointestinal: Negative.    Genitourinary: Negative.    Musculoskeletal:  Positive for neck pain.   Integumentary:  Positive for rash. Negative for wound.   Neurological: Negative.    Psychiatric/Behavioral: Negative.          Objective     Physical Exam  Vitals and nursing note reviewed.   Constitutional:       General: He is not in acute distress.     Appearance: He is well-developed. He is not diaphoretic.   HENT:      Head: Normocephalic and atraumatic.      Right Ear: External ear normal.      Left Ear: External ear normal.      Nose: Nose normal.      Mouth/Throat:      Pharynx: No oropharyngeal exudate.   Eyes:      General: No scleral icterus.        Right eye: No discharge.         Left eye: No discharge.      Conjunctiva/sclera: Conjunctivae normal.      Pupils: Pupils are  equal, round, and reactive to light.   Neck:      Thyroid: No thyromegaly.      Vascular: No JVD.      Trachea: No tracheal deviation.   Cardiovascular:      Rate and Rhythm: Normal rate and regular rhythm.      Heart sounds: Normal heart sounds. No murmur heard.    No friction rub. No gallop.   Pulmonary:      Effort: Pulmonary effort is normal. No respiratory distress.      Breath sounds: Normal breath sounds. No stridor. No wheezing or rales.   Chest:      Chest wall: No tenderness.   Abdominal:      General: Bowel sounds are normal. There is no distension.      Palpations: Abdomen is soft. There is no mass.      Tenderness: There is no abdominal tenderness. There is no guarding or rebound.   Musculoskeletal:         General: No tenderness. Normal range of motion.      Cervical back: Normal range of motion and neck supple.   Lymphadenopathy:      Cervical: No cervical adenopathy.   Skin:     General: Skin is warm and dry.      Coloration: Skin is not pale.      Findings: Rash (Right foot.) present. No erythema. Rash is papular.   Neurological:      Mental Status: He is alert and oriented to person, place, and time.      Cranial Nerves: No cranial nerve deficit.      Motor: No abnormal muscle tone.      Coordination: Coordination normal.      Deep Tendon Reflexes: Reflexes are normal and symmetric. Reflexes normal.   Psychiatric:         Behavior: Behavior normal.         Thought Content: Thought content normal.         Judgment: Judgment normal.          Assessment and Plan     1. Essential hypertension  Overview:  Overview:   dx update    Orders:  -     Lipid Panel; Future; Expected date: 06/05/2023  -     Comprehensive Metabolic Panel; Future; Expected date: 06/05/2023    2. Mixed hyperlipidemia  -     Lipid Panel; Future; Expected date: 06/05/2023  -     Comprehensive Metabolic Panel; Future; Expected date: 06/05/2023    3. Unspecified inflammatory spondylopathy, cervical region    4. Cervical myelopathy    5.  Aortic atherosclerosis  Overview:  Seen on chest x-ray dated 03/27/11    Orders:  -     Lipid Panel; Future; Expected date: 06/05/2023    6. Pulmonary emphysema, unspecified emphysema type    7. 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thy, without long-term current use of insulin  -     Microalbumin/Creatinine Ratio, Urine; Future; Expected date: 06/05/2023  -     Lipid Panel; Future; Expected date: 06/05/2023  -     Hemoglobin A1C; Future; Expected date: 06/05/2023  -     Comprehensive Metabolic Panel; Future; Expected date: 06/05/2023    8. Tinea pedis of right foot  -     econazole nitrate 1 % cream; Apply topically once daily. for 10 days  Dispense: 85 g; Refill: 0  -     mupirocin (BACTROBAN) 2 % ointment; Apply topically 3 (three) times daily.  Dispense: 30 g; Refill: 0    9. Screen for colon cancer  -     Case Request Endoscopy: COLONOSCOPY        Continue monitoring blood pressure at home, low sodium diet.   Continue monitoring blood sugar at home,ADA diet.        Follow-up in 6 months.

## 2023-06-06 DIAGNOSIS — I10 ESSENTIAL HYPERTENSION: ICD-10-CM

## 2023-06-06 DIAGNOSIS — M79.89 FOOT SWELLING: ICD-10-CM

## 2023-06-06 RX ORDER — HYDROCHLOROTHIAZIDE 12.5 MG/1
CAPSULE ORAL
Qty: 90 CAPSULE | Refills: 3 | Status: SHIPPED | OUTPATIENT
Start: 2023-06-06

## 2023-06-06 NOTE — TELEPHONE ENCOUNTER
Refill Decision Note   Xander Sanchez  is requesting a refill authorization.  Brief Assessment and Rationale for Refill:  Approve     Medication Therapy Plan:         Comments:     Note composed:12:32 PM 06/06/2023             Appointments     Last Visit   6/5/2023 Williams Alvarenga MD   Next Visit   Visit date not found Williams Alvarenga MD

## 2023-06-06 NOTE — TELEPHONE ENCOUNTER
No care due was identified.  [facility] Embedded Care Due Messages. Reference number: [id number].   6/06/2023 10:10:33 AM CDT

## 2023-06-09 ENCOUNTER — CLINICAL SUPPORT (OUTPATIENT)
Dept: REHABILITATION | Facility: HOSPITAL | Age: 73
End: 2023-06-09
Payer: MEDICARE

## 2023-06-09 DIAGNOSIS — R29.898 DECREASED ROM OF NECK: ICD-10-CM

## 2023-06-09 DIAGNOSIS — G89.29 CHRONIC NECK PAIN: ICD-10-CM

## 2023-06-09 DIAGNOSIS — M54.2 CHRONIC NECK PAIN: ICD-10-CM

## 2023-06-09 DIAGNOSIS — R53.1 DECREASED STRENGTH: ICD-10-CM

## 2023-06-09 PROCEDURE — 97161 PT EVAL LOW COMPLEX 20 MIN: CPT | Mod: HCNC,PN

## 2023-06-09 PROCEDURE — 97110 THERAPEUTIC EXERCISES: CPT | Mod: HCNC,PN

## 2023-06-12 ENCOUNTER — CLINICAL SUPPORT (OUTPATIENT)
Dept: REHABILITATION | Facility: HOSPITAL | Age: 73
End: 2023-06-12
Payer: MEDICARE

## 2023-06-12 DIAGNOSIS — B35.3 TINEA PEDIS OF RIGHT FOOT: ICD-10-CM

## 2023-06-12 DIAGNOSIS — R53.1 WEAKNESS: Primary | ICD-10-CM

## 2023-06-12 DIAGNOSIS — M79.601 PAIN IN RIGHT ARM: ICD-10-CM

## 2023-06-12 DIAGNOSIS — Z74.1 NEED FOR ASSISTANCE WITH PERSONAL CARE: ICD-10-CM

## 2023-06-12 DIAGNOSIS — M25.60 STIFFNESS IN JOINT: ICD-10-CM

## 2023-06-12 PROCEDURE — 97112 NEUROMUSCULAR REEDUCATION: CPT | Mod: HCNC,PN

## 2023-06-12 PROCEDURE — 97530 THERAPEUTIC ACTIVITIES: CPT | Mod: HCNC,PN

## 2023-06-12 RX ORDER — ECONAZOLE NITRATE 10 MG/G
CREAM TOPICAL DAILY
Qty: 85 G | Refills: 0 | Status: SHIPPED | OUTPATIENT
Start: 2023-06-12 | End: 2023-12-05

## 2023-06-12 NOTE — PROGRESS NOTES
[facility]   Physical Therapy Treatment Note      Name: Xander Sanchez  [facility] Number: [id number]    Therapy Diagnosis:   Encounter Diagnoses   Name Primary?    Weakness Yes    Stiffness in joint     Need for assistance with personal care     Pain in right arm      Physician: Diomedes Martin MD    Visit Date: 6/12/2023    Physician Orders: PT Eval and Treat: continuation of therapy, work on neck exercises and evaluation for his gait.   Medical Diagnosis from Referral: M54.2,G89.29 (ICD-10-CM) - Chronic neck pain  Evaluation Date: 6/9/2023  Authorization Period Expiration: 05/31/2024  Plan of Care Expiration:  7/21/23  Visit # / Visits authorized: 1/20 (+ eval)  FOTO: 1/5    PTA Visit #: 0/5     Time In: 9:33 AM  Time Out: 10:15 AM  Total Billable Time: 43 minutes    Precautions:  Standard, Fall, and History of Cervical Fusion; DM II; HTN; ICD; CRPS, asthma, COPD    Subjective     Pt reports: pain in right side of neck, weakness in right arm and leg.  He  will be  compliant with home exercise program.  Response to previous treatment: no adverse response to IE  Functional change:  none reported    Pain: 5/10  Location: right side of neck      Objective      Objective Measures updated at progress report unless specified.     Timed Up and Go: 11.4 sec   30 second sit to stand: 6 reps with bilateral upper extremity push off  Functional Gait Assessment:     *note: Measure a distance of 20 feet (~6 meters) for this assessment    1. Gait on level surface =  2   (3) Normal: less than 5.5 sec, no A.D., no imbalance, normal gait pattern, deviates <6in   (2) Mild impairment: 7-5.6 sec, uses A.D., mild gait deviations, or deviates 6-10 in   (1) Moderate impairment: > 7 sec, slow speed, imbalance, deviates 10-15 in.   (0) Severe impairment: needs assist, deviates >15 in, reach/touch wall  2. Change in Gait Speed = 2   (3) Normal: smooth change w/o loss of balance or gait deviation, deviates < 6  in, significant difference between speeds   (2) Mild impairment: changes speed, but demonstrates mild gait deviations, deviates 6-10 in, OR no deviations but unable to significantly speed, OR uses A.D.   (1) Moderate impairment: minor changes to speed, OR changes speed w/ significant deviations, deviates 10-15 in, OR  Changes speed , but loses balance & recovers   (0) Severe impairment: cannot change speed, deviates >15 in, or loses balance & needs assist  3. Gait with horizontal head turns  = 3   (3) Normal: no change in gait, deviates <6 in   (2) Mild impairment: slight change in speed, deviates 6-10 in, OR uses A.D.   (1) Moderate impairment: moderate change in speed, deviates 10-15 in   (0) Severe impairment: severe disruption of gait, deviates >15in  4. Gait with vertical head turns = 3   (3) Normal: no change in gait, deviates <6 in   (2) Mild impairment: slight change in speed, deviates 6-10 in OR uses A.D.   (1) Moderate impairment: moderate change in speed, deviates 10-15 in   (0) Severe impairment: severe disruption of gait, deviates >15 in  5. Gait with pivot turns = 3   (3) Normal: performs safely in 3 sec, no LOB   (2) Mild impairment: performs in >3 sec & no LOB, OR turns safely & requires several steps to regain LOB   (1) Moderate impairment: turns slow, OR requires several small steps for balance following turn & stop   (0) Severe impairment: cannot turn safely, needs assist  6. Step over obstacle = 2   (3) Normal: steps over 2 stacked boxes w/o change in speed or LOB   (2) Mild impairment: able to step over 1 box w/o change in speed or LOB   (1) Moderate impairment: steps over 1 box but must slow down, may require VC   (0) Severe impairment: cannot perform w/o assist  7. Gait with Narrow HAYLEE = 1   (3) Normal: 10 steps no staggering   (2) Mild impairment: 7-9 steps   (1) Moderate impairment: 4-7 steps   (0) Severe impairment: < 4 steps or cannot perform w/o assist  8. Gait with eyes closed = 2   (3)  "Normal: < 7 sec, no A.D., no LOB, normal gait pattern, deviates <6 in   (2) Mild impairment: 7.1-9 sec, mild gait deviations, deviates 6-10 in   (1) Moderate impairment: > 9 sec, abnormal pattern, LOB, deviates 10-15 in   (0) Severe impairment: cannot perform w/o assist, LOB, deviates >15in  9. Ambulating Backwards = 2   (3) Normal: no A.D., no LOB, normal gait pattern, deviates <6in   (2) Mild impairment: uses A.D., slower speed, mild gait deviations, deviates 6-10 in   (1) Moderate impairment: slow speed, abnormal gait pattern, LOB, deviates 10-15 in   (0) Severe impairment: severe gait deviations or LOB, deviates >15in  10. Steps = 2   (3) Normal: alternating feet, no rail   (2) Mild Impairment: alternating feet, uses rail   (1) Moderate impairment: step-to, uses rail   (0) Severe impairment: cannot perform safely    Score 22/30     Cutoffs:   <22/30 fall risk in older adults  <18/30 fall risk in Parkinsons    MDC/MCID:  Stroke = 4.2 points (MDC)  Vestibular = 6 points (MDC)  Geriatric = 4 points (MCID)  Parkinson's = 4 points (MDC)     Treatment     Xander received the treatments listed below:      therapeutic exercises to develop strength, ROM, and flexibility for 25 minutes including:  - UBE x 6 min (3 forward/3 backwards)  - self right upper trap stretch  20" x 3  - self right SCM stretch 20" x 3  - lunge stretch at step x 10 bilaterally with 5" hold  - scap retraction with red band 2 x 10 reps  - straight arm pull down with red band 2 x 10 reps  - 6" forward step ups 2 x 10 reps with single upper extremity support  - 6" lateral step ups 2 x 10 reps with single upper extremity support      neuromuscular re-education activities to improve: Balance and Coordination for 18 minutes. The following activities were included:  - alternating toe taps on 6" step with finger tip support 30" x 3  - Functional Gait Assessment  - Timed Up and Go  - 30 sec sit to stand      Patient Education and Home Exercises   "     Education provided:   - HEP progression    Written Home Exercises Provided: yes. Exercises were reviewed and Xander was able to demonstrate them prior to the end of the session.  Xander demonstrated fair  understanding of the education provided. See EMR under Patient Instructions for exercises provided during therapy sessions    Assessment     Xander arrived for first follow up session with mild complaints of right sided neck pain but was agreeable to session.  Balance assessment completed today with performance of Functional Gait Assessment and Timed Up and Go.  Pt would benefit from skilled PT intervention to improve right lower extremity strength to normalize gait pattern and improve functional mobility.    Xander Is progressing well towards his goals.   Pt prognosis is Fair.     Pt will continue to benefit from skilled outpatient physical therapy to address the deficits listed in the problem list box on initial evaluation, provide pt/family education and to maximize pt's level of independence in the home and community environment.     Pt's spiritual, cultural and educational needs considered and pt agreeable to plan of care and goals.     Anticipated barriers to physical therapy: comorbidities, cervical myelopathy, chronicity of impairments of 3+ years     Goals:   Short Term Goals: 4 weeks  1. Pt will demo good deep neck flexor strength to improve cervical stabilization. (PROGRESSING, NOT MET)    2. Increase cervical ROM by 5 degrees in rotation in order to perform ADLs with decreased difficulty. (PROGRESSING, NOT MET)   3. Pt to tolerate HEP to improve ROM and independence with ADL's. (PROGRESSING, NOT MET)        Long Term Goals: 8 weeks  1. Pt will score 24/30 on FGA for decreased fall risk. (PROGRESSING, NOT MET)   2. Pt will score </=9'' on TUG for decreased fall risk. (PROGRESSING, NOT MET)   3. Pt will score 8 reps on 30'' STS Test without upper extremity use for increased functional strength and  improved weight shift - body mechanics.  (PROGRESSING, NOT MET)   4. Pt will report at </= 30% impaired on FOTO neck score for neck pain disability to demonstrate decrease in disability and improvement in neck pain. (PROGRESSING, NOT MET)       Plan     Plan of care Certification: 6/9/2023 to 7/21/23.    Progress as tolerated.     Philomena Wharton, PT

## 2023-06-15 PROBLEM — M54.2 CHRONIC NECK PAIN: Status: ACTIVE | Noted: 2023-06-15

## 2023-06-15 PROBLEM — G89.29 CHRONIC NECK PAIN: Status: ACTIVE | Noted: 2023-06-15

## 2023-06-15 PROBLEM — R29.898 DECREASED ROM OF NECK: Status: ACTIVE | Noted: 2023-06-15

## 2023-06-15 PROBLEM — R53.1 DECREASED STRENGTH: Status: ACTIVE | Noted: 2023-06-15

## 2023-06-16 ENCOUNTER — CLINICAL SUPPORT (OUTPATIENT)
Dept: REHABILITATION | Facility: HOSPITAL | Age: 73
End: 2023-06-16
Payer: MEDICARE

## 2023-06-16 DIAGNOSIS — R29.898 DECREASED ROM OF NECK: Primary | ICD-10-CM

## 2023-06-16 DIAGNOSIS — R53.1 DECREASED STRENGTH: ICD-10-CM

## 2023-06-16 PROCEDURE — 97110 THERAPEUTIC EXERCISES: CPT | Mod: HCNC,PN

## 2023-06-16 PROCEDURE — 97112 NEUROMUSCULAR REEDUCATION: CPT | Mod: HCNC,PN

## 2023-06-16 NOTE — PROGRESS NOTES
"  [facility]   Physical Therapy Treatment Note      Name: Xander Sanchez  Clinic Number: [id number]    Therapy Diagnosis:   Encounter Diagnoses   Name Primary?    Decreased ROM of neck Yes    Decreased strength      Physician: Diomedes Martin MD    Visit Date: 6/16/2023    Physician Orders: PT Eval and Treat: continuation of therapy, work on neck exercises and evaluation for his gait.   Medical Diagnosis from Referral: M54.2,G89.29 (ICD-10-CM) - Chronic neck pain  Evaluation Date: 6/9/2023  Authorization Period Expiration: 05/31/2024  Plan of Care Expiration:  7/21/23  Visit # / Visits authorized: 1/20 (+ eval)  FOTO: 1/5    PTA Visit #: 0/5     Time In: 8:00 AM  Time Out: 8:45 AM  Total Billable Time: 45 minutes    Precautions:  Standard, Fall, and History of Cervical Fusion; DM II; HTN; ICD; CRPS, asthma, COPD    Subjective     Pt reports: pain in right side of neck, weakness in right arm and leg.  He  will be  compliant with home exercise program.  Response to previous treatment: no adverse response to IE  Functional change:  none reported    Pain: 5/10  Location: right side of neck      Objective      Treatment     Xander received the treatments listed below:      therapeutic exercises to develop strength, ROM, and flexibility for 25 minutes including:  - NuStep with bilateral upper extremity and lower extremity, intervals, level 4.0 x 8 min  - overhead pulley 2' flexion, 2' abduction  - scap retraction with red band 2 x 10 reps  - straight arm pull down with red band 2 x 10 reps  - 6" forward step ups 2 x 10 reps with single upper extremity support  - 6" lateral step ups 2 x 10 reps with single upper extremity support  - sit to stand from chair with red med ball overhead press 2 x 10 reps    Not today:  - self right upper trap stretch  20" x 3  - self right SCM stretch 20" x 3  - lunge stretch at step x 10 bilaterally with 5" hold    neuromuscular re-education activities to " improve: Balance and Coordination for 18 minutes. The following activities were included:  - tandem walk 10' x 6  - high march 10' x 6  - backwards walking 10' x 6      Patient Education and Home Exercises       Education provided:   - HEP progression    Written Home Exercises Provided: yes. Exercises were reviewed and Xander was able to demonstrate them prior to the end of the session.  Xander demonstrated fair  understanding of the education provided. See EMR under Patient Instructions for exercises provided during therapy sessions    Assessment     Xander arrived for follow up session with mild complaints of right sided neck pain but was agreeable to session.  Appropriate levels of fatigue achieved throughout visit and no seated rest breaks required during session. Pt was able to complete all exercises as noted without any increase in symptoms. Skilled PT continues to be appropriate. He would benefit from continued PT services to achieve functional goals set at evaluation to normalize gait pattern and improve functional mobility.    Xander Is progressing well towards his goals.   Pt prognosis is Fair.     Pt will continue to benefit from skilled outpatient physical therapy to address the deficits listed in the problem list box on initial evaluation, provide pt/family education and to maximize pt's level of independence in the home and community environment.     Pt's spiritual, cultural and educational needs considered and pt agreeable to plan of care and goals.     Anticipated barriers to physical therapy: comorbidities, cervical myelopathy, chronicity of impairments of 3+ years     Goals:   Short Term Goals: 4 weeks  1. Pt will demo good deep neck flexor strength to improve cervical stabilization. (PROGRESSING, NOT MET)    2. Increase cervical ROM by 5 degrees in rotation in order to perform ADLs with decreased difficulty. (PROGRESSING, NOT MET)   3. Pt to tolerate HEP to improve ROM and independence with  ADL's. (PROGRESSING, NOT MET)        Long Term Goals: 8 weeks  1. Pt will score 24/30 on FGA for decreased fall risk. (PROGRESSING, NOT MET)   2. Pt will score </=9'' on TUG for decreased fall risk. (PROGRESSING, NOT MET)   3. Pt will score 8 reps on 30'' STS Test without upper extremity use for increased functional strength and improved weight shift - body mechanics.  (PROGRESSING, NOT MET)   4. Pt will report at </= 30% impaired on FOTO neck score for neck pain disability to demonstrate decrease in disability and improvement in neck pain. (PROGRESSING, NOT MET)       Plan     Plan of care Certification: 6/9/2023 to 7/21/23.    Progress as tolerated.     Philomena Wharton, PT

## 2023-06-19 ENCOUNTER — CLINICAL SUPPORT (OUTPATIENT)
Dept: REHABILITATION | Facility: HOSPITAL | Age: 73
End: 2023-06-19
Payer: MEDICARE

## 2023-06-19 DIAGNOSIS — R29.898 DECREASED ROM OF NECK: Primary | ICD-10-CM

## 2023-06-19 DIAGNOSIS — R53.1 DECREASED STRENGTH: ICD-10-CM

## 2023-06-19 PROCEDURE — 97112 NEUROMUSCULAR REEDUCATION: CPT | Mod: HCNC,PN

## 2023-06-19 PROCEDURE — 97110 THERAPEUTIC EXERCISES: CPT | Mod: HCNC,PN

## 2023-06-19 NOTE — PROGRESS NOTES
"  [facility]   Physical Therapy Treatment Note      Name: Xander Sanchez  Clinic Number: [id number]    Therapy Diagnosis:   Encounter Diagnoses   Name Primary?    Decreased ROM of neck Yes    Decreased strength      Physician: Diomedes Martin MD    Visit Date: 6/19/2023    Physician Orders: PT Eval and Treat: continuation of therapy, work on neck exercises and evaluation for his gait.   Medical Diagnosis from Referral: M54.2,G89.29 (ICD-10-CM) - Chronic neck pain  Evaluation Date: 6/9/2023  Authorization Period Expiration: 05/31/2024  Plan of Care Expiration:  7/21/23  Visit # / Visits authorized: 2/20 (+ eval)  FOTO: 3/5    PTA Visit #: 0/5     Time In: 9:05 AM  Time Out: 10:00 AM  Total Billable Time: 30 minutes (1 NMR, 1 TE)    Precautions:  Standard, Fall, and History of Cervical Fusion; DM II; HTN; ICD; CRPS, asthma, COPD    Subjective     Pt reports: mild neck soreness and right arm feels stiff.  He  will be  compliant with home exercise program.  Response to previous treatment: no adverse response to IE  Functional change:  none reported    Pain: 5/10  Location: right side of neck      Objective      Treatment     Xander received the treatments listed below:      therapeutic exercises to develop strength, ROM, and flexibility for 40 minutes including:  - Supine chin tucks: 20x5'' holds  - Supine isometric cervical rotation: 12x5'' holds Bilateral   - NuStep with bilateral upper extremity and lower extremity, intervals, level 4.0 x 8 min  - Overhead pulley 2' flexion, 2' abduction  - Scap retraction with green band 2 x 10 reps  - straight arm pull down with red band 3 x 10 reps  - 6" forward step ups 2 x 12 reps with single upper extremity support  - 6" lateral step ups 2 x 12 reps with single upper extremity support  - sit to stand from chair with red med ball overhead press 2 x 10 reps    Not today:  - lunge stretch at step x 10 bilaterally with 5" hold    neuromuscular " re-education activities to improve: Balance and Coordination for 15 minutes. The following activities were included:  - tandem walk 10' x 6  - high march 10' x 6  - backwards walking 10' x 6    Patient Education and Home Exercises       Education provided:   - HEP progression    Written Home Exercises Provided: yes. Exercises were reviewed and Xander was able to demonstrate them prior to the end of the session.  Xander demonstrated fair  understanding of the education provided. See EMR under Patient Instructions for exercises provided during therapy sessions    Assessment     Xander arrived for follow up session with mild complaints of right sided neck pain but was agreeable to session.  Added on gentle cervical strengthening with mild neck discomfort that was brief. RLE weakness and gait deficits visible during stepping activities. Moderate cueing throughout session for increased right knee flexion in swing phase.     Xander Is progressing well towards his goals.   Pt prognosis is Fair.     Pt will continue to benefit from skilled outpatient physical therapy to address the deficits listed in the problem list box on initial evaluation, provide pt/family education and to maximize pt's level of independence in the home and community environment.     Pt's spiritual, cultural and educational needs considered and pt agreeable to plan of care and goals.     Anticipated barriers to physical therapy: comorbidities, cervical myelopathy, chronicity of impairments of 3+ years     Goals:   Short Term Goals: 4 weeks  1. Pt will demo good deep neck flexor strength to improve cervical stabilization. (PROGRESSING, NOT MET)    2. Increase cervical ROM by 5 degrees in rotation in order to perform ADLs with decreased difficulty. (PROGRESSING, NOT MET)   3. Pt to tolerate HEP to improve ROM and independence with ADL's. (PROGRESSING, NOT MET)        Long Term Goals: 8 weeks  1. Pt will score 24/30 on FGA for decreased fall risk.  (PROGRESSING, NOT MET)   2. Pt will score </=9'' on TUG for decreased fall risk. (PROGRESSING, NOT MET)   3. Pt will score 8 reps on 30'' STS Test without upper extremity use for increased functional strength and improved weight shift - body mechanics.  (PROGRESSING, NOT MET)   4. Pt will report at </= 30% impaired on FOTO neck score for neck pain disability to demonstrate decrease in disability and improvement in neck pain. (PROGRESSING, NOT MET)       Plan     Plan of care Certification: 6/9/2023 to 7/21/23.    Progress as tolerated.     Luis Walls, PT

## 2023-06-23 ENCOUNTER — CLINICAL SUPPORT (OUTPATIENT)
Dept: REHABILITATION | Facility: HOSPITAL | Age: 73
End: 2023-06-23
Payer: MEDICARE

## 2023-06-23 DIAGNOSIS — R29.898 DECREASED ROM OF NECK: Primary | ICD-10-CM

## 2023-06-23 DIAGNOSIS — R53.1 DECREASED STRENGTH: ICD-10-CM

## 2023-06-23 PROCEDURE — 97112 NEUROMUSCULAR REEDUCATION: CPT | Mod: HCNC,PN

## 2023-06-23 PROCEDURE — 97110 THERAPEUTIC EXERCISES: CPT | Mod: HCNC,PN

## 2023-06-23 NOTE — PROGRESS NOTES
"    [facility]   Physical Therapy Treatment Note      Name: Xander Sanchez  Clinic Number: [id number]    Therapy Diagnosis:   Encounter Diagnoses   Name Primary?    Decreased ROM of neck Yes    Decreased strength      Physician: Diomedes Martin MD    Visit Date: 6/23/2023    Physician Orders: PT Eval and Treat: continuation of therapy, work on neck exercises and evaluation for his gait.   Medical Diagnosis from Referral: M54.2,G89.29 (ICD-10-CM) - Chronic neck pain  Evaluation Date: 6/9/2023  Authorization Period Expiration: 05/31/2024  Plan of Care Expiration:  7/21/23  Visit # / Visits authorized: 2/20 (+ eval)  FOTO: 3/5    PTA Visit #: 0/5     Time In: 8:05 AM  Time Out: 9:00 AM  Total Billable Time: 30 minutes (1 NMR, 3 TE)    Precautions:  Standard, Fall, and History of Cervical Fusion; DM II; HTN; ICD; CRPS, asthma, COPD    Subjective     Pt reports: "doing alright" no change in right sided neck and arm pain  He  will be  compliant with home exercise program.  Response to previous treatment: no adverse response to IE  Functional change:  none reported    Pain: 5/10  Location: right side of neck      Objective      Treatment     Xander received the treatments listed below:      therapeutic exercises to develop strength, ROM, and flexibility for 40 minutes including:  - UBE x 8 min (4 forward/4 backwards) level 3.0   - Supine chin tucks: 20x5'' holds  - Supine isometric cervical rotation: 12x5'' holds Bilateral   - supine serratus punch with 3# bar 2 x 15 reps  - Scap retraction with green band 2 x 10 reps  - straight arm pull down with red band 3 x 10 reps  - 6" forward step ups 2 x 12 reps with single upper extremity support  - 6" lateral step ups 2 x 12 reps with single upper extremity support  - lunge stretch at step x 10 bilaterally with 5" hold    Not today:  - Overhead pulley 2' flexion, 2' abduction  - sit to stand from chair with red med ball overhead press 2 x 10 " "reps    neuromuscular re-education activities to improve: Balance and Coordination for 15 minutes. The following activities were included:  - tandem walk 10' x 6  - backwards walking 10' x 6  - tandem stance with upper extremity support as needed 20" x 2 bilaterally  - single leg stance with finger tip support 20" x 3 bilaterally    Patient Education and Home Exercises       Education provided:   - HEP progression    Written Home Exercises Provided: yes. Exercises were reviewed and Xander was able to demonstrate them prior to the end of the session.  Xander demonstrated fair  understanding of the education provided. See EMR under Patient Instructions for exercises provided during therapy sessions    Assessment     Xander arrived for follow up session with mild complaints of right sided neck pain but was agreeable to session.  Pt was able to perform all exercises without increase in pain today.  Pt challenged in tandem and single stance positions during balance activity today.  Skilled PT remains appropriate to address limitations.      Xander Is progressing well towards his goals.   Pt prognosis is Fair.     Pt will continue to benefit from skilled outpatient physical therapy to address the deficits listed in the problem list box on initial evaluation, provide pt/family education and to maximize pt's level of independence in the home and community environment.     Pt's spiritual, cultural and educational needs considered and pt agreeable to plan of care and goals.     Anticipated barriers to physical therapy: comorbidities, cervical myelopathy, chronicity of impairments of 3+ years     Goals:   Short Term Goals: 4 weeks  1. Pt will demo good deep neck flexor strength to improve cervical stabilization. (PROGRESSING, NOT MET)    2. Increase cervical ROM by 5 degrees in rotation in order to perform ADLs with decreased difficulty. (PROGRESSING, NOT MET)   3. Pt to tolerate HEP to improve ROM and independence with " ADL's. (PROGRESSING, NOT MET)        Long Term Goals: 8 weeks  1. Pt will score 24/30 on FGA for decreased fall risk. (PROGRESSING, NOT MET)   2. Pt will score </=9'' on TUG for decreased fall risk. (PROGRESSING, NOT MET)   3. Pt will score 8 reps on 30'' STS Test without upper extremity use for increased functional strength and improved weight shift - body mechanics.  (PROGRESSING, NOT MET)   4. Pt will report at </= 30% impaired on FOTO neck score for neck pain disability to demonstrate decrease in disability and improvement in neck pain. (PROGRESSING, NOT MET)       Plan     Plan of care Certification: 6/9/2023 to 7/21/23.    Progress as tolerated.     Philomena Wharton, PT

## 2023-06-26 ENCOUNTER — CLINICAL SUPPORT (OUTPATIENT)
Dept: REHABILITATION | Facility: HOSPITAL | Age: 73
End: 2023-06-26
Payer: MEDICARE

## 2023-06-26 DIAGNOSIS — M48.02 SPINAL STENOSIS IN CERVICAL REGION: ICD-10-CM

## 2023-06-26 DIAGNOSIS — M50.00 HNP (HERNIATED NUCLEUS PULPOSUS) WITH MYELOPATHY, CERVICAL: ICD-10-CM

## 2023-06-26 DIAGNOSIS — M47.812 FACET ARTHRITIS OF CERVICAL REGION: ICD-10-CM

## 2023-06-26 DIAGNOSIS — G90.50 COMPLEX REGIONAL PAIN SYNDROME TYPE 1, AFFECTING UNSPECIFIED SITE: ICD-10-CM

## 2023-06-26 DIAGNOSIS — R29.898 RIGHT ARM WEAKNESS: ICD-10-CM

## 2023-06-26 DIAGNOSIS — M50.30 DEGENERATION OF CERVICAL INTERVERTEBRAL DISC: ICD-10-CM

## 2023-06-26 DIAGNOSIS — R53.1 DECREASED STRENGTH: ICD-10-CM

## 2023-06-26 DIAGNOSIS — M79.601 RIGHT ARM PAIN: ICD-10-CM

## 2023-06-26 DIAGNOSIS — M62.81 MUSCLE RIGHT ARM WEAKNESS: ICD-10-CM

## 2023-06-26 DIAGNOSIS — G89.4 CHRONIC PAIN SYNDROME: ICD-10-CM

## 2023-06-26 DIAGNOSIS — F11.20 NARCOTIC DEPENDENCY, CONTINUOUS: ICD-10-CM

## 2023-06-26 DIAGNOSIS — M54.12 BRACHIAL NEURITIS OR RADICULITIS: ICD-10-CM

## 2023-06-26 DIAGNOSIS — M75.101 ROTATOR CUFF SYNDROME OF RIGHT SHOULDER: ICD-10-CM

## 2023-06-26 DIAGNOSIS — M47.12 CERVICAL SPONDYLOSIS WITH MYELOPATHY: ICD-10-CM

## 2023-06-26 DIAGNOSIS — M54.12 CERVICAL RADICULAR PAIN: ICD-10-CM

## 2023-06-26 DIAGNOSIS — R29.898 DECREASED ROM OF NECK: Primary | ICD-10-CM

## 2023-06-26 DIAGNOSIS — G56.41 COMPLEX REGIONAL PAIN SYNDROME TYPE 2 OF RIGHT UPPER EXTREMITY: ICD-10-CM

## 2023-06-26 DIAGNOSIS — M96.1 CERVICAL POST-LAMINECTOMY SYNDROME: ICD-10-CM

## 2023-06-26 DIAGNOSIS — M54.12 CERVICAL RADICULOPATHY: ICD-10-CM

## 2023-06-26 DIAGNOSIS — R29.898 RIGHT HAND WEAKNESS: ICD-10-CM

## 2023-06-26 DIAGNOSIS — M48.02 CERVICAL STENOSIS OF SPINE: ICD-10-CM

## 2023-06-26 PROCEDURE — 97110 THERAPEUTIC EXERCISES: CPT | Mod: HCNC,PN

## 2023-06-26 PROCEDURE — 97112 NEUROMUSCULAR REEDUCATION: CPT | Mod: HCNC,PN

## 2023-06-26 RX ORDER — HYDROCODONE BITARTRATE AND ACETAMINOPHEN 10; 325 MG/1; MG/1
1 TABLET ORAL EVERY 6 HOURS PRN
Qty: 120 TABLET | Refills: 0 | Status: SHIPPED | OUTPATIENT
Start: 2023-06-29 | End: 2023-07-31 | Stop reason: SDUPTHER

## 2023-06-26 NOTE — PROGRESS NOTES
"    [facility]   Physical Therapy Treatment Note      Name: Xander Sanchez  [facility] Number: [id number]    Therapy Diagnosis:   Encounter Diagnoses   Name Primary?    Decreased ROM of neck Yes    Decreased strength      Physician: Diomedes Martin MD    Visit Date: 6/26/2023    Physician Orders: PT Eval and Treat: continuation of therapy, work on neck exercises and evaluation for his gait.   Medical Diagnosis from Referral: M54.2,G89.29 (ICD-10-CM) - Chronic neck pain  Evaluation Date: 6/9/2023  Authorization Period Expiration: 05/31/2024  Plan of Care Expiration:  7/21/23  Visit # / Visits authorized: 5/20 (+ eval)  FOTO: next    PTA Visit #: 0/5     Time In: 8:45 AM  Time Out: 9:30 AM  Total Billable Time: 45 minutes (1NMR, 2TE)    Precautions:  Standard, Fall, and History of Cervical Fusion; DM II; HTN; ICD; CRPS, asthma, COPD    Subjective     Pt reports: Feels like balance is improving to some extent since re-initiating PT. Moderate neck/shoulder pain today.  He  will be  compliant with home exercise program.  Response to previous treatment: no adverse response to IE  Functional change:  none reported    Pain: 5/10  Location: right side of neck and right shoulder    Objective      Treatment     Xander received the treatments listed below:      therapeutic exercises to develop strength, ROM, and flexibility for 30 minutes including:  - UBE x 8 min (4 forward/4 backwards) level 4  - Supine chin tucks: x5'' holds; 2 minutes total  - Supine isometric cervical rotation: 5x5'' holds Bilateral   - supine serratus punch with 3# bar 2 x 15 reps  - Scap retraction with green band 2 x 15 reps  - straight arm pull down with red band 2 x 15 reps    NOT TODAY  - 6" forward step ups 2 x 12 reps with single upper extremity support  - 6" lateral step ups 2 x 12 reps with single upper extremity support  - lunge stretch at step x 10 bilaterally with 5" hold    Not today:  - Overhead pulley 2' flexion, " "2' abduction  - sit to stand from chair with red med ball overhead press 2 x 10 reps    neuromuscular re-education activities to improve: Balance and Coordination for 15 minutes. The following activities were included:  - tandem walk 10' x 6  - backwards walking 10' x 6  - tandem stance with upper extremity support as needed 30" x 2 bilaterally  - single leg stance with finger tip support 20" x 3 bilaterally  - heel walks 10' x 4    Patient Education and Home Exercises       Education provided:   - HEP progression    Written Home Exercises Provided: yes. Exercises were reviewed and Xander was able to demonstrate them prior to the end of the session.  Xander demonstrated fair  understanding of the education provided. See EMR under Patient Instructions for exercises provided during therapy sessions    Assessment     Xander arrived to PT session without any new complaints and agreeable to PT. No loss of balance noted throughout. Better conceptualization of postural exercise today, but still with occasional need for tactile cuing to optimize exercise mechanics. He would benefit continued PT services to achieve functional goals established at initial evaluation.    Xander Is progressing well towards his goals.   Pt prognosis is Fair.     Pt will continue to benefit from skilled outpatient physical therapy to address the deficits listed in the problem list box on initial evaluation, provide pt/family education and to maximize pt's level of independence in the home and community environment.     Pt's spiritual, cultural and educational needs considered and pt agreeable to plan of care and goals.     Anticipated barriers to physical therapy: comorbidities, cervical myelopathy, chronicity of impairments of 3+ years     Goals:   Short Term Goals: 4 weeks  1. Pt will demo good deep neck flexor strength to improve cervical stabilization. (PROGRESSING, NOT MET)    2. Increase cervical ROM by 5 degrees in rotation in order to " perform ADLs with decreased difficulty. (PROGRESSING, NOT MET)   3. Pt to tolerate HEP to improve ROM and independence with ADL's. (PROGRESSING, NOT MET)        Long Term Goals: 8 weeks  1. Pt will score 24/30 on FGA for decreased fall risk. (PROGRESSING, NOT MET)   2. Pt will score </=9'' on TUG for decreased fall risk. (PROGRESSING, NOT MET)   3. Pt will score 8 reps on 30'' STS Test without upper extremity use for increased functional strength and improved weight shift - body mechanics.  (PROGRESSING, NOT MET)   4. Pt will report at </= 30% impaired on FOTO neck score for neck pain disability to demonstrate decrease in disability and improvement in neck pain. (PROGRESSING, NOT MET)       Plan     Plan of care Certification: 6/9/2023 to 7/21/23.    Progress as tolerated.     ISABEL MUKHERJEE, PT

## 2023-06-26 NOTE — TELEPHONE ENCOUNTER
----- Message from Kari Patel sent at 6/26/2023 10:40 AM CDT -----  Regarding: Pt Advice / refill  Contact: [facility] [phone]  Rx Refill/Request    Is this a Refill or New Rx:  Refill     Rx Name and Strength:  HYDROcodone-acetaminophen (NORCO)  mg per tablet      [id number] [place] [address] AT [facility] [address]  [facility] [address]  [facility] [address]  Phone: [phone] Fax: [phone]

## 2023-06-30 ENCOUNTER — CLINICAL SUPPORT (OUTPATIENT)
Dept: REHABILITATION | Facility: HOSPITAL | Age: 73
End: 2023-06-30
Payer: MEDICARE

## 2023-06-30 DIAGNOSIS — R53.1 DECREASED STRENGTH: ICD-10-CM

## 2023-06-30 DIAGNOSIS — R29.898 DECREASED ROM OF NECK: Primary | ICD-10-CM

## 2023-06-30 PROCEDURE — 97110 THERAPEUTIC EXERCISES: CPT | Mod: HCNC,PN

## 2023-06-30 PROCEDURE — 97112 NEUROMUSCULAR REEDUCATION: CPT | Mod: HCNC,PN

## 2023-06-30 NOTE — PROGRESS NOTES
"      [facility]   Physical Therapy Treatment Note      Name: Xander Sanchez  Clinic Number: [id number]    Therapy Diagnosis:   Encounter Diagnoses   Name Primary?    Decreased ROM of neck Yes    Decreased strength      Physician: Diomedes Martin MD    Visit Date: 6/30/2023    Physician Orders: PT Eval and Treat: continuation of therapy, work on neck exercises and evaluation for his gait.   Medical Diagnosis from Referral: M54.2,G89.29 (ICD-10-CM) - Chronic neck pain  Evaluation Date: 6/9/2023  Authorization Period Expiration: 05/31/2024  Plan of Care Expiration:  7/21/23  Visit # / Visits authorized: 6/20 (+ eval)  FOTO: next    PTA Visit #: 0/5     Time In: 8:00 AM  Time Out: 8:45 AM  Total Billable Time: 45 minutes (1NMR, 2TE)    Precautions:  Standard, Fall, and History of Cervical Fusion; DM II; HTN; ICD; CRPS, asthma, COPD    Subjective     Pt reports: Feels like balance is improving to some extent since re-initiating PT. Moderate neck/shoulder pain today.  He  will be  compliant with home exercise program.  Response to previous treatment: no adverse response to IE  Functional change:  none reported    Pain: 4/10  Location: right side of neck and right shoulder    Objective      Treatment     Xander received the treatments listed below:      therapeutic exercises to develop strength, ROM, and flexibility for 30 minutes including:  - NuStep x 8 min   - Supine chin tucks: x5'' holds; 2 minutes total  - Supine isometric cervical rotation: 5x5'' holds Bilateral   - supine serratus punch with 3# bar 2 x 15 reps  - supine shoulder flexion with 3# bar 2 x 15 reps  - Scap retraction with green band 2 x 15 reps  - straight arm pull down with red band 2 x 15 reps    NOT TODAY  - UBE x 8 min (4 forward/4 backwards) level 4  - 6" forward step ups 2 x 12 reps with single upper extremity support  - 6" lateral step ups 2 x 12 reps with single upper extremity support  - lunge stretch at step x " "10 bilaterally with 5" hold    Not today:  - Overhead pulley 2' flexion, 2' abduction  - sit to stand from chair with red med ball overhead press 2 x 10 reps    neuromuscular re-education activities to improve: Balance and Coordination for 15 minutes. The following activities were included:  - tandem walk 10' x 6  - backwards walking 10' x 6  - tandem stance with red med ball chest press 2 x 10 reps bilaterally   - single leg stance with finger tip support 20" x 3 bilaterally  - heel walks 10' x 4    Patient Education and Home Exercises       Education provided:   - HEP progression    Written Home Exercises Provided: yes. Exercises were reviewed and Xander was able to demonstrate them prior to the end of the session.  Xander demonstrated fair  understanding of the education provided. See EMR under Patient Instructions for exercises provided during therapy sessions    Assessment     Xander arrived to PT session without any new complaints and agreeable to PT. Pt tolerated session well, was able to recall proper mechanics of supine exercises with minimal cuing this visit.  No loss of balance noted during balance activity.  He would benefit continued PT services to achieve functional goals established at initial evaluation.    Xander Is progressing well towards his goals.   Pt prognosis is Fair.     Pt will continue to benefit from skilled outpatient physical therapy to address the deficits listed in the problem list box on initial evaluation, provide pt/family education and to maximize pt's level of independence in the home and community environment.     Pt's spiritual, cultural and educational needs considered and pt agreeable to plan of care and goals.     Anticipated barriers to physical therapy: comorbidities, cervical myelopathy, chronicity of impairments of 3+ years     Goals:   Short Term Goals: 4 weeks  1. Pt will demo good deep neck flexor strength to improve cervical stabilization. (PROGRESSING, NOT MET)  "   2. Increase cervical ROM by 5 degrees in rotation in order to perform ADLs with decreased difficulty. (PROGRESSING, NOT MET)   3. Pt to tolerate HEP to improve ROM and independence with ADL's. (PROGRESSING, NOT MET)        Long Term Goals: 8 weeks  1. Pt will score 24/30 on FGA for decreased fall risk. (PROGRESSING, NOT MET)   2. Pt will score </=9'' on TUG for decreased fall risk. (PROGRESSING, NOT MET)   3. Pt will score 8 reps on 30'' STS Test without upper extremity use for increased functional strength and improved weight shift - body mechanics.  (PROGRESSING, NOT MET)   4. Pt will report at </= 30% impaired on FOTO neck score for neck pain disability to demonstrate decrease in disability and improvement in neck pain. (PROGRESSING, NOT MET)       Plan     Plan of care Certification: 6/9/2023 to 7/21/23.    Progress as tolerated.     Philomena Wharton, PT

## 2023-07-07 ENCOUNTER — CLINICAL SUPPORT (OUTPATIENT)
Dept: REHABILITATION | Facility: HOSPITAL | Age: 73
End: 2023-07-07
Payer: MEDICARE

## 2023-07-07 DIAGNOSIS — R53.1 DECREASED STRENGTH: ICD-10-CM

## 2023-07-07 DIAGNOSIS — R29.898 DECREASED ROM OF NECK: Primary | ICD-10-CM

## 2023-07-07 PROCEDURE — 97110 THERAPEUTIC EXERCISES: CPT | Mod: HCNC,PN

## 2023-07-07 PROCEDURE — 97112 NEUROMUSCULAR REEDUCATION: CPT | Mod: HCNC,PN

## 2023-07-07 RX ORDER — BACLOFEN 10 MG/1
TABLET ORAL
Qty: 270 TABLET | Refills: 0 | Status: SHIPPED | OUTPATIENT
Start: 2023-07-07 | End: 2023-12-04

## 2023-07-07 NOTE — PROGRESS NOTES
"  [facility]   Physical Therapy Treatment Note      Name: Xander Sanchez  Clinic Number: [id number]    Therapy Diagnosis:   Encounter Diagnoses   Name Primary?    Decreased ROM of neck Yes    Decreased strength      Physician: Diomedes Martin MD    Visit Date: 7/7/2023    Physician Orders: PT Mukund and Treat: continuation of therapy, work on neck exercises and evaluation for his gait.   Medical Diagnosis from Referral: M54.2,G89.29 (ICD-10-CM) - Chronic neck pain  Evaluation Date: 6/9/2023  Authorization Period Expiration: 05/31/2024  Plan of Care Expiration:  7/21/23  Visit # / Visits authorized: 7/20 (+ eval)  FOTO: second survey completed 7/7/2023    PTA Visit #: 0/5     Time In: 8:00 AM  Time Out: 8:45 AM  Total Billable Time: 45 minutes (1NMR, 2TE)    Precautions:  Standard, Fall, and History of Cervical Fusion; DM II; HTN; ICD; CRPS, asthma, COPD    Subjective     Patient reports: no neck or shoulder pain today; no new falls noted.  He  will be  compliant with home exercise program.  Response to previous treatment: no adverse response  Functional change: ongoing improvements in neck pain and balance    Pain: 0/10  Location: right side of neck and right shoulder    Objective      Neck Disability Index: 10%    Treatment     Xander received the treatments listed below:      therapeutic exercises to develop strength, ROM, and flexibility for 35 minutes including:  - supine shoulder flexion with 4# bar 2 x 15 reps  - supine serratus punch with 4# bar 2 x 15 reps  - supine chin tucks + lift with brief holds; 2 minutes total   - scap retraction with green band 2 x 15 reps  - straight arm pull down with red band 2 x 15 reps  - FOTO neck survey (see above)    NOT TODAY  - Supine isometric cervical rotation: 5x5'' holds Bilateral   - UBE x 8 min (4 forward/4 backwards) level 4  - 6" forward step ups 2 x 12 reps with single upper extremity support  - 6" lateral step ups 2 x 12 reps with " "single upper extremity support  - lunge stretch at step x 10 bilaterally with 5" hold  - Overhead pulley 2' flexion, 2' abduction  - sit to stand from chair with red med ball overhead press 2 x 10 reps    neuromuscular re-education activities to improve: Balance and Coordination for 10 minutes. The following activities were included:  - tandem walk 10' x 6  - tandem stance + horizontal head turns 2x30" each leg leading  - NuStep x 6 minutes for reciprocal limb coordination of all 4 extremities; level 4.5 sprints    Not today:  - backwards walking 10' x 6  - single leg stance with finger tip support 20" x 3 bilaterally  - heel walks 10' x 4    Patient Education and Home Exercises       Education provided:   - HEP progression    Written Home Exercises Provided: yes. Exercises were reviewed and Xander was able to demonstrate them prior to the end of the session.  Xander demonstrated fair  understanding of the education provided. See EMR under Patient Instructions for exercises provided during therapy sessions    Assessment     Xander arrived to PT session without any new complaints. Tolerated progressions in program without adverse response. Updated HEP accordingly. He is progressing well in terms of exercise tolerance, pain management, and self-perception of functional mobility. He would benefit from continued PT services to achieve functional goals.    Xander Is progressing well towards his goals.   Pt prognosis is Fair.     Pt will continue to benefit from skilled outpatient physical therapy to address the deficits listed in the problem list box on initial evaluation, provide pt/family education and to maximize pt's level of independence in the home and community environment.     Pt's spiritual, cultural and educational needs considered and pt agreeable to plan of care and goals.     Anticipated barriers to physical therapy: comorbidities, cervical myelopathy, chronicity of impairments of 3+ years     Goals: "   Short Term Goals: 4 weeks  1. Pt will demo good deep neck flexor strength to improve cervical stabilization. (MET)    2. Increase cervical ROM by 5 degrees in rotation in order to perform ADLs with decreased difficulty. (PROGRESSING, NOT MET)   3. Pt to tolerate HEP to improve ROM and independence with ADL's. (MET)    Long Term Goals: 8 weeks  1. Pt will score 24/30 on FGA for decreased fall risk. (PROGRESSING, NOT MET)   2. Pt will score </=9'' on TUG for decreased fall risk. (PROGRESSING, NOT MET)   3. Pt will score 8 reps on 30'' STS Test without upper extremity use for increased functional strength and improved weight shift - body mechanics.  (PROGRESSING, NOT MET)   4. Pt will report at </= 30% impaired on FOTO neck score for neck pain disability to demonstrate decrease in disability and improvement in neck pain. (PROGRESSING, NOT MET)     Plan     Plan of care Certification: 6/9/2023 to 7/21/23.    Progress as tolerated. Reassess FGA and TUG soon.    ISABEL MUKHERJEE, PT

## 2023-07-10 ENCOUNTER — CLINICAL SUPPORT (OUTPATIENT)
Dept: REHABILITATION | Facility: HOSPITAL | Age: 73
End: 2023-07-10
Payer: MEDICARE

## 2023-07-10 DIAGNOSIS — R53.1 DECREASED STRENGTH: ICD-10-CM

## 2023-07-10 DIAGNOSIS — R29.898 DECREASED ROM OF NECK: Primary | ICD-10-CM

## 2023-07-10 PROCEDURE — 97112 NEUROMUSCULAR REEDUCATION: CPT | Mod: HCNC,PN

## 2023-07-10 PROCEDURE — 97110 THERAPEUTIC EXERCISES: CPT | Mod: HCNC,PN

## 2023-07-10 NOTE — PROGRESS NOTES
"  [facility]   Physical Therapy Treatment Note      Name: Xander Sanchez  Clinic Number: [id number]    Therapy Diagnosis:   Encounter Diagnoses   Name Primary?    Decreased ROM of neck Yes    Decreased strength      Physician: Diomedes Martin MD    Visit Date: 7/10/2023    Physician Orders: PT Eval and Treat: continuation of therapy, work on neck exercises and evaluation for his gait.   Medical Diagnosis from Referral: M54.2,G89.29 (ICD-10-CM) - Chronic neck pain  Evaluation Date: 6/9/2023  Authorization Period Expiration: 05/31/2024  Plan of Care Expiration:  7/21/23  Visit # / Visits authorized: 7/20 (+ eval)  FOTO: 8/10    PTA Visit #: 0/5     Time In: 8:05 AM  Time Out: 8:45 AM  Total Billable Time: 45 minutes (1NMR, 2TE)    Precautions:  Standard, Fall, and History of Cervical Fusion C3-C7; DM II; HTN; ICD; CRPS, asthma, COPD    Subjective     Patient reports: only reports right arm burning pain that is fairly constant. Neck is feeling good today.  He  will be  compliant with home exercise program.  Response to previous treatment: no adverse response  Functional change: ongoing improvements in neck pain and balance    Pain: 0/10  Location: right side of neck and right shoulder    Objective      Neck Disability Index: 10%    Treatment     Xander received the treatments listed below:      therapeutic exercises to develop strength, ROM, and flexibility for 35 minutes including:  - supine shoulder flexion with 4# bar 2 x 15 reps  - supine serratus punch with 4# bar 2 x 15 reps  - supine chin tucks + lift with brief holds; 2 minutes total   - supine chicken wings  - prone scapular retractions: next  - scap retraction with blue band 3x10 reps  - straight arm pull down with red band 2 x 15 reps  - standing horizontal abduction: 2x8, yellow theraband     NOT TODAY  - Supine isometric cervical rotation: 5x5'' holds Bilateral   - UBE x 8 min (4 forward/4 backwards) level 4  - 6" forward " "step ups 2 x 12 reps with single upper extremity support  - 6" lateral step ups 2 x 12 reps with single upper extremity support  - lunge stretch at step x 10 bilaterally with 5" hold  - Overhead pulley 2' flexion, 2' abduction  - sit to stand from chair with red med ball overhead press 2 x 10 reps    neuromuscular re-education activities to improve: Balance and Coordination for 10 minutes. The following activities were included:  - tandem walk 10' x 6  - tandem stance + horizontal head turns 2x30" each leg leading  - NuStep x 6 minutes for reciprocal limb coordination of all 4 extremities; level 4.5 sprints - NT    Not today:  - backwards walking 10' x 6  - single leg stance with finger tip support 20" x 3 bilaterally  - heel walks 10' x 4    Patient Education and Home Exercises       Education provided:   - HEP progression    Written Home Exercises Provided: yes. Exercises were reviewed and Xander was able to demonstrate them prior to the end of the session.  Xander demonstrated fair  understanding of the education provided. See EMR under Patient Instructions for exercises provided during therapy sessions    Assessment     Xander arrived to PT session without any new complaints. Tolerated progressions in program without adverse response. Mild tone in RUE and RLE, cervical AROM acceptable per extent of cervical fusion, can progress cervical stabilization strength per tolerance, mild signs of end range bilateral shoulder impingement, and poor right scapula resting position (depressed/abducted/internal rotation) and upward rotation.     Xander Is progressing well towards his goals.   Pt prognosis is Fair.     Pt will continue to benefit from skilled outpatient physical therapy to address the deficits listed in the problem list box on initial evaluation, provide pt/family education and to maximize pt's level of independence in the home and community environment.     Pt's spiritual, cultural and educational needs " considered and pt agreeable to plan of care and goals.     Anticipated barriers to physical therapy: comorbidities, cervical myelopathy, chronicity of impairments of 3+ years     Goals:   Short Term Goals: 4 weeks  1. Pt will demo good deep neck flexor strength to improve cervical stabilization. (MET)    2. Increase cervical ROM by 5 degrees in rotation in order to perform ADLs with decreased difficulty. (PROGRESSING, NOT MET)   3. Pt to tolerate HEP to improve ROM and independence with ADL's. (MET)    Long Term Goals: 8 weeks  1. Pt will score 24/30 on FGA for decreased fall risk. (PROGRESSING, NOT MET)   2. Pt will score </=9'' on TUG for decreased fall risk. (PROGRESSING, NOT MET)   3. Pt will score 8 reps on 30'' STS Test without upper extremity use for increased functional strength and improved weight shift - body mechanics.  (PROGRESSING, NOT MET)   4. Pt will report at </= 30% impaired on FOTO neck score for neck pain disability to demonstrate decrease in disability and improvement in neck pain. (PROGRESSING, NOT MET)     Plan     Plan of care Certification: 6/9/2023 to 7/21/23.    Progress as tolerated. Reassess FGA and TUG soon.    Luis Walls, PT

## 2023-07-14 ENCOUNTER — CLINICAL SUPPORT (OUTPATIENT)
Dept: REHABILITATION | Facility: HOSPITAL | Age: 73
End: 2023-07-14
Payer: MEDICARE

## 2023-07-14 DIAGNOSIS — R29.898 DECREASED ROM OF NECK: Primary | ICD-10-CM

## 2023-07-14 DIAGNOSIS — R53.1 DECREASED STRENGTH: ICD-10-CM

## 2023-07-14 PROCEDURE — 97112 NEUROMUSCULAR REEDUCATION: CPT | Mod: HCNC,PN

## 2023-07-14 PROCEDURE — 97110 THERAPEUTIC EXERCISES: CPT | Mod: HCNC,PN

## 2023-07-14 NOTE — PROGRESS NOTES
[facility]   Physical Therapy Treatment Note      Name: Xander Sanchez  [facility] Number: [id number]    Therapy Diagnosis:   Encounter Diagnoses   Name Primary?    Decreased ROM of neck Yes    Decreased strength      Physician: Diomedes Martin MD    Visit Date: 7/14/2023    Physician Orders: PT Eval and Treat: continuation of therapy, work on neck exercises and evaluation for his gait.   Medical Diagnosis from Referral: M54.2,G89.29 (ICD-10-CM) - Chronic neck pain  Evaluation Date: 6/9/2023  Authorization Period Expiration: 05/31/2024  Plan of Care Expiration:  7/21/23  Visit # / Visits authorized: 9/20 (+ eval)  FOTO: 9/10    PTA Visit #: 0/5     Time In: 8:00 AM  Time Out: 8:45 AM  Total Billable Time: 42 minutes (1NMR, 2TE)    Precautions:  Standard, Fall, and History of Cervical Fusion C3-C7; DM II; HTN; ICD; CRPS, asthma, COPD    Subjective     Patient reports: Still without neck pain but status quo right arm burning. Feels like balance has improved to some extent.  He  will be  compliant with home exercise program.  Response to previous treatment: no adverse response  Functional change: ongoing improvements in neck pain and balance    Pain: 3/10  Location: right shoulder    Objective      Functional Gait Assessment:     1. Gait on level surface =  2   (3) Normal: less than 5.5 sec, no A.D., no imbalance, normal gait pattern, deviates <6in   (2) Mild impairment: 7-5.6 sec, uses A.D., mild gait deviations, or deviates 6-10 in   (1) Moderate impairment: > 7 sec, slow speed, imbalance, deviates 10-15 in.   (0) Severe impairment: needs assist, deviates >15 in, reach/touch wall  2. Change in Gait Speed = 3   (3) Normal: smooth change w/o loss of balance or gait deviation, deviates < 6 in, significant difference between speeds   (2) Mild impairment: changes speed, but demonstrates mild gait deviations, deviates 6-10 in, OR no deviations but unable to significantly speed, OR uses  A.D.   (1) Moderate impairment: minor changes to speed, OR changes speed w/ significant deviations, deviates 10-15 in, OR  Changes speed , but loses balance & recovers   (0) Severe impairment: cannot change speed, deviates >15 in, or loses balance & needs assist  3. Gait with horizontal head turns  = 2   (3) Normal: no change in gait, deviates <6 in   (2) Mild impairment: slight change in speed, deviates 6-10 in, OR uses A.D.   (1) Moderate impairment: moderate change in speed, deviates 10-15 in   (0) Severe impairment: severe disruption of gait, deviates >15in  4. Gait with vertical head turns = 3   (3) Normal: no change in gait, deviates <6 in   (2) Mild impairment: slight change in speed, deviates 6-10 in OR uses A.D.   (1) Moderate impairment: moderate change in speed, deviates 10-15 in   (0) Severe impairment: severe disruption of gait, deviates >15 in  5. Gait with pivot turns = 3   (3) Normal: performs safely in 3 sec, no LOB   (2) Mild impairment: performs in >3 sec & no LOB, OR turns safely & requires several steps to regain LOB   (1) Moderate impairment: turns slow, OR requires several small steps for balance following turn & stop   (0) Severe impairment: cannot turn safely, needs assist  6. Step over obstacle = 3   (3) Normal: steps over 2 stacked boxes w/o change in speed or LOB   (2) Mild impairment: able to step over 1 box w/o change in speed or LOB   (1) Moderate impairment: steps over 1 box but must slow down, may require VC   (0) Severe impairment: cannot perform w/o assist  7. Gait with Narrow HAYLEE = 2   (3) Normal: 10 steps no staggering   (2) Mild impairment: 7-9 steps   (1) Moderate impairment: 4-7 steps   (0) Severe impairment: < 4 steps or cannot perform w/o assist  8. Gait with eyes closed = 1   (3) Normal: < 7 sec, no A.D., no LOB, normal gait pattern, deviates <6 in   (2) Mild impairment: 7.1-9 sec, mild gait deviations, deviates 6-10 in   (1) Moderate impairment: > 9 sec, abnormal pattern,  "LOB, deviates 10-15 in   (0) Severe impairment: cannot perform w/o assist, LOB, deviates >15in  9. Ambulating Backwards = 3   (3) Normal: no A.D., no LOB, normal gait pattern, deviates <6in   (2) Mild impairment: uses A.D., slower speed, mild gait deviations, deviates 6-10 in   (1) Moderate impairment: slow speed, abnormal gait pattern, LOB, deviates 10-15 in   (0) Severe impairment: severe gait deviations or LOB, deviates >15in  10. Steps = 2   (3) Normal: alternating feet, no rail   (2) Mild Impairment: alternating feet, uses rail   (1) Moderate impairment: step-to, uses rail   (0) Severe impairment: cannot perform safely    Score 24/30     Cutoffs:   <22/30 fall risk in older adults  <18/30 fall risk in Parkinsons    MDC/MCID:  Stroke = 4.2 points (MDC)  Vestibular = 6 points (MDC)  Geriatric = 4 points (MCID)  Parkinson's = 4 points (MDC)     Treatment     Xander received the treatments listed below:      therapeutic exercises to develop strength, ROM, and flexibility for 32 minutes including:  - supine shoulder flexion with 4# bar 2 x 15 reps  - supine serratus punch with 4# bar 2 x 15 reps  - supine chin tucks + lift with brief holds; 2 minutes total   - prone scap retraction 2x15  - prone shoulder extension with occasional min A right upper extremity 2x10  - scap retraction with blue band 2x15 reps  - straight arm pull down with red band 2x15 reps  - standing open books x5B with 10" holds  - standing horizontal abduction: x8, yellow theraband (low tolerance to first set; second set held today)    NOT TODAY  - supine chicken wings   - Supine isometric cervical rotation: 5x5'' holds Bilateral   - UBE x 8 min (4 forward/4 backwards) level 4  - 6" forward step ups 2 x 12 reps with single upper extremity support  - 6" lateral step ups 2 x 12 reps with single upper extremity support  - lunge stretch at step x 10 bilaterally with 5" hold  - Overhead pulley 2' flexion, 2' abduction  - sit to stand from chair with " "red med ball overhead press 2 x 10 reps    neuromuscular re-education activities to improve: Balance and Coordination for 10 minutes. The following activities were included:  - Functional Gait Assessment (see above)  - Reciprocal step ups to 6" step x10B; left upper extremity touchdown support    NOT TODAY  - tandem walk 10' x 6  - tandem stance + horizontal head turns 2x30" each leg leading  - NuStep x 6 minutes for reciprocal limb coordination of all 4 extremities; level 4.5 sprints  - backwards walking 10' x 6  - single leg stance with finger tip support 20" x 3 bilaterally  - heel walks 10' x 4    Patient Education and Home Exercises       Education provided:   - HEP progression    Written Home Exercises Provided: yes. Exercises were reviewed and Xander was able to demonstrate them prior to the end of the session.  Xander demonstrated fair  understanding of the education provided. See EMR under Patient Instructions for exercises provided during therapy sessions    Assessment     Xander arrived to PT session without any new complaints. Status quo right arm "burning." Tolerated prone activity well without increase in right arm pain. Slight improvement in fall risk per 2-point increased on FGA score. Considering chronicity of his deficits and good improvement in neck symptoms/balance, he will likely be ready for discharge next week.    Xander Is progressing well towards his goals.   Pt prognosis is Fair.     Pt will continue to benefit from skilled outpatient physical therapy to address the deficits listed in the problem list box on initial evaluation, provide pt/family education and to maximize pt's level of independence in the home and community environment.     Pt's spiritual, cultural and educational needs considered and pt agreeable to plan of care and goals.     Anticipated barriers to physical therapy: comorbidities, cervical myelopathy, chronicity of impairments of 3+ years     Goals:   Short Term " Goals: 4 weeks  1. Pt will demo good deep neck flexor strength to improve cervical stabilization. (MET)    2. Increase cervical ROM by 5 degrees in rotation in order to perform ADLs with decreased difficulty. (PROGRESSING, NOT MET)   3. Pt to tolerate HEP to improve ROM and independence with ADL's. (MET)    Long Term Goals: 8 weeks  1. Pt will score 24/30 on FGA for decreased fall risk. (MET)  2. Pt will score </=9'' on TUG for decreased fall risk. (PROGRESSING, NOT MET)   3. Pt will score 8 reps on 30'' STS Test without upper extremity use for increased functional strength and improved weight shift - body mechanics.  (PROGRESSING, NOT MET)   4. Pt will report at </= 30% impaired on FOTO neck score for neck pain disability to demonstrate decrease in disability and improvement in neck pain. (PROGRESSING, NOT MET)     Plan     Plan of care Certification: 6/9/2023 to 7/21/23.    Progress as tolerated. Likely discharge next Friday.    ISABEL MUKHERJEE, PT

## 2023-07-17 ENCOUNTER — CLINICAL SUPPORT (OUTPATIENT)
Dept: REHABILITATION | Facility: HOSPITAL | Age: 73
End: 2023-07-17
Payer: MEDICARE

## 2023-07-17 DIAGNOSIS — R29.898 DECREASED ROM OF NECK: Primary | ICD-10-CM

## 2023-07-17 DIAGNOSIS — R53.1 DECREASED STRENGTH: ICD-10-CM

## 2023-07-17 PROCEDURE — 97112 NEUROMUSCULAR REEDUCATION: CPT | Mod: HCNC,PN

## 2023-07-17 PROCEDURE — 97110 THERAPEUTIC EXERCISES: CPT | Mod: HCNC,PN

## 2023-07-17 NOTE — PROGRESS NOTES
[facility]   Physical Therapy Treatment Note      Name: Xander Sanchez  [facility] Number: [id number]    Therapy Diagnosis:   Encounter Diagnoses   Name Primary?    Decreased ROM of neck Yes    Decreased strength      Physician: Diomedes Martin MD    Visit Date: 7/17/2023    Physician Orders: PT Eval and Treat: continuation of therapy, work on neck exercises and evaluation for his gait.   Medical Diagnosis from Referral: M54.2,G89.29 (ICD-10-CM) - Chronic neck pain  Evaluation Date: 6/9/2023  Authorization Period Expiration: 05/31/2024  Plan of Care Expiration:  7/21/23  Visit # / Visits authorized: 10/20 (+ eval)  FOTO: 10/10 - Next    PTA Visit #: 0/5     Time In: 8:05 AM  Time Out: 9:00 AM  Total Billable Time: 55 minutes (2NMR, 2TE)    Precautions:  Standard, Fall, and History of Cervical Fusion C3-C7; DM II; HTN; ICD; CRPS, asthma, COPD    Subjective     Patient reports: no new complaints only burning in right arm that greatly improves after taking his gabapentin and hydrocodone.  He  will be  compliant with home exercise program.  Response to previous treatment: no adverse response  Functional change: ongoing improvements in neck pain and balance    Pain: 3/10  Location: right shoulder    Objective      Functional Gait Assessment:     1. Gait on level surface =  2   (3) Normal: less than 5.5 sec, no A.D., no imbalance, normal gait pattern, deviates <6in   (2) Mild impairment: 7-5.6 sec, uses A.D., mild gait deviations, or deviates 6-10 in   (1) Moderate impairment: > 7 sec, slow speed, imbalance, deviates 10-15 in.   (0) Severe impairment: needs assist, deviates >15 in, reach/touch wall  2. Change in Gait Speed = 3   (3) Normal: smooth change w/o loss of balance or gait deviation, deviates < 6 in, significant difference between speeds   (2) Mild impairment: changes speed, but demonstrates mild gait deviations, deviates 6-10 in, OR no deviations but unable to significantly speed, OR  uses A.D.   (1) Moderate impairment: minor changes to speed, OR changes speed w/ significant deviations, deviates 10-15 in, OR  Changes speed , but loses balance & recovers   (0) Severe impairment: cannot change speed, deviates >15 in, or loses balance & needs assist  3. Gait with horizontal head turns  = 2   (3) Normal: no change in gait, deviates <6 in   (2) Mild impairment: slight change in speed, deviates 6-10 in, OR uses A.D.   (1) Moderate impairment: moderate change in speed, deviates 10-15 in   (0) Severe impairment: severe disruption of gait, deviates >15in  4. Gait with vertical head turns = 3   (3) Normal: no change in gait, deviates <6 in   (2) Mild impairment: slight change in speed, deviates 6-10 in OR uses A.D.   (1) Moderate impairment: moderate change in speed, deviates 10-15 in   (0) Severe impairment: severe disruption of gait, deviates >15 in  5. Gait with pivot turns = 3   (3) Normal: performs safely in 3 sec, no LOB   (2) Mild impairment: performs in >3 sec & no LOB, OR turns safely & requires several steps to regain LOB   (1) Moderate impairment: turns slow, OR requires several small steps for balance following turn & stop   (0) Severe impairment: cannot turn safely, needs assist  6. Step over obstacle = 3   (3) Normal: steps over 2 stacked boxes w/o change in speed or LOB   (2) Mild impairment: able to step over 1 box w/o change in speed or LOB   (1) Moderate impairment: steps over 1 box but must slow down, may require VC   (0) Severe impairment: cannot perform w/o assist  7. Gait with Narrow HAYLEE = 2   (3) Normal: 10 steps no staggering   (2) Mild impairment: 7-9 steps   (1) Moderate impairment: 4-7 steps   (0) Severe impairment: < 4 steps or cannot perform w/o assist  8. Gait with eyes closed = 1   (3) Normal: < 7 sec, no A.D., no LOB, normal gait pattern, deviates <6 in   (2) Mild impairment: 7.1-9 sec, mild gait deviations, deviates 6-10 in   (1) Moderate impairment: > 9 sec, abnormal  "pattern, LOB, deviates 10-15 in   (0) Severe impairment: cannot perform w/o assist, LOB, deviates >15in  9. Ambulating Backwards = 3   (3) Normal: no A.D., no LOB, normal gait pattern, deviates <6in   (2) Mild impairment: uses A.D., slower speed, mild gait deviations, deviates 6-10 in   (1) Moderate impairment: slow speed, abnormal gait pattern, LOB, deviates 10-15 in   (0) Severe impairment: severe gait deviations or LOB, deviates >15in  10. Steps = 2   (3) Normal: alternating feet, no rail   (2) Mild Impairment: alternating feet, uses rail   (1) Moderate impairment: step-to, uses rail   (0) Severe impairment: cannot perform safely    Score 24/30     Cutoffs:   <22/30 fall risk in older adults  <18/30 fall risk in Parkinsons    MDC/MCID:  Stroke = 4.2 points (MDC)  Vestibular = 6 points (MDC)  Geriatric = 4 points (MCID)  Parkinson's = 4 points (MDC)     Treatment     Xander received the treatments listed below:      therapeutic exercises to develop strength, ROM, and flexibility for 30 minutes including:  - UBE x 8 min (4 forward/4 backwards) level 4  - Supine isometric cervical rotation: 12x5'' holds Bilateral   - supine shoulder flexion with 4# bar 2 x 15 reps  - supine serratus punch with 5# bar 2 x 20 reps  - supine chin tucks + lift with brief holds; 2 minutes total   - prone scap retraction 2x15  - prone shoulder extension with occasional min A right upper extremity 2x10    NOT TODAY- 6" forward step ups 2 x 12 reps with single upper extremity support  - lunge stretch at step x 10 bilaterally with 5" hold  - Overhead pulley 2' flexion, 2' abduction  - sit to stand from chair with red med ball overhead press 2 x 10 reps  - scap retraction with blue band 2x15 reps    neuromuscular re-education activities to improve: Balance and Coordination for 25 minutes. The following activities were included:  - Reciprocal step ups to 6" step x20 B; left upper extremity touchdown support  - Lateral step up and overs: 2x10 " "Bilateral, 6 inch step  - tandem walk 10' x 6  - heel walks 10' x 4  - backwards walking 10' x 6    NOT TODAY  - tandem stance + horizontal head turns 2x30" each leg leading  - NuStep x 6 minutes for reciprocal limb coordination of all 4 extremities; level 4.5 sprints  - single leg stance with finger tip support 20" x 3 bilaterally    Patient Education and Home Exercises       Education provided:   - HEP progression    Written Home Exercises Provided: yes. Exercises were reviewed and Xander was able to demonstrate them prior to the end of the session.  Xander demonstrated fair  understanding of the education provided. See EMR under Patient Instructions for exercises provided during therapy sessions    Assessment     Xander arrived to PT session without any new complaints. Mild right lower arm/hand pain that is typical for before he takes his medication - did say it was a little more than usual but improved by session end. Pt nearing discharge as little right arm coordination and burning pain improvements overall; improved RLE strength and gait however. Good neck AROM and slight pain with right shoulder end range flexion present.     Xander Is progressing well towards his goals.   Pt prognosis is Fair.     Pt will continue to benefit from skilled outpatient physical therapy to address the deficits listed in the problem list box on initial evaluation, provide pt/family education and to maximize pt's level of independence in the home and community environment.     Pt's spiritual, cultural and educational needs considered and pt agreeable to plan of care and goals.     Anticipated barriers to physical therapy: comorbidities, cervical myelopathy, chronicity of impairments of 3+ years     Goals:   Short Term Goals: 4 weeks  1. Pt will demo good deep neck flexor strength to improve cervical stabilization. (MET)    2. Increase cervical ROM by 5 degrees in rotation in order to perform ADLs with decreased difficulty. " (PROGRESSING, NOT MET)   3. Pt to tolerate HEP to improve ROM and independence with ADL's. (MET)    Long Term Goals: 8 weeks  1. Pt will score 24/30 on FGA for decreased fall risk. (MET)  2. Pt will score </=9'' on TUG for decreased fall risk. (PROGRESSING, NOT MET)   3. Pt will score 8 reps on 30'' STS Test without upper extremity use for increased functional strength and improved weight shift - body mechanics.  (PROGRESSING, NOT MET)   4. Pt will report at </= 30% impaired on FOTO neck score for neck pain disability to demonstrate decrease in disability and improvement in neck pain. (PROGRESSING, NOT MET)     Plan     Plan of care Certification: 6/9/2023 to 7/21/23.    Progress as tolerated. Likely discharge next Friday.    Luis Walls, PT

## 2023-07-18 ENCOUNTER — OFFICE VISIT (OUTPATIENT)
Dept: OPTOMETRY | Facility: CLINIC | Age: 73
End: 2023-07-18
Payer: MEDICARE

## 2023-07-18 DIAGNOSIS — Z13.5 GLAUCOMA SCREENING: ICD-10-CM

## 2023-07-18 DIAGNOSIS — H52.4 PRESBYOPIA: ICD-10-CM

## 2023-07-18 DIAGNOSIS — E11.9 TYPE 2 DIABETES MELLITUS WITHOUT RETINOPATHY: Primary | ICD-10-CM

## 2023-07-18 PROCEDURE — 1126F PR PAIN SEVERITY QUANTIFIED, NO PAIN PRESENT: ICD-10-PCS | Mod: HCNC,CPTII,S$GLB, | Performed by: OPTOMETRIST

## 2023-07-18 PROCEDURE — 3066F NEPHROPATHY DOC TX: CPT | Mod: HCNC,CPTII,S$GLB, | Performed by: OPTOMETRIST

## 2023-07-18 PROCEDURE — 1160F RVW MEDS BY RX/DR IN RCRD: CPT | Mod: HCNC,CPTII,S$GLB, | Performed by: OPTOMETRIST

## 2023-07-18 PROCEDURE — 92014 COMPRE OPH EXAM EST PT 1/>: CPT | Mod: HCNC,S$GLB,, | Performed by: OPTOMETRIST

## 2023-07-18 PROCEDURE — 3044F PR MOST RECENT HEMOGLOBIN A1C LEVEL <7.0%: ICD-10-PCS | Mod: HCNC,CPTII,S$GLB, | Performed by: OPTOMETRIST

## 2023-07-18 PROCEDURE — 3044F HG A1C LEVEL LT 7.0%: CPT | Mod: HCNC,CPTII,S$GLB, | Performed by: OPTOMETRIST

## 2023-07-18 PROCEDURE — 1101F PR PT FALLS ASSESS DOC 0-1 FALLS W/OUT INJ PAST YR: ICD-10-PCS | Mod: HCNC,CPTII,S$GLB, | Performed by: OPTOMETRIST

## 2023-07-18 PROCEDURE — 1160F PR REVIEW ALL MEDS BY PRESCRIBER/CLIN PHARMACIST DOCUMENTED: ICD-10-PCS | Mod: HCNC,CPTII,S$GLB, | Performed by: OPTOMETRIST

## 2023-07-18 PROCEDURE — 3288F PR FALLS RISK ASSESSMENT DOCUMENTED: ICD-10-PCS | Mod: HCNC,CPTII,S$GLB, | Performed by: OPTOMETRIST

## 2023-07-18 PROCEDURE — 92015 PR REFRACTION: ICD-10-PCS | Mod: HCNC,S$GLB,, | Performed by: OPTOMETRIST

## 2023-07-18 PROCEDURE — 1159F MED LIST DOCD IN RCRD: CPT | Mod: HCNC,CPTII,S$GLB, | Performed by: OPTOMETRIST

## 2023-07-18 PROCEDURE — 3061F NEG MICROALBUMINURIA REV: CPT | Mod: HCNC,CPTII,S$GLB, | Performed by: OPTOMETRIST

## 2023-07-18 PROCEDURE — 3061F PR NEG MICROALBUMINURIA RESULT DOCUMENTED/REVIEW: ICD-10-PCS | Mod: HCNC,CPTII,S$GLB, | Performed by: OPTOMETRIST

## 2023-07-18 PROCEDURE — 2023F PR DILATED RETINAL EXAM W/O EVID OF RETINOPATHY: ICD-10-PCS | Mod: HCNC,CPTII,S$GLB, | Performed by: OPTOMETRIST

## 2023-07-18 PROCEDURE — 1159F PR MEDICATION LIST DOCUMENTED IN MEDICAL RECORD: ICD-10-PCS | Mod: HCNC,CPTII,S$GLB, | Performed by: OPTOMETRIST

## 2023-07-18 PROCEDURE — 2023F DILAT RTA XM W/O RTNOPTHY: CPT | Mod: HCNC,CPTII,S$GLB, | Performed by: OPTOMETRIST

## 2023-07-18 PROCEDURE — 1101F PT FALLS ASSESS-DOCD LE1/YR: CPT | Mod: HCNC,CPTII,S$GLB, | Performed by: OPTOMETRIST

## 2023-07-18 PROCEDURE — 99999 PR PBB SHADOW E&M-EST. PATIENT-LVL III: ICD-10-PCS | Mod: PBBFAC,HCNC,, | Performed by: OPTOMETRIST

## 2023-07-18 PROCEDURE — 1126F AMNT PAIN NOTED NONE PRSNT: CPT | Mod: HCNC,CPTII,S$GLB, | Performed by: OPTOMETRIST

## 2023-07-18 PROCEDURE — 3288F FALL RISK ASSESSMENT DOCD: CPT | Mod: HCNC,CPTII,S$GLB, | Performed by: OPTOMETRIST

## 2023-07-18 PROCEDURE — 92015 DETERMINE REFRACTIVE STATE: CPT | Mod: HCNC,S$GLB,, | Performed by: OPTOMETRIST

## 2023-07-18 PROCEDURE — 92014 PR EYE EXAM, EST PATIENT,COMPREHESV: ICD-10-PCS | Mod: HCNC,S$GLB,, | Performed by: OPTOMETRIST

## 2023-07-18 PROCEDURE — 99999 PR PBB SHADOW E&M-EST. PATIENT-LVL III: CPT | Mod: PBBFAC,HCNC,, | Performed by: OPTOMETRIST

## 2023-07-18 PROCEDURE — 4010F ACE/ARB THERAPY RXD/TAKEN: CPT | Mod: HCNC,CPTII,S$GLB, | Performed by: OPTOMETRIST

## 2023-07-18 PROCEDURE — 4010F PR ACE/ARB THEARPY RXD/TAKEN: ICD-10-PCS | Mod: HCNC,CPTII,S$GLB, | Performed by: OPTOMETRIST

## 2023-07-18 PROCEDURE — 3066F PR DOCUMENTATION OF TREATMENT FOR NEPHROPATHY: ICD-10-PCS | Mod: HCNC,CPTII,S$GLB, | Performed by: OPTOMETRIST

## 2023-07-18 NOTE — PROGRESS NOTES
HPI    DLE: 4/18/22    Systane BID OU   S/p phaco w/IOL OU    Pt here for diabetic eye exam.  Pt states blurry VA OU when watching TV.    Pt states he has itching OU and that has been going on since cataract   surgery .  Pt denies flashes, floaters, headaches or eye pain OU.  Pt   denies tearing or burning OU.       Hemoglobin A1C       Date                     Value               Ref Range             Status                06/05/2023               6.1 (H)             4.0 - 5.6 %           Final                         08/08/2022               6.1 (H)             4.0 - 5.6 %           Final                    02/07/2022               6.1 (H)             4.0 - 5.6 %           Final                  Last edited by Jackie Sanchez MA on 7/18/2023  9:46 AM.            Assessment /Plan     For exam results, see Encounter Report.    Type 2 diabetes mellitus without retinopathy    Glaucoma screening    Presbyopia      Sp pciol OU--pt wishes new Rx  DM- WITHOUT RETINOPATHY.  Advised yearly DFE   HANNA--pt to inc Ats to TID+    PLAN:    Rtc 1 yr

## 2023-07-19 ENCOUNTER — OFFICE VISIT (OUTPATIENT)
Dept: HOME HEALTH SERVICES | Facility: CLINIC | Age: 73
End: 2023-07-19
Payer: MEDICARE

## 2023-07-19 VITALS
SYSTOLIC BLOOD PRESSURE: 90 MMHG | OXYGEN SATURATION: 96 % | WEIGHT: 175 LBS | DIASTOLIC BLOOD PRESSURE: 60 MMHG | TEMPERATURE: 97 F | HEART RATE: 60 BPM | BODY MASS INDEX: 24.5 KG/M2 | HEIGHT: 71 IN | RESPIRATION RATE: 16 BRPM

## 2023-07-19 DIAGNOSIS — Z00.00 ENCOUNTER FOR MEDICARE ANNUAL WELLNESS EXAM: ICD-10-CM

## 2023-07-19 DIAGNOSIS — N13.8 BPH WITH URINARY OBSTRUCTION: ICD-10-CM

## 2023-07-19 DIAGNOSIS — I50.20 SYSTOLIC HEART FAILURE, UNSPECIFIED HF CHRONICITY: ICD-10-CM

## 2023-07-19 DIAGNOSIS — M46.92 UNSPECIFIED INFLAMMATORY SPONDYLOPATHY, CERVICAL REGION: ICD-10-CM

## 2023-07-19 DIAGNOSIS — F11.20 NARCOTIC DEPENDENCY, CONTINUOUS: ICD-10-CM

## 2023-07-19 DIAGNOSIS — J43.9 PULMONARY EMPHYSEMA, UNSPECIFIED EMPHYSEMA TYPE: ICD-10-CM

## 2023-07-19 DIAGNOSIS — G95.9 CERVICAL MYELOPATHY: ICD-10-CM

## 2023-07-19 DIAGNOSIS — I50.32 DIASTOLIC DYSFUNCTION WITH CHRONIC HEART FAILURE: ICD-10-CM

## 2023-07-19 DIAGNOSIS — E11.69 HYPERLIPIDEMIA ASSOCIATED WITH TYPE 2 DIABETES MELLITUS: ICD-10-CM

## 2023-07-19 DIAGNOSIS — G89.4 CHRONIC PAIN SYNDROME: ICD-10-CM

## 2023-07-19 DIAGNOSIS — E11.59 HYPERTENSION ASSOCIATED WITH DIABETES: ICD-10-CM

## 2023-07-19 DIAGNOSIS — N40.1 BPH WITH URINARY OBSTRUCTION: ICD-10-CM

## 2023-07-19 DIAGNOSIS — I70.0 AORTIC ATHEROSCLEROSIS: ICD-10-CM

## 2023-07-19 DIAGNOSIS — I42.8 CARDIOMYOPATHY, NONISCHEMIC: ICD-10-CM

## 2023-07-19 DIAGNOSIS — I77.9 RIGHT-SIDED CAROTID ARTERY DISEASE, UNSPECIFIED TYPE: ICD-10-CM

## 2023-07-19 DIAGNOSIS — K21.9 GASTROESOPHAGEAL REFLUX DISEASE, UNSPECIFIED WHETHER ESOPHAGITIS PRESENT: ICD-10-CM

## 2023-07-19 DIAGNOSIS — I48.0 PAROXYSMAL ATRIAL FIBRILLATION: ICD-10-CM

## 2023-07-19 DIAGNOSIS — E11.40 TYPE 2 DIABETES MELLITUS WITH DIABETIC NEUROPATHY, WITHOUT LONG-TERM CURRENT USE OF INSULIN: ICD-10-CM

## 2023-07-19 DIAGNOSIS — K27.9 PUD (PEPTIC ULCER DISEASE): ICD-10-CM

## 2023-07-19 DIAGNOSIS — I15.2 HYPERTENSION ASSOCIATED WITH DIABETES: ICD-10-CM

## 2023-07-19 DIAGNOSIS — I11.0 HYPERTENSIVE LEFT VENTRICULAR HYPERTROPHY WITH HEART FAILURE: ICD-10-CM

## 2023-07-19 DIAGNOSIS — E11.9 NO HISTORY OF DIABETIC RETINOPATHY IN PAST YEAR: ICD-10-CM

## 2023-07-19 DIAGNOSIS — Z00.00 ENCOUNTER FOR PREVENTIVE HEALTH EXAMINATION: Primary | ICD-10-CM

## 2023-07-19 DIAGNOSIS — E78.5 HYPERLIPIDEMIA ASSOCIATED WITH TYPE 2 DIABETES MELLITUS: ICD-10-CM

## 2023-07-19 PROCEDURE — 3078F DIAST BP <80 MM HG: CPT | Mod: CPTII,S$GLB,,

## 2023-07-19 PROCEDURE — 1170F PR FUNCTIONAL STATUS ASSESSED: ICD-10-PCS | Mod: CPTII,S$GLB,,

## 2023-07-19 PROCEDURE — 1160F PR REVIEW ALL MEDS BY PRESCRIBER/CLIN PHARMACIST DOCUMENTED: ICD-10-PCS | Mod: CPTII,S$GLB,,

## 2023-07-19 PROCEDURE — 4010F PR ACE/ARB THEARPY RXD/TAKEN: ICD-10-PCS | Mod: CPTII,S$GLB,,

## 2023-07-19 PROCEDURE — 3078F PR MOST RECENT DIASTOLIC BLOOD PRESSURE < 80 MM HG: ICD-10-PCS | Mod: CPTII,S$GLB,,

## 2023-07-19 PROCEDURE — 1170F FXNL STATUS ASSESSED: CPT | Mod: CPTII,S$GLB,,

## 2023-07-19 PROCEDURE — 3074F PR MOST RECENT SYSTOLIC BLOOD PRESSURE < 130 MM HG: ICD-10-PCS | Mod: CPTII,S$GLB,,

## 2023-07-19 PROCEDURE — 3066F PR DOCUMENTATION OF TREATMENT FOR NEPHROPATHY: ICD-10-PCS | Mod: CPTII,S$GLB,,

## 2023-07-19 PROCEDURE — 4010F ACE/ARB THERAPY RXD/TAKEN: CPT | Mod: CPTII,S$GLB,,

## 2023-07-19 PROCEDURE — 1160F RVW MEDS BY RX/DR IN RCRD: CPT | Mod: CPTII,S$GLB,,

## 2023-07-19 PROCEDURE — 3061F NEG MICROALBUMINURIA REV: CPT | Mod: CPTII,S$GLB,,

## 2023-07-19 PROCEDURE — 3072F LOW RISK FOR RETINOPATHY: CPT | Mod: CPTII,S$GLB,,

## 2023-07-19 PROCEDURE — 3066F NEPHROPATHY DOC TX: CPT | Mod: CPTII,S$GLB,,

## 2023-07-19 PROCEDURE — 3061F PR NEG MICROALBUMINURIA RESULT DOCUMENTED/REVIEW: ICD-10-PCS | Mod: CPTII,S$GLB,,

## 2023-07-19 PROCEDURE — 3288F PR FALLS RISK ASSESSMENT DOCUMENTED: ICD-10-PCS | Mod: CPTII,S$GLB,,

## 2023-07-19 PROCEDURE — 1159F MED LIST DOCD IN RCRD: CPT | Mod: CPTII,S$GLB,,

## 2023-07-19 PROCEDURE — 1126F AMNT PAIN NOTED NONE PRSNT: CPT | Mod: CPTII,S$GLB,,

## 2023-07-19 PROCEDURE — 1101F PT FALLS ASSESS-DOCD LE1/YR: CPT | Mod: CPTII,S$GLB,,

## 2023-07-19 PROCEDURE — 3074F SYST BP LT 130 MM HG: CPT | Mod: CPTII,S$GLB,,

## 2023-07-19 PROCEDURE — 1101F PR PT FALLS ASSESS DOC 0-1 FALLS W/OUT INJ PAST YR: ICD-10-PCS | Mod: CPTII,S$GLB,,

## 2023-07-19 PROCEDURE — G0439 PR MEDICARE ANNUAL WELLNESS SUBSEQUENT VISIT: ICD-10-PCS | Mod: S$GLB,,,

## 2023-07-19 PROCEDURE — 1126F PR PAIN SEVERITY QUANTIFIED, NO PAIN PRESENT: ICD-10-PCS | Mod: CPTII,S$GLB,,

## 2023-07-19 PROCEDURE — 3072F PR LOW RISK FOR RETINOPATHY: ICD-10-PCS | Mod: CPTII,S$GLB,,

## 2023-07-19 PROCEDURE — 3288F FALL RISK ASSESSMENT DOCD: CPT | Mod: CPTII,S$GLB,,

## 2023-07-19 PROCEDURE — 3044F HG A1C LEVEL LT 7.0%: CPT | Mod: CPTII,S$GLB,,

## 2023-07-19 PROCEDURE — G0439 PPPS, SUBSEQ VISIT: HCPCS | Mod: S$GLB,,,

## 2023-07-19 PROCEDURE — 1159F PR MEDICATION LIST DOCUMENTED IN MEDICAL RECORD: ICD-10-PCS | Mod: CPTII,S$GLB,,

## 2023-07-19 PROCEDURE — 3044F PR MOST RECENT HEMOGLOBIN A1C LEVEL <7.0%: ICD-10-PCS | Mod: CPTII,S$GLB,,

## 2023-07-19 NOTE — PROGRESS NOTES
"Xander Sanchez presented for a  Medicare AWV and comprehensive Health Risk Assessment today. The following components were reviewed and updated:    Medical history  Family History  Social history  Allergies and Current Medications  Health Risk Assessment  Health Maintenance  Care Team         ** See Completed Assessments for Annual Wellness Visit within the encounter summary.**         The following assessments were completed:  Living Situation  CAGE  Depression Screening  Timed Get Up and Go  Whisper Test  Cognitive Function Screening    Nutrition Screening  ADL Screening  PAQ Screening        Vitals:    07/19/23 1148   BP: 90/60   BP Location: Left arm   Patient Position: Sitting   Pulse: 60   Resp: 16   Temp: 97 °F (36.1 °C)   SpO2: 96%   Weight: 79.4 kg (175 lb)   Height: 5' 11" (1.803 m)     Body mass index is 24.41 kg/m².    Physical Exam  Vitals reviewed.   Constitutional:       General: He is not in acute distress.     Appearance: Normal appearance.   Cardiovascular:      Rate and Rhythm: Normal rate and regular rhythm.      Pulses: Normal pulses.      Heart sounds: No murmur heard.  Pulmonary:      Effort: Pulmonary effort is normal. No respiratory distress.      Breath sounds: Normal breath sounds.   Abdominal:      General: Bowel sounds are normal.   Musculoskeletal:      Right lower leg: No edema.      Left lower leg: No edema.   Neurological:      General: No focal deficit present.      Mental Status: He is alert and oriented to person, place, and time.      Gait: Gait abnormal.   Psychiatric:         Mood and Affect: Mood normal.         Behavior: Behavior normal.             Diagnoses and health risks identified today and associated recommendations/orders:    1. Encounter for preventive health examination  Assessments completed.  HM recommendations reviewed.  F/u with PCP as instructed.     Patient on chronic opioids for back pain, followed by Dr. BARBIE Martin.   Risk factors reviewed for any potential " opioid use disorder   Pain evaluated during visit.  Current treatment plan documented.  Will refer to specialist, as appropriate.      2. Encounter for Medicare annual wellness exam  - Ambulatory Referral/Consult to Enhanced Annual Wellness Visit (eAWV)    3. Pulmonary emphysema, unspecified emphysema type  Chronic, stable on current Advair regimen. Followed by PCP.     4. Aortic atherosclerosis  Chronic, stable on current statin regimen. Followed by PCP.     5. Right-sided carotid artery disease, unspecified type  Chronic, stable on current regimen. Followed by Cardiology.     6. Paroxysmal atrial fibrillation  Chronic, stable on current regimen. Followed by Cardiology.     7. Cardiomyopathy, nonischemic  Chronic, stable on current regimen. Followed by Cardiology.     8. Diastolic dysfunction with chronic heart failure  Chronic, stable on current regimen. Followed by Cardiology.     9. Hypertensive left ventricular hypertrophy with heart failure  Chronic, stable on current regimen. Followed by Cardiology.     10. Systolic heart failure, unspecified HF chronicity  Chronic, stable on current regimen. Followed by Cardiology.     11. Cervical myelopathy  Chronic, stable on current regimen. Followed by PCP.     12. Narcotic dependency, continuous  Chronic, stable on current Norco regimen. Followed by Dr. Martin.     13. Unspecified inflammatory spondylopathy, cervical region  Chronic, stable on current regimen. Followed by PCP.     14. 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thy, without long-term current use of insulin  Chronic, stable on current Gabapentin regimen. Followed by PCP.   Lab Results   Component Value Date    HGBA1C 6.1 (H) 06/05/2023     15. Hypertension associated with diabetes  Chronic, stable on current HCTZ, Metoprolol, Valsartan regimen. Followed by PCP.     16. Hyperlipidemia associated with type 2 diabetes mellitus  Chronic, stable on current statin regimen. Followed by PCP.     17. No  history of diabetic retinopathy in past year  Chronic, stable on current regimen. Followed by Ophthalmology.    18. Chronic pain syndrome  Chronic, stable on current Norco regimen. Followed by Dr. Martin.     19. BPH with urinary obstruction  Chronic, stable on current Flomax regimen. Followed by PCP.     20. Body mass index (BMI) of 23.0 to 23.9 in adult  Chronic, stable on current regimen. Followed by PCP.     21. PUD (peptic ulcer disease)  Chronic, stable on current regimen. Followed by PCP.     22. Gastroesophageal reflux disease, unspecified whether esophagitis present  Chronic, stable on current Protonix regimen. Followed by PCP.       Provided Xander with a 5-10 year written screening schedule and personal prevention plan. Recommendations were developed using the USPSTF age appropriate recommendations. Education, counseling, and referrals were provided as needed. After Visit Summary printed and given to patient which includes a list of additional screenings\tests needed.    Follow up in about 1 year (around 7/19/2024) for Medicare AWV.    Almita Lazaro NP    I offered to discuss advanced care planning, including how to pick a person who would make decisions for you if you were unable to make them for yourself, called a health care power of , and what kind of decisions you might make such as use of life sustaining treatments such as ventilators and tube feeding when faced with a life limiting illness recorded on a living will that they will need to know. (How you want to be cared for as you near the end of your natural life)     X Patient is interested in learning more about how to make advanced directives.  I provided them paperwork and offered to discuss this with them.

## 2023-07-19 NOTE — PATIENT INSTRUCTIONS
Counseling and Referral of Other Preventative  (Italic type indicates deductible and co-insurance are waived)    Patient Name: Xander Sanchez  Today's Date: 7/19/2023    Health Maintenance       Date Due Completion Date    Shingles Vaccine (1 of 2) Never done ---    COVID-19 Vaccine (3 - Moderna risk series) 06/13/2022 5/16/2022    Colorectal Cancer Screening 07/19/2022 7/19/2017    LDCT Lung Screen 06/05/2024 (Originally 8/3/2021) 8/3/2020    Influenza Vaccine (1) 09/01/2023 12/13/2022    Urine Drug Screen 11/01/2023 11/1/2022    Hemoglobin A1c 12/05/2023 6/5/2023    Foot Exam 02/06/2024 2/6/2023    Override on 2/7/2022: Done    Diabetes Urine Screening 06/05/2024 6/5/2023    Lipid Panel 06/05/2024 6/5/2023    Eye Exam 07/18/2024 7/18/2023    Override on 12/20/2017: Done    TETANUS VACCINE 08/17/2026 8/17/2016        No orders of the defined types were placed in this encounter.      The following information is provided to all patients.  This information is to help you find resources for any of the problems found today that may be affecting your health:                Living healthy guide: www.[facility].[place].gov      Understanding Diabetes: www.diabetes.org      Eating healthy: www.cdc.gov/healthyweight      CDC home safety checklist: www.cdc.gov/steadi/patient.html      Agency on Aging: www.goea.[place].gov      Alcoholics anonymous (AA): www.aa.org      Physical Activity: www.juvencio.nih.gov/fg2ynnu      Tobacco use: www.quitwithusla.org

## 2023-07-21 ENCOUNTER — CLINICAL SUPPORT (OUTPATIENT)
Dept: REHABILITATION | Facility: HOSPITAL | Age: 73
End: 2023-07-21
Payer: MEDICARE

## 2023-07-21 DIAGNOSIS — R29.898 DECREASED ROM OF NECK: Primary | ICD-10-CM

## 2023-07-21 DIAGNOSIS — R53.1 DECREASED STRENGTH: ICD-10-CM

## 2023-07-21 PROCEDURE — 97110 THERAPEUTIC EXERCISES: CPT | Mod: HCNC,PN

## 2023-07-21 PROCEDURE — 97112 NEUROMUSCULAR REEDUCATION: CPT | Mod: HCNC,PN

## 2023-07-21 NOTE — PROGRESS NOTES
[facility]   Physical Therapy Treatment Note / Discharge Summary     Name: Xander Sanchez  [facility] Number: [id number]    Therapy Diagnosis:   Encounter Diagnoses   Name Primary?    Decreased ROM of neck Yes    Decreased strength      Physician: Diomedes Martin MD    Visit Date: 7/21/2023    Physician Orders: PT Eval and Treat: continuation of therapy, work on neck exercises and evaluation for his gait.   Medical Diagnosis from Referral: M54.2,G89.29 (ICD-10-CM) - Chronic neck pain  Evaluation Date: 6/9/2023  Authorization Period Expiration: 05/31/2024  Plan of Care Expiration:  7/21/23  Visit # / Visits authorized: 11/12 (+ eval)  FOTO: Discharge FOTO completed    PTA Visit #: 0/5     Time In: 8:02 AM  Time Out: 8:42 AM  Total Billable Time: 40 minutes (2NMR, 2TE)    Precautions:  Standard, Fall, and History of Cervical Fusion C3-C7; DM II; HTN; ICD; CRPS, asthma, COPD    Subjective     Patient reports: Ready for discharge today. Neck pain free and balance improved but still with status quo right arm pain.  He  will be  compliant with home exercise program.  Response to previous treatment: no adverse response  Functional change: ongoing improvements in neck pain and balance    Pain: 5/10  Location: right shoulder    Objective      Timed Up and Go: 12.5 seconds no AD    30-Second Sit to Stand: 6 reps without bilateral upper extremity assist    FOTO/NDI: 0%    (Updated values in parentheses)  AROM: CERVICAL - degrees   Flexion 49 (45)   Extension 13 (15)   Right side bending 8 (20)   Left side bending 9 (20)   Right rotation 33 (50)   Left rotation 27 (39)      Treatment     Xander received the treatments listed below:      therapeutic exercises to develop strength, ROM, and flexibility for 17 minutes including:  - UBE x 8 min (4 forward/4 backwards) level 4  - Cervical AROM assessment (see above)  - supine isometric cervical rotation review only for HEP  - prone scap retraction review  only for HEP  - FOTO neck survey administered (see above)    neuromuscular re-education activities to improve: Balance and Coordination for 23 minutes. The following activities were included:  - Tandem walk x 2 lengths x 10 feet each for HEP review  - Retro walk x 2 lengths x 10 feet each for HEP review  - Reciprocal step ups with left upper extremity support x10B for HEP review  - TUG and 30-Second Sit to Stand (see above)    Patient Education and Home Exercises       Education provided:   - HEP progression    Written Home Exercises Provided: yes. Exercises were reviewed and Xander was able to demonstrate them prior to the end of the session.  Xander demonstrated fair  understanding of the education provided. See EMR under Patient Instructions for exercises provided during therapy sessions    Assessment     Xander arrived to PT session without any new complaints and ready for discharge. Continued right upper extremity pain but improved cervical pain, function, and mobility and improvements in balance noted. He has likely reached maximal therapeutic potential at this time but understands he can contact PT with any questions/concerns post-discharge. He demonstrates good understanding of recommended additional HEP items.    Xander Is progressing well towards his goals.   Pt prognosis is Fair.     Pt's spiritual, cultural and educational needs considered and pt agreeable to plan of care and goals.     Anticipated barriers to physical therapy: comorbidities, cervical myelopathy, chronicity of impairments of 3+ years     Goals:   Short Term Goals: 4 weeks  1. Pt will demo good deep neck flexor strength to improve cervical stabilization. (MET)    2. Increase cervical ROM by 5 degrees in rotation in order to perform ADLs with decreased difficulty. (MET)  3. Pt to tolerate HEP to improve ROM and independence with ADL's. (MET)    Long Term Goals: 8 weeks  1. Pt will score 24/30 on FGA for decreased fall risk. (MET)  2.  Pt will score </=9'' on TUG for decreased fall risk. (PROGRESSING, NOT MET)   3. Pt will score 8 reps on 30'' STS Test without upper extremity use for increased functional strength and improved weight shift - body mechanics.  (PROGRESSING, NOT MET)   4. Pt will report at </= 30% impaired on FOTO neck score for neck pain disability to demonstrate decrease in disability and improvement in neck pain. (MET)    Plan     Plan of care Certification: 6/9/2023 to 7/21/23.    Discharge PT    ISABEL MUKHERJEE, PT

## 2023-07-26 ENCOUNTER — CLINICAL SUPPORT (OUTPATIENT)
Dept: CARDIOLOGY | Facility: HOSPITAL | Age: 73
End: 2023-07-26
Payer: MEDICARE

## 2023-07-26 DIAGNOSIS — Z95.810 PRESENCE OF AUTOMATIC (IMPLANTABLE) CARDIAC DEFIBRILLATOR: ICD-10-CM

## 2023-07-26 PROCEDURE — 93296 REM INTERROG EVL PM/IDS: CPT | Mod: HCNC | Performed by: INTERNAL MEDICINE

## 2023-07-31 DIAGNOSIS — M48.02 SPINAL STENOSIS IN CERVICAL REGION: ICD-10-CM

## 2023-07-31 DIAGNOSIS — R29.898 RIGHT ARM WEAKNESS: ICD-10-CM

## 2023-07-31 DIAGNOSIS — M50.00 HNP (HERNIATED NUCLEUS PULPOSUS) WITH MYELOPATHY, CERVICAL: ICD-10-CM

## 2023-07-31 DIAGNOSIS — M50.30 DEGENERATION OF CERVICAL INTERVERTEBRAL DISC: ICD-10-CM

## 2023-07-31 DIAGNOSIS — M54.12 CERVICAL RADICULOPATHY: ICD-10-CM

## 2023-07-31 DIAGNOSIS — R29.898 RIGHT HAND WEAKNESS: ICD-10-CM

## 2023-07-31 DIAGNOSIS — G90.50 COMPLEX REGIONAL PAIN SYNDROME TYPE 1, AFFECTING UNSPECIFIED SITE: ICD-10-CM

## 2023-07-31 DIAGNOSIS — M54.12 CERVICAL RADICULAR PAIN: ICD-10-CM

## 2023-07-31 DIAGNOSIS — M96.1 CERVICAL POST-LAMINECTOMY SYNDROME: ICD-10-CM

## 2023-07-31 DIAGNOSIS — M79.601 RIGHT ARM PAIN: ICD-10-CM

## 2023-07-31 DIAGNOSIS — F11.20 NARCOTIC DEPENDENCY, CONTINUOUS: ICD-10-CM

## 2023-07-31 DIAGNOSIS — M75.101 ROTATOR CUFF SYNDROME OF RIGHT SHOULDER: ICD-10-CM

## 2023-07-31 DIAGNOSIS — M47.12 CERVICAL SPONDYLOSIS WITH MYELOPATHY: ICD-10-CM

## 2023-07-31 DIAGNOSIS — M48.02 CERVICAL STENOSIS OF SPINE: ICD-10-CM

## 2023-07-31 DIAGNOSIS — M54.12 BRACHIAL NEURITIS OR RADICULITIS: ICD-10-CM

## 2023-07-31 DIAGNOSIS — M47.812 FACET ARTHRITIS OF CERVICAL REGION: ICD-10-CM

## 2023-07-31 DIAGNOSIS — M62.81 MUSCLE RIGHT ARM WEAKNESS: ICD-10-CM

## 2023-07-31 DIAGNOSIS — G89.4 CHRONIC PAIN SYNDROME: ICD-10-CM

## 2023-07-31 DIAGNOSIS — G56.41 COMPLEX REGIONAL PAIN SYNDROME TYPE 2 OF RIGHT UPPER EXTREMITY: ICD-10-CM

## 2023-07-31 RX ORDER — HYDROCODONE BITARTRATE AND ACETAMINOPHEN 10; 325 MG/1; MG/1
1 TABLET ORAL EVERY 6 HOURS PRN
Qty: 120 TABLET | Refills: 0 | Status: SHIPPED | OUTPATIENT
Start: 2023-07-31 | End: 2023-07-31 | Stop reason: SDUPTHER

## 2023-07-31 RX ORDER — HYDROCODONE BITARTRATE AND ACETAMINOPHEN 10; 325 MG/1; MG/1
1 TABLET ORAL EVERY 6 HOURS PRN
Qty: 120 TABLET | Refills: 0 | Status: SHIPPED | OUTPATIENT
Start: 2023-07-31 | End: 2023-08-01 | Stop reason: SDUPTHER

## 2023-08-01 DIAGNOSIS — M96.1 CERVICAL POST-LAMINECTOMY SYNDROME: ICD-10-CM

## 2023-08-01 DIAGNOSIS — G89.4 CHRONIC PAIN SYNDROME: ICD-10-CM

## 2023-08-01 DIAGNOSIS — M48.02 SPINAL STENOSIS IN CERVICAL REGION: ICD-10-CM

## 2023-08-01 DIAGNOSIS — M50.30 DEGENERATION OF CERVICAL INTERVERTEBRAL DISC: ICD-10-CM

## 2023-08-01 DIAGNOSIS — M48.02 CERVICAL STENOSIS OF SPINE: ICD-10-CM

## 2023-08-01 DIAGNOSIS — M62.81 MUSCLE RIGHT ARM WEAKNESS: ICD-10-CM

## 2023-08-01 DIAGNOSIS — M54.12 BRACHIAL NEURITIS OR RADICULITIS: ICD-10-CM

## 2023-08-01 DIAGNOSIS — R29.898 RIGHT ARM WEAKNESS: ICD-10-CM

## 2023-08-01 DIAGNOSIS — G90.50 COMPLEX REGIONAL PAIN SYNDROME TYPE 1, AFFECTING UNSPECIFIED SITE: ICD-10-CM

## 2023-08-01 DIAGNOSIS — M54.12 CERVICAL RADICULAR PAIN: ICD-10-CM

## 2023-08-01 DIAGNOSIS — M50.00 HNP (HERNIATED NUCLEUS PULPOSUS) WITH MYELOPATHY, CERVICAL: ICD-10-CM

## 2023-08-01 DIAGNOSIS — M75.101 ROTATOR CUFF SYNDROME OF RIGHT SHOULDER: ICD-10-CM

## 2023-08-01 DIAGNOSIS — M54.12 CERVICAL RADICULOPATHY: ICD-10-CM

## 2023-08-01 DIAGNOSIS — M79.601 RIGHT ARM PAIN: ICD-10-CM

## 2023-08-01 DIAGNOSIS — G56.41 COMPLEX REGIONAL PAIN SYNDROME TYPE 2 OF RIGHT UPPER EXTREMITY: ICD-10-CM

## 2023-08-01 DIAGNOSIS — F11.20 NARCOTIC DEPENDENCY, CONTINUOUS: ICD-10-CM

## 2023-08-01 DIAGNOSIS — R29.898 RIGHT HAND WEAKNESS: ICD-10-CM

## 2023-08-01 DIAGNOSIS — M47.812 FACET ARTHRITIS OF CERVICAL REGION: ICD-10-CM

## 2023-08-01 DIAGNOSIS — M47.12 CERVICAL SPONDYLOSIS WITH MYELOPATHY: ICD-10-CM

## 2023-08-01 RX ORDER — HYDROCODONE BITARTRATE AND ACETAMINOPHEN 10; 325 MG/1; MG/1
1 TABLET ORAL EVERY 6 HOURS PRN
Qty: 120 TABLET | Refills: 0 | Status: SHIPPED | OUTPATIENT
Start: 2023-08-01 | End: 2023-08-25 | Stop reason: SDUPTHER

## 2023-08-02 ENCOUNTER — OFFICE VISIT (OUTPATIENT)
Dept: FAMILY MEDICINE | Facility: CLINIC | Age: 73
End: 2023-08-02
Payer: MEDICARE

## 2023-08-02 VITALS
SYSTOLIC BLOOD PRESSURE: 100 MMHG | DIASTOLIC BLOOD PRESSURE: 60 MMHG | WEIGHT: 180.31 LBS | HEART RATE: 79 BPM | BODY MASS INDEX: 25.24 KG/M2 | HEIGHT: 71 IN | OXYGEN SATURATION: 97 %

## 2023-08-02 DIAGNOSIS — I50.32 DIASTOLIC DYSFUNCTION WITH CHRONIC HEART FAILURE: Primary | ICD-10-CM

## 2023-08-02 DIAGNOSIS — R60.0 MILD PERIPHERAL EDEMA: ICD-10-CM

## 2023-08-02 PROCEDURE — 3061F NEG MICROALBUMINURIA REV: CPT | Mod: HCNC,CPTII,S$GLB, | Performed by: NURSE PRACTITIONER

## 2023-08-02 PROCEDURE — 3061F PR NEG MICROALBUMINURIA RESULT DOCUMENTED/REVIEW: ICD-10-PCS | Mod: HCNC,CPTII,S$GLB, | Performed by: NURSE PRACTITIONER

## 2023-08-02 PROCEDURE — 1126F PR PAIN SEVERITY QUANTIFIED, NO PAIN PRESENT: ICD-10-PCS | Mod: HCNC,CPTII,S$GLB, | Performed by: NURSE PRACTITIONER

## 2023-08-02 PROCEDURE — 3072F LOW RISK FOR RETINOPATHY: CPT | Mod: HCNC,CPTII,S$GLB, | Performed by: NURSE PRACTITIONER

## 2023-08-02 PROCEDURE — 3044F PR MOST RECENT HEMOGLOBIN A1C LEVEL <7.0%: ICD-10-PCS | Mod: HCNC,CPTII,S$GLB, | Performed by: NURSE PRACTITIONER

## 2023-08-02 PROCEDURE — 4010F ACE/ARB THERAPY RXD/TAKEN: CPT | Mod: HCNC,CPTII,S$GLB, | Performed by: NURSE PRACTITIONER

## 2023-08-02 PROCEDURE — 3288F PR FALLS RISK ASSESSMENT DOCUMENTED: ICD-10-PCS | Mod: HCNC,CPTII,S$GLB, | Performed by: NURSE PRACTITIONER

## 2023-08-02 PROCEDURE — 3066F NEPHROPATHY DOC TX: CPT | Mod: HCNC,CPTII,S$GLB, | Performed by: NURSE PRACTITIONER

## 2023-08-02 PROCEDURE — 99999 PR PBB SHADOW E&M-EST. PATIENT-LVL IV: ICD-10-PCS | Mod: PBBFAC,HCNC,, | Performed by: NURSE PRACTITIONER

## 2023-08-02 PROCEDURE — 4010F PR ACE/ARB THEARPY RXD/TAKEN: ICD-10-PCS | Mod: HCNC,CPTII,S$GLB, | Performed by: NURSE PRACTITIONER

## 2023-08-02 PROCEDURE — 99214 OFFICE O/P EST MOD 30 MIN: CPT | Mod: HCNC,S$GLB,, | Performed by: NURSE PRACTITIONER

## 2023-08-02 PROCEDURE — 3074F SYST BP LT 130 MM HG: CPT | Mod: HCNC,CPTII,S$GLB, | Performed by: NURSE PRACTITIONER

## 2023-08-02 PROCEDURE — 3044F HG A1C LEVEL LT 7.0%: CPT | Mod: HCNC,CPTII,S$GLB, | Performed by: NURSE PRACTITIONER

## 2023-08-02 PROCEDURE — 1160F PR REVIEW ALL MEDS BY PRESCRIBER/CLIN PHARMACIST DOCUMENTED: ICD-10-PCS | Mod: HCNC,CPTII,S$GLB, | Performed by: NURSE PRACTITIONER

## 2023-08-02 PROCEDURE — 3078F PR MOST RECENT DIASTOLIC BLOOD PRESSURE < 80 MM HG: ICD-10-PCS | Mod: HCNC,CPTII,S$GLB, | Performed by: NURSE PRACTITIONER

## 2023-08-02 PROCEDURE — 3074F PR MOST RECENT SYSTOLIC BLOOD PRESSURE < 130 MM HG: ICD-10-PCS | Mod: HCNC,CPTII,S$GLB, | Performed by: NURSE PRACTITIONER

## 2023-08-02 PROCEDURE — 1126F AMNT PAIN NOTED NONE PRSNT: CPT | Mod: HCNC,CPTII,S$GLB, | Performed by: NURSE PRACTITIONER

## 2023-08-02 PROCEDURE — 99999 PR PBB SHADOW E&M-EST. PATIENT-LVL IV: CPT | Mod: PBBFAC,HCNC,, | Performed by: NURSE PRACTITIONER

## 2023-08-02 PROCEDURE — 1159F MED LIST DOCD IN RCRD: CPT | Mod: HCNC,CPTII,S$GLB, | Performed by: NURSE PRACTITIONER

## 2023-08-02 PROCEDURE — 1101F PR PT FALLS ASSESS DOC 0-1 FALLS W/OUT INJ PAST YR: ICD-10-PCS | Mod: HCNC,CPTII,S$GLB, | Performed by: NURSE PRACTITIONER

## 2023-08-02 PROCEDURE — 3066F PR DOCUMENTATION OF TREATMENT FOR NEPHROPATHY: ICD-10-PCS | Mod: HCNC,CPTII,S$GLB, | Performed by: NURSE PRACTITIONER

## 2023-08-02 PROCEDURE — 3072F PR LOW RISK FOR RETINOPATHY: ICD-10-PCS | Mod: HCNC,CPTII,S$GLB, | Performed by: NURSE PRACTITIONER

## 2023-08-02 PROCEDURE — 99214 PR OFFICE/OUTPT VISIT, EST, LEVL IV, 30-39 MIN: ICD-10-PCS | Mod: HCNC,S$GLB,, | Performed by: NURSE PRACTITIONER

## 2023-08-02 PROCEDURE — 1159F PR MEDICATION LIST DOCUMENTED IN MEDICAL RECORD: ICD-10-PCS | Mod: HCNC,CPTII,S$GLB, | Performed by: NURSE PRACTITIONER

## 2023-08-02 PROCEDURE — 1160F RVW MEDS BY RX/DR IN RCRD: CPT | Mod: HCNC,CPTII,S$GLB, | Performed by: NURSE PRACTITIONER

## 2023-08-02 PROCEDURE — 3288F FALL RISK ASSESSMENT DOCD: CPT | Mod: HCNC,CPTII,S$GLB, | Performed by: NURSE PRACTITIONER

## 2023-08-02 PROCEDURE — 3078F DIAST BP <80 MM HG: CPT | Mod: HCNC,CPTII,S$GLB, | Performed by: NURSE PRACTITIONER

## 2023-08-02 PROCEDURE — 1101F PT FALLS ASSESS-DOCD LE1/YR: CPT | Mod: HCNC,CPTII,S$GLB, | Performed by: NURSE PRACTITIONER

## 2023-08-02 NOTE — PROGRESS NOTES
Subjective     Patient ID: Xander Sanchez is a 73 y.o. male.    Chief Complaint: Leg Swelling    This is a pleasant 72 yo male patient of Dr. August who is known to me. He presents today with c/o leg swelling. PMH includes    Patient Active Problem List:     Biventricular implantable cardioverter-defibrillator (ICD) in situ     Essential hypertension     Cervical spondylosis with myelopathy     Brachial neuritis or radiculitis NOS     Degeneration of cervical intervertebral disc     PUD (peptic ulcer disease)     COPD (chronic obstructive pulmonary disease) with emphysema     Cardiomyopathy, nonischemic     HNP (herniated nucleus pulposus) with myelopathy, cervical     CRPS (complex regional pain syndrome type II)     Chronic pain syndrome     Cervical post-laminectomy syndrome     LBBB (left bundle branch block)     Slow transit constipation     Chronic systolic dysfunction of left ventricle     S/P TURP     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thy     Diastolic dysfunction with chronic heart failure     Hypertensive left ventricular hypertrophy with heart failure     Narcotic dependency, continuous     Muscle right arm weakness     Rotator cuff syndrome of right shoulder     Facet arthritis of cervical region     Aortic atherosclerosis     Body mass index (BMI) of 23.0 to 23.9 in adult     BPH with urinary obstruction     Incomplete bladder emptying     History of colon polyps     Mixed hyperlipidemia     GERD (gastroesophageal reflux disease)     Cervical myelopathy     Systolic heart failure     NS (nuclear sclerosis), right     Nuclear sclerosis, left     Right-sided carotid artery disease     Unspecified inflammatory spondylopathy, cervical region     Stiffness in joint     Impaired functional mobility, balance, gait, and endurance     Decreased ROM of neck     Decreased strength    VSS today. Reports he has been experiencing intermittent swelling to both feet/ankles x past couple weeks. Denies pain,  redness, warmth, numbness/tingling/loss of sensation or weakness to legs. Denies CP, dyspnea. Suffers with chronic, intermittent SOB but has remained stable. Admits that he has been using a lot of salt lately. Eating watermelon and adding salt to the watermelon. Not limiting sodium in other meals. Only drinking 2 bottles of water daily. Compliant with all listed medications. Was previously advised to use compression stockings but has not worn them.       Review of Systems   Cardiovascular:  Positive for leg swelling.   Otherwise negative       Objective     Physical Exam  Vitals reviewed.   Constitutional:       General: He is awake. He is not in acute distress.     Appearance: Normal appearance. He is well-developed, well-groomed and overweight.   HENT:      Head: Normocephalic and atraumatic.      Right Ear: External ear normal.      Left Ear: External ear normal.   Eyes:      General:         Right eye: No discharge.         Left eye: No discharge.   Cardiovascular:      Rate and Rhythm: Normal rate and regular rhythm.      Pulses:           Radial pulses are 2+ on the right side and 2+ on the left side.        Dorsalis pedis pulses are 2+ on the right side and 2+ on the left side.        Posterior tibial pulses are 2+ on the right side and 2+ on the left side.      Heart sounds: No murmur heard.     Comments: LCW ICD in place  Pulmonary:      Effort: Pulmonary effort is normal. No respiratory distress.      Breath sounds: Normal breath sounds. No wheezing or rhonchi.   Abdominal:      General: Bowel sounds are normal. There is no distension.      Palpations: Abdomen is soft.      Tenderness: There is no abdominal tenderness.   Musculoskeletal:      Right lower leg: Pitting Edema present.      Left lower leg: Pitting Edema present.      Comments: Minimal edema noted to BLE, R>L   Skin:     General: Skin is warm and dry.      Capillary Refill: Capillary refill takes less than 2 seconds.      Coloration: Skin is  not pale.   Neurological:      Mental Status: He is alert and oriented to person, place, and time.      Coordination: Coordination normal.      Gait: Gait normal.   Psychiatric:         Attention and Perception: Attention normal.         Mood and Affect: Mood and affect normal.         Speech: Speech normal.         Behavior: Behavior normal. Behavior is cooperative.         Thought Content: Thought content normal.          Assessment and Plan     1. Diastolic dysfunction with chronic heart failure  -     COMPRESSION STOCKINGS    2. Mild peripheral edema      - Reduce sodium intake  - Wearing compression stocking during the day; elevate legs when sitting/lying down  - Take medications as scheduled  - Warning signs discussed     Follow up if symptoms worsen or fail to improve.          I spent a total of 30 minutes on the day of the visit.  This includes face to face time and non-face to face time preparing to see the patient (eg, review of tests), obtaining and/or reviewing separately obtained history, documenting clinical information in the electronic or other health record, independently interpreting results and communicating results to the patient/family/caregiver, or care coordinator.

## 2023-08-07 ENCOUNTER — TELEPHONE (OUTPATIENT)
Dept: GASTROENTEROLOGY | Facility: CLINIC | Age: 73
End: 2023-08-07
Payer: MEDICARE

## 2023-08-09 RX ORDER — TAMSULOSIN HYDROCHLORIDE 0.4 MG/1
CAPSULE ORAL
Qty: 90 CAPSULE | Refills: 3 | Status: SHIPPED | OUTPATIENT
Start: 2023-08-09

## 2023-08-09 NOTE — TELEPHONE ENCOUNTER
No care due was identified.  [facility] Embedded Care Due Messages. Reference number: [id number].   8/09/2023 10:52:37 AM CDT

## 2023-08-09 NOTE — TELEPHONE ENCOUNTER
Refill Decision Note   Xander Daniel  is requesting a refill authorization.  Brief Assessment and Rationale for Refill:  Approve     Medication Therapy Plan:         Comments:     Note composed:6:48 PM 08/09/2023

## 2023-08-25 ENCOUNTER — TELEPHONE (OUTPATIENT)
Dept: ELECTROPHYSIOLOGY | Facility: CLINIC | Age: 73
End: 2023-08-25
Payer: MEDICARE

## 2023-08-25 DIAGNOSIS — M96.1 CERVICAL POST-LAMINECTOMY SYNDROME: ICD-10-CM

## 2023-08-25 DIAGNOSIS — M50.30 DEGENERATION OF CERVICAL INTERVERTEBRAL DISC: ICD-10-CM

## 2023-08-25 DIAGNOSIS — M47.812 FACET ARTHRITIS OF CERVICAL REGION: ICD-10-CM

## 2023-08-25 DIAGNOSIS — M47.12 CERVICAL SPONDYLOSIS WITH MYELOPATHY: ICD-10-CM

## 2023-08-25 DIAGNOSIS — M54.12 CERVICAL RADICULAR PAIN: ICD-10-CM

## 2023-08-25 DIAGNOSIS — R29.898 RIGHT ARM WEAKNESS: ICD-10-CM

## 2023-08-25 DIAGNOSIS — R29.898 RIGHT HAND WEAKNESS: ICD-10-CM

## 2023-08-25 DIAGNOSIS — M48.02 CERVICAL STENOSIS OF SPINE: ICD-10-CM

## 2023-08-25 DIAGNOSIS — G90.50 COMPLEX REGIONAL PAIN SYNDROME TYPE 1, AFFECTING UNSPECIFIED SITE: ICD-10-CM

## 2023-08-25 DIAGNOSIS — M48.02 SPINAL STENOSIS IN CERVICAL REGION: ICD-10-CM

## 2023-08-25 DIAGNOSIS — M75.101 ROTATOR CUFF SYNDROME OF RIGHT SHOULDER: ICD-10-CM

## 2023-08-25 DIAGNOSIS — M54.12 BRACHIAL NEURITIS OR RADICULITIS: ICD-10-CM

## 2023-08-25 DIAGNOSIS — M50.00 HNP (HERNIATED NUCLEUS PULPOSUS) WITH MYELOPATHY, CERVICAL: ICD-10-CM

## 2023-08-25 DIAGNOSIS — G56.41 COMPLEX REGIONAL PAIN SYNDROME TYPE 2 OF RIGHT UPPER EXTREMITY: ICD-10-CM

## 2023-08-25 DIAGNOSIS — M62.81 MUSCLE RIGHT ARM WEAKNESS: ICD-10-CM

## 2023-08-25 DIAGNOSIS — F11.20 NARCOTIC DEPENDENCY, CONTINUOUS: ICD-10-CM

## 2023-08-25 DIAGNOSIS — M79.601 RIGHT ARM PAIN: ICD-10-CM

## 2023-08-25 DIAGNOSIS — M54.12 CERVICAL RADICULOPATHY: ICD-10-CM

## 2023-08-25 DIAGNOSIS — G89.4 CHRONIC PAIN SYNDROME: ICD-10-CM

## 2023-08-25 RX ORDER — HYDROCODONE BITARTRATE AND ACETAMINOPHEN 10; 325 MG/1; MG/1
1 TABLET ORAL EVERY 6 HOURS PRN
Qty: 120 TABLET | Refills: 0 | Status: SHIPPED | OUTPATIENT
Start: 2023-08-31 | End: 2023-10-02 | Stop reason: SDUPTHER

## 2023-08-25 NOTE — TELEPHONE ENCOUNTER
Called pt this afternoon and informed him his ICD had reached REEMA, that a message has been sent to the doctor and his nurse.  Also informed the pt that he should receive a call from Dr. Navarro's nurse sometime later next week.  Understanding was verbalized.  Pt appreciated the call.

## 2023-08-25 NOTE — TELEPHONE ENCOUNTER
The patients' CIED has reached ELECTIVE REPLACEMENT.     Current Device  and Model: THOMASM Alejandra Foreman 3357-40Q    Date of REEMA, if known: 8/24/23    Is this replacement indicator early or unexpected due to an advisory:  No    Battery Voltage (if available): n/a    Pacemaker Dependent:No    Anticoagulation Status:None    Last EF: 40% 2/2018    Model of Leads:      Any known lead recalls:  No    Leads MRI compatible:  No (Durata is MRI comp)    Any history of treated ventricular arrhythmias (ATP or shocks)?  No    Any history of device treated atrial arrhythmias (atrial ATP)?  No      *RN coordinator notified for scheduling; device coordinator notified to contact patient

## 2023-08-28 RX ORDER — FLUTICASONE PROPIONATE AND SALMETEROL XINAFOATE 230; 21 UG/1; UG/1
AEROSOL, METERED RESPIRATORY (INHALATION)
Refills: 0 | OUTPATIENT
Start: 2023-08-28

## 2023-08-28 NOTE — TELEPHONE ENCOUNTER
Refill Decision Note   Xander Sanchez  is requesting a refill authorization.  Brief Assessment and Rationale for Refill:  Quick Discontinue     Medication Therapy Plan: Pharmacy is requesting new scripts for the following medications without required information, (sig/ frequency/qty/etc)     Medication Reconciliation Completed: No   Comments:     No Care Gaps recommended.     Note composed:10:35 AM 08/28/2023           DISPLAY PLAN FREE TEXT

## 2023-08-30 ENCOUNTER — TELEPHONE (OUTPATIENT)
Dept: PHYSICAL MEDICINE AND REHAB | Facility: CLINIC | Age: 73
End: 2023-08-30
Payer: MEDICARE

## 2023-08-30 NOTE — TELEPHONE ENCOUNTER
----- Message from Carlos Whatley sent at 8/30/2023 10:30 AM CDT -----  Xander Wilson calling regarding Refills  (message) for #gabapentin (NEURONTIN) 600 MG tablet      [facility] - [place], [place] - [address]  [address]  [facility] [address]  Phone: [phone] Fax: [phone]

## 2023-09-01 DIAGNOSIS — I42.8 CARDIOMYOPATHY, NONISCHEMIC: Primary | ICD-10-CM

## 2023-09-01 DIAGNOSIS — Z45.02 ICD (IMPLANTABLE CARDIOVERTER-DEFIBRILLATOR) BATTERY DEPLETION: ICD-10-CM

## 2023-09-01 DIAGNOSIS — I49.9 CARDIAC ARRHYTHMIA, UNSPECIFIED CARDIAC ARRHYTHMIA TYPE: ICD-10-CM

## 2023-09-15 RX ORDER — FLUTICASONE PROPIONATE AND SALMETEROL XINAFOATE 230; 21 UG/1; UG/1
AEROSOL, METERED RESPIRATORY (INHALATION)
Refills: 0 | OUTPATIENT
Start: 2023-09-15

## 2023-09-15 NOTE — TELEPHONE ENCOUNTER
No care due was identified.  [facility] Embedded Care Due Messages. Reference number: [id number].   9/15/2023 1:57:15 PM CDT

## 2023-09-15 NOTE — TELEPHONE ENCOUNTER
Refill Decision Note   Xander Sanchez  is requesting a refill authorization.  Brief Assessment and Rationale for Refill:  Quick Discontinue     Medication Therapy Plan: Pharmacy is requesting new scripts for the following medications without required information, (sig/ frequency/qty/etc)       Medication Reconciliation Completed: No   Comments: Pharmacies have been requesting medications for patients without required information, (sig, frequency, qty, etc.). In addition, requests are sent for medication(s) pt. are currently not taking, and medications patients have never taken.    We have spoken to the pharmacies about these request types and advised their teams previously that we are unable to assess these New Script requests and require all details for these requests. This is a known issue and has been reported.     No Care Gaps recommended.     Note composed:2:35 PM 09/15/2023

## 2023-09-19 DIAGNOSIS — R32 ENURESIS: ICD-10-CM

## 2023-09-19 RX ORDER — OXYBUTYNIN CHLORIDE 5 MG/1
TABLET ORAL
Qty: 180 TABLET | Refills: 0 | OUTPATIENT
Start: 2023-09-19

## 2023-09-19 NOTE — TELEPHONE ENCOUNTER
Refill Decision Note   Xander Sanchez  is requesting a refill authorization.  Brief Assessment and Rationale for Refill:  Quick Discontinue     Medication Therapy Plan:    Pharmacy is requesting new scripts for the following medications without required information, (sig/ frequency/qty/etc)      Medication Reconciliation Completed: No     Comments: Pharmacies have been requesting medications for patients without required information, (sig, frequency, qty, etc.). In addition, requests are sent for medication(s) pt. are currently not taking, and medications patients have never taken.    We have spoken to the pharmacies about these request types and advised their teams previously that we are unable to assess these New Script requests and require all details for these requests. This is a known issue and has been reported.     Note composed:10:04 AM 09/19/2023

## 2023-09-19 NOTE — TELEPHONE ENCOUNTER
No care due was identified.  [facility] Embedded Care Due Messages. Reference number: [id number].   9/19/2023 9:28:27 AM CDT

## 2023-09-25 ENCOUNTER — TELEPHONE (OUTPATIENT)
Dept: NEUROLOGY | Facility: CLINIC | Age: 73
End: 2023-09-25
Payer: MEDICARE

## 2023-09-25 NOTE — TELEPHONE ENCOUNTER
----- Message from Jeanna VALENTIN Route sent at 9/25/2023  9:31 AM CDT -----  Regarding: Refill  Contact: pt. [phone]  Rx Refill/Request Is this a Refill or New Rx:  Refill    Rx Name and Strength:  HYDROcodone-acetaminophen (NORCO)  mg per tablet      [id number] [place] [address] [address] [facility] [address]  [facility] [address]  [facility] [address]  Phone: [phone] Fax: [phone]      Communication Preference:[phone]  Additional Information:

## 2023-09-27 RX ORDER — FLUTICASONE PROPIONATE AND SALMETEROL XINAFOATE 230; 21 UG/1; UG/1
2 AEROSOL, METERED RESPIRATORY (INHALATION) 2 TIMES DAILY
Qty: 36 G | Refills: 2 | Status: SHIPPED | OUTPATIENT
Start: 2023-09-27

## 2023-09-27 NOTE — TELEPHONE ENCOUNTER
Refill Decision Note   Xander Sanchez  is requesting a refill authorization.  Brief Assessment and Rationale for Refill:  Approve     Medication Therapy Plan:         Comments:     Note composed:5:58 PM 09/27/2023             Appointments     Last Visit   6/5/2023 Williams Alvarenga MD   Next Visit   12/5/2023 Williams Alvarenga MD

## 2023-09-27 NOTE — TELEPHONE ENCOUNTER
No care due was identified.  [facility] Embedded Care Due Messages. Reference number: [id number].   9/27/2023 3:21:35 PM CDT

## 2023-10-02 DIAGNOSIS — M54.12 BRACHIAL NEURITIS OR RADICULITIS: ICD-10-CM

## 2023-10-02 DIAGNOSIS — M47.812 FACET ARTHRITIS OF CERVICAL REGION: ICD-10-CM

## 2023-10-02 DIAGNOSIS — F11.20 NARCOTIC DEPENDENCY, CONTINUOUS: ICD-10-CM

## 2023-10-02 DIAGNOSIS — G56.41 COMPLEX REGIONAL PAIN SYNDROME TYPE 2 OF RIGHT UPPER EXTREMITY: ICD-10-CM

## 2023-10-02 DIAGNOSIS — G90.50 COMPLEX REGIONAL PAIN SYNDROME TYPE 1, AFFECTING UNSPECIFIED SITE: ICD-10-CM

## 2023-10-02 DIAGNOSIS — M96.1 CERVICAL POST-LAMINECTOMY SYNDROME: ICD-10-CM

## 2023-10-02 DIAGNOSIS — G89.4 CHRONIC PAIN SYNDROME: ICD-10-CM

## 2023-10-02 DIAGNOSIS — M48.02 CERVICAL STENOSIS OF SPINE: ICD-10-CM

## 2023-10-02 DIAGNOSIS — M75.101 ROTATOR CUFF SYNDROME OF RIGHT SHOULDER: ICD-10-CM

## 2023-10-02 DIAGNOSIS — R29.898 RIGHT HAND WEAKNESS: ICD-10-CM

## 2023-10-02 DIAGNOSIS — M62.81 MUSCLE RIGHT ARM WEAKNESS: ICD-10-CM

## 2023-10-02 DIAGNOSIS — M50.30 DEGENERATION OF CERVICAL INTERVERTEBRAL DISC: ICD-10-CM

## 2023-10-02 DIAGNOSIS — M79.601 RIGHT ARM PAIN: ICD-10-CM

## 2023-10-02 DIAGNOSIS — M54.12 CERVICAL RADICULOPATHY: ICD-10-CM

## 2023-10-02 DIAGNOSIS — R29.898 RIGHT ARM WEAKNESS: ICD-10-CM

## 2023-10-02 DIAGNOSIS — M47.12 CERVICAL SPONDYLOSIS WITH MYELOPATHY: ICD-10-CM

## 2023-10-02 DIAGNOSIS — M48.02 SPINAL STENOSIS IN CERVICAL REGION: ICD-10-CM

## 2023-10-02 DIAGNOSIS — M54.12 CERVICAL RADICULAR PAIN: ICD-10-CM

## 2023-10-02 DIAGNOSIS — M50.00 HNP (HERNIATED NUCLEUS PULPOSUS) WITH MYELOPATHY, CERVICAL: ICD-10-CM

## 2023-10-02 RX ORDER — HYDROCODONE BITARTRATE AND ACETAMINOPHEN 10; 325 MG/1; MG/1
1 TABLET ORAL EVERY 6 HOURS PRN
Qty: 120 TABLET | Refills: 0 | Status: SHIPPED | OUTPATIENT
Start: 2023-10-02 | End: 2023-10-03 | Stop reason: SDUPTHER

## 2023-10-02 NOTE — TELEPHONE ENCOUNTER
Last ordered:08/25/23    Last seen:06/01/23  Upcoming appt:10/06/23      ----- Message from Rachel Goyal sent at 10/2/2023  3:07 PM CDT -----  Regarding: refill rx  Contact: pt @ [phone]  Rx Refill/Request Is this a Refill or New Rx: refill     Rx Name and Strength: HYDROcodone-acetaminophen (NORCO)  mg per tablet    Preferred Pharmacy with phone number:     [facility] #[id number] [facility] [address] [facility] [address] [address]  Phone: [phone] Fax: [phone]        Communication Preference: call back     Additional Information:    Pt is requesting a refill be sent to [facility] as he is down to four pills. Please call to advise further. Thank you for all you are doing.

## 2023-10-03 ENCOUNTER — TELEPHONE (OUTPATIENT)
Dept: NEUROLOGY | Facility: CLINIC | Age: 73
End: 2023-10-03
Payer: MEDICARE

## 2023-10-03 DIAGNOSIS — R29.898 RIGHT HAND WEAKNESS: ICD-10-CM

## 2023-10-03 DIAGNOSIS — M54.12 BRACHIAL NEURITIS OR RADICULITIS: ICD-10-CM

## 2023-10-03 DIAGNOSIS — M54.12 CERVICAL RADICULOPATHY: ICD-10-CM

## 2023-10-03 DIAGNOSIS — F11.20 NARCOTIC DEPENDENCY, CONTINUOUS: ICD-10-CM

## 2023-10-03 DIAGNOSIS — G90.50 COMPLEX REGIONAL PAIN SYNDROME TYPE 1, AFFECTING UNSPECIFIED SITE: ICD-10-CM

## 2023-10-03 DIAGNOSIS — R29.898 RIGHT ARM WEAKNESS: ICD-10-CM

## 2023-10-03 DIAGNOSIS — M62.81 MUSCLE RIGHT ARM WEAKNESS: ICD-10-CM

## 2023-10-03 DIAGNOSIS — M48.02 SPINAL STENOSIS IN CERVICAL REGION: ICD-10-CM

## 2023-10-03 DIAGNOSIS — M47.12 CERVICAL SPONDYLOSIS WITH MYELOPATHY: ICD-10-CM

## 2023-10-03 DIAGNOSIS — G56.41 COMPLEX REGIONAL PAIN SYNDROME TYPE 2 OF RIGHT UPPER EXTREMITY: ICD-10-CM

## 2023-10-03 DIAGNOSIS — M75.101 ROTATOR CUFF SYNDROME OF RIGHT SHOULDER: ICD-10-CM

## 2023-10-03 DIAGNOSIS — M96.1 CERVICAL POST-LAMINECTOMY SYNDROME: ICD-10-CM

## 2023-10-03 DIAGNOSIS — G89.4 CHRONIC PAIN SYNDROME: ICD-10-CM

## 2023-10-03 DIAGNOSIS — M48.02 CERVICAL STENOSIS OF SPINE: ICD-10-CM

## 2023-10-03 DIAGNOSIS — M79.601 RIGHT ARM PAIN: ICD-10-CM

## 2023-10-03 DIAGNOSIS — M47.812 FACET ARTHRITIS OF CERVICAL REGION: ICD-10-CM

## 2023-10-03 DIAGNOSIS — M50.30 DEGENERATION OF CERVICAL INTERVERTEBRAL DISC: ICD-10-CM

## 2023-10-03 DIAGNOSIS — M50.00 HNP (HERNIATED NUCLEUS PULPOSUS) WITH MYELOPATHY, CERVICAL: ICD-10-CM

## 2023-10-03 DIAGNOSIS — M54.12 CERVICAL RADICULAR PAIN: ICD-10-CM

## 2023-10-03 RX ORDER — HYDROCODONE BITARTRATE AND ACETAMINOPHEN 10; 325 MG/1; MG/1
1 TABLET ORAL EVERY 6 HOURS PRN
Qty: 120 TABLET | Refills: 0 | Status: SHIPPED | OUTPATIENT
Start: 2023-10-03 | End: 2023-10-13 | Stop reason: SDUPTHER

## 2023-10-03 NOTE — TELEPHONE ENCOUNTER
----- Message from Yves Leon MA sent at 10/3/2023  2:11 PM CDT -----    ----- Message -----  From: Carlos Whatley  Sent: 10/3/2023   2:05 PM CDT  To: Veronica VALENTIN Staff    HIREN WILSON calling regarding Refills  (message) for #HYDROcodone-acetaminophen (NORCO)  mg per tablet Pt asking if the doctor can sent a new Rx in to the [facility] due to [facility] not knowing when they will be getting any of the meds in Pt also states if he needs to pay more he will for it to come sooner     [facility] - [place], [place] - [address]  [address] [facility] [address]  [facility] [address]  Phone: [phone] Fax: [phone]

## 2023-10-04 ENCOUNTER — TELEPHONE (OUTPATIENT)
Dept: ELECTROPHYSIOLOGY | Facility: CLINIC | Age: 73
End: 2023-10-04
Payer: MEDICARE

## 2023-10-04 NOTE — TELEPHONE ENCOUNTER
----- Message from Kael Craft sent at 10/4/2023  9:05 AM CDT -----  HI Gay,    Mr. Wilson called this am for information for the Generator change.  If you would please give him a call.     Thanks,  Kael

## 2023-10-04 NOTE — TELEPHONE ENCOUNTER
Spoke with patient and advised that his instructions were mailed out, as requested, on 9/28/2023. His labs are scheduled for 10/17/2023 at [facility] and his ICD battery change is scheduled for 10/24/2023 with 12noon arrival. He will call back if he doesn't get the instructions in the mail in the next day or so, but all instructions were reviewed and he verbalized understanding.

## 2023-10-06 ENCOUNTER — LAB VISIT (OUTPATIENT)
Dept: LAB | Facility: HOSPITAL | Age: 73
End: 2023-10-06
Attending: PHYSICAL MEDICINE & REHABILITATION
Payer: MEDICARE

## 2023-10-06 ENCOUNTER — OFFICE VISIT (OUTPATIENT)
Dept: PHYSICAL MEDICINE AND REHAB | Facility: CLINIC | Age: 73
End: 2023-10-06
Payer: MEDICARE

## 2023-10-06 VITALS
BODY MASS INDEX: 24.39 KG/M2 | SYSTOLIC BLOOD PRESSURE: 129 MMHG | HEIGHT: 71 IN | DIASTOLIC BLOOD PRESSURE: 91 MMHG | HEART RATE: 80 BPM | WEIGHT: 174.19 LBS

## 2023-10-06 DIAGNOSIS — M50.30 DEGENERATION OF CERVICAL INTERVERTEBRAL DISC: ICD-10-CM

## 2023-10-06 DIAGNOSIS — Z79.891 CHRONICALLY ON OPIATE THERAPY: ICD-10-CM

## 2023-10-06 DIAGNOSIS — M47.812 FACET ARTHRITIS OF CERVICAL REGION: ICD-10-CM

## 2023-10-06 DIAGNOSIS — M54.12 BRACHIAL NEURITIS OR RADICULITIS: ICD-10-CM

## 2023-10-06 DIAGNOSIS — G89.29 CHRONIC NECK PAIN: Primary | ICD-10-CM

## 2023-10-06 DIAGNOSIS — M62.81 MUSCLE RIGHT ARM WEAKNESS: ICD-10-CM

## 2023-10-06 DIAGNOSIS — M96.1 CERVICAL POST-LAMINECTOMY SYNDROME: ICD-10-CM

## 2023-10-06 DIAGNOSIS — M79.601 RIGHT ARM PAIN: ICD-10-CM

## 2023-10-06 DIAGNOSIS — M54.12 CERVICAL RADICULAR PAIN: ICD-10-CM

## 2023-10-06 DIAGNOSIS — F11.20 NARCOTIC DEPENDENCY, CONTINUOUS: ICD-10-CM

## 2023-10-06 DIAGNOSIS — R29.898 RIGHT ARM WEAKNESS: ICD-10-CM

## 2023-10-06 DIAGNOSIS — M50.00 HNP (HERNIATED NUCLEUS PULPOSUS) WITH MYELOPATHY, CERVICAL: ICD-10-CM

## 2023-10-06 DIAGNOSIS — M48.02 CERVICAL STENOSIS OF SPINE: ICD-10-CM

## 2023-10-06 DIAGNOSIS — M47.12 CERVICAL SPONDYLOSIS WITH MYELOPATHY: ICD-10-CM

## 2023-10-06 DIAGNOSIS — G89.4 CHRONIC PAIN SYNDROME: ICD-10-CM

## 2023-10-06 DIAGNOSIS — M48.02 SPINAL STENOSIS IN CERVICAL REGION: ICD-10-CM

## 2023-10-06 DIAGNOSIS — Z98.1 STATUS POST CERVICAL SPINAL FUSION: ICD-10-CM

## 2023-10-06 DIAGNOSIS — G90.50 COMPLEX REGIONAL PAIN SYNDROME TYPE 1, AFFECTING UNSPECIFIED SITE: ICD-10-CM

## 2023-10-06 DIAGNOSIS — R26.9 GAIT DISORDER: ICD-10-CM

## 2023-10-06 DIAGNOSIS — M54.2 CHRONIC NECK PAIN: Primary | ICD-10-CM

## 2023-10-06 DIAGNOSIS — M47.812 SPONDYLOSIS OF CERVICAL REGION WITHOUT MYELOPATHY OR RADICULOPATHY: ICD-10-CM

## 2023-10-06 DIAGNOSIS — R29.898 RIGHT HAND WEAKNESS: ICD-10-CM

## 2023-10-06 DIAGNOSIS — G56.41 COMPLEX REGIONAL PAIN SYNDROME TYPE 2 OF RIGHT UPPER EXTREMITY: ICD-10-CM

## 2023-10-06 DIAGNOSIS — M75.101 ROTATOR CUFF SYNDROME OF RIGHT SHOULDER: ICD-10-CM

## 2023-10-06 DIAGNOSIS — R26.9 GAIT ABNORMALITY: ICD-10-CM

## 2023-10-06 PROCEDURE — 1159F MED LIST DOCD IN RCRD: CPT | Mod: CPTII,S$GLB,, | Performed by: PHYSICAL MEDICINE & REHABILITATION

## 2023-10-06 PROCEDURE — 1101F PR PT FALLS ASSESS DOC 0-1 FALLS W/OUT INJ PAST YR: ICD-10-PCS | Mod: CPTII,S$GLB,, | Performed by: PHYSICAL MEDICINE & REHABILITATION

## 2023-10-06 PROCEDURE — 99213 OFFICE O/P EST LOW 20 MIN: CPT | Mod: S$GLB,,, | Performed by: PHYSICAL MEDICINE & REHABILITATION

## 2023-10-06 PROCEDURE — 4010F ACE/ARB THERAPY RXD/TAKEN: CPT | Mod: CPTII,S$GLB,, | Performed by: PHYSICAL MEDICINE & REHABILITATION

## 2023-10-06 PROCEDURE — 1159F PR MEDICATION LIST DOCUMENTED IN MEDICAL RECORD: ICD-10-PCS | Mod: CPTII,S$GLB,, | Performed by: PHYSICAL MEDICINE & REHABILITATION

## 2023-10-06 PROCEDURE — 3080F DIAST BP >= 90 MM HG: CPT | Mod: CPTII,S$GLB,, | Performed by: PHYSICAL MEDICINE & REHABILITATION

## 2023-10-06 PROCEDURE — 3288F PR FALLS RISK ASSESSMENT DOCUMENTED: ICD-10-PCS | Mod: CPTII,S$GLB,, | Performed by: PHYSICAL MEDICINE & REHABILITATION

## 2023-10-06 PROCEDURE — 99213 PR OFFICE/OUTPT VISIT, EST, LEVL III, 20-29 MIN: ICD-10-PCS | Mod: S$GLB,,, | Performed by: PHYSICAL MEDICINE & REHABILITATION

## 2023-10-06 PROCEDURE — 3061F NEG MICROALBUMINURIA REV: CPT | Mod: CPTII,S$GLB,, | Performed by: PHYSICAL MEDICINE & REHABILITATION

## 2023-10-06 PROCEDURE — 3061F PR NEG MICROALBUMINURIA RESULT DOCUMENTED/REVIEW: ICD-10-PCS | Mod: CPTII,S$GLB,, | Performed by: PHYSICAL MEDICINE & REHABILITATION

## 2023-10-06 PROCEDURE — 3074F SYST BP LT 130 MM HG: CPT | Mod: CPTII,S$GLB,, | Performed by: PHYSICAL MEDICINE & REHABILITATION

## 2023-10-06 PROCEDURE — 80326 AMPHETAMINES 5 OR MORE: CPT | Performed by: PHYSICAL MEDICINE & REHABILITATION

## 2023-10-06 PROCEDURE — 3066F NEPHROPATHY DOC TX: CPT | Mod: CPTII,S$GLB,, | Performed by: PHYSICAL MEDICINE & REHABILITATION

## 2023-10-06 PROCEDURE — 3288F FALL RISK ASSESSMENT DOCD: CPT | Mod: CPTII,S$GLB,, | Performed by: PHYSICAL MEDICINE & REHABILITATION

## 2023-10-06 PROCEDURE — 3074F PR MOST RECENT SYSTOLIC BLOOD PRESSURE < 130 MM HG: ICD-10-PCS | Mod: CPTII,S$GLB,, | Performed by: PHYSICAL MEDICINE & REHABILITATION

## 2023-10-06 PROCEDURE — 99999 PR PBB SHADOW E&M-EST. PATIENT-LVL III: CPT | Mod: PBBFAC,,, | Performed by: PHYSICAL MEDICINE & REHABILITATION

## 2023-10-06 PROCEDURE — 1125F PR PAIN SEVERITY QUANTIFIED, PAIN PRESENT: ICD-10-PCS | Mod: CPTII,S$GLB,, | Performed by: PHYSICAL MEDICINE & REHABILITATION

## 2023-10-06 PROCEDURE — 3066F PR DOCUMENTATION OF TREATMENT FOR NEPHROPATHY: ICD-10-PCS | Mod: CPTII,S$GLB,, | Performed by: PHYSICAL MEDICINE & REHABILITATION

## 2023-10-06 PROCEDURE — 3044F PR MOST RECENT HEMOGLOBIN A1C LEVEL <7.0%: ICD-10-PCS | Mod: CPTII,S$GLB,, | Performed by: PHYSICAL MEDICINE & REHABILITATION

## 2023-10-06 PROCEDURE — 99999 PR PBB SHADOW E&M-EST. PATIENT-LVL III: ICD-10-PCS | Mod: PBBFAC,,, | Performed by: PHYSICAL MEDICINE & REHABILITATION

## 2023-10-06 PROCEDURE — 3080F PR MOST RECENT DIASTOLIC BLOOD PRESSURE >= 90 MM HG: ICD-10-PCS | Mod: CPTII,S$GLB,, | Performed by: PHYSICAL MEDICINE & REHABILITATION

## 2023-10-06 PROCEDURE — 1125F AMNT PAIN NOTED PAIN PRSNT: CPT | Mod: CPTII,S$GLB,, | Performed by: PHYSICAL MEDICINE & REHABILITATION

## 2023-10-06 PROCEDURE — 4010F PR ACE/ARB THEARPY RXD/TAKEN: ICD-10-PCS | Mod: CPTII,S$GLB,, | Performed by: PHYSICAL MEDICINE & REHABILITATION

## 2023-10-06 PROCEDURE — 3044F HG A1C LEVEL LT 7.0%: CPT | Mod: CPTII,S$GLB,, | Performed by: PHYSICAL MEDICINE & REHABILITATION

## 2023-10-06 PROCEDURE — 1101F PT FALLS ASSESS-DOCD LE1/YR: CPT | Mod: CPTII,S$GLB,, | Performed by: PHYSICAL MEDICINE & REHABILITATION

## 2023-10-06 RX ORDER — GABAPENTIN 600 MG/1
600 TABLET ORAL 3 TIMES DAILY
Qty: 270 TABLET | Refills: 1
Start: 2023-10-06 | End: 2023-11-20

## 2023-10-06 NOTE — PROGRESS NOTES
Subjective:       Patient ID: Xander Sanchez is a 73 y.o. male.    Chief Complaint: Arm Pain      HPI      Mr. Sanchez is a 73-year-old black male with past medical history of hypertension, diabetes mellitus, peripheral neuropathy, RA, nonischemic cardiomyopathy, chronic systolic heart failure, status post AICD, COPD, osteoarthritis, chronic neck pain status post 3 neck surgeries, CRPS type 1 of right arm, GERD.  The patient is [facility] up [facility] for chronic neck pain with right cervical radiculopathy, status post multiple neck surgeries (C3-6 laminectomy in 11/2012, C5-6 ACDF in 04/2014, and C3-7 posterior fusion in 08/2015).  He was last seen on 6/1/2023  He was maintained on gabapentin, p.r.n. baclofen and p.r.n. hydrocodone/APAP.      The patient is coming to the clinic for follow-up.  His neck pain has been under good control. It is an intermittent tightness and burning or cold sensation from the shoulder area shooting to his right hand.  His pain is worse when he wakes up in the morning.  It is better with his pain medications.  His max pain is 4-5/10 and minimum 0/10.  Today it is 0/10.  The patient denies any significant upper extremity weakness.  He complains of occasional spasms in his right arm.  He has chronic impaired hand coordination such as trouble buttoning or writing. This has been better with OT. He he reports occasional stumbling and falling due to lower extremity weakness and coordination problems. This has also been better with PT.    He is currently taking:  Gabapentin 600 mg p.o. 2 times per day.    Hydrocodone/APAP 10/325 p.r.n.3-4 times per day   Baclofen 10 mg p.o. b.i.d. p.r.n..    He finished courses of physical and occupational therapy.    Past Medical History:   Diagnosis Date    Allergy     Arthritis     Asthma     Cardiomyopathy     Cataract     Cervical radicular pain     Cervical spondylosis with myelopathy 10/17/2012    Chronic bronchitis     Chronic  systolic dysfunction of left ventricle 07/27/2015    Constipation 07/27/2015    COPD (chronic obstructive pulmonary disease)     CRPS (complex regional pain syndrome type I) 12/29/2014    Diabetes mellitus, type 2     DM type 2 (diabetes mellitus, type 2) 07/27/2015    Enlarged prostate 07/27/2015    Enuresis 07/06/2018    Hypertension     ICD (implantable cardiac defibrillator) in place     Mixed hyperlipidemia 02/28/2018    Presence of biventricular AICD 07/27/2015    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thy 02/01/2016    Urinary tract infection     pt does not know        Review of patient's allergies indicates:   Allergen Reactions    Ciprofloxacin Nausea And Vomiting        Review of Systems   Constitutional:  Negative for chills and fever.   Eyes:  Negative for visual disturbance.   Respiratory:  Positive for shortness of breath.    Cardiovascular:  Negative for chest pain.   Gastrointestinal:  Negative for constipation, nausea and vomiting.   Genitourinary:  Negative for difficulty urinating.   Musculoskeletal:  Positive for gait problem, myalgias and neck pain. Negative for back pain.   Neurological:  Negative for dizziness, weakness and headaches.   Psychiatric/Behavioral:  Negative for behavioral problems and sleep disturbance.              Objective:      Physical Exam  Vitals reviewed.   Constitutional:       General: He is not in acute distress.     Appearance: He is well-developed.   HENT:      Head: Normocephalic and atraumatic.   Neck:      Comments: Healed posterior and anterior neck surgical scars.  Decreased ROM in all planes.  -ve tenderness  Musculoskeletal:      Comments: BUE:  ROM:   RUE: full.   LUE: full.  Strength:    RUE: 5/5 at shoulder abduction, 5 elbow flexion, 5 wrist extension, 5 elbow extension, 5 finger flexion, 5 finger abduction.   LUE: 5/5 at shoulder abduction, 5 elbow flexion, 5 wrist extension, 5 elbow extension, 5 finger flexion, 5 finger abduction.  Sensation to  pinprick:   RUE: intact.   LUE: intact.  DTR:    RUE: +1 biceps, +1 triceps.   LUE:  +1 biceps, +1 triceps.      BLE:  ROM:   RLE: full.   LLE: full.  Strength:    RLE: 5/5 at hip flexion, 5 knee extension, 5 ankle DF, 5 PF, 5 EHL.   LLE: 5/5 at hip flexion, 5 knee extension, 5 ankle DF, 5 PF, 5 EHL.  Sensation to pinprick:     RLE: intact.      LLE: intact.   DTR:     RLE: +1 knee, +1 ankle.    LLE: +1 knee, +1 ankle.  Clonus:    Rt ankle: -ve.    Lt ankle: -ve.  SLR (sitting):      RLE: -ve.      LLE: -ve.    -ve tenderness lumbar spine.    Gait:  Slow aiden, some shuffling and circumduction on the right side.     Skin:     General: Skin is warm.   Neurological:      Mental Status: He is alert.   Psychiatric:         Behavior: Behavior normal.         Assessment:       1. Chronic neck pain    2. Spondylosis of cervical region without myelopathy or radiculopathy    3. Status post cervical spinal fusion    4. Cervical post-laminectomy syndrome    5. Complex regional pain syndrome type 2 of right upper extremity    6. Gait disorder    7. Chronically on opiate therapy          Plan:       - Oral NSAIDs will be avoided due to cardiac illness.  - Trial of increasing gabapentin 600 mg, 1 tablet by mouth 3 times per day   - Continue hydrocodone/APAP 10/325, 1 tablet by mouth every 6 hours as needed for pain.    - Continue baclofen 10 mg tablets; take 1 tablet by mouth 3 times per day as needed for muscle spasms.  - Regular home exercise program was encouraged.  - Pain Clinic Drug Screen; Future  - Follow up in about 4 months (around 2/6/2024).      This note was partly generated with [employer] voice recognition software. I apologize for any possible typographical errors.

## 2023-10-10 LAB
6MAM UR QL: NOT DETECTED
7AMINOCLONAZEPAM UR QL: NOT DETECTED
A-OH ALPRAZ UR QL: NOT DETECTED
ALPHA-OH-MIDAZOLAM: NOT DETECTED
ALPRAZ UR QL: NOT DETECTED
AMPHET UR QL SCN: NOT DETECTED
ANNOTATION COMMENT IMP: NORMAL
ANNOTATION COMMENT IMP: NORMAL
BARBITURATES UR QL: NOT DETECTED
BUPRENORPHINE UR QL: NOT DETECTED
BZE UR QL: NOT DETECTED
CARBOXYTHC UR QL: PRESENT
CARISOPRODOL UR QL: NOT DETECTED
CLONAZEPAM UR QL: NOT DETECTED
CODEINE UR QL: NOT DETECTED
CREAT UR-MCNC: 112.8 MG/DL (ref 20–400)
DIAZEPAM UR QL: NOT DETECTED
ETHYL GLUCURONIDE UR QL: NOT DETECTED
FENTANYL UR QL: NOT DETECTED
GABAPENTIN: PRESENT
HYDROCODONE UR QL: PRESENT
HYDROMORPHONE UR QL: PRESENT
LORAZEPAM UR QL: NOT DETECTED
MDA UR QL: NOT DETECTED
MDEA UR QL: NOT DETECTED
MDMA UR QL: NOT DETECTED
ME-PHENIDATE UR QL: NOT DETECTED
METHADONE UR QL: NOT DETECTED
METHAMPHET UR QL: NOT DETECTED
MIDAZOLAM UR QL SCN: NOT DETECTED
MORPHINE UR QL: NOT DETECTED
NALOXONE: NOT DETECTED
NORBUPRENORPHINE UR QL CFM: NOT DETECTED
NORDIAZEPAM UR QL: NOT DETECTED
NORFENTANYL UR QL: NOT DETECTED
NORHYDROCODONE UR QL CFM: PRESENT
NORMEPERIDINE UR QL CFM: NOT DETECTED
NOROXYCODONE UR QL CFM: NOT DETECTED
NOROXYMORPHONE UR QL SCN: NOT DETECTED
OXAZEPAM UR QL: NOT DETECTED
OXYCODONE UR QL: NOT DETECTED
OXYMORPHONE UR QL: NOT DETECTED
PATHOLOGY STUDY: NORMAL
PCP UR QL: NOT DETECTED
PHENTERMINE UR QL: NOT DETECTED
PREGABALIN: NOT DETECTED
SERVICE CMNT-IMP: NORMAL
TAPENTADOL UR QL SCN: NOT DETECTED
TAPENTADOL UR QL SCN: NOT DETECTED
TEMAZEPAM UR QL: NOT DETECTED
TRAMADOL UR QL: NOT DETECTED
ZOLPIDEM METABOLITE: NOT DETECTED
ZOLPIDEM UR QL: NOT DETECTED

## 2023-10-12 ENCOUNTER — TELEPHONE (OUTPATIENT)
Dept: NEUROLOGY | Facility: CLINIC | Age: 73
End: 2023-10-12
Payer: MEDICARE

## 2023-10-12 NOTE — TELEPHONE ENCOUNTER
----- Message from Tadeo Muñiz sent at 10/12/2023 12:32 PM CDT -----  Regarding: Refill  Contact: pt [phone]  Rx Refill/Request    Is this a Refill or New Rx:  refill    Rx Name and Strength:    - HYDROcodone-acetaminophen (NORCO)  mg per tablet 120 tablet      Preferred Pharmacy with phone number:    [facility] #[id number] [facility] [address] [facility] AT [address] [address]  Phone: [phone] Fax: [phone]     Communication Preference: please call pt @[phone]    Additional Information: pt is calling to check status of refill request, refill was sent to [facility] which is out of stock, pt talked to [facility] today as was told medication is in stock, pt is requesting prescription be sent to [facility], also pt is out of medication.

## 2023-10-12 NOTE — TELEPHONE ENCOUNTER
----- Message from Rubina Ramirez sent at 10/12/2023  9:04 AM CDT -----  Regarding: refill  Contact: [phone]  Rx Refill/Request         Is this a Refill or New Rx:  new refill         Rx Name and Strength:       -HYDROcodone-acetaminophen (NORCO)  mg per tablet 120 tablet     Preferred Pharmacy with phone number:  [facility] #[id number] [facility] [address] [facility] [address]  [facility] [address]  [facility] [address]  Phone: [phone] Fax: [phone]       Communication Preference:         Additional Information Pt states he needs an new rx sent over to the pharmacy for refill. Please call the pharmacy has medication in stock

## 2023-10-13 DIAGNOSIS — R29.898 RIGHT HAND WEAKNESS: ICD-10-CM

## 2023-10-13 DIAGNOSIS — M50.00 HNP (HERNIATED NUCLEUS PULPOSUS) WITH MYELOPATHY, CERVICAL: ICD-10-CM

## 2023-10-13 DIAGNOSIS — G56.41 COMPLEX REGIONAL PAIN SYNDROME TYPE 2 OF RIGHT UPPER EXTREMITY: ICD-10-CM

## 2023-10-13 DIAGNOSIS — M54.12 CERVICAL RADICULAR PAIN: ICD-10-CM

## 2023-10-13 DIAGNOSIS — M48.02 SPINAL STENOSIS IN CERVICAL REGION: ICD-10-CM

## 2023-10-13 DIAGNOSIS — M79.601 RIGHT ARM PAIN: ICD-10-CM

## 2023-10-13 DIAGNOSIS — F11.20 NARCOTIC DEPENDENCY, CONTINUOUS: ICD-10-CM

## 2023-10-13 DIAGNOSIS — M54.12 BRACHIAL NEURITIS OR RADICULITIS: ICD-10-CM

## 2023-10-13 DIAGNOSIS — M50.30 DEGENERATION OF CERVICAL INTERVERTEBRAL DISC: ICD-10-CM

## 2023-10-13 DIAGNOSIS — G89.4 CHRONIC PAIN SYNDROME: ICD-10-CM

## 2023-10-13 DIAGNOSIS — M62.81 MUSCLE RIGHT ARM WEAKNESS: ICD-10-CM

## 2023-10-13 DIAGNOSIS — M96.1 CERVICAL POST-LAMINECTOMY SYNDROME: ICD-10-CM

## 2023-10-13 DIAGNOSIS — M75.101 ROTATOR CUFF SYNDROME OF RIGHT SHOULDER: ICD-10-CM

## 2023-10-13 DIAGNOSIS — M47.12 CERVICAL SPONDYLOSIS WITH MYELOPATHY: ICD-10-CM

## 2023-10-13 DIAGNOSIS — R29.898 RIGHT ARM WEAKNESS: ICD-10-CM

## 2023-10-13 DIAGNOSIS — G90.50 COMPLEX REGIONAL PAIN SYNDROME TYPE 1, AFFECTING UNSPECIFIED SITE: ICD-10-CM

## 2023-10-13 DIAGNOSIS — M54.12 CERVICAL RADICULOPATHY: ICD-10-CM

## 2023-10-13 DIAGNOSIS — M48.02 CERVICAL STENOSIS OF SPINE: ICD-10-CM

## 2023-10-13 DIAGNOSIS — M47.812 FACET ARTHRITIS OF CERVICAL REGION: ICD-10-CM

## 2023-10-13 RX ORDER — HYDROCODONE BITARTRATE AND ACETAMINOPHEN 10; 325 MG/1; MG/1
1 TABLET ORAL EVERY 6 HOURS PRN
Qty: 120 TABLET | Refills: 0 | Status: SHIPPED | OUTPATIENT
Start: 2023-10-13 | End: 2023-11-08 | Stop reason: SDUPTHER

## 2023-10-13 RX ORDER — LANCETS 33 GAUGE
EACH MISCELLANEOUS
Qty: 100 EACH | Refills: 3 | OUTPATIENT
Start: 2023-10-13

## 2023-10-16 NOTE — TELEPHONE ENCOUNTER
No care due was identified.  [facility] Embedded Care Due Messages. Reference number: [id number].   10/16/2023 10:22:18 AM CDT

## 2023-10-17 ENCOUNTER — LAB VISIT (OUTPATIENT)
Dept: LAB | Facility: HOSPITAL | Age: 73
End: 2023-10-17
Attending: INTERNAL MEDICINE
Payer: MEDICARE

## 2023-10-17 DIAGNOSIS — I42.8 CARDIOMYOPATHY, NONISCHEMIC: ICD-10-CM

## 2023-10-17 DIAGNOSIS — I49.9 CARDIAC ARRHYTHMIA, UNSPECIFIED CARDIAC ARRHYTHMIA TYPE: ICD-10-CM

## 2023-10-17 DIAGNOSIS — Z45.02 ICD (IMPLANTABLE CARDIOVERTER-DEFIBRILLATOR) BATTERY DEPLETION: ICD-10-CM

## 2023-10-17 LAB
ANION GAP SERPL CALC-SCNC: 9 MMOL/L (ref 8–16)
APTT PPP: 32.3 SEC (ref 21–32)
BASOPHILS # BLD AUTO: 0.03 K/UL (ref 0–0.2)
BASOPHILS NFR BLD: 0.4 % (ref 0–1.9)
BUN SERPL-MCNC: 10 MG/DL (ref 8–23)
CALCIUM SERPL-MCNC: 9.3 MG/DL (ref 8.7–10.5)
CHLORIDE SERPL-SCNC: 102 MMOL/L (ref 95–110)
CO2 SERPL-SCNC: 26 MMOL/L (ref 23–29)
CREAT SERPL-MCNC: 0.8 MG/DL (ref 0.5–1.4)
DIFFERENTIAL METHOD: ABNORMAL
EOSINOPHIL # BLD AUTO: 0.2 K/UL (ref 0–0.5)
EOSINOPHIL NFR BLD: 2.5 % (ref 0–8)
ERYTHROCYTE [DISTWIDTH] IN BLOOD BY AUTOMATED COUNT: 11.8 % (ref 11.5–14.5)
EST. GFR  (NO RACE VARIABLE): >60 ML/MIN/1.73 M^2
GLUCOSE SERPL-MCNC: 133 MG/DL (ref 70–110)
HCT VFR BLD AUTO: 43.2 % (ref 40–54)
HGB BLD-MCNC: 13.3 G/DL (ref 14–18)
IMM GRANULOCYTES # BLD AUTO: 0.02 K/UL (ref 0–0.04)
IMM GRANULOCYTES NFR BLD AUTO: 0.3 % (ref 0–0.5)
INR PPP: 1.1 (ref 0.8–1.2)
LYMPHOCYTES # BLD AUTO: 2.1 K/UL (ref 1–4.8)
LYMPHOCYTES NFR BLD: 28.9 % (ref 18–48)
MCH RBC QN AUTO: 29.3 PG (ref 27–31)
MCHC RBC AUTO-ENTMCNC: 30.8 G/DL (ref 32–36)
MCV RBC AUTO: 95 FL (ref 82–98)
MONOCYTES # BLD AUTO: 0.6 K/UL (ref 0.3–1)
MONOCYTES NFR BLD: 7.8 % (ref 4–15)
NEUTROPHILS # BLD AUTO: 4.3 K/UL (ref 1.8–7.7)
NEUTROPHILS NFR BLD: 60.1 % (ref 38–73)
NRBC BLD-RTO: 0 /100 WBC
PLATELET # BLD AUTO: 299 K/UL (ref 150–450)
PMV BLD AUTO: 10.6 FL (ref 9.2–12.9)
POTASSIUM SERPL-SCNC: 4.3 MMOL/L (ref 3.5–5.1)
PROTHROMBIN TIME: 11.4 SEC (ref 9–12.5)
RBC # BLD AUTO: 4.54 M/UL (ref 4.6–6.2)
SODIUM SERPL-SCNC: 137 MMOL/L (ref 136–145)
WBC # BLD AUTO: 7.14 K/UL (ref 3.9–12.7)

## 2023-10-17 PROCEDURE — 85730 THROMBOPLASTIN TIME PARTIAL: CPT | Performed by: INTERNAL MEDICINE

## 2023-10-17 PROCEDURE — 85610 PROTHROMBIN TIME: CPT | Performed by: INTERNAL MEDICINE

## 2023-10-17 PROCEDURE — 80048 BASIC METABOLIC PNL TOTAL CA: CPT | Performed by: INTERNAL MEDICINE

## 2023-10-17 PROCEDURE — 85025 COMPLETE CBC W/AUTO DIFF WBC: CPT | Performed by: INTERNAL MEDICINE

## 2023-10-17 PROCEDURE — 36415 COLL VENOUS BLD VENIPUNCTURE: CPT | Mod: PO | Performed by: INTERNAL MEDICINE

## 2023-10-17 RX ORDER — PRAVASTATIN SODIUM 10 MG/1
TABLET ORAL
Qty: 90 TABLET | Refills: 2 | Status: SHIPPED | OUTPATIENT
Start: 2023-10-17

## 2023-10-17 NOTE — TELEPHONE ENCOUNTER
Refill Decision Note   Xander Daniel  is requesting a refill authorization.  Brief Assessment and Rationale for Refill:  Approve     Medication Therapy Plan:         Comments:     Note composed:4:21 AM 10/17/2023

## 2023-10-23 ENCOUNTER — ANESTHESIA EVENT (OUTPATIENT)
Dept: MEDSURG UNIT | Facility: HOSPITAL | Age: 73
End: 2023-10-23
Payer: MEDICARE

## 2023-10-23 ENCOUNTER — TELEPHONE (OUTPATIENT)
Dept: ELECTROPHYSIOLOGY | Facility: CLINIC | Age: 73
End: 2023-10-23
Payer: MEDICARE

## 2023-10-23 NOTE — H&P
"[facility] - Short Stay Cardiac Unit  Cardiac Electrophysiology  History and Physical     Admission Date: 10/24/2023  Code Status: Prior   Attending Provider: Christian Navarro MD   Principal Problem:Pulse generator of implantable cardioverter-defibrillator (ICD) at end of life    Subjective:     Chief Complaint:  Generator change     HPI:   Mr. Sanchez is a 73 y.o. male with NICM (EF last 2018 was 40%), LBBB, COPD, CRT-D (LV lead off) here for generator change of CRT-D.     He was diagnosed with NICM with a LBBB at [facility] in approx ~2005, [facility] approx 5-6 years ago per patient at  that was "no blockages"  A single chamber ICD implanted for primary prevention, since he has been followed here at [facility] his LBBB resolved and NICM normalized.   He was scheduled for a generator change, noted to be in LBBB again (with correlative NYHA III symptoms) and fluoroscopy of the RV (Riata) revealed lead externalization but a patent left sided venous system. Echo revealed EF 25%.  3/24/15 Extraction of ICD lead and implantation of CRT-D (Dr. Mensah). Paucity of LV vein targets, has high LV threshold.  Did not derive symptomatic benefit from upgrade, and EF remained unchanged at 30%.  We turned off LV lead 11/16 to conserve battery.  5/27/2020: Mr. Sanchez is doing well from a device perspective with stable lead and device function. Narrow QRS. No arrhythmia noted. No significant RV pacing. He feels well.     3/31/2022: Mr. Sanchez is doing well from a device perspective with stable lead and device function. No arrhythmia noted. No RV pacing. LV lead off. Narrow QRS. On GDMT. No CHF symptoms. He feels well.     4/13/2023: Device Interrogation reveals an intrinsic SR with stable lead and device function. No arrhythmias or treated episodes were noted.  He paces 8% in the RA and 0% in the RV. Estimated battery longevity 5 months in 4/2023.  LV lead OFF due to narrow QRS.    REEMA 8/25/23     EKG today: NSR rate 65    Past Medical " History:   Diagnosis Date    Allergy     Arthritis     Asthma     Cardiomyopathy     Cataract     Cervical radicular pain     Cervical spondylosis with myelopathy 10/17/2012    Chronic bronchitis     Chronic systolic dysfunction of left ventricle 07/27/2015    Constipation 07/27/2015    COPD (chronic obstructive pulmonary disease)     CRPS (complex regional pain syndrome type I) 12/29/2014    Diabetes mellitus, type 2     DM type 2 (diabetes mellitus, type 2) 07/27/2015    Enlarged prostate 07/27/2015    Enuresis 07/06/2018    Hypertension     ICD (implantable cardiac defibrillator) in place     Mixed hyperlipidemia 02/28/2018    Presence of biventricular AICD 07/27/2015    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thy 02/01/2016    Urinary tract infection     pt does not know     Past Surgical History:   Procedure Laterality Date    CARDIAC DEFIBRILLATOR PLACEMENT      CATARACT EXTRACTION W/  INTRAOCULAR LENS IMPLANT Right 11/10/2020    Procedure: EXTRACTION, CATARACT, WITH IOL INSERTION;  Surgeon: Oral Graham MD;  Location: [facility];  Service: Ophthalmology;  Laterality: Right;    CATARACT EXTRACTION W/  INTRAOCULAR LENS IMPLANT Left 11/24/2020    Procedure: EXTRACTION, CATARACT, WITH IOL INSERTION;  Surgeon: Oral Graham MD;  Location: [facility];  Service: Ophthalmology;  Laterality: Left;    CERVICAL SPINE SURGERY      COLONOSCOPY N/A 7/19/2017    Procedure: COLONOSCOPY  golytely;  Surgeon: Vannessa Uribe MD;  Location: [facility];  Service: Endoscopy;  Laterality: N/A;    SPINE SURGERY       Review of patient's allergies indicates:   Allergen Reactions    Ciprofloxacin Nausea And Vomiting     No current facility-administered medications on file prior to encounter.     Current Outpatient Medications on File Prior to Encounter   Medication Sig    aspirin (ECOTRIN) 81 MG EC tablet Take 81 mg by mouth once daily.    baclofen (LIORESAL) 10 MG tablet TAKE 1 TABLET THREE TIMES DAILY     hydroCHLOROthiazide (MICROZIDE) 12.5 mg capsule TAKE 1 CAPSULE (12.5 MG TOTAL) BY MOUTH ONCE DAILY.    metoprolol succinate (TOPROL-XL) 25 MG 24 hr tablet TAKE 1 TABLET EVERY DAY    pantoprazole (PROTONIX) 40 MG tablet TAKE 1 TABLET BY MOUTH ONCE DAILY BEFORE BREAKFAST    tamsulosin (FLOMAX) 0.4 mg Cap TAKE 1 CAPSULE EVERY DAY    valsartan (DIOVAN) 40 MG tablet TAKE 1 TABLET EVERY DAY    alcohol swabs PadM Apply 1 each topically as needed.    blood glucose control, low (TRUE METRIX LEVEL 1) Soln 1 Units by Misc.(Non-Drug; Combo Route) route once daily.    blood sugar diagnostic (TRUE METRIX GLUCOSE TEST STRIP) Strp TEST  ONE  TIME  DAILY  AT  6AM    blood-glucose meter (TRUE METRIX AIR GLUCOSE METER) kit USE AS INSTRUCTED    econazole nitrate 1 % cream Apply topically once daily. for 10 days    lancets (TRUEPLUS LANCETS) 33 gauge Misc TEST ONE TIME DAILY AT 6:00AM    mupirocin (BACTROBAN) 2 % ointment Apply topically 3 (three) times daily.    oxybutynin (DITROPAN) 5 MG Tab TAKE 1 TABLET TWICE DAILY     Family History       Problem Relation (Age of Onset)    COPD Father    Hypertension Mother, Father    No Known Problems Sister, Brother, Daughter          Tobacco Use    Smoking status: Former     Current packs/day: 0.00     Average packs/day: 1 pack/day for 40.0 years (40.0 ttl pk-yrs)     Types: Cigarettes     Start date: 1969     Quit date: 2009     Years since quittin.2    Smokeless tobacco: Never   Substance and Sexual Activity    Alcohol use: Yes     Alcohol/week: 2.0 standard drinks of alcohol     Types: 1 Cans of beer, 1 Shots of liquor per week     Comment: May 1-2 beers or whiskey per month    Drug use: No    Sexual activity: Yes     Partners: Female     Review of Systems   Constitutional: Negative for chills, diaphoresis and night sweats.   Cardiovascular:  Negative for chest pain, dyspnea on exertion, irregular heartbeat, leg swelling, near-syncope and palpitations.   Respiratory:  Negative  for cough, shortness of breath and wheezing.    Skin:  Negative for color change and dry skin.   Musculoskeletal:  Negative for joint pain and joint swelling.   Gastrointestinal:  Negative for abdominal pain, diarrhea, nausea and vomiting.   Genitourinary:  Negative for dysuria.   Neurological:  Negative for dizziness, headaches, light-headedness and weakness.   All other systems reviewed and are negative.    Objective:     Vital Signs (Most Recent):  Temp: 97.9 °F (36.6 °C) (10/24/23 1210)  Pulse: 70 (10/24/23 1210)  Resp: 18 (10/24/23 1210)  BP: 137/82 (10/24/23 1211)  SpO2: 96 % (10/24/23 1210) Vital Signs (24h Range):  Temp:  [97.9 °F (36.6 °C)] 97.9 °F (36.6 °C)  Pulse:  [70] 70  Resp:  [18] 18  SpO2:  [96 %] 96 %  BP: (137-138)/(82-85) 137/82     Body mass index is 24.83 kg/m².    Physical Exam  Vitals reviewed.   Constitutional:       Appearance: He is well-developed.   HENT:      Head: Normocephalic and atraumatic.   Eyes:      General: No scleral icterus.     Conjunctiva/sclera: Conjunctivae normal.   Neck:      Vascular: No JVD.   Cardiovascular:      Rate and Rhythm: Normal rate and regular rhythm.      Pulses: Intact distal pulses.      Heart sounds: Normal heart sounds. No murmur heard.     No friction rub. No gallop.   Pulmonary:      Effort: Pulmonary effort is normal.      Breath sounds: Normal breath sounds. No wheezing or rales.   Abdominal:      General: Bowel sounds are normal. There is no distension.      Palpations: Abdomen is soft.      Tenderness: There is no abdominal tenderness.   Musculoskeletal:         General: Normal range of motion.      Cervical back: Normal range of motion.      Right lower leg: No edema.      Left lower leg: No edema.   Skin:     General: Skin is warm and dry.      Capillary Refill: Capillary refill takes less than 2 seconds.      Findings: No erythema or rash.   Neurological:      General: No focal deficit present.      Mental Status: He is alert and oriented to  person, place, and time.   Psychiatric:         Behavior: Behavior normal.         Thought Content: Thought content normal.         Judgment: Judgment normal.     Significant Labs: Reviewed    Significant Imaging: Reviewed  Assessment and Plan:     Active Diagnoses:    Diagnosis Date Noted POA    PRINCIPAL PROBLEM:  Pulse generator of implantable cardioverter-defibrillator (ICD) at end of life [Z45.02] 10/24/2023 Not Applicable    Cardiomyopathy, nonischemic [I42.8] 08/27/2013 Yes    Biventricular implantable cardioverter-defibrillator (ICD) in situ [Z95.810] 07/26/2012 Yes      Problems Resolved During this Admission:     Mr. Sanchez is a 73 y.o. male with NICM (EF 2018 40%), LBBB, COPD, CRT-D (LV lead off) here for generator change.   Mr. Sanchez is doing well from a device perspective with stable lead and device function. No arrhythmia noted. No RV pacing. LV lead turned OFF due to narrow QRS. No new CHF symptoms. He is feeling well. Device is at REEMA.     Patient here today for generator change.   Device company [employer]    Anticoagulation status: none    The risks, benefits and alternatives of the procedure were explained to the patient, patient's family and/or surrogate decision maker. Risks include (but not limited to) bleeding, hematoma, infection, pain, damage to structures surrounding generator pocket site. All questions were answered. Patient is understanding of these risks, and would like to proceed. Consents signed.     Case discussed with Dr. Navarro.    Joe Berry MD  Cardiac Electrophysiology  [facility] - Short Stay Cardiac Unit

## 2023-10-23 NOTE — TELEPHONE ENCOUNTER
Spoke to patient    CONFIRMED procedure arrival time of 12:00 Noon on 10/24/2023    Reiterated instructions including:  -Directions to check in desk  -NPO after midnight night prior to procedure  -Pre-procedure LABS reviewed  -Confirmed presence of implanted device/stimulator - SJM CRT-D in situ.  -Confirmed no fever, cough, or shortness of breath in the past 30 days  -Confirmed no redness, rash, irritation, or yeast infection to chest area.   -Bathe night prior and morning prior to procedure with Hibiclens solution or an antibacterial soap  -Reviewed current visitor policy    Patient verbalized understanding of above and appreciated the call.

## 2023-10-24 ENCOUNTER — ANESTHESIA (OUTPATIENT)
Dept: MEDSURG UNIT | Facility: HOSPITAL | Age: 73
End: 2023-10-24
Payer: MEDICARE

## 2023-10-24 ENCOUNTER — HOSPITAL ENCOUNTER (OUTPATIENT)
Facility: HOSPITAL | Age: 73
Discharge: HOME | End: 2023-10-24
Attending: INTERNAL MEDICINE | Admitting: INTERNAL MEDICINE
Payer: MEDICARE

## 2023-10-24 ENCOUNTER — CLINICAL SUPPORT (OUTPATIENT)
Dept: CARDIOLOGY | Facility: HOSPITAL | Age: 73
End: 2023-10-24
Payer: MEDICARE

## 2023-10-24 VITALS
WEIGHT: 178 LBS | RESPIRATION RATE: 18 BRPM | OXYGEN SATURATION: 95 % | BODY MASS INDEX: 24.92 KG/M2 | DIASTOLIC BLOOD PRESSURE: 64 MMHG | SYSTOLIC BLOOD PRESSURE: 126 MMHG | HEIGHT: 71 IN | TEMPERATURE: 98 F | HEART RATE: 67 BPM

## 2023-10-24 DIAGNOSIS — I42.8 NICM (NONISCHEMIC CARDIOMYOPATHY): ICD-10-CM

## 2023-10-24 DIAGNOSIS — Z95.9 CARDIAC DEVICE IN SITU: ICD-10-CM

## 2023-10-24 DIAGNOSIS — I50.9 HEART FAILURE, UNSPECIFIED: ICD-10-CM

## 2023-10-24 DIAGNOSIS — I49.9 ARRHYTHMIA: ICD-10-CM

## 2023-10-24 DIAGNOSIS — Z95.810 PRESENCE OF AUTOMATIC (IMPLANTABLE) CARDIAC DEFIBRILLATOR: ICD-10-CM

## 2023-10-24 DIAGNOSIS — Z45.02 ICD (IMPLANTABLE CARDIOVERTER-DEFIBRILLATOR) BATTERY DEPLETION: ICD-10-CM

## 2023-10-24 LAB
POCT GLUCOSE: 79 MG/DL (ref 70–110)
POCT GLUCOSE: 88 MG/DL (ref 70–110)

## 2023-10-24 PROCEDURE — 27800903 OPTIME MED/SURG SUP & DEVICES OTHER IMPLANTS: Performed by: INTERNAL MEDICINE

## 2023-10-24 PROCEDURE — 25000003 PHARM REV CODE 250: Performed by: STUDENT IN AN ORGANIZED HEALTH CARE EDUCATION/TRAINING PROGRAM

## 2023-10-24 PROCEDURE — 93005 ELECTROCARDIOGRAM TRACING: CPT | Mod: 59

## 2023-10-24 PROCEDURE — 33264 RMVL & RPLCMT DFB GEN MLT LD: CPT | Mod: ,,, | Performed by: INTERNAL MEDICINE

## 2023-10-24 PROCEDURE — D9220A PRA ANESTHESIA: ICD-10-PCS | Mod: ANES,,, | Performed by: ANESTHESIOLOGY

## 2023-10-24 PROCEDURE — 25000003 PHARM REV CODE 250: Performed by: INTERNAL MEDICINE

## 2023-10-24 PROCEDURE — 93295 CARDIAC DEVICE CHECK - REMOTE: ICD-10-PCS | Mod: ,,, | Performed by: INTERNAL MEDICINE

## 2023-10-24 PROCEDURE — 25000003 PHARM REV CODE 250: Performed by: NURSE ANESTHETIST, CERTIFIED REGISTERED

## 2023-10-24 PROCEDURE — C1721 AICD, DUAL CHAMBER: HCPCS | Performed by: INTERNAL MEDICINE

## 2023-10-24 PROCEDURE — 93010 EKG 12-LEAD: ICD-10-PCS | Mod: 76,,, | Performed by: INTERNAL MEDICINE

## 2023-10-24 PROCEDURE — 37000009 HC ANESTHESIA EA ADD 15 MINS: Performed by: INTERNAL MEDICINE

## 2023-10-24 PROCEDURE — 63600175 PHARM REV CODE 636 W HCPCS: Performed by: INTERNAL MEDICINE

## 2023-10-24 PROCEDURE — 93296 REM INTERROG EVL PM/IDS: CPT | Performed by: INTERNAL MEDICINE

## 2023-10-24 PROCEDURE — D9220A PRA ANESTHESIA: Mod: ANES,,, | Performed by: ANESTHESIOLOGY

## 2023-10-24 PROCEDURE — 93295 DEV INTERROG REMOTE 1/2/MLT: CPT | Mod: ,,, | Performed by: INTERNAL MEDICINE

## 2023-10-24 PROCEDURE — 82962 GLUCOSE BLOOD TEST: CPT | Performed by: INTERNAL MEDICINE

## 2023-10-24 PROCEDURE — D9220A PRA ANESTHESIA: Mod: CRNA,,, | Performed by: NURSE ANESTHETIST, CERTIFIED REGISTERED

## 2023-10-24 PROCEDURE — 33264 RMVL & RPLCMT DFB GEN MLT LD: CPT | Performed by: INTERNAL MEDICINE

## 2023-10-24 PROCEDURE — D9220A PRA ANESTHESIA: ICD-10-PCS | Mod: CRNA,,, | Performed by: NURSE ANESTHETIST, CERTIFIED REGISTERED

## 2023-10-24 PROCEDURE — 27201423 OPTIME MED/SURG SUP & DEVICES STERILE SUPPLY: Performed by: INTERNAL MEDICINE

## 2023-10-24 PROCEDURE — 37000008 HC ANESTHESIA 1ST 15 MINUTES: Performed by: INTERNAL MEDICINE

## 2023-10-24 PROCEDURE — 93005 ELECTROCARDIOGRAM TRACING: CPT

## 2023-10-24 PROCEDURE — 63600175 PHARM REV CODE 636 W HCPCS: Performed by: NURSE ANESTHETIST, CERTIFIED REGISTERED

## 2023-10-24 PROCEDURE — 33264 PR REMV&REPLC CVD GEN MULT LEAD: ICD-10-PCS | Mod: ,,, | Performed by: INTERNAL MEDICINE

## 2023-10-24 PROCEDURE — 93010 ELECTROCARDIOGRAM REPORT: CPT | Mod: ,,, | Performed by: INTERNAL MEDICINE

## 2023-10-24 PROCEDURE — 93010 ELECTROCARDIOGRAM REPORT: CPT | Mod: 76,,, | Performed by: INTERNAL MEDICINE

## 2023-10-24 DEVICE — TIERED-THERAPY CARDIOVERTER/DEFIBRILLATOR VVEV VVIR
Type: IMPLANTABLE DEVICE | Site: CHEST | Status: FUNCTIONAL
Brand: FORTIFY ASSURA™

## 2023-10-24 DEVICE — LEAD CAP (IS-1/DF-1/SJ4/DF4/IS4)
Type: IMPLANTABLE DEVICE | Site: CHEST | Status: FUNCTIONAL
Brand: SJM™

## 2023-10-24 RX ORDER — LIDOCAINE HYDROCHLORIDE 20 MG/ML
INJECTION INTRAVENOUS
Status: DISCONTINUED | OUTPATIENT
Start: 2023-10-24 | End: 2023-10-24

## 2023-10-24 RX ORDER — LIDOCAINE HYDROCHLORIDE 20 MG/ML
INJECTION, SOLUTION INFILTRATION; PERINEURAL
Status: DISCONTINUED | OUTPATIENT
Start: 2023-10-24 | End: 2023-10-27 | Stop reason: HOSPADM

## 2023-10-24 RX ORDER — BUPIVACAINE HYDROCHLORIDE 2.5 MG/ML
INJECTION, SOLUTION EPIDURAL; INFILTRATION; INTRACAUDAL
Status: DISCONTINUED | OUTPATIENT
Start: 2023-10-24 | End: 2023-10-27 | Stop reason: HOSPADM

## 2023-10-24 RX ORDER — CEFAZOLIN SODIUM 1 G/3ML
INJECTION, POWDER, FOR SOLUTION INTRAMUSCULAR; INTRAVENOUS
Status: DISCONTINUED | OUTPATIENT
Start: 2023-10-24 | End: 2023-10-24

## 2023-10-24 RX ORDER — ONDANSETRON 2 MG/ML
INJECTION INTRAMUSCULAR; INTRAVENOUS
Status: DISCONTINUED | OUTPATIENT
Start: 2023-10-24 | End: 2023-10-24

## 2023-10-24 RX ORDER — SODIUM CHLORIDE 0.9 G/100ML
IRRIGANT IRRIGATION
Status: DISCONTINUED | OUTPATIENT
Start: 2023-10-24 | End: 2023-10-27 | Stop reason: HOSPADM

## 2023-10-24 RX ORDER — PHENYLEPHRINE HYDROCHLORIDE 10 MG/ML
INJECTION INTRAVENOUS
Status: DISCONTINUED | OUTPATIENT
Start: 2023-10-24 | End: 2023-10-24

## 2023-10-24 RX ORDER — SODIUM CHLORIDE 9 MG/ML
INJECTION, SOLUTION INTRAMUSCULAR; INTRAVENOUS; SUBCUTANEOUS
Status: DISCONTINUED | OUTPATIENT
Start: 2023-10-24 | End: 2023-10-27 | Stop reason: HOSPADM

## 2023-10-24 RX ORDER — VANCOMYCIN HYDROCHLORIDE 1 G/20ML
INJECTION, POWDER, LYOPHILIZED, FOR SOLUTION INTRAVENOUS
Status: DISCONTINUED | OUTPATIENT
Start: 2023-10-24 | End: 2023-10-27 | Stop reason: HOSPADM

## 2023-10-24 RX ORDER — FENTANYL CITRATE 50 UG/ML
25 INJECTION, SOLUTION INTRAMUSCULAR; INTRAVENOUS EVERY 5 MIN PRN
Status: DISCONTINUED | OUTPATIENT
Start: 2023-10-24 | End: 2023-10-27 | Stop reason: HOSPADM

## 2023-10-24 RX ORDER — FENTANYL CITRATE 50 UG/ML
INJECTION, SOLUTION INTRAMUSCULAR; INTRAVENOUS
Status: DISCONTINUED | OUTPATIENT
Start: 2023-10-24 | End: 2023-10-24

## 2023-10-24 RX ORDER — ONDANSETRON 2 MG/ML
4 INJECTION INTRAMUSCULAR; INTRAVENOUS DAILY PRN
Status: DISCONTINUED | OUTPATIENT
Start: 2023-10-24 | End: 2023-10-27 | Stop reason: HOSPADM

## 2023-10-24 RX ORDER — ACETAMINOPHEN 325 MG/1
650 TABLET ORAL EVERY 4 HOURS PRN
Status: DISCONTINUED | OUTPATIENT
Start: 2023-10-24 | End: 2023-10-27 | Stop reason: HOSPADM

## 2023-10-24 RX ORDER — PROPOFOL 10 MG/ML
VIAL (ML) INTRAVENOUS CONTINUOUS PRN
Status: DISCONTINUED | OUTPATIENT
Start: 2023-10-24 | End: 2023-10-24

## 2023-10-24 RX ORDER — DOXYCYCLINE HYCLATE 100 MG
100 TABLET ORAL 2 TIMES DAILY
Qty: 10 TABLET | Refills: 0 | Status: SHIPPED | OUTPATIENT
Start: 2023-10-24 | End: 2023-10-29

## 2023-10-24 RX ORDER — FAMOTIDINE 10 MG/ML
INJECTION INTRAVENOUS
Status: DISCONTINUED | OUTPATIENT
Start: 2023-10-24 | End: 2023-10-24

## 2023-10-24 RX ORDER — VASOPRESSIN 20 [USP'U]/ML
INJECTION, SOLUTION INTRAMUSCULAR; SUBCUTANEOUS
Status: DISCONTINUED | OUTPATIENT
Start: 2023-10-24 | End: 2023-10-24

## 2023-10-24 RX ORDER — DEXMEDETOMIDINE HYDROCHLORIDE 100 UG/ML
INJECTION, SOLUTION INTRAVENOUS
Status: DISCONTINUED | OUTPATIENT
Start: 2023-10-24 | End: 2023-10-24

## 2023-10-24 RX ADMIN — FENTANYL CITRATE 12.5 MCG: 0.05 INJECTION, SOLUTION INTRAMUSCULAR; INTRAVENOUS at 02:10

## 2023-10-24 RX ADMIN — DEXMEDETOMIDINE 4 MCG: 100 INJECTION, SOLUTION, CONCENTRATE INTRAVENOUS at 02:10

## 2023-10-24 RX ADMIN — ONDANSETRON 4 MG: 2 INJECTION INTRAMUSCULAR; INTRAVENOUS at 02:10

## 2023-10-24 RX ADMIN — LIDOCAINE HYDROCHLORIDE 50 MG: 20 INJECTION INTRAVENOUS at 02:10

## 2023-10-24 RX ADMIN — PROPOFOL 20 MG: 10 INJECTION, EMULSION INTRAVENOUS at 02:10

## 2023-10-24 RX ADMIN — PHENYLEPHRINE HYDROCHLORIDE 100 MCG: 10 INJECTION INTRAVENOUS at 02:10

## 2023-10-24 RX ADMIN — VASOPRESSIN 0.2 UNITS: 20 INJECTION INTRAVENOUS at 03:10

## 2023-10-24 RX ADMIN — ACETAMINOPHEN 650 MG: 325 TABLET ORAL at 04:10

## 2023-10-24 RX ADMIN — SODIUM CHLORIDE: 0.9 INJECTION, SOLUTION INTRAVENOUS at 02:10

## 2023-10-24 RX ADMIN — FAMOTIDINE 20 MG: 10 INJECTION, SOLUTION INTRAVENOUS at 02:10

## 2023-10-24 RX ADMIN — GLYCOPYRROLATE 0.2 MG: 0.2 INJECTION, SOLUTION INTRAMUSCULAR; INTRAVENOUS at 02:10

## 2023-10-24 RX ADMIN — PHENYLEPHRINE HYDROCHLORIDE 200 MCG: 10 INJECTION INTRAVENOUS at 03:10

## 2023-10-24 RX ADMIN — PHENYLEPHRINE HYDROCHLORIDE 100 MCG: 10 INJECTION INTRAVENOUS at 03:10

## 2023-10-24 RX ADMIN — PROPOFOL 100 MCG/KG/MIN: 10 INJECTION, EMULSION INTRAVENOUS at 02:10

## 2023-10-24 RX ADMIN — CEFAZOLIN 2 G: 330 INJECTION, POWDER, FOR SOLUTION INTRAMUSCULAR; INTRAVENOUS at 02:10

## 2023-10-24 RX ADMIN — PROPOFOL 30 MG: 10 INJECTION, EMULSION INTRAVENOUS at 02:10

## 2023-10-24 NOTE — NURSING
Received report from YOUSIF Lam. Patient s/p Generator change, AAOx4. VSS, no c/o pain or discomfort at this time, resp even and unlabored. Foam mepilex dressing to left chest is CDI. No active bleeding. No hematoma noted. Post procedure protocol reviewed with patient and patient's wife. Understanding verbalized. Wife at bedside. Nurse call bell within reach.

## 2023-10-24 NOTE — DISCHARGE INSTRUCTIONS
Follow Up:  Device clinic in 1 week  Dr. Navarro in 3 months     Patient Instructions:   Medication instructions:  Complete your 5 days of antibiotics  If you are on a blood thinner (examples: apixiban [Eliquis], rivaroxaban [Xarelto], dabigatran [Pradaxa], or enoxaparin [Lovenox]), do not take your blood thinner for the next 5 days after your procedure. You can resume after 5 days post-procedure (i.e., when you complete your antibiotics). If you are on Coumadin you can continue to take it the day of your procedure  Pain control: you can take up to Tylenol 1000 mg every 6 hours as needed or (if you do not have kidney disease) ibuprofen 600 mg every 6 hours as needed as needed for pain control (if you do not have kidney disease). If unsure, please contact your primary care physician for recommendations.     Activity restrictions & precautions:  Arm instructions:  Do not lift >5 lbs for 2 weeks.  Do not raise your elbow above your shoulder on the same side as your device for 6 weeks.      Surgical site   Dressing (see images below)  **Note: these are general rules to follow unless instructed otherwise** - see images below  If you have a brown rectangular gel bandage (A.K.A. Aquacel Ag dressing) over your surgical incision do not remove it. This will be removed at your device clinic follow up appointment in 1 week.  If you have a square light brown bandage (A.K.A Mepilex dressing) you should remove in 48 hours after your procedure.  If you have a pressure dressing (white elastic tape with gauze underneath or a very large bandage that covers your left chest and is shaped like a triangle) over your surgical site, this should be removed the morning after your procedure unless instructed otherwise.  Do not place any creams or ointments over the surgical site. This could effect the integrity of the Dermabond glue.  You can shower after 24 hours post-procedure, but do not let the jet directly hit your pocket site for at  least 2 weeks. Recommend showering with your back to the shower head.   Do not reach your arm (on the same side as your device) around your back during showering for 6 weeks.  Do not submerge your surgical incision site under water (swimming, bathing if you submerge the actual surgical site) for at least 6 week. It is imperative that the surgical site is completely healed prior to doing so     Follow up in device clinic in 1 week to check your incision and device function  Please contact the electrophysiology clinic if you have any questions or if you experience: potential surgical/pocket site complications (pain, swelling, bleeding, drainage), fevers, chest pain/shortness of breath, or for any other concerns.             Xander Sanchez was given education on their disease process and medications.        Medications:  Reconciled Home Medications:       Medication List          START taking these medications       doxycycline 100 MG tablet  Commonly known as: VIBRA-TABS  Take 1 tablet (100 mg total) by mouth 2 (two) times daily. for 5 days

## 2023-10-24 NOTE — DISCHARGE SUMMARY
[facility]  Electrophysiology  Discharge Summary      Patient Name: Xander Sanchez  MRN: [id number]  Admission Date: 10/24/2023  Hospital Length of Stay: 0 days  Discharge Date and Time:  10/24/2023 3:51 PM  Attending Physician: Christian Navarro MD    Discharging Provider: Joe Berry      [facility] Course:  Mr. Sanchez is a 73 y.o. male with NICM (EF last 2018 was 40%), LBBB, COPD, CRT-D (LV lead off) here for generator change of CRT-D.     Downgraded to dual chamber ICD with LV lead not being in use. LV lead capped.    Discharged Condition: good    Disposition:   Home    Follow Up:  Device clinic in 1 week  Dr. Navarro in 3 months    Patient Instructions:   Medication instructions:  Complete your 5 days of antibiotics  If you are on a blood thinner (examples: apixiban [Eliquis], rivaroxaban [Xarelto], dabigatran [Pradaxa], or enoxaparin [Lovenox]), do not take your blood thinner for the next 5 days after your procedure. You can resume after 5 days post-procedure (i.e., when you complete your antibiotics). If you are on Coumadin you can continue to take it the day of your procedure  Pain control: you can take up to Tylenol 1000 mg every 6 hours as needed or (if you do not have kidney disease) ibuprofen 600 mg every 6 hours as needed as needed for pain control (if you do not have kidney disease). If unsure, please contact your primary care physician for recommendations.    Activity restrictions & precautions:  Arm instructions:  Do not lift >5 lbs for 2 weeks.  Do not raise your elbow above your shoulder on the same side as your device for 6 weeks.     Surgical site   Dressing (see images below)  **Note: these are general rules to follow unless instructed otherwise** - see images below  If you have a brown rectangular gel bandage (A.K.A. Aquacel Ag dressing) over your surgical incision do not remove it. This will be removed at your device clinic follow up appointment in 1 week.  If you have a  square light brown bandage (A.K.A Mepilex dressing) you should remove in 48 hours after your procedure.  If you have a pressure dressing (white elastic tape with gauze underneath or a very large bandage that covers your left chest and is shaped like a triangle) over your surgical site, this should be removed the morning after your procedure unless instructed otherwise.  Do not place any creams or ointments over the surgical site. This could effect the integrity of the Dermabond glue.  You can shower after 24 hours post-procedure, but do not let the jet directly hit your pocket site for at least 2 weeks. Recommend showering with your back to the shower head.   Do not reach your arm (on the same side as your device) around your back during showering for 6 weeks.  Do not submerge your surgical incision site under water (swimming, bathing if you submerge the actual surgical site) for at least 6 week. It is imperative that the surgical site is completely healed prior to doing so    Follow up in device clinic in 1 week to check your incision and device function  Please contact the electrophysiology clinic if you have any questions or if you experience: potential surgical/pocket site complications (pain, swelling, bleeding, drainage), fevers, chest pain/shortness of breath, or for any other concerns.           Xander Sanchez was given education on their disease process and medications.      Medications:  Reconciled Home Medications:      Medication List        START taking these medications      doxycycline 100 MG tablet  Commonly known as: VIBRA-TABS  Take 1 tablet (100 mg total) by mouth 2 (two) times daily. for 5 days            CONTINUE taking these medications      alcohol swabs Padm  Apply 1 each topically as needed.     aspirin 81 MG EC tablet  Commonly known as: ECOTRIN  Take 81 mg by mouth once daily.     baclofen 10 MG tablet  Commonly known as: LIORESAL  TAKE 1 TABLET THREE TIMES DAILY     blood-glucose meter  kit  Commonly known as: TRUE METRIX AIR GLUCOSE METER  USE AS INSTRUCTED     econazole nitrate 1 % cream  Apply topically once daily. for 10 days     fluticasone-salmeterol 230-21 mcg/dose 230-21 mcg/actuation Hfaa inhaler  Commonly known as: ADVAIR HFA  Inhale 2 puffs into the lungs 2 (two) times daily. Controller     gabapentin 600 MG tablet  Commonly known as: NEURONTIN  Take 1 tablet (600 mg total) by mouth 3 (three) times daily.     hydroCHLOROthiazide 12.5 mg capsule  Commonly known as: MICROZIDE  TAKE 1 CAPSULE (12.5 MG TOTAL) BY MOUTH ONCE DAILY.     HYDROcodone-acetaminophen  mg per tablet  Commonly known as: NORCO  Take 1 tablet by mouth every 6 (six) hours as needed for Pain.     lancets 33 gauge Misc  Commonly known as: TRUEPLUS LANCETS  TEST ONE TIME DAILY AT 6:00AM     metoprolol succinate 25 MG 24 hr tablet  Commonly known as: TOPROL-XL  TAKE 1 TABLET EVERY DAY     mupirocin 2 % ointment  Commonly known as: BACTROBAN  Apply topically 3 (three) times daily.     oxybutynin 5 MG Tab  Commonly known as: DITROPAN  TAKE 1 TABLET TWICE DAILY     pantoprazole 40 MG tablet  Commonly known as: PROTONIX  TAKE 1 TABLET BY MOUTH ONCE DAILY BEFORE BREAKFAST     pravastatin 10 MG tablet  Commonly known as: PRAVACHOL  TAKE 1 TABLET EVERY NIGHT     tamsulosin 0.4 mg Cap  Commonly known as: FLOMAX  TAKE 1 CAPSULE EVERY DAY     TRUE METRIX GLUCOSE TEST STRIP Strp  Generic drug: blood sugar diagnostic  TEST  ONE  TIME  DAILY  AT  6AM     TRUE METRIX LEVEL 1 Soln  Generic drug: blood glucose control, low  1 Units by Misc.(Non-Drug; Combo Route) route once daily.     valsartan 40 MG tablet  Commonly known as: DIOVAN  TAKE 1 TABLET EVERY DAY                Signed:  Joe Berry MD  Cardiovascular Disease PGY-V  [facility]

## 2023-10-24 NOTE — NURSING
Patient discharged per MD orders. Instructions given on medications, wound care, activity, signs of infection, when to call MD, and follow up appointments. Pt and spouse verbalized understanding. Telemetry and PIV removed. Patient transferred off of unit via wheelchair by transporter.

## 2023-10-24 NOTE — PROGRESS NOTES
Nursing Transfer Note        Reason patient is being transferred: back to short stay post recovery    Transfer To: short stay 10    Transfer via stretcher    Transfer with cardiac monitoring    Transported by RN    Telemetry: Box Number [id number]    Medicines sent: doxy rec'd from pharmacy    Any special needs or follow-up needed: edin Esparza complete    Patient belongings transferred with patient: No    Chart send with patient: Yes    Notified: spouse    Patient reassessed at: to be done by receiving RN (date, time)

## 2023-10-24 NOTE — Clinical Note
There was an existing generator. The generator was removed. The leads were disconnected. The generator was returned to . The generator was returned due to REEMA/EOL

## 2023-10-24 NOTE — BRIEF OP NOTE
Attending: Christian Navarro MD  Date of Procedure: 10/24/2023    Post-operative Diagnosis: generator at REEMA status    Procedure Performed: generator change    Description of Procedure: The patient was brought to the EP lab in the fasting state. Prepped and draped in sterile fashion. Safety timeout was performed. Sedation administered by anesthesia staff. Prophylactic IV antibiotics given. Lidocaine used for local anesthetic. Incision made. Blunt and electrocautery dissection performed to level of existing generator. Hemostasis achieved. Pocket washed with antibiotic solution. Downgraded to dual chamber ICD. Generator connected and placed in pocket. LV lead capped.  Pocket washed with antibiotic solution. Deep layer closed with interrupted 3-0 suture. Intermediate layer closed with running 3-0 suture. Superficial layer closed with running 4-0 suture. Skin closed with Dermabond. Meplex dressing to be placed after dermabond has dried.     EBL: <10 mL    Specimens Removed: None  Complications: no immediate    Plan:  - Antibiotics x 5 days  - Device clinic follow up in 1 week  - ECG post-procedure  - Discharge home following 1 hour bedrest if voiding, tolerating PO, and no signs of complications    Joe Berry MD  Cardiovascular Disease PGY-V  [facility]

## 2023-10-24 NOTE — ANESTHESIA PREPROCEDURE EVALUATION
10/24/2023  Xander Sanchez is a 73 y.o., male with cardiomyopathy here for generator change.      Pre-operative evaluation for Procedure(s) (LRB):  REPLACEMENT, ICD GENERATOR (N/A)        Patient Active Problem List   Diagnosis    Biventricular implantable cardioverter-defibrillator (ICD) in situ    Essential hypertension    Cervical spondylosis with myelopathy    Brachial neuritis or radiculitis NOS    Degeneration of cervical intervertebral disc    PUD (peptic ulcer disease)    COPD (chronic obstructive pulmonary disease) with emphysema    Cardiomyopathy, nonischemic    HNP (herniated nucleus pulposus) with myelopathy, cervical    CRPS (complex regional pain syndrome type II)    Chronic pain syndrome    Cervical post-laminectomy syndrome    LBBB (left bundle branch block)    Slow transit constipation    Chronic systolic dysfunction of left ventricle    S/P TURP    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thy    Diastolic dysfunction with chronic heart failure    Hypertensive left ventricular hypertrophy with heart failure    Narcotic dependency, continuous    Muscle right arm weakness    Rotator cuff syndrome of right shoulder    Facet arthritis of cervical region    Aortic atherosclerosis    Body mass index (BMI) of 23.0 to 23.9 in adult    BPH with urinary obstruction    Incomplete bladder emptying    History of colon polyps    Mixed hyperlipidemia    GERD (gastroesophageal reflux disease)    Enuresis    Cervical myelopathy    Systolic heart failure    NS (nuclear sclerosis), right    Nuclear sclerosis, left    Right-sided carotid artery disease    Unspecified inflammatory spondylopathy, cervical region    Weakness    Stiffness in joint    Need for assistance with personal care    Pain in right arm    Impaired functional mobility, balance, gait, and endurance     Chronic neck pain    Decreased ROM of neck    Decreased strength    Pulse generator of implantable cardioverter-defibrillator (ICD) at end of life       Review of patient's allergies indicates:   Allergen Reactions    Ciprofloxacin Nausea And Vomiting       No current facility-administered medications on file prior to encounter.     Current Outpatient Medications on File Prior to Encounter   Medication Sig Dispense Refill    aspirin (ECOTRIN) 81 MG EC tablet Take 81 mg by mouth once daily.      baclofen (LIORESAL) 10 MG tablet TAKE 1 TABLET THREE TIMES DAILY 270 tablet 0    hydroCHLOROthiazide (MICROZIDE) 12.5 mg capsule TAKE 1 CAPSULE (12.5 MG TOTAL) BY MOUTH ONCE DAILY. 90 capsule 3    metoprolol succinate (TOPROL-XL) 25 MG 24 hr tablet TAKE 1 TABLET EVERY DAY 90 tablet 3    pantoprazole (PROTONIX) 40 MG tablet TAKE 1 TABLET BY MOUTH ONCE DAILY BEFORE BREAKFAST 30 tablet 11    tamsulosin (FLOMAX) 0.4 mg Cap TAKE 1 CAPSULE EVERY DAY 90 capsule 3    valsartan (DIOVAN) 40 MG tablet TAKE 1 TABLET EVERY DAY 90 tablet 3    alcohol swabs PadM Apply 1 each topically as needed. 200 each 3    blood glucose control, low (TRUE METRIX LEVEL 1) Soln 1 Units by Misc.(Non-Drug; Combo Route) route once daily. 1 each 3    blood sugar diagnostic (TRUE METRIX GLUCOSE TEST STRIP) Strp TEST  ONE  TIME  DAILY  AT  6AM 100 strip 3    blood-glucose meter (TRUE METRIX AIR GLUCOSE METER) kit USE AS INSTRUCTED 1 each 0    econazole nitrate 1 % cream Apply topically once daily. for 10 days 85 g 0    lancets (TRUEPLUS LANCETS) 33 gauge Misc TEST ONE TIME DAILY AT 6:00AM 100 each 3    mupirocin (BACTROBAN) 2 % ointment Apply topically 3 (three) times daily. 30 g 0    oxybutynin (DITROPAN) 5 MG Tab TAKE 1 TABLET TWICE DAILY 180 tablet 3       Past Surgical History:   Procedure Laterality Date    CARDIAC DEFIBRILLATOR PLACEMENT      CATARACT EXTRACTION W/  INTRAOCULAR LENS IMPLANT Right 11/10/2020    Procedure:  EXTRACTION, CATARACT, WITH IOL INSERTION;  Surgeon: Oral Graham MD;  [facility];  Service: Ophthalmology;  Laterality: Right;    CATARACT EXTRACTION W/  INTRAOCULAR LENS IMPLANT Left 2020    Procedure: EXTRACTION, CATARACT, WITH IOL INSERTION;  Surgeon: Oral Graham MD;  [facility] OR;  Service: Ophthalmology;  Laterality: Left;    CERVICAL SPINE SURGERY      COLONOSCOPY N/A 2017    Procedure: COLONOSCOPY  golytely;  Surgeon: Vannessa Uribe MD;  Location: [facility];  Service: Endoscopy;  Laterality: N/A;    SPINE SURGERY         Social History     Socioeconomic History    Marital status:    Tobacco Use    Smoking status: Former     Current packs/day: 0.00     Average packs/day: 1 pack/day for 40.0 years (40.0 ttl pk-yrs)     Types: Cigarettes     Start date: 1969     Quit date: 2009     Years since quittin.2    Smokeless tobacco: Never   Substance and Sexual Activity    Alcohol use: Yes     Alcohol/week: 2.0 standard drinks of alcohol     Types: 1 Cans of beer, 1 Shots of liquor per week     Comment: May 1-2 beers or whiskey per month    Drug use: No    Sexual activity: Yes     Partners: Female     Social Determinants of Health     Financial Resource Strain: Low Risk  (2023)    Overall Financial Resource Strain (CARDIA)     Difficulty of Paying Living Expenses: Not hard at all   Food Insecurity: No Food Insecurity (2023)    Hunger Vital Sign     Worried About Running Out of Food in the Last Year: Never true     Ran Out of Food in the Last Year: Never true   Transportation Needs: No Transportation Needs (2023)    PRAPARE - Transportation     Lack of Transportation (Medical): No     Lack of Transportation (Non-Medical): No   Physical Activity: Inactive (2023)    Exercise Vital Sign     Days of Exercise per Week: 0 days     Minutes of Exercise per Session: 0 min   Stress: No Stress Concern Present (2023)  "   [facility] of Occupational Health - Occupational Stress Questionnaire     Feeling of Stress : Not at all   Social Connections: Moderately Isolated (7/19/2023)    Social Connection and Isolation Panel [NHANES]     Frequency of Communication with Friends and Family: More than three times a week     Frequency of Social Gatherings with Friends and Family: More than three times a week     Attends Judaism Services: Never     Active Member of Clubs or Organizations: No     Attends Club or Organization Meetings: Never     Marital Status:    Housing Stability: Low Risk  (7/19/2023)    Housing Stability Vital Sign     Unable to Pay for Housing in the Last Year: No     Number of Places Lived in the Last Year: 1     Unstable Housing in the Last Year: No         CBC: No results for input(s): "WBC", "RBC", "HGB", "HCT", "PLT", "MCV", "MCH", "MCHC" in the last 72 hours.    CMP: No results for input(s): "NA", "K", "CL", "CO2", "BUN", "CREATININE", "GLU", "MG", "PHOS", "CALCIUM", "ALBUMIN", "PROT", "ALKPHOS", "ALT", "AST", "BILITOT" in the last 72 hours.    INR  No results for input(s): "PT", "INR", "PROTIME", "APTT" in the last 72 hours.            2D Echo:  Results for orders placed or performed during the hospital encounter of 02/15/18   2D echo with color flow doppler   Result Value Ref Range    EF + QEF 40 (A) 55 - 65    Mitral Valve Regurgitation MILD     Diastolic Dysfunction Yes (A)     Est. PA Systolic Pressure 35.76     Tricuspid Valve Regurgitation MILD            Pre-op Assessment    I have reviewed the Patient Summary Reports.     I have reviewed the Nursing Notes.    I have reviewed the Medications.     Review of Systems  Anesthesia Hx:  No problems with previous Anesthesia    Hematology/Oncology:  Hematology Normal   Oncology Normal     EENT/Dental:EENT/Dental Normal   Cardiovascular:   Pacemaker Hypertension Dysrhythmias    Pulmonary:  Pulmonary Normal    Renal/:  Renal/ Normal   "   Hepatic/GI:  Hepatic/GI Normal    Musculoskeletal:  Musculoskeletal Normal    Neurological:  Neurology Normal    Endocrine:  Endocrine Normal    Dermatological:  Skin Normal    Psych:  Psychiatric Normal           Physical Exam  General: Well nourished    Airway:  Mallampati: II   Mouth Opening: Normal  TM Distance: Normal  Tongue: Normal  Neck ROM: Normal ROM    Dental:  Intact    Chest/Lungs:  Clear to auscultation, Normal Respiratory Rate    Heart:  Rate: Normal  Rhythm: Regular Rhythm  Sounds: Normal        Anesthesia Plan  Type of Anesthesia, risks & benefits discussed:    Anesthesia Type: Gen Natural Airway  Intra-op Monitoring Plan: Standard ASA Monitors  Post Op Pain Control Plan: multimodal analgesia  Induction:  IV  Informed Consent: Informed consent signed with the Patient and all parties understand the risks and agree with anesthesia plan.  All questions answered.   ASA Score: 3    Ready For Surgery From Anesthesia Perspective.     .

## 2023-10-24 NOTE — TRANSFER OF CARE
"Anesthesia Transfer of Care Note    Patient: Xander Sanchez    Procedure(s) Performed: Procedure(s) (LRB):  REPLACEMENT, ICD GENERATOR (N/A)    Patient location: PACU    Anesthesia Type: general    Transport from OR: Transported from OR on 6-10 L/min O2 by face mask with adequate spontaneous ventilation    Post pain: adequate analgesia    Post assessment: no apparent anesthetic complications and tolerated procedure well    Post vital signs: stable    Level of consciousness: sedated    Nausea/Vomiting: no nausea/vomiting    Complications: none    Transfer of care protocol was followed      Last vitals:   Visit Vitals  /82 (BP Location: Left arm, Patient Position: Lying)   Pulse 70   Temp 36.6 °C (97.9 °F) (Temporal)   Resp 18   Ht 5' 11" (1.803 m)   Wt 80.7 kg (178 lb)   SpO2 96%   BMI 24.83 kg/m²     "

## 2023-10-24 NOTE — NURSING
Pt ambulated around unit and to restroom. Pt voided. Tolerated walk well. Vital signs remain stable. Post walk QF=781/64 HR=67 .  No c/o pain or discomfort at this time, resp even and unlabored. Mepilex dressing to left chest is CDI. No active bleeding. No hematoma noted.  Call light in reach.

## 2023-10-26 NOTE — ANESTHESIA POSTPROCEDURE EVALUATION
Anesthesia Post Evaluation    Patient: Xander Sanchez    Procedure(s) Performed: Procedure(s) (LRB):  REPLACEMENT, ICD GENERATOR (N/A)    Final Anesthesia Type: general      Patient location during evaluation: PACU  Patient participation: Yes- Able to Participate  Level of consciousness: awake and alert  Post-procedure vital signs: reviewed and stable  Pain management: adequate  Airway patency: patent    PONV status at discharge: No PONV  Anesthetic complications: no      Cardiovascular status: blood pressure returned to baseline  Respiratory status: unassisted  Hydration status: euvolemic  Follow-up not needed.          Vitals Value Taken Time   /64 10/24/23 1730   Temp 36.6 °C (97.9 °F) 10/24/23 1635   Pulse 67 10/24/23 1730   Resp 18 10/24/23 1730   SpO2 95 % 10/24/23 1730         No case tracking events are documented in the log.      Pain/Odell Score: Pain Rating Prior to Med Admin: 4 (10/24/2023  4:10 PM)  Pain Rating Post Med Admin: 4 (10/24/2023  4:15 PM)  Odell Score: 10 (10/24/2023  5:30 PM)

## 2023-10-31 ENCOUNTER — TELEPHONE (OUTPATIENT)
Dept: ELECTROPHYSIOLOGY | Facility: CLINIC | Age: 73
End: 2023-10-31
Payer: MEDICARE

## 2023-10-31 ENCOUNTER — CLINICAL SUPPORT (OUTPATIENT)
Dept: CARDIOLOGY | Facility: HOSPITAL | Age: 73
End: 2023-10-31
Attending: INTERNAL MEDICINE
Payer: MEDICARE

## 2023-10-31 DIAGNOSIS — I42.8 CARDIOMYOPATHY, NONISCHEMIC: ICD-10-CM

## 2023-10-31 DIAGNOSIS — Z95.810 PRESENCE OF AUTOMATIC (IMPLANTABLE) CARDIAC DEFIBRILLATOR: ICD-10-CM

## 2023-10-31 DIAGNOSIS — I50.42 CHRONIC COMBINED SYSTOLIC (CONGESTIVE) AND DIASTOLIC (CONGESTIVE) HEART FAILURE: ICD-10-CM

## 2023-10-31 DIAGNOSIS — I47.29 OTHER VENTRICULAR TACHYCARDIA: ICD-10-CM

## 2023-10-31 DIAGNOSIS — I48.0 PAROXYSMAL ATRIAL FIBRILLATION: ICD-10-CM

## 2023-10-31 PROCEDURE — 93283 PRGRMG EVAL IMPLANTABLE DFB: CPT | Mod: 26,,, | Performed by: INTERNAL MEDICINE

## 2023-10-31 PROCEDURE — 93283 CARDIAC DEVICE CHECK - IN CLINIC & HOSPITAL: ICD-10-PCS | Mod: 26,,, | Performed by: INTERNAL MEDICINE

## 2023-10-31 PROCEDURE — 93283 PRGRMG EVAL IMPLANTABLE DFB: CPT

## 2023-10-31 NOTE — TELEPHONE ENCOUNTER
S/P ICD gen change implant  LV lead is capped  Dressing removed.   Incision well approx., no redness,  drainage, or tenderness noted.   Pt has a hematoma noted to site.  Not on OAC.  Edges marked.   Reviewed with Dr. Brown for Dr. Navarro  RTC in one week for assessment    Pt will contact clinic in the interim  until appt 11/8/23 if has extended hematoma or bleeding.

## 2023-11-06 ENCOUNTER — TELEPHONE (OUTPATIENT)
Dept: NEUROLOGY | Facility: CLINIC | Age: 73
End: 2023-11-06
Payer: MEDICARE

## 2023-11-06 NOTE — TELEPHONE ENCOUNTER
----- Message from Cary Luna sent at 11/6/2023 11:49 AM CST -----  Regarding: Refill  Contact: pt @ [phone]  Rx Refill/Request    Is this a Refill or New Rx:refill    Rx Name and Strength:gabapentin (NEURONTIN) 600 MG tablet    Preferred Pharmacy with phone number:  [facility] - [place], [place] - [address]  [address] [address]  Phone: [phone] Fax: [phone]    Communication Preference:pt @ [phone]    Additional Information:

## 2023-11-07 LAB
OHS CV AF BURDEN PERCENT: < 1
OHS CV DC REMOTE DEVICE TYPE: NORMAL
OHS CV RV PACING PERCENT: 1 %

## 2023-11-08 ENCOUNTER — CLINICAL SUPPORT (OUTPATIENT)
Dept: CARDIOLOGY | Facility: HOSPITAL | Age: 73
End: 2023-11-08
Attending: INTERNAL MEDICINE
Payer: MEDICARE

## 2023-11-08 ENCOUNTER — DOCUMENTATION ONLY (OUTPATIENT)
Dept: ELECTROPHYSIOLOGY | Facility: CLINIC | Age: 73
End: 2023-11-08
Payer: MEDICARE

## 2023-11-08 DIAGNOSIS — Z95.810 PRESENCE OF AUTOMATIC (IMPLANTABLE) CARDIAC DEFIBRILLATOR: ICD-10-CM

## 2023-11-08 DIAGNOSIS — I47.29 OTHER VENTRICULAR TACHYCARDIA: ICD-10-CM

## 2023-11-08 DIAGNOSIS — G89.4 CHRONIC PAIN SYNDROME: ICD-10-CM

## 2023-11-08 DIAGNOSIS — M54.12 CERVICAL RADICULAR PAIN: ICD-10-CM

## 2023-11-08 DIAGNOSIS — M48.02 SPINAL STENOSIS IN CERVICAL REGION: ICD-10-CM

## 2023-11-08 DIAGNOSIS — M96.1 CERVICAL POST-LAMINECTOMY SYNDROME: ICD-10-CM

## 2023-11-08 DIAGNOSIS — M62.81 MUSCLE RIGHT ARM WEAKNESS: ICD-10-CM

## 2023-11-08 DIAGNOSIS — M54.12 BRACHIAL NEURITIS OR RADICULITIS: ICD-10-CM

## 2023-11-08 DIAGNOSIS — R29.898 RIGHT ARM WEAKNESS: ICD-10-CM

## 2023-11-08 DIAGNOSIS — M48.02 CERVICAL STENOSIS OF SPINE: ICD-10-CM

## 2023-11-08 DIAGNOSIS — R29.898 RIGHT HAND WEAKNESS: ICD-10-CM

## 2023-11-08 DIAGNOSIS — I42.8 CARDIOMYOPATHY, NONISCHEMIC: ICD-10-CM

## 2023-11-08 DIAGNOSIS — I50.42 CHRONIC COMBINED SYSTOLIC (CONGESTIVE) AND DIASTOLIC (CONGESTIVE) HEART FAILURE: ICD-10-CM

## 2023-11-08 DIAGNOSIS — G90.50 COMPLEX REGIONAL PAIN SYNDROME TYPE 1, AFFECTING UNSPECIFIED SITE: ICD-10-CM

## 2023-11-08 DIAGNOSIS — G56.41 COMPLEX REGIONAL PAIN SYNDROME TYPE 2 OF RIGHT UPPER EXTREMITY: ICD-10-CM

## 2023-11-08 DIAGNOSIS — M79.601 RIGHT ARM PAIN: ICD-10-CM

## 2023-11-08 DIAGNOSIS — M50.30 DEGENERATION OF CERVICAL INTERVERTEBRAL DISC: ICD-10-CM

## 2023-11-08 DIAGNOSIS — F11.20 NARCOTIC DEPENDENCY, CONTINUOUS: ICD-10-CM

## 2023-11-08 DIAGNOSIS — M50.00 HNP (HERNIATED NUCLEUS PULPOSUS) WITH MYELOPATHY, CERVICAL: ICD-10-CM

## 2023-11-08 DIAGNOSIS — M47.812 FACET ARTHRITIS OF CERVICAL REGION: ICD-10-CM

## 2023-11-08 DIAGNOSIS — M54.12 CERVICAL RADICULOPATHY: ICD-10-CM

## 2023-11-08 DIAGNOSIS — M75.101 ROTATOR CUFF SYNDROME OF RIGHT SHOULDER: ICD-10-CM

## 2023-11-08 DIAGNOSIS — I48.0 PAROXYSMAL ATRIAL FIBRILLATION: ICD-10-CM

## 2023-11-08 DIAGNOSIS — M47.12 CERVICAL SPONDYLOSIS WITH MYELOPATHY: ICD-10-CM

## 2023-11-08 RX ORDER — HYDROCODONE BITARTRATE AND ACETAMINOPHEN 10; 325 MG/1; MG/1
1 TABLET ORAL EVERY 6 HOURS PRN
Qty: 120 TABLET | Refills: 0 | Status: SHIPPED | OUTPATIENT
Start: 2023-11-12 | End: 2023-12-08 | Stop reason: SDUPTHER

## 2023-11-08 NOTE — PROGRESS NOTES
F/u hematoma assessment. S/p Generator change 10/24/23.  Pt had wound appt last week and hematoma.      Not on OAC.  Incision open to air, well approximated, no redness, drainage or tenderness noted.  Hematoma is resolving, softer and smaller in size.    Educated pt to contact clinic for increased swelling, drainage or s/s of infection.    Pt verbalizes understanding.

## 2023-11-17 DIAGNOSIS — M50.30 DEGENERATION OF CERVICAL INTERVERTEBRAL DISC: ICD-10-CM

## 2023-11-17 DIAGNOSIS — R29.898 RIGHT HAND WEAKNESS: ICD-10-CM

## 2023-11-17 DIAGNOSIS — G90.50 COMPLEX REGIONAL PAIN SYNDROME TYPE 1, AFFECTING UNSPECIFIED SITE: ICD-10-CM

## 2023-11-17 DIAGNOSIS — F11.20 NARCOTIC DEPENDENCY, CONTINUOUS: ICD-10-CM

## 2023-11-17 DIAGNOSIS — M62.81 MUSCLE RIGHT ARM WEAKNESS: ICD-10-CM

## 2023-11-17 DIAGNOSIS — M48.02 SPINAL STENOSIS IN CERVICAL REGION: ICD-10-CM

## 2023-11-17 DIAGNOSIS — M54.12 CERVICAL RADICULAR PAIN: ICD-10-CM

## 2023-11-17 DIAGNOSIS — G89.4 CHRONIC PAIN SYNDROME: ICD-10-CM

## 2023-11-17 DIAGNOSIS — M48.02 CERVICAL STENOSIS OF SPINE: ICD-10-CM

## 2023-11-17 DIAGNOSIS — M50.00 HNP (HERNIATED NUCLEUS PULPOSUS) WITH MYELOPATHY, CERVICAL: ICD-10-CM

## 2023-11-17 DIAGNOSIS — G56.41 COMPLEX REGIONAL PAIN SYNDROME TYPE 2 OF RIGHT UPPER EXTREMITY: ICD-10-CM

## 2023-11-17 DIAGNOSIS — M96.1 CERVICAL POST-LAMINECTOMY SYNDROME: ICD-10-CM

## 2023-11-17 DIAGNOSIS — M79.601 RIGHT ARM PAIN: ICD-10-CM

## 2023-11-17 DIAGNOSIS — M47.12 CERVICAL SPONDYLOSIS WITH MYELOPATHY: ICD-10-CM

## 2023-11-17 DIAGNOSIS — M54.12 BRACHIAL NEURITIS OR RADICULITIS: ICD-10-CM

## 2023-11-17 DIAGNOSIS — M47.812 FACET ARTHRITIS OF CERVICAL REGION: ICD-10-CM

## 2023-11-17 DIAGNOSIS — M75.101 ROTATOR CUFF SYNDROME OF RIGHT SHOULDER: ICD-10-CM

## 2023-11-17 DIAGNOSIS — R29.898 RIGHT ARM WEAKNESS: ICD-10-CM

## 2023-11-17 DIAGNOSIS — R26.9 GAIT ABNORMALITY: ICD-10-CM

## 2023-11-18 NOTE — TELEPHONE ENCOUNTER
No care due was identified.  [facility] Embedded Care Due Messages. Reference number: [id number].   11/18/2023 12:00:05 AM CST

## 2023-11-20 RX ORDER — GABAPENTIN 600 MG/1
600 TABLET ORAL 2 TIMES DAILY
Qty: 180 TABLET | Refills: 3 | Status: SHIPPED | OUTPATIENT
Start: 2023-11-20

## 2023-12-04 RX ORDER — BACLOFEN 10 MG/1
TABLET ORAL
Qty: 270 TABLET | Refills: 3 | Status: SHIPPED | OUTPATIENT
Start: 2023-12-04

## 2023-12-05 ENCOUNTER — OFFICE VISIT (OUTPATIENT)
Dept: FAMILY MEDICINE | Facility: CLINIC | Age: 73
End: 2023-12-05
Payer: MEDICARE

## 2023-12-05 ENCOUNTER — LAB VISIT (OUTPATIENT)
Dept: LAB | Facility: HOSPITAL | Age: 73
End: 2023-12-05
Attending: FAMILY MEDICINE
Payer: MEDICARE

## 2023-12-05 ENCOUNTER — TELEPHONE (OUTPATIENT)
Dept: GASTROENTEROLOGY | Facility: CLINIC | Age: 73
End: 2023-12-05
Payer: MEDICARE

## 2023-12-05 VITALS
WEIGHT: 175.06 LBS | SYSTOLIC BLOOD PRESSURE: 110 MMHG | DIASTOLIC BLOOD PRESSURE: 70 MMHG | BODY MASS INDEX: 24.51 KG/M2 | HEART RATE: 73 BPM | HEIGHT: 71 IN | OXYGEN SATURATION: 94 %

## 2023-12-05 DIAGNOSIS — E11.40 TYPE 2 DIABETES MELLITUS WITH DIABETIC NEUROPATHY, WITHOUT LONG-TERM CURRENT USE OF INSULIN: ICD-10-CM

## 2023-12-05 DIAGNOSIS — Z23 NEEDS FLU SHOT: Primary | ICD-10-CM

## 2023-12-05 DIAGNOSIS — Z12.11 SCREEN FOR COLON CANCER: ICD-10-CM

## 2023-12-05 DIAGNOSIS — I10 ESSENTIAL HYPERTENSION: ICD-10-CM

## 2023-12-05 DIAGNOSIS — R32 ENURESIS: ICD-10-CM

## 2023-12-05 LAB
CHOLEST SERPL-MCNC: 95 MG/DL (ref 120–199)
CHOLEST/HDLC SERPL: 2.3 {RATIO} (ref 2–5)
ESTIMATED AVG GLUCOSE: 128 MG/DL (ref 68–131)
HBA1C MFR BLD: 6.1 % (ref 4–5.6)
HDLC SERPL-MCNC: 42 MG/DL (ref 40–75)
HDLC SERPL: 44.2 % (ref 20–50)
LDLC SERPL CALC-MCNC: 44.4 MG/DL (ref 63–159)
NONHDLC SERPL-MCNC: 53 MG/DL
TRIGL SERPL-MCNC: 43 MG/DL (ref 30–150)

## 2023-12-05 PROCEDURE — 3288F FALL RISK ASSESSMENT DOCD: CPT | Mod: CPTII,S$GLB,, | Performed by: FAMILY MEDICINE

## 2023-12-05 PROCEDURE — 3061F PR NEG MICROALBUMINURIA RESULT DOCUMENTED/REVIEW: ICD-10-PCS | Mod: CPTII,S$GLB,, | Performed by: FAMILY MEDICINE

## 2023-12-05 PROCEDURE — 3044F PR MOST RECENT HEMOGLOBIN A1C LEVEL <7.0%: ICD-10-PCS | Mod: CPTII,S$GLB,, | Performed by: FAMILY MEDICINE

## 2023-12-05 PROCEDURE — 3078F DIAST BP <80 MM HG: CPT | Mod: CPTII,S$GLB,, | Performed by: FAMILY MEDICINE

## 2023-12-05 PROCEDURE — 3044F HG A1C LEVEL LT 7.0%: CPT | Mod: CPTII,S$GLB,, | Performed by: FAMILY MEDICINE

## 2023-12-05 PROCEDURE — 99215 PR OFFICE/OUTPT VISIT, EST, LEVL V, 40-54 MIN: ICD-10-PCS | Mod: S$GLB,,, | Performed by: FAMILY MEDICINE

## 2023-12-05 PROCEDURE — 3061F NEG MICROALBUMINURIA REV: CPT | Mod: CPTII,S$GLB,, | Performed by: FAMILY MEDICINE

## 2023-12-05 PROCEDURE — 1101F PT FALLS ASSESS-DOCD LE1/YR: CPT | Mod: CPTII,S$GLB,, | Performed by: FAMILY MEDICINE

## 2023-12-05 PROCEDURE — 1160F PR REVIEW ALL MEDS BY PRESCRIBER/CLIN PHARMACIST DOCUMENTED: ICD-10-PCS | Mod: CPTII,S$GLB,, | Performed by: FAMILY MEDICINE

## 2023-12-05 PROCEDURE — 1159F PR MEDICATION LIST DOCUMENTED IN MEDICAL RECORD: ICD-10-PCS | Mod: CPTII,S$GLB,, | Performed by: FAMILY MEDICINE

## 2023-12-05 PROCEDURE — 3078F PR MOST RECENT DIASTOLIC BLOOD PRESSURE < 80 MM HG: ICD-10-PCS | Mod: CPTII,S$GLB,, | Performed by: FAMILY MEDICINE

## 2023-12-05 PROCEDURE — 80061 LIPID PANEL: CPT | Performed by: FAMILY MEDICINE

## 2023-12-05 PROCEDURE — 1125F AMNT PAIN NOTED PAIN PRSNT: CPT | Mod: CPTII,S$GLB,, | Performed by: FAMILY MEDICINE

## 2023-12-05 PROCEDURE — 3074F SYST BP LT 130 MM HG: CPT | Mod: CPTII,S$GLB,, | Performed by: FAMILY MEDICINE

## 2023-12-05 PROCEDURE — 3074F PR MOST RECENT SYSTOLIC BLOOD PRESSURE < 130 MM HG: ICD-10-PCS | Mod: CPTII,S$GLB,, | Performed by: FAMILY MEDICINE

## 2023-12-05 PROCEDURE — 1160F RVW MEDS BY RX/DR IN RCRD: CPT | Mod: CPTII,S$GLB,, | Performed by: FAMILY MEDICINE

## 2023-12-05 PROCEDURE — 1125F PR PAIN SEVERITY QUANTIFIED, PAIN PRESENT: ICD-10-PCS | Mod: CPTII,S$GLB,, | Performed by: FAMILY MEDICINE

## 2023-12-05 PROCEDURE — 99999 PR PBB SHADOW E&M-EST. PATIENT-LVL IV: CPT | Mod: PBBFAC,,, | Performed by: FAMILY MEDICINE

## 2023-12-05 PROCEDURE — 90694 VACC AIIV4 NO PRSRV 0.5ML IM: CPT | Mod: S$GLB,,, | Performed by: FAMILY MEDICINE

## 2023-12-05 PROCEDURE — G0008 FLU VACCINE - QUADRIVALENT - ADJUVANTED: ICD-10-PCS | Mod: S$GLB,,, | Performed by: FAMILY MEDICINE

## 2023-12-05 PROCEDURE — 1159F MED LIST DOCD IN RCRD: CPT | Mod: CPTII,S$GLB,, | Performed by: FAMILY MEDICINE

## 2023-12-05 PROCEDURE — 3066F NEPHROPATHY DOC TX: CPT | Mod: CPTII,S$GLB,, | Performed by: FAMILY MEDICINE

## 2023-12-05 PROCEDURE — 4010F ACE/ARB THERAPY RXD/TAKEN: CPT | Mod: CPTII,S$GLB,, | Performed by: FAMILY MEDICINE

## 2023-12-05 PROCEDURE — 3288F PR FALLS RISK ASSESSMENT DOCUMENTED: ICD-10-PCS | Mod: CPTII,S$GLB,, | Performed by: FAMILY MEDICINE

## 2023-12-05 PROCEDURE — 4010F PR ACE/ARB THEARPY RXD/TAKEN: ICD-10-PCS | Mod: CPTII,S$GLB,, | Performed by: FAMILY MEDICINE

## 2023-12-05 PROCEDURE — 90694 FLU VACCINE - QUADRIVALENT - ADJUVANTED: ICD-10-PCS | Mod: S$GLB,,, | Performed by: FAMILY MEDICINE

## 2023-12-05 PROCEDURE — 83036 HEMOGLOBIN GLYCOSYLATED A1C: CPT | Performed by: FAMILY MEDICINE

## 2023-12-05 PROCEDURE — 1101F PR PT FALLS ASSESS DOC 0-1 FALLS W/OUT INJ PAST YR: ICD-10-PCS | Mod: CPTII,S$GLB,, | Performed by: FAMILY MEDICINE

## 2023-12-05 PROCEDURE — 3066F PR DOCUMENTATION OF TREATMENT FOR NEPHROPATHY: ICD-10-PCS | Mod: CPTII,S$GLB,, | Performed by: FAMILY MEDICINE

## 2023-12-05 PROCEDURE — G0008 ADMIN INFLUENZA VIRUS VAC: HCPCS | Mod: S$GLB,,, | Performed by: FAMILY MEDICINE

## 2023-12-05 PROCEDURE — 99215 OFFICE O/P EST HI 40 MIN: CPT | Mod: S$GLB,,, | Performed by: FAMILY MEDICINE

## 2023-12-05 PROCEDURE — 36415 COLL VENOUS BLD VENIPUNCTURE: CPT | Mod: PO | Performed by: FAMILY MEDICINE

## 2023-12-05 PROCEDURE — 99999 PR PBB SHADOW E&M-EST. PATIENT-LVL IV: ICD-10-PCS | Mod: PBBFAC,,, | Performed by: FAMILY MEDICINE

## 2023-12-05 RX ORDER — OXYBUTYNIN CHLORIDE 5 MG/1
5 TABLET ORAL 2 TIMES DAILY
Qty: 180 TABLET | Refills: 3 | Status: SHIPPED | OUTPATIENT
Start: 2023-12-05

## 2023-12-05 NOTE — PROGRESS NOTES
Subjective     Patient ID: Xander Sanchez is a 73 y.o. male.    Chief Complaint: Follow-up and Arm Pain    73 years old male came to the clinic for blood pressure check.  Blood pressure today was stable.  No chest pain, palpitation, orthopnea or PND.  Patient is requesting oxybutynin to help with the symptoms associated with his prostate.  He was not taking the medicine.  Patient due for his colonoscopy.  No recent A1c.  No polyuria, polydipsia or polyphagia.    Follow-up  Pertinent negatives include no chest pain.   Arm Pain   Pertinent negatives include no chest pain.     Review of Systems   Constitutional: Negative.    HENT: Negative.     Eyes: Negative.    Respiratory: Negative.     Cardiovascular: Negative.  Negative for chest pain, palpitations, leg swelling and claudication.   Gastrointestinal: Negative.    Endocrine: Negative for polydipsia, polyphagia and polyuria.   Genitourinary: Negative.    Musculoskeletal: Negative.    Integumentary:  Negative.   Neurological: Negative.    Psychiatric/Behavioral: Negative.            Objective     Physical Exam  Vitals and nursing note reviewed.   Constitutional:       General: He is not in acute distress.     Appearance: He is well-developed. He is not diaphoretic.   HENT:      Head: Normocephalic and atraumatic.      Right Ear: External ear normal.      Left Ear: External ear normal.      Nose: Nose normal.      Mouth/Throat:      Pharynx: No oropharyngeal exudate.   Eyes:      General: No scleral icterus.        Right eye: No discharge.         Left eye: No discharge.      Conjunctiva/sclera: Conjunctivae normal.      Pupils: Pupils are equal, round, and reactive to light.   Neck:      Thyroid: No thyromegaly.      Vascular: No JVD.      Trachea: No tracheal deviation.   Cardiovascular:      Rate and Rhythm: Normal rate and regular rhythm.      Heart sounds: Normal heart sounds. No murmur heard.     No friction rub. No gallop.   Pulmonary:      Effort: Pulmonary  effort is normal. No respiratory distress.      Breath sounds: Normal breath sounds. No stridor. No wheezing or rales.   Chest:      Chest wall: No tenderness.   Abdominal:      General: Bowel sounds are normal. There is no distension.      Palpations: Abdomen is soft. There is no mass.      Tenderness: There is no abdominal tenderness. There is no guarding or rebound.   Musculoskeletal:         General: No tenderness. Normal range of motion.      Cervical back: Normal range of motion and neck supple.   Lymphadenopathy:      Cervical: No cervical adenopathy.   Skin:     General: Skin is warm and dry.      Coloration: Skin is not pale.      Findings: No erythema or rash.   Neurological:      Mental Status: He is alert and oriented to person, place, and time.      Cranial Nerves: No cranial nerve deficit.      Motor: No abnormal muscle tone.      Coordination: Coordination normal.      Deep Tendon Reflexes: Reflexes are normal and symmetric. Reflexes normal.   Psychiatric:         Behavior: Behavior normal.         Thought Content: Thought content normal.         Judgment: Judgment normal.            Assessment and Plan     1. Needs flu shot  -     Influenza (FLUAD) - Quadrivalent (Adjuvanted) *Preferred* (65+) (PF)    2. Essential hypertension  Overview:  Overview:   dx update    Orders:  -     Lipid Panel; Future; Expected date: 12/05/2023    3. Enuresis  -     oxybutynin (DITROPAN) 5 MG Tab; Take 1 tablet (5 mg total) by mouth 2 (two) times daily.  Dispense: 180 tablet; Refill: 3    4. 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thy, without long-term current use of insulin  -     Hemoglobin A1C; Future; Expected date: 12/05/2023    5. Screen for colon cancer  -     Case Request Endoscopy: COLONOSCOPY      Continue monitoring blood pressure at home, low sodium diet.   Continue monitoring blood sugar at home,ADA diet.          Follow up in about 6 months (around 6/5/2024), or if symptoms worsen or fail to  improve.

## 2023-12-08 DIAGNOSIS — M96.1 CERVICAL POST-LAMINECTOMY SYNDROME: ICD-10-CM

## 2023-12-08 DIAGNOSIS — M54.12 CERVICAL RADICULAR PAIN: ICD-10-CM

## 2023-12-08 DIAGNOSIS — M47.12 CERVICAL SPONDYLOSIS WITH MYELOPATHY: ICD-10-CM

## 2023-12-08 DIAGNOSIS — M62.81 MUSCLE RIGHT ARM WEAKNESS: ICD-10-CM

## 2023-12-08 DIAGNOSIS — G90.50 COMPLEX REGIONAL PAIN SYNDROME TYPE 1, AFFECTING UNSPECIFIED SITE: ICD-10-CM

## 2023-12-08 DIAGNOSIS — M47.812 FACET ARTHRITIS OF CERVICAL REGION: ICD-10-CM

## 2023-12-08 DIAGNOSIS — F11.20 NARCOTIC DEPENDENCY, CONTINUOUS: ICD-10-CM

## 2023-12-08 DIAGNOSIS — M54.12 BRACHIAL NEURITIS OR RADICULITIS: ICD-10-CM

## 2023-12-08 DIAGNOSIS — M50.30 DEGENERATION OF CERVICAL INTERVERTEBRAL DISC: ICD-10-CM

## 2023-12-08 DIAGNOSIS — R29.898 RIGHT ARM WEAKNESS: ICD-10-CM

## 2023-12-08 DIAGNOSIS — M75.101 ROTATOR CUFF SYNDROME OF RIGHT SHOULDER: ICD-10-CM

## 2023-12-08 DIAGNOSIS — M54.12 CERVICAL RADICULOPATHY: ICD-10-CM

## 2023-12-08 DIAGNOSIS — M50.00 HNP (HERNIATED NUCLEUS PULPOSUS) WITH MYELOPATHY, CERVICAL: ICD-10-CM

## 2023-12-08 DIAGNOSIS — G56.41 COMPLEX REGIONAL PAIN SYNDROME TYPE 2 OF RIGHT UPPER EXTREMITY: ICD-10-CM

## 2023-12-08 DIAGNOSIS — M48.02 SPINAL STENOSIS IN CERVICAL REGION: ICD-10-CM

## 2023-12-08 DIAGNOSIS — G89.4 CHRONIC PAIN SYNDROME: ICD-10-CM

## 2023-12-08 DIAGNOSIS — M79.601 RIGHT ARM PAIN: ICD-10-CM

## 2023-12-08 DIAGNOSIS — M48.02 CERVICAL STENOSIS OF SPINE: ICD-10-CM

## 2023-12-08 DIAGNOSIS — R29.898 RIGHT HAND WEAKNESS: ICD-10-CM

## 2023-12-08 RX ORDER — HYDROCODONE BITARTRATE AND ACETAMINOPHEN 10; 325 MG/1; MG/1
1 TABLET ORAL EVERY 6 HOURS PRN
Qty: 120 TABLET | Refills: 0 | Status: SHIPPED | OUTPATIENT
Start: 2023-12-13 | End: 2023-12-14 | Stop reason: SDUPTHER

## 2023-12-13 ENCOUNTER — TELEPHONE (OUTPATIENT)
Dept: GASTROENTEROLOGY | Facility: CLINIC | Age: 73
End: 2023-12-13
Payer: MEDICARE

## 2023-12-13 ENCOUNTER — TELEPHONE (OUTPATIENT)
Dept: NEUROLOGY | Facility: CLINIC | Age: 73
End: 2023-12-13
Payer: MEDICARE

## 2023-12-13 DIAGNOSIS — M54.12 BRACHIAL NEURITIS OR RADICULITIS: ICD-10-CM

## 2023-12-13 DIAGNOSIS — M47.812 FACET ARTHRITIS OF CERVICAL REGION: ICD-10-CM

## 2023-12-13 DIAGNOSIS — F11.20 NARCOTIC DEPENDENCY, CONTINUOUS: ICD-10-CM

## 2023-12-13 DIAGNOSIS — M50.00 HNP (HERNIATED NUCLEUS PULPOSUS) WITH MYELOPATHY, CERVICAL: ICD-10-CM

## 2023-12-13 DIAGNOSIS — M54.12 CERVICAL RADICULAR PAIN: ICD-10-CM

## 2023-12-13 DIAGNOSIS — R29.898 RIGHT ARM WEAKNESS: ICD-10-CM

## 2023-12-13 DIAGNOSIS — M62.81 MUSCLE RIGHT ARM WEAKNESS: ICD-10-CM

## 2023-12-13 DIAGNOSIS — M48.02 CERVICAL STENOSIS OF SPINE: ICD-10-CM

## 2023-12-13 DIAGNOSIS — G89.4 CHRONIC PAIN SYNDROME: ICD-10-CM

## 2023-12-13 DIAGNOSIS — G56.41 COMPLEX REGIONAL PAIN SYNDROME TYPE 2 OF RIGHT UPPER EXTREMITY: ICD-10-CM

## 2023-12-13 DIAGNOSIS — M47.12 CERVICAL SPONDYLOSIS WITH MYELOPATHY: ICD-10-CM

## 2023-12-13 DIAGNOSIS — M48.02 SPINAL STENOSIS IN CERVICAL REGION: ICD-10-CM

## 2023-12-13 DIAGNOSIS — M75.101 ROTATOR CUFF SYNDROME OF RIGHT SHOULDER: ICD-10-CM

## 2023-12-13 DIAGNOSIS — M50.30 DEGENERATION OF CERVICAL INTERVERTEBRAL DISC: ICD-10-CM

## 2023-12-13 DIAGNOSIS — M96.1 CERVICAL POST-LAMINECTOMY SYNDROME: ICD-10-CM

## 2023-12-13 DIAGNOSIS — M54.12 CERVICAL RADICULOPATHY: ICD-10-CM

## 2023-12-13 DIAGNOSIS — R29.898 RIGHT HAND WEAKNESS: ICD-10-CM

## 2023-12-13 DIAGNOSIS — G90.50 COMPLEX REGIONAL PAIN SYNDROME TYPE 1, AFFECTING UNSPECIFIED SITE: ICD-10-CM

## 2023-12-13 DIAGNOSIS — M79.601 RIGHT ARM PAIN: ICD-10-CM

## 2023-12-13 RX ORDER — HYDROCODONE BITARTRATE AND ACETAMINOPHEN 10; 325 MG/1; MG/1
1 TABLET ORAL EVERY 6 HOURS PRN
Qty: 120 TABLET | Refills: 0 | Status: CANCELLED | OUTPATIENT
Start: 2023-12-13 | End: 2024-01-12

## 2023-12-13 RX ORDER — SODIUM PICOSULFATE, MAGNESIUM OXIDE, AND ANHYDROUS CITRIC ACID 10; 3.5; 12 MG/160ML; G/160ML; G/160ML
LIQUID ORAL
Qty: 320 ML | Refills: 0 | Status: SHIPPED | OUTPATIENT
Start: 2023-12-13

## 2023-12-13 RX ORDER — SODIUM PICOSULFATE, MAGNESIUM OXIDE, AND ANHYDROUS CITRIC ACID 12; 3.5; 1 G/175ML; G/175ML; MG/175ML
1 LIQUID ORAL ONCE
Qty: 175 ML | Refills: 0 | Status: CANCELLED | OUTPATIENT
Start: 2023-12-13 | End: 2023-12-13

## 2023-12-13 NOTE — TELEPHONE ENCOUNTER
Endoscopy Scheduling Questionnaire:         Are you taking any blood thinners? no               If Yes  Have you been on them for longer than one year? N/a    2. Have you been diagnosed with Diverticulitis in past three months?  no    3. Are you on dialysis or have Kidney Disease? no    4. Previous Colonoscopy?  yes         If yes Do you have a history of colon polyps?  yes    5. Family History of Colon Cancer: no         Relation: n/a       Age at Diagnosis: n/a      6. Are you a diabetic?  Yes but not on medication    7. Do you have a history of constipation?  yes    8. Are you taking a GLP-1 Agonist/Adipex (weight loss drugs)? no  Dulaglutide (Trulicity) (weekly)  Exenatide extended release (Bydureon bcise) (weekly)  Exenatide (Byetta) (twice daily)  Semaglutide (Ozempic) (weekly)  Liraglutide (Victoza, Saxenda) (daily)  Lixisenatide (Adlyxin) (daily)  Semaglutide (Rybelsus) (taken by mouth once daily)    Hold GLP-1 agonists on the day of the procedure/surgery for patients who take the medication daily.    Hold GLP-1 agonists a week prior to the procedure/surgery for patients who take the medication weekly.    Procedure scheduled with Dr. Qureshi  on  2/29/2024    The prep being used is 2 DAY CLENPIQ     The patient's prep instructions were sent via mail    Pharmacy is [facility]    2 Day CLENPIQ Instructions    You are scheduled for a colonoscopy with Dr. Qureshi on 2/29/2024 at [facility] located at [address] [place] [address].  Check in at the admit desk, first floor of the hospital (which is the building on the left).     You will receive a call 2-3 days before your colonoscopy to tell you the time to arrive.  If you have not received a call by the day before your procedure, call the Endoscopy Lab at [phone].    To ensure that your test is accurate and complete, you MUST follow these instructions listed below.  If you have any questions, please call our office at [phone].  Plan on  being at the hospital for your procedure for 3-4 hours. Please contact the office at [phone] two days prior to procedure date if reschedule is needed.      1. Two Days Before the procedure 2/27/2024  *At 4:00 PM  Take 20mg (four pills) of the Dulcolax.    *At 6:00 PM,   Drink 1 glass of Miralax (8 ounces of gatorade or water with 1 capful of Miralax powder) every 15 minutes until you have completed 8 glasses of Miralax.   This is easier to drink if this solution is cold, so you can mix the solution ahead of time and place in the refrigerator prior to drinking.  You have to drink the solution within 24 hours of mixing it.  Do NOT put this solution over ice.  It IS ok to drink with a straw.    2. 2/28/2024 Follow a CLEAR LIQUID DIET for the entire day before your scheduled colonoscopy.  This means no solid food the entire day starting when you wake.  You may have as much of the clear liquids as you want throughout the day.   CLEAR LIQUID DIET:   - Avoid Red, Orange, Purple, and/or Blue food coloring   - NO DAIRY   - You can have:  Coffee with sugar (no creamer), tea, water, soda, apple or white grape juice, chicken or beef broth/bouillon (no meat, noodles, or veggies), green/yellow popsicles, green/yellow Jell-O, lemonade.    3.  AT 5 pm the evening before your colonoscopy, OPEN ONE (1) BOTTLE OF CLENPIQ AND DRINK THE ENTIRE BOTTLE.  DRINK FIVE (5) 8-OUNCE GLASSES OF WATER (40 OUNCES TOTAL) OVER THE NEXT FIVE (5) HOURS.     4.  The endoscopy department will call you 2 days before your colonoscopy to tell you the exact time to arrive, AND to tell you the exact time to drink the 2nd portion of your prep (which will be FIVE HOURS BEFORE YOUR ARRIVAL TIME).  At this time given to you, OPEN THE OTHER ONE (1) BOTTLE OF CLENPIQ AND DRINK THE ENTIRE BOTTLE.  DRINK THREE (3) 8-OUNCE GLASSES OF WATER (24 OUNCES TOTAL) OVER THE NEXT THREE (3) HOURS. Once this is complete, you can not have anything else by mouth!    5.  You  must have someone with you to DRIVE YOU HOME since you will be receiving IV sedation for the colonoscopy.    6.  It is ok to take your heart, blood pressure, and seizure medications in the morning of your test with a SIP of water.  Hold other medications until after your procedure.  Do NOT have anything else to eat or drink the morning of your colonoscopy.  It is ok to brush your teeth.    7.  If you are on blood thinners THAT YOU HAVE BEEN INSTRUCTED TO HOLD BY YOUR DOCTOR FOR THIS PROCEDURE, then do NOT take this the morning of your colonoscopy.  Do NOT stop these medications on your own, they must be approved to be held by your doctor.  Your colonoscopy can NOT be done if you are on these medications.  Examples of blood thinners include: Coumadin, Aggrenox, Plavix, Pradaxa, Reapro, Pletal, Xarelto, Ticagrelor, Brilinta, Eliquis, and high dose aspirin (325 mg).  You do not have to stop baby aspirin 81 mg.    8.  IF YOU ARE DIABETIC:  NO INSULIN OR ORAL MEDICATIONS THE MORNING OF THE COLONOSCOPY.  TAKE ONLY HALF THE DOSE OF YOUR INSULIN THE DAY BEFORE THE COLONOSCOPY.  DO NOT TAKE ANY ORAL DIABETIC MEDICATIONS THE DAY BEFORE THE COLONOSCOPY.  IF YOU ARE AN INSULIN DEPENDENT DIABETIC WITH UNSTABLE BLOOD SUGARS, NOTIFY YOUR PRIMARY CARE PHYSICIAN FOR INSTRUCTIONS.    9. Please hold any GLP-1 medications prior to the procedure: Dulaglutide Trulicity(hold week prior), Exenatide Byetta (hold the morning of procedure), Semaglutide Ozempic (hold week prior), Liraglutide Victoza, Saxenda(hold week prior), Lixisenatide Adlyxin (hold the morning of procedure), Semaglutide Rybelsus (hold the morning of procedure), Tirzepatide Mounjaro (hold week prior)

## 2023-12-13 NOTE — TELEPHONE ENCOUNTER
----- Message from Wendy Espinoza sent at 12/13/2023  2:53 PM CST -----  Regarding: Refill  Contact: [phone]  Type:  RX Refill Request        Who Called: Xander Sanchez        Refill or New Rx: Refill        RX Name and Strength: HYDROcodone-acetaminophen (NORCO)  mg per tablet        Preferred Pharmacy with phone number:   [id number] [place], [place] - [address] [facility] [facility] [address] [facility] [address] [facility] [address] [place] [address] [address] [address]  Phone: [phone] Fax: [phone]          Local or Mail Order: Local        Would the patient rather a call back or a response via [facility]?        Best Call Back Number:   [phone]        Additional Information:  Pt states he requested rx not to be sent to [facility].  Please send it to [facility].  Pt is out of medication.

## 2023-12-13 NOTE — LETTER
December 13, 2023    Xander Sanchez  [address]  [address]  [place] [address]             [place] [facility] G[facility]  [address]  [address]  [place] [address]  Phone: [phone] Dear Mr. Sanchez:    2 Day CLENPIQ Instructions  You are scheduled for a colonoscopy with Dr. Qureshi on 2/29/2024 at [facility] located at [address] [facility].  Check in at the admit desk, first floor of the hospital (which is the building on the left).       You will receive a call 2-3 days before your colonoscopy to tell you the time to arrive.  If you have not received a call by the day before your procedure, call the Endoscopy Lab at [phone].    To ensure that your test is accurate and complete, you MUST follow these instructions listed below.  If you have any questions, please call our office at [phone].  Plan on being at the hospital for your procedure for 3-4 hours. Please contact the office at [phone] two days prior to procedure date if reschedule is needed.    1. Two Days Before the procedure 2/27/2024  *At 4:00 PM  Take 20mg (four pills) of the Dulcolax.    *At 6:00 PM,   Drink 1 glass of Miralax (8 ounces of gatorade or water with 1 capful of Miralax powder) every 15 minutes until you have completed 8 glasses of Miralax.   This is easier to drink if this solution is cold, so you can mix the solution ahead of time and place in the refrigerator prior to drinking.  You have to drink the solution within 24 hours of mixing it.  Do NOT put this solution over ice.  It IS ok to drink with a straw.    2. 2/28/2024 Follow a CLEAR LIQUID DIET for the entire day before your scheduled colonoscopy.  This means no solid food the entire day starting when you wake.  You may have as much of the clear liquids as you want throughout the day.   CLEAR LIQUID DIET:   - Avoid Red, Orange, Purple, and/or Blue food coloring   - NO DAIRY   - You can have:  Coffee with sugar (no creamer), tea, water, soda,  apple or white grape juice, chicken or beef broth/bouillon (no meat, noodles, or veggies), green/yellow popsicles, green/yellow Jell-O, lemonade.    3.  AT 5 pm the evening before your colonoscopy, OPEN ONE (1) BOTTLE OF CLENPIQ AND DRINK THE ENTIRE BOTTLE.  DRINK FIVE (5) 8-OUNCE GLASSES OF WATER (40 OUNCES TOTAL) OVER THE NEXT FIVE (5) HOURS.     4.  The endoscopy department will call you 2 days before your colonoscopy to tell you the exact time to arrive, AND to tell you the exact time to drink the 2nd portion of your prep (which will be FIVE HOURS BEFORE YOUR ARRIVAL TIME).  At this time given to you, OPEN THE OTHER ONE (1) BOTTLE OF CLENPIQ AND DRINK THE ENTIRE BOTTLE.  DRINK THREE (3) 8-OUNCE GLASSES OF WATER (24 OUNCES TOTAL) OVER THE NEXT THREE (3) HOURS. Once this is complete, you can not have anything else by mouth!    5.  You must have someone with you to DRIVE YOU HOME since you will be receiving IV sedation for the colonoscopy.    6.  It is ok to take your heart, blood pressure, and seizure medications in the morning of your test with a SIP of water.  Hold other medications until after your procedure.  Do NOT have anything else to eat or drink the morning of your colonoscopy.  It is ok to brush your teeth.    7.  If you are on blood thinners THAT YOU HAVE BEEN INSTRUCTED TO HOLD BY YOUR DOCTOR FOR THIS PROCEDURE, then do NOT take this the morning of your colonoscopy.  Do NOT stop these medications on your own, they must be approved to be held by your doctor.  Your colonoscopy can NOT be done if you are on these medications.  Examples of blood thinners include: Coumadin, Aggrenox, Plavix, Pradaxa, Reapro, Pletal, Xarelto, Ticagrelor, Brilinta, Eliquis, and high dose aspirin (325 mg).  You do not have to stop baby aspirin 81 mg.    8.  IF YOU ARE DIABETIC:  NO INSULIN OR ORAL MEDICATIONS THE MORNING OF THE COLONOSCOPY.  TAKE ONLY HALF THE DOSE OF YOUR INSULIN THE DAY BEFORE THE COLONOSCOPY.  DO NOT TAKE  ANY ORAL DIABETIC MEDICATIONS THE DAY BEFORE THE COLONOSCOPY.  IF YOU ARE AN INSULIN DEPENDENT DIABETIC WITH UNSTABLE BLOOD SUGARS, NOTIFY YOUR PRIMARY CARE PHYSICIAN FOR INSTRUCTIONS.    9. Please hold any GLP-1 medications prior to the procedure: Dulaglutide Trulicity(hold week prior), Exenatide Byetta (hold the morning of procedure), Semaglutide Ozempic (hold week prior), Liraglutide Victoza, Saxenda(hold week prior), Lixisenatide Adlyxin (hold the morning of procedure), Semaglutide Rybelsus (hold the morning of procedure), Tirzepatide Mounjaro (hold week prior)

## 2023-12-13 NOTE — TELEPHONE ENCOUNTER
----- Message from Faith Dailey sent at 12/13/2023 10:34 AM CST -----  Who Called: Pt    What is the request in detail: Requesting call back to discuss New Rx, new pharmacy, pt [facility] Pt states he had the Rx sent to [facility] canceled. Please advise    HYDROcodone-acetaminophen (NORCO)  mg per tablet 120 tablet 0 12/13/2023 1/12/2024 No  Sig - Route: Take 1 tablet by mouth every 6 (six) hours as needed for Pain. - Oral  Sent to pharmacy as: HYDROcodone-acetaminophen (NORCO)  mg per tablet  Class: Normal  Earliest Fill Date: 12/13/2023  Notes to Pharmacy: Medically necessary for > 7 days due to chronic pain.  Order: [id number]      [facility] #[id number] - [place], [place] - [facility] [address] AT [facility] [address]  [facility] [address]  Phone: [phone] Fax: [phone]      Can the clinic reply by [facility]? No    Best Call Back Number: [phone]      Additional Information:

## 2023-12-13 NOTE — TELEPHONE ENCOUNTER
----- Message from Wendy Espinoza sent at 12/13/2023  2:53 PM CST -----  Regarding: Refill  Contact: [phone]  Type:  RX Refill Request        Who Called: Xander Sanchez        Refill or New Rx: Refill        RX Name and Strength: HYDROcodone-acetaminophen (NORCO)  mg per tablet        Preferred Pharmacy with phone number:   [facility] #[id number] [place], [place] - [address] AT [facility] [address] [place] [address] [address] [facility] [address]  Phone: [phone] Fax: [phone]          Local or Mail Order: Local        Would the patient rather a call back or a response via [facility]?        Best Call Back Number:   [phone]        Additional Information:  Pt states he requested rx not to be sent to [facility].  Please send it to [facility].  Pt is out of medication.

## 2023-12-14 DIAGNOSIS — F11.20 NARCOTIC DEPENDENCY, CONTINUOUS: ICD-10-CM

## 2023-12-14 DIAGNOSIS — M54.12 CERVICAL RADICULAR PAIN: ICD-10-CM

## 2023-12-14 DIAGNOSIS — G56.41 COMPLEX REGIONAL PAIN SYNDROME TYPE 2 OF RIGHT UPPER EXTREMITY: ICD-10-CM

## 2023-12-14 DIAGNOSIS — M75.101 ROTATOR CUFF SYNDROME OF RIGHT SHOULDER: ICD-10-CM

## 2023-12-14 DIAGNOSIS — M79.601 RIGHT ARM PAIN: ICD-10-CM

## 2023-12-14 DIAGNOSIS — M48.02 CERVICAL STENOSIS OF SPINE: ICD-10-CM

## 2023-12-14 DIAGNOSIS — R29.898 RIGHT HAND WEAKNESS: ICD-10-CM

## 2023-12-14 DIAGNOSIS — G89.4 CHRONIC PAIN SYNDROME: ICD-10-CM

## 2023-12-14 DIAGNOSIS — M48.02 SPINAL STENOSIS IN CERVICAL REGION: ICD-10-CM

## 2023-12-14 DIAGNOSIS — M96.1 CERVICAL POST-LAMINECTOMY SYNDROME: ICD-10-CM

## 2023-12-14 DIAGNOSIS — M62.81 MUSCLE RIGHT ARM WEAKNESS: ICD-10-CM

## 2023-12-14 DIAGNOSIS — M47.812 FACET ARTHRITIS OF CERVICAL REGION: ICD-10-CM

## 2023-12-14 DIAGNOSIS — M50.30 DEGENERATION OF CERVICAL INTERVERTEBRAL DISC: ICD-10-CM

## 2023-12-14 DIAGNOSIS — R29.898 RIGHT ARM WEAKNESS: ICD-10-CM

## 2023-12-14 DIAGNOSIS — M47.12 CERVICAL SPONDYLOSIS WITH MYELOPATHY: ICD-10-CM

## 2023-12-14 DIAGNOSIS — M54.12 BRACHIAL NEURITIS OR RADICULITIS: ICD-10-CM

## 2023-12-14 DIAGNOSIS — M54.12 CERVICAL RADICULOPATHY: ICD-10-CM

## 2023-12-14 DIAGNOSIS — M50.00 HNP (HERNIATED NUCLEUS PULPOSUS) WITH MYELOPATHY, CERVICAL: ICD-10-CM

## 2023-12-14 DIAGNOSIS — G90.50 COMPLEX REGIONAL PAIN SYNDROME TYPE 1, AFFECTING UNSPECIFIED SITE: ICD-10-CM

## 2023-12-14 RX ORDER — HYDROCODONE BITARTRATE AND ACETAMINOPHEN 10; 325 MG/1; MG/1
1 TABLET ORAL EVERY 6 HOURS PRN
Qty: 120 TABLET | Refills: 0 | Status: SHIPPED | OUTPATIENT
Start: 2023-12-14 | End: 2024-01-10 | Stop reason: SDUPTHER

## 2024-01-10 DIAGNOSIS — G89.4 CHRONIC PAIN SYNDROME: ICD-10-CM

## 2024-01-10 DIAGNOSIS — G56.41 COMPLEX REGIONAL PAIN SYNDROME TYPE 2 OF RIGHT UPPER EXTREMITY: ICD-10-CM

## 2024-01-10 DIAGNOSIS — M54.12 CERVICAL RADICULAR PAIN: ICD-10-CM

## 2024-01-10 DIAGNOSIS — M79.601 RIGHT ARM PAIN: ICD-10-CM

## 2024-01-10 DIAGNOSIS — F11.20 NARCOTIC DEPENDENCY, CONTINUOUS: ICD-10-CM

## 2024-01-10 DIAGNOSIS — M48.02 CERVICAL STENOSIS OF SPINE: ICD-10-CM

## 2024-01-10 DIAGNOSIS — M54.12 BRACHIAL NEURITIS OR RADICULITIS: ICD-10-CM

## 2024-01-10 DIAGNOSIS — R29.898 RIGHT ARM WEAKNESS: ICD-10-CM

## 2024-01-10 DIAGNOSIS — M47.12 CERVICAL SPONDYLOSIS WITH MYELOPATHY: ICD-10-CM

## 2024-01-10 DIAGNOSIS — R29.898 RIGHT HAND WEAKNESS: ICD-10-CM

## 2024-01-10 DIAGNOSIS — M48.02 SPINAL STENOSIS IN CERVICAL REGION: ICD-10-CM

## 2024-01-10 DIAGNOSIS — M75.101 ROTATOR CUFF SYNDROME OF RIGHT SHOULDER: ICD-10-CM

## 2024-01-10 DIAGNOSIS — M62.81 MUSCLE RIGHT ARM WEAKNESS: ICD-10-CM

## 2024-01-10 DIAGNOSIS — M54.12 CERVICAL RADICULOPATHY: ICD-10-CM

## 2024-01-10 DIAGNOSIS — M50.30 DEGENERATION OF CERVICAL INTERVERTEBRAL DISC: ICD-10-CM

## 2024-01-10 DIAGNOSIS — G90.50 COMPLEX REGIONAL PAIN SYNDROME TYPE 1, AFFECTING UNSPECIFIED SITE: ICD-10-CM

## 2024-01-10 DIAGNOSIS — M47.812 FACET ARTHRITIS OF CERVICAL REGION: ICD-10-CM

## 2024-01-10 DIAGNOSIS — M50.00 HNP (HERNIATED NUCLEUS PULPOSUS) WITH MYELOPATHY, CERVICAL: ICD-10-CM

## 2024-01-10 DIAGNOSIS — M96.1 CERVICAL POST-LAMINECTOMY SYNDROME: ICD-10-CM

## 2024-01-10 RX ORDER — HYDROCODONE BITARTRATE AND ACETAMINOPHEN 10; 325 MG/1; MG/1
1 TABLET ORAL EVERY 6 HOURS PRN
Qty: 120 TABLET | Refills: 0 | Status: SHIPPED | OUTPATIENT
Start: 2024-01-13 | End: 2024-02-14 | Stop reason: SDUPTHER

## 2024-01-22 ENCOUNTER — CLINICAL SUPPORT (OUTPATIENT)
Dept: CARDIOLOGY | Facility: HOSPITAL | Age: 74
End: 2024-01-22

## 2024-01-22 ENCOUNTER — CLINICAL SUPPORT (OUTPATIENT)
Dept: CARDIOLOGY | Facility: HOSPITAL | Age: 74
End: 2024-01-22
Attending: INTERNAL MEDICINE
Payer: MEDICARE

## 2024-01-22 DIAGNOSIS — Z95.810 PRESENCE OF AUTOMATIC (IMPLANTABLE) CARDIAC DEFIBRILLATOR: ICD-10-CM

## 2024-01-22 DIAGNOSIS — I42.9 CARDIOMYOPATHY, UNSPECIFIED: ICD-10-CM

## 2024-01-22 PROCEDURE — 93296 REM INTERROG EVL PM/IDS: CPT | Performed by: INTERNAL MEDICINE

## 2024-01-23 PROBLEM — I48.0 PAROXYSMAL ATRIAL FIBRILLATION: Status: ACTIVE | Noted: 2024-01-23

## 2024-01-24 ENCOUNTER — TELEPHONE (OUTPATIENT)
Dept: ELECTROPHYSIOLOGY | Facility: CLINIC | Age: 74
End: 2024-01-24
Payer: MEDICARE

## 2024-01-25 ENCOUNTER — TELEPHONE (OUTPATIENT)
Dept: ELECTROPHYSIOLOGY | Facility: CLINIC | Age: 74
End: 2024-01-25

## 2024-01-25 NOTE — TELEPHONE ENCOUNTER
----- Message from Bridget South MA sent at 1/24/2024  9:25 AM CST -----  Contact: self  Has appts tomorrow Thurs.1/25 for EKG,device ck & Nithya that wants to cancel and reschedule for early in the morning. Please call. Please call  [phone].

## 2024-02-14 DIAGNOSIS — M54.12 BRACHIAL NEURITIS OR RADICULITIS: ICD-10-CM

## 2024-02-14 DIAGNOSIS — M79.601 RIGHT ARM PAIN: ICD-10-CM

## 2024-02-14 DIAGNOSIS — M50.30 DEGENERATION OF CERVICAL INTERVERTEBRAL DISC: ICD-10-CM

## 2024-02-14 DIAGNOSIS — M47.12 CERVICAL SPONDYLOSIS WITH MYELOPATHY: ICD-10-CM

## 2024-02-14 DIAGNOSIS — M75.101 ROTATOR CUFF SYNDROME OF RIGHT SHOULDER: ICD-10-CM

## 2024-02-14 DIAGNOSIS — M62.81 MUSCLE RIGHT ARM WEAKNESS: ICD-10-CM

## 2024-02-14 DIAGNOSIS — M96.1 CERVICAL POST-LAMINECTOMY SYNDROME: ICD-10-CM

## 2024-02-14 DIAGNOSIS — M48.02 SPINAL STENOSIS IN CERVICAL REGION: ICD-10-CM

## 2024-02-14 DIAGNOSIS — F11.20 NARCOTIC DEPENDENCY, CONTINUOUS: ICD-10-CM

## 2024-02-14 DIAGNOSIS — M54.12 CERVICAL RADICULAR PAIN: ICD-10-CM

## 2024-02-14 DIAGNOSIS — G90.50 COMPLEX REGIONAL PAIN SYNDROME TYPE 1, AFFECTING UNSPECIFIED SITE: ICD-10-CM

## 2024-02-14 DIAGNOSIS — G89.4 CHRONIC PAIN SYNDROME: ICD-10-CM

## 2024-02-14 DIAGNOSIS — R29.898 RIGHT ARM WEAKNESS: ICD-10-CM

## 2024-02-14 DIAGNOSIS — R29.898 RIGHT HAND WEAKNESS: ICD-10-CM

## 2024-02-14 DIAGNOSIS — M48.02 CERVICAL STENOSIS OF SPINE: ICD-10-CM

## 2024-02-14 DIAGNOSIS — M47.812 FACET ARTHRITIS OF CERVICAL REGION: ICD-10-CM

## 2024-02-14 DIAGNOSIS — M54.12 CERVICAL RADICULOPATHY: ICD-10-CM

## 2024-02-14 DIAGNOSIS — G56.41 COMPLEX REGIONAL PAIN SYNDROME TYPE 2 OF RIGHT UPPER EXTREMITY: ICD-10-CM

## 2024-02-14 DIAGNOSIS — M50.00 HNP (HERNIATED NUCLEUS PULPOSUS) WITH MYELOPATHY, CERVICAL: ICD-10-CM

## 2024-02-14 RX ORDER — HYDROCODONE BITARTRATE AND ACETAMINOPHEN 10; 325 MG/1; MG/1
1 TABLET ORAL EVERY 6 HOURS PRN
Qty: 120 TABLET | Refills: 0 | Status: SHIPPED | OUTPATIENT
Start: 2024-02-14 | End: 2024-02-16 | Stop reason: SDUPTHER

## 2024-02-15 DIAGNOSIS — M50.00 HNP (HERNIATED NUCLEUS PULPOSUS) WITH MYELOPATHY, CERVICAL: ICD-10-CM

## 2024-02-15 DIAGNOSIS — M47.12 CERVICAL SPONDYLOSIS WITH MYELOPATHY: ICD-10-CM

## 2024-02-15 DIAGNOSIS — M54.12 CERVICAL RADICULOPATHY: ICD-10-CM

## 2024-02-15 DIAGNOSIS — M48.02 CERVICAL STENOSIS OF SPINE: ICD-10-CM

## 2024-02-15 DIAGNOSIS — M47.812 FACET ARTHRITIS OF CERVICAL REGION: ICD-10-CM

## 2024-02-15 DIAGNOSIS — M50.30 DEGENERATION OF CERVICAL INTERVERTEBRAL DISC: ICD-10-CM

## 2024-02-15 DIAGNOSIS — M62.81 MUSCLE RIGHT ARM WEAKNESS: ICD-10-CM

## 2024-02-15 DIAGNOSIS — G90.50 COMPLEX REGIONAL PAIN SYNDROME TYPE 1, AFFECTING UNSPECIFIED SITE: ICD-10-CM

## 2024-02-15 DIAGNOSIS — M54.12 BRACHIAL NEURITIS OR RADICULITIS: ICD-10-CM

## 2024-02-15 DIAGNOSIS — M75.101 ROTATOR CUFF SYNDROME OF RIGHT SHOULDER: ICD-10-CM

## 2024-02-15 DIAGNOSIS — M96.1 CERVICAL POST-LAMINECTOMY SYNDROME: ICD-10-CM

## 2024-02-15 DIAGNOSIS — M48.02 SPINAL STENOSIS IN CERVICAL REGION: ICD-10-CM

## 2024-02-15 DIAGNOSIS — G56.41 COMPLEX REGIONAL PAIN SYNDROME TYPE 2 OF RIGHT UPPER EXTREMITY: ICD-10-CM

## 2024-02-15 DIAGNOSIS — G89.4 CHRONIC PAIN SYNDROME: ICD-10-CM

## 2024-02-15 DIAGNOSIS — R29.898 RIGHT HAND WEAKNESS: ICD-10-CM

## 2024-02-15 DIAGNOSIS — M54.12 CERVICAL RADICULAR PAIN: ICD-10-CM

## 2024-02-15 DIAGNOSIS — M79.601 RIGHT ARM PAIN: ICD-10-CM

## 2024-02-15 DIAGNOSIS — F11.20 NARCOTIC DEPENDENCY, CONTINUOUS: ICD-10-CM

## 2024-02-15 DIAGNOSIS — R29.898 RIGHT ARM WEAKNESS: ICD-10-CM

## 2024-02-15 RX ORDER — HYDROCODONE BITARTRATE AND ACETAMINOPHEN 10; 325 MG/1; MG/1
1 TABLET ORAL EVERY 6 HOURS PRN
Qty: 120 TABLET | Refills: 0 | OUTPATIENT
Start: 2024-02-15 | End: 2024-03-16

## 2024-02-16 DIAGNOSIS — M62.81 MUSCLE RIGHT ARM WEAKNESS: ICD-10-CM

## 2024-02-16 DIAGNOSIS — R29.898 RIGHT HAND WEAKNESS: ICD-10-CM

## 2024-02-16 DIAGNOSIS — M50.00 HNP (HERNIATED NUCLEUS PULPOSUS) WITH MYELOPATHY, CERVICAL: ICD-10-CM

## 2024-02-16 DIAGNOSIS — M96.1 CERVICAL POST-LAMINECTOMY SYNDROME: ICD-10-CM

## 2024-02-16 DIAGNOSIS — M50.30 DEGENERATION OF CERVICAL INTERVERTEBRAL DISC: ICD-10-CM

## 2024-02-16 DIAGNOSIS — M48.02 SPINAL STENOSIS IN CERVICAL REGION: ICD-10-CM

## 2024-02-16 DIAGNOSIS — M54.12 CERVICAL RADICULOPATHY: ICD-10-CM

## 2024-02-16 DIAGNOSIS — M47.12 CERVICAL SPONDYLOSIS WITH MYELOPATHY: ICD-10-CM

## 2024-02-16 DIAGNOSIS — R29.898 RIGHT ARM WEAKNESS: ICD-10-CM

## 2024-02-16 DIAGNOSIS — M54.12 BRACHIAL NEURITIS OR RADICULITIS: ICD-10-CM

## 2024-02-16 DIAGNOSIS — M48.02 CERVICAL STENOSIS OF SPINE: ICD-10-CM

## 2024-02-16 DIAGNOSIS — F11.20 NARCOTIC DEPENDENCY, CONTINUOUS: ICD-10-CM

## 2024-02-16 DIAGNOSIS — M47.812 FACET ARTHRITIS OF CERVICAL REGION: ICD-10-CM

## 2024-02-16 DIAGNOSIS — M79.601 RIGHT ARM PAIN: ICD-10-CM

## 2024-02-16 DIAGNOSIS — M75.101 ROTATOR CUFF SYNDROME OF RIGHT SHOULDER: ICD-10-CM

## 2024-02-16 DIAGNOSIS — G56.41 COMPLEX REGIONAL PAIN SYNDROME TYPE 2 OF RIGHT UPPER EXTREMITY: ICD-10-CM

## 2024-02-16 DIAGNOSIS — G90.50 COMPLEX REGIONAL PAIN SYNDROME TYPE 1, AFFECTING UNSPECIFIED SITE: ICD-10-CM

## 2024-02-16 DIAGNOSIS — M54.12 CERVICAL RADICULAR PAIN: ICD-10-CM

## 2024-02-16 DIAGNOSIS — G89.4 CHRONIC PAIN SYNDROME: ICD-10-CM

## 2024-02-16 RX ORDER — HYDROCODONE BITARTRATE AND ACETAMINOPHEN 10; 325 MG/1; MG/1
1 TABLET ORAL EVERY 6 HOURS PRN
Qty: 120 TABLET | Refills: 0 | Status: SHIPPED | OUTPATIENT
Start: 2024-02-16 | End: 2024-03-11 | Stop reason: SDUPTHER

## 2024-02-16 NOTE — TELEPHONE ENCOUNTER
----- Message from Genaro Cuba sent at 2/16/2024  2:09 PM CST -----  Regarding: Refill  Contact: [phone]  Patient called requesting a refill on HYDROcodone-acetaminophen (NORCO)  mg per tablet   medication. He stated he has been out of medication since Wednesday and he wants it to go to [facility]. Please don't send to [facility]. Please contact patient as soon as possible.     [facility] #[id number] [facility] [address] AT [facility] [address]  [address]  [facility] [address]  Phone: [phone] Fax: [phone]

## 2024-02-20 ENCOUNTER — OFFICE VISIT (OUTPATIENT)
Dept: PHYSICAL MEDICINE AND REHAB | Facility: CLINIC | Age: 74
End: 2024-02-20
Payer: MEDICARE

## 2024-02-20 VITALS — HEIGHT: 71 IN | WEIGHT: 182.31 LBS | OXYGEN SATURATION: 98 % | BODY MASS INDEX: 25.52 KG/M2

## 2024-02-20 DIAGNOSIS — Z79.891 CHRONICALLY ON OPIATE THERAPY: ICD-10-CM

## 2024-02-20 DIAGNOSIS — G56.41 COMPLEX REGIONAL PAIN SYNDROME TYPE 2 OF RIGHT UPPER EXTREMITY: ICD-10-CM

## 2024-02-20 DIAGNOSIS — M96.1 CERVICAL POST-LAMINECTOMY SYNDROME: ICD-10-CM

## 2024-02-20 DIAGNOSIS — R26.9 GAIT DISORDER: ICD-10-CM

## 2024-02-20 DIAGNOSIS — M47.812 SPONDYLOSIS OF CERVICAL REGION WITHOUT MYELOPATHY OR RADICULOPATHY: ICD-10-CM

## 2024-02-20 DIAGNOSIS — Z98.1 STATUS POST CERVICAL SPINAL FUSION: ICD-10-CM

## 2024-02-20 DIAGNOSIS — M54.2 CHRONIC NECK PAIN: Primary | ICD-10-CM

## 2024-02-20 DIAGNOSIS — G89.29 CHRONIC NECK PAIN: Primary | ICD-10-CM

## 2024-02-20 PROCEDURE — 99999 PR PBB SHADOW E&M-EST. PATIENT-LVL III: CPT | Mod: PBBFAC,,, | Performed by: PHYSICAL MEDICINE & REHABILITATION

## 2024-02-20 PROCEDURE — 99213 OFFICE O/P EST LOW 20 MIN: CPT | Mod: S$GLB,,, | Performed by: PHYSICAL MEDICINE & REHABILITATION

## 2024-02-20 PROCEDURE — 1125F AMNT PAIN NOTED PAIN PRSNT: CPT | Mod: CPTII,S$GLB,, | Performed by: PHYSICAL MEDICINE & REHABILITATION

## 2024-02-20 PROCEDURE — 3072F LOW RISK FOR RETINOPATHY: CPT | Mod: CPTII,S$GLB,, | Performed by: PHYSICAL MEDICINE & REHABILITATION

## 2024-02-20 PROCEDURE — 3288F FALL RISK ASSESSMENT DOCD: CPT | Mod: CPTII,S$GLB,, | Performed by: PHYSICAL MEDICINE & REHABILITATION

## 2024-02-20 PROCEDURE — 1159F MED LIST DOCD IN RCRD: CPT | Mod: CPTII,S$GLB,, | Performed by: PHYSICAL MEDICINE & REHABILITATION

## 2024-02-20 PROCEDURE — 1101F PT FALLS ASSESS-DOCD LE1/YR: CPT | Mod: CPTII,S$GLB,, | Performed by: PHYSICAL MEDICINE & REHABILITATION

## 2024-02-20 NOTE — PROGRESS NOTES
Subjective:       Patient ID: Xander Sanchez is a 73 y.o. male.    Chief Complaint: No chief complaint on file.      HPI      Mr. Sanchez is a 73-year-old black male with past medical history of hypertension, diabetes mellitus, peripheral neuropathy, RA, nonischemic cardiomyopathy, chronic systolic heart failure, status post AICD, COPD, osteoarthritis, chronic neck pain status post 3 neck surgeries, CRPS type 1 of right arm, GERD.  The patient is followed up at the [facility] Medicine Clinic for chronic neck pain with right cervical radiculopathy, status post multiple neck surgeries (C3-6 laminectomy in 11/2012, C5-6 ACDF in 04/2014, and C3-7 posterior fusion in 08/2015).  He was last seen on 10/6/2023  He was maintained on gabapentin, p.r.n. baclofen and p.r.n. hydrocodone/APAP.  A Pain Clinic Drug Screen was obtained and was positive as expected for hydrocodone.    The patient is coming to the clinic for follow-up.  His neck pain has been under good control. It is an intermittent tightness and burning or cold sensation from the shoulder area shooting to his right hand.  His pain is worse when he wakes up in the morning.  It is better with his pain medications.  His max pain is 6/10 and minimum 2/10.  Today it is 4/10.  The patient denies any significant upper extremity weakness.  He complains of occasional spasms in his right arm.  He has chronic impaired hand coordination such as trouble buttoning or writing. This has been better with OT. He he reports occasional stumbling and falling due to lower extremity weakness and coordination problems. This has also been better with PT.    He is currently taking:  Gabapentin 600 mg p.o. 2 times per day.  He did not increase it to 3 times per day as suggested last visit.  Hydrocodone/APAP 10/325 p.r.n.3-4 times per day   Baclofen 10 mg p.o. b.i.d. p.r.n..    He finished courses of physical and occupational therapy.    Past Medical History:   Diagnosis Date    Allergy      Arthritis     Asthma     Cardiomyopathy     Cataract     Cervical radicular pain     Cervical spondylosis with myelopathy 10/17/2012    Chronic bronchitis     Chronic systolic dysfunction of left ventricle 07/27/2015    Constipation 07/27/2015    COPD (chronic obstructive pulmonary disease)     CRPS (complex regional pain syndrome type I) 12/29/2014    Diabetes mellitus, type 2     DM type 2 (diabetes mellitus, type 2) 07/27/2015    Enlarged prostate 07/27/2015    Enuresis 07/06/2018    Hypertension     ICD (implantable cardiac defibrillator) in place     Mixed hyperlipidemia 02/28/2018    Paroxysmal atrial fibrillation 1/23/2024    Presence of biventricular AICD 07/27/2015    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thy 02/01/2016    Urinary tract infection     pt does not know        Review of patient's allergies indicates:   Allergen Reactions    Ciprofloxacin Nausea And Vomiting        Review of Systems   Constitutional:  Negative for chills and fever.   Eyes:  Negative for visual disturbance.   Respiratory:  Positive for shortness of breath.    Cardiovascular:  Negative for chest pain.   Gastrointestinal:  Positive for constipation. Negative for nausea and vomiting.   Genitourinary:  Negative for difficulty urinating.   Musculoskeletal:  Positive for gait problem, myalgias and neck pain. Negative for back pain.   Neurological:  Negative for dizziness, weakness and headaches.   Psychiatric/Behavioral:  Negative for behavioral problems and sleep disturbance.              Objective:      Physical Exam  Vitals reviewed.   Constitutional:       General: He is not in acute distress.     Appearance: He is well-developed.   HENT:      Head: Normocephalic and atraumatic.   Neck:      Comments: Healed posterior and anterior neck surgical scars.  Decreased ROM in all planes.    Musculoskeletal:      Comments: BUE:  ROM:   RUE: full.   LUE: full.  Strength:    RUE: 5/5 at shoulder abduction, 5 elbow flexion, 5 wrist  extension, 5 elbow extension, 5 finger flexion, 5 finger abduction.   LUE: 5/5 at shoulder abduction, 5 elbow flexion, 5 wrist extension, 5 elbow extension, 5 finger flexion, 5 finger abduction.  Sensation to pinprick:   RUE: intact.   LUE: intact.  DTR:    RUE: +1 biceps, +1 triceps.   LUE:  +1 biceps, +1 triceps.      BLE:  ROM:   RLE: full.   LLE: full.  Strength:    RLE: 5/5 at hip flexion, 5 knee extension, 5 ankle DF, 5 PF, 5 EHL.   LLE: 5/5 at hip flexion, 5 knee extension, 5 ankle DF, 5 PF, 5 EHL.  Sensation to pinprick:     RLE: intact.      LLE: intact.   DTR:     RLE: +1 knee, +1 ankle.    LLE: +1 knee, +1 ankle.  Clonus:    Rt ankle: -ve.    Lt ankle: -ve.  SLR (sitting):      RLE: -ve.      LLE: -ve.      Gait:  Slow aiden, some shuffling and circumduction on the right side.     Skin:     General: Skin is warm.   Neurological:      Mental Status: He is alert.   Psychiatric:         Behavior: Behavior normal.         Assessment:       1. Chronic neck pain    2. Spondylosis of cervical region without myelopathy or radiculopathy    3. Status post cervical spinal fusion    4. Cervical post-laminectomy syndrome    5. Complex regional pain syndrome type 2 of right upper extremity    6. Gait disorder    7. Chronically on opiate therapy          Plan:       - Oral NSAIDs will be avoided due to cardiac illness.  - Trial of increasing gabapentin 600 mg, 1 tablet by mouth 3 times per day   - Continue hydrocodone/APAP 10/325, 1 tablet by mouth every 6 hours as needed for pain.    - Continue baclofen 10 mg tablets; take 1 tablet by mouth 3 times per day as needed for muscle spasms.  - Regular home exercise program was encouraged.  - Follow up in about 4 months (around 6/20/2024).      This note was partly generated with [employer] voice recognition software. I apologize for any possible typographical errors.

## 2024-02-23 LAB
OHS CV AF BURDEN PERCENT: < 1
OHS CV DC REMOTE DEVICE TYPE: NORMAL
OHS CV RV PACING PERCENT: 1 %

## 2024-02-26 DIAGNOSIS — I10 ESSENTIAL HYPERTENSION: ICD-10-CM

## 2024-02-26 DIAGNOSIS — B35.3 TINEA PEDIS OF RIGHT FOOT: ICD-10-CM

## 2024-02-26 DIAGNOSIS — E11.40 TYPE 2 DIABETES MELLITUS WITH DIABETIC NEUROPATHY, WITHOUT LONG-TERM CURRENT USE OF INSULIN: ICD-10-CM

## 2024-02-26 RX ORDER — METOPROLOL SUCCINATE 25 MG/1
TABLET, EXTENDED RELEASE ORAL
Qty: 90 TABLET | Refills: 3 | Status: SHIPPED | OUTPATIENT
Start: 2024-02-26

## 2024-02-26 RX ORDER — VALSARTAN 40 MG/1
TABLET ORAL
Qty: 90 TABLET | Refills: 3 | Status: ON HOLD | OUTPATIENT
Start: 2024-02-26 | End: 2024-02-29 | Stop reason: CLARIF

## 2024-02-26 RX ORDER — MUPIROCIN 20 MG/G
OINTMENT TOPICAL 3 TIMES DAILY
Qty: 22 G | Refills: 11 | Status: SHIPPED | OUTPATIENT
Start: 2024-02-26

## 2024-02-26 NOTE — TELEPHONE ENCOUNTER
Refill Routing Note   Medication(s) are not appropriate for processing by [facility] for the following reason(s):        Outside of protocol    [facility] action(s):  Route  Approve               Appointments  past 12m or future 3m with PCP    Date Provider   Last Visit   12/5/2023 Williams Alvarenga MD   Next Visit   6/5/2024 Williams Alvarenga MD   ED visits in past 90 days: 0        Note composed:12:04 PM 02/26/2024

## 2024-02-26 NOTE — TELEPHONE ENCOUNTER
No care due was identified.  [facility] Embedded Care Due Messages. Reference number: [id number].   2/26/2024 9:22:41 AM CST

## 2024-02-27 ENCOUNTER — TELEPHONE (OUTPATIENT)
Dept: ENDOSCOPY | Facility: HOSPITAL | Age: 74
End: 2024-02-27
Payer: MEDICARE

## 2024-02-27 NOTE — TELEPHONE ENCOUNTER
Spoke with patient about arrival time @ 0730.   Colon/2 day Clenpiq    Prep instructions reviewed:      Two Days Before the procedure 2/27/2024  At 4:00 PM  Take 20mg (four pills) of the Dulcolax.     At 6:00 PM,   Drink 1 glass of Miralax (8 ounces of gatorade or water with 1 capful of Miralax powder) every 15 minutes until you have completed 8 glasses of Miralax.    The day before the procedure 2/28/24, follow a clear liquid diet all day, then start the first 1/2 of prep at 5pm and take 2nd 1/2 of prep @ 0230.  Pt must be completely NPO when prep completed @ 0430.     Patient has AICD.             Medications: Do not take Insulin or oral diabetic medications the day of the procedure.  Take as prescribed: heart, seizure and blood pressure medication in the morning with a sip of water (less than an ounce).  Take any breathing medications and bring inhalers to hospital with you Leave all valuables and jewelry at home.     Wear comfortable clothes to procedure to change into hospital gown You cannot drive for 24 hours after your procedure because you will receive sedation for your procedure to make you comfortable.  A ride must be provided at discharge.

## 2024-02-28 ENCOUNTER — NURSE TRIAGE (OUTPATIENT)
Dept: ADMINISTRATIVE | Facility: CLINIC | Age: 74
End: 2024-02-28
Payer: MEDICARE

## 2024-02-29 ENCOUNTER — HOSPITAL ENCOUNTER (OUTPATIENT)
Facility: HOSPITAL | Age: 74
Discharge: HOME | End: 2024-02-29
Attending: INTERNAL MEDICINE | Admitting: INTERNAL MEDICINE
Payer: MEDICARE

## 2024-02-29 ENCOUNTER — ANESTHESIA (OUTPATIENT)
Dept: ENDOSCOPY | Facility: HOSPITAL | Age: 74
End: 2024-02-29
Payer: MEDICARE

## 2024-02-29 ENCOUNTER — ANESTHESIA EVENT (OUTPATIENT)
Dept: ENDOSCOPY | Facility: HOSPITAL | Age: 74
End: 2024-02-29
Payer: MEDICARE

## 2024-02-29 VITALS
RESPIRATION RATE: 19 BRPM | WEIGHT: 185 LBS | TEMPERATURE: 98 F | DIASTOLIC BLOOD PRESSURE: 70 MMHG | BODY MASS INDEX: 25.9 KG/M2 | HEIGHT: 71 IN | SYSTOLIC BLOOD PRESSURE: 124 MMHG | HEART RATE: 62 BPM | OXYGEN SATURATION: 95 %

## 2024-02-29 DIAGNOSIS — K63.5 COLON POLYP: ICD-10-CM

## 2024-02-29 PROCEDURE — 37000009 HC ANESTHESIA EA ADD 15 MINS: Performed by: INTERNAL MEDICINE

## 2024-02-29 PROCEDURE — 88305 TISSUE EXAM BY PATHOLOGIST: CPT | Mod: 26,,, | Performed by: STUDENT IN AN ORGANIZED HEALTH CARE EDUCATION/TRAINING PROGRAM

## 2024-02-29 PROCEDURE — 63600175 PHARM REV CODE 636 W HCPCS: Performed by: NURSE ANESTHETIST, CERTIFIED REGISTERED

## 2024-02-29 PROCEDURE — 88305 TISSUE EXAM BY PATHOLOGIST: CPT | Performed by: STUDENT IN AN ORGANIZED HEALTH CARE EDUCATION/TRAINING PROGRAM

## 2024-02-29 PROCEDURE — 45385 COLONOSCOPY W/LESION REMOVAL: CPT | Mod: PT,,, | Performed by: INTERNAL MEDICINE

## 2024-02-29 PROCEDURE — 37000008 HC ANESTHESIA 1ST 15 MINUTES: Performed by: INTERNAL MEDICINE

## 2024-02-29 PROCEDURE — D9220A PRA ANESTHESIA: Mod: PT,ANES,, | Performed by: ANESTHESIOLOGY

## 2024-02-29 PROCEDURE — 45385 COLONOSCOPY W/LESION REMOVAL: CPT | Mod: PT | Performed by: INTERNAL MEDICINE

## 2024-02-29 PROCEDURE — D9220A PRA ANESTHESIA: Mod: PT,CRNA,, | Performed by: NURSE ANESTHETIST, CERTIFIED REGISTERED

## 2024-02-29 PROCEDURE — 27201089 HC SNARE, DISP (ANY): Performed by: INTERNAL MEDICINE

## 2024-02-29 PROCEDURE — 25000003 PHARM REV CODE 250: Performed by: NURSE ANESTHETIST, CERTIFIED REGISTERED

## 2024-02-29 PROCEDURE — 25000003 PHARM REV CODE 250: Performed by: INTERNAL MEDICINE

## 2024-02-29 RX ORDER — DEXMEDETOMIDINE HYDROCHLORIDE 100 UG/ML
INJECTION, SOLUTION INTRAVENOUS
Status: DISCONTINUED | OUTPATIENT
Start: 2024-02-29 | End: 2024-02-29

## 2024-02-29 RX ORDER — PROPOFOL 10 MG/ML
VIAL (ML) INTRAVENOUS
Status: DISCONTINUED | OUTPATIENT
Start: 2024-02-29 | End: 2024-02-29

## 2024-02-29 RX ORDER — SODIUM CHLORIDE, SODIUM LACTATE, POTASSIUM CHLORIDE, CALCIUM CHLORIDE 600; 310; 30; 20 MG/100ML; MG/100ML; MG/100ML; MG/100ML
INJECTION, SOLUTION INTRAVENOUS CONTINUOUS PRN
Status: DISCONTINUED | OUTPATIENT
Start: 2024-02-29 | End: 2024-02-29

## 2024-02-29 RX ORDER — PROPOFOL 10 MG/ML
VIAL (ML) INTRAVENOUS CONTINUOUS PRN
Status: DISCONTINUED | OUTPATIENT
Start: 2024-02-29 | End: 2024-02-29

## 2024-02-29 RX ORDER — ETOMIDATE 2 MG/ML
INJECTION INTRAVENOUS
Status: DISCONTINUED | OUTPATIENT
Start: 2024-02-29 | End: 2024-02-29

## 2024-02-29 RX ORDER — SODIUM CHLORIDE 9 MG/ML
INJECTION, SOLUTION INTRAVENOUS CONTINUOUS
Status: DISCONTINUED | OUTPATIENT
Start: 2024-02-29 | End: 2024-02-29 | Stop reason: HOSPADM

## 2024-02-29 RX ADMIN — PROPOFOL 125 MCG/KG/MIN: 10 INJECTION, EMULSION INTRAVENOUS at 08:02

## 2024-02-29 RX ADMIN — SODIUM CHLORIDE, SODIUM LACTATE, POTASSIUM CHLORIDE, AND CALCIUM CHLORIDE: .6; .31; .03; .02 INJECTION, SOLUTION INTRAVENOUS at 07:02

## 2024-02-29 RX ADMIN — ETOMIDATE 6 MG: 2 INJECTION, SOLUTION INTRAVENOUS at 08:02

## 2024-02-29 RX ADMIN — DEXMEDETOMIDINE HYDROCHLORIDE 8 MCG: 100 INJECTION, SOLUTION, CONCENTRATE INTRAVENOUS at 08:02

## 2024-02-29 RX ADMIN — PROPOFOL 20 MG: 10 INJECTION, EMULSION INTRAVENOUS at 08:02

## 2024-02-29 RX ADMIN — SODIUM CHLORIDE: 9 INJECTION, SOLUTION INTRAVENOUS at 08:02

## 2024-02-29 NOTE — TELEPHONE ENCOUNTER
Patient says he has a procedure in the morning and wants to know which of his meds is the blood pressure medicine. Advised him that the metoprolol is his bp med. No other needs voiced at this time.  Reason for Disposition   Caller has medicine question only, adult not sick, AND triager answers question    Protocols used: Medication Question Call-A-AH

## 2024-02-29 NOTE — ANESTHESIA POSTPROCEDURE EVALUATION
Anesthesia Post Evaluation    Patient: Xander Sanchez    Procedure(s) Performed: Procedure(s) (LRB):  COLONOSCOPY (N/A)    Final Anesthesia Type: general      Patient location during evaluation: PACU  Patient participation: Yes- Able to Participate  Level of consciousness: awake and alert  Post-procedure vital signs: reviewed and stable  Pain management: adequate  Airway patency: patent    PONV status at discharge: No PONV  Anesthetic complications: no      Cardiovascular status: blood pressure returned to baseline  Respiratory status: unassisted  Hydration status: euvolemic  Follow-up not needed.          Vitals Value Taken Time   /66 02/29/24 0927   Temp 36.8 °C (98.2 °F) 02/29/24 0917   Pulse 50 02/29/24 0927   Resp 19 02/29/24 0927   SpO2 9 % 02/29/24 0927         No case tracking events are documented in the log.      Pain/Odell Score: Odell Score: 10 (2/29/2024  9:21 AM)

## 2024-02-29 NOTE — LETTER
December 5, 2023    Xander Sanchez  [address] [place] [address]  [address]  [place] [address]                [address] [facility]  [place] [address]  Phone: [phone]  Fax: [phone] Dear Mr. Sanchez:    We have attempted to contact you to schedule a screening colonoscopy that was ordered by your doctor. Please contact the office to schedule at [phone].      If you have any questions or concerns, please don't hesitate to call.    Sincerely,        Holli Delacruz MA

## 2024-02-29 NOTE — H&P
Short Stay Endoscopy History and Physical    PCP - Williams Alvarenga MD    Procedure - Colonoscopy  ASA - III  Mallampati - per anesthesia  History of Anesthesia problems - no  Family history Anesthesia problems - no     HPI:  This is a 73 y.o. male here for evaluation of : Colon polyps    Pt here today for surveillance of prior colon polyps. The most recent exam was in 2017, at which time patients recalls having polyps removed. Since patient denies change in bowel habits or overt blood in stool. Denies weight loss.     ROS:  Constitutional: No fevers, chills, No weight loss  ENT: No allergies  CV: No chest pain  Pulm: No shortness of breath  GI: see HPI  Derm: No rash    Medical History:  has a past medical history of Allergy, Arthritis, Asthma, Cardiomyopathy, Cataract, Cervical radicular pain, Cervical spondylosis with myelopathy (10/17/2012), Chronic bronchitis, Chronic systolic dysfunction of left ventricle (07/27/2015), Constipation (07/27/2015), COPD (chronic obstructive pulmonary disease), CRPS (complex regional pain syndrome type I) (12/29/2014), Diabetes mellitus, type 2, DM type 2 (diabetes mellitus, type 2) (07/27/2015), Enlarged prostate (07/27/2015), Enuresis (07/06/2018), Hypertension, ICD (implantable cardiac defibrillator) in place, Mixed hyperlipidemia (02/28/2018), Paroxysmal atrial fibrillation (1/23/2024), Presence of biventricular AICD (07/27/2015), 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thy (02/01/2016), and Urinary tract infection.    Surgical History:  has a past surgical history that includes Cervical spine surgery; Cardiac defibrillator placement; Spine surgery; Colonoscopy (N/A, 7/19/2017); Cataract extraction w/  intraocular lens implant (Right, 11/10/2020); Cataract extraction w/  intraocular lens implant (Left, 11/24/2020); and replacement of implantable cardioverter-defibrillator (icd) generator (N/A, 10/24/2023).    Family History: family history includes COPD in his  father; Hypertension in his father and mother; No Known Problems in his brother, daughter, and sister.. Otherwise no colon cancer, inflammatory bowel disease, or GI malignancies.    Social History:  reports that he quit smoking about 14 years ago. His smoking use included cigarettes. He started smoking about 54 years ago. He has a 40.0 pack-year smoking history. He has never used smokeless tobacco. He reports current alcohol use of about 2.0 standard drinks of alcohol per week. He reports that he does not use drugs.    Review of patient's allergies indicates:   Allergen Reactions    Ciprofloxacin Nausea And Vomiting       Medications:   Medications Prior to Admission   Medication Sig Dispense Refill Last Dose    aspirin (ECOTRIN) 81 MG EC tablet Take 81 mg by mouth once daily.   Past Week    baclofen (LIORESAL) 10 MG tablet TAKE 1 TABLET THREE TIMES DAILY 270 tablet 3 2/28/2024    gabapentin (NEURONTIN) 600 MG tablet TAKE 1 TABLET TWICE DAILY 180 tablet 3 Past Week    hydroCHLOROthiazide (MICROZIDE) 12.5 mg capsule TAKE 1 CAPSULE (12.5 MG TOTAL) BY MOUTH ONCE DAILY. 90 capsule 3 Past Week    HYDROcodone-acetaminophen (NORCO)  mg per tablet Take 1 tablet by mouth every 6 (six) hours as needed for Pain. 120 tablet 0 Past Week    metoprolol succinate (TOPROL-XL) 25 MG 24 hr tablet TAKE 1 TABLET EVERY DAY 90 tablet 3 2/29/2024 at 0630    oxybutynin (DITROPAN) 5 MG Tab Take 1 tablet (5 mg total) by mouth 2 (two) times daily. 180 tablet 3 Past Week    pantoprazole (PROTONIX) 40 MG tablet TAKE 1 TABLET BY MOUTH ONCE DAILY BEFORE BREAKFAST 30 tablet 11 Past Week    pravastatin (PRAVACHOL) 10 MG tablet TAKE 1 TABLET EVERY NIGHT 90 tablet 2 Past Week    tamsulosin (FLOMAX) 0.4 mg Cap TAKE 1 CAPSULE EVERY DAY 90 capsule 3 Past Week    alcohol swabs PadM Apply 1 each topically as needed. 200 each 3     blood glucose control, low (TRUE METRIX LEVEL 1) Soln 1 Units by Misc.(Non-Drug; Combo Route) route once daily. 1 each 3      blood sugar diagnostic (TRUE METRIX GLUCOSE TEST STRIP) Strp TEST  ONE  TIME  DAILY  AT  6AM 100 strip 3     blood-glucose meter (TRUE METRIX AIR GLUCOSE METER) kit USE AS INSTRUCTED 1 each 0     fluticasone-salmeterol 230-21 mcg/dose (ADVAIR HFA) 230-21 mcg/actuation HFAA inhaler Inhale 2 puffs into the lungs 2 (two) times daily. Controller 36 g 2     lancets (TRUEPLUS LANCETS) 33 gauge Misc TEST ONE TIME DAILY AT 6:00AM 100 each 3     mupirocin (BACTROBAN) 2 % ointment APPLY TOPICALLY 3 (THREE) TIMES DAILY. 22 g 11     sod picosulf-mag ox-citric ac (CLENPIQ) 10 mg-3.5 gram- 12 gram/160 mL Soln Take As Directed. 320 mL 0          Objective Findings:    Vital Signs: see nursing notes  Physical Exam:  General Appearance: Well appearing in no acute distress  Eyes:    No scleral icterus  ENT: Neck supple  Lungs: CTA anteriorly  Heart:  S1, S2 normal, no murmurs heard  Abdomen: Soft, non tender, non distended with positive bowel sounds. No hepatosplenomegaly, ascites, or mass  Extremities: no edema  Skin: No rash      Labs:  Lab Results   Component Value Date    WBC 7.14 10/17/2023    HGB 13.3 (L) 10/17/2023    HCT 43.2 10/17/2023     10/17/2023    CHOL 95 (L) 12/05/2023    TRIG 43 12/05/2023    HDL 42 12/05/2023    ALT 19 06/05/2023    AST 21 06/05/2023     10/17/2023    K 4.3 10/17/2023     10/17/2023    CREATININE 0.8 10/17/2023    BUN 10 10/17/2023    CO2 26 10/17/2023    TSH 2.427 06/28/2021    PSA 1.2 02/01/2016    INR 1.1 10/17/2023    HGBA1C 6.1 (H) 12/05/2023       I have explained the risks and benefits of endoscopy procedures to the patient including but not limited to bleeding, perforation, infection, and death.    Buddy Qureshi MD

## 2024-02-29 NOTE — TRANSFER OF CARE
"Anesthesia Transfer of Care Note    Patient: Xander Sanchez    Procedure(s) Performed: Procedure(s) (LRB):  COLONOSCOPY (N/A)    Patient location: GI    Anesthesia Type: general    Transport from OR: Transported from OR on room air with adequate spontaneous ventilation    Post pain: adequate analgesia    Post assessment: no apparent anesthetic complications and tolerated procedure well    Post vital signs: stable    Level of consciousness: awake    Nausea/Vomiting: no nausea/vomiting    Complications: none    Transfer of care protocol was followed      Last vitals: Visit Vitals  BP (!) 141/71 (BP Location: Left arm, Patient Position: Lying)   Pulse 63   Temp 36.5 °C (97.7 °F) (Temporal)   Resp 18   Ht 5' 11" (1.803 m)   Wt 83.9 kg (185 lb)   SpO2 96%   BMI 25.80 kg/m²     "

## 2024-02-29 NOTE — ANESTHESIA PREPROCEDURE EVALUATION
02/29/2024  Xander Sanchez is a 73 y.o., male for COLONOSCOPY (N/A)    Past Medical History:   Diagnosis Date    Allergy     Arthritis     Asthma     Cardiomyopathy     Cataract     Cervical radicular pain     Cervical spondylosis with myelopathy 10/17/2012    Chronic bronchitis     Chronic systolic dysfunction of left ventricle 07/27/2015    Constipation 07/27/2015    COPD (chronic obstructive pulmonary disease)     CRPS (complex regional pain syndrome type I) 12/29/2014    Diabetes mellitus, type 2     DM type 2 (diabetes mellitus, type 2) 07/27/2015    Enlarged prostate 07/27/2015    Enuresis 07/06/2018    Hypertension     ICD (implantable cardiac defibrillator) in place     Mixed hyperlipidemia 02/28/2018    Paroxysmal atrial fibrillation 1/23/2024    Presence of biventricular AICD 07/27/2015    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thy 02/01/2016    Urinary tract infection     pt does not know     Past Surgical History:   Procedure Laterality Date    CARDIAC DEFIBRILLATOR PLACEMENT      CATARACT EXTRACTION W/  INTRAOCULAR LENS IMPLANT Right 11/10/2020    Procedure: EXTRACTION, CATARACT, WITH IOL INSERTION;  Surgeon: Oral Graham MD;  Location: [facility] OR;  Service: Ophthalmology;  Laterality: Right;    CATARACT EXTRACTION W/  INTRAOCULAR LENS IMPLANT Left 11/24/2020    Procedure: EXTRACTION, CATARACT, WITH IOL INSERTION;  Surgeon: Oral Graham MD;  Location: [facility] OR;  Service: Ophthalmology;  Laterality: Left;    CERVICAL SPINE SURGERY      COLONOSCOPY N/A 7/19/2017    Procedure: COLONOSCOPY  golytely;  Surgeon: Vannessa Uribe MD;  Location: [facility] ENDO;  Service: Endoscopy;  Laterality: N/A;    REPLACEMENT OF IMPLANTABLE CARDIOVERTER-DEFIBRILLATOR (ICD) GENERATOR N/A 10/24/2023    Procedure: REPLACEMENT, ICD GENERATOR;  Surgeon: Christian Navarro MD;  Location: [facility] EP LAB;   Service: Cardiology;  Laterality: N/A;  NICM, REEMA, CRT-D gen change, SJM, SK, 3 prep    SPINE SURGERY       CONCLUSIONS     1 - Mildly to moderately depressed left ventricular systolic function (EF 40%).     2 - No wall motion abnormalities.     3 - Impaired LV relaxation, normal LAP (grade 1 diastolic dysfunction).     4 - Normal right ventricular systolic function .     5 - The estimated PA systolic pressure is greater than 36 mmHg.     6 - Mild mitral regurgitation.     7 - Mild tricuspid regurgitation.       Pre-op Assessment       I have reviewed the Medications.     Review of Systems  Anesthesia Hx:             Denies Family Hx of Anesthesia complications.     Cardiovascular:     Hypertension    Dysrhythmias                              Hypertension     Disorder of Cardiac Rhythm, Atrial Fibrillation     Pulmonary:   COPD Asthma       Asthma:   Chronic Obstructive Pulmonary Disease (COPD):                      Hepatic/GI:   PUD,  GERD      Gerd, Peptic Ulcer Disease          Musculoskeletal:  Arthritis        Arthritis          Neurological:    Neuromuscular Disease,           Arthritis                         Neuromuscular Disease   Endocrine:  Diabetes    Diabetes                        Physical Exam  General: Well nourished    Airway:  Mallampati: II   TM Distance: Normal  Neck ROM: Normal ROM    Dental:  Intact    Chest/Lungs:  Clear to auscultation    Heart:  Rate: Normal  Rhythm: Regular Rhythm      Anesthesia Plan  Type of Anesthesia, risks & benefits discussed:    Anesthesia Type: Gen Natural Airway  Intra-op Monitoring Plan: Standard ASA Monitors  Post Op Pain Control Plan: multimodal analgesia  Informed Consent: Informed consent signed with the Patient and all parties understand the risks and agree with anesthesia plan.  All questions answered.   ASA Score: 3    Ready For Surgery From Anesthesia Perspective.     .

## 2024-02-29 NOTE — PROVATION PATIENT INSTRUCTIONS
Discharge Summary/Instructions after an Endoscopic Procedure  Patient Name: Xander Sanchez  Patient MRN: [id number]  Patient YOB: 1950 Thursday, February 29, 2024  Buddy Qureshi MD  Dear patient,  As a result of recent federal legislation (The Federal Cures Act), you may   receive lab or pathology results from your procedure in your [facility]   account before your physician is able to contact you. Your physician or   their representative will relay the results to you with their   recommendations at their soonest availability.  Thank you,  Your health is very important to us during the Covid Crisis. Following your   procedure today, you will receive a daily text for 2 weeks asking about   signs or symptoms of Covid 19.  Please respond to this text when you   receive it so we can follow up and keep you as safe as possible.   RESTRICTIONS:  During your procedure today, you received medications for sedation.  These   medications may affect your judgment, balance and coordination.  Therefore,   for 24 hours, you have the following restrictions:   - DO NOT drive a car, operate machinery, make legal/financial decisions,   sign important papers or drink alcohol.    ACTIVITY:  Today: no heavy lifting, straining or running due to procedural   sedation/anesthesia.  The following day: return to full activity including work.  DIET:  Eat and drink normally unless instructed otherwise.     TREATMENT FOR COMMON SIDE EFFECTS:  - Mild abdominal pain, nausea, belching, bloating or excessive gas:  rest,   eat lightly and use a heating pad.  - Sore Throat: treat with throat lozenges and/or gargle with warm salt   water.  - Because air was used during the procedure, expelling large amounts of air   from your rectum or belching is normal.  - If a bowel prep was taken, you may not have a bowel movement for 1-3 days.    This is normal.  SYMPTOMS TO WATCH FOR AND REPORT TO YOUR PHYSICIAN:  1. Abdominal pain or bloating,  other than gas cramps.  2. Chest pain.  3. Back pain.  4. Signs of infection such as: chills or fever occurring within 24 hours   after the procedure.  5. Rectal bleeding, which would show as bright red, maroon, or black stools.   (A tablespoon of blood from the rectum is not serious, especially if   hemorrhoids are present.)  6. Vomiting.  7. Weakness or dizziness.  GO DIRECTLY TO THE NEAREST EMERGENCY ROOM IF YOU HAVE ANY OF THE FOLLOWING:      Difficulty breathing              Chills and/or fever over 101 F   Persistent vomiting and/or vomiting blood   Severe abdominal pain   Severe chest pain   Black, tarry stools   Bleeding- more than one tablespoon   Any other symptom or condition that you feel may need urgent attention  Your doctor recommends these additional instructions:  If any biopsies were taken, your doctors clinic will contact you in 1 to 2   weeks with any results.  - Discharge patient to home.   - Resume previous diet.   - Continue present medications.   - Await pathology results.   - Repeat colonoscopy in 3 years for surveillance.   - Refer to a colo-rectal surgeon to evaluate hemorrhoids.   - Patient has a contact number available for emergencies.  The signs and   symptoms of potential delayed complications were discussed with the   patient.  Return to normal activities tomorrow.  Written discharge   instructions were provided to the patient.  For questions, problems or results please call your physician - Buddy Qureshi MD.  EMERGENCY PHONE NUMBER: [phone],  LAB RESULTS: [phone]  IF A COMPLICATION OR EMERGENCY SITUATION ARISES AND YOU ARE UNABLE TO REACH   YOUR PHYSICIAN - GO DIRECTLY TO THE EMERGENCY ROOM.  Buddy Qureshi MD  2/29/2024 9:15:04 AM  This report has been verified and signed electronically.  Dear patient,  As a result of recent federal legislation (The Federal Cures Act), you may   receive lab or pathology results from your procedure in your  [facility]   account before your physician is able to contact you. Your physician or   their representative will relay the results to you with their   recommendations at their soonest availability.  Thank you,  PROVATION

## 2024-03-01 LAB
FINAL PATHOLOGIC DIAGNOSIS: NORMAL
GROSS: NORMAL
Lab: NORMAL

## 2024-03-07 ENCOUNTER — TELEPHONE (OUTPATIENT)
Dept: GASTROENTEROLOGY | Facility: CLINIC | Age: 74
End: 2024-03-07
Payer: MEDICARE

## 2024-03-07 NOTE — TELEPHONE ENCOUNTER
Spoke with patient about test results. Verbal understanding.       ----- Message from Buddy Qureshi MD sent at 3/4/2024  9:57 AM CST -----  Pathology from recent colonoscopy reviewed.  Colon polyp(s) benign.  Repeat colonoscopy in 3 years.

## 2024-03-11 DIAGNOSIS — M48.02 CERVICAL STENOSIS OF SPINE: ICD-10-CM

## 2024-03-11 DIAGNOSIS — M54.12 CERVICAL RADICULAR PAIN: ICD-10-CM

## 2024-03-11 DIAGNOSIS — G90.50 COMPLEX REGIONAL PAIN SYNDROME TYPE 1, AFFECTING UNSPECIFIED SITE: ICD-10-CM

## 2024-03-11 DIAGNOSIS — M47.812 FACET ARTHRITIS OF CERVICAL REGION: ICD-10-CM

## 2024-03-11 DIAGNOSIS — R29.898 RIGHT HAND WEAKNESS: ICD-10-CM

## 2024-03-11 DIAGNOSIS — M47.12 CERVICAL SPONDYLOSIS WITH MYELOPATHY: ICD-10-CM

## 2024-03-11 DIAGNOSIS — M50.00 HNP (HERNIATED NUCLEUS PULPOSUS) WITH MYELOPATHY, CERVICAL: ICD-10-CM

## 2024-03-11 DIAGNOSIS — R29.898 RIGHT ARM WEAKNESS: ICD-10-CM

## 2024-03-11 DIAGNOSIS — M48.02 SPINAL STENOSIS IN CERVICAL REGION: ICD-10-CM

## 2024-03-11 DIAGNOSIS — G56.41 COMPLEX REGIONAL PAIN SYNDROME TYPE 2 OF RIGHT UPPER EXTREMITY: ICD-10-CM

## 2024-03-11 DIAGNOSIS — G89.4 CHRONIC PAIN SYNDROME: ICD-10-CM

## 2024-03-11 DIAGNOSIS — M96.1 CERVICAL POST-LAMINECTOMY SYNDROME: ICD-10-CM

## 2024-03-11 DIAGNOSIS — M75.101 ROTATOR CUFF SYNDROME OF RIGHT SHOULDER: ICD-10-CM

## 2024-03-11 DIAGNOSIS — M50.30 DEGENERATION OF CERVICAL INTERVERTEBRAL DISC: ICD-10-CM

## 2024-03-11 DIAGNOSIS — F11.20 NARCOTIC DEPENDENCY, CONTINUOUS: ICD-10-CM

## 2024-03-11 DIAGNOSIS — M54.12 CERVICAL RADICULOPATHY: ICD-10-CM

## 2024-03-11 DIAGNOSIS — M54.12 BRACHIAL NEURITIS OR RADICULITIS: ICD-10-CM

## 2024-03-11 DIAGNOSIS — M62.81 MUSCLE RIGHT ARM WEAKNESS: ICD-10-CM

## 2024-03-11 DIAGNOSIS — M79.601 RIGHT ARM PAIN: ICD-10-CM

## 2024-03-11 RX ORDER — HYDROCODONE BITARTRATE AND ACETAMINOPHEN 10; 325 MG/1; MG/1
1 TABLET ORAL EVERY 6 HOURS PRN
Qty: 120 TABLET | Refills: 0 | Status: SHIPPED | OUTPATIENT
Start: 2024-03-17 | End: 2024-04-11 | Stop reason: SDUPTHER

## 2024-03-12 NOTE — PROGRESS NOTES
"Mr. Sanchez is a patient of Dr. Navarro and was last seen in clinic 4/13/2023.      Subjective:   Patient ID:  Xander Sanchez is a 73 y.o. male who presents for follow up of ICD  .     HPI:    Mr. Sanchez is a 73 y.o. male with NICM (EF now 40%), LBBB, COPD, CRT-D (LV lead off) here for follow up after generator change.     Background:    He was diagnosed with NICM with a LBBB at [facility] in approx ~2005, [facility] approx 5-6 years ago per patient at  that was "no blockages"  A single chamber ICD implanted for primary prevention, since [place] has [place] [facility] [place]BBB resolved and NICM normalized.   He was scheduled for a generator change, noted to be in LBBB again ( with correlative NYHA III symptoms ) and fluoroscopy of the RV (Riata) revealed lead externalization but a patent left sided venous system. Echo revealed EF 25%.  3/24/15 Extraction of ICD lead and implantation of CRT-D (Dr. Mensah). Paucity of LV vein targets, has high LV threshold.  Did not derive symptomatic benefit from upgrade, and EF remained unchanged at 30%.  We turned off LV lead 11/16 to conserve battery.  5/27/2020: Mr. Sanchez is doing well from a device perspective with stable lead and device function. Narrow QRS. No arrhythmia noted. No significant RV pacing. He feels well.    3/31/2022: Mr. Sanchez is doing well from a device perspective with stable lead and device function. No arrhythmia noted. No RV pacing. LV lead off. Narrow QRS. On GDMT. No CHF symptoms. He feels well.    4/13/2023: Mr. Sanchez is doing well from a device perspective with stable lead and device function. No arrhythmia noted. No RV pacing. LV lead turned OFF due to narrow QRS. No new CHF symptoms. He is feeling well. 5 months to REEMA. Pt educated re: vibratory alert. Monthly device checks.    Update (03/13/2024):    10/24/2023: Successful generator change     Today he says he is overall feeling well. Will feel some CP at rest but not c/w exertion. SOB in " the AM which resolves after inhaler. No worsening MURDOCK. No palpitations, LH, syncope reported. He walks for exercise.     Device Interrogation (3/13/2024) reveals an intrinsic SR (70s) with stable lead and device function. No arrhythmias or treated episodes were noted.  He paces 6% in the RA and 0% in the RV (LV lead off). Estimated battery longevity 7.7-8.4 years.     I have personally reviewed the patient's EKG today, which shows sinus rhythm at 70bpm. MI interval is 158. QRS is 112. QTc is 416.    Recent Cardiac Tests:    2D Echo (2/15/2018):  CONCLUSIONS     1 - Mildly to moderately depressed left ventricular systolic function (EF 40%).     2 - No wall motion abnormalities.     3 - Impaired LV relaxation, normal LAP (grade 1 diastolic dysfunction).     4 - Normal right ventricular systolic function .     5 - The estimated PA systolic pressure is greater than 36 mmHg.     6 - Mild mitral regurgitation.     7 - Mild tricuspid regurgitation.     Current Outpatient Medications   Medication Sig    alcohol swabs PadM Apply 1 each topically as needed.    aspirin (ECOTRIN) 81 MG EC tablet Take 81 mg by mouth once daily.    baclofen (LIORESAL) 10 MG tablet TAKE 1 TABLET THREE TIMES DAILY    blood glucose control, low (TRUE METRIX LEVEL 1) Soln 1 Units by Misc.(Non-Drug; Combo Route) route once daily.    blood sugar diagnostic (TRUE METRIX GLUCOSE TEST STRIP) Strp TEST  ONE  TIME  DAILY  AT  6AM    blood-glucose meter (TRUE METRIX AIR GLUCOSE METER) kit USE AS INSTRUCTED    fluticasone-salmeterol 230-21 mcg/dose (ADVAIR HFA) 230-21 mcg/actuation HFAA inhaler Inhale 2 puffs into the lungs 2 (two) times daily. Controller    gabapentin (NEURONTIN) 600 MG tablet TAKE 1 TABLET TWICE DAILY    hydroCHLOROthiazide (MICROZIDE) 12.5 mg capsule TAKE 1 CAPSULE (12.5 MG TOTAL) BY MOUTH ONCE DAILY.    [START ON 3/17/2024] HYDROcodone-acetaminophen (NORCO)  mg per tablet Take 1 tablet by mouth every 6 (six) hours as needed for  "Pain.    lancets (TRUEPLUS LANCETS) 33 gauge Misc TEST ONE TIME DAILY AT 6:00AM    metoprolol succinate (TOPROL-XL) 25 MG 24 hr tablet TAKE 1 TABLET EVERY DAY    mupirocin (BACTROBAN) 2 % ointment APPLY TOPICALLY 3 (THREE) TIMES DAILY.    oxybutynin (DITROPAN) 5 MG Tab Take 1 tablet (5 mg total) by mouth 2 (two) times daily.    pantoprazole (PROTONIX) 40 MG tablet TAKE 1 TABLET BY MOUTH ONCE DAILY BEFORE BREAKFAST    pravastatin (PRAVACHOL) 10 MG tablet TAKE 1 TABLET EVERY NIGHT    sod picosulf-mag ox-citric ac (CLENPIQ) 10 mg-3.5 gram- 12 gram/160 mL Soln Take As Directed.    tamsulosin (FLOMAX) 0.4 mg Cap TAKE 1 CAPSULE EVERY DAY     No current facility-administered medications for this visit.       Review of Systems   Constitutional: Negative for malaise/fatigue.   Cardiovascular:  Positive for chest pain (atypical). Negative for dyspnea on exertion, irregular heartbeat, leg swelling and palpitations.   Respiratory:  Positive for shortness of breath (in AM).    Hematologic/Lymphatic: Negative for bleeding problem.   Skin:  Negative for rash.   Musculoskeletal:  Negative for myalgias.   Gastrointestinal:  Negative for hematemesis, hematochezia and nausea.   Genitourinary:  Negative for hematuria.   Neurological:  Negative for light-headedness.   Psychiatric/Behavioral:  Negative for altered mental status.    Allergic/Immunologic: Negative for persistent infections.       Objective:          /65   Pulse 70   Ht 5' 11" (1.803 m)   Wt 81.1 kg (178 lb 12.7 oz)   BMI 24.94 kg/m²     Physical Exam  Vitals and nursing note reviewed.   Constitutional:       Appearance: Normal appearance. He is well-developed.   HENT:      Head: Normocephalic.      Nose: Nose normal.   Eyes:      Pupils: Pupils are equal, round, and reactive to light.   Cardiovascular:      Rate and Rhythm: Normal rate and regular rhythm.   Pulmonary:      Effort: No respiratory distress.      Breath sounds: Normal breath sounds.   Chest:      " Comments: Device to ISRAELW. Incision and pocket in good repair.    Musculoskeletal:         General: Normal range of motion.   Skin:     General: Skin is warm and dry.      Findings: No erythema.   Neurological:      Mental Status: He is alert and oriented to person, place, and time.   Psychiatric:         Speech: Speech normal.         Behavior: Behavior normal.           Lab Results   Component Value Date     10/17/2023    K 4.3 10/17/2023    MG 2.2 04/08/2013    BUN 10 10/17/2023    CREATININE 0.8 10/17/2023    ALT 19 06/05/2023    AST 21 06/05/2023    HGB 13.3 (L) 10/17/2023    HCT 43.2 10/17/2023    HCT 31 (L) 08/10/2015    TSH 2.427 06/28/2021    LDLCALC 44.4 (L) 12/05/2023       Recent Labs   Lab 10/17/23  0831   INR 1.1       Assessment:     1. Biventricular implantable cardioverter-defibrillator (ICD) in situ    2. Cardiomyopathy, nonischemic    3. Essential hypertension    4. LBBB (left bundle branch block)    5. Paroxysmal atrial fibrillation        Plan:     In summary, Mr. Sanchez is a 73 y.o. male with NICM (EF now 40%), LBBB, COPD, CRT-D (LV lead off) here for follow up after generator change.   He is 5 months s/p generator change. Mr. Sanchez is doing well from a device perspective with stable lead and device function. No arrhythmia noted.   No RV pacing (LV lead off - narrow QRS). No CHF symptoms. Atypical CP not likely cardiac but we did review signs and symptoms of ischemia and when to seek medical care. He walks for exercise and feels well overall.    Continue current medication regimen and device settings.   Follow up in device clinic as scheduled.   Follow up in EP clinic in 1 year, sooner as needed.   *A copy of this note has been sent to Dr. Navarro*    Follow up in about 1 year (around 3/13/2025).    ------------------------------------------------------------------    RACHEL Grace, NP-C  Cardiac Electrophysiology

## 2024-03-13 ENCOUNTER — CLINICAL SUPPORT (OUTPATIENT)
Dept: CARDIOLOGY | Facility: HOSPITAL | Age: 74
End: 2024-03-13
Attending: INTERNAL MEDICINE
Payer: MEDICARE

## 2024-03-13 ENCOUNTER — OFFICE VISIT (OUTPATIENT)
Dept: ELECTROPHYSIOLOGY | Facility: CLINIC | Age: 74
End: 2024-03-13
Attending: INTERNAL MEDICINE
Payer: MEDICARE

## 2024-03-13 ENCOUNTER — HOSPITAL ENCOUNTER (OUTPATIENT)
Dept: CARDIOLOGY | Facility: CLINIC | Age: 74
Discharge: HOME | End: 2024-03-13
Attending: INTERNAL MEDICINE
Payer: MEDICARE

## 2024-03-13 VITALS
DIASTOLIC BLOOD PRESSURE: 65 MMHG | HEIGHT: 71 IN | WEIGHT: 178.81 LBS | BODY MASS INDEX: 25.03 KG/M2 | HEART RATE: 70 BPM | SYSTOLIC BLOOD PRESSURE: 127 MMHG

## 2024-03-13 DIAGNOSIS — I48.0 PAROXYSMAL ATRIAL FIBRILLATION: ICD-10-CM

## 2024-03-13 DIAGNOSIS — I42.8 CARDIOMYOPATHY, NONISCHEMIC: ICD-10-CM

## 2024-03-13 DIAGNOSIS — Z95.810 BIVENTRICULAR IMPLANTABLE CARDIOVERTER-DEFIBRILLATOR (ICD) IN SITU: Primary | ICD-10-CM

## 2024-03-13 DIAGNOSIS — Z95.810 PRESENCE OF AUTOMATIC (IMPLANTABLE) CARDIAC DEFIBRILLATOR: ICD-10-CM

## 2024-03-13 DIAGNOSIS — I47.29 OTHER VENTRICULAR TACHYCARDIA: ICD-10-CM

## 2024-03-13 DIAGNOSIS — I10 ESSENTIAL HYPERTENSION: ICD-10-CM

## 2024-03-13 DIAGNOSIS — I50.42 CHRONIC COMBINED SYSTOLIC (CONGESTIVE) AND DIASTOLIC (CONGESTIVE) HEART FAILURE: ICD-10-CM

## 2024-03-13 DIAGNOSIS — I44.7 LBBB (LEFT BUNDLE BRANCH BLOCK): ICD-10-CM

## 2024-03-13 LAB
OHS QRS DURATION: 112 MS
OHS QTC CALCULATION: 416 MS

## 2024-03-13 PROCEDURE — 1159F MED LIST DOCD IN RCRD: CPT | Mod: CPTII,S$GLB,, | Performed by: NURSE PRACTITIONER

## 2024-03-13 PROCEDURE — 3288F FALL RISK ASSESSMENT DOCD: CPT | Mod: CPTII,S$GLB,, | Performed by: NURSE PRACTITIONER

## 2024-03-13 PROCEDURE — 3072F LOW RISK FOR RETINOPATHY: CPT | Mod: CPTII,S$GLB,, | Performed by: NURSE PRACTITIONER

## 2024-03-13 PROCEDURE — 3074F SYST BP LT 130 MM HG: CPT | Mod: CPTII,S$GLB,, | Performed by: NURSE PRACTITIONER

## 2024-03-13 PROCEDURE — 1101F PT FALLS ASSESS-DOCD LE1/YR: CPT | Mod: CPTII,S$GLB,, | Performed by: NURSE PRACTITIONER

## 2024-03-13 PROCEDURE — 99214 OFFICE O/P EST MOD 30 MIN: CPT | Mod: S$GLB,,, | Performed by: NURSE PRACTITIONER

## 2024-03-13 PROCEDURE — 93005 ELECTROCARDIOGRAM TRACING: CPT | Mod: S$GLB,,, | Performed by: INTERNAL MEDICINE

## 2024-03-13 PROCEDURE — 93283 PRGRMG EVAL IMPLANTABLE DFB: CPT | Mod: 26,,, | Performed by: INTERNAL MEDICINE

## 2024-03-13 PROCEDURE — 99999 PR PBB SHADOW E&M-EST. PATIENT-LVL III: CPT | Mod: PBBFAC,,, | Performed by: NURSE PRACTITIONER

## 2024-03-13 PROCEDURE — 93010 ELECTROCARDIOGRAM REPORT: CPT | Mod: S$GLB,,, | Performed by: INTERNAL MEDICINE

## 2024-03-13 PROCEDURE — 1160F RVW MEDS BY RX/DR IN RCRD: CPT | Mod: CPTII,S$GLB,, | Performed by: NURSE PRACTITIONER

## 2024-03-13 PROCEDURE — 93283 PRGRMG EVAL IMPLANTABLE DFB: CPT

## 2024-03-13 PROCEDURE — 3078F DIAST BP <80 MM HG: CPT | Mod: CPTII,S$GLB,, | Performed by: NURSE PRACTITIONER

## 2024-03-13 PROCEDURE — 1126F AMNT PAIN NOTED NONE PRSNT: CPT | Mod: CPTII,S$GLB,, | Performed by: NURSE PRACTITIONER

## 2024-03-19 LAB
OHS CV DC REMOTE DEVICE TYPE: NORMAL
OHS CV RV PACING PERCENT: 0 %

## 2024-04-11 DIAGNOSIS — M50.00 HNP (HERNIATED NUCLEUS PULPOSUS) WITH MYELOPATHY, CERVICAL: ICD-10-CM

## 2024-04-11 DIAGNOSIS — R29.898 RIGHT ARM WEAKNESS: ICD-10-CM

## 2024-04-11 DIAGNOSIS — M96.1 CERVICAL POST-LAMINECTOMY SYNDROME: ICD-10-CM

## 2024-04-11 DIAGNOSIS — G89.4 CHRONIC PAIN SYNDROME: ICD-10-CM

## 2024-04-11 DIAGNOSIS — R29.898 RIGHT HAND WEAKNESS: ICD-10-CM

## 2024-04-11 DIAGNOSIS — M54.12 CERVICAL RADICULAR PAIN: ICD-10-CM

## 2024-04-11 DIAGNOSIS — M50.30 DEGENERATION OF CERVICAL INTERVERTEBRAL DISC: ICD-10-CM

## 2024-04-11 DIAGNOSIS — M79.601 RIGHT ARM PAIN: ICD-10-CM

## 2024-04-11 DIAGNOSIS — M47.812 FACET ARTHRITIS OF CERVICAL REGION: ICD-10-CM

## 2024-04-11 DIAGNOSIS — M48.02 SPINAL STENOSIS IN CERVICAL REGION: ICD-10-CM

## 2024-04-11 DIAGNOSIS — M54.12 BRACHIAL NEURITIS OR RADICULITIS: ICD-10-CM

## 2024-04-11 DIAGNOSIS — M75.101 ROTATOR CUFF SYNDROME OF RIGHT SHOULDER: ICD-10-CM

## 2024-04-11 DIAGNOSIS — M48.02 CERVICAL STENOSIS OF SPINE: ICD-10-CM

## 2024-04-11 DIAGNOSIS — M62.81 MUSCLE RIGHT ARM WEAKNESS: ICD-10-CM

## 2024-04-11 DIAGNOSIS — M54.12 CERVICAL RADICULOPATHY: ICD-10-CM

## 2024-04-11 DIAGNOSIS — F11.20 NARCOTIC DEPENDENCY, CONTINUOUS: ICD-10-CM

## 2024-04-11 DIAGNOSIS — M47.12 CERVICAL SPONDYLOSIS WITH MYELOPATHY: ICD-10-CM

## 2024-04-11 DIAGNOSIS — G56.41 COMPLEX REGIONAL PAIN SYNDROME TYPE 2 OF RIGHT UPPER EXTREMITY: ICD-10-CM

## 2024-04-11 DIAGNOSIS — G90.50 COMPLEX REGIONAL PAIN SYNDROME TYPE 1, AFFECTING UNSPECIFIED SITE: ICD-10-CM

## 2024-04-11 RX ORDER — HYDROCODONE BITARTRATE AND ACETAMINOPHEN 10; 325 MG/1; MG/1
1 TABLET ORAL EVERY 6 HOURS PRN
Qty: 120 TABLET | Refills: 0 | Status: SHIPPED | OUTPATIENT
Start: 2024-04-16 | End: 2024-04-16 | Stop reason: SDUPTHER

## 2024-04-11 NOTE — TELEPHONE ENCOUNTER
----- Message from Cary Luna sent at 4/11/2024  9:06 AM CDT -----  Regarding: Refill  Contact: pt @ brday @ [phone]  Rx Refill/Request    Is this a Refill or New Rx:refill    Rx Name and Strength:HYDROcodone-acetaminophen (NORCO)  mg per tablet    Preferred Pharmacy with phone number:  [facility] #[id number] [place] [address] [address] [facility] [address]  [facility] [address]  [facility] [address]  Phone: [phone] Fax: [phone]    Communication Preference:pt @ [phone]    Additional Information:

## 2024-04-13 NOTE — TELEPHONE ENCOUNTER
----- Message from Justyna Pizano sent at 2/20/2017 12:55 PM CST -----  Contact: pt#[phone]  Pt states that medication is not working and wants to speak with you  
Doing better.  He will continue oxybutynin after doxycycline is done.  Will see him in May as scheduled.  
Patient is hard to understand, but he states he is taking all three meds . He still c/o nocturia and dysuria. He states he can't get any sleep because he voids all night.   
Yes

## 2024-04-16 ENCOUNTER — TELEPHONE (OUTPATIENT)
Dept: NEUROLOGY | Facility: CLINIC | Age: 74
End: 2024-04-16
Payer: MEDICARE

## 2024-04-16 DIAGNOSIS — G89.4 CHRONIC PAIN SYNDROME: ICD-10-CM

## 2024-04-16 DIAGNOSIS — M54.12 BRACHIAL NEURITIS OR RADICULITIS: ICD-10-CM

## 2024-04-16 DIAGNOSIS — M50.30 DEGENERATION OF CERVICAL INTERVERTEBRAL DISC: ICD-10-CM

## 2024-04-16 DIAGNOSIS — G56.41 COMPLEX REGIONAL PAIN SYNDROME TYPE 2 OF RIGHT UPPER EXTREMITY: ICD-10-CM

## 2024-04-16 DIAGNOSIS — M47.812 FACET ARTHRITIS OF CERVICAL REGION: ICD-10-CM

## 2024-04-16 DIAGNOSIS — R29.898 RIGHT HAND WEAKNESS: ICD-10-CM

## 2024-04-16 DIAGNOSIS — M79.601 RIGHT ARM PAIN: ICD-10-CM

## 2024-04-16 DIAGNOSIS — F11.20 NARCOTIC DEPENDENCY, CONTINUOUS: ICD-10-CM

## 2024-04-16 DIAGNOSIS — M62.81 MUSCLE RIGHT ARM WEAKNESS: ICD-10-CM

## 2024-04-16 DIAGNOSIS — M75.101 ROTATOR CUFF SYNDROME OF RIGHT SHOULDER: ICD-10-CM

## 2024-04-16 DIAGNOSIS — M48.02 SPINAL STENOSIS IN CERVICAL REGION: ICD-10-CM

## 2024-04-16 DIAGNOSIS — M54.12 CERVICAL RADICULAR PAIN: ICD-10-CM

## 2024-04-16 DIAGNOSIS — M47.12 CERVICAL SPONDYLOSIS WITH MYELOPATHY: ICD-10-CM

## 2024-04-16 DIAGNOSIS — M96.1 CERVICAL POST-LAMINECTOMY SYNDROME: ICD-10-CM

## 2024-04-16 DIAGNOSIS — M48.02 CERVICAL STENOSIS OF SPINE: ICD-10-CM

## 2024-04-16 DIAGNOSIS — M50.00 HNP (HERNIATED NUCLEUS PULPOSUS) WITH MYELOPATHY, CERVICAL: ICD-10-CM

## 2024-04-16 DIAGNOSIS — R29.898 RIGHT ARM WEAKNESS: ICD-10-CM

## 2024-04-16 DIAGNOSIS — M54.12 CERVICAL RADICULOPATHY: ICD-10-CM

## 2024-04-16 DIAGNOSIS — G90.50 COMPLEX REGIONAL PAIN SYNDROME TYPE 1, AFFECTING UNSPECIFIED SITE: ICD-10-CM

## 2024-04-16 RX ORDER — HYDROCODONE BITARTRATE AND ACETAMINOPHEN 10; 325 MG/1; MG/1
1 TABLET ORAL EVERY 6 HOURS PRN
Qty: 120 TABLET | Refills: 0 | Status: SHIPPED | OUTPATIENT
Start: 2024-04-17 | End: 2024-04-17 | Stop reason: SDUPTHER

## 2024-04-16 NOTE — TELEPHONE ENCOUNTER
----- Message from Helen Holm sent at 4/16/2024 10:02 AM CDT -----  Can the clinic reply in [facility]:  No         Please refill the medication(s) listed below. Please call the patient when the prescription(s) is ready for  at this phone number Telephone Information:  Mobile          [phone]          The pt needs his medication re sent in he would like ot to go to the [facility]'s pharmacy instead       Medication #1  HYDROcodone-acetaminophen (NORCO)  mg per tablet 120 tablet 0 4/16/2024 5/16/2024 No  Sig - Route: Take 1 tablet by mouth every 6 (six) hours as needed for Pain. - Ora          Medication #2          Preferred Pharmacy:      [facility] #[id number] [place] [address] [address] AT [facility] [address]  [facility] [address]  [facility] [address]  Phone: [phone] Fax: [phone]

## 2024-04-16 NOTE — TELEPHONE ENCOUNTER
----- Message from Helen Holm sent at 4/16/2024  3:39 PM CDT -----  Regarding: meds due today  Can the clinic reply in [facility]:    no      Please refill the medication(s) listed below. Please call the patient when the prescription(s) is ready for  at this phone number     [phone] (home)   The pt will be out of meds and needs this to go to the [facility] below . Please do not sent this medication to [facility] in the future         Medication #1    HYDROcodone-acetaminophen (NORCO)  mg per tablet 120 tablet 0 4/16/2024 5/16/2024 No  Sig - Route: Take 1 tablet by mouth every 6 (six) hours as needed for Pain. - O        Medication #2          Preferred Pharmacy:      [facility] #[id number] [facility]note 3[facility] [address] AT [facility] [address]  [facility] [address]  [facility] [address]  Phone: [phone] Fax: [phone]

## 2024-04-17 DIAGNOSIS — M48.02 SPINAL STENOSIS IN CERVICAL REGION: ICD-10-CM

## 2024-04-17 DIAGNOSIS — M96.1 CERVICAL POST-LAMINECTOMY SYNDROME: ICD-10-CM

## 2024-04-17 DIAGNOSIS — M47.12 CERVICAL SPONDYLOSIS WITH MYELOPATHY: ICD-10-CM

## 2024-04-17 DIAGNOSIS — M48.02 CERVICAL STENOSIS OF SPINE: ICD-10-CM

## 2024-04-17 DIAGNOSIS — M47.812 FACET ARTHRITIS OF CERVICAL REGION: ICD-10-CM

## 2024-04-17 DIAGNOSIS — M54.12 CERVICAL RADICULOPATHY: ICD-10-CM

## 2024-04-17 DIAGNOSIS — M79.601 RIGHT ARM PAIN: ICD-10-CM

## 2024-04-17 DIAGNOSIS — F11.20 NARCOTIC DEPENDENCY, CONTINUOUS: ICD-10-CM

## 2024-04-17 DIAGNOSIS — M50.30 DEGENERATION OF CERVICAL INTERVERTEBRAL DISC: ICD-10-CM

## 2024-04-17 DIAGNOSIS — R29.898 RIGHT HAND WEAKNESS: ICD-10-CM

## 2024-04-17 DIAGNOSIS — R29.898 RIGHT ARM WEAKNESS: ICD-10-CM

## 2024-04-17 DIAGNOSIS — G56.41 COMPLEX REGIONAL PAIN SYNDROME TYPE 2 OF RIGHT UPPER EXTREMITY: ICD-10-CM

## 2024-04-17 DIAGNOSIS — M50.00 HNP (HERNIATED NUCLEUS PULPOSUS) WITH MYELOPATHY, CERVICAL: ICD-10-CM

## 2024-04-17 DIAGNOSIS — M75.101 ROTATOR CUFF SYNDROME OF RIGHT SHOULDER: ICD-10-CM

## 2024-04-17 DIAGNOSIS — G89.4 CHRONIC PAIN SYNDROME: ICD-10-CM

## 2024-04-17 DIAGNOSIS — G90.50 COMPLEX REGIONAL PAIN SYNDROME TYPE 1, AFFECTING UNSPECIFIED SITE: ICD-10-CM

## 2024-04-17 DIAGNOSIS — M62.81 MUSCLE RIGHT ARM WEAKNESS: ICD-10-CM

## 2024-04-17 DIAGNOSIS — M54.12 BRACHIAL NEURITIS OR RADICULITIS: ICD-10-CM

## 2024-04-17 DIAGNOSIS — M54.12 CERVICAL RADICULAR PAIN: ICD-10-CM

## 2024-04-17 RX ORDER — HYDROCODONE BITARTRATE AND ACETAMINOPHEN 10; 325 MG/1; MG/1
1 TABLET ORAL EVERY 6 HOURS PRN
Qty: 120 TABLET | Refills: 0 | Status: SHIPPED | OUTPATIENT
Start: 2024-04-18 | End: 2024-05-10 | Stop reason: SDUPTHER

## 2024-04-22 ENCOUNTER — CLINICAL SUPPORT (OUTPATIENT)
Dept: CARDIOLOGY | Facility: HOSPITAL | Age: 74
End: 2024-04-22
Payer: MEDICARE

## 2024-04-22 ENCOUNTER — CLINICAL SUPPORT (OUTPATIENT)
Dept: CARDIOLOGY | Facility: HOSPITAL | Age: 74
End: 2024-04-22
Attending: INTERNAL MEDICINE
Payer: MEDICARE

## 2024-04-22 DIAGNOSIS — I42.5 OTHER RESTRICTIVE CARDIOMYOPATHY: ICD-10-CM

## 2024-04-22 DIAGNOSIS — Z95.810 PRESENCE OF AUTOMATIC (IMPLANTABLE) CARDIAC DEFIBRILLATOR: ICD-10-CM

## 2024-04-22 PROCEDURE — 93295 DEV INTERROG REMOTE 1/2/MLT: CPT | Mod: ,,, | Performed by: INTERNAL MEDICINE

## 2024-04-22 PROCEDURE — 93296 REM INTERROG EVL PM/IDS: CPT | Performed by: INTERNAL MEDICINE

## 2024-04-23 ENCOUNTER — TELEPHONE (OUTPATIENT)
Dept: PODIATRY | Facility: CLINIC | Age: 74
End: 2024-04-23
Payer: MEDICARE

## 2024-04-23 NOTE — TELEPHONE ENCOUNTER
Attempted to reach out to Mr BUSHRA Sanchez to assist him with rescheduling his appt with Dr Garcia;however, he was unavailable. Voice message left with Mr Sanchez informing him that at this time the earliest available appt I can offer him at this time would be for 5/16/24 at 8:45 am and that if I have anything else that become available sooner that I will let him know. Call back encouraged if he can accept the appt with Dr Garcia for 5/16/24 at 8:45 am.

## 2024-05-02 LAB
OHS CV AF BURDEN PERCENT: < 1
OHS CV DC REMOTE DEVICE TYPE: NORMAL
OHS CV RV PACING PERCENT: 1 %

## 2024-05-10 DIAGNOSIS — R29.898 RIGHT ARM WEAKNESS: ICD-10-CM

## 2024-05-10 DIAGNOSIS — G89.4 CHRONIC PAIN SYNDROME: ICD-10-CM

## 2024-05-10 DIAGNOSIS — M47.812 FACET ARTHRITIS OF CERVICAL REGION: ICD-10-CM

## 2024-05-10 DIAGNOSIS — M47.12 CERVICAL SPONDYLOSIS WITH MYELOPATHY: ICD-10-CM

## 2024-05-10 DIAGNOSIS — G90.50 COMPLEX REGIONAL PAIN SYNDROME TYPE 1, AFFECTING UNSPECIFIED SITE: ICD-10-CM

## 2024-05-10 DIAGNOSIS — M54.12 CERVICAL RADICULAR PAIN: ICD-10-CM

## 2024-05-10 DIAGNOSIS — F11.20 NARCOTIC DEPENDENCY, CONTINUOUS: ICD-10-CM

## 2024-05-10 DIAGNOSIS — M54.12 BRACHIAL NEURITIS OR RADICULITIS: ICD-10-CM

## 2024-05-10 DIAGNOSIS — M50.30 DEGENERATION OF CERVICAL INTERVERTEBRAL DISC: ICD-10-CM

## 2024-05-10 DIAGNOSIS — M54.12 CERVICAL RADICULOPATHY: ICD-10-CM

## 2024-05-10 DIAGNOSIS — M96.1 CERVICAL POST-LAMINECTOMY SYNDROME: ICD-10-CM

## 2024-05-10 DIAGNOSIS — M50.00 HNP (HERNIATED NUCLEUS PULPOSUS) WITH MYELOPATHY, CERVICAL: ICD-10-CM

## 2024-05-10 DIAGNOSIS — M62.81 MUSCLE RIGHT ARM WEAKNESS: ICD-10-CM

## 2024-05-10 DIAGNOSIS — M48.02 CERVICAL STENOSIS OF SPINE: ICD-10-CM

## 2024-05-10 DIAGNOSIS — M79.601 RIGHT ARM PAIN: ICD-10-CM

## 2024-05-10 DIAGNOSIS — R29.898 RIGHT HAND WEAKNESS: ICD-10-CM

## 2024-05-10 DIAGNOSIS — M48.02 SPINAL STENOSIS IN CERVICAL REGION: ICD-10-CM

## 2024-05-10 DIAGNOSIS — G56.41 COMPLEX REGIONAL PAIN SYNDROME TYPE 2 OF RIGHT UPPER EXTREMITY: ICD-10-CM

## 2024-05-10 DIAGNOSIS — M75.101 ROTATOR CUFF SYNDROME OF RIGHT SHOULDER: ICD-10-CM

## 2024-05-10 RX ORDER — HYDROCODONE BITARTRATE AND ACETAMINOPHEN 10; 325 MG/1; MG/1
1 TABLET ORAL EVERY 6 HOURS PRN
Qty: 120 TABLET | Refills: 0 | Status: SHIPPED | OUTPATIENT
Start: 2024-05-17 | End: 2024-06-11 | Stop reason: SDUPTHER

## 2024-05-10 NOTE — TELEPHONE ENCOUNTER
Last ordered:04/17/24    Last seen:02/20/24  Upcoming appt:07/26/24        ----- Message from Beverley Holm sent at 5/10/2024  9:47 AM CDT -----  Regarding: Refill requested for [facility] on [address]  Contact: [phone]  Rx Refill/Request         Is this a Refill or New Rx:  Refill  Rx Name and Strength:  HYDROcodone-acetaminophen (NORCO)  mg per tablet  Preferred Pharmacy with phone number:    [facility] #[id number] [facility] [address] [facility] [address]  [facility] [address] [facility] [address]  Phone: [phone] Fax: [phone]     Communication Preference: [phone]  Additional Information:

## 2024-05-16 ENCOUNTER — OFFICE VISIT (OUTPATIENT)
Dept: PODIATRY | Facility: CLINIC | Age: 74
End: 2024-05-16
Payer: MEDICARE

## 2024-05-16 VITALS
DIASTOLIC BLOOD PRESSURE: 89 MMHG | HEART RATE: 78 BPM | SYSTOLIC BLOOD PRESSURE: 134 MMHG | BODY MASS INDEX: 25.9 KG/M2 | HEIGHT: 71 IN | WEIGHT: 185 LBS

## 2024-05-16 DIAGNOSIS — E11.49 TYPE 2 DIABETES MELLITUS WITH NEUROLOGICAL MANIFESTATIONS: Primary | ICD-10-CM

## 2024-05-16 DIAGNOSIS — M77.42 METATARSALGIA, LEFT FOOT: ICD-10-CM

## 2024-05-16 DIAGNOSIS — B35.1 ONYCHOMYCOSIS: ICD-10-CM

## 2024-05-16 DIAGNOSIS — M24.573 EQUINUS CONTRACTURE OF ANKLE: ICD-10-CM

## 2024-05-16 PROCEDURE — 1160F RVW MEDS BY RX/DR IN RCRD: CPT | Mod: CPTII,S$GLB,, | Performed by: PODIATRIST

## 2024-05-16 PROCEDURE — 3072F LOW RISK FOR RETINOPATHY: CPT | Mod: CPTII,S$GLB,, | Performed by: PODIATRIST

## 2024-05-16 PROCEDURE — 99213 OFFICE O/P EST LOW 20 MIN: CPT | Mod: 25,S$GLB,, | Performed by: PODIATRIST

## 2024-05-16 PROCEDURE — 1101F PT FALLS ASSESS-DOCD LE1/YR: CPT | Mod: CPTII,S$GLB,, | Performed by: PODIATRIST

## 2024-05-16 PROCEDURE — 3079F DIAST BP 80-89 MM HG: CPT | Mod: CPTII,S$GLB,, | Performed by: PODIATRIST

## 2024-05-16 PROCEDURE — 11721 DEBRIDE NAIL 6 OR MORE: CPT | Mod: Q9,S$GLB,, | Performed by: PODIATRIST

## 2024-05-16 PROCEDURE — 3288F FALL RISK ASSESSMENT DOCD: CPT | Mod: CPTII,S$GLB,, | Performed by: PODIATRIST

## 2024-05-16 PROCEDURE — 99999 PR PBB SHADOW E&M-EST. PATIENT-LVL IV: CPT | Mod: PBBFAC,,, | Performed by: PODIATRIST

## 2024-05-16 PROCEDURE — 1159F MED LIST DOCD IN RCRD: CPT | Mod: CPTII,S$GLB,, | Performed by: PODIATRIST

## 2024-05-16 PROCEDURE — 3075F SYST BP GE 130 - 139MM HG: CPT | Mod: CPTII,S$GLB,, | Performed by: PODIATRIST

## 2024-05-16 PROCEDURE — 1125F AMNT PAIN NOTED PAIN PRSNT: CPT | Mod: CPTII,S$GLB,, | Performed by: PODIATRIST

## 2024-05-16 NOTE — PROCEDURES
"Routine Foot Care    Date/Time: 5/16/2024 8:45 AM    Performed by: Scott Garcia DPM  Authorized by: Scott Garcia DPM    Time out: Immediately prior to procedure a "time out" was called to verify the correct patient, procedure, equipment, support staff and site/side marked as required.    Consent Done?:  Yes (Written)  Hyperkeratotic Skin Lesions?: No      Nail Care Type:  Debride  Location(s): All  (Left 1st Toe, Left 3rd Toe, Left 2nd Toe, Left 4th Toe, Left 5th Toe, Right 1st Toe, Right 2nd Toe, Right 3rd Toe, Right 4th Toe and Right 5th Toe)  Patient tolerance:  Patient tolerated the procedure well with no immediate complications     Used sterile nail nipper.  Assisted per Zenon Florentino DPM PGY 3    "

## 2024-05-16 NOTE — PROGRESS NOTES
Subjective:      Patient ID: Xander Sanchez is a 74 y.o. male.    Chief Complaint: Foot Pain (Bilateral foot pain)    Xander is a 74 y.o. male who presents to the clinic for evaluation and treatment of high risk feet. Xander has a past medical history of Allergy, Arthritis, Asthma, Cardiomyopathy, Cataract, Cervical radicular pain, Cervical spondylosis with myelopathy (10/17/2012), Chronic bronchitis, Chronic systolic dysfunction of left ventricle (07/27/2015), Constipation (07/27/2015), COPD (chronic obstructive pulmonary disease), CRPS (complex regional pain syndrome type I) (12/29/2014), Diabetes mellitus, type 2, DM type 2 (diabetes mellitus, type 2) (07/27/2015), Enlarged prostate (07/27/2015), Enuresis (07/06/2018), Hypertension, ICD (implantable cardiac defibrillator) in place, Mixed hyperlipidemia (02/28/2018), Paroxysmal atrial fibrillation (1/23/2024), Presence of biventricular AICD (07/27/2015), 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thy (02/01/2016), and Urinary tract infection.  This patient has documented high risk feet requiring routine maintenance secondary to diabetes mellitis and those secondary complications of diabetes, as mentioned.  Treated per Physical medicine for chronic back pain. Complains that his pain in his feet are mostly at night when he lays down. Taking gabapentin 600 mg po qid for chronic neuropathic pain. No new complaints.    10/31/2019:  Presents routine diabetic foot care.  States that he was prescribed topical creams to be applied to dry scaly painful skin to the right 5th toe a couple months ago which has helped improve his symptoms.  Due to complaints today.  Seen by Milly Leiva NP on 09/19/2019.    3/2/20: Reports worsening right foot drag since November. Otherwise, no other changes. Painful scaly skin has resolved. Presents today for painful long toe nails.    6/5/2020: Presents for diabetic foot care. No new complaints.  10/09/2020:  Returns for routine foot  care.  No new complaints.      06/13/2022:  Returns for annual foot exam.  Complains of elongated thickened toenails.    02/06/2023: Returns for routine foot care. Due for annual foot exam in June 2023.    05/16/2024: Returns for annual foot exam with complaints of pain in the bottom of the left foot in addition to elongated uncomfortable toenails that he is unable to trim himself. Relays intermittent discomfort underneath the ball of the left foot which can occur when he 1st steps in the morning. States that he tries to wear slippers around his home in his unable to walk barefoot.      Williams Alvarenga MD    12/05/2023  Past Medical History:   Diagnosis Date    Allergy     Arthritis     Asthma     Cardiomyopathy     Cataract     Cervical radicular pain     Cervical spondylosis with myelopathy 10/17/2012    Chronic bronchitis     Chronic systolic dysfunction of left ventricle 07/27/2015    Constipation 07/27/2015    COPD (chronic obstructive pulmonary disease)     CRPS (complex regional pain syndrome type I) 12/29/2014    Diabetes mellitus, type 2     DM type 2 (diabetes mellitus, type 2) 07/27/2015    Enlarged prostate 07/27/2015    Enuresis 07/06/2018    Hypertension     ICD (implantable cardiac defibrillator) in place     Mixed hyperlipidemia 02/28/2018    Paroxysmal atrial fibrillation 1/23/2024    Presence of biventricular AICD 07/27/2015    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thy 02/01/2016    Urinary tract infection     pt does not know       Past Surgical History:   Procedure Laterality Date    CARDIAC DEFIBRILLATOR PLACEMENT      CATARACT EXTRACTION W/  INTRAOCULAR LENS IMPLANT Right 11/10/2020    Procedure: EXTRACTION, CATARACT, WITH IOL INSERTION;  Surgeon: Oral Graham MD;  Location: [facility];  Service: Ophthalmology;  Laterality: Right;    CATARACT EXTRACTION W/  INTRAOCULAR LENS IMPLANT Left 11/24/2020    Procedure: EXTRACTION, CATARACT, WITH IOL INSERTION;  Surgeon: Orla  JJ Graham MD;  Location: [facility] OR;  Service: Ophthalmology;  Laterality: Left;    CERVICAL SPINE SURGERY      COLONOSCOPY N/A 2017    Procedure: COLONOSCOPY  golytely;  Surgeon: Vannessa Uribe MD;  Location: [facility] ENDO;  Service: Endoscopy;  Laterality: N/A;    COLONOSCOPY N/A 2024    Procedure: COLONOSCOPY;  Surgeon: Buddy Qureshi MD;  Location: [facility] ENDO;  Service: Endoscopy;  Laterality: N/A;    REPLACEMENT OF IMPLANTABLE CARDIOVERTER-DEFIBRILLATOR (ICD) GENERATOR N/A 10/24/2023    Procedure: REPLACEMENT, ICD GENERATOR;  Surgeon: Christian Navarro MD;  Location: [facility] EP LAB;  Service: Cardiology;  Laterality: N/A;  NICM, REEMA, CRT-D gen change, SJM, SK, 3 prep    SPINE SURGERY         Family History   Problem Relation Name Age of Onset    Hypertension Mother      Hypertension Father      COPD Father      No Known Problems Sister x2     No Known Problems Brother      No Known Problems Daughter x3     Prostate cancer Neg Hx      Kidney disease Neg Hx         Social History     Socioeconomic History    Marital status:    Tobacco Use    Smoking status: Former     Current packs/day: 0.00     Average packs/day: 1 pack/day for 40.0 years (40.0 ttl pk-yrs)     Types: Cigarettes     Start date: 1969     Quit date: 2009     Years since quittin.8    Smokeless tobacco: Never   Substance and Sexual Activity    Alcohol use: Yes     Alcohol/week: 2.0 standard drinks of alcohol     Types: 1 Cans of beer, 1 Shots of liquor per week     Comment: May 2-3 beers or whiskey per month    Drug use: No    Sexual activity: Yes     Partners: Female     Social Determinants of Health     Financial Resource Strain: Low Risk  (2023)    Overall Financial Resource Strain (CARDIA)     Difficulty of Paying Living Expenses: Not hard at all   Food Insecurity: No Food Insecurity (2023)    Hunger Vital Sign     Worried About Running Out of Food in the Last Year: Never true     Ran Out of  Food in the Last Year: Never true   Transportation Needs: No Transportation Needs (7/19/2023)    PRAPARE - Transportation     Lack of Transportation (Medical): No     Lack of Transportation (Non-Medical): No   Physical Activity: Inactive (7/19/2023)    Exercise Vital Sign     Days of Exercise per Week: 0 days     Minutes of Exercise per Session: 0 min   Stress: No Stress Concern Present (7/19/2023)    [employer] [place] [employer] - Occupational Stress Questionnaire     Feeling of Stress : Not at all   Housing Stability: Low Risk  (7/19/2023)    Housing Stability Vital Sign     Unable to Pay for Housing in the Last Year: No     Number of Places Lived in the Last Year: 1     Unstable Housing in the Last Year: No       Current Outpatient Medications   Medication Sig Dispense Refill    alcohol swabs PadM Apply 1 each topically as needed. 200 each 3    aspirin (ECOTRIN) 81 MG EC tablet Take 81 mg by mouth once daily.      baclofen (LIORESAL) 10 MG tablet TAKE 1 TABLET THREE TIMES DAILY 270 tablet 3    blood sugar diagnostic (TRUE METRIX GLUCOSE TEST STRIP) Strp TEST  ONE  TIME  DAILY  AT  6AM 100 strip 3    blood-glucose meter (TRUE METRIX AIR GLUCOSE METER) kit USE AS INSTRUCTED 1 each 0    gabapentin (NEURONTIN) 600 MG tablet TAKE 1 TABLET TWICE DAILY 180 tablet 3    hydroCHLOROthiazide (MICROZIDE) 12.5 mg capsule TAKE 1 CAPSULE (12.5 MG TOTAL) BY MOUTH ONCE DAILY. 90 capsule 3    HYDROcodone-acetaminophen (NORCO)  mg per tablet Take 1 tablet by mouth every 6 (six) hours as needed for Pain. 120 tablet 0    lancets (TRUEPLUS LANCETS) 33 gauge Misc TEST ONE TIME DAILY AT 6:00AM 100 each 3    metoprolol succinate (TOPROL-XL) 25 MG 24 hr tablet TAKE 1 TABLET EVERY DAY 90 tablet 3    mupirocin (BACTROBAN) 2 % ointment APPLY TOPICALLY 3 (THREE) TIMES DAILY. 22 g 11    oxybutynin (DITROPAN) 5 MG Tab Take 1 tablet (5 mg total) by mouth 2 (two) times daily. 180 tablet 3    pantoprazole (PROTONIX) 40 MG tablet  TAKE 1 TABLET BY MOUTH ONCE DAILY BEFORE BREAKFAST 30 tablet 11    pravastatin (PRAVACHOL) 10 MG tablet TAKE 1 TABLET EVERY NIGHT 90 tablet 2    sod picosulf-mag ox-citric ac (CLENPIQ) 10 mg-3.5 gram- 12 gram/160 mL Soln Take As Directed. 320 mL 0    tamsulosin (FLOMAX) 0.4 mg Cap TAKE 1 CAPSULE EVERY DAY 90 capsule 3    ADVAIR -21 mcg/actuation HFAA inhaler INHALE 2 PUFFS INTO THE LUNGS TWO TIMES DAILY. CONTROLLER 36 g 1    blood glucose control, low (TRUE METRIX LEVEL 1) Soln 1 Units by Misc.(Non-Drug; Combo Route) route once daily. 1 each 3     No current facility-administered medications for this visit.       Review of patient's allergies indicates:  No Known Allergies    PCP: Williams Alvarenga MD    Date Last Seen by PCP:     Current shoe gear:  Affected Foot: Casual shoes     Unaffected Foot: Casual shoes    Hemoglobin A1C   Date Value Ref Range Status   12/05/2023 6.1 (H) 4.0 - 5.6 % Final     Comment:     ADA Screening Guidelines:  5.7-6.4%  Consistent with prediabetes  >or=6.5%  Consistent with diabetes    High levels of fetal hemoglobin interfere with the HbA1C  assay. Heterozygous hemoglobin variants (HbS, HgC, etc)do  not significantly interfere with this assay.   However, presence of multiple variants may affect accuracy.     06/05/2023 6.1 (H) 4.0 - 5.6 % Final     Comment:     ADA Screening Guidelines:  5.7-6.4%  Consistent with prediabetes  >or=6.5%  Consistent with diabetes    High levels of fetal hemoglobin interfere with the HbA1C  assay. Heterozygous hemoglobin variants (HbS, HgC, etc)do  not significantly interfere with this assay.   However, presence of multiple variants may affect accuracy.     08/08/2022 6.1 (H) 4.0 - 5.6 % Final     Comment:     ADA Screening Guidelines:  5.7-6.4%  Consistent with prediabetes  >or=6.5%  Consistent with diabetes    High levels of fetal hemoglobin interfere with the HbA1C  assay. Heterozygous hemoglobin variants (HbS, HgC, etc)do  not  significantly interfere with this assay.   However, presence of multiple variants may affect accuracy.         Review of Systems   Constitutional: Negative for chills and fever.   HENT:  Negative for hearing loss.    Cardiovascular:  Negative for chest pain and claudication.   Respiratory:  Negative for shortness of breath.    Skin:  Positive for color change and nail changes. Negative for itching.   Musculoskeletal:  Negative for back pain, falls, joint pain and muscle weakness.        Abnormal gait with dragging right foot   Gastrointestinal:  Negative for nausea and vomiting.   Neurological:  Negative for focal weakness, loss of balance, numbness and paresthesias.           Objective:      Physical Exam  Constitutional:       General: He is not in acute distress.     Appearance: He is not diaphoretic.   Cardiovascular:      Pulses:           Dorsalis pedis pulses are 2+ on the right side and 2+ on the left side.        Posterior tibial pulses are 2+ on the right side and 2+ on the left side.      Comments: CRT < 3 seconds to digits 1-5 bilateral, mild to moderate edema of bilateral lower extremity with varicosities.  Musculoskeletal:      Right foot: Decreased range of motion. No deformity.      Left foot: Decreased range of motion. No deformity.      Comments: Pes planovalgus bilateral foot.  Ankle range motion is a proximally 0° on the left and -5 degrees on the right improving slightly with knee flexion bilateral.  Subtalar joint and midtarsal joint range motion is reduced with semi-rigid pes planus foot structure bilateral.  One MTP range motion bilateral is reduced.    No pain on palpation bilateral foot.    Gait examination reveals overall slight trendelenberg gait with high steppage foot.     Feet:      Right foot:      Protective Sensation: 10 sites tested.  9 sites sensed.      Skin integrity: Dry skin present. No ulcer, skin breakdown, erythema, warmth or callus.      Toenail Condition: Right toenails  are abnormally thick and long. Fungal disease present.     Left foot:      Protective Sensation: 10 sites tested.  9 sites sensed.      Skin integrity: Dry skin present. No ulcer, skin breakdown, erythema, warmth or callus.      Toenail Condition: Left toenails are abnormally thick and long. Fungal disease present.  Skin:     General: Skin is warm and dry.      Capillary Refill: Capillary refill takes less than 2 seconds.      Coloration: Skin is not pale.      Findings: No ecchymosis, erythema, lesion, petechiae or rash.      Nails: There is no clubbing.      Comments: Skin is dry and flaky plantar foot bilateral improved from previous visit.    Nails 1-5 bilateral are thickened 3-4 mm, slightly yellow-brown with debris, loosened and elongated > 10 mm. Hallux nail bilateral is dystrophic and severely thickened.    Edema B/L LE 2+    No open lesions or macerations bilateral lower extremity.       Neurological:      Mental Status: He is alert and oriented to person, place, and time.      Sensory: Sensory deficit present.      Comments: (-) Tinel's sign  Light touch intact.  MMT 5/5 DF, PF, Inv, Ev L foot. R foot with 4/5 DF otherwise 5/5 for all other compartments.   Normal muscle tone.  No signs of atrophy.  No tremor or spasticity.         Biomechanical Exam:     Non weight bearing assessment:   Right (degrees) Left (degrees)   Malleolar position 20 20   Ankle DF (knee extended) -5 0   Ankle DF (knee flexed) -2 0   Heel Inversion 20 20   Heel Eversion 10 10   STJ Neutral Position 0 0   Forefoot to rearfoot (1-5) perp perp   Forefoot to Rearfoot (2-5) perp perp   First Ray Dorsiflextion 5mm 5mm   First ray plantarflextion 5mm 5mm   First ray Neutral Position 0mm 0mm   Hallux Dorsiflexion 25 25   Hallux plantarflexion 30 30      Musculoskeletal evaluation, Range of Motion    Normal Limited Excessive pain   Ankle DF  R/L     Ankle PF R/L      STJ supination  R/L     STJ pronation  R/L     Hallux DF  R/L     Hallux PF  R/L      Lesser digits DF R/L      Lesser Digits PF R/L        Muscle Strength   Right Left   Gastrocnemius 5 5   Soleus 5 5   Tib. Posterior 5 5   FDL 5 5   FHL 5 5   FDB 5 5   Tib Anterior 4 5   EDL 5 5   EHL 5 5   EDB 5 5   Peroneus Longus 5 5   Peroneus Brevis 5 5       Limb length Inequality (in cm)    Right Left   Normal   x   structural     Combined 1 cm    Functional       Digital Assessments    Right  1 2 3 4 5    Left  1 2 3 4 5      Abducted    x x       x x      Adducted                   Clawtoe                   Hammer toe                   Mallet toetoe                   Hallux IP extensus     Hallux IP abductus         Gait Analysis  Gait Pattern: Steppate      Right Left   Angle of Gait 8 8   Base of Gait 9cm 9cm       Heel Position Right Left   Contact sup sup   Mid-stance Pro Pro   Propulsion sup sup   Swing sup sup     Heel off: Early Both  Abductory twist?  No Both         Assessment:       Encounter Diagnoses   Name Primary?    Type 2 diabetes mellitus with neurological manifestations Yes    Onychomycosis     Metatarsalgia, left foot     Equinus contracture of ankle          Plan:       Xander was seen today for foot pain.    Diagnoses and all orders for this visit:    Type 2 diabetes mellitus with neurological manifestations  -     Routine Foot Care    Onychomycosis  -     Routine Foot Care    Metatarsalgia, left foot    Equinus contracture of ankle      I counseled the patient on his conditions, their implications and medical management.    Shoe inspection. Diabetic Foot Education. Patient reminded of the importance of good nutrition and blood sugar control to help prevent podiatric complications of diabetes. Patient instructed on proper foot hygeine. We discussed wearing proper shoe gear, daily foot inspections, never walking without protective shoe gear, never putting sharp instruments to feet    Routine foot care per attached note.    Comprehensive annual diabetic foot exam completed  today.  Patient found to be intermediate overall risk for diabetic foot complication.    We discussed avoiding barefoot walking. Inspected his current casual shoes which lack shank support and cushion since he has a stiffer foot lacking motion. Shoe gear recommendations were made today including recovery slides as an option to avoid barefoot walking. Patient can trial supportive motion control stability shoes gear modification, can consider OTC orthotic with break in period dicussed.      We discussed light stretching of the gastroc soleus complex to help reduce the equinus contracture in order to reduce excessive loading pressure associated with intermittent plantar left foot pain symptoms. Demonstrated appropriate technique in clinic.  Can consider PT in the future if no improvement, currently patient would like to try stretches himself.     RTC within 4 months or p.r.n. as discussed.     Assisted per Zenon BRODYM PGY 3    A portion of this note was generated by voice recognition software and may contain spelling and grammar errors.

## 2024-05-16 NOTE — PATIENT INSTRUCTIONS
This exercise is designed to stretch and strengthen your feet and ankles. Before beginning the exercise, read through all the instructions. While exercising, breathe normally and dont bounce. If you feel any pain, stop the exercise. If pain persists, inform your healthcare provider:  Stand an arms length away from a wall. Place the palms of your hands on the wall. Step forward about 12 inches with your ______ foot.  Keeping toes pointed forward and both heels on the floor, bend both knees and lean forward. Hold for __30____ seconds. Relax.  Repeat ___3___ times. Do ___2-3___ sets a day.  Date Last Reviewed: 8/16/2015  © 9288-9551 [employer]. [address] [place] [address], [place] [address]. All rights reserved. This information is not intended as a substitute for professional medical care. Always follow your healthcare professional's instructions.

## 2024-06-05 ENCOUNTER — LAB VISIT (OUTPATIENT)
Dept: LAB | Facility: HOSPITAL | Age: 74
End: 2024-06-05
Attending: FAMILY MEDICINE
Payer: MEDICARE

## 2024-06-05 ENCOUNTER — OFFICE VISIT (OUTPATIENT)
Dept: FAMILY MEDICINE | Facility: CLINIC | Age: 74
End: 2024-06-05
Payer: MEDICARE

## 2024-06-05 VITALS
BODY MASS INDEX: 24.92 KG/M2 | DIASTOLIC BLOOD PRESSURE: 72 MMHG | WEIGHT: 178 LBS | SYSTOLIC BLOOD PRESSURE: 106 MMHG | HEIGHT: 71 IN | OXYGEN SATURATION: 96 % | HEART RATE: 62 BPM

## 2024-06-05 DIAGNOSIS — I48.0 PAROXYSMAL ATRIAL FIBRILLATION: ICD-10-CM

## 2024-06-05 DIAGNOSIS — Z95.810 BIVENTRICULAR IMPLANTABLE CARDIOVERTER-DEFIBRILLATOR (ICD) IN SITU: ICD-10-CM

## 2024-06-05 DIAGNOSIS — E11.40 TYPE 2 DIABETES MELLITUS WITH DIABETIC NEUROPATHY, WITHOUT LONG-TERM CURRENT USE OF INSULIN: ICD-10-CM

## 2024-06-05 DIAGNOSIS — G95.9 CERVICAL MYELOPATHY: ICD-10-CM

## 2024-06-05 DIAGNOSIS — M46.92 UNSPECIFIED INFLAMMATORY SPONDYLOPATHY, CERVICAL REGION: ICD-10-CM

## 2024-06-05 DIAGNOSIS — F11.20 NARCOTIC DEPENDENCY, CONTINUOUS: ICD-10-CM

## 2024-06-05 DIAGNOSIS — I70.0 AORTIC ATHEROSCLEROSIS: ICD-10-CM

## 2024-06-05 DIAGNOSIS — J43.9 PULMONARY EMPHYSEMA, UNSPECIFIED EMPHYSEMA TYPE: ICD-10-CM

## 2024-06-05 DIAGNOSIS — I50.32 DIASTOLIC DYSFUNCTION WITH CHRONIC HEART FAILURE: ICD-10-CM

## 2024-06-05 DIAGNOSIS — R60.0 BILATERAL LEG EDEMA: Primary | ICD-10-CM

## 2024-06-05 LAB
ALBUMIN SERPL BCP-MCNC: 3.7 G/DL (ref 3.5–5.2)
ALP SERPL-CCNC: 83 U/L (ref 55–135)
ALT SERPL W/O P-5'-P-CCNC: 17 U/L (ref 10–44)
ANION GAP SERPL CALC-SCNC: 9 MMOL/L (ref 8–16)
AST SERPL-CCNC: 20 U/L (ref 10–40)
BILIRUB SERPL-MCNC: 0.4 MG/DL (ref 0.1–1)
BUN SERPL-MCNC: 9 MG/DL (ref 8–23)
CALCIUM SERPL-MCNC: 9.4 MG/DL (ref 8.7–10.5)
CHLORIDE SERPL-SCNC: 100 MMOL/L (ref 95–110)
CHOLEST SERPL-MCNC: 124 MG/DL (ref 120–199)
CHOLEST/HDLC SERPL: 2.8 {RATIO} (ref 2–5)
CO2 SERPL-SCNC: 25 MMOL/L (ref 23–29)
CREAT SERPL-MCNC: 0.8 MG/DL (ref 0.5–1.4)
EST. GFR  (NO RACE VARIABLE): >60 ML/MIN/1.73 M^2
ESTIMATED AVG GLUCOSE: 137 MG/DL (ref 68–131)
GLUCOSE SERPL-MCNC: 94 MG/DL (ref 70–110)
HBA1C MFR BLD: 6.4 % (ref 4–5.6)
HDLC SERPL-MCNC: 44 MG/DL (ref 40–75)
HDLC SERPL: 35.5 % (ref 20–50)
LDLC SERPL CALC-MCNC: 66 MG/DL (ref 63–159)
NONHDLC SERPL-MCNC: 80 MG/DL
POTASSIUM SERPL-SCNC: 4.3 MMOL/L (ref 3.5–5.1)
PROT SERPL-MCNC: 7.6 G/DL (ref 6–8.4)
SODIUM SERPL-SCNC: 134 MMOL/L (ref 136–145)
TRIGL SERPL-MCNC: 70 MG/DL (ref 30–150)

## 2024-06-05 PROCEDURE — 80061 LIPID PANEL: CPT | Performed by: FAMILY MEDICINE

## 2024-06-05 PROCEDURE — 99215 OFFICE O/P EST HI 40 MIN: CPT | Mod: S$GLB,,, | Performed by: FAMILY MEDICINE

## 2024-06-05 PROCEDURE — 3072F LOW RISK FOR RETINOPATHY: CPT | Mod: CPTII,S$GLB,, | Performed by: FAMILY MEDICINE

## 2024-06-05 PROCEDURE — 99999 PR PBB SHADOW E&M-EST. PATIENT-LVL IV: CPT | Mod: PBBFAC,,, | Performed by: FAMILY MEDICINE

## 2024-06-05 PROCEDURE — 3288F FALL RISK ASSESSMENT DOCD: CPT | Mod: CPTII,S$GLB,, | Performed by: FAMILY MEDICINE

## 2024-06-05 PROCEDURE — 1159F MED LIST DOCD IN RCRD: CPT | Mod: CPTII,S$GLB,, | Performed by: FAMILY MEDICINE

## 2024-06-05 PROCEDURE — 80053 COMPREHEN METABOLIC PANEL: CPT | Performed by: FAMILY MEDICINE

## 2024-06-05 PROCEDURE — 36415 COLL VENOUS BLD VENIPUNCTURE: CPT | Mod: PO | Performed by: FAMILY MEDICINE

## 2024-06-05 PROCEDURE — 3074F SYST BP LT 130 MM HG: CPT | Mod: CPTII,S$GLB,, | Performed by: FAMILY MEDICINE

## 2024-06-05 PROCEDURE — 1125F AMNT PAIN NOTED PAIN PRSNT: CPT | Mod: CPTII,S$GLB,, | Performed by: FAMILY MEDICINE

## 2024-06-05 PROCEDURE — 1101F PT FALLS ASSESS-DOCD LE1/YR: CPT | Mod: CPTII,S$GLB,, | Performed by: FAMILY MEDICINE

## 2024-06-05 PROCEDURE — 83036 HEMOGLOBIN GLYCOSYLATED A1C: CPT | Performed by: FAMILY MEDICINE

## 2024-06-05 PROCEDURE — 3078F DIAST BP <80 MM HG: CPT | Mod: CPTII,S$GLB,, | Performed by: FAMILY MEDICINE

## 2024-06-05 PROCEDURE — 1160F RVW MEDS BY RX/DR IN RCRD: CPT | Mod: CPTII,S$GLB,, | Performed by: FAMILY MEDICINE

## 2024-06-05 RX ORDER — FUROSEMIDE 20 MG/1
20 TABLET ORAL DAILY
Qty: 90 TABLET | Refills: 3 | Status: SHIPPED | OUTPATIENT
Start: 2024-06-05 | End: 2025-06-05

## 2024-06-05 RX ORDER — POTASSIUM CHLORIDE 20 MEQ/1
20 TABLET, EXTENDED RELEASE ORAL 2 TIMES DAILY
Qty: 90 TABLET | Refills: 3 | Status: SHIPPED | OUTPATIENT
Start: 2024-06-05

## 2024-06-05 NOTE — PROGRESS NOTES
Subjective     Patient ID: Xander Sanchez is a 74 y.o. male.    Chief Complaint: No chief complaint on file.    74 years old male came to the clinic with bilateral lower extremity edema for the last couple of weeks.  Patient with paroxysmal atrial fibrillation associated with heart failure.  No shortness breath, chest pain or palpitations.  He is currently taking hydrochlorothiazide.  No flare ups of COPD or emphysema.  Patient with chronic opioid treatment follow-up by Physical Medicine.  Patient with chronic cervical myelopathy.  He was previously diagnosed with aortic arthrosclerosis currently on statin therapy.  No recent A1c.    Hypertension  This is a chronic problem. The current episode started more than 1 year ago. The problem is unchanged. The problem is controlled. Associated symptoms include neck pain and peripheral edema. Pertinent negatives include no chest pain, orthopnea, palpitations, PND or shortness of breath. There are no associated agents to hypertension. Risk factors for coronary artery disease include male gender, sedentary lifestyle and diabetes mellitus. Past treatments include diuretics and beta blockers. The current treatment provides moderate improvement. Compliance problems include exercise and diet.  Hypertensive end-organ damage includes heart failure. There is no history of angina. There is no history of a hypertension causing med.     Review of Systems   Constitutional: Negative.    HENT: Negative.     Eyes: Negative.    Respiratory: Negative.  Negative for shortness of breath.    Cardiovascular:  Positive for leg swelling. Negative for chest pain, palpitations, orthopnea, PND and claudication.   Gastrointestinal: Negative.    Genitourinary: Negative.    Musculoskeletal:  Positive for arthralgias, gait problem and neck pain.   Integumentary:  Negative.   Psychiatric/Behavioral: Negative.            Objective     Physical Exam  Vitals and nursing note reviewed.   Constitutional:        General: He is not in acute distress.     Appearance: He is well-developed. He is not diaphoretic.   HENT:      Head: Normocephalic and atraumatic.      Right Ear: External ear normal.      Left Ear: External ear normal.      Nose: Nose normal.      Mouth/Throat:      Pharynx: No oropharyngeal exudate.   Eyes:      General: No scleral icterus.        Right eye: No discharge.         Left eye: No discharge.      Conjunctiva/sclera: Conjunctivae normal.      Pupils: Pupils are equal, round, and reactive to light.   Neck:      Thyroid: No thyromegaly.      Vascular: No JVD.      Trachea: No tracheal deviation.   Cardiovascular:      Rate and Rhythm: Normal rate and regular rhythm.      Heart sounds: Normal heart sounds. No murmur heard.     No friction rub. No gallop.   Pulmonary:      Effort: Pulmonary effort is normal. No respiratory distress.      Breath sounds: Normal breath sounds. No stridor. No wheezing or rales.   Chest:      Chest wall: No tenderness.   Abdominal:      General: Bowel sounds are normal. There is no distension.      Palpations: Abdomen is soft. There is no mass.      Tenderness: There is no abdominal tenderness. There is no guarding or rebound.   Musculoskeletal:         General: No tenderness. Normal range of motion.      Cervical back: Normal range of motion and neck supple.      Right lower leg: Edema present.      Left lower leg: Edema present.   Lymphadenopathy:      Cervical: No cervical adenopathy.   Skin:     General: Skin is warm and dry.      Coloration: Skin is not pale.      Findings: No erythema or rash.   Neurological:      Mental Status: He is alert and oriented to person, place, and time.      Cranial Nerves: No cranial nerve deficit.      Motor: No abnormal muscle tone.      Coordination: Coordination abnormal.      Gait: Gait abnormal.      Deep Tendon Reflexes: Reflexes are normal and symmetric. Reflexes normal.   Psychiatric:         Behavior: Behavior normal.         Thought  Content: Thought content normal.         Judgment: Judgment normal.            Assessment and Plan     1. Bilateral leg edema  -     COMPRESSION STOCKINGS  -     furosemide (LASIX) 20 MG tablet; Take 1 tablet (20 mg total) by mouth once daily.  Dispense: 90 tablet; Refill: 3  -     potassium chloride SA (K-DUR,KLOR-CON) 20 MEQ tablet; Take 1 tablet (20 mEq total) by mouth 2 (two) times daily.  Dispense: 90 tablet; Refill: 3    2. Pulmonary emphysema, unspecified emphysema type    3. Narcotic dependency, continuous    4. Cervical myelopathy    5. Diastolic dysfunction with chronic heart failure  -     furosemide (LASIX) 20 MG tablet; Take 1 tablet (20 mg total) by mouth once daily.  Dispense: 90 tablet; Refill: 3    6. Unspecified inflammatory spondylopathy, cervical region    7. Aortic atherosclerosis  Overview:  Seen on chest x-ray dated 03/27/11      8. Biventricular implantable cardioverter-defibrillator (ICD) in situ  Overview:  Single chamber ICD implanted at [facility] in 2007.  Has [employer] with [employer] lead.  No significant arrhythmias noted.      9. Paroxysmal atrial fibrillation  -     furosemide (LASIX) 20 MG tablet; Take 1 tablet (20 mg total) by mouth once daily.  Dispense: 90 tablet; Refill: 3    10. 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thy, without long-term current use of insulin  -     Microalbumin/Creatinine Ratio, Urine; Future; Expected date: 06/05/2024  -     Lipid Panel; Future; Expected date: 06/05/2024  -     Hemoglobin A1C; Future; Expected date: 06/05/2024  -     Comprehensive Metabolic Panel; Future; Expected date: 06/05/2024        Cervical myelopathy follow-up by Physical Medicine.  Continue monitoring blood pressure at home, low sodium diet.   Continue monitoring blood sugar at home,ADA diet.   Compression stockings.  Discontinue hydrochlorothiazide.  Lasix was ordered.  I spent a total of 45 minutes on the day of the visit.This includes face to face time and non-face to face  time preparing to see the patient (eg, review of tests), obtaining and/or reviewing separately obtained history, documenting clinical information in the electronic or other health record, independently interpreting results and communicating results to the patient/family/caregiver, or care coordinator.          Follow up in about 6 months (around 12/5/2024), or if symptoms worsen or fail to improve.

## 2024-06-10 ENCOUNTER — TELEPHONE (OUTPATIENT)
Dept: FAMILY MEDICINE | Facility: CLINIC | Age: 74
End: 2024-06-10
Payer: MEDICARE

## 2024-06-10 NOTE — TELEPHONE ENCOUNTER
----- Message from Tahira Frazier sent at 6/10/2024  3:19 PM CDT -----  .Type:  Needs Medical Advice    Who Called: pt    Would the patient rather a call back or a response via [facility]? Call back  Best Call Back Number: [phone]  Additional Information:     Pt stated he would like a call back regarding the order that was supposed to be sent to get compression socks  
Returned patient call. Informed I have faxed the order for compression socks today. Verbalized understanding.  
Statement Selected

## 2024-06-11 DIAGNOSIS — G90.50 COMPLEX REGIONAL PAIN SYNDROME TYPE 1, AFFECTING UNSPECIFIED SITE: ICD-10-CM

## 2024-06-11 DIAGNOSIS — M50.00 HNP (HERNIATED NUCLEUS PULPOSUS) WITH MYELOPATHY, CERVICAL: ICD-10-CM

## 2024-06-11 DIAGNOSIS — R29.898 RIGHT ARM WEAKNESS: ICD-10-CM

## 2024-06-11 DIAGNOSIS — F11.20 NARCOTIC DEPENDENCY, CONTINUOUS: ICD-10-CM

## 2024-06-11 DIAGNOSIS — M62.81 MUSCLE RIGHT ARM WEAKNESS: ICD-10-CM

## 2024-06-11 DIAGNOSIS — M54.12 CERVICAL RADICULOPATHY: ICD-10-CM

## 2024-06-11 DIAGNOSIS — M48.02 SPINAL STENOSIS IN CERVICAL REGION: ICD-10-CM

## 2024-06-11 DIAGNOSIS — M47.812 FACET ARTHRITIS OF CERVICAL REGION: ICD-10-CM

## 2024-06-11 DIAGNOSIS — M54.12 CERVICAL RADICULAR PAIN: ICD-10-CM

## 2024-06-11 DIAGNOSIS — M54.12 BRACHIAL NEURITIS OR RADICULITIS: ICD-10-CM

## 2024-06-11 DIAGNOSIS — M50.30 DEGENERATION OF CERVICAL INTERVERTEBRAL DISC: ICD-10-CM

## 2024-06-11 DIAGNOSIS — M79.601 RIGHT ARM PAIN: ICD-10-CM

## 2024-06-11 DIAGNOSIS — M75.101 ROTATOR CUFF SYNDROME OF RIGHT SHOULDER: ICD-10-CM

## 2024-06-11 DIAGNOSIS — G56.41 COMPLEX REGIONAL PAIN SYNDROME TYPE 2 OF RIGHT UPPER EXTREMITY: ICD-10-CM

## 2024-06-11 DIAGNOSIS — G89.4 CHRONIC PAIN SYNDROME: ICD-10-CM

## 2024-06-11 DIAGNOSIS — M48.02 CERVICAL STENOSIS OF SPINE: ICD-10-CM

## 2024-06-11 DIAGNOSIS — M96.1 CERVICAL POST-LAMINECTOMY SYNDROME: ICD-10-CM

## 2024-06-11 DIAGNOSIS — M47.12 CERVICAL SPONDYLOSIS WITH MYELOPATHY: ICD-10-CM

## 2024-06-11 DIAGNOSIS — R29.898 RIGHT HAND WEAKNESS: ICD-10-CM

## 2024-06-11 RX ORDER — HYDROCODONE BITARTRATE AND ACETAMINOPHEN 10; 325 MG/1; MG/1
1 TABLET ORAL EVERY 6 HOURS PRN
Qty: 120 TABLET | Refills: 0 | Status: SHIPPED | OUTPATIENT
Start: 2024-06-16 | End: 2024-06-18 | Stop reason: SDUPTHER

## 2024-06-11 NOTE — TELEPHONE ENCOUNTER
----- Message from Jeanna VALENTIN Route sent at 6/11/2024  9:47 AM CDT -----  Regarding: Refill  Contact: pt  [phone]  Rx Refill/Request Is this a Refill or New Rx:  Refill    Rx Name and Strength:  HYDROcodone-acetaminophen (NORCO)  mg per tablet    Preferred Pharmacy with phone number:     [facility] DR[facility]ORE #[id number] [place] [address] [address] [facility] [address]  [facility] [address]  [facility] [address]  Phone: [phone] Fax: [phone]      Communication Preference: [phone]    Additional Information:

## 2024-06-17 NOTE — TELEPHONE ENCOUNTER
----- Message from Priscilla Ochoa MA sent at 2/24/2023  8:46 AM CST -----  Contact: [phone]    Type:  RX Refill Request      Who Called: Patient      RX Name and Strength:HYDROcodone-acetaminophen (NORCO)  mg per tablet      Preferred Pharmacy with phone number:     [facility] #[id number] [facility] [address] [facility] [address]  [facility] [address]  [facility] [address]  Phone: [phone] Fax: [phone]      Would the patient rather a call back or a response via [facility]? Callback      Best Call Back Number: [phone]      Additional Information:         
Opt out

## 2024-06-18 DIAGNOSIS — M62.81 MUSCLE RIGHT ARM WEAKNESS: ICD-10-CM

## 2024-06-18 DIAGNOSIS — G89.4 CHRONIC PAIN SYNDROME: ICD-10-CM

## 2024-06-18 DIAGNOSIS — M48.02 SPINAL STENOSIS IN CERVICAL REGION: ICD-10-CM

## 2024-06-18 DIAGNOSIS — F11.20 NARCOTIC DEPENDENCY, CONTINUOUS: ICD-10-CM

## 2024-06-18 DIAGNOSIS — M54.12 BRACHIAL NEURITIS OR RADICULITIS: ICD-10-CM

## 2024-06-18 DIAGNOSIS — M50.00 HNP (HERNIATED NUCLEUS PULPOSUS) WITH MYELOPATHY, CERVICAL: ICD-10-CM

## 2024-06-18 DIAGNOSIS — M50.30 DEGENERATION OF CERVICAL INTERVERTEBRAL DISC: ICD-10-CM

## 2024-06-18 DIAGNOSIS — M96.1 CERVICAL POST-LAMINECTOMY SYNDROME: ICD-10-CM

## 2024-06-18 DIAGNOSIS — G90.50 COMPLEX REGIONAL PAIN SYNDROME TYPE 1, AFFECTING UNSPECIFIED SITE: ICD-10-CM

## 2024-06-18 DIAGNOSIS — M48.02 CERVICAL STENOSIS OF SPINE: ICD-10-CM

## 2024-06-18 DIAGNOSIS — R29.898 RIGHT ARM WEAKNESS: ICD-10-CM

## 2024-06-18 DIAGNOSIS — M54.12 CERVICAL RADICULAR PAIN: ICD-10-CM

## 2024-06-18 DIAGNOSIS — M75.101 ROTATOR CUFF SYNDROME OF RIGHT SHOULDER: ICD-10-CM

## 2024-06-18 DIAGNOSIS — M47.812 FACET ARTHRITIS OF CERVICAL REGION: ICD-10-CM

## 2024-06-18 DIAGNOSIS — M79.601 RIGHT ARM PAIN: ICD-10-CM

## 2024-06-18 DIAGNOSIS — G56.41 COMPLEX REGIONAL PAIN SYNDROME TYPE 2 OF RIGHT UPPER EXTREMITY: ICD-10-CM

## 2024-06-18 DIAGNOSIS — M47.12 CERVICAL SPONDYLOSIS WITH MYELOPATHY: ICD-10-CM

## 2024-06-18 DIAGNOSIS — M54.12 CERVICAL RADICULOPATHY: ICD-10-CM

## 2024-06-18 DIAGNOSIS — R29.898 RIGHT HAND WEAKNESS: ICD-10-CM

## 2024-06-18 RX ORDER — HYDROCODONE BITARTRATE AND ACETAMINOPHEN 10; 325 MG/1; MG/1
1 TABLET ORAL EVERY 6 HOURS PRN
Qty: 120 TABLET | Refills: 0 | Status: SHIPPED | OUTPATIENT
Start: 2024-06-18 | End: 2024-07-18

## 2024-06-18 NOTE — TELEPHONE ENCOUNTER
----- Message from Cary Luna sent at 6/17/2024  9:24 AM CDT -----  Regarding: Refill  Contact: pt @ [phone]  Rx Refill/Request    Is this a Refill or New Rx:refill    Rx Name and Strength:HYDROcodone-acetaminophen (NORCO)  mg per tablet    Preferred Pharmacy with phone number:  [facility] DR[facility] STORE #[id number] [facility] [address] [facility] [address]  [facility] [address]  [facility] [address]  Phone: [phone] Fax: [phone]    Communication Preference:pt @ [phone]    Additional Information:pt is asking for a call back after prescription has been sent to pharmacy

## 2024-07-01 RX ORDER — ISOPROPYL ALCOHOL 70 ML/100ML
SWAB TOPICAL
Refills: 0 | OUTPATIENT
Start: 2024-07-01

## 2024-07-01 RX ORDER — LANCETS 33 GAUGE
EACH MISCELLANEOUS
Qty: 100 EACH | Refills: 0 | OUTPATIENT
Start: 2024-07-01

## 2024-07-01 RX ORDER — DEXTROSE 4 G
TABLET,CHEWABLE ORAL
Refills: 0 | OUTPATIENT
Start: 2024-07-01

## 2024-07-01 RX ORDER — BLOOD-GLUCOSE METER
EACH MISCELLANEOUS
Qty: 1 EACH | Refills: 0 | OUTPATIENT
Start: 2024-07-01

## 2024-07-01 NOTE — TELEPHONE ENCOUNTER
Refill Decision Note   Xander Sanchez  is requesting a refill authorization.  Brief Assessment and Rationale for Refill:  Quick Discontinue     Medication Therapy Plan:    Pharmacy is requesting new scripts for the following medications without required information, (sig/ frequency/qty/etc)      Medication Reconciliation Completed: No     Comments: Pharmacies have been requesting medications for patients without required information, (sig, frequency, qty, etc.). In addition, requests are sent for medication(s) pt. are currently not taking, and medications patients have never taken.    We have spoken to the pharmacies about these request types and advised their teams previously that we are unable to assess these New Script requests and require all details for these requests. This is a known issue and has been reported.     Note composed:11:21 AM 07/01/2024

## 2024-07-01 NOTE — TELEPHONE ENCOUNTER
Refill Decision Note   Xander Sanchez  is requesting a refill authorization.  Brief Assessment and Rationale for Refill:  Quick Discontinue     Medication Therapy Plan:    Pharmacy is requesting new scripts for the following medications without required information, (sig/ frequency/qty/etc)      Medication Reconciliation Completed: No     Comments: Pharmacies have been requesting medications for patients without required information, (sig, frequency, qty, etc.). In addition, requests are sent for medication(s) pt. are currently not taking, and medications patients have never taken.    We have spoken to the pharmacies about these request types and advised their teams previously that we are unable to assess these New Script requests and require all details for these requests. This is a known issue and has been reported.     Note composed:11:22 AM 07/01/2024

## 2024-07-01 NOTE — TELEPHONE ENCOUNTER
No care due was identified.  [facility] Embedded Care Due Messages. Reference number: [id number].   7/01/2024 11:07:23 AM CDT

## 2024-07-01 NOTE — TELEPHONE ENCOUNTER
No care due was identified.  [facility] Embedded Care Due Messages. Reference number: [id number].   7/01/2024 11:09:31 AM CDT

## 2024-07-01 NOTE — TELEPHONE ENCOUNTER
No care due was identified.  [facility] Embedded Care Due Messages. Reference number: [id number].   7/01/2024 11:16:20 AM CDT

## 2024-07-01 NOTE — TELEPHONE ENCOUNTER
No care due was identified.  [facility] Embedded Care Due Messages. Reference number: [id number].   7/01/2024 11:07:55 AM CDT

## 2024-07-11 DIAGNOSIS — M54.12 CERVICAL RADICULOPATHY: ICD-10-CM

## 2024-07-11 DIAGNOSIS — M47.812 FACET ARTHRITIS OF CERVICAL REGION: ICD-10-CM

## 2024-07-11 DIAGNOSIS — M48.02 CERVICAL STENOSIS OF SPINE: ICD-10-CM

## 2024-07-11 DIAGNOSIS — M79.601 RIGHT ARM PAIN: ICD-10-CM

## 2024-07-11 DIAGNOSIS — M96.1 CERVICAL POST-LAMINECTOMY SYNDROME: ICD-10-CM

## 2024-07-11 DIAGNOSIS — F11.20 NARCOTIC DEPENDENCY, CONTINUOUS: ICD-10-CM

## 2024-07-11 DIAGNOSIS — R29.898 RIGHT HAND WEAKNESS: ICD-10-CM

## 2024-07-11 DIAGNOSIS — M54.12 CERVICAL RADICULAR PAIN: ICD-10-CM

## 2024-07-11 DIAGNOSIS — M62.81 MUSCLE RIGHT ARM WEAKNESS: ICD-10-CM

## 2024-07-11 DIAGNOSIS — M47.12 CERVICAL SPONDYLOSIS WITH MYELOPATHY: ICD-10-CM

## 2024-07-11 DIAGNOSIS — R29.898 RIGHT ARM WEAKNESS: ICD-10-CM

## 2024-07-11 DIAGNOSIS — M50.00 HNP (HERNIATED NUCLEUS PULPOSUS) WITH MYELOPATHY, CERVICAL: ICD-10-CM

## 2024-07-11 DIAGNOSIS — M50.30 DEGENERATION OF CERVICAL INTERVERTEBRAL DISC: ICD-10-CM

## 2024-07-11 DIAGNOSIS — M75.101 ROTATOR CUFF SYNDROME OF RIGHT SHOULDER: ICD-10-CM

## 2024-07-11 DIAGNOSIS — G56.41 COMPLEX REGIONAL PAIN SYNDROME TYPE 2 OF RIGHT UPPER EXTREMITY: ICD-10-CM

## 2024-07-11 DIAGNOSIS — G90.50 COMPLEX REGIONAL PAIN SYNDROME TYPE 1, AFFECTING UNSPECIFIED SITE: ICD-10-CM

## 2024-07-11 DIAGNOSIS — M54.12 BRACHIAL NEURITIS OR RADICULITIS: ICD-10-CM

## 2024-07-11 DIAGNOSIS — G89.4 CHRONIC PAIN SYNDROME: ICD-10-CM

## 2024-07-11 DIAGNOSIS — M48.02 SPINAL STENOSIS IN CERVICAL REGION: ICD-10-CM

## 2024-07-11 RX ORDER — HYDROCODONE BITARTRATE AND ACETAMINOPHEN 10; 325 MG/1; MG/1
1 TABLET ORAL EVERY 6 HOURS PRN
Qty: 120 TABLET | Refills: 0 | Status: SHIPPED | OUTPATIENT
Start: 2024-07-17 | End: 2024-08-16

## 2024-07-17 DIAGNOSIS — M79.601 RIGHT ARM PAIN: ICD-10-CM

## 2024-07-17 DIAGNOSIS — F11.20 NARCOTIC DEPENDENCY, CONTINUOUS: ICD-10-CM

## 2024-07-17 DIAGNOSIS — R29.898 RIGHT HAND WEAKNESS: ICD-10-CM

## 2024-07-17 DIAGNOSIS — G56.41 COMPLEX REGIONAL PAIN SYNDROME TYPE 2 OF RIGHT UPPER EXTREMITY: ICD-10-CM

## 2024-07-17 DIAGNOSIS — M47.12 CERVICAL SPONDYLOSIS WITH MYELOPATHY: ICD-10-CM

## 2024-07-17 DIAGNOSIS — M75.101 ROTATOR CUFF SYNDROME OF RIGHT SHOULDER: ICD-10-CM

## 2024-07-17 DIAGNOSIS — M47.812 FACET ARTHRITIS OF CERVICAL REGION: ICD-10-CM

## 2024-07-17 DIAGNOSIS — M48.02 SPINAL STENOSIS IN CERVICAL REGION: ICD-10-CM

## 2024-07-17 DIAGNOSIS — M54.12 BRACHIAL NEURITIS OR RADICULITIS: ICD-10-CM

## 2024-07-17 DIAGNOSIS — R29.898 RIGHT ARM WEAKNESS: ICD-10-CM

## 2024-07-17 DIAGNOSIS — M48.02 CERVICAL STENOSIS OF SPINE: ICD-10-CM

## 2024-07-17 DIAGNOSIS — M62.81 MUSCLE RIGHT ARM WEAKNESS: ICD-10-CM

## 2024-07-17 DIAGNOSIS — M50.30 DEGENERATION OF CERVICAL INTERVERTEBRAL DISC: ICD-10-CM

## 2024-07-17 DIAGNOSIS — M96.1 CERVICAL POST-LAMINECTOMY SYNDROME: ICD-10-CM

## 2024-07-17 DIAGNOSIS — M54.12 CERVICAL RADICULAR PAIN: ICD-10-CM

## 2024-07-17 DIAGNOSIS — G90.50 COMPLEX REGIONAL PAIN SYNDROME TYPE 1, AFFECTING UNSPECIFIED SITE: ICD-10-CM

## 2024-07-17 DIAGNOSIS — G89.4 CHRONIC PAIN SYNDROME: ICD-10-CM

## 2024-07-17 DIAGNOSIS — M54.12 CERVICAL RADICULOPATHY: ICD-10-CM

## 2024-07-17 DIAGNOSIS — M50.00 HNP (HERNIATED NUCLEUS PULPOSUS) WITH MYELOPATHY, CERVICAL: ICD-10-CM

## 2024-07-17 RX ORDER — HYDROCODONE BITARTRATE AND ACETAMINOPHEN 10; 325 MG/1; MG/1
1 TABLET ORAL EVERY 6 HOURS PRN
Qty: 120 TABLET | Refills: 0 | Status: SHIPPED | OUTPATIENT
Start: 2024-07-17 | End: 2024-08-16

## 2024-07-22 ENCOUNTER — CLINICAL SUPPORT (OUTPATIENT)
Dept: CARDIOLOGY | Facility: HOSPITAL | Age: 74
End: 2024-07-22
Payer: MEDICARE

## 2024-07-22 ENCOUNTER — CLINICAL SUPPORT (OUTPATIENT)
Dept: CARDIOLOGY | Facility: HOSPITAL | Age: 74
End: 2024-07-22
Attending: INTERNAL MEDICINE
Payer: MEDICARE

## 2024-07-22 DIAGNOSIS — Z95.810 PRESENCE OF AUTOMATIC (IMPLANTABLE) CARDIAC DEFIBRILLATOR: ICD-10-CM

## 2024-07-22 DIAGNOSIS — I42.5 OTHER RESTRICTIVE CARDIOMYOPATHY: ICD-10-CM

## 2024-07-22 PROCEDURE — 93295 DEV INTERROG REMOTE 1/2/MLT: CPT | Mod: ,,, | Performed by: INTERNAL MEDICINE

## 2024-07-22 PROCEDURE — 93296 REM INTERROG EVL PM/IDS: CPT | Performed by: INTERNAL MEDICINE

## 2024-07-26 ENCOUNTER — OFFICE VISIT (OUTPATIENT)
Dept: PHYSICAL MEDICINE AND REHAB | Facility: CLINIC | Age: 74
End: 2024-07-26
Payer: MEDICARE

## 2024-07-26 VITALS — HEIGHT: 71 IN | OXYGEN SATURATION: 98 % | WEIGHT: 183.19 LBS | BODY MASS INDEX: 25.65 KG/M2

## 2024-07-26 DIAGNOSIS — M47.22 OSTEOARTHRITIS OF SPINE WITH RADICULOPATHY, CERVICAL REGION: ICD-10-CM

## 2024-07-26 DIAGNOSIS — M96.1 CERVICAL POST-LAMINECTOMY SYNDROME: ICD-10-CM

## 2024-07-26 DIAGNOSIS — Z98.1 STATUS POST CERVICAL SPINAL FUSION: ICD-10-CM

## 2024-07-26 DIAGNOSIS — Z79.891 CHRONICALLY ON OPIATE THERAPY: ICD-10-CM

## 2024-07-26 DIAGNOSIS — G90.50 COMPLEX REGIONAL PAIN SYNDROME TYPE 1, AFFECTING UNSPECIFIED SITE: ICD-10-CM

## 2024-07-26 DIAGNOSIS — G89.29 CHRONIC NECK PAIN: Primary | ICD-10-CM

## 2024-07-26 DIAGNOSIS — R26.9 GAIT DISORDER: ICD-10-CM

## 2024-07-26 DIAGNOSIS — M54.2 CHRONIC NECK PAIN: Primary | ICD-10-CM

## 2024-07-26 PROCEDURE — 99999 PR PBB SHADOW E&M-EST. PATIENT-LVL III: CPT | Mod: PBBFAC,,, | Performed by: PHYSICAL MEDICINE & REHABILITATION

## 2024-07-26 NOTE — PROGRESS NOTES
Subjective:       Patient ID: Xander Sanchez is a 74 y.o. male.    Chief Complaint: No chief complaint on file.      HPI      Mr. Sanchez is a 74-year-old black male with past medical history of hypertension, diabetes mellitus, peripheral neuropathy, RA, nonischemic cardiomyopathy, chronic systolic heart failure, status post AICD, COPD, osteoarthritis, chronic neck pain status post 3 neck surgeries, CRPS type 1 of right arm, GERD.  The patient is [facility] up [facility] for chronic neck pain with right cervical radiculopathy, status post multiple neck surgeries (C3-6 laminectomy in 11/2012, C5-6 ACDF in 04/2014, and C3-7 posterior fusion in 08/2015).  He was last seen on 2/20/2024  He was maintained on gabapentin, p.r.n. baclofen and p.r.n. hydrocodone/APAP.      The patient is coming to the clinic for follow-up.  His neck pain has nearly subsided, but he still has right arm pain. It is an intermittent tightness and burning or cold sensation from the shoulder area shooting to his right hand.  His pain is worse when he wakes up in the morning.  It is better with his pain medications.  His max pain is 6/10 and minimum 2/10.  Today it is 4/10.  The patient denies any significant upper extremity weakness.  He complains of occasional spasms in his right arm.  He has chronic impaired hand coordination such as trouble buttoning or writing. He he reports occasional stumbling and falling due to lower extremity weakness and coordination problems.    He is currently taking:  Gabapentin 600 mg p.o. 2 times per day.  He felt sleepy when he increased it to 3 times per day.  Hydrocodone/APAP 10/325 p.r.n. 4 times per day   Baclofen 10 mg p.o. b.i.d. p.r.n..    He finished courses of physical and occupational therapy. He has been doing a home exercise program.    Past Medical History:   Diagnosis Date    Allergy     Arthritis     Asthma     Cardiomyopathy     Cataract     Cervical radicular pain     Cervical  spondylosis with myelopathy 10/17/2012    Chronic bronchitis     Chronic systolic dysfunction of left ventricle 07/27/2015    Constipation 07/27/2015    COPD (chronic obstructive pulmonary disease)     CRPS (complex regional pain syndrome type I) 12/29/2014    Diabetes mellitus, type 2     DM type 2 (diabetes mellitus, type 2) 07/27/2015    Enlarged prostate 07/27/2015    Enuresis 07/06/2018    Hypertension     ICD (implantable cardiac defibrillator) in place     Mixed hyperlipidemia 02/28/2018    Paroxysmal atrial fibrillation 1/23/2024    Presence of biventricular AICD 07/27/2015    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thy 02/01/2016    Urinary tract infection     pt does not know        Review of patient's allergies indicates:   Allergen Reactions    Ciprofloxacin Nausea And Vomiting        Review of Systems   Constitutional:  Negative for chills and fever.   Eyes:  Negative for visual disturbance.   Respiratory:  Positive for shortness of breath.    Cardiovascular:  Negative for chest pain.   Gastrointestinal:  Positive for constipation. Negative for nausea and vomiting.   Genitourinary:  Negative for difficulty urinating.   Musculoskeletal:  Positive for gait problem, myalgias and neck pain. Negative for back pain.   Neurological:  Negative for dizziness, weakness and headaches.   Psychiatric/Behavioral:  Negative for behavioral problems and sleep disturbance.              Objective:      Physical Exam  Vitals reviewed.   Constitutional:       General: He is not in acute distress.     Appearance: He is well-developed.   HENT:      Head: Normocephalic and atraumatic.   Neck:      Comments: Healed posterior and anterior neck surgical scars.  Decreased ROM in all planes.    Musculoskeletal:      Comments: BUE:  ROM:   RUE: full.   LUE: full.  Strength:    RUE: 5/5 at shoulder abduction, 5 elbow flexion, 5 wrist extension, 5 elbow extension, 5 finger flexion, 5 finger abduction.   LUE: 5/5 at shoulder  abduction, 5 elbow flexion, 5 wrist extension, 5 elbow extension, 5 finger flexion, 5 finger abduction.  Sensation to pinprick:   RUE: intact.   LUE: intact.  DTR:    RUE: +1 biceps, +1 triceps.   LUE:  +1 biceps, +1 triceps.      BLE:  ROM:   RLE: full.   LLE: full.  Strength:    RLE: 5/5 at hip flexion, 5 knee extension, 5 ankle DF, 5 PF, 5 EHL.   LLE: 5/5 at hip flexion, 5 knee extension, 5 ankle DF, 5 PF, 5 EHL.  Sensation to pinprick:     RLE: intact.      LLE: intact.   DTR:     RLE: +1 knee, +1 ankle.    LLE: +1 knee, +1 ankle.  Clonus:    Rt ankle: -ve.    Lt ankle: -ve.  SLR (sitting):      RLE: -ve.      LLE: -ve.      Gait:  Slow aiden, some shuffling and circumduction on the right side.     Skin:     General: Skin is warm.   Neurological:      Mental Status: He is alert.   Psychiatric:         Behavior: Behavior normal.         Assessment:       1. Chronic neck pain    2. Cervical post-laminectomy syndrome    3. Status post cervical spinal fusion    4. Osteoarthritis of spine with radiculopathy, cervical region    5. Complex regional pain syndrome type 1, affecting unspecified site    6. Gait disorder    7. Chronically on opiate therapy          Plan:       - Oral NSAIDs will be avoided due to cardiac illness.  - Continue gabapentin 600 mg, 1 tablet by mouth 2 times per day   - Continue hydrocodone/APAP 10/325, 1 tablet by mouth every 6 hours as needed for pain.    - Continue baclofen 10 mg tablets; take 1 tablet by mouth 3 times per day as needed for muscle spasms.  - Regular home exercise program was encouraged.  - Follow up in about 4 months (around 11/26/2024).      This note was partly generated with Rogate voice recognition software. I apologize for any possible typographical errors.

## 2024-08-02 DIAGNOSIS — G89.4 CHRONIC PAIN SYNDROME: ICD-10-CM

## 2024-08-02 DIAGNOSIS — R29.898 RIGHT HAND WEAKNESS: ICD-10-CM

## 2024-08-02 DIAGNOSIS — R29.898 RIGHT ARM WEAKNESS: ICD-10-CM

## 2024-08-02 DIAGNOSIS — M48.02 SPINAL STENOSIS IN CERVICAL REGION: ICD-10-CM

## 2024-08-02 DIAGNOSIS — M54.12 CERVICAL RADICULOPATHY: ICD-10-CM

## 2024-08-02 DIAGNOSIS — M47.812 FACET ARTHRITIS OF CERVICAL REGION: ICD-10-CM

## 2024-08-02 DIAGNOSIS — G56.41 COMPLEX REGIONAL PAIN SYNDROME TYPE 2 OF RIGHT UPPER EXTREMITY: ICD-10-CM

## 2024-08-02 DIAGNOSIS — M50.30 DEGENERATION OF CERVICAL INTERVERTEBRAL DISC: ICD-10-CM

## 2024-08-02 DIAGNOSIS — M50.00 HNP (HERNIATED NUCLEUS PULPOSUS) WITH MYELOPATHY, CERVICAL: ICD-10-CM

## 2024-08-02 DIAGNOSIS — M47.12 CERVICAL SPONDYLOSIS WITH MYELOPATHY: ICD-10-CM

## 2024-08-02 DIAGNOSIS — M54.12 CERVICAL RADICULAR PAIN: ICD-10-CM

## 2024-08-02 DIAGNOSIS — G90.50 COMPLEX REGIONAL PAIN SYNDROME TYPE 1, AFFECTING UNSPECIFIED SITE: ICD-10-CM

## 2024-08-02 DIAGNOSIS — M54.12 BRACHIAL NEURITIS OR RADICULITIS: ICD-10-CM

## 2024-08-02 DIAGNOSIS — M62.81 MUSCLE RIGHT ARM WEAKNESS: ICD-10-CM

## 2024-08-02 DIAGNOSIS — M96.1 CERVICAL POST-LAMINECTOMY SYNDROME: ICD-10-CM

## 2024-08-02 DIAGNOSIS — M48.02 CERVICAL STENOSIS OF SPINE: ICD-10-CM

## 2024-08-02 DIAGNOSIS — F11.20 NARCOTIC DEPENDENCY, CONTINUOUS: ICD-10-CM

## 2024-08-02 DIAGNOSIS — M75.101 ROTATOR CUFF SYNDROME OF RIGHT SHOULDER: ICD-10-CM

## 2024-08-02 DIAGNOSIS — M79.601 RIGHT ARM PAIN: ICD-10-CM

## 2024-08-02 RX ORDER — HYDROCODONE BITARTRATE AND ACETAMINOPHEN 10; 325 MG/1; MG/1
1 TABLET ORAL EVERY 6 HOURS PRN
Qty: 120 TABLET | Refills: 0 | Status: SHIPPED | OUTPATIENT
Start: 2024-08-16 | End: 2024-09-15

## 2024-08-09 LAB
OHS CV AF BURDEN PERCENT: < 1
OHS CV DC REMOTE DEVICE TYPE: NORMAL
OHS CV RV PACING PERCENT: 1 %

## 2024-08-12 DIAGNOSIS — M54.12 CERVICAL RADICULAR PAIN: ICD-10-CM

## 2024-08-12 DIAGNOSIS — G90.50 COMPLEX REGIONAL PAIN SYNDROME TYPE 1, AFFECTING UNSPECIFIED SITE: ICD-10-CM

## 2024-08-12 DIAGNOSIS — M50.00 HNP (HERNIATED NUCLEUS PULPOSUS) WITH MYELOPATHY, CERVICAL: ICD-10-CM

## 2024-08-12 DIAGNOSIS — G56.41 COMPLEX REGIONAL PAIN SYNDROME TYPE 2 OF RIGHT UPPER EXTREMITY: ICD-10-CM

## 2024-08-12 DIAGNOSIS — M48.02 SPINAL STENOSIS IN CERVICAL REGION: ICD-10-CM

## 2024-08-12 DIAGNOSIS — M54.12 BRACHIAL NEURITIS OR RADICULITIS: ICD-10-CM

## 2024-08-12 DIAGNOSIS — R29.898 RIGHT ARM WEAKNESS: ICD-10-CM

## 2024-08-12 DIAGNOSIS — M47.812 FACET ARTHRITIS OF CERVICAL REGION: ICD-10-CM

## 2024-08-12 DIAGNOSIS — M50.30 DEGENERATION OF CERVICAL INTERVERTEBRAL DISC: ICD-10-CM

## 2024-08-12 DIAGNOSIS — M75.101 ROTATOR CUFF SYNDROME OF RIGHT SHOULDER: ICD-10-CM

## 2024-08-12 DIAGNOSIS — M96.1 CERVICAL POST-LAMINECTOMY SYNDROME: ICD-10-CM

## 2024-08-12 DIAGNOSIS — R29.898 RIGHT HAND WEAKNESS: ICD-10-CM

## 2024-08-12 DIAGNOSIS — M48.02 CERVICAL STENOSIS OF SPINE: ICD-10-CM

## 2024-08-12 DIAGNOSIS — F11.20 NARCOTIC DEPENDENCY, CONTINUOUS: ICD-10-CM

## 2024-08-12 DIAGNOSIS — G89.4 CHRONIC PAIN SYNDROME: ICD-10-CM

## 2024-08-12 DIAGNOSIS — M62.81 MUSCLE RIGHT ARM WEAKNESS: ICD-10-CM

## 2024-08-12 DIAGNOSIS — M54.12 CERVICAL RADICULOPATHY: ICD-10-CM

## 2024-08-12 DIAGNOSIS — M47.12 CERVICAL SPONDYLOSIS WITH MYELOPATHY: ICD-10-CM

## 2024-08-12 DIAGNOSIS — M79.601 RIGHT ARM PAIN: ICD-10-CM

## 2024-08-12 RX ORDER — HYDROCODONE BITARTRATE AND ACETAMINOPHEN 10; 325 MG/1; MG/1
1 TABLET ORAL EVERY 6 HOURS PRN
Qty: 120 TABLET | Refills: 0 | OUTPATIENT
Start: 2024-08-16 | End: 2024-09-15

## 2024-08-12 NOTE — TELEPHONE ENCOUNTER
----- Message from aMrium Baldwin sent at 8/12/2024 10:02 AM CDT -----  .Type:  RX Refill Request    Who Called? PT     Refill or New Rx? REFILL     RX Name and Strength?     HYDROcodone-acetaminophen (NORCO)  mg per tablet(PLEASE SEND THIS TO [facility])    gabapentin (NEURONTIN) 600 MG tablet(PLEASE SEND THIS TO [facility])        Preferred Pharmacy with phone number?     [facility]  [phone] Fax:[phone]    [facility] [phone] Fax:[phone]    Pt Call Back Number? [phone]    Additional Information: PLEASE BE MINDFUL PT ONLY WANTS HIS PAIN MEDICATION SENT TO [facility] MOVING FORWARD. THE GABAPENTIN WILL CONTINUE TO GO TO [facility]       Thank You

## 2024-08-15 RX ORDER — TAMSULOSIN HYDROCHLORIDE 0.4 MG/1
CAPSULE ORAL
Qty: 90 CAPSULE | Refills: 3 | Status: SHIPPED | OUTPATIENT
Start: 2024-08-15

## 2024-08-15 NOTE — TELEPHONE ENCOUNTER
Refill Decision Note   Xander Daniel  is requesting a refill authorization.  Brief Assessment and Rationale for Refill:  Approve     Medication Therapy Plan:         Comments:     Note composed:4:12 PM 08/15/2024

## 2024-08-15 NOTE — TELEPHONE ENCOUNTER
No care due was identified.  [facility] Embedded Care Due Messages. Reference number: [id number].   8/15/2024 9:33:01 AM CDT

## 2024-08-17 ENCOUNTER — NURSE TRIAGE (OUTPATIENT)
Dept: ADMINISTRATIVE | Facility: CLINIC | Age: 74
End: 2024-08-17
Payer: MEDICARE

## 2024-08-17 NOTE — TELEPHONE ENCOUNTER
Patient is trying to  his prescription for Norco. There is a prescription showing in Epic that went to his mail order pharmacy. Pt reports it should have to went to the local [facility]. Dispo provided- call MD when office is open. Will route message as well. Instructed to call back with additional questions or worsening of symptoms. Patient verbalized understanding.     Reason for Disposition   Caller requesting a CONTROLLED substance prescription refill (e.g., narcotics, ADHD medicines)    Protocols used: Medication Refill and Renewal Call-A-

## 2024-08-20 DIAGNOSIS — M48.02 SPINAL STENOSIS IN CERVICAL REGION: ICD-10-CM

## 2024-08-20 DIAGNOSIS — M75.101 ROTATOR CUFF SYNDROME OF RIGHT SHOULDER: ICD-10-CM

## 2024-08-20 DIAGNOSIS — G89.4 CHRONIC PAIN SYNDROME: ICD-10-CM

## 2024-08-20 DIAGNOSIS — M54.12 CERVICAL RADICULAR PAIN: ICD-10-CM

## 2024-08-20 DIAGNOSIS — M48.02 CERVICAL STENOSIS OF SPINE: ICD-10-CM

## 2024-08-20 DIAGNOSIS — M62.81 MUSCLE RIGHT ARM WEAKNESS: ICD-10-CM

## 2024-08-20 DIAGNOSIS — M50.30 DEGENERATION OF CERVICAL INTERVERTEBRAL DISC: ICD-10-CM

## 2024-08-20 DIAGNOSIS — G90.50 COMPLEX REGIONAL PAIN SYNDROME TYPE 1, AFFECTING UNSPECIFIED SITE: ICD-10-CM

## 2024-08-20 DIAGNOSIS — M96.1 CERVICAL POST-LAMINECTOMY SYNDROME: ICD-10-CM

## 2024-08-20 DIAGNOSIS — M54.12 BRACHIAL NEURITIS OR RADICULITIS: ICD-10-CM

## 2024-08-20 DIAGNOSIS — R29.898 RIGHT ARM WEAKNESS: ICD-10-CM

## 2024-08-20 DIAGNOSIS — M54.12 CERVICAL RADICULOPATHY: ICD-10-CM

## 2024-08-20 DIAGNOSIS — M47.812 FACET ARTHRITIS OF CERVICAL REGION: ICD-10-CM

## 2024-08-20 DIAGNOSIS — F11.20 NARCOTIC DEPENDENCY, CONTINUOUS: ICD-10-CM

## 2024-08-20 DIAGNOSIS — G56.41 COMPLEX REGIONAL PAIN SYNDROME TYPE 2 OF RIGHT UPPER EXTREMITY: ICD-10-CM

## 2024-08-20 DIAGNOSIS — M79.601 RIGHT ARM PAIN: ICD-10-CM

## 2024-08-20 DIAGNOSIS — R29.898 RIGHT HAND WEAKNESS: ICD-10-CM

## 2024-08-20 DIAGNOSIS — M50.00 HNP (HERNIATED NUCLEUS PULPOSUS) WITH MYELOPATHY, CERVICAL: ICD-10-CM

## 2024-08-20 DIAGNOSIS — M47.12 CERVICAL SPONDYLOSIS WITH MYELOPATHY: ICD-10-CM

## 2024-08-20 RX ORDER — HYDROCODONE BITARTRATE AND ACETAMINOPHEN 10; 325 MG/1; MG/1
1 TABLET ORAL EVERY 6 HOURS PRN
Qty: 120 TABLET | Refills: 0 | Status: SHIPPED | OUTPATIENT
Start: 2024-08-20 | End: 2024-09-19

## 2024-08-20 NOTE — TELEPHONE ENCOUNTER
----- Message from Rachel Goyal sent at 8/19/2024 10:15 AM CDT -----  Regarding: rx refill  Contact: pt @ [phone]  Rx Refill/Request Is this a Refill or New Rx: refill      Rx Name and Strength: HYDROcodone-acetaminophen (NORCO)  mg per tablet    Preferred Pharmacy with phone number:     [facility] #[id number] [facility] [address] [facility] [address]  [address] [place] [address]  Phone: [phone] Fax: [phone]      Communication Preference: call back     Additional Information:  Pt is calling to advise that he canceled rx that was sent to [facility] as he has been out since Saturday. Please call to advise further. Thank you for all you are doing.

## 2024-08-20 NOTE — TELEPHONE ENCOUNTER
Can you please send pt Rx to the pharmacy below it was originally sent to [place] pharmacy .

## 2024-08-27 DIAGNOSIS — R32 ENURESIS: ICD-10-CM

## 2024-08-27 RX ORDER — OXYBUTYNIN CHLORIDE 5 MG/1
5 TABLET ORAL 2 TIMES DAILY
Qty: 180 TABLET | Refills: 3 | Status: SHIPPED | OUTPATIENT
Start: 2024-08-27

## 2024-08-27 NOTE — TELEPHONE ENCOUNTER
No care due was identified.  [facility] Embedded Care Due Messages. Reference number: [id number].   8/27/2024 1:17:58 PM CDT

## 2024-08-28 NOTE — TELEPHONE ENCOUNTER
Refill Decision Note   Xander Sanchez  is requesting a refill authorization.  Brief Assessment and Rationale for Refill:  Approve     Medication Therapy Plan:         Alert overridden per protocol: Yes   Comments:     Note composed:11:50 PM 08/27/2024

## 2024-09-04 DIAGNOSIS — M79.89 FOOT SWELLING: ICD-10-CM

## 2024-09-04 DIAGNOSIS — I10 ESSENTIAL HYPERTENSION: ICD-10-CM

## 2024-09-04 RX ORDER — PRAVASTATIN SODIUM 10 MG/1
TABLET ORAL
Qty: 90 TABLET | Refills: 3 | Status: SHIPPED | OUTPATIENT
Start: 2024-09-04

## 2024-09-04 RX ORDER — HYDROCHLOROTHIAZIDE 12.5 MG/1
CAPSULE ORAL
Qty: 90 CAPSULE | Refills: 3 | OUTPATIENT
Start: 2024-09-04

## 2024-09-04 NOTE — TELEPHONE ENCOUNTER
No care due was identified.  [facility] Embedded Care Due Messages. Reference number: [id number].   9/04/2024 2:25:43 PM CDT

## 2024-09-05 NOTE — TELEPHONE ENCOUNTER
Refill Decision Note   Xander Daniel  is requesting a refill authorization.  Brief Assessment and Rationale for Refill:  Quick Discontinue  Approve     Medication Therapy Plan: Hctz mayra'lucinda (6/5/24)      Comments:     Note composed:8:58 PM 09/04/2024

## 2024-09-13 DIAGNOSIS — M47.12 CERVICAL SPONDYLOSIS WITH MYELOPATHY: ICD-10-CM

## 2024-09-13 DIAGNOSIS — M96.1 CERVICAL POST-LAMINECTOMY SYNDROME: ICD-10-CM

## 2024-09-13 DIAGNOSIS — M62.81 MUSCLE RIGHT ARM WEAKNESS: ICD-10-CM

## 2024-09-13 DIAGNOSIS — M50.30 DEGENERATION OF CERVICAL INTERVERTEBRAL DISC: ICD-10-CM

## 2024-09-13 DIAGNOSIS — M48.02 CERVICAL STENOSIS OF SPINE: ICD-10-CM

## 2024-09-13 DIAGNOSIS — M54.12 CERVICAL RADICULAR PAIN: ICD-10-CM

## 2024-09-13 DIAGNOSIS — R29.898 RIGHT HAND WEAKNESS: ICD-10-CM

## 2024-09-13 DIAGNOSIS — M47.812 FACET ARTHRITIS OF CERVICAL REGION: ICD-10-CM

## 2024-09-13 DIAGNOSIS — M48.02 SPINAL STENOSIS IN CERVICAL REGION: ICD-10-CM

## 2024-09-13 DIAGNOSIS — M79.601 RIGHT ARM PAIN: ICD-10-CM

## 2024-09-13 DIAGNOSIS — F11.20 NARCOTIC DEPENDENCY, CONTINUOUS: ICD-10-CM

## 2024-09-13 DIAGNOSIS — M54.12 BRACHIAL NEURITIS OR RADICULITIS: ICD-10-CM

## 2024-09-13 DIAGNOSIS — M50.00 HNP (HERNIATED NUCLEUS PULPOSUS) WITH MYELOPATHY, CERVICAL: ICD-10-CM

## 2024-09-13 DIAGNOSIS — M75.101 ROTATOR CUFF SYNDROME OF RIGHT SHOULDER: ICD-10-CM

## 2024-09-13 DIAGNOSIS — G89.4 CHRONIC PAIN SYNDROME: ICD-10-CM

## 2024-09-13 DIAGNOSIS — R29.898 RIGHT ARM WEAKNESS: ICD-10-CM

## 2024-09-13 DIAGNOSIS — M54.12 CERVICAL RADICULOPATHY: ICD-10-CM

## 2024-09-13 DIAGNOSIS — G56.41 COMPLEX REGIONAL PAIN SYNDROME TYPE 2 OF RIGHT UPPER EXTREMITY: ICD-10-CM

## 2024-09-13 DIAGNOSIS — G90.50 COMPLEX REGIONAL PAIN SYNDROME TYPE 1, AFFECTING UNSPECIFIED SITE: ICD-10-CM

## 2024-09-13 RX ORDER — HYDROCODONE BITARTRATE AND ACETAMINOPHEN 10; 325 MG/1; MG/1
1 TABLET ORAL EVERY 6 HOURS PRN
Qty: 120 TABLET | Refills: 0 | Status: SHIPPED | OUTPATIENT
Start: 2024-09-19 | End: 2024-10-19

## 2024-09-16 DIAGNOSIS — R29.898 RIGHT HAND WEAKNESS: ICD-10-CM

## 2024-09-16 DIAGNOSIS — R29.898 RIGHT ARM WEAKNESS: ICD-10-CM

## 2024-09-16 DIAGNOSIS — M54.12 BRACHIAL NEURITIS OR RADICULITIS: ICD-10-CM

## 2024-09-16 DIAGNOSIS — M54.12 CERVICAL RADICULOPATHY: ICD-10-CM

## 2024-09-16 DIAGNOSIS — M62.81 MUSCLE RIGHT ARM WEAKNESS: ICD-10-CM

## 2024-09-16 DIAGNOSIS — M48.02 CERVICAL STENOSIS OF SPINE: ICD-10-CM

## 2024-09-16 DIAGNOSIS — M75.101 ROTATOR CUFF SYNDROME OF RIGHT SHOULDER: ICD-10-CM

## 2024-09-16 DIAGNOSIS — M48.02 SPINAL STENOSIS IN CERVICAL REGION: ICD-10-CM

## 2024-09-16 DIAGNOSIS — G89.4 CHRONIC PAIN SYNDROME: ICD-10-CM

## 2024-09-16 DIAGNOSIS — M54.12 CERVICAL RADICULAR PAIN: ICD-10-CM

## 2024-09-16 DIAGNOSIS — M79.601 RIGHT ARM PAIN: ICD-10-CM

## 2024-09-16 DIAGNOSIS — M50.00 HNP (HERNIATED NUCLEUS PULPOSUS) WITH MYELOPATHY, CERVICAL: ICD-10-CM

## 2024-09-16 DIAGNOSIS — M50.30 DEGENERATION OF CERVICAL INTERVERTEBRAL DISC: ICD-10-CM

## 2024-09-16 DIAGNOSIS — G90.50 COMPLEX REGIONAL PAIN SYNDROME TYPE 1, AFFECTING UNSPECIFIED SITE: ICD-10-CM

## 2024-09-16 DIAGNOSIS — G56.41 COMPLEX REGIONAL PAIN SYNDROME TYPE 2 OF RIGHT UPPER EXTREMITY: ICD-10-CM

## 2024-09-16 DIAGNOSIS — M96.1 CERVICAL POST-LAMINECTOMY SYNDROME: ICD-10-CM

## 2024-09-16 DIAGNOSIS — M47.812 FACET ARTHRITIS OF CERVICAL REGION: ICD-10-CM

## 2024-09-16 DIAGNOSIS — F11.20 NARCOTIC DEPENDENCY, CONTINUOUS: ICD-10-CM

## 2024-09-16 DIAGNOSIS — M47.12 CERVICAL SPONDYLOSIS WITH MYELOPATHY: ICD-10-CM

## 2024-09-16 RX ORDER — HYDROCODONE BITARTRATE AND ACETAMINOPHEN 10; 325 MG/1; MG/1
1 TABLET ORAL EVERY 6 HOURS PRN
Qty: 120 TABLET | Refills: 0 | OUTPATIENT
Start: 2024-09-19 | End: 2024-10-19

## 2024-09-18 DIAGNOSIS — M79.601 RIGHT ARM PAIN: ICD-10-CM

## 2024-09-18 DIAGNOSIS — M75.101 ROTATOR CUFF SYNDROME OF RIGHT SHOULDER: ICD-10-CM

## 2024-09-18 DIAGNOSIS — G56.41 COMPLEX REGIONAL PAIN SYNDROME TYPE 2 OF RIGHT UPPER EXTREMITY: ICD-10-CM

## 2024-09-18 DIAGNOSIS — M54.12 CERVICAL RADICULAR PAIN: ICD-10-CM

## 2024-09-18 DIAGNOSIS — G90.50 COMPLEX REGIONAL PAIN SYNDROME TYPE 1, AFFECTING UNSPECIFIED SITE: ICD-10-CM

## 2024-09-18 DIAGNOSIS — F11.20 NARCOTIC DEPENDENCY, CONTINUOUS: ICD-10-CM

## 2024-09-18 DIAGNOSIS — M47.12 CERVICAL SPONDYLOSIS WITH MYELOPATHY: ICD-10-CM

## 2024-09-18 DIAGNOSIS — M96.1 CERVICAL POST-LAMINECTOMY SYNDROME: ICD-10-CM

## 2024-09-18 DIAGNOSIS — R29.898 RIGHT HAND WEAKNESS: ICD-10-CM

## 2024-09-18 DIAGNOSIS — M54.12 BRACHIAL NEURITIS OR RADICULITIS: ICD-10-CM

## 2024-09-18 DIAGNOSIS — M48.02 CERVICAL STENOSIS OF SPINE: ICD-10-CM

## 2024-09-18 DIAGNOSIS — G89.4 CHRONIC PAIN SYNDROME: ICD-10-CM

## 2024-09-18 DIAGNOSIS — M50.30 DEGENERATION OF CERVICAL INTERVERTEBRAL DISC: ICD-10-CM

## 2024-09-18 DIAGNOSIS — R29.898 RIGHT ARM WEAKNESS: ICD-10-CM

## 2024-09-18 DIAGNOSIS — M50.00 HNP (HERNIATED NUCLEUS PULPOSUS) WITH MYELOPATHY, CERVICAL: ICD-10-CM

## 2024-09-18 DIAGNOSIS — M62.81 MUSCLE RIGHT ARM WEAKNESS: ICD-10-CM

## 2024-09-18 DIAGNOSIS — M48.02 SPINAL STENOSIS IN CERVICAL REGION: ICD-10-CM

## 2024-09-18 DIAGNOSIS — M54.12 CERVICAL RADICULOPATHY: ICD-10-CM

## 2024-09-18 DIAGNOSIS — M47.812 FACET ARTHRITIS OF CERVICAL REGION: ICD-10-CM

## 2024-09-18 RX ORDER — HYDROCODONE BITARTRATE AND ACETAMINOPHEN 10; 325 MG/1; MG/1
1 TABLET ORAL EVERY 6 HOURS PRN
Qty: 120 TABLET | Refills: 0 | Status: SHIPPED | OUTPATIENT
Start: 2024-09-19 | End: 2024-10-19

## 2024-10-11 DIAGNOSIS — M62.81 MUSCLE RIGHT ARM WEAKNESS: ICD-10-CM

## 2024-10-11 DIAGNOSIS — M50.30 DEGENERATION OF CERVICAL INTERVERTEBRAL DISC: ICD-10-CM

## 2024-10-11 DIAGNOSIS — G90.50 COMPLEX REGIONAL PAIN SYNDROME TYPE 1, AFFECTING UNSPECIFIED SITE: ICD-10-CM

## 2024-10-11 DIAGNOSIS — M48.02 CERVICAL STENOSIS OF SPINE: ICD-10-CM

## 2024-10-11 DIAGNOSIS — M48.02 SPINAL STENOSIS IN CERVICAL REGION: ICD-10-CM

## 2024-10-11 DIAGNOSIS — M54.12 BRACHIAL NEURITIS OR RADICULITIS: ICD-10-CM

## 2024-10-11 DIAGNOSIS — M75.101 ROTATOR CUFF SYNDROME OF RIGHT SHOULDER: ICD-10-CM

## 2024-10-11 DIAGNOSIS — R29.898 RIGHT HAND WEAKNESS: ICD-10-CM

## 2024-10-11 DIAGNOSIS — M96.1 CERVICAL POST-LAMINECTOMY SYNDROME: ICD-10-CM

## 2024-10-11 DIAGNOSIS — M79.601 RIGHT ARM PAIN: ICD-10-CM

## 2024-10-11 DIAGNOSIS — R29.898 RIGHT ARM WEAKNESS: ICD-10-CM

## 2024-10-11 DIAGNOSIS — M47.12 CERVICAL SPONDYLOSIS WITH MYELOPATHY: ICD-10-CM

## 2024-10-11 DIAGNOSIS — M54.12 CERVICAL RADICULAR PAIN: ICD-10-CM

## 2024-10-11 DIAGNOSIS — G56.41 COMPLEX REGIONAL PAIN SYNDROME TYPE 2 OF RIGHT UPPER EXTREMITY: ICD-10-CM

## 2024-10-11 DIAGNOSIS — M54.12 CERVICAL RADICULOPATHY: ICD-10-CM

## 2024-10-11 DIAGNOSIS — F11.20 NARCOTIC DEPENDENCY, CONTINUOUS: ICD-10-CM

## 2024-10-11 DIAGNOSIS — M47.812 FACET ARTHRITIS OF CERVICAL REGION: ICD-10-CM

## 2024-10-11 DIAGNOSIS — M50.00 HNP (HERNIATED NUCLEUS PULPOSUS) WITH MYELOPATHY, CERVICAL: ICD-10-CM

## 2024-10-11 DIAGNOSIS — G89.4 CHRONIC PAIN SYNDROME: ICD-10-CM

## 2024-10-11 RX ORDER — HYDROCODONE BITARTRATE AND ACETAMINOPHEN 10; 325 MG/1; MG/1
1 TABLET ORAL EVERY 6 HOURS PRN
Qty: 120 TABLET | Refills: 0 | Status: SHIPPED | OUTPATIENT
Start: 2024-10-18 | End: 2024-11-17

## 2024-10-11 NOTE — TELEPHONE ENCOUNTER
----- Message from Funmilayo Bailon sent at 10/10/2024 12:37 PM CDT -----  Regarding: Refill  Contact: [phone]  Is this a Refill or New Rx (Script/Out of Refills):  Refill     Name of Caller: Patient     Rx Name: HYDROcodone-acetaminophen (NORCO)  mg per tablet    Pharmacy Name:   [facility]ORE #[id number] [place] [address] [address] AT [facility] [address]  [facility] [address]  [facility] [address]  Phone: [phone] Fax: [phone]

## 2024-10-14 DIAGNOSIS — M48.02 SPINAL STENOSIS IN CERVICAL REGION: ICD-10-CM

## 2024-10-14 DIAGNOSIS — M54.12 CERVICAL RADICULOPATHY: ICD-10-CM

## 2024-10-14 DIAGNOSIS — R29.898 RIGHT ARM WEAKNESS: ICD-10-CM

## 2024-10-14 DIAGNOSIS — G90.50 COMPLEX REGIONAL PAIN SYNDROME TYPE 1, AFFECTING UNSPECIFIED SITE: ICD-10-CM

## 2024-10-14 DIAGNOSIS — M47.812 FACET ARTHRITIS OF CERVICAL REGION: ICD-10-CM

## 2024-10-14 DIAGNOSIS — R29.898 RIGHT HAND WEAKNESS: ICD-10-CM

## 2024-10-14 DIAGNOSIS — M50.00 HNP (HERNIATED NUCLEUS PULPOSUS) WITH MYELOPATHY, CERVICAL: ICD-10-CM

## 2024-10-14 DIAGNOSIS — M96.1 CERVICAL POST-LAMINECTOMY SYNDROME: ICD-10-CM

## 2024-10-14 DIAGNOSIS — M47.12 CERVICAL SPONDYLOSIS WITH MYELOPATHY: ICD-10-CM

## 2024-10-14 DIAGNOSIS — F11.20 NARCOTIC DEPENDENCY, CONTINUOUS: ICD-10-CM

## 2024-10-14 DIAGNOSIS — M50.30 DEGENERATION OF CERVICAL INTERVERTEBRAL DISC: ICD-10-CM

## 2024-10-14 DIAGNOSIS — G89.4 CHRONIC PAIN SYNDROME: ICD-10-CM

## 2024-10-14 DIAGNOSIS — M48.02 CERVICAL STENOSIS OF SPINE: ICD-10-CM

## 2024-10-14 DIAGNOSIS — M75.101 ROTATOR CUFF SYNDROME OF RIGHT SHOULDER: ICD-10-CM

## 2024-10-14 DIAGNOSIS — M79.601 RIGHT ARM PAIN: ICD-10-CM

## 2024-10-14 DIAGNOSIS — M54.12 BRACHIAL NEURITIS OR RADICULITIS: ICD-10-CM

## 2024-10-14 DIAGNOSIS — G56.41 COMPLEX REGIONAL PAIN SYNDROME TYPE 2 OF RIGHT UPPER EXTREMITY: ICD-10-CM

## 2024-10-14 DIAGNOSIS — M54.12 CERVICAL RADICULAR PAIN: ICD-10-CM

## 2024-10-14 DIAGNOSIS — M62.81 MUSCLE RIGHT ARM WEAKNESS: ICD-10-CM

## 2024-10-14 RX ORDER — HYDROCODONE BITARTRATE AND ACETAMINOPHEN 10; 325 MG/1; MG/1
1 TABLET ORAL EVERY 6 HOURS PRN
Qty: 120 TABLET | Refills: 0 | Status: SHIPPED | OUTPATIENT
Start: 2024-10-18 | End: 2024-11-17

## 2024-10-21 ENCOUNTER — CLINICAL SUPPORT (OUTPATIENT)
Dept: CARDIOLOGY | Facility: HOSPITAL | Age: 74
End: 2024-10-21
Attending: INTERNAL MEDICINE
Payer: MEDICARE

## 2024-10-21 ENCOUNTER — CLINICAL SUPPORT (OUTPATIENT)
Dept: CARDIOLOGY | Facility: HOSPITAL | Age: 74
End: 2024-10-21
Payer: MEDICARE

## 2024-10-21 DIAGNOSIS — I42.5 OTHER RESTRICTIVE CARDIOMYOPATHY: ICD-10-CM

## 2024-10-21 DIAGNOSIS — Z95.810 PRESENCE OF AUTOMATIC (IMPLANTABLE) CARDIAC DEFIBRILLATOR: ICD-10-CM

## 2024-10-21 PROCEDURE — 93296 REM INTERROG EVL PM/IDS: CPT | Performed by: INTERNAL MEDICINE

## 2024-10-31 LAB
OHS CV AF BURDEN PERCENT: < 1
OHS CV DC REMOTE DEVICE TYPE: NORMAL
OHS CV RV PACING PERCENT: 1 %

## 2024-11-08 ENCOUNTER — TELEPHONE (OUTPATIENT)
Dept: FAMILY MEDICINE | Facility: CLINIC | Age: 74
End: 2024-11-08
Payer: MEDICARE

## 2024-11-12 DIAGNOSIS — M54.12 BRACHIAL NEURITIS OR RADICULITIS: ICD-10-CM

## 2024-11-12 DIAGNOSIS — M50.00 HNP (HERNIATED NUCLEUS PULPOSUS) WITH MYELOPATHY, CERVICAL: ICD-10-CM

## 2024-11-12 DIAGNOSIS — M48.02 CERVICAL STENOSIS OF SPINE: ICD-10-CM

## 2024-11-12 DIAGNOSIS — G56.41 COMPLEX REGIONAL PAIN SYNDROME TYPE 2 OF RIGHT UPPER EXTREMITY: ICD-10-CM

## 2024-11-12 DIAGNOSIS — R29.898 RIGHT ARM WEAKNESS: ICD-10-CM

## 2024-11-12 DIAGNOSIS — G90.50 COMPLEX REGIONAL PAIN SYNDROME TYPE 1, AFFECTING UNSPECIFIED SITE: ICD-10-CM

## 2024-11-12 DIAGNOSIS — M48.02 SPINAL STENOSIS IN CERVICAL REGION: ICD-10-CM

## 2024-11-12 DIAGNOSIS — M79.601 RIGHT ARM PAIN: ICD-10-CM

## 2024-11-12 DIAGNOSIS — M62.81 MUSCLE RIGHT ARM WEAKNESS: ICD-10-CM

## 2024-11-12 DIAGNOSIS — R29.898 RIGHT HAND WEAKNESS: ICD-10-CM

## 2024-11-12 DIAGNOSIS — M96.1 CERVICAL POST-LAMINECTOMY SYNDROME: ICD-10-CM

## 2024-11-12 DIAGNOSIS — M47.12 CERVICAL SPONDYLOSIS WITH MYELOPATHY: ICD-10-CM

## 2024-11-12 DIAGNOSIS — M47.812 FACET ARTHRITIS OF CERVICAL REGION: ICD-10-CM

## 2024-11-12 DIAGNOSIS — F11.20 NARCOTIC DEPENDENCY, CONTINUOUS: ICD-10-CM

## 2024-11-12 DIAGNOSIS — M75.101 ROTATOR CUFF SYNDROME OF RIGHT SHOULDER: ICD-10-CM

## 2024-11-12 DIAGNOSIS — M54.12 CERVICAL RADICULAR PAIN: ICD-10-CM

## 2024-11-12 DIAGNOSIS — M54.12 CERVICAL RADICULOPATHY: ICD-10-CM

## 2024-11-12 DIAGNOSIS — G89.4 CHRONIC PAIN SYNDROME: ICD-10-CM

## 2024-11-12 DIAGNOSIS — M50.30 DEGENERATION OF CERVICAL INTERVERTEBRAL DISC: ICD-10-CM

## 2024-11-12 RX ORDER — HYDROCODONE BITARTRATE AND ACETAMINOPHEN 10; 325 MG/1; MG/1
1 TABLET ORAL EVERY 6 HOURS PRN
Qty: 120 TABLET | Refills: 0 | Status: SHIPPED | OUTPATIENT
Start: 2024-11-17 | End: 2024-12-17

## 2024-11-12 NOTE — TELEPHONE ENCOUNTER
----- Message from Bianka sent at 11/12/2024  2:34 PM CST -----  Regarding: Refill  Contact: [phone]  Type:  RX Refill Request    Who Called: pt   Refill or New Rx:Refill   RX Name and Strength:HYDROcodone-acetaminophen (NORCO)  mg per tablet     Preferred Pharmacy with phone number:    [facility] #[id number] [facility] [address] [facility] [address]  [facility] [address]  Phone: [phone] Fax: [phone]      Local or Mail Order:Local   Ordering Provider:Dr. Martin  Would the patient rather a call back or a response via [facility]? callback  Best Call Back Number:[phone]

## 2024-12-09 ENCOUNTER — OFFICE VISIT (OUTPATIENT)
Dept: FAMILY MEDICINE | Facility: CLINIC | Age: 74
End: 2024-12-09
Payer: MEDICARE

## 2024-12-09 ENCOUNTER — LAB VISIT (OUTPATIENT)
Dept: LAB | Facility: HOSPITAL | Age: 74
End: 2024-12-09
Attending: FAMILY MEDICINE
Payer: MEDICARE

## 2024-12-09 VITALS
HEIGHT: 71 IN | SYSTOLIC BLOOD PRESSURE: 130 MMHG | WEIGHT: 182.13 LBS | BODY MASS INDEX: 25.5 KG/M2 | DIASTOLIC BLOOD PRESSURE: 80 MMHG | OXYGEN SATURATION: 95 % | HEART RATE: 60 BPM

## 2024-12-09 DIAGNOSIS — I10 ESSENTIAL HYPERTENSION: ICD-10-CM

## 2024-12-09 DIAGNOSIS — E11.40 TYPE 2 DIABETES MELLITUS WITH DIABETIC NEUROPATHY, WITHOUT LONG-TERM CURRENT USE OF INSULIN: ICD-10-CM

## 2024-12-09 DIAGNOSIS — Z23 NEEDS FLU SHOT: ICD-10-CM

## 2024-12-09 DIAGNOSIS — F51.01 PRIMARY INSOMNIA: ICD-10-CM

## 2024-12-09 DIAGNOSIS — R68.2 DRY MOUTH: ICD-10-CM

## 2024-12-09 DIAGNOSIS — Z23 NEED FOR COVID-19 VACCINE: Primary | ICD-10-CM

## 2024-12-09 DIAGNOSIS — I48.0 PAROXYSMAL ATRIAL FIBRILLATION: ICD-10-CM

## 2024-12-09 LAB
ALBUMIN SERPL BCP-MCNC: 3.5 G/DL (ref 3.5–5.2)
ALP SERPL-CCNC: 91 U/L (ref 40–150)
ALT SERPL W/O P-5'-P-CCNC: 12 U/L (ref 10–44)
ANION GAP SERPL CALC-SCNC: 8 MMOL/L (ref 8–16)
AST SERPL-CCNC: 17 U/L (ref 10–40)
BILIRUB SERPL-MCNC: 0.4 MG/DL (ref 0.1–1)
BUN SERPL-MCNC: 7 MG/DL (ref 8–23)
CALCIUM SERPL-MCNC: 8.6 MG/DL (ref 8.7–10.5)
CHLORIDE SERPL-SCNC: 103 MMOL/L (ref 95–110)
CHOLEST SERPL-MCNC: 109 MG/DL (ref 120–199)
CHOLEST/HDLC SERPL: 2.7 {RATIO} (ref 2–5)
CO2 SERPL-SCNC: 28 MMOL/L (ref 23–29)
CREAT SERPL-MCNC: 0.8 MG/DL (ref 0.5–1.4)
EST. GFR  (NO RACE VARIABLE): >60 ML/MIN/1.73 M^2
ESTIMATED AVG GLUCOSE: 131 MG/DL (ref 68–131)
GLUCOSE SERPL-MCNC: 95 MG/DL (ref 70–110)
HBA1C MFR BLD: 6.2 % (ref 4–5.6)
HDLC SERPL-MCNC: 41 MG/DL (ref 40–75)
HDLC SERPL: 37.6 % (ref 20–50)
LDLC SERPL CALC-MCNC: 56.2 MG/DL (ref 63–159)
NONHDLC SERPL-MCNC: 68 MG/DL
POTASSIUM SERPL-SCNC: 3.8 MMOL/L (ref 3.5–5.1)
PROT SERPL-MCNC: 7.3 G/DL (ref 6–8.4)
SODIUM SERPL-SCNC: 139 MMOL/L (ref 136–145)
TRIGL SERPL-MCNC: 59 MG/DL (ref 30–150)

## 2024-12-09 PROCEDURE — 99999 PR PBB SHADOW E&M-EST. PATIENT-LVL V: CPT | Mod: PBBFAC,,, | Performed by: FAMILY MEDICINE

## 2024-12-09 PROCEDURE — 99215 OFFICE O/P EST HI 40 MIN: CPT | Mod: S$GLB,,, | Performed by: FAMILY MEDICINE

## 2024-12-09 PROCEDURE — 3072F LOW RISK FOR RETINOPATHY: CPT | Mod: CPTII,S$GLB,, | Performed by: FAMILY MEDICINE

## 2024-12-09 PROCEDURE — 3075F SYST BP GE 130 - 139MM HG: CPT | Mod: CPTII,S$GLB,, | Performed by: FAMILY MEDICINE

## 2024-12-09 PROCEDURE — G0008 ADMIN INFLUENZA VIRUS VAC: HCPCS | Mod: S$GLB,,, | Performed by: FAMILY MEDICINE

## 2024-12-09 PROCEDURE — 1160F RVW MEDS BY RX/DR IN RCRD: CPT | Mod: CPTII,S$GLB,, | Performed by: FAMILY MEDICINE

## 2024-12-09 PROCEDURE — 80061 LIPID PANEL: CPT | Performed by: FAMILY MEDICINE

## 2024-12-09 PROCEDURE — 90480 ADMN SARSCOV2 VAC 1/ONLY CMP: CPT | Mod: S$GLB,,, | Performed by: FAMILY MEDICINE

## 2024-12-09 PROCEDURE — 3288F FALL RISK ASSESSMENT DOCD: CPT | Mod: CPTII,S$GLB,, | Performed by: FAMILY MEDICINE

## 2024-12-09 PROCEDURE — 91322 SARSCOV2 VAC 50 MCG/0.5ML IM: CPT | Mod: S$GLB,,, | Performed by: FAMILY MEDICINE

## 2024-12-09 PROCEDURE — 80053 COMPREHEN METABOLIC PANEL: CPT | Performed by: FAMILY MEDICINE

## 2024-12-09 PROCEDURE — 1101F PT FALLS ASSESS-DOCD LE1/YR: CPT | Mod: CPTII,S$GLB,, | Performed by: FAMILY MEDICINE

## 2024-12-09 PROCEDURE — 83036 HEMOGLOBIN GLYCOSYLATED A1C: CPT | Performed by: FAMILY MEDICINE

## 2024-12-09 PROCEDURE — 1125F AMNT PAIN NOTED PAIN PRSNT: CPT | Mod: CPTII,S$GLB,, | Performed by: FAMILY MEDICINE

## 2024-12-09 PROCEDURE — 90653 IIV ADJUVANT VACCINE IM: CPT | Mod: S$GLB,,, | Performed by: FAMILY MEDICINE

## 2024-12-09 PROCEDURE — 3079F DIAST BP 80-89 MM HG: CPT | Mod: CPTII,S$GLB,, | Performed by: FAMILY MEDICINE

## 2024-12-09 PROCEDURE — 1159F MED LIST DOCD IN RCRD: CPT | Mod: CPTII,S$GLB,, | Performed by: FAMILY MEDICINE

## 2024-12-09 RX ORDER — TRAZODONE HYDROCHLORIDE 50 MG/1
50 TABLET ORAL NIGHTLY PRN
Qty: 90 TABLET | Refills: 3 | Status: SHIPPED | OUTPATIENT
Start: 2024-12-09 | End: 2025-12-09

## 2024-12-09 NOTE — PROGRESS NOTES
Subjective     Patient ID: Xander Sanchez is a 74 y.o. male.    Chief Complaint: Arm Pain    74 years old male came to the clinic came to the clinic for blood pressure check.  Blood pressure today was stable.  Patient is requesting a medicine to help him to sleep.  Patient also with dry mouth that he attributes to the Lasix and the prostate medicine.  He is using lemon drops with significant improvement.  No flare ups of atrial fibrillation.    Hypertension  This is a chronic problem. The current episode started more than 1 year ago. The problem is controlled. Pertinent negatives include no chest pain, orthopnea, palpitations or peripheral edema. There are no associated agents to hypertension. Risk factors for coronary artery disease include male gender and sedentary lifestyle. Past treatments include beta blockers and diuretics. The current treatment provides significant improvement. Compliance problems include exercise.  There is no history of angina. There is no history of a hypertension causing med or a thyroid problem.   Diabetes  He presents for his follow-up diabetic visit. He has type 2 diabetes mellitus. His disease course has been stable. There are no hypoglycemic associated symptoms. Pertinent negatives for diabetes include no chest pain, no polydipsia, no polyphagia and no polyuria. There are no hypoglycemic complications. Symptoms are stable. There are no diabetic complications. There are no known risk factors for coronary artery disease. Current diabetic treatment includes diet. He is compliant with treatment most of the time. He is following a generally healthy diet. He never participates in exercise. An ACE inhibitor/angiotensin II receptor blocker is not being taken. Eye exam is not current.     Review of Systems   Constitutional: Negative.    HENT: Negative.     Eyes: Negative.    Respiratory: Negative.     Cardiovascular: Negative.  Negative for chest pain, palpitations and orthopnea.    Gastrointestinal: Negative.    Endocrine: Negative for polydipsia, polyphagia and polyuria.   Genitourinary: Negative.    Musculoskeletal: Negative.    Integumentary:  Negative.   Neurological: Negative.    Psychiatric/Behavioral: Negative.            Objective     Physical Exam  Vitals and nursing note reviewed.   Constitutional:       General: He is not in acute distress.     Appearance: He is well-developed. He is not diaphoretic.   HENT:      Head: Normocephalic and atraumatic.      Right Ear: External ear normal.      Left Ear: External ear normal.      Nose: Nose normal.      Mouth/Throat:      Mouth: Mucous membranes are dry.      Pharynx: No oropharyngeal exudate.   Eyes:      General: No scleral icterus.        Right eye: No discharge.         Left eye: No discharge.      Conjunctiva/sclera: Conjunctivae normal.      Pupils: Pupils are equal, round, and reactive to light.   Neck:      Thyroid: No thyromegaly.      Vascular: No JVD.      Trachea: No tracheal deviation.   Cardiovascular:      Rate and Rhythm: Normal rate and regular rhythm.      Heart sounds: Normal heart sounds. No murmur heard.     No friction rub. No gallop.   Pulmonary:      Effort: Pulmonary effort is normal. No respiratory distress.      Breath sounds: Normal breath sounds. No stridor. No wheezing or rales.   Chest:      Chest wall: No tenderness.   Abdominal:      General: Bowel sounds are normal. There is no distension.      Palpations: Abdomen is soft. There is no mass.      Tenderness: There is no abdominal tenderness. There is no guarding or rebound.   Musculoskeletal:         General: No tenderness. Normal range of motion.      Cervical back: Normal range of motion and neck supple.      Right lower leg: Edema present.      Left lower leg: Edema present.   Lymphadenopathy:      Cervical: No cervical adenopathy.   Skin:     General: Skin is warm and dry.      Coloration: Skin is not pale.      Findings: No erythema or rash.    Neurological:      Mental Status: He is alert and oriented to person, place, and time.      Cranial Nerves: No cranial nerve deficit.      Motor: No abnormal muscle tone.      Coordination: Coordination normal.      Deep Tendon Reflexes: Reflexes are normal and symmetric. Reflexes normal.   Psychiatric:         Behavior: Behavior normal.         Thought Content: Thought content normal.         Judgment: Judgment normal.            Assessment and Plan     1. Need for COVID-19 vaccine  -     COVID-19 (Moderna) 50 mcg/0.5 mL IM vaccine (>/= 11 yo) 0.5 mL    2. Needs flu shot  -     influenza (adjuvanted) (Fluad) 45 mcg/0.5 mL IM vaccine (> or = 66 yo) 0.5 mL    3. Primary insomnia  -     traZODone (DESYREL) 50 MG tablet; Take 1 tablet (50 mg total) by mouth nightly as needed for Insomnia.  Dispense: 90 tablet; Refill: 3    4. Dry mouth    5. Paroxysmal atrial fibrillation  -     Comprehensive Metabolic Panel; Future; Expected date: 12/09/2024    6. Essential hypertension  Overview:  Overview:   dx update    Orders:  -     Lipid Panel; Future; Expected date: 12/09/2024  -     Comprehensive Metabolic Panel; Future; Expected date: 12/09/2024  -     Lipid Panel; Future; Expected date: 12/09/2024  -     Comprehensive Metabolic Panel; Future; Expected date: 12/09/2024    7. 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thy, without long-term current use of insulin  -     Microalbumin/Creatinine Ratio, Urine; Future; Expected date: 12/09/2024  -     Lipid Panel; Future; Expected date: 12/09/2024  -     Hemoglobin A1C; Future; Expected date: 12/09/2024  -     Comprehensive Metabolic Panel; Future; Expected date: 12/09/2024  -     Ambulatory referral/consult to Optometry; Future; Expected date: 12/16/2024  -     Microalbumin/Creatinine Ratio, Urine; Future; Expected date: 12/09/2024  -     Lipid Panel; Future; Expected date: 12/09/2024  -     Hemoglobin A1C; Future; Expected date: 12/09/2024  -     Comprehensive Metabolic  Panel; Future; Expected date: 12/09/2024    Continue monitoring blood sugar at home,ADA diet.   Continue monitoring blood pressure at home, low sodium diet.   I spent a total of 41 minutes on the day of the visit.This includes face to face time and non-face to face time preparing to see the patient (eg, review of tests), obtaining and/or reviewing separately obtained history, documenting clinical information in the electronic or other health record, independently interpreting results and communicating results to the patient/family/caregiver, or care coordinator.          Follow up in about 6 months (around 6/9/2025), or if symptoms worsen or fail to improve.

## 2024-12-13 DIAGNOSIS — M50.30 DEGENERATION OF CERVICAL INTERVERTEBRAL DISC: ICD-10-CM

## 2024-12-13 DIAGNOSIS — M48.02 SPINAL STENOSIS IN CERVICAL REGION: ICD-10-CM

## 2024-12-13 DIAGNOSIS — G89.4 CHRONIC PAIN SYNDROME: ICD-10-CM

## 2024-12-13 DIAGNOSIS — M54.12 CERVICAL RADICULAR PAIN: ICD-10-CM

## 2024-12-13 DIAGNOSIS — M47.12 CERVICAL SPONDYLOSIS WITH MYELOPATHY: ICD-10-CM

## 2024-12-13 DIAGNOSIS — M96.1 CERVICAL POST-LAMINECTOMY SYNDROME: ICD-10-CM

## 2024-12-13 DIAGNOSIS — M54.12 BRACHIAL NEURITIS OR RADICULITIS: ICD-10-CM

## 2024-12-13 DIAGNOSIS — M54.12 CERVICAL RADICULOPATHY: ICD-10-CM

## 2024-12-13 DIAGNOSIS — R29.898 RIGHT ARM WEAKNESS: ICD-10-CM

## 2024-12-13 DIAGNOSIS — M62.81 MUSCLE RIGHT ARM WEAKNESS: ICD-10-CM

## 2024-12-13 DIAGNOSIS — G56.41 COMPLEX REGIONAL PAIN SYNDROME TYPE 2 OF RIGHT UPPER EXTREMITY: ICD-10-CM

## 2024-12-13 DIAGNOSIS — R29.898 RIGHT HAND WEAKNESS: ICD-10-CM

## 2024-12-13 DIAGNOSIS — M75.101 ROTATOR CUFF SYNDROME OF RIGHT SHOULDER: ICD-10-CM

## 2024-12-13 DIAGNOSIS — M48.02 CERVICAL STENOSIS OF SPINE: ICD-10-CM

## 2024-12-13 DIAGNOSIS — M50.00 HNP (HERNIATED NUCLEUS PULPOSUS) WITH MYELOPATHY, CERVICAL: ICD-10-CM

## 2024-12-13 DIAGNOSIS — F11.20 NARCOTIC DEPENDENCY, CONTINUOUS: ICD-10-CM

## 2024-12-13 DIAGNOSIS — M79.601 RIGHT ARM PAIN: ICD-10-CM

## 2024-12-13 DIAGNOSIS — G90.50 COMPLEX REGIONAL PAIN SYNDROME TYPE 1, AFFECTING UNSPECIFIED SITE: ICD-10-CM

## 2024-12-13 DIAGNOSIS — M47.812 FACET ARTHRITIS OF CERVICAL REGION: ICD-10-CM

## 2024-12-13 RX ORDER — HYDROCODONE BITARTRATE AND ACETAMINOPHEN 10; 325 MG/1; MG/1
1 TABLET ORAL EVERY 6 HOURS PRN
Qty: 120 TABLET | Refills: 0 | Status: SHIPPED | OUTPATIENT
Start: 2024-12-18 | End: 2025-01-17

## 2024-12-13 NOTE — TELEPHONE ENCOUNTER
----- Message from Bianka sent at 12/13/2024  8:31 AM CST -----  Regarding: Refill  Contact: [phone]  Type:  RX Refill Request    Who Called: pt   Refill or New Rx:Refill   RX Name and Strength:HYDROcodone-acetaminophen (NORCO)  mg per tablet     Preferred Pharmacy with phone number:    [facility] DR[facility] STORE #[id number] [facility] [address] [facility] [address]  [facility] [address]  Phone: [phone] Fax: [phone]      Local or Mail Order:Local  Ordering Provider:Dr. Martin  Would the patient rather a call back or a response via [facility]? Callback  Best Call Back Number:[phone]

## 2024-12-16 DIAGNOSIS — M54.12 BRACHIAL NEURITIS OR RADICULITIS: ICD-10-CM

## 2024-12-16 DIAGNOSIS — F11.20 NARCOTIC DEPENDENCY, CONTINUOUS: ICD-10-CM

## 2024-12-16 DIAGNOSIS — G89.4 CHRONIC PAIN SYNDROME: ICD-10-CM

## 2024-12-16 DIAGNOSIS — M50.30 DEGENERATION OF CERVICAL INTERVERTEBRAL DISC: ICD-10-CM

## 2024-12-16 DIAGNOSIS — M75.101 ROTATOR CUFF SYNDROME OF RIGHT SHOULDER: ICD-10-CM

## 2024-12-16 DIAGNOSIS — R29.898 RIGHT ARM WEAKNESS: ICD-10-CM

## 2024-12-16 DIAGNOSIS — M54.12 CERVICAL RADICULOPATHY: ICD-10-CM

## 2024-12-16 DIAGNOSIS — M62.81 MUSCLE RIGHT ARM WEAKNESS: ICD-10-CM

## 2024-12-16 DIAGNOSIS — G90.50 COMPLEX REGIONAL PAIN SYNDROME TYPE 1, AFFECTING UNSPECIFIED SITE: ICD-10-CM

## 2024-12-16 DIAGNOSIS — M48.02 CERVICAL STENOSIS OF SPINE: ICD-10-CM

## 2024-12-16 DIAGNOSIS — M50.00 HNP (HERNIATED NUCLEUS PULPOSUS) WITH MYELOPATHY, CERVICAL: ICD-10-CM

## 2024-12-16 DIAGNOSIS — M96.1 CERVICAL POST-LAMINECTOMY SYNDROME: ICD-10-CM

## 2024-12-16 DIAGNOSIS — M54.12 CERVICAL RADICULAR PAIN: ICD-10-CM

## 2024-12-16 DIAGNOSIS — M48.02 SPINAL STENOSIS IN CERVICAL REGION: ICD-10-CM

## 2024-12-16 DIAGNOSIS — M47.12 CERVICAL SPONDYLOSIS WITH MYELOPATHY: ICD-10-CM

## 2024-12-16 DIAGNOSIS — M79.601 RIGHT ARM PAIN: ICD-10-CM

## 2024-12-16 DIAGNOSIS — M47.812 FACET ARTHRITIS OF CERVICAL REGION: ICD-10-CM

## 2024-12-16 DIAGNOSIS — G56.41 COMPLEX REGIONAL PAIN SYNDROME TYPE 2 OF RIGHT UPPER EXTREMITY: ICD-10-CM

## 2024-12-16 DIAGNOSIS — R29.898 RIGHT HAND WEAKNESS: ICD-10-CM

## 2024-12-16 RX ORDER — HYDROCODONE BITARTRATE AND ACETAMINOPHEN 10; 325 MG/1; MG/1
1 TABLET ORAL EVERY 6 HOURS PRN
Qty: 120 TABLET | Refills: 0 | Status: SHIPPED | OUTPATIENT
Start: 2024-12-18 | End: 2025-01-17

## 2024-12-16 NOTE — TELEPHONE ENCOUNTER
----- Message from Aide sent at 12/16/2024  8:49 AM CST -----  Regarding: Refill Request  Contact: Xander CAROLINA  REFILL REQUEST    Who Called: Xander CAROLINA     Refill or New Rx: Refill     Rx Name and Strength:HYDROcodone-acetaminophen (NORCO)  mg per tablet    Preferred Pharmacy with phone number:    [facility]ORE #[id number] [facility] [address] AT [facility] [address] [place] [address] [address] [facility] [address]  Phone: [phone] Fax: [phone]      Local or Mail Order: Local     Would the patient rather a call back or a response via [facility]? Call Back   [phone] (home)           Patient states that NORCO be only sent to this [facility] pharmacy for future reference.

## 2024-12-16 NOTE — TELEPHONE ENCOUNTER
----- Message from Sarina sent at 12/16/2024  9:07 AM CST -----  Type:  RX Refill Request    Who Called: Xander  Refill or New Rx:refill  RX Name and Strength:HYDROcodone-acetaminophen (NORCO)  mg per tablet  How is the patient currently taking it? (ex. 1XDay):  Is this a 30 day or 90 day RX:  Preferred Pharmacy with phone number:[facility] STORE #[id number] [place] [address] [address] [facility] [address]   Phone: [phone]  Fax: [phone]        Local or Mail Order:Local  Ordering Provider:Dr Martin   Would the patient rather a call back or a response via [facility]? call  Best Call Back Number: [phone]     Additional Information: RX was sent to [facility] Please send to the [facility] Please call when sent

## 2024-12-16 NOTE — TELEPHONE ENCOUNTER
No care due was identified.  [facility] Embedded Care Due Messages. Reference number: [id number].   12/16/2024 10:15:00 AM CST

## 2024-12-17 RX ORDER — METOPROLOL SUCCINATE 25 MG/1
TABLET, EXTENDED RELEASE ORAL
Qty: 90 TABLET | Refills: 3 | Status: SHIPPED | OUTPATIENT
Start: 2024-12-17

## 2024-12-17 NOTE — TELEPHONE ENCOUNTER
Refill Decision Note   Xander Daniel  is requesting a refill authorization.  Brief Assessment and Rationale for Refill:  Approve     Medication Therapy Plan:         Comments:     Note composed:5:18 AM 12/17/2024

## 2025-01-13 DIAGNOSIS — M50.30 DEGENERATION OF CERVICAL INTERVERTEBRAL DISC: ICD-10-CM

## 2025-01-13 DIAGNOSIS — M54.12 BRACHIAL NEURITIS OR RADICULITIS: ICD-10-CM

## 2025-01-13 DIAGNOSIS — G89.4 CHRONIC PAIN SYNDROME: ICD-10-CM

## 2025-01-13 DIAGNOSIS — M79.601 RIGHT ARM PAIN: ICD-10-CM

## 2025-01-13 DIAGNOSIS — G90.50 COMPLEX REGIONAL PAIN SYNDROME TYPE 1, AFFECTING UNSPECIFIED SITE: ICD-10-CM

## 2025-01-13 DIAGNOSIS — F11.20 NARCOTIC DEPENDENCY, CONTINUOUS: ICD-10-CM

## 2025-01-13 DIAGNOSIS — M62.81 MUSCLE RIGHT ARM WEAKNESS: ICD-10-CM

## 2025-01-13 DIAGNOSIS — M50.00 HNP (HERNIATED NUCLEUS PULPOSUS) WITH MYELOPATHY, CERVICAL: ICD-10-CM

## 2025-01-13 DIAGNOSIS — M47.812 FACET ARTHRITIS OF CERVICAL REGION: ICD-10-CM

## 2025-01-13 DIAGNOSIS — G56.41 COMPLEX REGIONAL PAIN SYNDROME TYPE 2 OF RIGHT UPPER EXTREMITY: ICD-10-CM

## 2025-01-13 DIAGNOSIS — R29.898 RIGHT ARM WEAKNESS: ICD-10-CM

## 2025-01-13 DIAGNOSIS — M54.12 CERVICAL RADICULAR PAIN: ICD-10-CM

## 2025-01-13 DIAGNOSIS — M96.1 CERVICAL POST-LAMINECTOMY SYNDROME: ICD-10-CM

## 2025-01-13 DIAGNOSIS — M54.12 CERVICAL RADICULOPATHY: ICD-10-CM

## 2025-01-13 DIAGNOSIS — M48.02 CERVICAL STENOSIS OF SPINE: ICD-10-CM

## 2025-01-13 DIAGNOSIS — M48.02 SPINAL STENOSIS IN CERVICAL REGION: ICD-10-CM

## 2025-01-13 DIAGNOSIS — R29.898 RIGHT HAND WEAKNESS: ICD-10-CM

## 2025-01-13 DIAGNOSIS — M47.12 CERVICAL SPONDYLOSIS WITH MYELOPATHY: ICD-10-CM

## 2025-01-13 DIAGNOSIS — M75.101 ROTATOR CUFF SYNDROME OF RIGHT SHOULDER: ICD-10-CM

## 2025-01-13 RX ORDER — HYDROCODONE BITARTRATE AND ACETAMINOPHEN 10; 325 MG/1; MG/1
1 TABLET ORAL EVERY 6 HOURS PRN
Qty: 120 TABLET | Refills: 0 | Status: SHIPPED | OUTPATIENT
Start: 2025-01-17 | End: 2025-02-16

## 2025-01-13 NOTE — TELEPHONE ENCOUNTER
----- Message from Sunita sent at 1/13/2025 11:21 AM CST -----  Regarding: Pt called states he need refills on below Rx    Can the clinic reply in [facility]:        Please refill the medication(s) listed below.  Pt called states he need refills on below Rx. Please advise      Medication #1 HYDROcodone-acetaminophen (NORCO)  mg per tablet      Medication #2          Preferred Pharmacy:   [facility] DR[facility] STORE #[id number] [place] [address] [address] AT [facility] [address]  [facility] [address]  [facility] [address]  Phone: [phone] Fax: [phone]

## 2025-01-20 ENCOUNTER — CLINICAL SUPPORT (OUTPATIENT)
Dept: CARDIOLOGY | Facility: HOSPITAL | Age: 75
End: 2025-01-20
Payer: MEDICARE

## 2025-01-20 DIAGNOSIS — Z95.810 PRESENCE OF AUTOMATIC (IMPLANTABLE) CARDIAC DEFIBRILLATOR: ICD-10-CM

## 2025-01-20 DIAGNOSIS — I42.5 OTHER RESTRICTIVE CARDIOMYOPATHY: ICD-10-CM

## 2025-01-20 PROCEDURE — 93295 DEV INTERROG REMOTE 1/2/MLT: CPT | Mod: ,,, | Performed by: INTERNAL MEDICINE

## 2025-01-21 ENCOUNTER — CLINICAL SUPPORT (OUTPATIENT)
Dept: CARDIOLOGY | Facility: HOSPITAL | Age: 75
End: 2025-01-21
Attending: INTERNAL MEDICINE
Payer: MEDICARE

## 2025-01-21 DIAGNOSIS — Z95.810 PRESENCE OF AUTOMATIC (IMPLANTABLE) CARDIAC DEFIBRILLATOR: ICD-10-CM

## 2025-01-21 DIAGNOSIS — I42.5 OTHER RESTRICTIVE CARDIOMYOPATHY: ICD-10-CM

## 2025-01-21 PROCEDURE — 93296 REM INTERROG EVL PM/IDS: CPT | Performed by: INTERNAL MEDICINE

## 2025-02-05 RX ORDER — TAMSULOSIN HYDROCHLORIDE 0.4 MG/1
1 CAPSULE ORAL DAILY
Qty: 90 CAPSULE | Refills: 3 | Status: SHIPPED | OUTPATIENT
Start: 2025-02-05

## 2025-02-05 NOTE — TELEPHONE ENCOUNTER
Refill Decision Note   Xander Daniel  is requesting a refill authorization.  Brief Assessment and Rationale for Refill:  Approve     Medication Therapy Plan:        Comments:     Note composed:10:21 AM 02/05/2025

## 2025-02-05 NOTE — TELEPHONE ENCOUNTER
No care due was identified.  [facility] Embedded Care Due Messages. Reference number: [id number].   2/05/2025 8:26:51 AM CST

## 2025-02-06 DIAGNOSIS — R26.9 GAIT ABNORMALITY: ICD-10-CM

## 2025-02-06 DIAGNOSIS — M50.30 DEGENERATION OF CERVICAL INTERVERTEBRAL DISC: ICD-10-CM

## 2025-02-06 DIAGNOSIS — M79.601 RIGHT ARM PAIN: ICD-10-CM

## 2025-02-06 DIAGNOSIS — M48.02 SPINAL STENOSIS IN CERVICAL REGION: ICD-10-CM

## 2025-02-06 DIAGNOSIS — M54.12 BRACHIAL NEURITIS OR RADICULITIS: ICD-10-CM

## 2025-02-06 DIAGNOSIS — G90.50 COMPLEX REGIONAL PAIN SYNDROME TYPE 1, AFFECTING UNSPECIFIED SITE: ICD-10-CM

## 2025-02-06 DIAGNOSIS — M62.81 MUSCLE RIGHT ARM WEAKNESS: ICD-10-CM

## 2025-02-06 DIAGNOSIS — M96.1 CERVICAL POST-LAMINECTOMY SYNDROME: ICD-10-CM

## 2025-02-06 DIAGNOSIS — G56.41 COMPLEX REGIONAL PAIN SYNDROME TYPE 2 OF RIGHT UPPER EXTREMITY: ICD-10-CM

## 2025-02-06 DIAGNOSIS — M54.12 CERVICAL RADICULAR PAIN: ICD-10-CM

## 2025-02-06 DIAGNOSIS — F11.20 NARCOTIC DEPENDENCY, CONTINUOUS: ICD-10-CM

## 2025-02-06 DIAGNOSIS — R29.898 RIGHT HAND WEAKNESS: ICD-10-CM

## 2025-02-06 DIAGNOSIS — R29.898 RIGHT ARM WEAKNESS: ICD-10-CM

## 2025-02-06 DIAGNOSIS — M48.02 CERVICAL STENOSIS OF SPINE: ICD-10-CM

## 2025-02-06 DIAGNOSIS — M47.812 FACET ARTHRITIS OF CERVICAL REGION: ICD-10-CM

## 2025-02-06 DIAGNOSIS — M50.00 HNP (HERNIATED NUCLEUS PULPOSUS) WITH MYELOPATHY, CERVICAL: ICD-10-CM

## 2025-02-06 DIAGNOSIS — M47.12 CERVICAL SPONDYLOSIS WITH MYELOPATHY: ICD-10-CM

## 2025-02-06 DIAGNOSIS — G89.4 CHRONIC PAIN SYNDROME: ICD-10-CM

## 2025-02-06 DIAGNOSIS — M75.101 ROTATOR CUFF SYNDROME OF RIGHT SHOULDER: ICD-10-CM

## 2025-02-06 RX ORDER — GABAPENTIN 600 MG/1
600 TABLET ORAL 2 TIMES DAILY
Qty: 180 TABLET | Refills: 3 | Status: SHIPPED | OUTPATIENT
Start: 2025-02-06

## 2025-02-06 NOTE — TELEPHONE ENCOUNTER
"----- Message from Tsering sent at 2/6/2025 10:16 AM CST -----  Regarding: refill  Contact: [phone]  .Name Of Caller: Self     Contact Preference?:[phone]     What is the nature of the call?: Requesting refill for gabapentin (NEURONTIN) 600 MG tablet pls call           .  [facility] - [place], [place] [facility] [address]  [address] [address]  Phone: [phone] Fax: [phone]                 Additional Notes:  "Thank you for all that you do for our patients"  "

## 2025-02-11 ENCOUNTER — LAB VISIT (OUTPATIENT)
Dept: LAB | Facility: HOSPITAL | Age: 75
End: 2025-02-11
Attending: PHYSICAL MEDICINE & REHABILITATION

## 2025-02-11 ENCOUNTER — OFFICE VISIT (OUTPATIENT)
Dept: PHYSICAL MEDICINE AND REHAB | Facility: CLINIC | Age: 75
End: 2025-02-11

## 2025-02-11 VITALS — WEIGHT: 180.13 LBS | BODY MASS INDEX: 25.22 KG/M2 | OXYGEN SATURATION: 98 % | HEIGHT: 71 IN

## 2025-02-11 DIAGNOSIS — Z98.1 STATUS POST CERVICAL SPINAL FUSION: ICD-10-CM

## 2025-02-11 DIAGNOSIS — G89.29 CHRONIC NECK PAIN: Primary | ICD-10-CM

## 2025-02-11 DIAGNOSIS — M54.2 CHRONIC NECK PAIN: Primary | ICD-10-CM

## 2025-02-11 DIAGNOSIS — M96.1 CERVICAL POST-LAMINECTOMY SYNDROME: ICD-10-CM

## 2025-02-11 DIAGNOSIS — Z79.891 CHRONICALLY ON OPIATE THERAPY: ICD-10-CM

## 2025-02-11 DIAGNOSIS — G90.511 COMPLEX REGIONAL PAIN SYNDROME TYPE 1 OF RIGHT UPPER EXTREMITY: ICD-10-CM

## 2025-02-11 DIAGNOSIS — E11.40 TYPE 2 DIABETES MELLITUS WITH DIABETIC NEUROPATHY, WITHOUT LONG-TERM CURRENT USE OF INSULIN: ICD-10-CM

## 2025-02-11 DIAGNOSIS — M47.22 OSTEOARTHRITIS OF SPINE WITH RADICULOPATHY, CERVICAL REGION: ICD-10-CM

## 2025-02-11 LAB
ALBUMIN/CREAT UR: 22.9 UG/MG (ref 0–30)
CREAT UR-MCNC: 48 MG/DL (ref 23–375)
MICROALBUMIN UR DL<=1MG/L-MCNC: 11 UG/ML

## 2025-02-11 PROCEDURE — 99999 PR PBB SHADOW E&M-EST. PATIENT-LVL III: CPT | Mod: PBBFAC,,, | Performed by: PHYSICAL MEDICINE & REHABILITATION

## 2025-02-11 PROCEDURE — 99213 OFFICE O/P EST LOW 20 MIN: CPT | Mod: S$PBB,,, | Performed by: PHYSICAL MEDICINE & REHABILITATION

## 2025-02-11 PROCEDURE — 82043 UR ALBUMIN QUANTITATIVE: CPT | Performed by: FAMILY MEDICINE

## 2025-02-11 PROCEDURE — 80347 BENZODIAZEPINES 13 OR MORE: CPT | Performed by: PHYSICAL MEDICINE & REHABILITATION

## 2025-02-11 PROCEDURE — 99213 OFFICE O/P EST LOW 20 MIN: CPT | Mod: PBBFAC | Performed by: PHYSICAL MEDICINE & REHABILITATION

## 2025-02-11 PROCEDURE — 82570 ASSAY OF URINE CREATININE: CPT | Performed by: FAMILY MEDICINE

## 2025-02-11 RX ORDER — HYDROCODONE BITARTRATE AND ACETAMINOPHEN 10; 325 MG/1; MG/1
1 TABLET ORAL EVERY 6 HOURS PRN
Qty: 120 TABLET | Refills: 0 | Status: SHIPPED | OUTPATIENT
Start: 2025-02-16 | End: 2025-02-14 | Stop reason: SDUPTHER

## 2025-02-11 NOTE — PROGRESS NOTES
Subjective:       Patient ID: Xander Sanchez is a 74 y.o. male.    Chief Complaint: No chief complaint on file.      HPI      Mr. Sanchez is a 74-year-old black male with past medical history of hypertension, diabetes mellitus, peripheral neuropathy, RA, nonischemic cardiomyopathy, chronic systolic heart failure, status post AICD, COPD, osteoarthritis, chronic neck pain status post 3 neck surgeries, CRPS type 1 of right arm, GERD.  The patient is followed up at the [facility] for chronic neck pain with right cervical radiculopathy, status post multiple neck surgeries (C3-6 laminectomy in 11/2012, C5-6 ACDF in 04/2014, and C3-7 posterior fusion in 08/2015).  He was last seen on 7/26/2024  He was maintained on gabapentin, p.r.n. baclofen and p.r.n. hydrocodone/APAP.      The patient is coming to the clinic for follow-up.  His neck pain has under good control. but he still has right arm pain. It is an intermittent tightness and burning or cold sensation from the shoulder area shooting to his right hand.  His pain is worse when he wakes up in the morning.  It is better with his pain medications.  His max pain is 7/10 and minimum 2/10.  Today it is 5/10.  The patient denies any significant upper extremity weakness.  He complains of occasional spasms in his right arm.  He has chronic impaired hand coordination such as trouble buttoning or writing. He used to have occasional stumbling and falling due to lower extremity weakness and coordination problems. However, this get better after starting to use a leg exerciser.    He is currently taking:  Gabapentin 600 mg p.o. 2 times per day.  He felt sleepy when he increased it to 3 times per day.  Hydrocodone/APAP 10/325 p.r.n. 4 times per day   Baclofen 10 mg p.r.n. 2 times per day.    He finished courses of physical therapy in 7/2023 and occupational therapy in 3/2023.   He has been doing a home exercise program.    Past Medical History:   Diagnosis Date     Allergy     Arthritis     Asthma     Cardiomyopathy     Cataract     Cervical radicular pain     Cervical spondylosis with myelopathy 10/17/2012    Chronic bronchitis     Chronic systolic dysfunction of left ventricle 07/27/2015    Constipation 07/27/2015    COPD (chronic obstructive pulmonary disease)     CRPS (complex regional pain syndrome type I) 12/29/2014    Diabetes mellitus, type 2     DM type 2 (diabetes mellitus, type 2) 07/27/2015    Enlarged prostate 07/27/2015    Enuresis 07/06/2018    Hypertension     ICD (implantable cardiac defibrillator) in place     Mixed hyperlipidemia 02/28/2018    Paroxysmal atrial fibrillation 1/23/2024    Presence of biventricular AICD 07/27/2015    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thy 02/01/2016    Urinary tract infection     pt does not know        Review of patient's allergies indicates:   Allergen Reactions    Ciprofloxacin Nausea And Vomiting        Review of Systems   Constitutional:  Negative for chills and fever.   Eyes:  Negative for visual disturbance.   Respiratory:  Negative for shortness of breath.    Cardiovascular:  Negative for chest pain.   Gastrointestinal:  Positive for constipation. Negative for nausea and vomiting.   Genitourinary:  Negative for difficulty urinating.   Musculoskeletal:  Positive for gait problem, myalgias and neck pain. Negative for back pain.   Neurological:  Negative for dizziness, weakness and headaches.   Psychiatric/Behavioral:  Negative for behavioral problems and sleep disturbance.              Objective:      Physical Exam  Vitals reviewed.   Constitutional:       General: He is not in acute distress.     Appearance: He is well-developed.   HENT:      Head: Normocephalic and atraumatic.   Neck:      Comments: Healed posterior and anterior neck surgical scars.  Decreased ROM in all planes.    Musculoskeletal:      Comments: BUE:  ROM:   RUE: full.   LUE: full.  Strength:    RUE: 5/5 at shoulder abduction, 5 elbow  flexion, 5 wrist extension, 5 elbow extension, 5 finger flexion, 5 finger abduction.   LUE: 5/5 at shoulder abduction, 5 elbow flexion, 5 wrist extension, 5 elbow extension, 5 finger flexion, 5 finger abduction.  Sensation to pinprick:   RUE: intact.   LUE: intact.  DTR:    RUE: +1 biceps, +1 triceps.   LUE:  +1 biceps, +1 triceps.      BLE:  ROM:   RLE: full.   LLE: full.  Strength:    RLE: 5/5 at hip flexion, 5 knee extension, 5 ankle DF, 5 PF, 5 EHL.   LLE: 5/5 at hip flexion, 5 knee extension, 5 ankle DF, 5 PF, 5 EHL.  Sensation to pinprick:     RLE: intact.      LLE: intact.   DTR:     RLE: +1 knee, +1 ankle.    LLE: +1 knee, +1 ankle.  Clonus:    Rt ankle: -ve.    Lt ankle: -ve.        Gait:  Slow aiden, some shuffling and circumduction on the right side.     Skin:     General: Skin is warm.   Neurological:      Mental Status: He is alert.   Psychiatric:         Behavior: Behavior normal.         Assessment:       1. Chronic neck pain    2. Status post cervical spinal fusion    3. Osteoarthritis of spine with radiculopathy, cervical region    4. Complex regional pain syndrome type 1 of right upper extremity    5. Cervical post-laminectomy syndrome    6. Chronically on opiate therapy          Plan:       - Oral NSAIDs will be avoided due to cardiac illness.  - Continue gabapentin 600 mg, 1 tablet by mouth 2 times per day   - Continue hydrocodone/APAP 10/325, 1 tablet by mouth every 6 hours as needed for pain.    - Continue baclofen 10 mg tablets; take 1 tablet by mouth 3 times per day as needed for muscle spasms.  - Regular home exercise program was encouraged.  - Pain Clinic Drug Screen; Future  - Follow up in about 4 months (around 6/11/2025).      This was a 20 minute visit  (including review of records), 50% of which was spent educating the patient about the diagnosis and the treatment plan.    This note was partly generated with Scrip-t voice recognition software. I apologize for any possible  typographical errors.

## 2025-02-13 LAB
OHS CV AF BURDEN PERCENT: < 1
OHS CV DC REMOTE DEVICE TYPE: NORMAL
OHS CV RV PACING PERCENT: 1 %

## 2025-02-14 DIAGNOSIS — M54.2 CHRONIC NECK PAIN: ICD-10-CM

## 2025-02-14 DIAGNOSIS — G89.29 CHRONIC NECK PAIN: ICD-10-CM

## 2025-02-14 RX ORDER — HYDROCODONE BITARTRATE AND ACETAMINOPHEN 10; 325 MG/1; MG/1
1 TABLET ORAL EVERY 6 HOURS PRN
Qty: 120 TABLET | Refills: 0 | Status: SHIPPED | OUTPATIENT
Start: 2025-02-16 | End: 2025-03-18

## 2025-02-14 NOTE — TELEPHONE ENCOUNTER
----- Message from Vonnie sent at 2/13/2025  4:38 PM CST -----  Can the clinic reply in [facility]:        Please refill the medication(s) listed below. Please call the patient when the prescription(s) is ready for  at this phone number    [phone]        Medication #1 HYDROcodone-acetaminophen (NORCO)  mg per tablet        Preferred Pharmacy:    [facility] STORE #[id number] [place] [address] [address] AT [facility] [address]  [facility] [address]  [facility] [place] [address]  Phone: [phone] Fax: [phone]

## 2025-02-14 NOTE — TELEPHONE ENCOUNTER
----- Message from Genaro sent at 2/14/2025  1:08 PM CST -----  Regarding: Refill  Contact: [phone]  Patient called requesting a refill on HYDROcodone-acetaminophen (NORCO)  mg per tablet   medication. He said please call and let him know when it's done. Please contact patient as soon as possible.     [facility] DR[facility] STORE #[id number] [place] [address] [address] AT [facility] [address]  [facility] [address]  [facility] [address]  Phone: [phone] Fax: [phone]

## 2025-02-14 NOTE — TELEPHONE ENCOUNTER
----- Message from Sade sent at 2/14/2025  9:04 AM CST -----  Regarding: refill/ transfer  Can the clinic reply in [facility]:       Please refill the medication(s) listed below. Please call the patient when the prescription(s) is ready for  at this phone number    HIREN TEJEDA [[id number]] [phone] (home)  please transfer script to pharmacy below :  please contact pt when sent         Medication #1 HYDROcodone-acetaminophen (NORCO)  mg per tablet        Preferred Pharmacy:     [facility]ORE #[id number] [place] [address] [address] AT [facility] [address]  [facility] [address]  [facility] [address]  Phone: [phone] Fax: [phone]

## 2025-02-15 LAB
1OH-MIDAZOLAM UR QL SCN: NOT DETECTED
6MAM UR QL: NOT DETECTED
7AMINOCLONAZEPAM UR QL: NOT DETECTED
A-OH ALPRAZ UR QL: NOT DETECTED
ALPRAZ UR QL: NOT DETECTED
AMPHET UR QL SCN: NOT DETECTED
ANNOTATION COMMENT IMP: NORMAL
BARBITURATES UR QL: NEGATIVE
BUPRENORPHINE UR QL: NOT DETECTED
BZE UR QL: NEGATIVE
CARBOXYTHC UR QL: NORMAL
CARISOPRODOL UR QL: NEGATIVE
CLONAZEPAM UR QL: NOT DETECTED
CODEINE UR QL: NOT DETECTED
CREAT UR-MCNC: 47.3 MG/DL (ref 20–400)
DIAZEPAM UR QL: NOT DETECTED
ETHYL GLUCURONIDE UR QL: NEGATIVE
FENTANYL UR QL: NOT DETECTED
GABAPENTIN UR QL CFM: PRESENT
HYDROCODONE UR QL: PRESENT
HYDROMORPHONE UR QL: PRESENT
LORAZEPAM UR QL: NOT DETECTED
MDA UR QL: NOT DETECTED
MDEA UR QL: NOT DETECTED
MDMA UR QL: NOT DETECTED
ME-PHENIDATE UR QL: NOT DETECTED
METHADONE UR QL: NEGATIVE
METHAMPHET UR QL: NOT DETECTED
MIDAZOLAM UR QL SCN: NOT DETECTED
MORPHINE UR QL: NOT DETECTED
NALOXONE UR QL CFM: NOT DETECTED
NORBUPRENORPHINE UR QL CFM: NOT DETECTED
NORDIAZEPAM UR QL: NOT DETECTED
NORFENTANYL UR QL: NOT DETECTED
NORHYDROCODONE UR QL CFM: PRESENT
NORMEPERIDINE UR QL CFM: NOT DETECTED
NOROXYCODONE UR QL CFM: NOT DETECTED
NOROXYMORPHONE UR QL SCN: NOT DETECTED
OXAZEPAM UR QL: NOT DETECTED
OXYCODONE UR QL: NOT DETECTED
OXYMORPHONE UR QL: NOT DETECTED
PATHOLOGY STUDY: NORMAL
PCP UR QL: NEGATIVE
PHENTERMINE UR QL: NOT DETECTED
PREGABALIN UR QL CFM: NOT DETECTED
SERVICE CMNT-IMP: NORMAL
TAPENTADOL UR QL SCN: NOT DETECTED
TAPENTADOL UR QL SCN: NOT DETECTED
TEMAZEPAM UR QL: NOT DETECTED
TRAMADOL UR QL: NEGATIVE
ZOLPIDEM PHENYL-4-CARB UR QL SCN: NOT DETECTED
ZOLPIDEM UR QL: NOT DETECTED

## 2025-02-19 ENCOUNTER — RESULTS FOLLOW-UP (OUTPATIENT)
Dept: FAMILY MEDICINE | Facility: CLINIC | Age: 75
End: 2025-02-19

## 2025-02-20 RX ORDER — VALSARTAN 40 MG/1
40 TABLET ORAL
Qty: 90 TABLET | Refills: 3 | Status: SHIPPED | OUTPATIENT
Start: 2025-02-20

## 2025-02-20 NOTE — TELEPHONE ENCOUNTER
No care due was identified.  [facility] Embedded Care Due Messages. Reference number: [id number].   2/20/2025 10:33:59 AM CST

## 2025-02-20 NOTE — TELEPHONE ENCOUNTER
Refill Routing Note   Medication(s) are not appropriate for processing by [facility] for the following reason(s):        No active prescription written by provider    [facility] action(s):  Defer               Appointments  past 12m or future 3m with PCP    Date Provider   Last Visit   12/9/2024 Williams Alvarenga MD   Next Visit   6/10/2025 Williams Alvarenga MD   ED visits in past 90 days: 0        Note composed:12:59 PM 02/20/2025

## 2025-03-05 ENCOUNTER — TELEPHONE (OUTPATIENT)
Dept: FAMILY MEDICINE | Facility: CLINIC | Age: 75
End: 2025-03-05
Payer: MEDICARE

## 2025-03-07 DIAGNOSIS — I48.0 PAROXYSMAL ATRIAL FIBRILLATION: ICD-10-CM

## 2025-03-07 DIAGNOSIS — I49.9 CARDIAC ARRHYTHMIA, UNSPECIFIED CARDIAC ARRHYTHMIA TYPE: Primary | ICD-10-CM

## 2025-03-07 DIAGNOSIS — Z45.02 ICD (IMPLANTABLE CARDIOVERTER-DEFIBRILLATOR) BATTERY DEPLETION: ICD-10-CM

## 2025-03-10 DIAGNOSIS — G89.29 CHRONIC NECK PAIN: ICD-10-CM

## 2025-03-10 DIAGNOSIS — M54.2 CHRONIC NECK PAIN: ICD-10-CM

## 2025-03-10 RX ORDER — HYDROCODONE BITARTRATE AND ACETAMINOPHEN 10; 325 MG/1; MG/1
1 TABLET ORAL EVERY 6 HOURS PRN
Qty: 120 TABLET | Refills: 0 | Status: SHIPPED | OUTPATIENT
Start: 2025-03-19 | End: 2025-03-12 | Stop reason: SDUPTHER

## 2025-03-11 DIAGNOSIS — M54.2 CHRONIC NECK PAIN: ICD-10-CM

## 2025-03-11 DIAGNOSIS — G89.29 CHRONIC NECK PAIN: ICD-10-CM

## 2025-03-11 RX ORDER — HYDROCODONE BITARTRATE AND ACETAMINOPHEN 10; 325 MG/1; MG/1
1 TABLET ORAL EVERY 6 HOURS PRN
Qty: 120 TABLET | Refills: 0 | OUTPATIENT
Start: 2025-03-19 | End: 2025-04-18

## 2025-03-11 NOTE — TELEPHONE ENCOUNTER
----- Message from Wendy sent at 3/11/2025  9:59 AM CDT -----  Regarding: Rx Sent to Wrong Pharmacy  Contact: HIREN TEJEDA [[id number]]  Type:  RX Refill RequestWho Called:  HIREN TEJEDA [[id number]]Refill or New Rx:  RefillRX Name and Strength:  HYDROcodone-acetaminophen (NORCO)  mg per tabletPreferred Pharmacy with phone number:  [facility]ORE #[id number] [facility] [address] [place], [place] [address] [facility] AT [address] [address]Crjsq: [phone] Fax: [phone] or Mail Order:  LocalWould the patient rather a call back or a response via [facility]?  Call [facility] Call Back Number:  [phone] (home) Additional Information:  Pt is stating his rx was sent to [facility].  Pt is requesting it to be sent to [address] [address].Pt is upset stating this happens often and it would take too long for him to receive it via [facility].

## 2025-03-12 DIAGNOSIS — G89.29 CHRONIC NECK PAIN: ICD-10-CM

## 2025-03-12 DIAGNOSIS — M54.2 CHRONIC NECK PAIN: ICD-10-CM

## 2025-03-12 RX ORDER — HYDROCODONE BITARTRATE AND ACETAMINOPHEN 10; 325 MG/1; MG/1
1 TABLET ORAL EVERY 6 HOURS PRN
Qty: 120 TABLET | Refills: 0 | Status: SHIPPED | OUTPATIENT
Start: 2025-03-19 | End: 2025-04-18

## 2025-03-18 RX ORDER — BACLOFEN 10 MG/1
10 TABLET ORAL 3 TIMES DAILY
Qty: 270 TABLET | Refills: 0 | Status: SHIPPED | OUTPATIENT
Start: 2025-03-18

## 2025-03-18 NOTE — TELEPHONE ENCOUNTER
Refill Routing Note   Medication(s) are not appropriate for processing by [facility] for the following reason(s):        Outside of protocol    [facility] action(s):  Route               Appointments  past 12m or future 3m with PCP    Date Provider   Last Visit   12/9/2024 Williams Alvarenga MD   Next Visit   6/10/2025 Williams Alvarenga MD   ED visits in past 90 days: 0        Note composed:11:50 AM 03/18/2025

## 2025-03-24 ENCOUNTER — TELEPHONE (OUTPATIENT)
Dept: OPTOMETRY | Facility: CLINIC | Age: 75
End: 2025-03-24
Payer: MEDICARE

## 2025-03-24 NOTE — TELEPHONE ENCOUNTER
Called to schedule pt for diabetic eye exam.      ----- Message from Sarina sent at 3/24/2025 12:30 PM CDT -----  Type:  Sooner Apoointment RequestCaller is requesting a sooner appointment.  Name of Caller:Xander When is the first available appointment?July Symptoms:AnnualWould the patient rather a call back or a response via [facility]? callBest Call Back Number: [phone] Information:

## 2025-03-26 DIAGNOSIS — I48.0 PAROXYSMAL ATRIAL FIBRILLATION: Primary | ICD-10-CM

## 2025-04-10 DIAGNOSIS — G89.29 CHRONIC NECK PAIN: ICD-10-CM

## 2025-04-10 DIAGNOSIS — M54.2 CHRONIC NECK PAIN: ICD-10-CM

## 2025-04-10 NOTE — TELEPHONE ENCOUNTER
"----- Message from Tsering sent at 4/10/2025  8:32 AM CDT -----  Regarding: refill  Contact: [phone]  .Name Of Caller: Self Contact Preference?: [phone] What is the nature of the call?: Requesting refill for Hydrocodone, and also inquiring about an appt attempted nothing available. pl call . [facility] [place] [address] [address] [facility] AT [address] [address]Tosam: [phone] Fax: [phone]   Additional Notes:"Thank you for all that you do for our patients"  ----- Message -----  From: Tsering Campbell  Sent: 4/10/2025   8:33 AM CDT  To: Veronica VALENTIN Staff  Subject: refill                                           .Name Of Caller: Self Contact Preference?: [phone] What is the nature of the call?: Requesting refill for Hydrocodone, pl call . [facility] [address] [place] [address] [address] [place] AT [address] [address]Rxpop: [phone] Fax: [phone]   Additional Notes:"Thank you for all that you do for our patients"  "

## 2025-04-11 RX ORDER — HYDROCODONE BITARTRATE AND ACETAMINOPHEN 10; 325 MG/1; MG/1
1 TABLET ORAL EVERY 6 HOURS PRN
Qty: 120 TABLET | Refills: 0 | Status: SHIPPED | OUTPATIENT
Start: 2025-04-18 | End: 2025-05-18

## 2025-04-11 NOTE — TELEPHONE ENCOUNTER
----- Message from Denita sent at 4/11/2025  8:26 AM CDT -----  Regarding: Rx Refill  Rx Refill/Request Is this a Refill or New Rx: Rx Refill  Rx Name and Strength: HYDROcodone-acetaminophen (NORCO)  mg per tablet Preferred Pharmacy with phone number:[facility] #[id number] [facility] [place] [address] [facility] [id number] [facility] [address]Gulki: [phone] Fax: [phone]

## 2025-04-11 NOTE — TELEPHONE ENCOUNTER
----- Message from Eliceo sent at 4/10/2025 10:28 AM CDT -----  Type:  Appointment Request Name of Caller:Pete is the first available appointment?No accessSymptoms:check upWould the patient rather a call back or a response via [facility]? [facility] Call Back Number:[phone]

## 2025-04-14 DIAGNOSIS — M54.2 CHRONIC NECK PAIN: ICD-10-CM

## 2025-04-14 DIAGNOSIS — G89.29 CHRONIC NECK PAIN: ICD-10-CM

## 2025-04-14 RX ORDER — HYDROCODONE BITARTRATE AND ACETAMINOPHEN 10; 325 MG/1; MG/1
1 TABLET ORAL EVERY 6 HOURS PRN
Qty: 120 TABLET | Refills: 0 | Status: SHIPPED | OUTPATIENT
Start: 2025-04-18 | End: 2025-05-18

## 2025-04-14 NOTE — TELEPHONE ENCOUNTER
Last ordered:04/11/2025     Last seen:02/11/2025  Upcoming appt:      [id number] - [facility] [place] - [address] AT [facility] [address]   [address] [place] [address]   Phone: [phone] Fax: [phone]

## 2025-04-21 ENCOUNTER — CLINICAL SUPPORT (OUTPATIENT)
Dept: CARDIOLOGY | Facility: HOSPITAL | Age: 75
End: 2025-04-21
Attending: INTERNAL MEDICINE
Payer: MEDICARE

## 2025-04-21 ENCOUNTER — CLINICAL SUPPORT (OUTPATIENT)
Dept: CARDIOLOGY | Facility: HOSPITAL | Age: 75
End: 2025-04-21
Payer: MEDICARE

## 2025-04-21 DIAGNOSIS — Z95.810 PRESENCE OF AUTOMATIC (IMPLANTABLE) CARDIAC DEFIBRILLATOR: ICD-10-CM

## 2025-04-21 DIAGNOSIS — I42.5 OTHER RESTRICTIVE CARDIOMYOPATHY: ICD-10-CM

## 2025-04-21 PROCEDURE — 93296 REM INTERROG EVL PM/IDS: CPT | Performed by: INTERNAL MEDICINE

## 2025-05-08 DIAGNOSIS — R60.0 BILATERAL LEG EDEMA: ICD-10-CM

## 2025-05-08 DIAGNOSIS — I48.0 PAROXYSMAL ATRIAL FIBRILLATION: ICD-10-CM

## 2025-05-08 DIAGNOSIS — I50.32 DIASTOLIC DYSFUNCTION WITH CHRONIC HEART FAILURE: ICD-10-CM

## 2025-05-08 RX ORDER — FUROSEMIDE 20 MG/1
20 TABLET ORAL DAILY
Qty: 90 TABLET | Refills: 2 | Status: SHIPPED | OUTPATIENT
Start: 2025-05-08

## 2025-05-08 NOTE — TELEPHONE ENCOUNTER
Refill Decision Note   Xander Daniel  is requesting a refill authorization.  Brief Assessment and Rationale for Refill:  Approve     Medication Therapy Plan:         Comments:     Note composed:8:32 AM 05/08/2025

## 2025-05-08 NOTE — TELEPHONE ENCOUNTER
No care due was identified.  [facility] Embedded Care Due Messages. Reference number: [id number].   5/08/2025 1:31:36 AM CDT

## 2025-05-09 DIAGNOSIS — G89.29 CHRONIC NECK PAIN: ICD-10-CM

## 2025-05-09 DIAGNOSIS — M54.2 CHRONIC NECK PAIN: ICD-10-CM

## 2025-05-09 RX ORDER — HYDROCODONE BITARTRATE AND ACETAMINOPHEN 10; 325 MG/1; MG/1
1 TABLET ORAL EVERY 6 HOURS PRN
Qty: 120 TABLET | Refills: 0 | Status: SHIPPED | OUTPATIENT
Start: 2025-05-18 | End: 2025-06-17

## 2025-05-09 NOTE — TELEPHONE ENCOUNTER
----- Message from Christen sent at 5/9/2025  9:24 AM CDT -----  Refill request.HYDROcodone-acetaminophen (NORCO)  [id number] [address] [facility] [id number] [facility] [place] [address]Vmwjs: [phone] Fax: [phone] patient's contact info below:Contact Name: Xander SanchezPhone Number: [phone]

## 2025-05-12 DIAGNOSIS — M50.00 HNP (HERNIATED NUCLEUS PULPOSUS) WITH MYELOPATHY, CERVICAL: ICD-10-CM

## 2025-05-12 DIAGNOSIS — M48.02 SPINAL STENOSIS IN CERVICAL REGION: ICD-10-CM

## 2025-05-12 DIAGNOSIS — G90.50 COMPLEX REGIONAL PAIN SYNDROME TYPE 1, AFFECTING UNSPECIFIED SITE: ICD-10-CM

## 2025-05-12 DIAGNOSIS — R29.898 RIGHT ARM WEAKNESS: ICD-10-CM

## 2025-05-12 DIAGNOSIS — M54.12 CERVICAL RADICULAR PAIN: ICD-10-CM

## 2025-05-12 DIAGNOSIS — G56.41 COMPLEX REGIONAL PAIN SYNDROME TYPE 2 OF RIGHT UPPER EXTREMITY: ICD-10-CM

## 2025-05-12 DIAGNOSIS — M75.101 ROTATOR CUFF SYNDROME OF RIGHT SHOULDER: ICD-10-CM

## 2025-05-12 DIAGNOSIS — M47.12 CERVICAL SPONDYLOSIS WITH MYELOPATHY: ICD-10-CM

## 2025-05-12 DIAGNOSIS — G89.4 CHRONIC PAIN SYNDROME: ICD-10-CM

## 2025-05-12 DIAGNOSIS — M50.30 DEGENERATION OF CERVICAL INTERVERTEBRAL DISC: ICD-10-CM

## 2025-05-12 DIAGNOSIS — R29.898 RIGHT HAND WEAKNESS: ICD-10-CM

## 2025-05-12 DIAGNOSIS — M47.812 FACET ARTHRITIS OF CERVICAL REGION: ICD-10-CM

## 2025-05-12 DIAGNOSIS — M62.81 MUSCLE RIGHT ARM WEAKNESS: ICD-10-CM

## 2025-05-12 DIAGNOSIS — M96.1 CERVICAL POST-LAMINECTOMY SYNDROME: ICD-10-CM

## 2025-05-12 DIAGNOSIS — M54.12 BRACHIAL NEURITIS OR RADICULITIS: ICD-10-CM

## 2025-05-12 DIAGNOSIS — M48.02 CERVICAL STENOSIS OF SPINE: ICD-10-CM

## 2025-05-12 DIAGNOSIS — F11.20 NARCOTIC DEPENDENCY, CONTINUOUS: ICD-10-CM

## 2025-05-12 DIAGNOSIS — M79.601 RIGHT ARM PAIN: ICD-10-CM

## 2025-05-12 DIAGNOSIS — R26.9 GAIT ABNORMALITY: ICD-10-CM

## 2025-05-12 RX ORDER — GABAPENTIN 600 MG/1
600 TABLET ORAL 2 TIMES DAILY
Qty: 180 TABLET | Refills: 3 | Status: SHIPPED | OUTPATIENT
Start: 2025-05-12

## 2025-05-12 NOTE — TELEPHONE ENCOUNTER
----- Message from Kushaljuan sent at 5/12/2025 11:08 AM CDT -----  Regarding: Refill  Contact: [phone]  Rx Refill/Request Is this a Refill or New Rx:  REFILLRx Name and Strength:  gabapentin (NEURONTIN) 600 MG tabletPreferred Pharmacy with phone number: [facility] - [place], [place] - [address] [facility] [facility] [address]Ujbli: [phone] Fax: [phone] Preference: [phone]

## 2025-05-19 LAB
OHS CV AF BURDEN PERCENT: < 1
OHS CV DC REMOTE DEVICE TYPE: NORMAL
OHS CV RV PACING PERCENT: 1 %

## 2025-05-31 RX ORDER — BACLOFEN 10 MG/1
10 TABLET ORAL 3 TIMES DAILY
Qty: 270 TABLET | Refills: 3 | Status: SHIPPED | OUTPATIENT
Start: 2025-05-31

## 2025-06-04 ENCOUNTER — LAB VISIT (OUTPATIENT)
Dept: LAB | Facility: HOSPITAL | Age: 75
End: 2025-06-04
Attending: FAMILY MEDICINE
Payer: MEDICARE

## 2025-06-04 DIAGNOSIS — I48.0 PAROXYSMAL ATRIAL FIBRILLATION: ICD-10-CM

## 2025-06-04 DIAGNOSIS — I10 ESSENTIAL HYPERTENSION: ICD-10-CM

## 2025-06-04 DIAGNOSIS — E11.40 TYPE 2 DIABETES MELLITUS WITH DIABETIC NEUROPATHY, WITHOUT LONG-TERM CURRENT USE OF INSULIN: ICD-10-CM

## 2025-06-04 LAB
ALBUMIN SERPL BCP-MCNC: 3.8 G/DL (ref 3.5–5.2)
ALP SERPL-CCNC: 85 UNIT/L (ref 40–150)
ALT SERPL W/O P-5'-P-CCNC: 14 UNIT/L (ref 10–44)
ANION GAP (OHS): 7 MMOL/L (ref 8–16)
AST SERPL-CCNC: 20 UNIT/L (ref 11–45)
BILIRUB SERPL-MCNC: 0.3 MG/DL (ref 0.1–1)
BUN SERPL-MCNC: 7 MG/DL (ref 8–23)
CALCIUM SERPL-MCNC: 8.6 MG/DL (ref 8.7–10.5)
CHLORIDE SERPL-SCNC: 104 MMOL/L (ref 95–110)
CHOLEST SERPL-MCNC: 98 MG/DL (ref 120–199)
CHOLEST/HDLC SERPL: 2.3 {RATIO} (ref 2–5)
CO2 SERPL-SCNC: 25 MMOL/L (ref 23–29)
CREAT SERPL-MCNC: 0.7 MG/DL (ref 0.5–1.4)
EAG (OHS): 126 MG/DL (ref 68–131)
GFR SERPLBLD CREATININE-BSD FMLA CKD-EPI: >60 ML/MIN/1.73/M2
GLUCOSE SERPL-MCNC: 82 MG/DL (ref 70–110)
HBA1C MFR BLD: 6 % (ref 4–5.6)
HDLC SERPL-MCNC: 43 MG/DL (ref 40–75)
HDLC SERPL: 43.9 % (ref 20–50)
LDLC SERPL CALC-MCNC: 45.4 MG/DL (ref 63–159)
NONHDLC SERPL-MCNC: 55 MG/DL
POTASSIUM SERPL-SCNC: 4 MMOL/L (ref 3.5–5.1)
PROT SERPL-MCNC: 7.3 GM/DL (ref 6–8.4)
SODIUM SERPL-SCNC: 136 MMOL/L (ref 136–145)
TRIGL SERPL-MCNC: 48 MG/DL (ref 30–150)

## 2025-06-04 PROCEDURE — 80061 LIPID PANEL: CPT

## 2025-06-04 PROCEDURE — 84460 ALANINE AMINO (ALT) (SGPT): CPT

## 2025-06-04 PROCEDURE — 83036 HEMOGLOBIN GLYCOSYLATED A1C: CPT

## 2025-06-04 PROCEDURE — 36415 COLL VENOUS BLD VENIPUNCTURE: CPT | Mod: PO

## 2025-06-13 DIAGNOSIS — G89.29 CHRONIC NECK PAIN: ICD-10-CM

## 2025-06-13 DIAGNOSIS — M54.2 CHRONIC NECK PAIN: ICD-10-CM

## 2025-06-13 RX ORDER — HYDROCODONE BITARTRATE AND ACETAMINOPHEN 10; 325 MG/1; MG/1
1 TABLET ORAL EVERY 6 HOURS PRN
Qty: 120 TABLET | Refills: 0 | Status: SHIPPED | OUTPATIENT
Start: 2025-06-13 | End: 2025-07-13

## 2025-06-13 NOTE — TELEPHONE ENCOUNTER
----- Message from Christen sent at 6/13/2025  8:49 AM CDT -----  Refill request.       HYDROcodone-acetaminophen (NORCO)  mg per tablet        [facility] DR[facility] STORE #[id number] [place] [address] [address] AT [facility] [address]  [address]  [facility] [address]  Phone: [phone] Fax: [phone]    Confirmed patient's contact info below:  Contact Name: Xander Sanchez  Phone Number: [phone]

## 2025-06-18 DIAGNOSIS — G89.29 CHRONIC NECK PAIN: ICD-10-CM

## 2025-06-18 DIAGNOSIS — M54.2 CHRONIC NECK PAIN: ICD-10-CM

## 2025-06-18 DIAGNOSIS — M79.601 RIGHT ARM PAIN: ICD-10-CM

## 2025-06-18 DIAGNOSIS — G56.41 COMPLEX REGIONAL PAIN SYNDROME TYPE 2 OF RIGHT UPPER EXTREMITY: ICD-10-CM

## 2025-06-18 DIAGNOSIS — M50.30 DEGENERATION OF CERVICAL INTERVERTEBRAL DISC: ICD-10-CM

## 2025-06-18 DIAGNOSIS — M47.12 CERVICAL SPONDYLOSIS WITH MYELOPATHY: ICD-10-CM

## 2025-06-18 DIAGNOSIS — M47.812 FACET ARTHRITIS OF CERVICAL REGION: ICD-10-CM

## 2025-06-18 DIAGNOSIS — R29.898 RIGHT ARM WEAKNESS: ICD-10-CM

## 2025-06-18 DIAGNOSIS — G90.50 COMPLEX REGIONAL PAIN SYNDROME TYPE 1, AFFECTING UNSPECIFIED SITE: ICD-10-CM

## 2025-06-18 DIAGNOSIS — M96.1 CERVICAL POST-LAMINECTOMY SYNDROME: ICD-10-CM

## 2025-06-18 DIAGNOSIS — G89.4 CHRONIC PAIN SYNDROME: ICD-10-CM

## 2025-06-18 DIAGNOSIS — M62.81 MUSCLE RIGHT ARM WEAKNESS: ICD-10-CM

## 2025-06-18 DIAGNOSIS — F11.20 NARCOTIC DEPENDENCY, CONTINUOUS: ICD-10-CM

## 2025-06-18 DIAGNOSIS — M48.02 SPINAL STENOSIS IN CERVICAL REGION: ICD-10-CM

## 2025-06-18 DIAGNOSIS — M75.101 ROTATOR CUFF SYNDROME OF RIGHT SHOULDER: ICD-10-CM

## 2025-06-18 DIAGNOSIS — R26.9 GAIT ABNORMALITY: ICD-10-CM

## 2025-06-18 DIAGNOSIS — M50.00 HNP (HERNIATED NUCLEUS PULPOSUS) WITH MYELOPATHY, CERVICAL: ICD-10-CM

## 2025-06-18 DIAGNOSIS — M54.12 BRACHIAL NEURITIS OR RADICULITIS: ICD-10-CM

## 2025-06-18 DIAGNOSIS — M54.12 CERVICAL RADICULAR PAIN: ICD-10-CM

## 2025-06-18 DIAGNOSIS — R29.898 RIGHT HAND WEAKNESS: ICD-10-CM

## 2025-06-18 DIAGNOSIS — M48.02 CERVICAL STENOSIS OF SPINE: ICD-10-CM

## 2025-06-18 RX ORDER — HYDROCODONE BITARTRATE AND ACETAMINOPHEN 10; 325 MG/1; MG/1
1 TABLET ORAL EVERY 6 HOURS PRN
Qty: 120 TABLET | Refills: 0 | Status: SHIPPED | OUTPATIENT
Start: 2025-06-18 | End: 2025-07-18

## 2025-06-18 RX ORDER — GABAPENTIN 600 MG/1
600 TABLET ORAL 2 TIMES DAILY
Qty: 180 TABLET | Refills: 3 | Status: SHIPPED | OUTPATIENT
Start: 2025-06-18

## 2025-06-24 ENCOUNTER — OFFICE VISIT (OUTPATIENT)
Dept: PHYSICAL MEDICINE AND REHAB | Facility: CLINIC | Age: 75
End: 2025-06-24
Payer: MEDICARE

## 2025-06-24 VITALS — BODY MASS INDEX: 25.32 KG/M2 | OXYGEN SATURATION: 98 % | WEIGHT: 180.88 LBS | HEIGHT: 71 IN

## 2025-06-24 DIAGNOSIS — Z98.1 STATUS POST CERVICAL SPINAL FUSION: ICD-10-CM

## 2025-06-24 DIAGNOSIS — M54.2 CHRONIC NECK PAIN: Primary | ICD-10-CM

## 2025-06-24 DIAGNOSIS — M79.601 RIGHT ARM PAIN: ICD-10-CM

## 2025-06-24 DIAGNOSIS — M75.101 ROTATOR CUFF SYNDROME OF RIGHT SHOULDER: ICD-10-CM

## 2025-06-24 DIAGNOSIS — Z79.891 CHRONICALLY ON OPIATE THERAPY: ICD-10-CM

## 2025-06-24 DIAGNOSIS — M96.1 CERVICAL POST-LAMINECTOMY SYNDROME: ICD-10-CM

## 2025-06-24 DIAGNOSIS — M48.02 SPINAL STENOSIS IN CERVICAL REGION: ICD-10-CM

## 2025-06-24 DIAGNOSIS — R26.9 GAIT ABNORMALITY: ICD-10-CM

## 2025-06-24 DIAGNOSIS — R29.898 RIGHT HAND WEAKNESS: ICD-10-CM

## 2025-06-24 DIAGNOSIS — M47.12 CERVICAL SPONDYLOSIS WITH MYELOPATHY: ICD-10-CM

## 2025-06-24 DIAGNOSIS — M62.81 MUSCLE RIGHT ARM WEAKNESS: ICD-10-CM

## 2025-06-24 DIAGNOSIS — G90.50 COMPLEX REGIONAL PAIN SYNDROME TYPE 1, AFFECTING UNSPECIFIED SITE: ICD-10-CM

## 2025-06-24 DIAGNOSIS — F11.20 NARCOTIC DEPENDENCY, CONTINUOUS: ICD-10-CM

## 2025-06-24 DIAGNOSIS — M50.00 HNP (HERNIATED NUCLEUS PULPOSUS) WITH MYELOPATHY, CERVICAL: ICD-10-CM

## 2025-06-24 DIAGNOSIS — M54.12 BRACHIAL NEURITIS OR RADICULITIS: ICD-10-CM

## 2025-06-24 DIAGNOSIS — M54.12 CERVICAL RADICULAR PAIN: ICD-10-CM

## 2025-06-24 DIAGNOSIS — G90.511 COMPLEX REGIONAL PAIN SYNDROME TYPE 1 OF RIGHT UPPER EXTREMITY: ICD-10-CM

## 2025-06-24 DIAGNOSIS — G56.41 COMPLEX REGIONAL PAIN SYNDROME TYPE 2 OF RIGHT UPPER EXTREMITY: ICD-10-CM

## 2025-06-24 DIAGNOSIS — M47.812 FACET ARTHRITIS OF CERVICAL REGION: ICD-10-CM

## 2025-06-24 DIAGNOSIS — R29.898 RIGHT ARM WEAKNESS: ICD-10-CM

## 2025-06-24 DIAGNOSIS — M50.30 DEGENERATION OF CERVICAL INTERVERTEBRAL DISC: ICD-10-CM

## 2025-06-24 DIAGNOSIS — G89.4 CHRONIC PAIN SYNDROME: ICD-10-CM

## 2025-06-24 DIAGNOSIS — G89.29 CHRONIC NECK PAIN: Primary | ICD-10-CM

## 2025-06-24 DIAGNOSIS — M48.02 CERVICAL STENOSIS OF SPINE: ICD-10-CM

## 2025-06-24 PROCEDURE — 1101F PT FALLS ASSESS-DOCD LE1/YR: CPT | Mod: CPTII,S$GLB,, | Performed by: PHYSICAL MEDICINE & REHABILITATION

## 2025-06-24 PROCEDURE — 99213 OFFICE O/P EST LOW 20 MIN: CPT | Mod: S$GLB,,, | Performed by: PHYSICAL MEDICINE & REHABILITATION

## 2025-06-24 PROCEDURE — 3288F FALL RISK ASSESSMENT DOCD: CPT | Mod: CPTII,S$GLB,, | Performed by: PHYSICAL MEDICINE & REHABILITATION

## 2025-06-24 PROCEDURE — 1125F AMNT PAIN NOTED PAIN PRSNT: CPT | Mod: CPTII,S$GLB,, | Performed by: PHYSICAL MEDICINE & REHABILITATION

## 2025-06-24 PROCEDURE — 1159F MED LIST DOCD IN RCRD: CPT | Mod: CPTII,S$GLB,, | Performed by: PHYSICAL MEDICINE & REHABILITATION

## 2025-06-24 PROCEDURE — 99999 PR PBB SHADOW E&M-EST. PATIENT-LVL III: CPT | Mod: PBBFAC,,, | Performed by: PHYSICAL MEDICINE & REHABILITATION

## 2025-06-24 RX ORDER — GABAPENTIN 600 MG/1
600 TABLET ORAL 2 TIMES DAILY
Qty: 180 TABLET | Refills: 3 | Status: SHIPPED | OUTPATIENT
Start: 2025-06-24 | End: 2025-06-27 | Stop reason: SDUPTHER

## 2025-06-24 NOTE — PROGRESS NOTES
Subjective:       Patient ID: Xander Sanchez is a 75 y.o. male.    Chief Complaint: No chief complaint on file.      HPI      Mr. Sanchez is a 74-year-old black male with past medical history of hypertension, diabetes mellitus, peripheral neuropathy, RA, nonischemic cardiomyopathy, chronic systolic heart failure, status post AICD, COPD, osteoarthritis, chronic neck pain status post 3 neck surgeries, CRPS type 1 of right arm, GERD.  The patient is followed up at the [facility] for chronic neck pain with right cervical radiculopathy, status post multiple neck surgeries (C3-6 laminectomy in 11/2012, C5-6 ACDF in 04/2014, and C3-7 posterior fusion in 08/2015).  He was last seen on 2/11/2025  He was maintained on gabapentin, p.r.n. baclofen and p.r.n. hydrocodone/APAP.  A [facility] Drug Screen was obtained and was positive as expected for hydrocodone and gabapentin.    The patient is coming to the clinic for follow-up.  His neck pain has been under good control, but he still has right arm pain. It is an intermittent tightness and burning or cold sensation from the shoulder area shooting to his right hand.  His pain is worse when he wakes up in the morning.  It is better with his pain medications.  His max pain is 8/10 and minimum 2/10.  Today it is 3/10.  The patient denies any significant upper extremity weakness.  He complains of occasional spasms in his right arm.  He has chronic impaired hand coordination such as trouble buttoning or writing. He continues to use the leg exerciser.    He is currently taking:  Gabapentin 600 mg p.o. 2 times per day.  He felt sleepy when he increased it to 3 times per day.  Hydrocodone/APAP 10/325 p.r.n. 4 times per day   Baclofen 10 mg p.r.n. 2 times per day.    He finished courses of physical therapy in 7/2023 and occupational therapy in 3/2023.   He has been doing a home exercise program.    Past Medical History:   Diagnosis Date    Allergy     Arthritis     Asthma      Cardiomyopathy     Cataract     Cervical radicular pain     Cervical spondylosis with myelopathy 10/17/2012    Chronic bronchitis     Chronic systolic dysfunction of left ventricle 07/27/2015    Constipation 07/27/2015    COPD (chronic obstructive pulmonary disease)     CRPS (complex regional pain syndrome type I) 12/29/2014    Diabetes mellitus, type 2     DM type 2 (diabetes mellitus, type 2) 07/27/2015    Enlarged prostate 07/27/2015    Enuresis 07/06/2018    Hypertension     ICD (implantable cardiac defibrillator) in place     Mixed hyperlipidemia 02/28/2018    Paroxysmal atrial fibrillation 1/23/2024    Presence of biventricular AICD 07/27/2015    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thy 02/01/2016    Urinary tract infection     pt does not know        Review of patient's allergies indicates:   Allergen Reactions    Ciprofloxacin Nausea And Vomiting        Review of Systems   Constitutional:  Negative for chills and fever.   Eyes:  Negative for visual disturbance.   Respiratory:  Negative for shortness of breath.    Cardiovascular:  Negative for chest pain.   Gastrointestinal:  Positive for constipation. Negative for nausea and vomiting.   Genitourinary:  Negative for difficulty urinating.   Musculoskeletal:  Positive for gait problem, myalgias and neck pain. Negative for back pain.   Neurological:  Negative for dizziness, weakness and headaches.   Psychiatric/Behavioral:  Negative for behavioral problems and sleep disturbance.              Objective:      Physical Exam  Vitals reviewed.   Constitutional:       General: He is not in acute distress.     Appearance: He is well-developed.   HENT:      Head: Normocephalic and atraumatic.   Neck:      Comments: Healed posterior and anterior neck surgical scars.  Decreased ROM in all planes.    Musculoskeletal:      Comments: BUE:  ROM:   RUE: full.   LUE: full.  Strength:    RUE: 5/5 at shoulder abduction, 5 elbow flexion, 5 wrist extension, 5 elbow  extension, 5 finger flexion, 5 finger abduction.   LUE: 5/5 at shoulder abduction, 5 elbow flexion, 5 wrist extension, 5 elbow extension, 5 finger flexion, 5 finger abduction.  Sensation to pinprick:   RUE: intact.   LUE: intact.    BLE:  ROM:   RLE: full.   LLE: full.  Strength:    RLE: 5/5 at hip flexion, 5 knee extension, 5 ankle DF, 5 PF, 5 EHL.   LLE: 5/5 at hip flexion, 5 knee extension, 5 ankle DF, 5 PF, 5 EHL.  Sensation to pinprick:     RLE: intact.      LLE: intact.         Gait:  Slow aiden, some shuffling and circumduction on the right side.     Skin:     General: Skin is warm.   Neurological:      Mental Status: He is alert.   Psychiatric:         Behavior: Behavior normal.         Assessment:       1. Chronic neck pain    2. Status post cervical spinal fusion    3. Cervical post-laminectomy syndrome    4. Complex regional pain syndrome type 1 of right upper extremity    5. Chronically on opiate therapy          Plan:       - Oral NSAIDs will be avoided due to cardiac illness.  - Continue gabapentin 600 mg, 1 tablet by mouth 2 times per day   - Continue hydrocodone/APAP 10/325, 1 tablet by mouth every 6 hours as needed for pain.    - Continue baclofen 10 mg tablets; take 1 tablet by mouth 3 times per day as needed for muscle spasms.  - Regular home exercise program was encouraged.  - Follow up in about 4 months (around 10/24/2025).      This note was partly generated with [employer] voice recognition software. I apologize for any possible typographical errors.

## 2025-06-27 DIAGNOSIS — M47.812 FACET ARTHRITIS OF CERVICAL REGION: ICD-10-CM

## 2025-06-27 DIAGNOSIS — M54.12 BRACHIAL NEURITIS OR RADICULITIS: ICD-10-CM

## 2025-06-27 DIAGNOSIS — F11.20 NARCOTIC DEPENDENCY, CONTINUOUS: ICD-10-CM

## 2025-06-27 DIAGNOSIS — M75.101 ROTATOR CUFF SYNDROME OF RIGHT SHOULDER: ICD-10-CM

## 2025-06-27 DIAGNOSIS — G89.4 CHRONIC PAIN SYNDROME: ICD-10-CM

## 2025-06-27 DIAGNOSIS — M47.12 CERVICAL SPONDYLOSIS WITH MYELOPATHY: ICD-10-CM

## 2025-06-27 DIAGNOSIS — M96.1 CERVICAL POST-LAMINECTOMY SYNDROME: ICD-10-CM

## 2025-06-27 DIAGNOSIS — M54.12 CERVICAL RADICULAR PAIN: ICD-10-CM

## 2025-06-27 DIAGNOSIS — M50.30 DEGENERATION OF CERVICAL INTERVERTEBRAL DISC: ICD-10-CM

## 2025-06-27 DIAGNOSIS — G56.41 COMPLEX REGIONAL PAIN SYNDROME TYPE 2 OF RIGHT UPPER EXTREMITY: ICD-10-CM

## 2025-06-27 DIAGNOSIS — M48.02 CERVICAL STENOSIS OF SPINE: ICD-10-CM

## 2025-06-27 DIAGNOSIS — R29.898 RIGHT HAND WEAKNESS: ICD-10-CM

## 2025-06-27 DIAGNOSIS — R29.898 RIGHT ARM WEAKNESS: ICD-10-CM

## 2025-06-27 DIAGNOSIS — M62.81 MUSCLE RIGHT ARM WEAKNESS: ICD-10-CM

## 2025-06-27 DIAGNOSIS — M79.601 RIGHT ARM PAIN: ICD-10-CM

## 2025-06-27 DIAGNOSIS — M50.00 HNP (HERNIATED NUCLEUS PULPOSUS) WITH MYELOPATHY, CERVICAL: ICD-10-CM

## 2025-06-27 DIAGNOSIS — M48.02 SPINAL STENOSIS IN CERVICAL REGION: ICD-10-CM

## 2025-06-27 DIAGNOSIS — R26.9 GAIT ABNORMALITY: ICD-10-CM

## 2025-06-27 DIAGNOSIS — G90.50 COMPLEX REGIONAL PAIN SYNDROME TYPE 1, AFFECTING UNSPECIFIED SITE: ICD-10-CM

## 2025-06-27 RX ORDER — GABAPENTIN 600 MG/1
600 TABLET ORAL 2 TIMES DAILY
Qty: 180 TABLET | Refills: 3 | Status: SHIPPED | OUTPATIENT
Start: 2025-06-27

## 2025-06-27 NOTE — TELEPHONE ENCOUNTER
Copied from CRM #[id number]. Topic: Medications - Medication Refill  >> Jun 27, 2025  1:07 PM Genaro wrote:  Type:  RX Refill Request    Who Called:  Patient  Refill or New Rx: gabapentin (NEURONTIN) 600 MG tablet    RX Name and Strength: gabapentin (NEURONTIN) 600 MG tablet    Preferred Pharmacy with phone number:    [facility] [address]  [address] [address]  Phone: [phone] Fax: [phone]      Local or Mail Order: Mail Order  Ordering Provider: Dr. Martin  Would the patient rather a call back or a response via [facility]? callback  Best Call Back Number: [phone]    Additional Information: n/a

## 2025-07-11 DIAGNOSIS — M54.2 CHRONIC NECK PAIN: ICD-10-CM

## 2025-07-11 DIAGNOSIS — G89.29 CHRONIC NECK PAIN: ICD-10-CM

## 2025-07-11 RX ORDER — HYDROCODONE BITARTRATE AND ACETAMINOPHEN 10; 325 MG/1; MG/1
1 TABLET ORAL EVERY 6 HOURS PRN
Qty: 120 TABLET | Refills: 0 | Status: SHIPPED | OUTPATIENT
Start: 2025-07-18 | End: 2025-08-17

## 2025-07-14 DIAGNOSIS — R32 ENURESIS: ICD-10-CM

## 2025-07-14 RX ORDER — PRAVASTATIN SODIUM 10 MG/1
10 TABLET ORAL NIGHTLY
Qty: 90 TABLET | Refills: 1 | Status: SHIPPED | OUTPATIENT
Start: 2025-07-14

## 2025-07-14 NOTE — TELEPHONE ENCOUNTER
No care due was identified.  [facility] Embedded Care Due Messages. Reference number: [id number].   7/14/2025 2:09:21 AM CDT

## 2025-07-15 RX ORDER — OXYBUTYNIN CHLORIDE 5 MG/1
5 TABLET ORAL 2 TIMES DAILY
Qty: 180 TABLET | Refills: 1 | Status: SHIPPED | OUTPATIENT
Start: 2025-07-15

## 2025-07-15 NOTE — TELEPHONE ENCOUNTER
Refill Routing Note   Medication(s) are not appropriate for processing by [facility] for the following reason(s):     DDI not previously overridden by current provider--after initial override, the Refill Center will be able to continue overrides    : oxybutynin and Incomplete bladder emptying   Drug-disease interaction    [facility] action(s):  Defer  Approve           Pharmacist review requested: Yes     Appointments  past 12m or future 3m with PCP    Date Provider   Last Visit   12/9/2024 Williams Alvarenga MD   Next Visit   Visit date not found Williams Alvarenga MD   ED visits in past 90 days: 0        Note composed:11:37 PM 07/14/2025

## 2025-07-15 NOTE — TELEPHONE ENCOUNTER
Refill Decision Note   Xander Sanchez  is requesting a refill authorization.  Brief Assessment and Rationale for Refill:  Approve     Medication Therapy Plan:         Pharmacist review requested: Yes   Comments:     Note composed:6:18 AM 07/15/2025

## 2025-07-21 ENCOUNTER — CLINICAL SUPPORT (OUTPATIENT)
Dept: CARDIOLOGY | Facility: HOSPITAL | Age: 75
End: 2025-07-21
Attending: INTERNAL MEDICINE
Payer: MEDICARE

## 2025-07-21 ENCOUNTER — CLINICAL SUPPORT (OUTPATIENT)
Dept: CARDIOLOGY | Facility: HOSPITAL | Age: 75
End: 2025-07-21
Payer: MEDICARE

## 2025-07-21 DIAGNOSIS — I42.5 OTHER RESTRICTIVE CARDIOMYOPATHY: ICD-10-CM

## 2025-07-21 DIAGNOSIS — Z95.810 PRESENCE OF AUTOMATIC (IMPLANTABLE) CARDIAC DEFIBRILLATOR: ICD-10-CM

## 2025-07-21 PROCEDURE — 93295 DEV INTERROG REMOTE 1/2/MLT: CPT | Mod: ,,, | Performed by: INTERNAL MEDICINE

## 2025-07-21 PROCEDURE — 93296 REM INTERROG EVL PM/IDS: CPT | Performed by: INTERNAL MEDICINE

## 2025-08-01 LAB
OHS CV AF BURDEN PERCENT: < 1
OHS CV DC REMOTE DEVICE TYPE: NORMAL
OHS CV RV PACING PERCENT: 1 %

## 2025-08-01 NOTE — PROGRESS NOTES
Subjective   Patient ID:  Xander Sanchez is a 75 y.o. male who presents for follow-up of ICD check      HPI 76 yo male with NICM (EF now 40%), LBBB, COPD, CRT-D (LV lead off)     Background:  Previously managed by Dr. Mensah.  He was diagnosed with NICM with intermittent LBBB at [facility] near 2005.  LHC reportedly negative.    Single chamber ICD implanted for primary prevention.     He was scheduled for a generator change, noted to be in LBBB again ( with correlative NYHA III symptoms ) and fluoroscopy of the RV ([facility]) revealed lead externalization but a patent left sided venous system. Echo revealed EF 25%.  3/24/15 Extraction of ICD lead and implantation of CRT-D (Dr. Mensah). Paucity of LV vein targets, has high LV threshold.  Did not derive symptomatic benefit from upgrade, and EF remained unchanged at 30%.  We turned off LV lead 11/16 to conserve battery.  10/24/2023: Successful generator change        Update:     Feeling well overall. Notes shortness of breath primarily in the morning, improves with his inhaler. Denies syncope, palpitations. Walks about 2K steps.    Device interrogation reveals stable function of the leads, RA pacing 17% RV pacing 1%, no significant arrhythmias.       Review of Systems   Constitutional: Negative. Negative for fever and malaise/fatigue.   HENT:  Negative for congestion and sore throat.    Cardiovascular:  Negative for chest pain, dyspnea on exertion, irregular heartbeat, leg swelling, near-syncope, orthopnea, palpitations, paroxysmal nocturnal dyspnea and syncope.   Respiratory:  Positive for shortness of breath. Negative for cough.    Gastrointestinal:  Negative for abdominal pain, constipation and diarrhea.   Neurological:  Negative for dizziness, light-headedness and weakness.   Psychiatric/Behavioral:  Negative for depression. The patient is not nervous/anxious.    All other systems reviewed and are negative.         Objective     Physical  Exam  Constitutional:       Appearance: He is well-developed.   Eyes:      General: No scleral icterus.     Conjunctiva/sclera: Conjunctivae normal.   Neck:      Vascular: No JVD.      Trachea: No tracheal deviation.   Cardiovascular:      Rate and Rhythm: Normal rate and regular rhythm.      Chest Wall: PMI is not displaced.      Heart sounds: Normal heart sounds.   Pulmonary:      Effort: Pulmonary effort is normal. No respiratory distress.      Breath sounds: Normal breath sounds.   Abdominal:      Palpations: Abdomen is soft.      Tenderness: There is no abdominal tenderness.   Musculoskeletal:         General: No tenderness.   Skin:     General: Skin is warm and dry.      Findings: No rash.   Neurological:      Mental Status: He is alert and oriented to person, place, and time.   Psychiatric:         Behavior: Behavior normal.            Assessment and Plan     1. LBBB (left bundle branch block)    2. Diastolic dysfunction with chronic heart failure    3. Paroxysmal atrial fibrillation    4. Cardiomyopathy, nonischemic    5. Biventricular implantable cardioverter-defibrillator (ICD) in situ    6. Chronic systolic heart failure    7. 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thy, without long-term current use of insulin        Plan:    Remains well from an arrhythmia standpoint.  Continue current settings and medications.  Echo with color flow.  Establish care with General Cardiology.  F/u with monitoring as scheduled with me in one year

## 2025-08-04 ENCOUNTER — OFFICE VISIT (OUTPATIENT)
Dept: ELECTROPHYSIOLOGY | Facility: CLINIC | Age: 75
End: 2025-08-04
Payer: MEDICARE

## 2025-08-04 ENCOUNTER — CLINICAL SUPPORT (OUTPATIENT)
Dept: CARDIOLOGY | Facility: HOSPITAL | Age: 75
End: 2025-08-04
Attending: INTERNAL MEDICINE
Payer: MEDICARE

## 2025-08-04 VITALS
WEIGHT: 174.38 LBS | BODY MASS INDEX: 24.41 KG/M2 | DIASTOLIC BLOOD PRESSURE: 62 MMHG | HEIGHT: 71 IN | HEART RATE: 70 BPM | SYSTOLIC BLOOD PRESSURE: 135 MMHG

## 2025-08-04 DIAGNOSIS — I48.0 PAROXYSMAL ATRIAL FIBRILLATION: ICD-10-CM

## 2025-08-04 DIAGNOSIS — I42.8 CARDIOMYOPATHY, NONISCHEMIC: ICD-10-CM

## 2025-08-04 DIAGNOSIS — I44.7 LBBB (LEFT BUNDLE BRANCH BLOCK): Primary | ICD-10-CM

## 2025-08-04 DIAGNOSIS — I50.22 CHRONIC SYSTOLIC HEART FAILURE: ICD-10-CM

## 2025-08-04 DIAGNOSIS — I50.32 DIASTOLIC DYSFUNCTION WITH CHRONIC HEART FAILURE: ICD-10-CM

## 2025-08-04 DIAGNOSIS — E11.40 TYPE 2 DIABETES MELLITUS WITH DIABETIC NEUROPATHY, WITHOUT LONG-TERM CURRENT USE OF INSULIN: ICD-10-CM

## 2025-08-04 DIAGNOSIS — Z95.810 BIVENTRICULAR IMPLANTABLE CARDIOVERTER-DEFIBRILLATOR (ICD) IN SITU: ICD-10-CM

## 2025-08-04 PROCEDURE — 1126F AMNT PAIN NOTED NONE PRSNT: CPT | Mod: CPTII,S$GLB,, | Performed by: INTERNAL MEDICINE

## 2025-08-04 PROCEDURE — 99214 OFFICE O/P EST MOD 30 MIN: CPT | Mod: S$GLB,,, | Performed by: INTERNAL MEDICINE

## 2025-08-04 PROCEDURE — 99999 PR PBB SHADOW E&M-EST. PATIENT-LVL IV: CPT | Mod: PBBFAC,,, | Performed by: INTERNAL MEDICINE

## 2025-08-04 PROCEDURE — 3078F DIAST BP <80 MM HG: CPT | Mod: CPTII,S$GLB,, | Performed by: INTERNAL MEDICINE

## 2025-08-04 PROCEDURE — 93283 PRGRMG EVAL IMPLANTABLE DFB: CPT

## 2025-08-04 PROCEDURE — 3075F SYST BP GE 130 - 139MM HG: CPT | Mod: CPTII,S$GLB,, | Performed by: INTERNAL MEDICINE

## 2025-08-04 PROCEDURE — 3288F FALL RISK ASSESSMENT DOCD: CPT | Mod: CPTII,S$GLB,, | Performed by: INTERNAL MEDICINE

## 2025-08-04 PROCEDURE — 1159F MED LIST DOCD IN RCRD: CPT | Mod: CPTII,S$GLB,, | Performed by: INTERNAL MEDICINE

## 2025-08-04 PROCEDURE — 1101F PT FALLS ASSESS-DOCD LE1/YR: CPT | Mod: CPTII,S$GLB,, | Performed by: INTERNAL MEDICINE

## 2025-08-06 LAB
OHS CV AF BURDEN PERCENT: < 1
OHS CV DC REMOTE DEVICE TYPE: NORMAL
OHS CV RV PACING PERCENT: 1

## 2025-08-12 DIAGNOSIS — G89.29 CHRONIC NECK PAIN: ICD-10-CM

## 2025-08-12 DIAGNOSIS — M54.2 CHRONIC NECK PAIN: ICD-10-CM

## 2025-08-12 RX ORDER — HYDROCODONE BITARTRATE AND ACETAMINOPHEN 10; 325 MG/1; MG/1
1 TABLET ORAL EVERY 6 HOURS PRN
Qty: 120 TABLET | Refills: 0 | Status: SHIPPED | OUTPATIENT
Start: 2025-08-17 | End: 2025-09-16

## 2025-08-13 RX ORDER — FLUTICASONE PROPIONATE AND SALMETEROL XINAFOATE 230; 21 UG/1; UG/1
AEROSOL, METERED RESPIRATORY (INHALATION)
Qty: 36 G | Refills: 1 | Status: SHIPPED | OUTPATIENT
Start: 2025-08-13

## (undated) DEVICE — SOL IRR STRL WATER 500ML

## (undated) DEVICE — BLADE SURG BVL ANG COAX 2.4MM

## (undated) DEVICE — CASSETTE INFINITI

## (undated) DEVICE — SHEILD & GARTERS FOX METAL EYE

## (undated) DEVICE — SOL NS 1000CC

## (undated) DEVICE — ELECTRODE REM PLYHSV RETURN 9

## (undated) DEVICE — Device

## (undated) DEVICE — SYR 10CC LUER LOCK

## (undated) DEVICE — SET TUR BLADDER IRRIG Y TYPE

## (undated) DEVICE — SOL BETADINE 5%

## (undated) DEVICE — SOL IRR NACL .9% 3000ML

## (undated) DEVICE — ADHESIVE DERMABOND ADVANCED

## (undated) DEVICE — PACK CYSTO

## (undated) DEVICE — KIT WRENCH

## (undated) DEVICE — GLOVE BIOGEL SKINSENSE PI 7.5

## (undated) DEVICE — SYR SLIP TIP 1CC

## (undated) DEVICE — GOWN SURGICAL X-LARGE

## (undated) DEVICE — TRAY CYSTO BASIN

## (undated) DEVICE — PACK PACER PERMANENT OMC

## (undated) DEVICE — BLADE PLASMA WIDE SPATULA TIP

## (undated) DEVICE — SYR 50ML CATH TIP

## (undated) DEVICE — PAD DEFIB CADENCE ADULT R2

## (undated) DEVICE — DRAPE INCISE IOBAN 2 23X17IN